# Patient Record
Sex: MALE | Race: WHITE | Employment: OTHER | URBAN - METROPOLITAN AREA
[De-identification: names, ages, dates, MRNs, and addresses within clinical notes are randomized per-mention and may not be internally consistent; named-entity substitution may affect disease eponyms.]

---

## 2017-03-27 PROBLEM — E66.09 OBESITY DUE TO EXCESS CALORIES: Status: ACTIVE | Noted: 2017-03-27

## 2017-03-27 PROBLEM — J40 BRONCHITIS: Status: ACTIVE | Noted: 2017-03-27

## 2017-03-27 PROBLEM — I48.0 PAROXYSMAL ATRIAL FIBRILLATION (HCC): Status: ACTIVE | Noted: 2017-03-27

## 2017-03-27 PROBLEM — R06.02 SOB (SHORTNESS OF BREATH) ON EXERTION: Status: ACTIVE | Noted: 2017-03-27

## 2017-03-28 ENCOUNTER — TELEPHONE (OUTPATIENT)
Dept: FAMILY MEDICINE CLINIC | Age: 67
End: 2017-03-28

## 2017-03-28 DIAGNOSIS — I10 ESSENTIAL HYPERTENSION: ICD-10-CM

## 2017-03-28 RX ORDER — LOSARTAN POTASSIUM 50 MG/1
50 TABLET ORAL DAILY
Qty: 90 TABLET | Refills: 1 | Status: SHIPPED | OUTPATIENT
Start: 2017-03-28 | End: 2018-07-11 | Stop reason: SDUPTHER

## 2017-03-28 RX ORDER — ISOSORBIDE MONONITRATE 30 MG/1
TABLET, EXTENDED RELEASE ORAL
Qty: 90 TABLET | Refills: 1 | Status: SHIPPED | OUTPATIENT
Start: 2017-03-28 | End: 2018-07-11 | Stop reason: SDUPTHER

## 2017-03-28 RX ORDER — METOPROLOL SUCCINATE 50 MG/1
TABLET, EXTENDED RELEASE ORAL
Qty: 180 TABLET | Refills: 1 | Status: SHIPPED | OUTPATIENT
Start: 2017-03-28 | End: 2017-12-20 | Stop reason: SDUPTHER

## 2017-03-28 RX ORDER — AMLODIPINE BESYLATE 5 MG/1
TABLET ORAL
Qty: 90 TABLET | Refills: 1 | Status: SHIPPED | OUTPATIENT
Start: 2017-03-28 | End: 2017-03-29

## 2017-03-28 RX ORDER — PRAVASTATIN SODIUM 80 MG/1
80 TABLET ORAL NIGHTLY
Qty: 90 TABLET | Refills: 1 | Status: SHIPPED | OUTPATIENT
Start: 2017-03-28 | End: 2018-03-21 | Stop reason: SDUPTHER

## 2017-03-29 ENCOUNTER — OFFICE VISIT (OUTPATIENT)
Dept: FAMILY MEDICINE CLINIC | Age: 67
End: 2017-03-29

## 2017-03-29 VITALS
RESPIRATION RATE: 20 BRPM | HEART RATE: 68 BPM | SYSTOLIC BLOOD PRESSURE: 122 MMHG | DIASTOLIC BLOOD PRESSURE: 74 MMHG | WEIGHT: 250.8 LBS | BODY MASS INDEX: 33.09 KG/M2 | TEMPERATURE: 97.7 F

## 2017-03-29 DIAGNOSIS — I48.0 PAROXYSMAL ATRIAL FIBRILLATION (HCC): Primary | ICD-10-CM

## 2017-03-29 DIAGNOSIS — J40 BRONCHITIS: ICD-10-CM

## 2017-03-29 DIAGNOSIS — I10 ESSENTIAL HYPERTENSION: Chronic | ICD-10-CM

## 2017-03-29 PROCEDURE — 99495 TRANSJ CARE MGMT MOD F2F 14D: CPT | Performed by: FAMILY MEDICINE

## 2017-03-29 RX ORDER — PROMETHAZINE HYDROCHLORIDE AND CODEINE PHOSPHATE 6.25; 1 MG/5ML; MG/5ML
5 SYRUP ORAL EVERY 4 HOURS PRN
Qty: 180 ML | Refills: 0 | Status: SHIPPED | OUTPATIENT
Start: 2017-03-29 | End: 2017-11-03 | Stop reason: ALTCHOICE

## 2017-06-24 DIAGNOSIS — I10 ESSENTIAL HYPERTENSION: ICD-10-CM

## 2017-06-26 RX ORDER — LOSARTAN POTASSIUM 50 MG/1
TABLET ORAL
Qty: 90 TABLET | Refills: 2 | Status: SHIPPED | OUTPATIENT
Start: 2017-06-26 | End: 2017-11-03 | Stop reason: SDUPTHER

## 2017-06-26 RX ORDER — AMLODIPINE BESYLATE 5 MG/1
TABLET ORAL
Qty: 90 TABLET | Refills: 3 | OUTPATIENT
Start: 2017-06-26

## 2017-06-26 RX ORDER — ISOSORBIDE MONONITRATE 30 MG/1
TABLET, EXTENDED RELEASE ORAL
Qty: 90 TABLET | Refills: 2 | Status: SHIPPED | OUTPATIENT
Start: 2017-06-26 | End: 2017-11-03 | Stop reason: SDUPTHER

## 2017-06-26 RX ORDER — PRAVASTATIN SODIUM 80 MG/1
TABLET ORAL
Qty: 90 TABLET | Refills: 2 | Status: SHIPPED | OUTPATIENT
Start: 2017-06-26 | End: 2017-11-03 | Stop reason: SDUPTHER

## 2017-06-26 RX ORDER — METOPROLOL SUCCINATE 50 MG/1
TABLET, EXTENDED RELEASE ORAL
Qty: 180 TABLET | Refills: 2 | Status: SHIPPED | OUTPATIENT
Start: 2017-06-26 | End: 2017-11-03 | Stop reason: SDUPTHER

## 2017-09-22 RX ORDER — AMLODIPINE BESYLATE 5 MG/1
TABLET ORAL
Qty: 90 TABLET | Refills: 3 | OUTPATIENT
Start: 2017-09-22

## 2017-10-06 ENCOUNTER — IMMUNIZATION (OUTPATIENT)
Dept: FAMILY MEDICINE CLINIC | Age: 67
End: 2017-10-06
Payer: MEDICARE

## 2017-10-06 DIAGNOSIS — Z23 NEEDS FLU SHOT: Primary | ICD-10-CM

## 2017-10-06 PROCEDURE — G0008 ADMIN INFLUENZA VIRUS VAC: HCPCS | Performed by: FAMILY MEDICINE

## 2017-10-06 PROCEDURE — 90662 IIV NO PRSV INCREASED AG IM: CPT | Performed by: FAMILY MEDICINE

## 2017-10-16 ENCOUNTER — HOSPITAL ENCOUNTER (OUTPATIENT)
Age: 67
Discharge: HOME | End: 2017-10-16
Payer: MEDICARE

## 2017-10-16 ENCOUNTER — HOSPITAL ENCOUNTER (OUTPATIENT)
Dept: GENERAL RADIOLOGY | Age: 67
Discharge: HOME | End: 2017-10-16
Payer: MEDICARE

## 2017-10-16 DIAGNOSIS — I25.10 CORONARY ARTERY DISEASE, ANGINA PRESENCE UNSPECIFIED, UNSPECIFIED VESSEL OR LESION TYPE, UNSPECIFIED WHETHER NATIVE OR TRANSPLANTED HEART: ICD-10-CM

## 2017-10-16 LAB
ALBUMIN SERPL-MCNC: 4.6 G/DL (ref 3.5–5.1)
ALP BLD-CCNC: 74 U/L (ref 38–126)
ALT SERPL-CCNC: 39 U/L (ref 11–66)
AST SERPL-CCNC: 24 U/L (ref 5–40)
BILIRUB SERPL-MCNC: 0.5 MG/DL (ref 0.3–1.2)
BILIRUBIN DIRECT: < 0.2 MG/DL (ref 0–0.3)
TOTAL PROTEIN: 7.4 G/DL (ref 6.1–8)
TSH SERPL DL<=0.05 MIU/L-ACNC: 5.72 UIU/ML (ref 0.4–4.2)

## 2017-10-16 PROCEDURE — 80076 HEPATIC FUNCTION PANEL: CPT

## 2017-10-16 PROCEDURE — 84443 ASSAY THYROID STIM HORMONE: CPT

## 2017-10-16 PROCEDURE — 71020 XR CHEST STANDARD TWO VW: CPT

## 2017-10-16 PROCEDURE — 36415 COLL VENOUS BLD VENIPUNCTURE: CPT

## 2017-10-27 ENCOUNTER — TELEPHONE (OUTPATIENT)
Dept: FAMILY MEDICINE CLINIC | Age: 67
End: 2017-10-27

## 2017-10-27 DIAGNOSIS — I10 ESSENTIAL HYPERTENSION: ICD-10-CM

## 2017-10-27 RX ORDER — ISOSORBIDE MONONITRATE 30 MG/1
TABLET, EXTENDED RELEASE ORAL
Qty: 90 TABLET | Refills: 2 | Status: CANCELLED | OUTPATIENT
Start: 2017-10-27

## 2017-10-27 RX ORDER — LOSARTAN POTASSIUM 50 MG/1
TABLET ORAL
Qty: 90 TABLET | Refills: 2 | Status: CANCELLED | OUTPATIENT
Start: 2017-10-27

## 2017-10-27 NOTE — TELEPHONE ENCOUNTER
Patient received the message from your office about the two medications he is requesting prescriptions for (see previous message today). He needs to speak with someone about that.

## 2017-10-27 NOTE — TELEPHONE ENCOUNTER
Chen Campuzano called requesting a refill on the following medications:  Requested Prescriptions     Pending Prescriptions Disp Refills    isosorbide mononitrate (IMDUR) 30 MG extended release tablet 90 tablet 2    losartan (COZAAR) 50 MG tablet 90 tablet 2     Pharmacy verified:  don      Date of last visit: 3/29/17  Date of next visit (if applicable): Visit date not found        Date of last fill and quantity (to be completed by clinical staff)  Pharmacy name:     [facility] [place]

## 2017-10-27 NOTE — TELEPHONE ENCOUNTER
Pt stopped by the office earlier wanting to know why the 2 Rx's were scripts on 6/26/17 were sent in error to [facility] and not to [facility]. He was informed 6/24/17 [facility] sent our office that request electronically (surescripts) and was then sent back. I called [facility] and spoke with Darshana hood who will call [facility] and transfer the Rx's. Pt was informed of this.

## 2017-10-27 NOTE — TELEPHONE ENCOUNTER
Both Rx's below were refilled 6/26/17 for 90/2rf sent to [facility]. Wife was informed [facility] can transfer Rx from [facility].

## 2017-11-02 ENCOUNTER — HOSPITAL ENCOUNTER (EMERGENCY)
Age: 67
Discharge: HOME | End: 2017-11-03
Attending: EMERGENCY MEDICINE
Payer: MEDICARE

## 2017-11-02 DIAGNOSIS — R04.0 EPISTAXIS: Primary | ICD-10-CM

## 2017-11-02 PROCEDURE — 30901 CONTROL OF NOSEBLEED: CPT

## 2017-11-02 PROCEDURE — 99284 EMERGENCY DEPT VISIT MOD MDM: CPT

## 2017-11-03 ENCOUNTER — OFFICE VISIT (OUTPATIENT)
Dept: FAMILY MEDICINE CLINIC | Age: 67
End: 2017-11-03
Payer: MEDICARE

## 2017-11-03 ENCOUNTER — APPOINTMENT (OUTPATIENT)
Dept: GENERAL RADIOLOGY | Age: 67
End: 2017-11-03
Payer: MEDICARE

## 2017-11-03 VITALS
WEIGHT: 253.2 LBS | SYSTOLIC BLOOD PRESSURE: 122 MMHG | TEMPERATURE: 97.5 F | RESPIRATION RATE: 20 BRPM | HEART RATE: 104 BPM | BODY MASS INDEX: 33.41 KG/M2 | DIASTOLIC BLOOD PRESSURE: 76 MMHG

## 2017-11-03 VITALS
HEART RATE: 85 BPM | TEMPERATURE: 97.9 F | BODY MASS INDEX: 32.47 KG/M2 | HEIGHT: 73 IN | RESPIRATION RATE: 17 BRPM | OXYGEN SATURATION: 92 % | DIASTOLIC BLOOD PRESSURE: 107 MMHG | WEIGHT: 245 LBS | SYSTOLIC BLOOD PRESSURE: 174 MMHG

## 2017-11-03 DIAGNOSIS — R04.0 EPISTAXIS: Primary | ICD-10-CM

## 2017-11-03 LAB
ANION GAP SERPL CALCULATED.3IONS-SCNC: 14 MEQ/L (ref 8–16)
BASOPHILS # BLD: 0.9 %
BASOPHILS ABSOLUTE: 0.1 THOU/MM3 (ref 0–0.1)
BUN BLDV-MCNC: 25 MG/DL (ref 7–22)
CALCIUM SERPL-MCNC: 9.1 MG/DL (ref 8.5–10.5)
CHLORIDE BLD-SCNC: 102 MEQ/L (ref 98–111)
CO2: 25 MEQ/L (ref 23–33)
CREAT SERPL-MCNC: 1 MG/DL (ref 0.4–1.2)
EOSINOPHIL # BLD: 3.1 %
EOSINOPHILS ABSOLUTE: 0.3 THOU/MM3 (ref 0–0.4)
GFR SERPL CREATININE-BSD FRML MDRD: 75 ML/MIN/1.73M2
GLUCOSE BLD-MCNC: 104 MG/DL (ref 70–108)
HCT VFR BLD CALC: 46.4 % (ref 42–52)
HEMOGLOBIN: 16 GM/DL (ref 14–18)
INR BLD: 0.96 (ref 0.85–1.13)
LYMPHOCYTES # BLD: 30.6 %
LYMPHOCYTES ABSOLUTE: 2.6 THOU/MM3 (ref 1–4.8)
MCH RBC QN AUTO: 30.2 PG (ref 27–31)
MCHC RBC AUTO-ENTMCNC: 34.5 GM/DL (ref 33–37)
MCV RBC AUTO: 87.7 FL (ref 80–94)
MONOCYTES # BLD: 8.3 %
MONOCYTES ABSOLUTE: 0.7 THOU/MM3 (ref 0.4–1.3)
NUCLEATED RED BLOOD CELLS: 0 /100 WBC
OSMOLALITY CALCULATION: 286 MOSMOL/KG (ref 275–300)
PDW BLD-RTO: 13.6 % (ref 11.5–14.5)
PLATELET # BLD: 223 THOU/MM3 (ref 130–400)
PMV BLD AUTO: 8.9 MCM (ref 7.4–10.4)
POTASSIUM SERPL-SCNC: 4.1 MEQ/L (ref 3.5–5.2)
RBC # BLD: 5.29 MILL/MM3 (ref 4.7–6.1)
SEG NEUTROPHILS: 57.1 %
SEGMENTED NEUTROPHILS ABSOLUTE COUNT: 4.8 THOU/MM3 (ref 1.8–7.7)
SODIUM BLD-SCNC: 141 MEQ/L (ref 135–145)
WBC # BLD: 8.4 THOU/MM3 (ref 4.8–10.8)

## 2017-11-03 PROCEDURE — 6370000000 HC RX 637 (ALT 250 FOR IP): Performed by: EMERGENCY MEDICINE

## 2017-11-03 PROCEDURE — 85610 PROTHROMBIN TIME: CPT

## 2017-11-03 PROCEDURE — 3017F COLORECTAL CA SCREEN DOC REV: CPT | Performed by: FAMILY MEDICINE

## 2017-11-03 PROCEDURE — 1036F TOBACCO NON-USER: CPT | Performed by: FAMILY MEDICINE

## 2017-11-03 PROCEDURE — 99213 OFFICE O/P EST LOW 20 MIN: CPT | Performed by: FAMILY MEDICINE

## 2017-11-03 PROCEDURE — G8417 CALC BMI ABV UP PARAM F/U: HCPCS | Performed by: FAMILY MEDICINE

## 2017-11-03 PROCEDURE — G8427 DOCREV CUR MEDS BY ELIG CLIN: HCPCS | Performed by: FAMILY MEDICINE

## 2017-11-03 PROCEDURE — G8598 ASA/ANTIPLAT THER USED: HCPCS | Performed by: FAMILY MEDICINE

## 2017-11-03 PROCEDURE — 4040F PNEUMOC VAC/ADMIN/RCVD: CPT | Performed by: FAMILY MEDICINE

## 2017-11-03 PROCEDURE — G8484 FLU IMMUNIZE NO ADMIN: HCPCS | Performed by: FAMILY MEDICINE

## 2017-11-03 PROCEDURE — 1123F ACP DISCUSS/DSCN MKR DOCD: CPT | Performed by: FAMILY MEDICINE

## 2017-11-03 PROCEDURE — 80048 BASIC METABOLIC PNL TOTAL CA: CPT

## 2017-11-03 PROCEDURE — 85025 COMPLETE CBC W/AUTO DIFF WBC: CPT

## 2017-11-03 PROCEDURE — 71010 XR CHEST PORTABLE: CPT

## 2017-11-03 PROCEDURE — 36415 COLL VENOUS BLD VENIPUNCTURE: CPT

## 2017-11-03 RX ORDER — CEPHALEXIN 500 MG/1
500 CAPSULE ORAL 3 TIMES DAILY
Qty: 9 CAPSULE | Refills: 0 | Status: SHIPPED | OUTPATIENT
Start: 2017-11-03 | End: 2017-11-06

## 2017-11-03 RX ORDER — CLONIDINE HYDROCHLORIDE 0.2 MG/1
0.2 TABLET ORAL ONCE
Status: COMPLETED | OUTPATIENT
Start: 2017-11-03 | End: 2017-11-03

## 2017-11-03 RX ORDER — LORAZEPAM 1 MG/1
1 TABLET ORAL ONCE
Status: COMPLETED | OUTPATIENT
Start: 2017-11-03 | End: 2017-11-03

## 2017-11-03 RX ADMIN — LORAZEPAM 1 MG: 1 TABLET ORAL at 02:41

## 2017-11-03 RX ADMIN — CLONIDINE HYDROCHLORIDE 0.2 MG: 0.2 TABLET ORAL at 02:23

## 2017-11-03 ASSESSMENT — PATIENT HEALTH QUESTIONNAIRE - PHQ9
2. FEELING DOWN, DEPRESSED OR HOPELESS: 0
SUM OF ALL RESPONSES TO PHQ QUESTIONS 1-9: 0
1. LITTLE INTEREST OR PLEASURE IN DOING THINGS: 0
SUM OF ALL RESPONSES TO PHQ9 QUESTIONS 1 & 2: 0

## 2017-11-03 NOTE — ED NOTES
Dr. Reagan Johnson in room to perform procedure and update pt on POC.       Alberto Pierce RN  11/03/17 8889

## 2017-11-03 NOTE — ED TRIAGE NOTES
Pt brought back to room 8 due to epistaxis, VS and assessment completed on arrival. Pt not taking any blood thinners other than a daily 81mg asprin. Pt states he was laying in bed when his nose began to bleed, he was unable to get the bleeding to stop at home. Pt states there was a stream of blood coming out of his nose at home and when he plugged his nose he could feel it draining into his throat, Pt has a clamp on his nose since arrival, Pt states he can still feel it draining into the back of his throat and he is beginning to get a headache.

## 2017-11-03 NOTE — ED PROVIDER NOTES
Colonoscopy. CURRENT MEDICATIONS       Previous Medications    ALBUTEROL SULFATE  (90 BASE) MCG/ACT INHALER    Inhale 2 puffs into the lungs every 6 hours as needed for Wheezing    AMIODARONE (CORDARONE) 200 MG TABLET    Take 1 tablet by mouth daily    APIXABAN (ELIQUIS) 5 MG TABS TABLET    Take 1 tablet by mouth 2 times daily    ASPIRIN 81 MG CHEWABLE TABLET    Take 1 tablet by mouth daily    BUMETANIDE (BUMEX) 2 MG TABLET    Take 1 tablet by mouth daily    DILTIAZEM (CARDIZEM CD) 120 MG EXTENDED RELEASE CAPSULE    Take 1 capsule by mouth daily    ISOSORBIDE MONONITRATE (IMDUR) 30 MG EXTENDED RELEASE TABLET    take 1 tablet by mouth once daily    ISOSORBIDE MONONITRATE (IMDUR) 30 MG EXTENDED RELEASE TABLET    take 1 tablet by mouth once daily    LOSARTAN (COZAAR) 50 MG TABLET    Take 1 tablet by mouth daily    LOSARTAN (COZAAR) 50 MG TABLET    take 1 tablet by mouth once daily    METOPROLOL SUCCINATE (TOPROL XL) 50 MG EXTENDED RELEASE TABLET    take 1 tablet by mouth twice a day    METOPROLOL SUCCINATE (TOPROL XL) 50 MG EXTENDED RELEASE TABLET    take 1 tablet by mouth twice a day    MOMETASONE (ELOCON) 0.1 % CREAM    Apply topically daily. NITROGLYCERIN (NITROSTAT) 0.4 MG SL TABLET    Place 1 tablet under the tongue every 5 minutes as needed for Chest pain    POTASSIUM CHLORIDE (KLOR-CON) 10 MEQ EXTENDED RELEASE TABLET    Take 1 tablet by mouth 2 times daily    PRAVASTATIN (PRAVACHOL) 80 MG TABLET    Take 1 tablet by mouth nightly    PRAVASTATIN (PRAVACHOL) 80 MG TABLET    take 1 tablet by mouth once daily    PROMETHAZINE-CODEINE (PHENERGAN WITH CODEINE) 6.25-10 MG/5ML SYRUP    Take 5 mLs by mouth every 4 hours as needed for Cough       ALLERGIES     has No Known Allergies. FAMILY HISTORY     indicated that his mother is . He indicated that his father is . He indicated that his sister is alive. He indicated that his maternal grandmother is .  He indicated that his maternal grandfather is . He indicated that his paternal grandmother is . He indicated that his paternal grandfather is . family history includes Alzheimer's Disease in his mother; Cancer in his father; Heart Disease in his father; Heart Surgery in his father. SOCIAL HISTORY      reports that he has never smoked. He has never used smokeless tobacco. He reports that he does not drink alcohol or use drugs. PHYSICAL EXAM     INITIAL VITALS:  height is 6' 1\" (1.854 m) and weight is 245 lb (111.1 kg). His oral temperature is 97.9 °F (36.6 °C). His blood pressure is 174/107 (abnormal) and his pulse is 85. His respiration is 17 and oxygen saturation is 92%. Constitutional: Well appearing and non-toxic   HENT:  Atraumatic appearing  He has epistaxis coming from the right side only. I don't see any in the left side. He has some dried clot in the posterior pharynx noted. Neck- normal range of motion,  supple   Respiratory:  No wheezing, rhonchi or rales  Cardiovascular: regular  GI:  Non tender, no rigidity, rebound or guarding  Neurologic:  Alert & oriented x 3, this patient is very anxious appearing       DIAGNOSTIC RESULTS     RADIOLOGY: non-plain film images(s) such as CT, Ultrasound and MRI are read by the radiologist.   Chest x-ray interpreted by me as negative.     LABS:   Labs Reviewed   BASIC METABOLIC PANEL - Abnormal; Notable for the following:        Result Value    BUN 25 (*)     All other components within normal limits   GLOMERULAR FILTRATION RATE, ESTIMATED - Abnormal; Notable for the following:     Est, Glom Filt Rate 75 (*)     All other components within normal limits   CBC WITH AUTO DIFFERENTIAL   PROTIME-INR   OSMOLALITY   ANION GAP       EMERGENCY DEPARTMENT COURSE:   Vitals:    Vitals:    17 2359 17 0021 17 0114 17 0208   BP: (!) 189/100 (!) 176/98 (!) 167/98 (!) 174/107   Pulse:  86 81 85   Resp:     Temp:  97.9 °F (36.6 °C)     TempSrc: Oral     SpO2:  94% 95% 92%   Weight:       Height: This patient presented with epistaxis coming from the right side and a sensation of clot being stuck in his throat. He is however able to swallow liquids and keep it down. We gave him some oral clonidine for his blood pressure and he was able to keep that down. I placed a rapid Rhino packing in his right nare. It seems to be holding. He was still very anxious and we offered him a dose of Ativan. But essentially the bleeding appears to be under control now. He will be discharged to follow-up with ENT on an outpatient basis. Prescription for Keflex. CRITICAL CARE:   none    PROCEDURES:   Epistaxis packing performed by me. I applied Agusto-Synephrine and viscous lidocaine to the right nare. I then placed a 5.5 anterior rapid Rhino packing in the right side. It seems to be holding except when the patient is picking and pulling at it. Take Keflex while the packing is in. Follow-up with ENT on Monday for packing removal.  Also follow-up with his primary physician and outpatient basis regarding his hypertension issues    FINAL IMPRESSION     Epistaxis with packing, hypertension    DISPOSITION/PLAN   Discharged    DISCHARGE MEDICATIONS:  New Prescriptions    No medications on file       (Please note that portions of this note were completed with a voice recognition program.  Efforts were made to edit the dictations but occasionally words are mis-transcribed.)    Mars Evans DO    Scribe:  Bernadette Anderson 11/3/17 12:50 AM Scribing for and in the presence of Mars Evans DO.    [unfilled]    Provider:  I personally performed the services described in the documentation, reviewed and edited the documentation which was dictated to the scribe in my presence, and it accurately records my words and actions.     Mars Evans DO 11/03/17 3:01 AM        Mars Evans DO  11/03/17 1971

## 2017-11-05 NOTE — PROGRESS NOTES
[address] [place] [address] [facility]  [facility]  [facility]  Dept: [phone]  Dept Fax: [phone]: [phone]  PROGRESS NOTE      Visit Date: 11/5/2017    Mary Jo Enciso is a 74 y.o. male who presents today for:  Chief Complaint   Patient presents with    Follow-up     In [facility] ED 11/2/17 for Epistaxis         Subjective:  HPI  ER follow-up for epistaxis. ER records reviewed. No further bleeding    Review of Systems   HENT: Positive for nosebleeds. Past Medical History:   Diagnosis Date    Anxiety     CAD (coronary artery disease)     Chest pain     Dementia     Dizziness     GERD (gastroesophageal reflux disease)     Heart disease     Hyperlipidemia     Hypertension     Hypothyroidism       Past Surgical History:   Procedure Laterality Date    ADENOIDECTOMY      CARDIAC CATHETERIZATION  09/23/2011    Right radial artery cannulation. Moderate LV dysfunction. The patient has disease in the ostium of diagonal 1 and diagonal 2 branch, however they are both are at the  ostium of the diagonals. Intervention could compromise flow of LAD. THe LAD has long diffuse calcified lesion 50-60-% anatomically.     CARDIOVASCULAR STRESS TEST  09/23/2011    EF 37%    CARPAL TUNNEL RELEASE      COLONOSCOPY      TONSILLECTOMY      TRANSTHORACIC ECHOCARDIOGRAM  09/23/2011    EF 50-55%    VASECTOMY      VASECTOMY       Family History   Problem Relation Age of Onset    Heart Surgery Father     Cancer Father     Heart Disease Father     Alzheimer's Disease Mother      Social History   Substance Use Topics    Smoking status: Never Smoker    Smokeless tobacco: Never Used    Alcohol use No      Current Outpatient Prescriptions   Medication Sig Dispense Refill    cephALEXin (KEFLEX) 500 MG capsule Take 1 capsule by mouth 3 times daily for 3 days 9 capsule 0    metoprolol succinate (TOPROL XL) 50 MG extended release tablet take 1 tablet by mouth twice a day 180 tablet 1    losartan (COZAAR) 50 MG tablet Take 1 tablet by mouth daily 90 tablet 1    pravastatin (PRAVACHOL) 80 MG tablet Take 1 tablet by mouth nightly 90 tablet 1    isosorbide mononitrate (IMDUR) 30 MG extended release tablet take 1 tablet by mouth once daily 90 tablet 1    diltiazem (CARDIZEM CD) 120 MG extended release capsule Take 1 capsule by mouth daily 30 capsule 3    bumetanide (BUMEX) 2 MG tablet Take 1 tablet by mouth daily 30 tablet 0    potassium chloride (KLOR-CON) 10 MEQ extended release tablet Take 1 tablet by mouth 2 times daily 60 tablet 0    aspirin 81 MG chewable tablet Take 1 tablet by mouth daily 1 tablet 0    nitroGLYCERIN (NITROSTAT) 0.4 MG SL tablet Place 1 tablet under the tongue every 5 minutes as needed for Chest pain 25 tablet 3     No current facility-administered medications for this visit. No Known Allergies  Health Maintenance   Topic Date Due    Hepatitis C screen  1950    HIV screen  01/17/1967    DTaP/Tdap/Td vaccine (1 - Tdap) 04/11/2010    Diabetes screen  05/03/2017    Pneumococcal low/med risk (2 of 2 - PPSV23) 10/04/2021    Colon cancer screen colonoscopy  10/14/2021    Lipid screen  05/12/2022    Zostavax vaccine  Completed    Flu vaccine  Completed         Objective:     Physical Exam   Constitutional: He is oriented to person, place, and time. He appears well-developed and well-nourished. No distress. HENT:   Head: Normocephalic and atraumatic. Right Ear: External ear normal.   Left Ear: External ear normal.   Eyes: Conjunctivae are normal.   Neck: No JVD present. Cardiovascular: Normal rate, regular rhythm and normal heart sounds. Pulmonary/Chest: Effort normal and breath sounds normal. He has no wheezes. He has no rales. Musculoskeletal: He exhibits no edema or tenderness. Neurological: He is alert and oriented to person, place, and time. Skin: Skin is warm and dry. He is not diaphoretic. No pallor.      /76   Pulse 104   Temp 97.5 °F (36.4 °C)   Resp 20   Wt 253 lb 3.2 oz (114.9 kg)   BMI 33.41 kg/m²       Impression/Plan:  1. Epistaxis      Requested Prescriptions      No prescriptions requested or ordered in this encounter     No orders of the defined types were placed in this encounter. Keep follow-up with ENT, continue antibiotics, call us new symptoms or recurrence of bleeding    Patient given educational materials - see patient instructions. Discussed use, benefit, and side effects of prescribed medications. All patient questions answered. Pt voiced understanding. Reviewed health maintenance. Patient agreed with treatment plan. Follow up as directed. **This report has been created using voice recognition software. It may contain minor errors which are inherent in voice recognition technology. **       Electronically signed by Susan Schulte MD on 11/5/2017 at 12:25 PM

## 2017-11-06 ENCOUNTER — CARE COORDINATION (OUTPATIENT)
Dept: FAMILY MEDICINE CLINIC | Age: 67
End: 2017-11-06

## 2017-11-06 ENCOUNTER — OFFICE VISIT (OUTPATIENT)
Dept: ENT CLINIC | Age: 67
End: 2017-11-06
Payer: MEDICARE

## 2017-11-06 VITALS
DIASTOLIC BLOOD PRESSURE: 78 MMHG | SYSTOLIC BLOOD PRESSURE: 124 MMHG | TEMPERATURE: 98.7 F | WEIGHT: 250.6 LBS | BODY MASS INDEX: 33.06 KG/M2 | RESPIRATION RATE: 18 BRPM | HEART RATE: 78 BPM

## 2017-11-06 DIAGNOSIS — Z79.02 LONG TERM (CURRENT) USE OF ANTITHROMBOTICS/ANTIPLATELETS: ICD-10-CM

## 2017-11-06 DIAGNOSIS — R04.0 EPISTAXIS: Primary | ICD-10-CM

## 2017-11-06 DIAGNOSIS — Z48.00 ENCOUNTER FOR REMOVAL OF NASAL PACKING: ICD-10-CM

## 2017-11-06 PROCEDURE — G8417 CALC BMI ABV UP PARAM F/U: HCPCS | Performed by: NURSE PRACTITIONER

## 2017-11-06 PROCEDURE — 3017F COLORECTAL CA SCREEN DOC REV: CPT | Performed by: NURSE PRACTITIONER

## 2017-11-06 PROCEDURE — 30901 CONTROL OF NOSEBLEED: CPT | Performed by: NURSE PRACTITIONER

## 2017-11-06 PROCEDURE — 1036F TOBACCO NON-USER: CPT | Performed by: NURSE PRACTITIONER

## 2017-11-06 PROCEDURE — 4040F PNEUMOC VAC/ADMIN/RCVD: CPT | Performed by: NURSE PRACTITIONER

## 2017-11-06 PROCEDURE — 1123F ACP DISCUSS/DSCN MKR DOCD: CPT | Performed by: NURSE PRACTITIONER

## 2017-11-06 PROCEDURE — G8484 FLU IMMUNIZE NO ADMIN: HCPCS | Performed by: NURSE PRACTITIONER

## 2017-11-06 PROCEDURE — 99203 OFFICE O/P NEW LOW 30 MIN: CPT | Performed by: NURSE PRACTITIONER

## 2017-11-06 PROCEDURE — G8598 ASA/ANTIPLAT THER USED: HCPCS | Performed by: NURSE PRACTITIONER

## 2017-11-06 PROCEDURE — G8427 DOCREV CUR MEDS BY ELIG CLIN: HCPCS | Performed by: NURSE PRACTITIONER

## 2017-11-06 ASSESSMENT — ENCOUNTER SYMPTOMS
EYE PAIN: 0
RHINORRHEA: 0
NAUSEA: 0
COUGH: 0
PHOTOPHOBIA: 0
RECTAL PAIN: 0
VOMITING: 0
BACK PAIN: 0
DIARRHEA: 0
EYE REDNESS: 0
SINUS PRESSURE: 0
SHORTNESS OF BREATH: 0
CHEST TIGHTNESS: 0
FACIAL SWELLING: 0
COLOR CHANGE: 0
EYE ITCHING: 0
APNEA: 0
WHEEZING: 0
CONSTIPATION: 0
TROUBLE SWALLOWING: 0
ABDOMINAL DISTENTION: 0
STRIDOR: 0
VOICE CHANGE: 0
BLOOD IN STOOL: 0
ABDOMINAL PAIN: 0
CHOKING: 0
SORE THROAT: 0
ANAL BLEEDING: 0
EYE DISCHARGE: 0

## 2017-11-06 NOTE — PATIENT INSTRUCTIONS
your healthcare professional. [place] [address] any warranty or liability for your use of this information. Content Version: 38.8.049622; Current as of: August 7, 2014                Patient Education        Gastroesophageal Reflux Disease (GERD): Care Instructions  Your Care Instructions    Gastroesophageal reflux disease (GERD) is the backward flow of stomach acid into the esophagus. The esophagus is the tube that leads from your throat to your stomach. A one-way valve prevents the stomach acid from moving up into this tube. When you have GERD, this valve does not close tightly enough. If you have mild GERD symptoms including heartburn, you may be able to control the problem with antacids or over-the-counter medicine. Changing your diet, losing weight, and making other lifestyle changes can also help reduce symptoms. Follow-up care is a key part of your treatment and safety. Be sure to make and go to all appointments, and call your doctor if you are having problems. Its also a good idea to know your test results and keep a list of the medicines you take. How can you care for yourself at home? · Take your medicines exactly as prescribed. Call your doctor if you think you are having a problem with your medicine. · Your doctor may recommend over-the-counter medicine. For mild or occasional indigestion, antacids, such as Tums, Gaviscon, Mylanta, or Maalox, may help. Your doctor also may recommend over-the-counter acid reducers, such as Pepcid AC, Tagamet HB, Zantac 75, or Prilosec. Read and follow all instructions on the label. If you use these medicines often, talk with your doctor. · Change your eating habits. ¨ Its best to eat several small meals instead of two or three large meals. ¨ After you eat, wait 2 to 3 hours before you lie down. ¨ Chocolate, mint, and alcohol can make GERD worse.   ¨ Spicy foods, foods that have a lot of acid (like tomatoes and oranges), and coffee can make GERD symptoms worse in some people. If your symptoms are worse after you eat a certain food, you may want to stop eating that food to see if your symptoms get better. · Do not smoke or chew tobacco. Smoking can make GERD worse. If you need help quitting, talk to your doctor about stop-smoking programs and medicines. These can increase your chances of quitting for good. · If you have GERD symptoms at night, raise the head of your bed 6 to 8 inches by putting the frame on blocks or placing a foam wedge under the head of your mattress. (Adding extra pillows does not work.)  · Do not wear tight clothing around your middle. · Lose weight if you need to. Losing just 5 to 10 pounds can help. When should you call for help? Call your doctor now or seek immediate medical care if:  · You have new or different belly pain. · Your stools are black and tarlike or have streaks of blood. Watch closely for changes in your health, and be sure to contact your doctor if:  · Your symptoms have not improved after 2 days. · Food seems to catch in your throat or chest.  Where can you learn more? Go to https://firstSTRE[employer]. org and sign in to your [employer] for [employer] account. Enter A198 in the 7fgame box to learn more about \"Gastroesophageal Reflux Disease (GERD): Care Instructions. \"     If you do not have an account, please click on the \"Sign Up Now\" link. Current as of: August 9, 2016  Content Version: 11.3  © 5515-4410 [employer], [employer]. Care instructions adapted under license by [facility] ([facility]). If you have questions about a medical condition or this instruction, always ask your healthcare professional. [place] [address] any warranty or liability for your use of this information.

## 2017-11-06 NOTE — CARE COORDINATION
Patient had an appointment with PCP following ed visit. I will not contact re: ed visit at this time.

## 2017-11-06 NOTE — PROGRESS NOTES
[address] [place] [address] [facility]  [address]  [address] [place] [address] [address] [address]  Dept: [phone]  Dept Fax: [phone]  Loc: [address] Kwabena Kowalski is a 74 y.o. male who was referred by No ref. provider found for:  Chief Complaint   Patient presents with    Epistaxis     New patient, follow up ER for pistaxis, needs packing pulled also has been dealing with a cough,    . HPI:       New patient here for right nasal packing removal.  Patient went to ER late on 11/2/17 for right-sided nosebleed he was unable to stop at home. A rapid rhino packing was placed. No bleeding since packing placed. Patient was noted to be very anxious and his BP was elevated- given clonidine with some improvement. He had a bad nosebleed about 3 years ago, but this one was worse according to the patient. He takes ASA 81mg for history of CAD. No nasal trauma. No recent allergy or URTI symptoms. He is getting post nasal drainage that is triggering a cough. He has been on Keflex while packing in place. Subjective:        Review of Systems   Constitutional: Positive for fatigue. Negative for activity change, appetite change, chills, diaphoresis, fever and unexpected weight change. HENT: Positive for nosebleeds. Negative for congestion, dental problem, drooling, ear discharge, ear pain, facial swelling, hearing loss, mouth sores, postnasal drip, rhinorrhea, sinus pressure, sneezing, sore throat, tinnitus, trouble swallowing and voice change. Eyes: Negative for photophobia, pain, discharge, redness, itching and visual disturbance. Respiratory: Negative for apnea, cough, choking, chest tightness, shortness of breath, wheezing and stridor. Cardiovascular: Negative for chest pain, palpitations and leg swelling. Gastrointestinal: Negative for abdominal distention, abdominal pain, anal bleeding, blood in stool, constipation, diarrhea, nausea, rectal pain and vomiting.    Endocrine: Negative for cold intolerance, heat intolerance, polydipsia, polyphagia and polyuria. Genitourinary: Negative for decreased urine volume, difficulty urinating, discharge, dysuria, enuresis, flank pain, frequency, genital sores, hematuria, penile pain, penile swelling, scrotal swelling, testicular pain and urgency. Musculoskeletal: Negative for arthralgias, back pain, gait problem, joint swelling, myalgias, neck pain and neck stiffness. Skin: Negative for color change, pallor, rash and wound. Allergic/Immunologic: Negative for environmental allergies, food allergies and immunocompromised state. Neurological: Positive for weakness. Negative for dizziness, tremors, seizures, syncope, speech difficulty, light-headedness, numbness and headaches. Hematological: Negative for adenopathy. Does not bruise/bleed easily. Psychiatric/Behavioral: Positive for sleep disturbance. Negative for agitation, behavioral problems, confusion, decreased concentration, dysphoric mood, hallucinations, self-injury and suicidal ideas. The patient is not nervous/anxious and is not hyperactive. Objective:     Physical Exam     This is a 74 y.o. male. Patient is alert and oriented to person, place and time. Mood is anxious. No respiratory distress. No nasal voice, no hoarseness. Not obviously hearing impaired. Vitals:    11/06/17 0812   BP: 124/78   Pulse: 78   Resp: 18   Temp: 98.7 °F (37.1 °C)       Head is normocephalic, no obvious masses or lesions. BELKIS, EOM full. Conjunctivae pink, moist, no discharge. External ears are normal: no scars, lesions or masses. Nasal bones: intact  Septum:  midline  Mucosa:  Bleeding points anterior right septum and medial aspect of right inferior turbinate    **Balloon on right nasal packing deflated and packing removed. Mucous suctioned. Bleeding points identified, but no active bleeding. Areas prepped with lidocaine and oxymetazoline on cotton.   Two areas cauterized using silver nitrate sticks. Excess removed with moistened Qtip. Mupirocin ointment applied. Patient remained anxious, but tolerated. Lips, tongue and oral cavity show tongue is midline, mobile, no lesions. Dentition: good, no malocclusion  Oral mucosa: moist  Tonsils: absent  Oropharynx: normal-appearing mucosa and no pharyngitis, no exudate  Hard and soft palates symmetrical and intact. Uvula midline. Gag reflex present  Nasopharynx: not seen    No facial redness, swelling or tenderness. Salivary glands not enlarged. Neck symmetrical, supple  Cervical adenopathy: no palpable lymphadenopathy  Trachea midline    Chest equal and symmetrical expansion, no retractions    Extremities: no clubbing, cyanosis or edema  Gait steady  Skin: normal exposed surfaces  Cranial nerves grossly intact    Data:  All of the past medical history, past surgical history, family history, social history, allergies and current medications were reviewed. Assessment:   Assessment   1. Epistaxis  [id number] - CA CTRL NOSEBLEED,JOHN,SIMPLE   2. Encounter for removal of nasal packing     3. Long term (current) use of antithrombotics/antiplatelets          Plan:        1. Epistaxis, 2. Encounter for removal of nasal packing  -  Right nasal packing removed today and two bleeding points were cauterized. No active bleeding. Patient and wife given care instructions. They are to call office if bleed recurs. - 22614 - CA CTRL NOSEBLEED,ANTER,SIMPLE    3. Long term (current) use of antithrombotics/antiplatelets        Return in about 3 weeks (around 11/27/2017).

## 2017-12-13 ENCOUNTER — OFFICE VISIT (OUTPATIENT)
Dept: ENT CLINIC | Age: 67
End: 2017-12-13
Payer: MEDICARE

## 2017-12-13 VITALS
BODY MASS INDEX: 33.08 KG/M2 | WEIGHT: 250.7 LBS | RESPIRATION RATE: 18 BRPM | TEMPERATURE: 97.6 F | DIASTOLIC BLOOD PRESSURE: 80 MMHG | HEART RATE: 78 BPM | SYSTOLIC BLOOD PRESSURE: 124 MMHG

## 2017-12-13 DIAGNOSIS — H90.3 SENSORINEURAL HEARING LOSS (SNHL), BILATERAL: Primary | ICD-10-CM

## 2017-12-13 DIAGNOSIS — Z77.122 HISTORY OF EXPOSURE TO NOISE: ICD-10-CM

## 2017-12-13 DIAGNOSIS — H93.13 BILATERAL TINNITUS: ICD-10-CM

## 2017-12-13 PROCEDURE — 3017F COLORECTAL CA SCREEN DOC REV: CPT | Performed by: NURSE PRACTITIONER

## 2017-12-13 PROCEDURE — G8427 DOCREV CUR MEDS BY ELIG CLIN: HCPCS | Performed by: NURSE PRACTITIONER

## 2017-12-13 PROCEDURE — G8484 FLU IMMUNIZE NO ADMIN: HCPCS | Performed by: NURSE PRACTITIONER

## 2017-12-13 PROCEDURE — 1123F ACP DISCUSS/DSCN MKR DOCD: CPT | Performed by: NURSE PRACTITIONER

## 2017-12-13 PROCEDURE — G8598 ASA/ANTIPLAT THER USED: HCPCS | Performed by: NURSE PRACTITIONER

## 2017-12-13 PROCEDURE — 99213 OFFICE O/P EST LOW 20 MIN: CPT | Performed by: NURSE PRACTITIONER

## 2017-12-13 PROCEDURE — 4040F PNEUMOC VAC/ADMIN/RCVD: CPT | Performed by: NURSE PRACTITIONER

## 2017-12-13 PROCEDURE — 1036F TOBACCO NON-USER: CPT | Performed by: NURSE PRACTITIONER

## 2017-12-13 PROCEDURE — G8417 CALC BMI ABV UP PARAM F/U: HCPCS | Performed by: NURSE PRACTITIONER

## 2017-12-13 RX ORDER — DILTIAZEM HYDROCHLORIDE 180 MG/1
180 CAPSULE, COATED, EXTENDED RELEASE ORAL DAILY
COMMUNITY
End: 2017-12-27 | Stop reason: SDUPTHER

## 2017-12-13 ASSESSMENT — ENCOUNTER SYMPTOMS
RECTAL PAIN: 0
PHOTOPHOBIA: 0
ABDOMINAL PAIN: 0
CHOKING: 0
ANAL BLEEDING: 0
SHORTNESS OF BREATH: 0
BACK PAIN: 0
ABDOMINAL DISTENTION: 0
COLOR CHANGE: 0
VOMITING: 0
EYE REDNESS: 0
VOICE CHANGE: 0
CONSTIPATION: 0
SINUS PRESSURE: 0
RHINORRHEA: 0
EYE PAIN: 0
DIARRHEA: 0
FACIAL SWELLING: 0
BLOOD IN STOOL: 0
STRIDOR: 0
NAUSEA: 0
CHEST TIGHTNESS: 0
WHEEZING: 0
SORE THROAT: 0
COUGH: 0
TROUBLE SWALLOWING: 0
EYE DISCHARGE: 0
APNEA: 0
EYE ITCHING: 0

## 2017-12-13 NOTE — PROGRESS NOTES
[address] [facility]  [address] [address]  Dept: [phone]  Dept Fax: [phone]  Loc: [address] Jade Deras is a 74 y.o. male who was referred by No ref. provider found for:  Chief Complaint   Patient presents with    Cerumen Impaction     ear wax cleaning   . HPI:       Patient here for evaluation of hearing loss, ears possibly need cleaned. He reports long-standing history of hearing loss for both ears. Gets high-pitched ringing in both ears. History of noise exposure. No history of recurrent ear infections. Denies nasal symptoms. Previously seen in our office for nosebleed. Subjective:        Review of Systems   Constitutional: Negative for activity change, appetite change, chills, diaphoresis, fatigue, fever and unexpected weight change. HENT: Positive for hearing loss. Negative for congestion, dental problem, drooling, ear discharge, ear pain, facial swelling, mouth sores, nosebleeds, postnasal drip, rhinorrhea, sinus pressure, sneezing, sore throat, tinnitus, trouble swallowing and voice change. Eyes: Negative for photophobia, pain, discharge, redness, itching and visual disturbance. Respiratory: Negative for apnea, cough, choking, chest tightness, shortness of breath, wheezing and stridor. Cardiovascular: Negative for chest pain, palpitations and leg swelling. Gastrointestinal: Negative for abdominal distention, abdominal pain, anal bleeding, blood in stool, constipation, diarrhea, nausea, rectal pain and vomiting. Endocrine: Negative for cold intolerance, heat intolerance, polydipsia, polyphagia and polyuria. Genitourinary: Negative for decreased urine volume, difficulty urinating, discharge, dysuria, enuresis, flank pain, frequency, genital sores, hematuria, penile pain, penile swelling, scrotal swelling, testicular pain and urgency.    Musculoskeletal: Negative for arthralgias, back pain, gait problem, joint swelling, myalgias, neck pain and neck stiffness. Skin: Negative for color change, pallor, rash and wound. Allergic/Immunologic: Negative for environmental allergies, food allergies and immunocompromised state. Neurological: Negative for dizziness, tremors, seizures, syncope, speech difficulty, weakness, light-headedness, numbness and headaches. Hematological: Negative for adenopathy. Does not bruise/bleed easily. Psychiatric/Behavioral: Negative for agitation, behavioral problems, confusion, decreased concentration, dysphoric mood, hallucinations, self-injury, sleep disturbance and suicidal ideas. The patient is not nervous/anxious and is not hyperactive. Objective:       Physical Exam     This is a 74 y.o. male. Patient is alert and oriented to person, place and time. Mood is anxious. No respiratory distress. No nasal voice, no hoarseness. Not obviously hearing impaired. Vitals:    12/13/17 0921   BP: 124/80   Pulse: 78   Resp: 18   Temp: 97.6 °F (36.4 °C)       External ears are normal: no scars, lesions or masses. R External auditory canal clear and free of any pathology  L External auditory canal clear and free of any pathology   Tympanic membranes:  R intact, translucent                                            L intact, translucent  Rinne:   Right Ear:  512 hz - Result positive (air conduction greater than bone conduction)  Left Ear:  512 hz - Result positive (air conduction greater than bone conduction)      Data:  All of the past medical history, past surgical history, family history, social history, allergies and current medications were reviewed. Assessment:   Assessment   1. Sensorineural hearing loss (SNHL), bilateral  Audiometry with tympanometry    Hearing aid biaural evaluation   2. History of exposure to noise  Audiometry with tympanometry    Hearing aid biaural evaluation   3.  Bilateral tinnitus  Audiometry with tympanometry    Hearing aid biaural evaluation        Plan:        1. Sensorineural hearing loss (SNHL), bilateral  2. History of exposure to noise  3. Bilateral tinnitus  - No cerumen today. Normal ear exam.  Will obtain Audiogram (likely bilateral SNHL) and patient may pursue amplification if indicated. - Audiometry with tympanometry; Future  - Hearing aid biaural evaluation; Future      Return if symptoms worsen or fail to improve.

## 2017-12-20 ENCOUNTER — HOSPITAL ENCOUNTER (OUTPATIENT)
Dept: AUDIOLOGY | Age: 67
Discharge: HOME | End: 2017-12-20
Payer: MEDICARE

## 2017-12-20 PROCEDURE — 92557 COMPREHENSIVE HEARING TEST: CPT | Performed by: AUDIOLOGIST

## 2017-12-20 PROCEDURE — 92567 TYMPANOMETRY: CPT | Performed by: AUDIOLOGIST

## 2017-12-20 RX ORDER — METOPROLOL SUCCINATE 50 MG/1
TABLET, EXTENDED RELEASE ORAL
Qty: 180 TABLET | Refills: 3 | Status: ON HOLD | OUTPATIENT
Start: 2017-12-20 | End: 2018-07-21 | Stop reason: HOSPADM

## 2017-12-28 ENCOUNTER — HOSPITAL ENCOUNTER (OUTPATIENT)
Dept: AUDIOLOGY | Age: 67
Discharge: HOME | End: 2017-12-28
Payer: MEDICARE

## 2017-12-28 PROCEDURE — 9990000010 HC NO CHARGE VISIT: Performed by: AUDIOLOGIST

## 2017-12-28 RX ORDER — DILTIAZEM HYDROCHLORIDE 180 MG/1
180 CAPSULE, COATED, EXTENDED RELEASE ORAL DAILY
Qty: 90 CAPSULE | Refills: 2 | Status: SHIPPED | OUTPATIENT
Start: 2017-12-28 | End: 2018-03-21 | Stop reason: SDUPTHER

## 2017-12-28 RX ORDER — POTASSIUM CHLORIDE 750 MG/1
10 TABLET, FILM COATED, EXTENDED RELEASE ORAL 2 TIMES DAILY
Qty: 180 TABLET | Refills: 2 | Status: SHIPPED | OUTPATIENT
Start: 2017-12-28 | End: 2018-09-09 | Stop reason: SDUPTHER

## 2017-12-28 RX ORDER — BUMETANIDE 2 MG/1
2 TABLET ORAL DAILY
Qty: 90 TABLET | Refills: 2 | Status: SHIPPED | OUTPATIENT
Start: 2017-12-28 | End: 2018-03-21 | Stop reason: SDUPTHER

## 2018-03-21 RX ORDER — BUMETANIDE 2 MG/1
2 TABLET ORAL DAILY
Qty: 90 TABLET | Refills: 3 | Status: SHIPPED | OUTPATIENT
Start: 2018-03-21 | End: 2019-03-11 | Stop reason: SDUPTHER

## 2018-03-21 RX ORDER — DILTIAZEM HYDROCHLORIDE 180 MG/1
180 CAPSULE, COATED, EXTENDED RELEASE ORAL DAILY
Qty: 90 CAPSULE | Refills: 3 | Status: ON HOLD | OUTPATIENT
Start: 2018-03-21 | End: 2018-06-23

## 2018-03-21 RX ORDER — PRAVASTATIN SODIUM 80 MG/1
80 TABLET ORAL NIGHTLY
Qty: 90 TABLET | Refills: 3 | Status: SHIPPED | OUTPATIENT
Start: 2018-03-21 | End: 2019-03-11 | Stop reason: SDUPTHER

## 2018-03-21 NOTE — TELEPHONE ENCOUNTER
Rain Esteban called requesting a refill on the following medications:  Requested Prescriptions     Pending Prescriptions Disp Refills    pravastatin (PRAVACHOL) 80 MG tablet 90 tablet 1     Sig: Take 1 tablet by mouth nightly    bumetanide (BUMEX) 2 MG tablet 90 tablet 2     Sig: Take 1 tablet by mouth daily    diltiazem (CARTIA XT) 180 MG extended release capsule 90 capsule 2     Sig: Take 1 capsule by mouth daily     Pharmacy verified:  [email or web address]      Date of last visit: 11/3/2017  Date of next visit (if applicable): 1/1/9912

## 2018-05-10 ENCOUNTER — OFFICE VISIT (OUTPATIENT)
Dept: FAMILY MEDICINE CLINIC | Age: 68
End: 2018-05-10
Payer: MEDICARE

## 2018-05-10 VITALS
TEMPERATURE: 97.9 F | HEART RATE: 80 BPM | RESPIRATION RATE: 16 BRPM | WEIGHT: 251.8 LBS | HEIGHT: 73 IN | BODY MASS INDEX: 33.37 KG/M2 | DIASTOLIC BLOOD PRESSURE: 84 MMHG | SYSTOLIC BLOOD PRESSURE: 138 MMHG

## 2018-05-10 DIAGNOSIS — E78.5 HYPERLIPIDEMIA, UNSPECIFIED HYPERLIPIDEMIA TYPE: Chronic | ICD-10-CM

## 2018-05-10 DIAGNOSIS — Z00.00 ROUTINE GENERAL MEDICAL EXAMINATION AT A HEALTH CARE FACILITY: ICD-10-CM

## 2018-05-10 DIAGNOSIS — Z00.00 MEDICARE ANNUAL WELLNESS VISIT, SUBSEQUENT: Primary | ICD-10-CM

## 2018-05-10 DIAGNOSIS — I48.0 PAROXYSMAL ATRIAL FIBRILLATION (HCC): ICD-10-CM

## 2018-05-10 DIAGNOSIS — I10 ESSENTIAL HYPERTENSION: Chronic | ICD-10-CM

## 2018-05-10 DIAGNOSIS — Z11.59 NEED FOR HEPATITIS C SCREENING TEST: ICD-10-CM

## 2018-05-10 DIAGNOSIS — R79.89 ELEVATED TSH: ICD-10-CM

## 2018-05-10 PROCEDURE — G0438 PPPS, INITIAL VISIT: HCPCS | Performed by: FAMILY MEDICINE

## 2018-05-10 PROCEDURE — 4040F PNEUMOC VAC/ADMIN/RCVD: CPT | Performed by: FAMILY MEDICINE

## 2018-05-10 ASSESSMENT — LIFESTYLE VARIABLES: HOW OFTEN DO YOU HAVE A DRINK CONTAINING ALCOHOL: 0

## 2018-05-10 ASSESSMENT — ANXIETY QUESTIONNAIRES: GAD7 TOTAL SCORE: 0

## 2018-05-10 ASSESSMENT — PATIENT HEALTH QUESTIONNAIRE - PHQ9: SUM OF ALL RESPONSES TO PHQ QUESTIONS 1-9: 1

## 2018-06-22 ENCOUNTER — APPOINTMENT (OUTPATIENT)
Dept: GENERAL RADIOLOGY | Age: 68
End: 2018-06-22
Payer: MEDICARE

## 2018-06-22 ENCOUNTER — APPOINTMENT (OUTPATIENT)
Dept: CT IMAGING | Age: 68
End: 2018-06-22
Payer: MEDICARE

## 2018-06-22 ENCOUNTER — HOSPITAL ENCOUNTER (OUTPATIENT)
Age: 68
Setting detail: OBSERVATION
Discharge: HOME | End: 2018-06-23
Attending: INTERNAL MEDICINE | Admitting: INTERNAL MEDICINE
Payer: MEDICARE

## 2018-06-22 DIAGNOSIS — R20.0 PERIORAL NUMBNESS: ICD-10-CM

## 2018-06-22 DIAGNOSIS — I48.91 ATRIAL FIBRILLATION WITH RVR (HCC): Primary | ICD-10-CM

## 2018-06-22 LAB
ALBUMIN SERPL-MCNC: 4.1 G/DL (ref 3.5–5.1)
ALP BLD-CCNC: 80 U/L (ref 38–126)
ALT SERPL-CCNC: 34 U/L (ref 11–66)
ANION GAP SERPL CALCULATED.3IONS-SCNC: 12 MEQ/L (ref 8–16)
AST SERPL-CCNC: 23 U/L (ref 5–40)
BASOPHILS # BLD: 0.6 %
BASOPHILS ABSOLUTE: 0 THOU/MM3 (ref 0–0.1)
BILIRUB SERPL-MCNC: 0.2 MG/DL (ref 0.3–1.2)
BILIRUBIN URINE: NEGATIVE
BLOOD, URINE: NEGATIVE
BUN BLDV-MCNC: 17 MG/DL (ref 7–22)
CALCIUM SERPL-MCNC: 8.9 MG/DL (ref 8.5–10.5)
CHARACTER, URINE: CLEAR
CHLORIDE BLD-SCNC: 102 MEQ/L (ref 98–111)
CO2: 26 MEQ/L (ref 23–33)
COLOR: NORMAL
CREAT SERPL-MCNC: 1.1 MG/DL (ref 0.4–1.2)
EOSINOPHIL # BLD: 2 %
EOSINOPHILS ABSOLUTE: 0.2 THOU/MM3 (ref 0–0.4)
GFR SERPL CREATININE-BSD FRML MDRD: 67 ML/MIN/1.73M2
GLUCOSE BLD-MCNC: 135 MG/DL (ref 70–108)
GLUCOSE URINE: NEGATIVE MG/DL
HCT VFR BLD CALC: 48.7 % (ref 42–52)
HEMOGLOBIN: 16.6 GM/DL (ref 14–18)
KETONES, URINE: NEGATIVE
LEUKOCYTE ESTERASE, URINE: NEGATIVE
LYMPHOCYTES # BLD: 28 %
LYMPHOCYTES ABSOLUTE: 2.3 THOU/MM3 (ref 1–4.8)
MCH RBC QN AUTO: 29.6 PG (ref 27–31)
MCHC RBC AUTO-ENTMCNC: 34.1 GM/DL (ref 33–37)
MCV RBC AUTO: 86.7 FL (ref 80–94)
MONOCYTES # BLD: 6.7 %
MONOCYTES ABSOLUTE: 0.5 THOU/MM3 (ref 0.4–1.3)
NITRITE, URINE: NEGATIVE
NUCLEATED RED BLOOD CELLS: 0 /100 WBC
OSMOLALITY CALCULATION: 283 MOSMOL/KG (ref 275–300)
PDW BLD-RTO: 14.1 % (ref 11.5–14.5)
PH UA: 5.5
PLATELET # BLD: 246 THOU/MM3 (ref 130–400)
PMV BLD AUTO: 9.4 FL (ref 7.4–10.4)
POTASSIUM SERPL-SCNC: 3.9 MEQ/L (ref 3.5–5.2)
PROTEIN UA: NEGATIVE
RBC # BLD: 5.62 MILL/MM3 (ref 4.7–6.1)
SEG NEUTROPHILS: 62.7 %
SEGMENTED NEUTROPHILS ABSOLUTE COUNT: 5.1 THOU/MM3 (ref 1.8–7.7)
SODIUM BLD-SCNC: 140 MEQ/L (ref 135–145)
SPECIFIC GRAVITY, URINE: 1.01 (ref 1–1.03)
TOTAL PROTEIN: 6.8 G/DL (ref 6.1–8)
TROPONIN T: < 0.01 NG/ML
UROBILINOGEN, URINE: 0.2 EU/DL
WBC # BLD: 8.2 THOU/MM3 (ref 4.8–10.8)

## 2018-06-22 PROCEDURE — 2500000003 HC RX 250 WO HCPCS: Performed by: INTERNAL MEDICINE

## 2018-06-22 PROCEDURE — G0378 HOSPITAL OBSERVATION PER HR: HCPCS

## 2018-06-22 PROCEDURE — 6370000000 HC RX 637 (ALT 250 FOR IP): Performed by: INTERNAL MEDICINE

## 2018-06-22 PROCEDURE — 96374 THER/PROPH/DIAG INJ IV PUSH: CPT

## 2018-06-22 PROCEDURE — 80053 COMPREHEN METABOLIC PANEL: CPT

## 2018-06-22 PROCEDURE — 70450 CT HEAD/BRAIN W/O DYE: CPT

## 2018-06-22 PROCEDURE — 6360000002 HC RX W HCPCS: Performed by: INTERNAL MEDICINE

## 2018-06-22 PROCEDURE — 99285 EMERGENCY DEPT VISIT HI MDM: CPT

## 2018-06-22 PROCEDURE — 85025 COMPLETE CBC W/AUTO DIFF WBC: CPT

## 2018-06-22 PROCEDURE — 84484 ASSAY OF TROPONIN QUANT: CPT

## 2018-06-22 PROCEDURE — 93005 ELECTROCARDIOGRAM TRACING: CPT | Performed by: INTERNAL MEDICINE

## 2018-06-22 PROCEDURE — 81003 URINALYSIS AUTO W/O SCOPE: CPT

## 2018-06-22 PROCEDURE — 36415 COLL VENOUS BLD VENIPUNCTURE: CPT

## 2018-06-22 PROCEDURE — 71045 X-RAY EXAM CHEST 1 VIEW: CPT

## 2018-06-22 PROCEDURE — 2580000003 HC RX 258: Performed by: INTERNAL MEDICINE

## 2018-06-22 RX ORDER — LOSARTAN POTASSIUM 50 MG/1
50 TABLET ORAL DAILY
Status: DISCONTINUED | OUTPATIENT
Start: 2018-06-22 | End: 2018-06-23 | Stop reason: HOSPADM

## 2018-06-22 RX ORDER — NITROGLYCERIN 0.4 MG/1
0.4 TABLET SUBLINGUAL EVERY 5 MIN PRN
Status: DISCONTINUED | OUTPATIENT
Start: 2018-06-22 | End: 2018-06-23 | Stop reason: HOSPADM

## 2018-06-22 RX ORDER — SODIUM CHLORIDE 0.9 % (FLUSH) 0.9 %
10 SYRINGE (ML) INJECTION EVERY 12 HOURS SCHEDULED
Status: DISCONTINUED | OUTPATIENT
Start: 2018-06-22 | End: 2018-06-23 | Stop reason: HOSPADM

## 2018-06-22 RX ORDER — DOCUSATE SODIUM 100 MG/1
100 CAPSULE, LIQUID FILLED ORAL 2 TIMES DAILY
Status: DISCONTINUED | OUTPATIENT
Start: 2018-06-22 | End: 2018-06-23 | Stop reason: HOSPADM

## 2018-06-22 RX ORDER — POTASSIUM CHLORIDE 20MEQ/15ML
40 LIQUID (ML) ORAL PRN
Status: DISCONTINUED | OUTPATIENT
Start: 2018-06-22 | End: 2018-06-23 | Stop reason: HOSPADM

## 2018-06-22 RX ORDER — BUMETANIDE 1 MG/1
2 TABLET ORAL DAILY
Status: DISCONTINUED | OUTPATIENT
Start: 2018-06-23 | End: 2018-06-23 | Stop reason: HOSPADM

## 2018-06-22 RX ORDER — METOPROLOL SUCCINATE 50 MG/1
50 TABLET, EXTENDED RELEASE ORAL DAILY
Status: DISCONTINUED | OUTPATIENT
Start: 2018-06-23 | End: 2018-06-23 | Stop reason: HOSPADM

## 2018-06-22 RX ORDER — POTASSIUM CHLORIDE 750 MG/1
10 TABLET, FILM COATED, EXTENDED RELEASE ORAL 2 TIMES DAILY
Status: DISCONTINUED | OUTPATIENT
Start: 2018-06-22 | End: 2018-06-23 | Stop reason: HOSPADM

## 2018-06-22 RX ORDER — SODIUM CHLORIDE 0.9 % (FLUSH) 0.9 %
10 SYRINGE (ML) INJECTION PRN
Status: DISCONTINUED | OUTPATIENT
Start: 2018-06-22 | End: 2018-06-23 | Stop reason: HOSPADM

## 2018-06-22 RX ORDER — ISOSORBIDE MONONITRATE 30 MG/1
30 TABLET, EXTENDED RELEASE ORAL DAILY
Status: DISCONTINUED | OUTPATIENT
Start: 2018-06-23 | End: 2018-06-23 | Stop reason: HOSPADM

## 2018-06-22 RX ORDER — ACETAMINOPHEN 325 MG/1
650 TABLET ORAL EVERY 4 HOURS PRN
Status: DISCONTINUED | OUTPATIENT
Start: 2018-06-22 | End: 2018-06-23 | Stop reason: HOSPADM

## 2018-06-22 RX ORDER — ASPIRIN 81 MG/1
81 TABLET, CHEWABLE ORAL DAILY
Status: DISCONTINUED | OUTPATIENT
Start: 2018-06-23 | End: 2018-06-22

## 2018-06-22 RX ORDER — ASPIRIN 81 MG/1
81 TABLET, CHEWABLE ORAL 2 TIMES DAILY
Status: DISCONTINUED | OUTPATIENT
Start: 2018-06-22 | End: 2018-06-23 | Stop reason: HOSPADM

## 2018-06-22 RX ORDER — LORAZEPAM 1 MG/1
1 TABLET ORAL ONCE
Status: COMPLETED | OUTPATIENT
Start: 2018-06-22 | End: 2018-06-22

## 2018-06-22 RX ORDER — ASPIRIN 81 MG/1
81 TABLET, CHEWABLE ORAL DAILY
Status: DISCONTINUED | OUTPATIENT
Start: 2018-06-22 | End: 2018-06-22

## 2018-06-22 RX ORDER — POTASSIUM CHLORIDE 20 MEQ/1
40 TABLET, EXTENDED RELEASE ORAL PRN
Status: DISCONTINUED | OUTPATIENT
Start: 2018-06-22 | End: 2018-06-23 | Stop reason: HOSPADM

## 2018-06-22 RX ORDER — PRAVASTATIN SODIUM 80 MG/1
80 TABLET ORAL NIGHTLY
Status: DISCONTINUED | OUTPATIENT
Start: 2018-06-22 | End: 2018-06-23 | Stop reason: HOSPADM

## 2018-06-22 RX ORDER — NITROGLYCERIN 0.4 MG/1
0.4 TABLET SUBLINGUAL EVERY 5 MIN PRN
Status: DISCONTINUED | OUTPATIENT
Start: 2018-06-22 | End: 2018-06-22

## 2018-06-22 RX ORDER — DILTIAZEM HYDROCHLORIDE 180 MG/1
180 CAPSULE, COATED, EXTENDED RELEASE ORAL DAILY
Status: DISCONTINUED | OUTPATIENT
Start: 2018-06-23 | End: 2018-06-23 | Stop reason: HOSPADM

## 2018-06-22 RX ORDER — METOPROLOL TARTRATE 5 MG/5ML
5 INJECTION INTRAVENOUS ONCE
Status: COMPLETED | OUTPATIENT
Start: 2018-06-22 | End: 2018-06-22

## 2018-06-22 RX ORDER — ONDANSETRON 2 MG/ML
4 INJECTION INTRAMUSCULAR; INTRAVENOUS EVERY 6 HOURS PRN
Status: DISCONTINUED | OUTPATIENT
Start: 2018-06-22 | End: 2018-06-23 | Stop reason: HOSPADM

## 2018-06-22 RX ORDER — DOCUSATE SODIUM 100 MG/1
100 CAPSULE, LIQUID FILLED ORAL 2 TIMES DAILY
COMMUNITY
End: 2018-07-30

## 2018-06-22 RX ORDER — POTASSIUM CHLORIDE 7.45 MG/ML
10 INJECTION INTRAVENOUS PRN
Status: DISCONTINUED | OUTPATIENT
Start: 2018-06-22 | End: 2018-06-23 | Stop reason: HOSPADM

## 2018-06-22 RX ADMIN — PRAVASTATIN SODIUM 80 MG: 80 TABLET ORAL at 19:58

## 2018-06-22 RX ADMIN — DOCUSATE SODIUM 100 MG: 100 CAPSULE, LIQUID FILLED ORAL at 19:58

## 2018-06-22 RX ADMIN — LORAZEPAM 1 MG: 1 TABLET ORAL at 12:47

## 2018-06-22 RX ADMIN — Medication 10 ML: at 19:58

## 2018-06-22 RX ADMIN — POTASSIUM CHLORIDE 10 MEQ: 750 TABLET, FILM COATED, EXTENDED RELEASE ORAL at 19:58

## 2018-06-22 RX ADMIN — ASPIRIN 81 MG 81 MG: 81 TABLET ORAL at 19:58

## 2018-06-22 RX ADMIN — METOROPROLOL TARTRATE 5 MG: 5 INJECTION, SOLUTION INTRAVENOUS at 12:40

## 2018-06-22 RX ADMIN — ENOXAPARIN SODIUM 40 MG: 40 INJECTION SUBCUTANEOUS at 19:57

## 2018-06-22 RX ADMIN — LOSARTAN POTASSIUM 50 MG: 50 TABLET, FILM COATED ORAL at 19:58

## 2018-06-22 ASSESSMENT — PAIN SCALES - GENERAL: PAINLEVEL_OUTOF10: 0

## 2018-06-22 ASSESSMENT — ENCOUNTER SYMPTOMS
BLOOD IN STOOL: 0
WHEEZING: 0
ABDOMINAL PAIN: 0
EYE DISCHARGE: 0
RHINORRHEA: 0
VOMITING: 0
NAUSEA: 0
COUGH: 0
DIARRHEA: 0
BACK PAIN: 0
SHORTNESS OF BREATH: 0
SORE THROAT: 0
EYE REDNESS: 0

## 2018-06-23 ENCOUNTER — TELEPHONE (OUTPATIENT)
Dept: FAMILY MEDICINE CLINIC | Age: 68
End: 2018-06-23

## 2018-06-23 VITALS
DIASTOLIC BLOOD PRESSURE: 89 MMHG | BODY MASS INDEX: 32.3 KG/M2 | OXYGEN SATURATION: 95 % | RESPIRATION RATE: 18 BRPM | HEART RATE: 71 BPM | WEIGHT: 243.7 LBS | HEIGHT: 73 IN | SYSTOLIC BLOOD PRESSURE: 153 MMHG | TEMPERATURE: 97.5 F

## 2018-06-23 LAB
ALBUMIN SERPL-MCNC: 3.8 G/DL (ref 3.5–5.1)
ALP BLD-CCNC: 66 U/L (ref 38–126)
ALT SERPL-CCNC: 38 U/L (ref 11–66)
AST SERPL-CCNC: 32 U/L (ref 5–40)
BILIRUB SERPL-MCNC: 0.6 MG/DL (ref 0.3–1.2)
BILIRUBIN DIRECT: < 0.2 MG/DL (ref 0–0.3)
CHOLESTEROL, TOTAL: 125 MG/DL (ref 100–199)
EKG ATRIAL RATE: 138 BPM
EKG ATRIAL RATE: 97 BPM
EKG Q-T INTERVAL: 292 MS
EKG Q-T INTERVAL: 332 MS
EKG QRS DURATION: 110 MS
EKG QRS DURATION: 98 MS
EKG QTC CALCULATION (BAZETT): 442 MS
EKG QTC CALCULATION (BAZETT): 502 MS
EKG R AXIS: -2 DEGREES
EKG R AXIS: -3 DEGREES
EKG T AXIS: 122 DEGREES
EKG T AXIS: 75 DEGREES
EKG VENTRICULAR RATE: 138 BPM
EKG VENTRICULAR RATE: 138 BPM
HCT VFR BLD CALC: 50.4 % (ref 42–52)
HDLC SERPL-MCNC: 25 MG/DL
HEMOGLOBIN: 17 GM/DL (ref 14–18)
LDL CHOLESTEROL CALCULATED: 67 MG/DL
MCH RBC QN AUTO: 29.5 PG (ref 27–31)
MCHC RBC AUTO-ENTMCNC: 33.6 GM/DL (ref 33–37)
MCV RBC AUTO: 87.6 FL (ref 80–94)
PDW BLD-RTO: 13.9 % (ref 11.5–14.5)
PLATELET # BLD: 228 THOU/MM3 (ref 130–400)
PMV BLD AUTO: 9.3 FL (ref 7.4–10.4)
RBC # BLD: 5.75 MILL/MM3 (ref 4.7–6.1)
THYROXINE (T4): 6.5 UG/DL (ref 4.5–12)
TOTAL PROTEIN: 6.7 G/DL (ref 6.1–8)
TRIGL SERPL-MCNC: 163 MG/DL (ref 0–199)
TSH SERPL DL<=0.05 MIU/L-ACNC: 3.3 UIU/ML (ref 0.4–4.2)
WBC # BLD: 8 THOU/MM3 (ref 4.8–10.8)

## 2018-06-23 PROCEDURE — G0378 HOSPITAL OBSERVATION PER HR: HCPCS

## 2018-06-23 PROCEDURE — 93010 ELECTROCARDIOGRAM REPORT: CPT | Performed by: INTERNAL MEDICINE

## 2018-06-23 PROCEDURE — 85027 COMPLETE CBC AUTOMATED: CPT

## 2018-06-23 PROCEDURE — 2500000003 HC RX 250 WO HCPCS: Performed by: INTERNAL MEDICINE

## 2018-06-23 PROCEDURE — 84436 ASSAY OF TOTAL THYROXINE: CPT

## 2018-06-23 PROCEDURE — 6370000000 HC RX 637 (ALT 250 FOR IP): Performed by: INTERNAL MEDICINE

## 2018-06-23 PROCEDURE — 36415 COLL VENOUS BLD VENIPUNCTURE: CPT

## 2018-06-23 PROCEDURE — 80061 LIPID PANEL: CPT

## 2018-06-23 PROCEDURE — 80076 HEPATIC FUNCTION PANEL: CPT

## 2018-06-23 PROCEDURE — 96376 TX/PRO/DX INJ SAME DRUG ADON: CPT

## 2018-06-23 PROCEDURE — 93005 ELECTROCARDIOGRAM TRACING: CPT | Performed by: INTERNAL MEDICINE

## 2018-06-23 PROCEDURE — 84443 ASSAY THYROID STIM HORMONE: CPT

## 2018-06-23 PROCEDURE — 2580000003 HC RX 258: Performed by: INTERNAL MEDICINE

## 2018-06-23 RX ORDER — DIGOXIN 125 MCG
125 TABLET ORAL DAILY
Qty: 30 TABLET | Refills: 3 | Status: SHIPPED | OUTPATIENT
Start: 2018-06-23 | End: 2018-07-11 | Stop reason: SDUPTHER

## 2018-06-23 RX ORDER — DILTIAZEM HYDROCHLORIDE 180 MG/1
360 CAPSULE, COATED, EXTENDED RELEASE ORAL DAILY
Qty: 90 CAPSULE | Refills: 3 | Status: SHIPPED | OUTPATIENT
Start: 2018-06-23 | End: 2018-08-20

## 2018-06-23 RX ORDER — METOPROLOL TARTRATE 5 MG/5ML
5 INJECTION INTRAVENOUS ONCE
Status: COMPLETED | OUTPATIENT
Start: 2018-06-23 | End: 2018-06-23

## 2018-06-23 RX ADMIN — BUMETANIDE 2 MG: 1 TABLET ORAL at 09:30

## 2018-06-23 RX ADMIN — POTASSIUM CHLORIDE 10 MEQ: 750 TABLET, FILM COATED, EXTENDED RELEASE ORAL at 09:34

## 2018-06-23 RX ADMIN — Medication 10 ML: at 09:38

## 2018-06-23 RX ADMIN — METOPROLOL TARTRATE 5 MG: 5 INJECTION, SOLUTION INTRAVENOUS at 10:37

## 2018-06-23 RX ADMIN — METOPROLOL SUCCINATE 50 MG: 50 TABLET, EXTENDED RELEASE ORAL at 09:34

## 2018-06-23 RX ADMIN — LOSARTAN POTASSIUM 50 MG: 50 TABLET, FILM COATED ORAL at 09:45

## 2018-06-23 RX ADMIN — DILTIAZEM HYDROCHLORIDE 180 MG: 180 CAPSULE, EXTENDED RELEASE ORAL at 09:18

## 2018-06-23 RX ADMIN — ASPIRIN 81 MG 81 MG: 81 TABLET ORAL at 09:30

## 2018-06-23 ASSESSMENT — PAIN SCALES - GENERAL: PAINLEVEL_OUTOF10: 0

## 2018-07-02 ENCOUNTER — OFFICE VISIT (OUTPATIENT)
Dept: FAMILY MEDICINE CLINIC | Age: 68
End: 2018-07-02
Payer: MEDICARE

## 2018-07-02 VITALS
SYSTOLIC BLOOD PRESSURE: 136 MMHG | DIASTOLIC BLOOD PRESSURE: 88 MMHG | TEMPERATURE: 97.7 F | HEART RATE: 72 BPM | WEIGHT: 246.2 LBS | HEIGHT: 73 IN | BODY MASS INDEX: 32.63 KG/M2 | RESPIRATION RATE: 18 BRPM

## 2018-07-02 DIAGNOSIS — I48.0 PAROXYSMAL ATRIAL FIBRILLATION (HCC): Primary | ICD-10-CM

## 2018-07-02 PROCEDURE — 99495 TRANSJ CARE MGMT MOD F2F 14D: CPT | Performed by: FAMILY MEDICINE

## 2018-07-02 PROCEDURE — 1111F DSCHRG MED/CURRENT MED MERGE: CPT | Performed by: FAMILY MEDICINE

## 2018-07-11 DIAGNOSIS — I10 ESSENTIAL HYPERTENSION: ICD-10-CM

## 2018-07-11 RX ORDER — DIGOXIN 125 MCG
125 TABLET ORAL DAILY
Qty: 90 TABLET | Refills: 1 | Status: SHIPPED | OUTPATIENT
Start: 2018-07-11 | End: 2019-01-07 | Stop reason: SDUPTHER

## 2018-07-11 RX ORDER — ISOSORBIDE MONONITRATE 30 MG/1
TABLET, EXTENDED RELEASE ORAL
Qty: 90 TABLET | Refills: 2 | Status: SHIPPED | OUTPATIENT
Start: 2018-07-11 | End: 2018-08-20

## 2018-07-11 RX ORDER — LOSARTAN POTASSIUM 50 MG/1
50 TABLET ORAL DAILY
Qty: 90 TABLET | Refills: 2 | Status: SHIPPED | OUTPATIENT
Start: 2018-07-11 | End: 2019-03-11 | Stop reason: SDUPTHER

## 2018-07-11 NOTE — TELEPHONE ENCOUNTER
Thiago Shah called requesting a refill on the following medications:  Requested Prescriptions     Pending Prescriptions Disp Refills    losartan (COZAAR) 50 MG tablet 90 tablet 1     Sig: Take 1 tablet by mouth daily    isosorbide mononitrate (IMDUR) 30 MG extended release tablet 90 tablet 1     Sig: take 1 tablet by mouth once daily    digoxin (DIGITEK) 125 MCG tablet 30 tablet 3     Sig: Take 1 tablet by mouth daily     Pharmacy verified:  [email or web address]      Date of last visit: 7/2/18   Date of next visit (if applicable): Visit date not found  Patient is requesting a 90 day supply.

## 2018-07-14 ENCOUNTER — NURSE TRIAGE (OUTPATIENT)
Dept: ADMINISTRATIVE | Age: 68
End: 2018-07-14

## 2018-07-15 ENCOUNTER — NURSE TRIAGE (OUTPATIENT)
Dept: ADMINISTRATIVE | Age: 68
End: 2018-07-15

## 2018-07-15 NOTE — TELEPHONE ENCOUNTER
Reason for Disposition   Brief (now gone) dizziness or lightheadedness after standing up or eating    Answer Assessment - Initial Assessment Questions  1. BLOOD PRESSURE: \"What is the blood pressure? \" \"Did you take at least two measurements 5 minutes apart?\"      111/75,  103/73, 103/72, 104/65  2. ONSET: \"When did you take your blood pressure? \"     Today   3. HOW: \"How did you obtain the blood pressure? \" (e.g., visiting nurse, automatic home BP monitor)      Home cuff. 4. HISTORY: \"Do you have a history of low blood pressure? \" What is your blood pressure normally? Hx. A-FIB. HTN  5. MEDICATIONS: Chet Wrights you taking any medications for blood pressure? \" If yes: \"Have they been changed recently? \"      No.   6. PULSE RATE: \"Do you know what your pulse rate is? \"       96,  89, 82  7. OTHER SYMPTOMS: Annia Lightning you been sick recently? \" Have you had a recent injury? No   8. PREGNANCY: \"Is there any chance you are pregnant? \" \"When was your last menstrual period? \"      Male. Protocols used: LOW BLOOD PRESSURE-ADULT-AH      ** Alex called wanting to know what a low blood pressure would be. I advice him that <90/60 is considered low but mainly go by how your feeling like any dizziness, lightheaded or weakness. If you are having any of these symptoms, or SOB or chest pain then go to ED for evaluation. Keya Zimmerman verbalized understanding.

## 2018-07-16 ENCOUNTER — HOSPITAL ENCOUNTER (INPATIENT)
Age: 68
LOS: 4 days | Discharge: HOME | DRG: 310 | End: 2018-07-21
Attending: INTERNAL MEDICINE | Admitting: INTERNAL MEDICINE
Payer: MEDICARE

## 2018-07-16 DIAGNOSIS — R53.1 GENERAL WEAKNESS: ICD-10-CM

## 2018-07-16 DIAGNOSIS — I48.91 ATRIAL FIBRILLATION, UNSPECIFIED TYPE (HCC): ICD-10-CM

## 2018-07-16 DIAGNOSIS — R42 DIZZINESS: Primary | ICD-10-CM

## 2018-07-16 DIAGNOSIS — E86.0 DEHYDRATION: ICD-10-CM

## 2018-07-16 DIAGNOSIS — Z91.14 NON COMPLIANCE W MEDICATION REGIMEN: ICD-10-CM

## 2018-07-16 LAB
BASOPHILS # BLD: 0.8 %
BASOPHILS ABSOLUTE: 0.1 THOU/MM3 (ref 0–0.1)
EKG ATRIAL RATE: 120 BPM
EKG Q-T INTERVAL: 370 MS
EKG QRS DURATION: 98 MS
EKG QTC CALCULATION (BAZETT): 447 MS
EKG R AXIS: 7 DEGREES
EKG T AXIS: 107 DEGREES
EKG VENTRICULAR RATE: 88 BPM
EOSINOPHIL # BLD: 2.4 %
EOSINOPHILS ABSOLUTE: 0.2 THOU/MM3 (ref 0–0.4)
ERYTHROCYTE [DISTWIDTH] IN BLOOD BY AUTOMATED COUNT: 13.3 % (ref 11.5–14.5)
ERYTHROCYTE [DISTWIDTH] IN BLOOD BY AUTOMATED COUNT: 41.1 FL (ref 35–45)
HCT VFR BLD CALC: 46.3 % (ref 42–52)
HEMOGLOBIN: 16 GM/DL (ref 14–18)
IMMATURE GRANS (ABS): 0.03 THOU/MM3 (ref 0–0.07)
IMMATURE GRANULOCYTES: 0.3 %
LYMPHOCYTES # BLD: 42.6 %
LYMPHOCYTES ABSOLUTE: 4.1 THOU/MM3 (ref 1–4.8)
MCH RBC QN AUTO: 29.5 PG (ref 26–33)
MCHC RBC AUTO-ENTMCNC: 34.6 GM/DL (ref 32.2–35.5)
MCV RBC AUTO: 85.4 FL (ref 80–94)
MONOCYTES # BLD: 7.2 %
MONOCYTES ABSOLUTE: 0.7 THOU/MM3 (ref 0.4–1.3)
NUCLEATED RED BLOOD CELLS: 0 /100 WBC
PLATELET # BLD: 252 THOU/MM3 (ref 130–400)
PMV BLD AUTO: 10.4 FL (ref 9.4–12.4)
RBC # BLD: 5.42 MILL/MM3 (ref 4.7–6.1)
SEG NEUTROPHILS: 46.7 %
SEGMENTED NEUTROPHILS ABSOLUTE COUNT: 4.5 THOU/MM3 (ref 1.8–7.7)
WBC # BLD: 9.6 THOU/MM3 (ref 4.8–10.8)

## 2018-07-16 PROCEDURE — 84439 ASSAY OF FREE THYROXINE: CPT

## 2018-07-16 PROCEDURE — 80162 ASSAY OF DIGOXIN TOTAL: CPT

## 2018-07-16 PROCEDURE — 99285 EMERGENCY DEPT VISIT HI MDM: CPT

## 2018-07-16 PROCEDURE — 84484 ASSAY OF TROPONIN QUANT: CPT

## 2018-07-16 PROCEDURE — 80053 COMPREHEN METABOLIC PANEL: CPT

## 2018-07-16 PROCEDURE — 84443 ASSAY THYROID STIM HORMONE: CPT

## 2018-07-16 PROCEDURE — 36415 COLL VENOUS BLD VENIPUNCTURE: CPT

## 2018-07-16 PROCEDURE — 93005 ELECTROCARDIOGRAM TRACING: CPT | Performed by: EMERGENCY MEDICINE

## 2018-07-16 PROCEDURE — 85025 COMPLETE CBC W/AUTO DIFF WBC: CPT

## 2018-07-16 ASSESSMENT — PAIN DESCRIPTION - DESCRIPTORS: DESCRIPTORS: HEADACHE

## 2018-07-16 ASSESSMENT — PAIN SCALES - GENERAL: PAINLEVEL_OUTOF10: 5

## 2018-07-16 ASSESSMENT — PAIN DESCRIPTION - PAIN TYPE: TYPE: ACUTE PAIN

## 2018-07-16 ASSESSMENT — PAIN DESCRIPTION - LOCATION: LOCATION: HEAD

## 2018-07-17 ENCOUNTER — APPOINTMENT (OUTPATIENT)
Dept: CT IMAGING | Age: 68
DRG: 310 | End: 2018-07-17
Payer: MEDICARE

## 2018-07-17 PROBLEM — I51.89 DIASTOLIC DYSFUNCTION: Status: ACTIVE | Noted: 2018-07-17

## 2018-07-17 PROBLEM — R42 DIZZINESS: Status: ACTIVE | Noted: 2018-07-17

## 2018-07-17 PROBLEM — R06.02 SOB (SHORTNESS OF BREATH) ON EXERTION: Status: RESOLVED | Noted: 2017-03-27 | Resolved: 2018-07-17

## 2018-07-17 PROBLEM — I48.91 ATRIAL FIBRILLATION WITH RVR (HCC): Status: RESOLVED | Noted: 2018-06-22 | Resolved: 2018-07-17

## 2018-07-17 PROBLEM — Z87.19 HISTORY OF GASTROESOPHAGEAL REFLUX (GERD): Status: ACTIVE | Noted: 2018-07-17

## 2018-07-17 PROBLEM — J40 BRONCHITIS: Status: RESOLVED | Noted: 2017-03-27 | Resolved: 2018-07-17

## 2018-07-17 LAB
ALBUMIN SERPL-MCNC: 4.1 G/DL (ref 3.5–5.1)
ALP BLD-CCNC: 81 U/L (ref 38–126)
ALT SERPL-CCNC: 46 U/L (ref 11–66)
ANION GAP SERPL CALCULATED.3IONS-SCNC: 13 MEQ/L (ref 8–16)
AST SERPL-CCNC: 28 U/L (ref 5–40)
BILIRUB SERPL-MCNC: 0.3 MG/DL (ref 0.3–1.2)
BILIRUBIN URINE: NEGATIVE
BLOOD, URINE: NEGATIVE
BUN BLDV-MCNC: 16 MG/DL (ref 7–22)
CALCIUM SERPL-MCNC: 9.5 MG/DL (ref 8.5–10.5)
CHARACTER, URINE: CLEAR
CHLORIDE BLD-SCNC: 100 MEQ/L (ref 98–111)
CO2: 26 MEQ/L (ref 23–33)
COLOR: YELLOW
CREAT SERPL-MCNC: 1.1 MG/DL (ref 0.4–1.2)
DIGOXIN LEVEL: 0.6 NG/ML (ref 0.5–2)
GFR SERPL CREATININE-BSD FRML MDRD: 67 ML/MIN/1.73M2
GLUCOSE BLD-MCNC: 107 MG/DL (ref 70–108)
GLUCOSE URINE: NEGATIVE MG/DL
INR BLD: 1.02 (ref 0.85–1.13)
KETONES, URINE: NEGATIVE
LEUKOCYTE ESTERASE, URINE: NEGATIVE
NITRITE, URINE: NEGATIVE
OSMOLALITY CALCULATION: 279.2 MOSMOL/KG (ref 275–300)
PH UA: 6.5
POTASSIUM SERPL-SCNC: 3.8 MEQ/L (ref 3.5–5.2)
PROTEIN UA: NEGATIVE
SODIUM BLD-SCNC: 139 MEQ/L (ref 135–145)
SPECIFIC GRAVITY, URINE: > 1.03 (ref 1–1.03)
T4 FREE: 1.34 NG/DL (ref 0.93–1.76)
TOTAL PROTEIN: 7.2 G/DL (ref 6.1–8)
TROPONIN T: < 0.01 NG/ML
TSH SERPL DL<=0.05 MIU/L-ACNC: 9.16 UIU/ML (ref 0.4–4.2)
UROBILINOGEN, URINE: 0.2 EU/DL

## 2018-07-17 PROCEDURE — 71275 CT ANGIOGRAPHY CHEST: CPT

## 2018-07-17 PROCEDURE — 1200000003 HC TELEMETRY R&B

## 2018-07-17 PROCEDURE — 85610 PROTHROMBIN TIME: CPT

## 2018-07-17 PROCEDURE — 93010 ELECTROCARDIOGRAM REPORT: CPT | Performed by: INTERNAL MEDICINE

## 2018-07-17 PROCEDURE — 96361 HYDRATE IV INFUSION ADD-ON: CPT

## 2018-07-17 PROCEDURE — 6370000000 HC RX 637 (ALT 250 FOR IP): Performed by: NURSE PRACTITIONER

## 2018-07-17 PROCEDURE — 2580000003 HC RX 258: Performed by: NURSE PRACTITIONER

## 2018-07-17 PROCEDURE — 70450 CT HEAD/BRAIN W/O DYE: CPT

## 2018-07-17 PROCEDURE — 81003 URINALYSIS AUTO W/O SCOPE: CPT

## 2018-07-17 PROCEDURE — 96360 HYDRATION IV INFUSION INIT: CPT

## 2018-07-17 PROCEDURE — 6370000000 HC RX 637 (ALT 250 FOR IP): Performed by: INTERNAL MEDICINE

## 2018-07-17 PROCEDURE — 99223 1ST HOSP IP/OBS HIGH 75: CPT | Performed by: INTERNAL MEDICINE

## 2018-07-17 PROCEDURE — 36415 COLL VENOUS BLD VENIPUNCTURE: CPT

## 2018-07-17 PROCEDURE — 99223 1ST HOSP IP/OBS HIGH 75: CPT | Performed by: NURSE PRACTITIONER

## 2018-07-17 PROCEDURE — 6360000004 HC RX CONTRAST MEDICATION: Performed by: NURSE PRACTITIONER

## 2018-07-17 PROCEDURE — 6360000002 HC RX W HCPCS: Performed by: NURSE PRACTITIONER

## 2018-07-17 PROCEDURE — 2709999900 HC NON-CHARGEABLE SUPPLY

## 2018-07-17 RX ORDER — ASPIRIN 81 MG/1
81 TABLET, CHEWABLE ORAL DAILY
Status: DISCONTINUED | OUTPATIENT
Start: 2018-07-17 | End: 2018-07-21 | Stop reason: HOSPADM

## 2018-07-17 RX ORDER — PRAVASTATIN SODIUM 80 MG/1
80 TABLET ORAL NIGHTLY
Status: DISCONTINUED | OUTPATIENT
Start: 2018-07-17 | End: 2018-07-21 | Stop reason: HOSPADM

## 2018-07-17 RX ORDER — ONDANSETRON 2 MG/ML
4 INJECTION INTRAMUSCULAR; INTRAVENOUS EVERY 6 HOURS PRN
Status: DISCONTINUED | OUTPATIENT
Start: 2018-07-17 | End: 2018-07-20 | Stop reason: RX

## 2018-07-17 RX ORDER — BUMETANIDE 1 MG/1
2 TABLET ORAL DAILY
Status: DISCONTINUED | OUTPATIENT
Start: 2018-07-17 | End: 2018-07-18 | Stop reason: SDUPTHER

## 2018-07-17 RX ORDER — SODIUM CHLORIDE 0.9 % (FLUSH) 0.9 %
10 SYRINGE (ML) INJECTION EVERY 12 HOURS SCHEDULED
Status: DISCONTINUED | OUTPATIENT
Start: 2018-07-17 | End: 2018-07-21 | Stop reason: HOSPADM

## 2018-07-17 RX ORDER — LOSARTAN POTASSIUM 50 MG/1
50 TABLET ORAL DAILY
Status: DISCONTINUED | OUTPATIENT
Start: 2018-07-17 | End: 2018-07-21 | Stop reason: HOSPADM

## 2018-07-17 RX ORDER — POTASSIUM CHLORIDE 750 MG/1
10 TABLET, FILM COATED, EXTENDED RELEASE ORAL 2 TIMES DAILY
Status: DISCONTINUED | OUTPATIENT
Start: 2018-07-17 | End: 2018-07-21 | Stop reason: HOSPADM

## 2018-07-17 RX ORDER — MECLIZINE HYDROCHLORIDE CHEWABLE TABLETS 25 MG/1
25 TABLET, CHEWABLE ORAL ONCE
Status: COMPLETED | OUTPATIENT
Start: 2018-07-17 | End: 2018-07-17

## 2018-07-17 RX ORDER — DOCUSATE SODIUM 100 MG/1
100 CAPSULE, LIQUID FILLED ORAL 2 TIMES DAILY PRN
Status: DISCONTINUED | OUTPATIENT
Start: 2018-07-17 | End: 2018-07-21 | Stop reason: HOSPADM

## 2018-07-17 RX ORDER — ISOSORBIDE MONONITRATE 30 MG/1
30 TABLET, EXTENDED RELEASE ORAL DAILY
Status: DISCONTINUED | OUTPATIENT
Start: 2018-07-17 | End: 2018-07-21 | Stop reason: HOSPADM

## 2018-07-17 RX ORDER — 0.9 % SODIUM CHLORIDE 0.9 %
1000 INTRAVENOUS SOLUTION INTRAVENOUS ONCE
Status: COMPLETED | OUTPATIENT
Start: 2018-07-17 | End: 2018-07-17

## 2018-07-17 RX ORDER — SODIUM CHLORIDE 0.9 % (FLUSH) 0.9 %
10 SYRINGE (ML) INJECTION PRN
Status: DISCONTINUED | OUTPATIENT
Start: 2018-07-17 | End: 2018-07-21 | Stop reason: HOSPADM

## 2018-07-17 RX ORDER — DOCUSATE SODIUM 100 MG/1
100 CAPSULE, LIQUID FILLED ORAL 2 TIMES DAILY
Status: DISCONTINUED | OUTPATIENT
Start: 2018-07-17 | End: 2018-07-21 | Stop reason: HOSPADM

## 2018-07-17 RX ORDER — METOPROLOL SUCCINATE 50 MG/1
50 TABLET, EXTENDED RELEASE ORAL DAILY
Status: DISCONTINUED | OUTPATIENT
Start: 2018-07-17 | End: 2018-07-18

## 2018-07-17 RX ORDER — WARFARIN SODIUM 5 MG/1
5 TABLET ORAL ONCE
Status: COMPLETED | OUTPATIENT
Start: 2018-07-17 | End: 2018-07-17

## 2018-07-17 RX ORDER — ASPIRIN 81 MG/1
81 TABLET, CHEWABLE ORAL DAILY
COMMUNITY
End: 2018-10-17

## 2018-07-17 RX ORDER — DIGOXIN 125 MCG
125 TABLET ORAL DAILY
Status: DISCONTINUED | OUTPATIENT
Start: 2018-07-17 | End: 2018-07-21 | Stop reason: HOSPADM

## 2018-07-17 RX ORDER — NITROGLYCERIN 0.4 MG/1
0.4 TABLET SUBLINGUAL EVERY 5 MIN PRN
Status: DISCONTINUED | OUTPATIENT
Start: 2018-07-17 | End: 2018-07-21 | Stop reason: HOSPADM

## 2018-07-17 RX ADMIN — POTASSIUM CHLORIDE 10 MEQ: 750 TABLET, FILM COATED, EXTENDED RELEASE ORAL at 08:22

## 2018-07-17 RX ADMIN — ENOXAPARIN SODIUM 120 MG: 120 INJECTION SUBCUTANEOUS at 20:32

## 2018-07-17 RX ADMIN — DIGOXIN 125 MCG: 125 TABLET ORAL at 08:19

## 2018-07-17 RX ADMIN — SODIUM CHLORIDE 1000 ML: 9 INJECTION, SOLUTION INTRAVENOUS at 01:54

## 2018-07-17 RX ADMIN — IOPAMIDOL 80 ML: 755 INJECTION, SOLUTION INTRAVENOUS at 00:59

## 2018-07-17 RX ADMIN — LOSARTAN POTASSIUM 50 MG: 50 TABLET, FILM COATED ORAL at 08:21

## 2018-07-17 RX ADMIN — ASPIRIN 81 MG: 81 TABLET, CHEWABLE ORAL at 08:14

## 2018-07-17 RX ADMIN — Medication 10 ML: at 08:28

## 2018-07-17 RX ADMIN — BUMETANIDE 2 MG: 1 TABLET ORAL at 08:15

## 2018-07-17 RX ADMIN — POTASSIUM CHLORIDE 10 MEQ: 750 TABLET, FILM COATED, EXTENDED RELEASE ORAL at 20:29

## 2018-07-17 RX ADMIN — PRAVASTATIN SODIUM 80 MG: 80 TABLET ORAL at 20:30

## 2018-07-17 RX ADMIN — WARFARIN SODIUM 5 MG: 5 TABLET ORAL at 18:26

## 2018-07-17 RX ADMIN — ISOSORBIDE MONONITRATE 30 MG: 30 TABLET, EXTENDED RELEASE ORAL at 08:21

## 2018-07-17 RX ADMIN — ENOXAPARIN SODIUM 120 MG: 120 INJECTION SUBCUTANEOUS at 08:20

## 2018-07-17 RX ADMIN — DOCUSATE SODIUM 100 MG: 100 CAPSULE, LIQUID FILLED ORAL at 20:32

## 2018-07-17 RX ADMIN — Medication 10 ML: at 20:33

## 2018-07-17 RX ADMIN — METOPROLOL SUCCINATE 50 MG: 50 TABLET, EXTENDED RELEASE ORAL at 08:21

## 2018-07-17 RX ADMIN — DOCUSATE SODIUM 100 MG: 100 CAPSULE, LIQUID FILLED ORAL at 08:20

## 2018-07-17 RX ADMIN — DILTIAZEM HYDROCHLORIDE 360 MG: 240 CAPSULE, COATED, EXTENDED RELEASE ORAL at 08:18

## 2018-07-17 RX ADMIN — MECLIZINE HCL 25 MG: 25 TABLET, CHEWABLE ORAL at 01:53

## 2018-07-17 ASSESSMENT — PAIN SCALES - GENERAL
PAINLEVEL_OUTOF10: 0

## 2018-07-17 ASSESSMENT — ENCOUNTER SYMPTOMS
BACK PAIN: 0
RHINORRHEA: 0
CHEST TIGHTNESS: 0
SHORTNESS OF BREATH: 0
COUGH: 0

## 2018-07-17 NOTE — PLAN OF CARE
Problem: Discharge Planning:  Goal: Participates in care planning  Participates in care planning   Outcome: Ongoing  Patient plans to be discharged to home when medically stable. Problem: Cardiac Output - Decreased:  Goal: Hemodynamic stability will improve  Hemodynamic stability will improve   Outcome: Ongoing  Vitals:    07/17/18 1552   BP: 136/73   Pulse: 65   Resp: 17   Temp: 97.6 °F (36.4 °C)   SpO2: 93%   Patient in A-fib started on coumadin. Plan is to keep patient until INR is therapeutic. Problem: Pain:  Goal: Pain level will decrease  Pain level will decrease   Outcome: Ongoing  Patient free from pain this shift. Pain rated on 0-10 pain rating scale. Will continue to reassess. Problem: Skin Integrity - Impaired:  Goal: Will show no infection signs and symptoms  Will show no infection signs and symptoms   Outcome: Ongoing  Patient free from skin breakdown. Patient turns self and makes frequent positional changes. Will continue to monitor. Comments: Care plan reviewed with patient and wife. Patient and wife verbalized understanding of the plan of care and contribute to goal setting.

## 2018-07-17 NOTE — ED PROVIDER NOTES
Ultrasound and MRI are read by the radiologist.  Plain radiographic images are visualized and preliminarily interpreted by the emergency physician unless otherwise stated below. CT HEAD WO CONTRAST   Final Result   Negative CT of the brain for active pathology. **This report has been created using voice recognition software. It may contain minor errors which are inherent in voice recognition technology. **      Final report electronically signed by Dr. Pamela Crooks on 7/17/2018 1:26 AM      CTA Chest W WO Contrast   Final Result   1. No evidence of pulmonary embolism. 2. Elevation/eventration of the right hemidiaphragm with an atelectatic band or scar at the right lung base.   3            **This report has been created using voice recognition software. It may contain minor errors which are inherent in voice recognition technology. **            No change from CTA CHEST WITH CONTRAST with report dated 3/23/2017 8:49 AM.            **This report has been created using voice recognition software. It may contain minor errors which are inherent in voice recognition technology. **      Final report electronically signed by Dr. Pamela Crooks on 7/17/2018 1:10 AM            LABS:   Labs Reviewed   GLOMERULAR FILTRATION RATE, ESTIMATED - Abnormal; Notable for the following:        Result Value    Est, Glom Filt Rate 67 (*)     All other components within normal limits   TSH WITHOUT REFLEX - Abnormal; Notable for the following:     TSH 9.160 (*)     All other components within normal limits   URINE RT REFLEX TO CULTURE - Abnormal; Notable for the following:     Specific Gravity, Urine > 1.030 (*)     All other components within normal limits   PROTIME-INR - Abnormal; Notable for the following:     INR 1.47 (*)     All other components within normal limits   PROTIME-INR - Abnormal; Notable for the following:     INR 2.20 (*)     All other components within normal limits   RENAL FUNCTION PANEL - Abnormal; Notable (COUMADIN) daily dosing (placeholder) ( Other Canceled Entry 7/17/18 0931)   bumetanide (BUMEX) tablet 2 mg (2 mg Oral Given 7/21/18 1031)   diltiazem (DILACOR XR) extended release capsule 360 mg (360 mg Oral Given 7/21/18 1030)   ondansetron (ZOFRAN) tablet 4 mg (not administered)   magnesium hydroxide (MILK OF MAGNESIA) 400 MG/5ML suspension 30 mL (30 mLs Oral Given 7/20/18 1740)   metoprolol succinate (TOPROL XL) extended release tablet 100 mg (not administered)   warfarin (COUMADIN) tablet 2.5 mg (not administered)   iopamidol (ISOVUE-370) 76 % injection 80 mL (80 mLs Intravenous Given 7/17/18 0059)   meclizine (ANTIVERT) chewable tablet 25 mg (25 mg Oral Given 7/17/18 0153)   0.9 % sodium chloride bolus (0 mLs Intravenous Stopped 7/17/18 0839)   warfarin (COUMADIN) tablet 5 mg (5 mg Oral Given 7/17/18 1826)   metoprolol succinate (TOPROL XL) extended release tablet 25 mg (25 mg Oral Given 7/18/18 1113)   warfarin (COUMADIN) tablet 5 mg (5 mg Oral Given 7/18/18 1708)   warfarin (COUMADIN) tablet 7.5 mg (7.5 mg Oral Given 7/19/18 1840)   warfarin (COUMADIN) tablet 7.5 mg (7.5 mg Oral Given 7/20/18 1740)           I have given the patient strict written and verbal instructions about care at home, follow-up, and signs and symptoms of worsening of condition and they did verbalize understanding. Patient was seen independently by myself. The Patient's final impression and disposition and plan was determined by myself. CRITICAL CARE:   none    CONSULTS:  None    PROCEDURES:  None    FINAL IMPRESSION      1. Dizziness    2. General weakness    3. Atrial fibrillation, unspecified type ([address]    4. Dehydration    5.  Non compliance w medication regimen          DISPOSITION/PLAN   DISPOSITION Admitted 07/17/2018 04:45:22 AM        PATIENT REFERRED TO:  Pastor Soulier, MD  [address] [facility] [address]  [address] [place] [address], [address] [place], [place] [address]

## 2018-07-17 NOTE — CONSULTS
losartan (COZAAR) 50 MG tablet Take 1 tablet by mouth daily 7/11/18 7/11/19 Yes NARESH Nova CNP   isosorbide mononitrate (IMDUR) 30 MG extended release tablet take 1 tablet by mouth once daily 7/11/18  Yes NARESH Nova CNP   digoxin (DIGITEK) 125 MCG tablet Take 1 tablet by mouth daily 7/11/18  Yes NARESH Nova CNP   diltiazem (CARTIA XT) 180 MG extended release capsule Take 2 capsules by mouth daily 6/23/18  Yes Jessie Lai MD   docusate sodium (COLACE) 100 MG capsule Take 100 mg by mouth 2 times daily   Yes Historical Provider, MD   pravastatin (PRAVACHOL) 80 MG tablet Take 1 tablet by mouth nightly 3/21/18  Yes Jaime Rincon MD   bumetanide (BUMEX) 2 MG tablet Take 1 tablet by mouth daily 3/21/18  Yes Jaime Rincon MD   potassium chloride (KLOR-CON) 10 MEQ extended release tablet Take 1 tablet by mouth 2 times daily 12/28/17  Yes Jaime Rincon MD   metoprolol succinate (TOPROL XL) 50 MG extended release tablet take 1 tablet by mouth twice a day 12/20/17  Yes Jaime Rincon MD   aspirin 81 MG chewable tablet Take 1 tablet by mouth daily  Patient taking differently: Take 162 mg by mouth 2 times daily  3/27/17  Yes Jessie Lai MD   nitroGLYCERIN (NITROSTAT) 0.4 MG SL tablet Place 1 tablet under the tongue every 5 minutes as needed for Chest pain 7/10/16  Yes Jaime Rincon MD       Current Facility-Administered Medications   Medication Dose Route Frequency Provider Last Rate Last Dose    aspirin chewable tablet 81 mg  81 mg Oral Daily NARESH Malik - CNP   81 mg at 07/17/18 0814    bumetanide (BUMEX) tablet 2 mg  2 mg Oral Daily NARESH Malik - CNP   2 mg at 07/17/18 0815    digoxin (LANOXIN) tablet 125 mcg  125 mcg Oral Daily Linda Barnard APRN - CNP   125 mcg at 07/17/18 7311    diltiazem (CARDIZEM CD) extended release capsule 360 mg  360 mg Oral Daily NARESH Malik - CNP   360 mg at

## 2018-07-17 NOTE — PROGRESS NOTES
Pharmacy Medication History Note      List of current medications patient is taking is complete. Source of information: Patient, medications bottles, and Surescripts     Changes made to medication list:  Medications removed (include reason, ex. therapy complete or physician discontinued):  · Aspirin 81 mg - dose adjustment     Medications added/doses adjusted:  · Aspirin 81 mg BID     Other notes (ex. Recent course of antibiotics, Coumadin dosing):  · Patient stated he takes 1 baby aspirin in the morning and 1 baby aspirin in the evening   · Patient stated he has not used his nitroglycerin tablets in the 7 years he has been prescribed them, but continues to refill them. May need a new prescription for that and his digoxin (bottle was empty). · Denies use of other OTC or herbal medications besides aspirin and colace.        Allergies reviewed      Electronically signed by Penelope Pyror on 7/17/2018 at 2:51 PM

## 2018-07-17 NOTE — PROGRESS NOTES
Clinical Pharmacy Note    Katie Banks is a 79 y.o. male for whom pharmacy has been asked to manage warfarin therapy. Reason for Admission: atrial fibrillation    Consulting Physician: Dr. Jony Roberts  Warfarin dose prior to admission: none - new start  Warfarin indication: atrial fibrillation  Target INR range: 2- 3  Outpatient warfarin provider: needs to be determine    Past Medical History:   Diagnosis Date    Anxiety     Atrial fibrillation ([address]     CAD (coronary artery disease)     Chest pain     Dementia     Dizziness     GERD (gastroesophageal reflux disease)     Heart disease     Hyperlipidemia     Hypertension     Hypothyroidism                 Recent Labs      07/17/18   0846   INR  1.02     Recent Labs      07/16/18   2335   HGB  16.0   HCT  46.3   PLT  252       Current warfarin drug-drug interactions: aspirin (home), enoxaparin      Date INR Warfarin Dose   7/17/2018 1.02 5 mg                                   Daily PT/INR until stable within therapeutic range. Thank you for the consult.    Michelle Martinez, PharmD, BCPS  7/17/2018  11:40 AM

## 2018-07-17 NOTE — PROGRESS NOTES
Per Dr Kylee Nugent pt ok to be discharged once INR is therapeutic. Will follow with coumadin clinic. Pharmacy to continue to dose coumadin.

## 2018-07-18 LAB — INR BLD: 1.02 (ref 0.85–1.13)

## 2018-07-18 PROCEDURE — 2580000003 HC RX 258: Performed by: NURSE PRACTITIONER

## 2018-07-18 PROCEDURE — 6370000000 HC RX 637 (ALT 250 FOR IP): Performed by: NURSE PRACTITIONER

## 2018-07-18 PROCEDURE — 85610 PROTHROMBIN TIME: CPT

## 2018-07-18 PROCEDURE — 1200000003 HC TELEMETRY R&B

## 2018-07-18 PROCEDURE — 6370000000 HC RX 637 (ALT 250 FOR IP)

## 2018-07-18 PROCEDURE — 99231 SBSQ HOSP IP/OBS SF/LOW 25: CPT | Performed by: NURSE PRACTITIONER

## 2018-07-18 PROCEDURE — 99233 SBSQ HOSP IP/OBS HIGH 50: CPT | Performed by: INTERNAL MEDICINE

## 2018-07-18 PROCEDURE — 6360000002 HC RX W HCPCS: Performed by: NURSE PRACTITIONER

## 2018-07-18 PROCEDURE — 36415 COLL VENOUS BLD VENIPUNCTURE: CPT

## 2018-07-18 RX ORDER — WARFARIN SODIUM 5 MG/1
5 TABLET ORAL
Status: COMPLETED | OUTPATIENT
Start: 2018-07-18 | End: 2018-07-18

## 2018-07-18 RX ORDER — METOPROLOL SUCCINATE 50 MG/1
75 TABLET, EXTENDED RELEASE ORAL DAILY
Status: DISCONTINUED | OUTPATIENT
Start: 2018-07-19 | End: 2018-07-21

## 2018-07-18 RX ORDER — BUMETANIDE 2 MG/1
2 TABLET ORAL DAILY
Status: DISCONTINUED | OUTPATIENT
Start: 2018-07-19 | End: 2018-07-21 | Stop reason: HOSPADM

## 2018-07-18 RX ORDER — METOPROLOL SUCCINATE 25 MG/1
25 TABLET, EXTENDED RELEASE ORAL ONCE
Status: COMPLETED | OUTPATIENT
Start: 2018-07-18 | End: 2018-07-18

## 2018-07-18 RX ORDER — DILTIAZEM HYDROCHLORIDE 180 MG/1
360 CAPSULE, EXTENDED RELEASE ORAL DAILY
Status: DISCONTINUED | OUTPATIENT
Start: 2018-07-18 | End: 2018-07-21 | Stop reason: HOSPADM

## 2018-07-18 RX ADMIN — PRAVASTATIN SODIUM 80 MG: 80 TABLET ORAL at 20:26

## 2018-07-18 RX ADMIN — DOCUSATE SODIUM 100 MG: 100 CAPSULE, LIQUID FILLED ORAL at 20:23

## 2018-07-18 RX ADMIN — BUMETANIDE 2 MG: 1 TABLET ORAL at 08:02

## 2018-07-18 RX ADMIN — ENOXAPARIN SODIUM 120 MG: 120 INJECTION SUBCUTANEOUS at 20:16

## 2018-07-18 RX ADMIN — Medication 10 ML: at 20:16

## 2018-07-18 RX ADMIN — METOPROLOL SUCCINATE 25 MG: 25 TABLET, EXTENDED RELEASE ORAL at 11:13

## 2018-07-18 RX ADMIN — POTASSIUM CHLORIDE 10 MEQ: 750 TABLET, FILM COATED, EXTENDED RELEASE ORAL at 08:05

## 2018-07-18 RX ADMIN — WARFARIN SODIUM 5 MG: 5 TABLET ORAL at 17:08

## 2018-07-18 RX ADMIN — METOPROLOL SUCCINATE 50 MG: 50 TABLET, EXTENDED RELEASE ORAL at 08:05

## 2018-07-18 RX ADMIN — DILTIAZEM HYDROCHLORIDE 360 MG: 240 CAPSULE, COATED, EXTENDED RELEASE ORAL at 08:02

## 2018-07-18 RX ADMIN — LOSARTAN POTASSIUM 50 MG: 50 TABLET, FILM COATED ORAL at 08:04

## 2018-07-18 RX ADMIN — ENOXAPARIN SODIUM 120 MG: 120 INJECTION SUBCUTANEOUS at 08:04

## 2018-07-18 RX ADMIN — ISOSORBIDE MONONITRATE 30 MG: 30 TABLET, EXTENDED RELEASE ORAL at 08:04

## 2018-07-18 RX ADMIN — ASPIRIN 81 MG: 81 TABLET, CHEWABLE ORAL at 08:01

## 2018-07-18 RX ADMIN — Medication 10 ML: at 09:54

## 2018-07-18 RX ADMIN — DOCUSATE SODIUM 100 MG: 100 CAPSULE, LIQUID FILLED ORAL at 08:03

## 2018-07-18 RX ADMIN — POTASSIUM CHLORIDE 10 MEQ: 750 TABLET, FILM COATED, EXTENDED RELEASE ORAL at 20:26

## 2018-07-18 RX ADMIN — DIGOXIN 125 MCG: 125 TABLET ORAL at 08:03

## 2018-07-18 ASSESSMENT — PAIN SCALES - GENERAL
PAINLEVEL_OUTOF10: 0

## 2018-07-18 NOTE — PLAN OF CARE
Problem: Discharge Planning:  Goal: Participates in care planning  Participates in care planning   Outcome: Ongoing  Patient and wife participate in Plan of Care. Goal: Discharged to appropriate level of care  Discharged to appropriate level of care   Outcome: Ongoing  Patient plans to return home with wife at discharge. Problem: Cardiac Output - Decreased:  Goal: Hemodynamic stability will improve  Hemodynamic stability will improve   Outcome: Ongoing  VSS    Problem: Pain:  Goal: Pain level will decrease  Pain level will decrease   Outcome: Ongoing  Patient had no c/o pain noted this shift. Goal: Recognizes and communicates pain  Recognizes and communicates pain   Outcome: Ongoing  Patient able to recognize pain and communicate with staff the need for pain medication. Problem: Skin Integrity - Impaired:  Goal: Will show no infection signs and symptoms  Will show no infection signs and symptoms   Outcome: Ongoing  No s/s of infection noted. Comments: Care plan reviewed with patient and wife. Patient and wife verbalize understanding of the plan of care and contribute to goal setting.

## 2018-07-19 LAB
GLUCOSE BLD-MCNC: 162 MG/DL (ref 70–108)
INR BLD: 1.05 (ref 0.85–1.13)

## 2018-07-19 PROCEDURE — 6370000000 HC RX 637 (ALT 250 FOR IP)

## 2018-07-19 PROCEDURE — 82948 REAGENT STRIP/BLOOD GLUCOSE: CPT

## 2018-07-19 PROCEDURE — 85610 PROTHROMBIN TIME: CPT

## 2018-07-19 PROCEDURE — 36415 COLL VENOUS BLD VENIPUNCTURE: CPT

## 2018-07-19 PROCEDURE — 1200000003 HC TELEMETRY R&B

## 2018-07-19 PROCEDURE — 6370000000 HC RX 637 (ALT 250 FOR IP): Performed by: NURSE PRACTITIONER

## 2018-07-19 PROCEDURE — 99233 SBSQ HOSP IP/OBS HIGH 50: CPT | Performed by: INTERNAL MEDICINE

## 2018-07-19 PROCEDURE — 2580000003 HC RX 258: Performed by: NURSE PRACTITIONER

## 2018-07-19 PROCEDURE — 6360000002 HC RX W HCPCS: Performed by: NURSE PRACTITIONER

## 2018-07-19 RX ORDER — WARFARIN SODIUM 7.5 MG/1
7.5 TABLET ORAL
Status: COMPLETED | OUTPATIENT
Start: 2018-07-19 | End: 2018-07-19

## 2018-07-19 RX ADMIN — POTASSIUM CHLORIDE 10 MEQ: 750 TABLET, FILM COATED, EXTENDED RELEASE ORAL at 08:24

## 2018-07-19 RX ADMIN — METOPROLOL SUCCINATE 75 MG: 50 TABLET, EXTENDED RELEASE ORAL at 08:24

## 2018-07-19 RX ADMIN — DIGOXIN 125 MCG: 125 TABLET ORAL at 08:20

## 2018-07-19 RX ADMIN — DILTIAZEM HYDROCHLORIDE 360 MG: 180 CAPSULE, EXTENDED RELEASE ORAL at 08:21

## 2018-07-19 RX ADMIN — WARFARIN SODIUM 7.5 MG: 7.5 TABLET ORAL at 18:40

## 2018-07-19 RX ADMIN — ISOSORBIDE MONONITRATE 30 MG: 30 TABLET, EXTENDED RELEASE ORAL at 08:22

## 2018-07-19 RX ADMIN — POTASSIUM CHLORIDE 10 MEQ: 750 TABLET, FILM COATED, EXTENDED RELEASE ORAL at 20:04

## 2018-07-19 RX ADMIN — LOSARTAN POTASSIUM 50 MG: 50 TABLET, FILM COATED ORAL at 08:22

## 2018-07-19 RX ADMIN — DOCUSATE SODIUM 100 MG: 100 CAPSULE, LIQUID FILLED ORAL at 20:06

## 2018-07-19 RX ADMIN — ENOXAPARIN SODIUM 120 MG: 120 INJECTION SUBCUTANEOUS at 20:03

## 2018-07-19 RX ADMIN — Medication 10 ML: at 08:25

## 2018-07-19 RX ADMIN — BUMETANIDE 2 MG: 2 TABLET ORAL at 08:19

## 2018-07-19 RX ADMIN — Medication 10 ML: at 20:04

## 2018-07-19 RX ADMIN — PRAVASTATIN SODIUM 80 MG: 80 TABLET ORAL at 20:04

## 2018-07-19 RX ADMIN — ENOXAPARIN SODIUM 120 MG: 120 INJECTION SUBCUTANEOUS at 08:25

## 2018-07-19 RX ADMIN — ASPIRIN 81 MG: 81 TABLET, CHEWABLE ORAL at 08:18

## 2018-07-19 RX ADMIN — DOCUSATE SODIUM 100 MG: 100 CAPSULE, LIQUID FILLED ORAL at 08:21

## 2018-07-19 NOTE — PLAN OF CARE
Problem: Discharge Planning:  Goal: Participates in care planning  Participates in care planning   Outcome: Ongoing  Pt and family active in care planning. Goal: Discharged to appropriate level of care  Discharged to appropriate level of care   Outcome: Ongoing  Pt plans on being d/c home with wife when medically stable     Problem: Cardiac Output - Decreased:  Goal: Hemodynamic stability will improve  Hemodynamic stability will improve   Outcome: Ongoing  HR still elevated (120'-140's) at times. Metoprolol dose increased yesterday. Started on coumadin 7/17 for Atrial Fib. Waiting for therapeutic INR. Results for Chioma Melendez (MRN [id number]) as of 7/19/2018 09:51   Ref. Range 7/17/2018 08:46 7/18/2018 05:59 7/18/2018 10:32 7/19/2018 06:12   INR Latest Ref Range: 0.85 - 1.13  1.02 1.02  1.05       Problem: Pain:  Goal: Pain level will decrease  Pain level will decrease   Outcome: Ongoing  Pt denies any pain at this time    Problem: Skin Integrity - Impaired:  Goal: Will show no infection signs and symptoms  Will show no infection signs and symptoms   Outcome: Ongoing  No s/s of infection at this time    Problem: Falls - Risk of:  Goal: Will remain free from falls  Will remain free from falls   Outcome: Ongoing  No falls this shift. Call light in reach. Bed alarm on. Falling star protocol in place. Pt ambulates with standby assist. Pt a stay with me. Comments: Care plan reviewed with patient and family. Patient and family verbalize understanding of the plan of care and contribute to goal setting.

## 2018-07-19 NOTE — PROGRESS NOTES
Hospitalist Progress Note    Patient:  Karthik Cobian      Unit/Bed:6K-19/019-A    YOB: 1950    MRN: [id number]       Acct: [de-identified]     PCP: Stefanie Loza MD    Date of Admission: 7/16/2018    Chief Complaint: [facility] Course: Pt is a 78 y/o admitted with A. Fib. Pt is rate controlled. On coumadin and lovenox. Cardiology following    7/19/18: pt doing ok, INR not moving, Pt and wife not comfortable with administering Lovenox at home. Pharm monitoring    Subjective: Pt doing ok, no new concerns      Medications:  Reviewed    Infusion Medications   Scheduled Medications    warfarin  7.5 mg Oral Once    bumetanide  2 mg Oral Daily    metoprolol succinate  75 mg Oral Daily    diltiazem  360 mg Oral Daily    aspirin  81 mg Oral Daily    digoxin  125 mcg Oral Daily    docusate sodium  100 mg Oral BID    isosorbide mononitrate  30 mg Oral Daily    losartan  50 mg Oral Daily    potassium chloride  10 mEq Oral BID    pravastatin  80 mg Oral Nightly    sodium chloride flush  10 mL Intravenous 2 times per day    enoxaparin  1 mg/kg Subcutaneous BID    warfarin (COUMADIN) daily dosing (placeholder)   Other RX Placeholder     PRN Meds: nitroGLYCERIN, sodium chloride flush, docusate sodium, ondansetron      Intake/Output Summary (Last 24 hours) at 07/19/18 1357  Last data filed at 07/19/18 1352   Gross per 24 hour   Intake             1940 ml   Output                0 ml   Net             1940 ml       Diet:  DIET CARDIAC; Exam:  /84   Pulse 61   Temp 97.6 °F (36.4 °C) (Oral)   Resp 18   Ht 6' 1\" (1.854 m)   Wt 244 lb 3.2 oz (110.8 kg)   SpO2 97%   BMI 32.22 kg/m²     General appearance: No apparent distress, appears stated age and cooperative. HEENT: Pupils equal, round, and reactive to light. Conjunctivae/corneas clear. Neck: Supple, with full range of motion. Respiratory:  Normal respiratory effort.  Clear to auscultation,  Cardiovascular: Regular rate and rhythm with normal S1/S2 . Abdomen: Soft, non-tender, non-distended with normal bowel sounds. Musculoskeletal: passive and active ROM x 4 extremities. Skin: Skin color, texture, turgor normal.  No rashes or lesions. Neurologic:  Grossly non-focal.  Psychiatric: Alert and oriented, thought content appropriate, normal insight  Capillary Refill: Brisk,< 3 seconds   Peripheral Pulses: +2 palpable, equal bilaterally       Labs:   Recent Labs      07/16/18   2335   WBC  9.6   HGB  16.0   HCT  46.3   PLT  252     Recent Labs      07/16/18   2335   NA  139   K  3.8   CL  100   CO2  26   BUN  16   CREATININE  1.1   CALCIUM  9.5     Recent Labs      07/16/18   2335   AST  28   ALT  46   BILITOT  0.3   ALKPHOS  81     Recent Labs      07/17/18   0846  07/18/18   0559  07/19/18   0612   INR  1.02  1.02  1.05     No results for input(s): Aida Burns in the last 72 hours. Urinalysis:      Lab Results   Component Value Date    NITRU NEGATIVE 07/17/2018    WBCUA 0-2 03/23/2017    BACTERIA NONE 03/23/2017    RBCUA 3-5 03/23/2017    BLOODU NEGATIVE 07/17/2018    GLUCOSEU NEGATIVE 07/17/2018       Radiology:  CT HEAD WO CONTRAST   Final Result   Negative CT of the brain for active pathology. **This report has been created using voice recognition software. It may contain minor errors which are inherent in voice recognition technology. **      Final report electronically signed by Dr. Juan Manuel Cunningham on 7/17/2018 1:26 AM      CTA Chest W WO Contrast   Final Result   1. No evidence of pulmonary embolism. 2. Elevation/eventration of the right hemidiaphragm with an atelectatic band or scar at the right lung base.   3            **This report has been created using voice recognition software. It may contain minor errors which are inherent in voice recognition technology. **            No change from CTA CHEST WITH CONTRAST with report dated 3/23/2017 8:49 AM.            **This report has been created using voice recognition software. It may contain minor errors which are inherent in voice recognition technology. **      Final report electronically signed by Dr. Verona Hale on 7/17/2018 1:10 AM          Diet: DIET CARDIAC;    DVT prophylaxis: [] Lovenox                                 [] SCDs                                 [] SQ Heparin                                 [] Encourage ambulation           [x] Already on Anticoagulation     Disposition:    [x] Home       [] TCU       [] Rehab       [] Psych       [] SNF       [] [place]       [] Other-    Code Status: Full Code     PT/OT Eval Status:     Assessment/Plan:    Anticipated Discharge in : 2-4 days    Active Hospital Problems    Diagnosis Date Noted    Paroxysmal atrial fibrillation ([facility] [address] [facility] [I48.0] 03/27/2017     Priority: High    Obesity due to excess calories [E66.09] 03/27/2017     Priority: High    Dizziness [R42] 07/17/2018    History of gastroesophageal reflux (GERD) [Z87.19] 05/99/2351    Diastolic dysfunction [Z93.8] 07/17/2018    Essential hypertension [I10]     Hyperlipidemia [E78.5]        1. A. Fib: rate control. Plan is to be therapeutic prior to d/c.  2. HTN: BP control  3.  Continue all other mgt        Electronically signed by Kristen Jaramillo MD on 7/19/2018 at 1:57 PM

## 2018-07-19 NOTE — FLOWSHEET NOTE
Pt was alone at the time of the visit. He said that he got dizziness and that his medication has been on check for a change. He wanted prayer to heal and I prayed asking God to heal him.      07/19/18 1611   Encounter Summary   Services provided to: Patient   Referral/Consult From: [facility]   Support System Spouse; Family members   Continue Visiting Yes  (7/19)   Complexity of Encounter Low   Length of Encounter 15 minutes   Spiritual/Faith   Type Spiritual support   Assessment Approachable;Calm; Hopeful   Intervention Active listening;Prayer;Nurtured hope   Outcome Connection/belonging;Expressed gratitude;Engaged in conversation;Expressed feelings/needs/concerns

## 2018-07-20 LAB — INR BLD: 1.47 (ref 0.85–1.13)

## 2018-07-20 PROCEDURE — 6360000002 HC RX W HCPCS: Performed by: NURSE PRACTITIONER

## 2018-07-20 PROCEDURE — 2580000003 HC RX 258: Performed by: NURSE PRACTITIONER

## 2018-07-20 PROCEDURE — 85610 PROTHROMBIN TIME: CPT

## 2018-07-20 PROCEDURE — 6370000000 HC RX 637 (ALT 250 FOR IP): Performed by: NURSE PRACTITIONER

## 2018-07-20 PROCEDURE — 1200000003 HC TELEMETRY R&B

## 2018-07-20 PROCEDURE — 6370000000 HC RX 637 (ALT 250 FOR IP)

## 2018-07-20 PROCEDURE — 6370000000 HC RX 637 (ALT 250 FOR IP): Performed by: INTERNAL MEDICINE

## 2018-07-20 PROCEDURE — 36415 COLL VENOUS BLD VENIPUNCTURE: CPT

## 2018-07-20 PROCEDURE — 99233 SBSQ HOSP IP/OBS HIGH 50: CPT | Performed by: INTERNAL MEDICINE

## 2018-07-20 RX ORDER — WARFARIN SODIUM 7.5 MG/1
7.5 TABLET ORAL ONCE
Status: COMPLETED | OUTPATIENT
Start: 2018-07-20 | End: 2018-07-20

## 2018-07-20 RX ORDER — ONDANSETRON 4 MG/1
4 TABLET, FILM COATED ORAL EVERY 6 HOURS PRN
Status: DISCONTINUED | OUTPATIENT
Start: 2018-07-20 | End: 2018-07-21 | Stop reason: HOSPADM

## 2018-07-20 RX ADMIN — ASPIRIN 81 MG: 81 TABLET, CHEWABLE ORAL at 08:32

## 2018-07-20 RX ADMIN — ENOXAPARIN SODIUM 120 MG: 120 INJECTION SUBCUTANEOUS at 08:38

## 2018-07-20 RX ADMIN — PRAVASTATIN SODIUM 80 MG: 80 TABLET ORAL at 21:42

## 2018-07-20 RX ADMIN — Medication 10 ML: at 08:38

## 2018-07-20 RX ADMIN — LOSARTAN POTASSIUM 50 MG: 50 TABLET, FILM COATED ORAL at 08:35

## 2018-07-20 RX ADMIN — MAGNESIUM HYDROXIDE 30 ML: 400 SUSPENSION ORAL at 17:40

## 2018-07-20 RX ADMIN — POTASSIUM CHLORIDE 10 MEQ: 750 TABLET, FILM COATED, EXTENDED RELEASE ORAL at 21:39

## 2018-07-20 RX ADMIN — Medication 10 ML: at 21:39

## 2018-07-20 RX ADMIN — POTASSIUM CHLORIDE 10 MEQ: 750 TABLET, FILM COATED, EXTENDED RELEASE ORAL at 08:37

## 2018-07-20 RX ADMIN — DOCUSATE SODIUM 100 MG: 100 CAPSULE, LIQUID FILLED ORAL at 21:38

## 2018-07-20 RX ADMIN — DIGOXIN 125 MCG: 125 TABLET ORAL at 08:33

## 2018-07-20 RX ADMIN — ISOSORBIDE MONONITRATE 30 MG: 30 TABLET, EXTENDED RELEASE ORAL at 08:35

## 2018-07-20 RX ADMIN — METOPROLOL SUCCINATE 75 MG: 50 TABLET, EXTENDED RELEASE ORAL at 09:53

## 2018-07-20 RX ADMIN — WARFARIN SODIUM 7.5 MG: 7.5 TABLET ORAL at 17:40

## 2018-07-20 RX ADMIN — BUMETANIDE 2 MG: 2 TABLET ORAL at 08:32

## 2018-07-20 RX ADMIN — ENOXAPARIN SODIUM 120 MG: 120 INJECTION SUBCUTANEOUS at 21:39

## 2018-07-20 RX ADMIN — DILTIAZEM HYDROCHLORIDE 360 MG: 180 CAPSULE, EXTENDED RELEASE ORAL at 08:34

## 2018-07-20 RX ADMIN — DOCUSATE SODIUM 100 MG: 100 CAPSULE, LIQUID FILLED ORAL at 08:31

## 2018-07-20 NOTE — FLOWSHEET NOTE
Dr Trang Briones notified of request for MOM for constipation. Orders received. Patient stated became slightly dizzy one time today.  Denies dizziness at this time

## 2018-07-21 ENCOUNTER — TELEPHONE (OUTPATIENT)
Dept: FAMILY MEDICINE CLINIC | Age: 68
End: 2018-07-21

## 2018-07-21 VITALS
DIASTOLIC BLOOD PRESSURE: 89 MMHG | RESPIRATION RATE: 16 BRPM | TEMPERATURE: 97.6 F | WEIGHT: 244.6 LBS | HEIGHT: 73 IN | OXYGEN SATURATION: 95 % | SYSTOLIC BLOOD PRESSURE: 137 MMHG | BODY MASS INDEX: 32.42 KG/M2 | HEART RATE: 85 BPM

## 2018-07-21 LAB
ALBUMIN SERPL-MCNC: 4.1 G/DL (ref 3.5–5.1)
ANION GAP SERPL CALCULATED.3IONS-SCNC: 13 MEQ/L (ref 8–16)
BASOPHILS # BLD: 0.8 %
BASOPHILS ABSOLUTE: 0.1 THOU/MM3 (ref 0–0.1)
BUN BLDV-MCNC: 14 MG/DL (ref 7–22)
CALCIUM SERPL-MCNC: 9.7 MG/DL (ref 8.5–10.5)
CHLORIDE BLD-SCNC: 103 MEQ/L (ref 98–111)
CO2: 28 MEQ/L (ref 23–33)
CREAT SERPL-MCNC: 1 MG/DL (ref 0.4–1.2)
EOSINOPHIL # BLD: 2.7 %
EOSINOPHILS ABSOLUTE: 0.2 THOU/MM3 (ref 0–0.4)
ERYTHROCYTE [DISTWIDTH] IN BLOOD BY AUTOMATED COUNT: 13.6 % (ref 11.5–14.5)
ERYTHROCYTE [DISTWIDTH] IN BLOOD BY AUTOMATED COUNT: 42 FL (ref 35–45)
GFR SERPL CREATININE-BSD FRML MDRD: 75 ML/MIN/1.73M2
GLUCOSE BLD-MCNC: 133 MG/DL (ref 70–108)
HCT VFR BLD CALC: 49.5 % (ref 42–52)
HEMOGLOBIN: 16.9 GM/DL (ref 14–18)
IMMATURE GRANS (ABS): 0.03 THOU/MM3 (ref 0–0.07)
IMMATURE GRANULOCYTES: 0.4 %
INR BLD: 2.2 (ref 0.85–1.13)
LYMPHOCYTES # BLD: 32.8 %
LYMPHOCYTES ABSOLUTE: 2.4 THOU/MM3 (ref 1–4.8)
MAGNESIUM: 2.6 MG/DL (ref 1.6–2.4)
MCH RBC QN AUTO: 29.6 PG (ref 26–33)
MCHC RBC AUTO-ENTMCNC: 34.1 GM/DL (ref 32.2–35.5)
MCV RBC AUTO: 86.8 FL (ref 80–94)
MONOCYTES # BLD: 6.1 %
MONOCYTES ABSOLUTE: 0.4 THOU/MM3 (ref 0.4–1.3)
NUCLEATED RED BLOOD CELLS: 0 /100 WBC
PHOSPHORUS: 3 MG/DL (ref 2.4–4.7)
PLATELET # BLD: 241 THOU/MM3 (ref 130–400)
PMV BLD AUTO: 10.8 FL (ref 9.4–12.4)
POTASSIUM SERPL-SCNC: 4.4 MEQ/L (ref 3.5–5.2)
RBC # BLD: 5.7 MILL/MM3 (ref 4.7–6.1)
SEG NEUTROPHILS: 57.2 %
SEGMENTED NEUTROPHILS ABSOLUTE COUNT: 4.1 THOU/MM3 (ref 1.8–7.7)
SODIUM BLD-SCNC: 144 MEQ/L (ref 135–145)
WBC # BLD: 7.2 THOU/MM3 (ref 4.8–10.8)

## 2018-07-21 PROCEDURE — 6370000000 HC RX 637 (ALT 250 FOR IP): Performed by: PHARMACIST

## 2018-07-21 PROCEDURE — 6360000002 HC RX W HCPCS: Performed by: NURSE PRACTITIONER

## 2018-07-21 PROCEDURE — 36415 COLL VENOUS BLD VENIPUNCTURE: CPT

## 2018-07-21 PROCEDURE — 2580000003 HC RX 258: Performed by: NURSE PRACTITIONER

## 2018-07-21 PROCEDURE — 99239 HOSP IP/OBS DSCHRG MGMT >30: CPT | Performed by: INTERNAL MEDICINE

## 2018-07-21 PROCEDURE — 83735 ASSAY OF MAGNESIUM: CPT

## 2018-07-21 PROCEDURE — 80069 RENAL FUNCTION PANEL: CPT

## 2018-07-21 PROCEDURE — 85025 COMPLETE CBC W/AUTO DIFF WBC: CPT

## 2018-07-21 PROCEDURE — 85610 PROTHROMBIN TIME: CPT

## 2018-07-21 PROCEDURE — 6370000000 HC RX 637 (ALT 250 FOR IP): Performed by: NURSE PRACTITIONER

## 2018-07-21 RX ORDER — METOPROLOL SUCCINATE 100 MG/1
100 TABLET, EXTENDED RELEASE ORAL DAILY
Status: DISCONTINUED | OUTPATIENT
Start: 2018-07-22 | End: 2018-07-21 | Stop reason: HOSPADM

## 2018-07-21 RX ORDER — METOPROLOL SUCCINATE 100 MG/1
100 TABLET, EXTENDED RELEASE ORAL DAILY
Qty: 30 TABLET | Refills: 5 | Status: SHIPPED | OUTPATIENT
Start: 2018-07-22 | End: 2018-07-30 | Stop reason: DRUGHIGH

## 2018-07-21 RX ORDER — WARFARIN SODIUM 2.5 MG/1
TABLET ORAL
Qty: 30 TABLET | Refills: 0 | Status: SHIPPED | OUTPATIENT
Start: 2018-07-21 | End: 2018-08-08 | Stop reason: SDUPTHER

## 2018-07-21 RX ORDER — WARFARIN SODIUM 2.5 MG/1
2.5 TABLET ORAL ONCE
Status: COMPLETED | OUTPATIENT
Start: 2018-07-21 | End: 2018-07-21

## 2018-07-21 RX ADMIN — LOSARTAN POTASSIUM 50 MG: 50 TABLET, FILM COATED ORAL at 10:25

## 2018-07-21 RX ADMIN — ENOXAPARIN SODIUM 120 MG: 120 INJECTION SUBCUTANEOUS at 10:24

## 2018-07-21 RX ADMIN — ISOSORBIDE MONONITRATE 30 MG: 30 TABLET, EXTENDED RELEASE ORAL at 10:25

## 2018-07-21 RX ADMIN — DOCUSATE SODIUM 100 MG: 100 CAPSULE, LIQUID FILLED ORAL at 10:24

## 2018-07-21 RX ADMIN — METOPROLOL SUCCINATE 75 MG: 50 TABLET, EXTENDED RELEASE ORAL at 10:30

## 2018-07-21 RX ADMIN — DIGOXIN 125 MCG: 125 TABLET ORAL at 10:25

## 2018-07-21 RX ADMIN — Medication 10 ML: at 10:32

## 2018-07-21 RX ADMIN — WARFARIN SODIUM 2.5 MG: 2.5 TABLET ORAL at 14:52

## 2018-07-21 RX ADMIN — ASPIRIN 81 MG: 81 TABLET, CHEWABLE ORAL at 10:28

## 2018-07-21 RX ADMIN — POTASSIUM CHLORIDE 10 MEQ: 750 TABLET, FILM COATED, EXTENDED RELEASE ORAL at 10:31

## 2018-07-21 RX ADMIN — DILTIAZEM HYDROCHLORIDE 360 MG: 180 CAPSULE, EXTENDED RELEASE ORAL at 10:30

## 2018-07-21 RX ADMIN — BUMETANIDE 2 MG: 2 TABLET ORAL at 10:31

## 2018-07-21 NOTE — PROGRESS NOTES
Clinical Pharmacy Note    Warfarin consult follow-up    Recent Labs      07/21/18   0528   INR  2.20*     Recent Labs      07/21/18   0923   HGB  16.9   HCT  49.5   PLT  241       Significant Drug-Drug Interactions:  New warfarin drug-drug interactions: none  Discontinued drug-drug interactions: none  Current warfarin drug-drug interactions: aspirin (home), enoxaparin     Date INR Warfarin Dose   7/17/2018 1.02 5 mg   7/18/2018 1.02 5 mg   7/19/2018 1.05 7.5 mg   7/20/2018 1.47 7.5 mg    7/21/2018   2.20  2.5mg                       Notes:                     Daily PT/INR until stable within therapeutic range. Kailyn Wright Pharm. D., [facility] 7/21/2018 12:09 PM

## 2018-07-21 NOTE — PROGRESS NOTES
Clinical Pharmacy Note                                               Warfarin Discharge Recommendations      Pt discharged from [facility] today after admission for atrial fibrillation    INR today:  Recent Labs      07/21/18   0528   INR  2.20*       Coumadin 2.5 mg tabs    Interacting medications at discharge: aspirin    INR goal during admission: 2-3    Recommendations for discharge:   Date Warfarin Dose   7/22/2018 2.5 mg   7/23/2018 INR       Provider dosing warfarin: [facility]    Recheck INR:  Monday 7/23/2018 with results to [address]

## 2018-07-21 NOTE — DISCHARGE SUMMARY
with fear of falling\". Home blood pressure monitoring did reveal a drop in his normal blood pressure trending 100-105 compared to 130-145/systolic. Patient denies any trauma or fall, dyspnea or diaphoresis or chest pain. Maryam Dunn did reach out to call a nurse with recommendations to come to the ER. Patient also identifies that he has transferred care to Dr. Zarina Peres with appointment scheduled in September. Patient has been on no anticoagulation. Admitted as above and seen by cardiology consult with adjustment of medications including increasing metoprolol dose to a 100 mg BID. Patient was started on coumadin with heparin gtt as a [address] dosing both. Patient had been asymptomatic when seen and examined today and his INR is therapeutic. He is stable and has been cleared for discharge by cardiology with arranged follow up with the coumadin clinic in this facility as well as PCP and cardiologist. Risks, benefits and complications of coumadin use addressed in the presence of his wife at the bedside and all questions addressed. He is discharged in a stable state. Exam:     Vitals:  Vitals:    07/20/18 2056 07/21/18 0400 07/21/18 1000 07/21/18 1246   BP: 129/73 132/82 132/63 137/89   Pulse: 71 64 87 85   Resp: 16 18 16 16   Temp: 98.1 °F (36.7 °C) 97.8 °F (36.6 °C) 97.6 °F (36.4 °C)    TempSrc: Oral Oral Oral    SpO2: 96% 96% 94% 95%   Weight:  244 lb 9.6 oz (110.9 kg)     Height:         Weight: Weight: 244 lb 9.6 oz (110.9 kg)     24 hour intake/output:  Intake/Output Summary (Last 24 hours) at 07/21/18 1336  Last data filed at 07/21/18 1246   Gross per 24 hour   Intake             2120 ml   Output                0 ml   Net             2120 ml         General appearance:  No apparent distress, appears stated age and cooperative. HEENT:  Normal cephalic, atraumatic without obvious deformity. Pupils equal, round, and reactive to light. Extra ocular muscles intact. Conjunctivae/corneas clear.   Neck: errors which are inherent in voice recognition technology. **            No change from CTA CHEST WITH CONTRAST with report dated 3/23/2017 8:49 AM.            **This report has been created using voice recognition software. It may contain minor errors which are inherent in voice recognition technology. **      Final report electronically signed by Dr. Tushar Nick on 7/17/2018 1:10 AM             Consults:     IP CONSULT TO PHARMACY  IP CONSULT TO CARDIOLOGY    Disposition:    [x] Home       [] TCU       [] Rehab       [] Psych       [] SNF       [] [place]       [] Other-    Condition at Discharge: Stable    Code Status:  Full Code     Patient Instructions:    Discharge lab work: INR to be monitored by the coumadin clinic  Activity: activity as tolerated  Diet: DIET CARDIAC;       Follow-up visits:   Stefanie Loza MD  [address] [address]  [address] [place] [address], [address] [place] [address] [phone]               Discharge Medications:      Rakesh Kenney   [facility] Medication Instructions JPQ:[id number]    Printed on:07/21/18 3856   Medication Information                      aspirin 81 MG chewable tablet  Take 81 mg by mouth 2 times daily             bumetanide (BUMEX) 2 MG tablet  Take 1 tablet by mouth daily             digoxin (DIGITEK) 125 MCG tablet  Take 1 tablet by mouth daily             diltiazem (CARTIA XT) 180 MG extended release capsule  Take 2 capsules by mouth daily             docusate sodium (COLACE) 100 MG capsule  Take 100 mg by mouth 2 times daily             isosorbide mononitrate (IMDUR) 30 MG extended release tablet  take 1 tablet by mouth once daily             losartan (COZAAR) 50 MG tablet  Take 1 tablet by mouth daily             metoprolol succinate (TOPROL XL) 100 MG extended release tablet  Take 1 tablet by mouth daily             nitroGLYCERIN (NITROSTAT) 0.4 MG SL tablet  Place 1 tablet under the tongue every 5 minutes as needed for Chest pain             potassium chloride (KLOR-CON) 10 MEQ extended release tablet  Take 1 tablet by mouth 2 times daily             pravastatin (PRAVACHOL) 80 MG tablet  Take 1 tablet by mouth nightly             warfarin (COUMADIN) 2.5 MG tablet  Direction as per pharmacy                 Time Spent on discharge is more than 1 hour in the examination, evaluation, counseling and review of medications and discharge plan. Signed: Thank you Irene Brown MD for the opportunity to be involved in this patient's care.     Electronically signed by Isael Sawant MD on 7/21/2018 at 1:36 PM

## 2018-07-21 NOTE — FLOWSHEET NOTE
07/21/18 1158   Provider Notification   Reason for Communication Review case   Provider Name [facility]   Provider Notification Nurse Practitioner   Method of Communication Secure Message   Notification Time 1159   6k 19 patients rate has been more irregular this morning hanging around 120's up to 150's.  Thanks

## 2018-07-22 ENCOUNTER — CARE COORDINATION (OUTPATIENT)
Dept: CASE MANAGEMENT | Age: 68
End: 2018-07-22

## 2018-07-22 DIAGNOSIS — I48.0 PAROXYSMAL ATRIAL FIBRILLATION (HCC): Primary | ICD-10-CM

## 2018-07-22 PROCEDURE — 1111F DSCHRG MED/CURRENT MED MERGE: CPT | Performed by: FAMILY MEDICINE

## 2018-07-23 ENCOUNTER — HOSPITAL ENCOUNTER (OUTPATIENT)
Dept: PHARMACY | Age: 68
Setting detail: THERAPIES SERIES
Discharge: HOME | End: 2018-07-23
Payer: MEDICARE

## 2018-07-23 LAB — POC INR: 2.6 (ref 0.8–1.2)

## 2018-07-23 PROCEDURE — 85610 PROTHROMBIN TIME: CPT

## 2018-07-23 PROCEDURE — 36416 COLLJ CAPILLARY BLOOD SPEC: CPT

## 2018-07-23 PROCEDURE — 99212 OFFICE O/P EST SF 10 MIN: CPT

## 2018-07-23 NOTE — CARE COORDINATION
7/20/18 10:28 AM     INR 1.47. Patient and wife are both unable to give Lovenox injection due to fear of needles, no other family available to assist.  Yareli Nobles discussed with [facility]- they are unable to administer injections as that is only need. Dr. Kavitha Call will discharge when INR therapeutic. Patient will follow with [facility].
7/23/18, 12:12 PM    Home with wife. F/U with [facility]. Discharge plan discussed by  and . Discharge plan reviewed with patient/ family. Patient/ family verbalize understanding of discharge plan and are in agreement with plan. Understanding was demonstrated using the teach back method.        IMM Letter  IMM Letter given to Patient/Family/Significant other/Guardian/POA/by[de-identified] William Draper CM   IMM Letter date given[de-identified] 07/19/18  IMM Letter time given[de-identified] 4270
home with wife  Expected Discharge date:  07/19/18  Follow Up Appointment: Best Day/ Time: Tuesday AM    Discharge Plan: Rachele Her is from home with his wife. Will check back for possible need for [facility] if he returns home on Lovenox.       Evaluation: no

## 2018-07-24 ENCOUNTER — CARE COORDINATION (OUTPATIENT)
Dept: CASE MANAGEMENT | Age: 68
End: 2018-07-24

## 2018-07-24 NOTE — CARE COORDINATION
[facility] Transitions Follow Up Call    2018    Patient: Renetta Miranda  Patient : 1950   MRN: [id number]  Reason for Admission: There are no discharge diagnoses documented for the most recent discharge. Discharge Date: 18 RARS: Readmission Risk Score: 12       Spoke with: [address] Transitions Subsequent and Final Call    Subsequent and Final Calls  Do you have any ongoing symptoms?:  No  Have your medications changed?:  No  Do you have any questions related to your medications?:  No  Do you currently have any active services?:  Yes  Are you currently active with any services?:  Outpatient/Community Services  Do you have any needs or concerns that I can assist you with?:  No  Identified Barriers:  None  Care Transitions Interventions  No Identified Needs  Other Interventions:        Called pt for the sub transition call. Pt denied any racing heart, chest pain, dizziness or SOB. Pt stated he has noticed feeling lightheaded when he gets up pt understands to sit on the edge of the bed before getting up & get up slow. Pt stated he is taking all his meds, went to the coumadin clinic yesterday. Pt stated he was told to get a medic alert bracelet & card, pt will check with the DME supply. Pt denied any needs or concerns. CTC will continue to follow.       Follow Up  Future Appointments  Date Time Provider Cristina Grier   2018 10:45 AM NARESH Vale - [facility] - JESSICA [address]   2018 8:00 AM Vania Bourne RN [facility] JESSICA [address]   2018 12:15 PM Mahsa Dooley  [facility] - Dell Pimentel RN  Care Transition Coordinator  [phone]

## 2018-07-25 PROBLEM — Z79.01 ANTICOAGULATED ON COUMADIN: Status: ACTIVE | Noted: 2018-07-25

## 2018-07-26 ENCOUNTER — CARE COORDINATION (OUTPATIENT)
Dept: CASE MANAGEMENT | Age: 68
End: 2018-07-26

## 2018-07-27 ENCOUNTER — OFFICE VISIT (OUTPATIENT)
Dept: FAMILY MEDICINE CLINIC | Age: 68
End: 2018-07-27
Payer: MEDICARE

## 2018-07-27 VITALS
RESPIRATION RATE: 16 BRPM | BODY MASS INDEX: 32.19 KG/M2 | HEART RATE: 60 BPM | TEMPERATURE: 97.5 F | DIASTOLIC BLOOD PRESSURE: 78 MMHG | WEIGHT: 244 LBS | SYSTOLIC BLOOD PRESSURE: 132 MMHG

## 2018-07-27 DIAGNOSIS — Z79.01 ANTICOAGULATED ON COUMADIN: ICD-10-CM

## 2018-07-27 DIAGNOSIS — I48.20 CHRONIC ATRIAL FIBRILLATION (HCC): Primary | ICD-10-CM

## 2018-07-27 PROCEDURE — 1111F DSCHRG MED/CURRENT MED MERGE: CPT | Performed by: NURSE PRACTITIONER

## 2018-07-27 PROCEDURE — 99495 TRANSJ CARE MGMT MOD F2F 14D: CPT | Performed by: NURSE PRACTITIONER

## 2018-07-27 ASSESSMENT — ENCOUNTER SYMPTOMS
EYE PAIN: 0
RHINORRHEA: 0
CONSTIPATION: 0
ABDOMINAL PAIN: 0
SHORTNESS OF BREATH: 0
COLOR CHANGE: 0
BACK PAIN: 0
WHEEZING: 0
COUGH: 0
DIARRHEA: 0
EYE DISCHARGE: 0
STRIDOR: 0
SORE THROAT: 0
VOMITING: 0
NAUSEA: 0

## 2018-07-27 NOTE — PROGRESS NOTES
docusate sodium (COLACE) 100 MG capsule  Take 100 mg by mouth 2 times daily             isosorbide mononitrate (IMDUR) 30 MG extended release tablet  take 1 tablet by mouth once daily             losartan (COZAAR) 50 MG tablet  Take 1 tablet by mouth daily             metoprolol succinate (TOPROL XL) 100 MG extended release tablet  Take 1 tablet by mouth daily             nitroGLYCERIN (NITROSTAT) 0.4 MG SL tablet  Place 1 tablet under the tongue every 5 minutes as needed for Chest pain             potassium chloride (KLOR-CON) 10 MEQ extended release tablet  Take 1 tablet by mouth 2 times daily             pravastatin (PRAVACHOL) 80 MG tablet  Take 1 tablet by mouth nightly             warfarin (COUMADIN) 2.5 MG tablet  Direction as per pharmacy                   Medications marked \"taking\" at this time  Outpatient Prescriptions Marked as Taking for the 7/27/18 encounter (Office Visit) with NARESH Angulo CNP   Medication Sig Dispense Refill    bisacodyl (DULCOLAX) 5 MG EC tablet Take 5 mg by mouth nightly      warfarin (COUMADIN) 2.5 MG tablet Direction as per pharmacy 30 tablet 0    metoprolol succinate (TOPROL XL) 100 MG extended release tablet Take 1 tablet by mouth daily 30 tablet 5    aspirin 81 MG chewable tablet Take 81 mg by mouth 2 times daily      losartan (COZAAR) 50 MG tablet Take 1 tablet by mouth daily 90 tablet 2    isosorbide mononitrate (IMDUR) 30 MG extended release tablet take 1 tablet by mouth once daily 90 tablet 2    digoxin (DIGITEK) 125 MCG tablet Take 1 tablet by mouth daily 90 tablet 1    diltiazem (CARTIA XT) 180 MG extended release capsule Take 2 capsules by mouth daily 90 capsule 3    docusate sodium (COLACE) 100 MG capsule Take 100 mg by mouth 2 times daily      pravastatin (PRAVACHOL) 80 MG tablet Take 1 tablet by mouth nightly 90 tablet 3    bumetanide (BUMEX) 2 MG tablet Take 1 tablet by mouth daily 90 tablet 3    potassium chloride (KLOR-CON) 10 MEQ extended release tablet Take 1 tablet by mouth 2 times daily 180 tablet 2        Medications patient taking as of now reconciled against medications ordered at time of hospital discharge: Yes    Vitals:    07/27/18 1039   BP: 132/78   Pulse: 60   Resp: 16   Temp: 97.5 °F (36.4 °C)   TempSrc: Oral   Weight: 244 lb (110.7 kg)     Body mass index is 32.19 kg/m². Wt Readings from Last 3 Encounters:   07/27/18 244 lb (110.7 kg)   07/21/18 244 lb 9.6 oz (110.9 kg)   07/02/18 246 lb 3.2 oz (111.7 kg)     BP Readings from Last 3 Encounters:   07/27/18 132/78   07/21/18 137/89   07/02/18 136/88        Inpatient course: Discharge summary reviewed- see chart. Chief Complaint   Patient presents with    Follow-Up from Hospital     pt admitted for AFib, pt sees Dr. Hank Mitchell 08-20-18, pt states that he is feeling a bit better,        HPI  Review of Systems   Constitutional: Positive for fatigue. Negative for activity change, appetite change, chills and fever. HENT: Negative for congestion, ear pain, rhinorrhea and sore throat. Eyes: Negative for pain and discharge. Respiratory: Negative for cough, shortness of breath, wheezing and stridor. Cardiovascular: Negative for chest pain. Gastrointestinal: Negative for abdominal pain, constipation, diarrhea, nausea and vomiting. Genitourinary: Negative for dysuria, flank pain and frequency. Musculoskeletal: Negative for arthralgias, back pain, myalgias and neck pain. Skin: Negative for color change and rash. Allergic/Immunologic: Negative for immunocompromised state. Neurological: Negative for dizziness, weakness and headaches. Psychiatric/Behavioral: Negative.         Non face to face  following discharge, date last encounter closed (first attempt may have been earlier): 7/22/2018  1:19 PM 7/22/2018  1:19 PM    Call initiated 2 business days of discharge: Yes     Interval history/Current status: Status      Physical Exam   Constitutional: He is oriented to focused on an increase in his beta blocker that happened in the hospital.  He was taking 50 mg of metoprolol prior to his admission, and it was raised to 100 mg daily in the hospital.  This was under the direction of the hospitalist.  Patient is scheduled to see a new cardiologist, but his appointment is not until August 20. Patient wonders if some of his exertional weakness and shortness of breath has to do with his beta blocker. Recommended that patient called the cardiologist even though he has not had his office visit and see if he can either be seen sooner or if he can go back to his prehospital dose of 50 mg. Patient states that he would prefer to do that. Patient otherwise does not need any further orders or testing at this time.     Diagnoses and all orders for this visit:    Chronic atrial fibrillation ([facility] [address]    Anticoagulated on Coumadin     Electronically signed by NARESH Jaimes CNP on 7/27/2018 at 12:10 PM      Medical Decision Making: moderate complexity

## 2018-07-27 NOTE — PATIENT INSTRUCTIONS
a pneumococcal vaccine shot. If you have had one before, ask your doctor whether you need another dose. Get a flu shot every year. If you must be around people with colds or flu, wash your hands often. Activity    · If your doctor recommends it, get more exercise. Walking is a good choice. Bit by bit, increase the amount you walk every day. Try for at least 30 minutes on most days of the week. You also may want to swim, bike, or do other activities. Your doctor may suggest that you join a cardiac rehabilitation program so that you can have help increasing your physical activity safely.     · Start light exercise if your doctor says it is okay. Even a small amount will help you get stronger, have more energy, and manage stress. Walking is an easy way to get exercise. Start out by walking a little more than you did in the hospital. Gradually increase the amount you walk.     · When you exercise, watch for signs that your heart is working too hard. You are pushing too hard if you cannot talk while you are exercising. If you become short of breath or dizzy or have chest pain, sit down and rest immediately.     · Check your pulse regularly. Place two fingers on the artery at the palm side of your wrist, in line with your thumb. If your heartbeat seems uneven or fast, talk to your doctor. When should you call for help? Call 911 anytime you think you may need emergency care. For example, call if:    · You have symptoms of a heart attack. These may include:  ¨ Chest pain or pressure, or a strange feeling in the chest.  ¨ Sweating. ¨ Shortness of breath. ¨ Nausea or vomiting. ¨ Pain, pressure, or a strange feeling in the back, neck, jaw, or upper belly or in one or both shoulders or arms. ¨ Lightheadedness or sudden weakness. ¨ A fast or irregular heartbeat. After you call 911, the  may tell you to chew 1 adult-strength or 2 to 4 low-dose aspirin. Wait for an ambulance.  Do not try to drive yourself.

## 2018-07-30 ENCOUNTER — CARE COORDINATION (OUTPATIENT)
Dept: CASE MANAGEMENT | Age: 68
End: 2018-07-30

## 2018-07-30 ENCOUNTER — HOSPITAL ENCOUNTER (OUTPATIENT)
Dept: PHARMACY | Age: 68
Setting detail: THERAPIES SERIES
Discharge: HOME | End: 2018-07-30
Payer: MEDICARE

## 2018-07-30 LAB — POC INR: 3.8 (ref 0.8–1.2)

## 2018-07-30 PROCEDURE — 85610 PROTHROMBIN TIME: CPT | Performed by: PHARMACIST

## 2018-07-30 PROCEDURE — 36416 COLLJ CAPILLARY BLOOD SPEC: CPT | Performed by: PHARMACIST

## 2018-07-30 RX ORDER — METOPROLOL SUCCINATE 50 MG/1
75 TABLET, EXTENDED RELEASE ORAL DAILY
COMMUNITY
End: 2018-12-05 | Stop reason: DRUGHIGH

## 2018-07-30 RX ORDER — METOPROLOL SUCCINATE 50 MG/1
75 TABLET, EXTENDED RELEASE ORAL DAILY
Qty: 45 TABLET | Refills: 1 | Status: CANCELLED | OUTPATIENT
Start: 2018-07-30

## 2018-07-30 NOTE — CARE COORDINATION
[facility] Transitions Follow Up Call    2018    Patient: Thiago Shah  Patient : 1950   MRN: [id number]  Reason for Admission: There are no discharge diagnoses documented for the most recent discharge. Discharge Date: 18 RARS: Readmission Risk Score: 16       Spoke with: [facility] Transitions Subsequent and Final Call    Subsequent and Final Calls  Do you have any ongoing symptoms?:  Yes  Onset of Patient-reported symptoms: In the past 7 days  Patient-reported symptoms:  Fatigue, Weakness  Have your medications changed?:  No  Do you have any questions related to your medications?:  Yes  Patient Reports:  pt is to contact Dr Indira aZpata office re toprol dose, it was increased to 100 mg when pt was inpatient, pt thinks may need to be decreased to 50 mg  Do you currently have any active services?:  Yes  Are you currently active with any services?:  Outpatient/Community Services  Do you have any needs or concerns that I can assist you with?:  No  Identified Barriers:  None  Care Transitions Interventions  No Identified Needs  Other Interventions:        Called pt for the sub transition call. Pt stated he is still weak & tired, pt thinks it is the Toprol. The dose was increased from 50 mg to 100 mg while he was an inpatient. Pt stated he has been trying to reach Dr Indira Zapata office to discuss. Pt denied any chest pain, palpitations, SOB or dizziness. Pt denied any other needs or concerns. CTC will continue to follow.     Follow Up  Future Appointments  Date Time Provider Cristina Grier   2018 8:20 AM [address] [facility]   2018 12:15 PM Chidi Durand  [facility] Arturo Spine RN  Care Transition Coordinator  [phone]

## 2018-07-30 NOTE — TELEPHONE ENCOUNTER
Patient states he was seen at [facility] and his metoprolol was increased from 50 mg to 100 mg. He stated it is making him feel weak and very tired. Patient is asking if he can go back to the 50 mg.

## 2018-07-30 NOTE — PROGRESS NOTES
Medication Management [facility]  Anticoagulation Clinic  [phone] (phone)  [phone] (fax)      Mr. Katie Banks is a 79 y.o.  male with history of Afib who presents today for anticoagulation monitoring and adjustment. Patient verifies current dosing regimen and tablet strength. No missed or extra doses. Patient denies s/s bleeding/bruising/swelling/SOB/chest pain - some SOB with exertion, when out walking  No blood in urine or stool. No dietary changes. No changes in medication/OTC agents/Herbals. No change in alcohol use or tobacco use. No change in activity level. - less active, feels sluggish since change in Toprol dose  Patient denies headaches/dizziness/lightheadedness/falls. No vomiting/diarrhea or acute illness. No Procedures scheduled in the future at this time. Assessment:   Lab Results   Component Value Date    INR 3.80 (H) 07/30/2018    INR 2.60 (H) 07/23/2018    INR 2.20 (H) 07/21/2018     INR supratherapeutic   Recent Labs      07/30/18   0815   INR  3.80*         Plan:  No Coumadin today, then decrease Coumadin 5mg MF and 2.5mg TWThSaS. Recheck INR 10 days. Patient reminded to call the Anticoagulation Clinic with signs or symptoms of bleeding or with any medication changes. Patient given instructions utilizing the teach back method. Discharged ambulatory in no apparent distress. After visit summary printed and reviewed with patient.       Medications reviewed and updated on home medication list Yes    Influenza vaccine:     [] given    [] declined   [] received previously   [] plans to receive at a later time   [] refused    [x] documented in EPIC

## 2018-08-03 ENCOUNTER — CARE COORDINATION (OUTPATIENT)
Dept: CASE MANAGEMENT | Age: 68
End: 2018-08-03

## 2018-08-06 ENCOUNTER — TELEPHONE (OUTPATIENT)
Dept: FAMILY MEDICINE CLINIC | Age: 68
End: 2018-08-06

## 2018-08-06 ENCOUNTER — CARE COORDINATION (OUTPATIENT)
Dept: CASE MANAGEMENT | Age: 68
End: 2018-08-06

## 2018-08-06 RX ORDER — DILTIAZEM HYDROCHLORIDE 360 MG/1
360 CAPSULE, EXTENDED RELEASE ORAL DAILY
Qty: 90 CAPSULE | Refills: 3 | Status: SHIPPED | OUTPATIENT
Start: 2018-08-06 | End: 2019-08-01 | Stop reason: SDUPTHER

## 2018-08-08 ENCOUNTER — HOSPITAL ENCOUNTER (OUTPATIENT)
Dept: PHARMACY | Age: 68
Setting detail: THERAPIES SERIES
Discharge: HOME | End: 2018-08-08
Payer: MEDICARE

## 2018-08-08 LAB — POC INR: 2.8 (ref 0.8–1.2)

## 2018-08-08 PROCEDURE — 85610 PROTHROMBIN TIME: CPT

## 2018-08-08 PROCEDURE — 36416 COLLJ CAPILLARY BLOOD SPEC: CPT

## 2018-08-08 PROCEDURE — 99211 OFF/OP EST MAY X REQ PHY/QHP: CPT

## 2018-08-08 RX ORDER — WARFARIN SODIUM 2.5 MG/1
TABLET ORAL
Qty: 30 TABLET | Refills: 5 | Status: SHIPPED | OUTPATIENT
Start: 2018-08-08 | End: 2018-09-19 | Stop reason: SDUPTHER

## 2018-08-08 NOTE — PROGRESS NOTES
Medication Management [facility]  Anticoagulation Clinic  [phone] (phone)  [phone] (fax)      Mr. Jaylen Arevalo is a 79 y.o.  male with history of Afib who presents today for anticoagulation monitoring and adjustment. Patient verifies current dosing regimen and tablet strength. No missed or extra doses. Patient denies s/s bleeding/bruising/swelling/SOB/chest pain  No blood in urine or stool. No dietary changes. No changes in medication/OTC agents/Herbals. Patient taking 5 mg bisacodyl instead of 10 mg daily. No change in alcohol use or tobacco use. No change in activity level. Patient denies headaches/dizziness/lightheadedness/falls. No vomiting/diarrhea or acute illness. No Procedures scheduled in the future at this time. Assessment:   Lab Results   Component Value Date    INR 2.80 (H) 08/08/2018    INR 3.80 (H) 07/30/2018    INR 2.60 (H) 07/23/2018     INR therapeutic   Recent Labs      08/08/18   0826   INR  2.80*       Plan:  Continue Coumadin 5 mg MF, 2.5 mg TWThSS. Recheck INR 2 weeks. Patient reminded to call the Anticoagulation Clinic with any signs or symptoms of bleeding or with any medication changes. Patient given instructions utilizing the teach back method. Discharged ambulatory in no apparent distress. After visit summary printed and reviewed with patient.       Medications reviewed and updated on home medication list Yes    Influenza vaccine:     [] given    [] declined   [x] received previously   [] plans to receive at a later time   [] refused    [x] documented in [facility], PharmD, BCPS  8/8/2018 8:47 AM

## 2018-08-20 ENCOUNTER — OFFICE VISIT (OUTPATIENT)
Dept: CARDIOLOGY CLINIC | Age: 68
End: 2018-08-20
Payer: MEDICARE

## 2018-08-20 VITALS
SYSTOLIC BLOOD PRESSURE: 146 MMHG | WEIGHT: 243 LBS | HEART RATE: 109 BPM | BODY MASS INDEX: 32.2 KG/M2 | DIASTOLIC BLOOD PRESSURE: 72 MMHG | HEIGHT: 73 IN

## 2018-08-20 DIAGNOSIS — I25.10 CORONARY ARTERY DISEASE INVOLVING NATIVE CORONARY ARTERY OF NATIVE HEART WITHOUT ANGINA PECTORIS: ICD-10-CM

## 2018-08-20 DIAGNOSIS — I10 ESSENTIAL HYPERTENSION: Primary | ICD-10-CM

## 2018-08-20 DIAGNOSIS — I48.0 PAROXYSMAL ATRIAL FIBRILLATION (HCC): ICD-10-CM

## 2018-08-20 DIAGNOSIS — E78.01 FAMILIAL HYPERCHOLESTEROLEMIA: ICD-10-CM

## 2018-08-20 PROCEDURE — 1123F ACP DISCUSS/DSCN MKR DOCD: CPT | Performed by: NUCLEAR MEDICINE

## 2018-08-20 PROCEDURE — G8598 ASA/ANTIPLAT THER USED: HCPCS | Performed by: NUCLEAR MEDICINE

## 2018-08-20 PROCEDURE — G8417 CALC BMI ABV UP PARAM F/U: HCPCS | Performed by: NUCLEAR MEDICINE

## 2018-08-20 PROCEDURE — 1101F PT FALLS ASSESS-DOCD LE1/YR: CPT | Performed by: NUCLEAR MEDICINE

## 2018-08-20 PROCEDURE — 1036F TOBACCO NON-USER: CPT | Performed by: NUCLEAR MEDICINE

## 2018-08-20 PROCEDURE — 1111F DSCHRG MED/CURRENT MED MERGE: CPT | Performed by: NUCLEAR MEDICINE

## 2018-08-20 PROCEDURE — 3017F COLORECTAL CA SCREEN DOC REV: CPT | Performed by: NUCLEAR MEDICINE

## 2018-08-20 PROCEDURE — G8427 DOCREV CUR MEDS BY ELIG CLIN: HCPCS | Performed by: NUCLEAR MEDICINE

## 2018-08-20 PROCEDURE — 99204 OFFICE O/P NEW MOD 45 MIN: CPT | Performed by: NUCLEAR MEDICINE

## 2018-08-20 PROCEDURE — 93000 ELECTROCARDIOGRAM COMPLETE: CPT | Performed by: NUCLEAR MEDICINE

## 2018-08-20 PROCEDURE — 4040F PNEUMOC VAC/ADMIN/RCVD: CPT | Performed by: NUCLEAR MEDICINE

## 2018-08-20 ASSESSMENT — ENCOUNTER SYMPTOMS
FACIAL SWELLING: 0
CONSTIPATION: 0
CHEST TIGHTNESS: 0
BLOOD IN STOOL: 0
ABDOMINAL DISTENTION: 0
ANAL BLEEDING: 0
PHOTOPHOBIA: 0
BACK PAIN: 0
NAUSEA: 0
DIARRHEA: 0
VOMITING: 0
ABDOMINAL PAIN: 0
RECTAL PAIN: 0
SHORTNESS OF BREATH: 1

## 2018-08-20 NOTE — PROGRESS NOTES
[address]  [address]  [place] [address]  Dept: [phone]  Dept Fax: [phone]  Loc: [phone]    Visit Date: 8/20/2018    Estrellita Falk is a 79 y.o. male who presents today for:  Chief Complaint   Patient presents with    New Patient    Atrial Fibrillation    Hyperlipidemia    Hypertension   here for the first time  Used to see cheri   Has known CAD and no stents  No CABG either   Seen cheri for a while  Also A fib last year   In chronic A fib   Does have HTN and hyperlipidemia  Some weight issues  Does have sleep apnea signs and symptoms  Not studied  No ischemia work up lately   Does have issues with many meds that he is taking   No smoking  Family history of CAD       HPI:  HPI  Past Medical History:   Diagnosis Date    Anxiety     Atrial fibrillation ([address]     CAD (coronary artery disease)     Chest pain     Dementia     Dizziness     GERD (gastroesophageal reflux disease)     Heart disease     Hyperlipidemia     Hypertension     Hypothyroidism       Past Surgical History:   Procedure Laterality Date    ADENOIDECTOMY      CARDIAC CATHETERIZATION  09/23/2011    Right radial artery cannulation. Moderate LV dysfunction. The patient has disease in the ostium of diagonal 1 and diagonal 2 branch, however they are both are at the  ostium of the diagonals. Intervention could compromise flow of LAD. THe LAD has long diffuse calcified lesion 50-60-% anatomically.     CARDIOVASCULAR STRESS TEST  09/23/2011    EF 37%    CARPAL TUNNEL RELEASE      COLONOSCOPY      TONSILLECTOMY      TRANSTHORACIC ECHOCARDIOGRAM  09/23/2011    EF 50-55%    VASECTOMY      VASECTOMY       Family History   Problem Relation Age of Onset    Heart Surgery Father     Cancer Father     Heart Disease Father     Alzheimer's Disease Mother      Social History   Substance Use Topics    Smoking status: Never Smoker    Smokeless

## 2018-08-20 NOTE — PROGRESS NOTES
Pt here for fu from [facility] for a fib   Gets some SOB on exertion   Can feel heart palpitations

## 2018-08-22 ENCOUNTER — HOSPITAL ENCOUNTER (OUTPATIENT)
Dept: PHARMACY | Age: 68
Setting detail: THERAPIES SERIES
Discharge: HOME | End: 2018-08-22
Payer: MEDICARE

## 2018-08-22 LAB — POC INR: 3.3 (ref 0.8–1.2)

## 2018-08-22 PROCEDURE — 85610 PROTHROMBIN TIME: CPT

## 2018-08-22 PROCEDURE — 36416 COLLJ CAPILLARY BLOOD SPEC: CPT

## 2018-08-22 PROCEDURE — 99211 OFF/OP EST MAY X REQ PHY/QHP: CPT

## 2018-09-05 ENCOUNTER — HOSPITAL ENCOUNTER (OUTPATIENT)
Dept: PHARMACY | Age: 68
Setting detail: THERAPIES SERIES
Discharge: HOME | End: 2018-09-05
Payer: MEDICARE

## 2018-09-05 LAB — POC INR: 2.6 (ref 0.8–1.2)

## 2018-09-05 PROCEDURE — 36416 COLLJ CAPILLARY BLOOD SPEC: CPT

## 2018-09-05 PROCEDURE — 85610 PROTHROMBIN TIME: CPT

## 2018-09-05 PROCEDURE — 99211 OFF/OP EST MAY X REQ PHY/QHP: CPT

## 2018-09-05 NOTE — PROGRESS NOTES
Medication Management [facility]  Anticoagulation Clinic  [phone] (phone)  [phone] (fax)      Mr. Cinda Lundborg is a 79 y.o.  male with history of Afib who presents today for anticoagulation monitoring and adjustment. Patient verifies current dosing regimen and tablet strength. No missed or extra doses. Patient denies s/s bleeding/bruising/swelling/SOB/chest pain  No blood in urine or stool. No dietary changes. No changes in medication/OTC agents/Herbals. No change in alcohol use or tobacco use. No change in activity level. Patient denies headaches/dizziness/lightheadedness/falls. No vomiting/diarrhea or acute illness. Patient has stress test and ECHO on Friday. Assessment:   Lab Results   Component Value Date    INR 2.60 (H) 09/05/2018    INR 3.30 (H) 08/22/2018    INR 2.80 (H) 08/08/2018     INR therapeutic   Recent Labs      09/05/18   0818   INR  2.60*       Plan:  Continue Coumadin 5 mg PO W; 2.5 mg PO MTThFSSu. Recheck INR 2 weeks. Patient reminded to call the Anticoagulation Clinic with any signs or symptoms of bleeding or with any medication changes. Patient given instructions utilizing the teach back method. Discharged ambulatory in no apparent distress. After visit summary printed and reviewed with patient.       Medications reviewed and updated on home medication list Yes    Influenza vaccine:     [] given    [x] declined   [x] received previously   [] plans to receive at a later time   [] refused    [x] documented in VIRGINIA Harrison, PharmD  PGY2 Ambulatory Care Resident

## 2018-09-06 ENCOUNTER — TELEPHONE (OUTPATIENT)
Dept: FAMILY MEDICINE CLINIC | Age: 68
End: 2018-09-06

## 2018-09-07 ENCOUNTER — HOSPITAL ENCOUNTER (OUTPATIENT)
Dept: NON INVASIVE DIAGNOSTICS | Age: 68
Discharge: HOME | End: 2018-09-07
Payer: MEDICARE

## 2018-09-07 VITALS — HEIGHT: 73 IN | BODY MASS INDEX: 31.81 KG/M2 | WEIGHT: 240 LBS

## 2018-09-07 DIAGNOSIS — I48.0 PAROXYSMAL ATRIAL FIBRILLATION (HCC): ICD-10-CM

## 2018-09-07 DIAGNOSIS — E78.01 FAMILIAL HYPERCHOLESTEROLEMIA: ICD-10-CM

## 2018-09-07 DIAGNOSIS — I10 ESSENTIAL HYPERTENSION: ICD-10-CM

## 2018-09-07 DIAGNOSIS — I25.10 CORONARY ARTERY DISEASE INVOLVING NATIVE CORONARY ARTERY OF NATIVE HEART WITHOUT ANGINA PECTORIS: ICD-10-CM

## 2018-09-07 LAB
LV EF: 43 %
LVEF MODALITY: NORMAL

## 2018-09-07 PROCEDURE — 6360000002 HC RX W HCPCS

## 2018-09-07 PROCEDURE — 2709999900 HC NON-CHARGEABLE SUPPLY

## 2018-09-07 PROCEDURE — 93306 TTE W/DOPPLER COMPLETE: CPT

## 2018-09-07 PROCEDURE — 78452 HT MUSCLE IMAGE SPECT MULT: CPT

## 2018-09-07 PROCEDURE — A9500 TC99M SESTAMIBI: HCPCS | Performed by: NUCLEAR MEDICINE

## 2018-09-07 PROCEDURE — 3430000000 HC RX DIAGNOSTIC RADIOPHARMACEUTICAL: Performed by: NUCLEAR MEDICINE

## 2018-09-07 PROCEDURE — 93017 CV STRESS TEST TRACING ONLY: CPT | Performed by: NUCLEAR MEDICINE

## 2018-09-07 RX ADMIN — Medication 8.6 MILLICURIE: at 10:05

## 2018-09-07 RX ADMIN — Medication 29 MILLICURIE: at 11:17

## 2018-09-07 NOTE — TELEPHONE ENCOUNTER
Yamile Mojica called, she was informed. Patient is scheduled for an appointment on 9/12/18 with Dr. Greta Lima. She will follow up on this then.

## 2018-09-09 RX ORDER — POTASSIUM CHLORIDE 750 MG/1
TABLET, FILM COATED, EXTENDED RELEASE ORAL
Qty: 180 TABLET | Refills: 2 | Status: SHIPPED | OUTPATIENT
Start: 2018-09-09 | End: 2019-06-06 | Stop reason: SDUPTHER

## 2018-09-12 ENCOUNTER — HOSPITAL ENCOUNTER (OUTPATIENT)
Age: 68
Discharge: HOME | End: 2018-09-12
Payer: MEDICARE

## 2018-09-12 ENCOUNTER — TELEPHONE (OUTPATIENT)
Dept: FAMILY MEDICINE CLINIC | Age: 68
End: 2018-09-12

## 2018-09-12 ENCOUNTER — OFFICE VISIT (OUTPATIENT)
Dept: FAMILY MEDICINE CLINIC | Age: 68
End: 2018-09-12
Payer: MEDICARE

## 2018-09-12 VITALS
BODY MASS INDEX: 31.8 KG/M2 | HEART RATE: 62 BPM | TEMPERATURE: 97.7 F | DIASTOLIC BLOOD PRESSURE: 82 MMHG | WEIGHT: 241 LBS | SYSTOLIC BLOOD PRESSURE: 120 MMHG | RESPIRATION RATE: 16 BRPM

## 2018-09-12 DIAGNOSIS — N40.1 BENIGN PROSTATIC HYPERPLASIA WITH POST-VOID DRIBBLING: ICD-10-CM

## 2018-09-12 DIAGNOSIS — L98.9 SCALP LESION: ICD-10-CM

## 2018-09-12 DIAGNOSIS — Z23 NEEDS FLU SHOT: ICD-10-CM

## 2018-09-12 DIAGNOSIS — N39.43 BENIGN PROSTATIC HYPERPLASIA WITH POST-VOID DRIBBLING: ICD-10-CM

## 2018-09-12 DIAGNOSIS — R32 URINARY INCONTINENCE, UNSPECIFIED TYPE: Primary | ICD-10-CM

## 2018-09-12 LAB
BILIRUBIN, POC: NORMAL
BLOOD URINE, POC: NORMAL
CLARITY, POC: CLEAR
COLOR, POC: YELLOW
GLUCOSE URINE, POC: NORMAL
KETONES, POC: NORMAL
LEUKOCYTE EST, POC: NORMAL
NITRITE, POC: NORMAL
PH, POC: 5.5
PROSTATE SPECIFIC ANTIGEN: 4.39 NG/ML (ref 0–1)
PROTEIN, POC: NORMAL
SPECIFIC GRAVITY, POC: 1.02
UROBILINOGEN, POC: 0.2

## 2018-09-12 PROCEDURE — G8417 CALC BMI ABV UP PARAM F/U: HCPCS | Performed by: FAMILY MEDICINE

## 2018-09-12 PROCEDURE — 99214 OFFICE O/P EST MOD 30 MIN: CPT | Performed by: FAMILY MEDICINE

## 2018-09-12 PROCEDURE — G8598 ASA/ANTIPLAT THER USED: HCPCS | Performed by: FAMILY MEDICINE

## 2018-09-12 PROCEDURE — 1036F TOBACCO NON-USER: CPT | Performed by: FAMILY MEDICINE

## 2018-09-12 PROCEDURE — 3017F COLORECTAL CA SCREEN DOC REV: CPT | Performed by: FAMILY MEDICINE

## 2018-09-12 PROCEDURE — 81003 URINALYSIS AUTO W/O SCOPE: CPT | Performed by: FAMILY MEDICINE

## 2018-09-12 PROCEDURE — 84153 ASSAY OF PSA TOTAL: CPT

## 2018-09-12 PROCEDURE — 1101F PT FALLS ASSESS-DOCD LE1/YR: CPT | Performed by: FAMILY MEDICINE

## 2018-09-12 PROCEDURE — 90662 IIV NO PRSV INCREASED AG IM: CPT | Performed by: FAMILY MEDICINE

## 2018-09-12 PROCEDURE — G0008 ADMIN INFLUENZA VIRUS VAC: HCPCS | Performed by: FAMILY MEDICINE

## 2018-09-12 PROCEDURE — 4040F PNEUMOC VAC/ADMIN/RCVD: CPT | Performed by: FAMILY MEDICINE

## 2018-09-12 PROCEDURE — 1123F ACP DISCUSS/DSCN MKR DOCD: CPT | Performed by: FAMILY MEDICINE

## 2018-09-12 PROCEDURE — 36415 COLL VENOUS BLD VENIPUNCTURE: CPT

## 2018-09-12 PROCEDURE — G8427 DOCREV CUR MEDS BY ELIG CLIN: HCPCS | Performed by: FAMILY MEDICINE

## 2018-09-12 NOTE — PROGRESS NOTES
Immunizations     Name Date Dose Route    Influenza, High Dose (Fluzone 65 yrs and older) 9/12/2018 0.5 mL Intramuscular    Site: Deltoid- Left    Lot: [id number]    NDC: 53374-259-19

## 2018-09-14 ENCOUNTER — TELEPHONE (OUTPATIENT)
Dept: FAMILY MEDICINE CLINIC | Age: 68
End: 2018-09-14

## 2018-09-14 DIAGNOSIS — R97.20 ELEVATED PSA: Primary | ICD-10-CM

## 2018-09-16 ASSESSMENT — ENCOUNTER SYMPTOMS
SHORTNESS OF BREATH: 0
EYE PAIN: 0
ABDOMINAL DISTENTION: 0
DIARRHEA: 0
ABDOMINAL PAIN: 0
NAUSEA: 0
CONSTIPATION: 0
COUGH: 0
SORE THROAT: 0
RHINORRHEA: 0
SINUS PRESSURE: 0

## 2018-09-16 NOTE — PROGRESS NOTES
[address] [facility]  [facility] [place] [address]  Dept: [phone]  Dept Fax: [phone]  Loc: [phone]  PROGRESS NOTE      Visit Date: 9/12/2018    Pierre Jones is a 79 y.o. male who presents today for:  Chief Complaint   Patient presents with    Enuresis     c/o \"slight urinary leakage\" x 2 months. Denies dysuria or hematuria.  Lesion(s)     check lesions on head.  Flu Vaccine         Subjective:  HPI  Patient comes in because of change in his urine pattern. He's noticed some leakage. Usually occurs a short time after he urinates. Having some symptoms of urgency and frequency. Nothing seems to make better or worse. Feels a staying adequately hydrated. He denies any significant change in the force of his urine stream    Review of Systems   Constitutional: Negative for appetite change and fever. HENT: Negative for congestion, ear pain, postnasal drip, rhinorrhea, sinus pressure and sore throat. Eyes: Negative for pain and visual disturbance. Respiratory: Negative for cough and shortness of breath. Cardiovascular: Negative for chest pain. Gastrointestinal: Negative for abdominal distention, abdominal pain, constipation, diarrhea and nausea. Genitourinary: Positive for frequency and urgency. Negative for dysuria. Musculoskeletal: Negative for arthralgias. Skin: Negative for rash. Neurological: Negative for dizziness. Past Medical History:   Diagnosis Date    Anxiety     Atrial fibrillation ([address]     CAD (coronary artery disease)     Chest pain     Dementia     Dizziness     GERD (gastroesophageal reflux disease)     Heart disease     Hyperlipidemia     Hypertension     Hypothyroidism       Past Surgical History:   Procedure Laterality Date    ADENOIDECTOMY      CARDIAC CATHETERIZATION  09/23/2011    Right radial artery cannulation. Moderate LV dysfunction.  The patient has disease in the

## 2018-09-18 ENCOUNTER — OFFICE VISIT (OUTPATIENT)
Dept: UROLOGY | Age: 68
End: 2018-09-18
Payer: MEDICARE

## 2018-09-18 VITALS
HEIGHT: 73 IN | SYSTOLIC BLOOD PRESSURE: 122 MMHG | WEIGHT: 243 LBS | DIASTOLIC BLOOD PRESSURE: 80 MMHG | BODY MASS INDEX: 32.2 KG/M2

## 2018-09-18 DIAGNOSIS — N40.1 BPH ASSOCIATED WITH NOCTURIA: ICD-10-CM

## 2018-09-18 DIAGNOSIS — N39.43 URINARY INCONTINENCE, POST-VOID DRIBBLING: ICD-10-CM

## 2018-09-18 DIAGNOSIS — R35.0 URINARY FREQUENCY: ICD-10-CM

## 2018-09-18 DIAGNOSIS — R39.15 URGENCY OF URINATION: ICD-10-CM

## 2018-09-18 DIAGNOSIS — R97.20 ELEVATED PSA: Primary | ICD-10-CM

## 2018-09-18 DIAGNOSIS — R35.1 BPH ASSOCIATED WITH NOCTURIA: ICD-10-CM

## 2018-09-18 PROBLEM — R32 URINARY INCONTINENCE: Status: ACTIVE | Noted: 2018-09-18

## 2018-09-18 LAB
BILIRUBIN URINE: NEGATIVE
BLOOD URINE, POC: NORMAL
CHARACTER, URINE: CLEAR
COLOR, URINE: YELLOW
GLUCOSE URINE: NEGATIVE MG/DL
KETONES, URINE: NEGATIVE
LEUKOCYTE CLUMPS, URINE: NEGATIVE
NITRITE, URINE: NEGATIVE
PH, URINE: 5
PROTEIN, URINE: NEGATIVE MG/DL
SPECIFIC GRAVITY, URINE: 1.01 (ref 1–1.03)
UROBILINOGEN, URINE: 0.2 EU/DL

## 2018-09-18 PROCEDURE — 99213 OFFICE O/P EST LOW 20 MIN: CPT | Performed by: NURSE PRACTITIONER

## 2018-09-18 PROCEDURE — 1101F PT FALLS ASSESS-DOCD LE1/YR: CPT | Performed by: NURSE PRACTITIONER

## 2018-09-18 PROCEDURE — G8427 DOCREV CUR MEDS BY ELIG CLIN: HCPCS | Performed by: NURSE PRACTITIONER

## 2018-09-18 PROCEDURE — 4040F PNEUMOC VAC/ADMIN/RCVD: CPT | Performed by: NURSE PRACTITIONER

## 2018-09-18 PROCEDURE — 3017F COLORECTAL CA SCREEN DOC REV: CPT | Performed by: NURSE PRACTITIONER

## 2018-09-18 PROCEDURE — G8598 ASA/ANTIPLAT THER USED: HCPCS | Performed by: NURSE PRACTITIONER

## 2018-09-18 PROCEDURE — 81003 URINALYSIS AUTO W/O SCOPE: CPT | Performed by: NURSE PRACTITIONER

## 2018-09-18 PROCEDURE — 1123F ACP DISCUSS/DSCN MKR DOCD: CPT | Performed by: NURSE PRACTITIONER

## 2018-09-18 PROCEDURE — 1036F TOBACCO NON-USER: CPT | Performed by: NURSE PRACTITIONER

## 2018-09-18 PROCEDURE — G8417 CALC BMI ABV UP PARAM F/U: HCPCS | Performed by: NURSE PRACTITIONER

## 2018-09-18 RX ORDER — TAMSULOSIN HYDROCHLORIDE 0.4 MG/1
0.4 CAPSULE ORAL DAILY
Qty: 30 CAPSULE | Refills: 0 | Status: SHIPPED | OUTPATIENT
Start: 2018-09-18 | End: 2018-10-17 | Stop reason: SDUPTHER

## 2018-09-18 NOTE — PROGRESS NOTES
daily 90 tablet 1    pravastatin (PRAVACHOL) 80 MG tablet Take 1 tablet by mouth nightly 90 tablet 3    bumetanide (BUMEX) 2 MG tablet Take 1 tablet by mouth daily 90 tablet 3    nitroGLYCERIN (NITROSTAT) 0.4 MG SL tablet Place 1 tablet under the tongue every 5 minutes as needed for Chest pain 25 tablet 3    potassium chloride (KLOR-CON) 10 MEQ extended release tablet TAKE ONE TABLET BY MOUTH TWICE DAILY 180 tablet 2     No current facility-administered medications for this visit. Past Medical History  Elana Mancilla  has a past medical history of Anxiety; Atrial fibrillation ([facility] [address] CAD (coronary artery disease); Chest pain; Colon polyp; Dementia; Dizziness; Elevated PSA; GERD (gastroesophageal reflux disease); Heart disease; Hyperlipidemia; Hypertension; and Hypothyroidism. Past Surgical History  The patient  has a past surgical history that includes cardiovascular stress test (09/23/2011); transthoracic echocardiogram (09/23/2011); Cardiac catheterization (09/23/2011); Carpal tunnel release; Vasectomy; Tonsillectomy; Adenoidectomy; Vasectomy; and Colonoscopy. Family History  This patient's family history includes Alzheimer's Disease in his mother; Cancer in his father; Heart Disease in his father; Heart Surgery in his father. Social History  Elana Mancilla  reports that he has never smoked. He has never used smokeless tobacco. He reports that he does not drink alcohol or use drugs. Subjective:     Review of Systems  No problems with ears, nose or throat. No problems with eyes. No chest pain, shortness of breath, abdominal pain, extremity pain or weakness, and no neurological deficits. No rashes.  symptoms per HPI. The remainder of the review of symptoms is negative.     Objective:     PE:   Vitals:    09/18/18 1214   BP: 122/80   Weight: 243 lb (110.2 kg)   Height: 6' 1\" (1.854 m)       Constitutional: Alert and oriented times 3, no acute distress and cooperative to examination with appropriate mood and affect. HENT:   Head:        Normocephalic and atraumatic. Mouth/Throat:         Mucous membranes are normal.   Eyes:         EOM are normal. No scleral icterus. PERRLA. Neck:        Supple, symmetrical, trachea midline  Cardiovascular:        Normal rate, regular rhythm, S1 S2 heart sounds. No murmurs, rub, or gallops. Pulses:       Radial pulses are 2+/4 bilateral and equal. Posterior tibialis 2+/4 bilateral and equal  Pulmonary/Chest:      Chest symmetric with normal A/P diameter,  CTA with no wheezes, rales, or rhonchi noted. Normal respiratory rate and rhthym. No use of accessory muscles. Abdominal:         Soft. No tenderness. Bowel sounds present. Musculoskeletal:         Normal range of motion. No edema or tenderness of lower extremities. Extremities: No cyanosis, clubbing, or edema present. Neurological:        Alert and oriented. No cranial nerve deficit. Nora Hartley Psychiatric:        Normal mood and affect. Labs   Urine dip demonstrates   Results for POC orders placed in visit on 09/18/18   POCT Urinalysis No Micro (Auto)   Result Value Ref Range    Glucose, Ur Negative NEGATIVE mg/dl    Bilirubin Urine Negative     Ketones, Urine Negative NEGATIVE    Specific Gravity, Urine 1.015 1.002 - 1.03    Blood, UA POC Trace-intact NEGATIVE    pH, Urine 5.00 5.0 - 9.0    Protein, Urine Negative NEGATIVE mg/dl    Urobilinogen, Urine 0.20 0.0 - 1.0 eu/dl    Nitrite, Urine Negative NEGATIVE    Leukocyte Clumps, Urine Negative NEGATIVE    Color, Urine Yellow YELLOW-STR    Character, Urine Clear CLR-SL.MAUREEN       Patients recent PSA values are as follows  Lab Results   Component Value Date    PSA 4.39 (H) 09/12/2018    PSA 3.37 05/17/2016    PSA 3.49 04/02/2014        Recent BUN/Creatinine:  Lab Results   Component Value Date    BUN 14 07/21/2018    CREATININE 1.0 07/21/2018          Assessment & Plan:      Diagnosis Orders   1.  Elevated PSA  POCT Urinalysis No Micro (Auto)    PSA Prostatic Specific Antigen    Miscellaneous Sendout 1   2. Urinary incontinence, post-void dribbling  POCT Urinalysis No Micro (Auto)    PSA Prostatic Specific Antigen    Miscellaneous Sendout 1   3. Urgency of urination  POCT Urinalysis No Micro (Auto)    PSA Prostatic Specific Antigen   4. Urinary frequency  POCT Urinalysis No Micro (Auto)    PSA Prostatic Specific Antigen    Miscellaneous Sendout 1   5. BPH associated with nocturia  tamsulosin (FLOMAX) 0.4 MG capsule    PSA Prostatic Specific Antigen    Miscellaneous Sendout 1     Sent for Exosome Dx urinary testing. Start Flomax daily. Return in about 4 weeks (around 10/16/2018). PSA 2 days prior. Amy Ortiz.  Sydney Bloom, APRN-CNP  Urology

## 2018-09-19 ENCOUNTER — HOSPITAL ENCOUNTER (OUTPATIENT)
Dept: PHARMACY | Age: 68
Setting detail: THERAPIES SERIES
Discharge: HOME | End: 2018-09-19
Payer: MEDICARE

## 2018-09-19 LAB — POC INR: 2.6 (ref 0.8–1.2)

## 2018-09-19 PROCEDURE — 36416 COLLJ CAPILLARY BLOOD SPEC: CPT

## 2018-09-19 PROCEDURE — 99211 OFF/OP EST MAY X REQ PHY/QHP: CPT

## 2018-09-19 PROCEDURE — 85610 PROTHROMBIN TIME: CPT

## 2018-09-19 RX ORDER — WARFARIN SODIUM 2.5 MG/1
TABLET ORAL
Qty: 30 TABLET | Refills: 5 | Status: SHIPPED | OUTPATIENT
Start: 2018-09-19 | End: 2019-01-02 | Stop reason: SDUPTHER

## 2018-09-19 NOTE — PROGRESS NOTES
Medication Management [facility]  Anticoagulation Clinic  [phone] (phone)  [phone] (fax)      Mr. Stephy Smalls is a 79 y.o.  male with history of Afib who presents today for anticoagulation monitoring and adjustment. Patient verifies current dosing regimen and tablet strength. No missed or extra doses. Patient denies s/s bleeding/bruising/swelling/SOB/chest pain  No blood in urine or stool. No dietary changes. No changes in OTC agents/Herbals; patient states he started taking tamsulosin 0.4mg daily yesterday. No change in alcohol use or tobacco use. No change in activity level. Patient denies headaches/dizziness/lightheadedness/falls. No vomiting/diarrhea or acute illness. No Procedures scheduled in the future at this time. Assessment:   Lab Results   Component Value Date    INR 2.60 (H) 09/19/2018    INR 2.60 (H) 09/05/2018    INR 3.30 (H) 08/22/2018     INR therapeutic   Recent Labs      09/19/18   0809   INR  2.60*     Patient has been therapeutic on consecutive visits since dosage adjustment on current regimen. Plan:    Continue Coumadin 5 mg W and 2.5 mg MTuThFSaSu. Recheck INR 3 weeks. Patient reminded to call the Anticoagulation Clinic with any signs or symptoms of bleeding or with any medication changes. Patient given instructions utilizing the teach back method. E-scribed new prescription to pharmacy as patient was almost out of 2.5 mg tablets. Sent warfarin 2.5 mg tablets #30 tabs with 5 refills. Discharged ambulatory in no apparent distress. After visit summary printed and reviewed with patient.       Medications reviewed and updated on home medication list Yes    Influenza vaccine:     [] given    [x] declined   [x] received previously   [] plans to receive at a later time   [] refused    [x] documented in Suzie 47, PharmD, [facility]  9/19/2018  8:25 AM

## 2018-10-10 ENCOUNTER — HOSPITAL ENCOUNTER (OUTPATIENT)
Dept: PHARMACY | Age: 68
Setting detail: THERAPIES SERIES
Discharge: HOME | End: 2018-10-10
Payer: MEDICARE

## 2018-10-10 ENCOUNTER — HOSPITAL ENCOUNTER (OUTPATIENT)
Age: 68
Discharge: HOME | End: 2018-10-10
Payer: MEDICARE

## 2018-10-10 DIAGNOSIS — N40.1 BPH ASSOCIATED WITH NOCTURIA: ICD-10-CM

## 2018-10-10 DIAGNOSIS — N39.43 URINARY INCONTINENCE, POST-VOID DRIBBLING: ICD-10-CM

## 2018-10-10 DIAGNOSIS — R35.1 BPH ASSOCIATED WITH NOCTURIA: ICD-10-CM

## 2018-10-10 DIAGNOSIS — R39.15 URGENCY OF URINATION: ICD-10-CM

## 2018-10-10 DIAGNOSIS — R97.20 ELEVATED PSA: ICD-10-CM

## 2018-10-10 DIAGNOSIS — R35.0 URINARY FREQUENCY: ICD-10-CM

## 2018-10-10 LAB
POC INR: 2.5 (ref 0.8–1.2)
PROSTATE SPECIFIC ANTIGEN: 4.94 NG/ML (ref 0–1)

## 2018-10-10 PROCEDURE — 36415 COLL VENOUS BLD VENIPUNCTURE: CPT

## 2018-10-10 PROCEDURE — 85610 PROTHROMBIN TIME: CPT

## 2018-10-10 PROCEDURE — 36416 COLLJ CAPILLARY BLOOD SPEC: CPT

## 2018-10-10 PROCEDURE — 99211 OFF/OP EST MAY X REQ PHY/QHP: CPT

## 2018-10-10 PROCEDURE — 84153 ASSAY OF PSA TOTAL: CPT

## 2018-10-17 ENCOUNTER — OFFICE VISIT (OUTPATIENT)
Dept: UROLOGY | Age: 68
End: 2018-10-17
Payer: MEDICARE

## 2018-10-17 VITALS
BODY MASS INDEX: 31.94 KG/M2 | HEIGHT: 73 IN | SYSTOLIC BLOOD PRESSURE: 132 MMHG | DIASTOLIC BLOOD PRESSURE: 78 MMHG | WEIGHT: 241 LBS

## 2018-10-17 DIAGNOSIS — R35.1 BPH ASSOCIATED WITH NOCTURIA: ICD-10-CM

## 2018-10-17 DIAGNOSIS — N13.8 BPH WITH OBSTRUCTION/LOWER URINARY TRACT SYMPTOMS: ICD-10-CM

## 2018-10-17 DIAGNOSIS — N40.1 BPH WITH OBSTRUCTION/LOWER URINARY TRACT SYMPTOMS: ICD-10-CM

## 2018-10-17 DIAGNOSIS — N39.46 MIXED STRESS AND URGE URINARY INCONTINENCE: ICD-10-CM

## 2018-10-17 DIAGNOSIS — R97.20 ELEVATED PSA: Primary | ICD-10-CM

## 2018-10-17 DIAGNOSIS — N40.1 BPH ASSOCIATED WITH NOCTURIA: ICD-10-CM

## 2018-10-17 LAB
BILIRUBIN URINE: NEGATIVE
BLOOD URINE, POC: NEGATIVE
CHARACTER, URINE: CLEAR
COLOR, URINE: YELLOW
GLUCOSE URINE: NEGATIVE MG/DL
KETONES, URINE: ABNORMAL
LEUKOCYTE CLUMPS, URINE: NEGATIVE
NITRITE, URINE: NEGATIVE
PH, URINE: 5.5
PROTEIN, URINE: ABNORMAL MG/DL
SPECIFIC GRAVITY, URINE: 1.02 (ref 1–1.03)
UROBILINOGEN, URINE: 0.2 EU/DL

## 2018-10-17 PROCEDURE — G8598 ASA/ANTIPLAT THER USED: HCPCS | Performed by: NURSE PRACTITIONER

## 2018-10-17 PROCEDURE — 1036F TOBACCO NON-USER: CPT | Performed by: NURSE PRACTITIONER

## 2018-10-17 PROCEDURE — 81003 URINALYSIS AUTO W/O SCOPE: CPT | Performed by: NURSE PRACTITIONER

## 2018-10-17 PROCEDURE — 4040F PNEUMOC VAC/ADMIN/RCVD: CPT | Performed by: NURSE PRACTITIONER

## 2018-10-17 PROCEDURE — G8482 FLU IMMUNIZE ORDER/ADMIN: HCPCS | Performed by: NURSE PRACTITIONER

## 2018-10-17 PROCEDURE — G8427 DOCREV CUR MEDS BY ELIG CLIN: HCPCS | Performed by: NURSE PRACTITIONER

## 2018-10-17 PROCEDURE — 1123F ACP DISCUSS/DSCN MKR DOCD: CPT | Performed by: NURSE PRACTITIONER

## 2018-10-17 PROCEDURE — 99213 OFFICE O/P EST LOW 20 MIN: CPT | Performed by: NURSE PRACTITIONER

## 2018-10-17 PROCEDURE — G8417 CALC BMI ABV UP PARAM F/U: HCPCS | Performed by: NURSE PRACTITIONER

## 2018-10-17 PROCEDURE — 1101F PT FALLS ASSESS-DOCD LE1/YR: CPT | Performed by: NURSE PRACTITIONER

## 2018-10-17 PROCEDURE — 3017F COLORECTAL CA SCREEN DOC REV: CPT | Performed by: NURSE PRACTITIONER

## 2018-10-17 RX ORDER — TAMSULOSIN HYDROCHLORIDE 0.4 MG/1
0.4 CAPSULE ORAL DAILY
Qty: 90 CAPSULE | Refills: 3 | Status: SHIPPED | OUTPATIENT
Start: 2018-10-17 | End: 2019-10-09 | Stop reason: SDUPTHER

## 2018-11-07 ENCOUNTER — HOSPITAL ENCOUNTER (OUTPATIENT)
Dept: PHARMACY | Age: 68
Setting detail: THERAPIES SERIES
Discharge: HOME | End: 2018-11-07
Payer: MEDICARE

## 2018-11-07 LAB — POC INR: 1.9 (ref 0.8–1.2)

## 2018-11-07 PROCEDURE — 99211 OFF/OP EST MAY X REQ PHY/QHP: CPT | Performed by: PHARMACIST

## 2018-11-07 PROCEDURE — 36416 COLLJ CAPILLARY BLOOD SPEC: CPT | Performed by: PHARMACIST

## 2018-11-07 PROCEDURE — 85610 PROTHROMBIN TIME: CPT | Performed by: PHARMACIST

## 2018-11-07 RX ORDER — NITROGLYCERIN 0.4 MG/1
0.4 TABLET SUBLINGUAL EVERY 5 MIN PRN
Qty: 25 TABLET | Refills: 3 | Status: SHIPPED | OUTPATIENT
Start: 2018-11-07 | End: 2020-07-30 | Stop reason: SDUPTHER

## 2018-11-13 ENCOUNTER — TELEPHONE (OUTPATIENT)
Dept: CARDIOLOGY | Age: 68
End: 2018-11-13

## 2018-11-13 NOTE — TELEPHONE ENCOUNTER
11/13/18 Patient spouse Hali(on hipaa), states they seen on TV today that Losartan is being recalled as tainted with Cancer causing agent. She said patient has strong family h/o cancer. Please advise.   [facility] on [address]  Dolv: 8/20/18  Donv: 2/21/19

## 2018-12-05 ENCOUNTER — OFFICE VISIT (OUTPATIENT)
Dept: CARDIOLOGY CLINIC | Age: 68
End: 2018-12-05
Payer: MEDICARE

## 2018-12-05 ENCOUNTER — HOSPITAL ENCOUNTER (OUTPATIENT)
Dept: PHARMACY | Age: 68
Setting detail: THERAPIES SERIES
Discharge: HOME | End: 2018-12-05
Payer: MEDICARE

## 2018-12-05 VITALS
SYSTOLIC BLOOD PRESSURE: 130 MMHG | HEART RATE: 64 BPM | WEIGHT: 245 LBS | DIASTOLIC BLOOD PRESSURE: 88 MMHG | HEIGHT: 73 IN | BODY MASS INDEX: 32.47 KG/M2

## 2018-12-05 DIAGNOSIS — I51.89 DIASTOLIC DYSFUNCTION: ICD-10-CM

## 2018-12-05 DIAGNOSIS — I10 ESSENTIAL HYPERTENSION: ICD-10-CM

## 2018-12-05 DIAGNOSIS — I48.0 PAROXYSMAL ATRIAL FIBRILLATION (HCC): Primary | ICD-10-CM

## 2018-12-05 LAB — POC INR: 2.1 (ref 0.8–1.2)

## 2018-12-05 PROCEDURE — 99213 OFFICE O/P EST LOW 20 MIN: CPT | Performed by: PHYSICIAN ASSISTANT

## 2018-12-05 PROCEDURE — 36416 COLLJ CAPILLARY BLOOD SPEC: CPT | Performed by: PHARMACIST

## 2018-12-05 PROCEDURE — 1123F ACP DISCUSS/DSCN MKR DOCD: CPT | Performed by: PHYSICIAN ASSISTANT

## 2018-12-05 PROCEDURE — G8598 ASA/ANTIPLAT THER USED: HCPCS | Performed by: PHYSICIAN ASSISTANT

## 2018-12-05 PROCEDURE — 99211 OFF/OP EST MAY X REQ PHY/QHP: CPT | Performed by: PHARMACIST

## 2018-12-05 PROCEDURE — 3017F COLORECTAL CA SCREEN DOC REV: CPT | Performed by: PHYSICIAN ASSISTANT

## 2018-12-05 PROCEDURE — G8482 FLU IMMUNIZE ORDER/ADMIN: HCPCS | Performed by: PHYSICIAN ASSISTANT

## 2018-12-05 PROCEDURE — 1036F TOBACCO NON-USER: CPT | Performed by: PHYSICIAN ASSISTANT

## 2018-12-05 PROCEDURE — G8417 CALC BMI ABV UP PARAM F/U: HCPCS | Performed by: PHYSICIAN ASSISTANT

## 2018-12-05 PROCEDURE — G8427 DOCREV CUR MEDS BY ELIG CLIN: HCPCS | Performed by: PHYSICIAN ASSISTANT

## 2018-12-05 PROCEDURE — 1101F PT FALLS ASSESS-DOCD LE1/YR: CPT | Performed by: PHYSICIAN ASSISTANT

## 2018-12-05 PROCEDURE — 4040F PNEUMOC VAC/ADMIN/RCVD: CPT | Performed by: PHYSICIAN ASSISTANT

## 2018-12-05 PROCEDURE — 85610 PROTHROMBIN TIME: CPT | Performed by: PHARMACIST

## 2018-12-05 RX ORDER — METOPROLOL SUCCINATE 50 MG/1
50 TABLET, EXTENDED RELEASE ORAL DAILY
COMMUNITY
End: 2018-12-05 | Stop reason: SDUPTHER

## 2018-12-06 RX ORDER — METOPROLOL SUCCINATE 50 MG/1
50 TABLET, EXTENDED RELEASE ORAL DAILY
Qty: 90 TABLET | Refills: 3 | OUTPATIENT
Start: 2018-12-06 | End: 2019-12-05 | Stop reason: SDUPTHER

## 2018-12-08 ENCOUNTER — HOSPITAL ENCOUNTER (EMERGENCY)
Age: 68
Discharge: HOME | End: 2018-12-08
Attending: EMERGENCY MEDICINE
Payer: MEDICARE

## 2018-12-08 ENCOUNTER — APPOINTMENT (OUTPATIENT)
Dept: CT IMAGING | Age: 68
End: 2018-12-08
Payer: MEDICARE

## 2018-12-08 ENCOUNTER — APPOINTMENT (OUTPATIENT)
Dept: GENERAL RADIOLOGY | Age: 68
End: 2018-12-08
Payer: MEDICARE

## 2018-12-08 VITALS
WEIGHT: 245 LBS | RESPIRATION RATE: 24 BRPM | TEMPERATURE: 97.8 F | OXYGEN SATURATION: 96 % | DIASTOLIC BLOOD PRESSURE: 62 MMHG | BODY MASS INDEX: 32.47 KG/M2 | SYSTOLIC BLOOD PRESSURE: 110 MMHG | HEIGHT: 73 IN | HEART RATE: 95 BPM

## 2018-12-08 DIAGNOSIS — R19.7 DIARRHEA, UNSPECIFIED TYPE: ICD-10-CM

## 2018-12-08 DIAGNOSIS — E86.0 DEHYDRATION: Primary | ICD-10-CM

## 2018-12-08 DIAGNOSIS — R11.0 NAUSEA: ICD-10-CM

## 2018-12-08 LAB
ALBUMIN SERPL-MCNC: 3.9 G/DL (ref 3.5–5.1)
ALP BLD-CCNC: 70 U/L (ref 38–126)
ALT SERPL-CCNC: 50 U/L (ref 11–66)
ANION GAP SERPL CALCULATED.3IONS-SCNC: 16 MEQ/L (ref 8–16)
ANION GAP SERPL CALCULATED.3IONS-SCNC: 16 MEQ/L (ref 8–16)
APTT: 37.1 SECONDS (ref 22–38)
AST SERPL-CCNC: 33 U/L (ref 5–40)
BASOPHILS # BLD: 0.3 %
BASOPHILS ABSOLUTE: 0 THOU/MM3 (ref 0–0.1)
BILIRUB SERPL-MCNC: 0.5 MG/DL (ref 0.3–1.2)
BILIRUBIN URINE: NEGATIVE
BLOOD, URINE: NEGATIVE
BUN BLDV-MCNC: 15 MG/DL (ref 7–22)
BUN BLDV-MCNC: 15 MG/DL (ref 7–22)
CALCIUM SERPL-MCNC: 8.4 MG/DL (ref 8.5–10.5)
CALCIUM SERPL-MCNC: 8.5 MG/DL (ref 8.5–10.5)
CHARACTER, URINE: CLEAR
CHLORIDE BLD-SCNC: 100 MEQ/L (ref 98–111)
CHLORIDE BLD-SCNC: 100 MEQ/L (ref 98–111)
CO2: 22 MEQ/L (ref 23–33)
CO2: 23 MEQ/L (ref 23–33)
COLOR: YELLOW
CREAT SERPL-MCNC: 0.9 MG/DL (ref 0.4–1.2)
CREAT SERPL-MCNC: 1 MG/DL (ref 0.4–1.2)
DIGOXIN LEVEL: 0.6 NG/ML (ref 0.5–2)
EKG ATRIAL RATE: 214 BPM
EKG Q-T INTERVAL: 336 MS
EKG QRS DURATION: 90 MS
EKG QTC CALCULATION (BAZETT): 468 MS
EKG R AXIS: 2 DEGREES
EKG T AXIS: 61 DEGREES
EKG VENTRICULAR RATE: 117 BPM
EOSINOPHIL # BLD: 0.7 %
EOSINOPHILS ABSOLUTE: 0.1 THOU/MM3 (ref 0–0.4)
ERYTHROCYTE [DISTWIDTH] IN BLOOD BY AUTOMATED COUNT: 13.6 % (ref 11.5–14.5)
ERYTHROCYTE [DISTWIDTH] IN BLOOD BY AUTOMATED COUNT: 42.2 FL (ref 35–45)
GFR SERPL CREATININE-BSD FRML MDRD: 75 ML/MIN/1.73M2
GFR SERPL CREATININE-BSD FRML MDRD: 84 ML/MIN/1.73M2
GLUCOSE BLD-MCNC: 128 MG/DL (ref 70–108)
GLUCOSE BLD-MCNC: 136 MG/DL (ref 70–108)
GLUCOSE, URINE: NEGATIVE MG/DL
HCT VFR BLD CALC: 48.3 % (ref 42–52)
HEMOGLOBIN: 16.9 GM/DL (ref 14–18)
IMMATURE GRANS (ABS): 0.03 THOU/MM3 (ref 0–0.07)
IMMATURE GRANULOCYTES: 0.3 %
INR BLD: 2.22 (ref 0.85–1.13)
KETONES, URINE: NEGATIVE
LACTIC ACID: 2.7 MMOL/L (ref 0.5–2.2)
LEUKOCYTE EST, POC: NEGATIVE
LYMPHOCYTES # BLD: 9.3 %
LYMPHOCYTES ABSOLUTE: 0.8 THOU/MM3 (ref 1–4.8)
MCH RBC QN AUTO: 30.2 PG (ref 26–33)
MCHC RBC AUTO-ENTMCNC: 35 GM/DL (ref 32.2–35.5)
MCV RBC AUTO: 86.3 FL (ref 80–94)
MONOCYTES # BLD: 5.5 %
MONOCYTES ABSOLUTE: 0.5 THOU/MM3 (ref 0.4–1.3)
NITRITE, URINE: NEGATIVE
NUCLEATED RED BLOOD CELLS: 0 /100 WBC
OSMOLALITY CALCULATION: 278.6 MOSMOL/KG (ref 275–300)
OSMOLALITY CALCULATION: 280 MOSMOL/KG (ref 275–300)
PH UA: 6
PLATELET # BLD: 236 THOU/MM3 (ref 130–400)
PMV BLD AUTO: 10.4 FL (ref 9.4–12.4)
POTASSIUM REFLEX MAGNESIUM: 3.7 MEQ/L (ref 3.5–5.2)
POTASSIUM SERPL-SCNC: 4 MEQ/L (ref 3.5–5.2)
PRO-BNP: 1234 PG/ML (ref 0–900)
PROTEIN UA: NEGATIVE MG/DL
RBC # BLD: 5.6 MILL/MM3 (ref 4.7–6.1)
SEG NEUTROPHILS: 83.9 %
SEGMENTED NEUTROPHILS ABSOLUTE COUNT: 7.2 THOU/MM3 (ref 1.8–7.7)
SODIUM BLD-SCNC: 138 MEQ/L (ref 135–145)
SODIUM BLD-SCNC: 139 MEQ/L (ref 135–145)
SPECIFIC GRAVITY UA: 1.02 (ref 1–1.03)
TOTAL PROTEIN: 6.7 G/DL (ref 6.1–8)
TROPONIN T: < 0.01 NG/ML
UROBILINOGEN, URINE: 0.2 EU/DL
WBC # BLD: 8.6 THOU/MM3 (ref 4.8–10.8)

## 2018-12-08 PROCEDURE — 2580000003 HC RX 258: Performed by: EMERGENCY MEDICINE

## 2018-12-08 PROCEDURE — 80048 BASIC METABOLIC PNL TOTAL CA: CPT

## 2018-12-08 PROCEDURE — 96374 THER/PROPH/DIAG INJ IV PUSH: CPT

## 2018-12-08 PROCEDURE — 81003 URINALYSIS AUTO W/O SCOPE: CPT

## 2018-12-08 PROCEDURE — S0028 INJECTION, FAMOTIDINE, 20 MG: HCPCS | Performed by: EMERGENCY MEDICINE

## 2018-12-08 PROCEDURE — 71045 X-RAY EXAM CHEST 1 VIEW: CPT

## 2018-12-08 PROCEDURE — 80053 COMPREHEN METABOLIC PANEL: CPT

## 2018-12-08 PROCEDURE — 96375 TX/PRO/DX INJ NEW DRUG ADDON: CPT

## 2018-12-08 PROCEDURE — 80162 ASSAY OF DIGOXIN TOTAL: CPT

## 2018-12-08 PROCEDURE — 6360000002 HC RX W HCPCS: Performed by: EMERGENCY MEDICINE

## 2018-12-08 PROCEDURE — 99285 EMERGENCY DEPT VISIT HI MDM: CPT

## 2018-12-08 PROCEDURE — 96361 HYDRATE IV INFUSION ADD-ON: CPT

## 2018-12-08 PROCEDURE — 36415 COLL VENOUS BLD VENIPUNCTURE: CPT

## 2018-12-08 PROCEDURE — 85610 PROTHROMBIN TIME: CPT

## 2018-12-08 PROCEDURE — 84484 ASSAY OF TROPONIN QUANT: CPT

## 2018-12-08 PROCEDURE — 6360000004 HC RX CONTRAST MEDICATION: Performed by: EMERGENCY MEDICINE

## 2018-12-08 PROCEDURE — 85025 COMPLETE CBC W/AUTO DIFF WBC: CPT

## 2018-12-08 PROCEDURE — 2500000003 HC RX 250 WO HCPCS: Performed by: EMERGENCY MEDICINE

## 2018-12-08 PROCEDURE — 93010 ELECTROCARDIOGRAM REPORT: CPT | Performed by: NUCLEAR MEDICINE

## 2018-12-08 PROCEDURE — 85730 THROMBOPLASTIN TIME PARTIAL: CPT

## 2018-12-08 PROCEDURE — 93005 ELECTROCARDIOGRAM TRACING: CPT | Performed by: EMERGENCY MEDICINE

## 2018-12-08 PROCEDURE — 74177 CT ABD & PELVIS W/CONTRAST: CPT

## 2018-12-08 PROCEDURE — 83605 ASSAY OF LACTIC ACID: CPT

## 2018-12-08 PROCEDURE — 83880 ASSAY OF NATRIURETIC PEPTIDE: CPT

## 2018-12-08 RX ORDER — ONDANSETRON 2 MG/ML
4 INJECTION INTRAMUSCULAR; INTRAVENOUS EVERY 30 MIN PRN
Status: DISCONTINUED | OUTPATIENT
Start: 2018-12-08 | End: 2018-12-08 | Stop reason: HOSPADM

## 2018-12-08 RX ORDER — 0.9 % SODIUM CHLORIDE 0.9 %
500 INTRAVENOUS SOLUTION INTRAVENOUS ONCE
Status: COMPLETED | OUTPATIENT
Start: 2018-12-08 | End: 2018-12-08

## 2018-12-08 RX ORDER — DICYCLOMINE HYDROCHLORIDE 10 MG/1
10 CAPSULE ORAL EVERY 6 HOURS PRN
Qty: 20 CAPSULE | Refills: 0 | Status: SHIPPED | OUTPATIENT
Start: 2018-12-08 | End: 2019-01-31 | Stop reason: ALTCHOICE

## 2018-12-08 RX ORDER — ONDANSETRON 4 MG/1
4 TABLET, ORALLY DISINTEGRATING ORAL EVERY 8 HOURS PRN
Qty: 20 TABLET | Refills: 0 | Status: SHIPPED | OUTPATIENT
Start: 2018-12-08 | End: 2019-01-31 | Stop reason: ALTCHOICE

## 2018-12-08 RX ADMIN — IOPAMIDOL 80 ML: 755 INJECTION, SOLUTION INTRAVENOUS at 16:11

## 2018-12-08 RX ADMIN — FAMOTIDINE 20 MG: 10 INJECTION, SOLUTION INTRAVENOUS at 15:50

## 2018-12-08 RX ADMIN — ONDANSETRON 4 MG: 2 INJECTION INTRAMUSCULAR; INTRAVENOUS at 15:49

## 2018-12-08 RX ADMIN — SODIUM CHLORIDE 500 ML: 9 INJECTION, SOLUTION INTRAVENOUS at 15:50

## 2018-12-08 ASSESSMENT — ENCOUNTER SYMPTOMS
DIARRHEA: 1
VOMITING: 0
WHEEZING: 0
ABDOMINAL PAIN: 1
COUGH: 0
SORE THROAT: 0
RHINORRHEA: 0
EYE DISCHARGE: 0
SHORTNESS OF BREATH: 0
BACK PAIN: 0
NAUSEA: 1
EYE REDNESS: 0

## 2018-12-08 ASSESSMENT — PAIN SCALES - GENERAL: PAINLEVEL_OUTOF10: 4

## 2018-12-08 ASSESSMENT — PAIN DESCRIPTION - PAIN TYPE: TYPE: ACUTE PAIN

## 2018-12-08 ASSESSMENT — PAIN DESCRIPTION - LOCATION: LOCATION: ABDOMEN

## 2018-12-08 ASSESSMENT — PAIN DESCRIPTION - DESCRIPTORS: DESCRIPTORS: SHARP

## 2018-12-08 ASSESSMENT — PAIN DESCRIPTION - ORIENTATION: ORIENTATION: LEFT

## 2019-01-02 ENCOUNTER — HOSPITAL ENCOUNTER (OUTPATIENT)
Dept: PHARMACY | Age: 69
Setting detail: THERAPIES SERIES
Discharge: HOME | End: 2019-01-02
Payer: MEDICARE

## 2019-01-02 LAB — POC INR: 1.8 (ref 0.8–1.2)

## 2019-01-02 PROCEDURE — 99211 OFF/OP EST MAY X REQ PHY/QHP: CPT

## 2019-01-02 PROCEDURE — 36416 COLLJ CAPILLARY BLOOD SPEC: CPT

## 2019-01-02 PROCEDURE — 85610 PROTHROMBIN TIME: CPT

## 2019-01-02 RX ORDER — WARFARIN SODIUM 2.5 MG/1
TABLET ORAL
Qty: 40 TABLET | Refills: 5 | Status: SHIPPED | OUTPATIENT
Start: 2019-01-02 | End: 2019-01-31 | Stop reason: SDUPTHER

## 2019-01-02 RX ORDER — WARFARIN SODIUM 2.5 MG/1
TABLET ORAL
Qty: 30 TABLET | Refills: 5 | Status: SHIPPED | OUTPATIENT
Start: 2019-01-02 | End: 2019-01-02 | Stop reason: SDUPTHER

## 2019-01-07 RX ORDER — DIGOXIN 125 MCG
125 TABLET ORAL DAILY
Qty: 90 TABLET | Refills: 1 | Status: SHIPPED | OUTPATIENT
Start: 2019-01-07 | End: 2019-06-27 | Stop reason: SDUPTHER

## 2019-01-31 ENCOUNTER — HOSPITAL ENCOUNTER (OUTPATIENT)
Dept: PHARMACY | Age: 69
Setting detail: THERAPIES SERIES
Discharge: HOME | End: 2019-01-31
Payer: MEDICARE

## 2019-01-31 LAB — POC INR: 2.4 (ref 0.8–1.2)

## 2019-01-31 PROCEDURE — 99212 OFFICE O/P EST SF 10 MIN: CPT | Performed by: PHARMACIST

## 2019-01-31 PROCEDURE — 36416 COLLJ CAPILLARY BLOOD SPEC: CPT | Performed by: PHARMACIST

## 2019-01-31 PROCEDURE — 85610 PROTHROMBIN TIME: CPT | Performed by: PHARMACIST

## 2019-01-31 PROCEDURE — 99211 OFF/OP EST MAY X REQ PHY/QHP: CPT | Performed by: PHARMACIST

## 2019-01-31 RX ORDER — WARFARIN SODIUM 2.5 MG/1
TABLET ORAL
Qty: 40 TABLET | Refills: 11 | Status: SHIPPED | OUTPATIENT
Start: 2019-01-31 | End: 2020-02-24

## 2019-02-21 ENCOUNTER — OFFICE VISIT (OUTPATIENT)
Dept: CARDIOLOGY CLINIC | Age: 69
End: 2019-02-21
Payer: MEDICARE

## 2019-02-21 VITALS
WEIGHT: 246 LBS | SYSTOLIC BLOOD PRESSURE: 144 MMHG | DIASTOLIC BLOOD PRESSURE: 86 MMHG | BODY MASS INDEX: 32.6 KG/M2 | HEART RATE: 76 BPM | HEIGHT: 73 IN

## 2019-02-21 DIAGNOSIS — I25.10 CORONARY ARTERY DISEASE INVOLVING NATIVE CORONARY ARTERY OF NATIVE HEART WITHOUT ANGINA PECTORIS: Primary | ICD-10-CM

## 2019-02-21 DIAGNOSIS — I10 ESSENTIAL HYPERTENSION: ICD-10-CM

## 2019-02-21 DIAGNOSIS — I48.0 PAROXYSMAL ATRIAL FIBRILLATION (HCC): ICD-10-CM

## 2019-02-21 PROCEDURE — G8598 ASA/ANTIPLAT THER USED: HCPCS | Performed by: NUCLEAR MEDICINE

## 2019-02-21 PROCEDURE — 1123F ACP DISCUSS/DSCN MKR DOCD: CPT | Performed by: NUCLEAR MEDICINE

## 2019-02-21 PROCEDURE — G8482 FLU IMMUNIZE ORDER/ADMIN: HCPCS | Performed by: NUCLEAR MEDICINE

## 2019-02-21 PROCEDURE — 4040F PNEUMOC VAC/ADMIN/RCVD: CPT | Performed by: NUCLEAR MEDICINE

## 2019-02-21 PROCEDURE — 99213 OFFICE O/P EST LOW 20 MIN: CPT | Performed by: NUCLEAR MEDICINE

## 2019-02-21 PROCEDURE — G8427 DOCREV CUR MEDS BY ELIG CLIN: HCPCS | Performed by: NUCLEAR MEDICINE

## 2019-02-21 PROCEDURE — 3017F COLORECTAL CA SCREEN DOC REV: CPT | Performed by: NUCLEAR MEDICINE

## 2019-02-21 PROCEDURE — G8417 CALC BMI ABV UP PARAM F/U: HCPCS | Performed by: NUCLEAR MEDICINE

## 2019-02-21 PROCEDURE — 1036F TOBACCO NON-USER: CPT | Performed by: NUCLEAR MEDICINE

## 2019-02-21 PROCEDURE — 1101F PT FALLS ASSESS-DOCD LE1/YR: CPT | Performed by: NUCLEAR MEDICINE

## 2019-03-04 ENCOUNTER — HOSPITAL ENCOUNTER (OUTPATIENT)
Dept: PHARMACY | Age: 69
Setting detail: THERAPIES SERIES
Discharge: HOME | End: 2019-03-04
Payer: MEDICARE

## 2019-03-04 LAB — POC INR: 2.1 (ref 0.8–1.2)

## 2019-03-04 PROCEDURE — 36416 COLLJ CAPILLARY BLOOD SPEC: CPT

## 2019-03-04 PROCEDURE — 99211 OFF/OP EST MAY X REQ PHY/QHP: CPT

## 2019-03-04 PROCEDURE — 85610 PROTHROMBIN TIME: CPT

## 2019-03-11 DIAGNOSIS — I10 ESSENTIAL HYPERTENSION: ICD-10-CM

## 2019-03-11 RX ORDER — PRAVASTATIN SODIUM 80 MG/1
80 TABLET ORAL NIGHTLY
Qty: 90 TABLET | Refills: 3 | Status: SHIPPED | OUTPATIENT
Start: 2019-03-11 | End: 2020-03-27 | Stop reason: SDUPTHER

## 2019-03-11 RX ORDER — LOSARTAN POTASSIUM 50 MG/1
50 TABLET ORAL DAILY
Qty: 90 TABLET | Refills: 2 | Status: SHIPPED | OUTPATIENT
Start: 2019-03-11 | End: 2019-11-21 | Stop reason: SINTOL

## 2019-03-11 RX ORDER — BUMETANIDE 2 MG/1
2 TABLET ORAL DAILY
Qty: 90 TABLET | Refills: 3 | Status: SHIPPED | OUTPATIENT
Start: 2019-03-11 | End: 2020-03-27 | Stop reason: SDUPTHER

## 2019-03-12 RX ORDER — BUMETANIDE 2 MG/1
TABLET ORAL
Qty: 90 TABLET | Refills: 3 | OUTPATIENT
Start: 2019-03-12

## 2019-03-12 RX ORDER — LOSARTAN POTASSIUM 50 MG/1
TABLET ORAL
Qty: 90 TABLET | Refills: 2 | OUTPATIENT
Start: 2019-03-12

## 2019-03-12 RX ORDER — PRAVASTATIN SODIUM 80 MG/1
80 TABLET ORAL NIGHTLY
Qty: 90 TABLET | Refills: 3 | OUTPATIENT
Start: 2019-03-12

## 2019-03-24 NOTE — PROGRESS NOTES
Clinical Pharmacy Note    Warfarin consult follow-up    Recent Labs      07/20/18   0522   INR  1.47*     No results for input(s): HGB, HCT, PLT in the last 72 hours. Significant Drug-Drug Interactions:  New warfarin drug-drug interactions: none  Discontinued drug-drug interactions: none  Current warfarin drug-drug interactions: aspirin (home), enoxaparin     Date INR Warfarin Dose   7/17/2018 1.02 5 mg   7/18/2018 1.02 5 mg   7/19/2018 1.05 7.5 mg   7/20/2018 1.47 7.5 mg                               Notes:                     Daily PT/INR until stable within therapeutic range. Attending Only

## 2019-04-01 ENCOUNTER — HOSPITAL ENCOUNTER (OUTPATIENT)
Dept: PHARMACY | Age: 69
Setting detail: THERAPIES SERIES
Discharge: HOME | End: 2019-04-01
Payer: MEDICARE

## 2019-04-01 LAB — POC INR: 1.6 (ref 0.8–1.2)

## 2019-04-01 PROCEDURE — 99211 OFF/OP EST MAY X REQ PHY/QHP: CPT

## 2019-04-01 PROCEDURE — 36416 COLLJ CAPILLARY BLOOD SPEC: CPT

## 2019-04-01 PROCEDURE — 85610 PROTHROMBIN TIME: CPT

## 2019-04-01 NOTE — PROGRESS NOTES
Medication Management [facility]  Anticoagulation Clinic  [phone] (phone)  [phone] (fax)      Mr. Lilia Ramírez is a 81 y.o.  male with history of Afib who presents today for anticoagulation monitoring and adjustment. Patient verifies current dosing regimen and tablet strength. No missed or extra doses. Patient denies s/s bleeding/bruising/swelling/SOB/chest pain  No blood in urine or stool. Patient reports eating a lot of cooked cabbage last week. Counseled patient on being consistent regarding diet and Vitamin K intake. No changes in medication/OTC agents/Herbals. No change in alcohol use or tobacco use. No change in activity level. Patient denies headaches/dizziness/lightheadedness/falls. No vomiting/diarrhea or acute illness. No Procedures scheduled in the future at this time. Assessment:   Lab Results   Component Value Date    INR 1.60 (H) 04/01/2019    INR 2.10 (H) 03/04/2019    INR 2.40 (H) 01/31/2019     INR subtherapeutic   Recent Labs     04/01/19  0706   INR 1.60*     Patient presents with subtherapeutic INR. Potentially could be due to increased intake of cabbage last week. Patient unwilling to come into Coumadin Clinic any sooner than 4 weeks unless 2 consecutive INRs are out of range. Plan:  Coumadin 5 mg x 1 dose today 4/1/19 then continue Coumadin 5 mg W and 2.5 mg MTuThFSaSu. Recheck INR in 4 weeks. Patient reminded to call the Anticoagulation Clinic with any signs or symptoms of bleeding or with any medication changes. Patient given instructions utilizing the teach back method. Discharged ambulatory in no apparent distress. After visit summary printed and reviewed with patient.       Medications reviewed and updated on home medication list Yes    Influenza vaccine:     [] given    [x] declined   [x] received previously   [] plans to receive at a later time   [] refused    [x] documented in [address] [place] [address], PharmD, BCPS  4/1/2019  7:51 AM

## 2019-04-10 ENCOUNTER — HOSPITAL ENCOUNTER (OUTPATIENT)
Age: 69
Discharge: HOME | End: 2019-04-10
Payer: MEDICARE

## 2019-04-10 DIAGNOSIS — R97.20 ELEVATED PSA: ICD-10-CM

## 2019-04-10 LAB — PROSTATE SPECIFIC ANTIGEN: 3.59 NG/ML (ref 0–1)

## 2019-04-10 PROCEDURE — 84153 ASSAY OF PSA TOTAL: CPT

## 2019-04-10 PROCEDURE — 36415 COLL VENOUS BLD VENIPUNCTURE: CPT

## 2019-04-17 ENCOUNTER — OFFICE VISIT (OUTPATIENT)
Dept: UROLOGY | Age: 69
End: 2019-04-17
Payer: MEDICARE

## 2019-04-17 VITALS
DIASTOLIC BLOOD PRESSURE: 78 MMHG | BODY MASS INDEX: 32.35 KG/M2 | WEIGHT: 244.1 LBS | HEIGHT: 73 IN | SYSTOLIC BLOOD PRESSURE: 132 MMHG

## 2019-04-17 DIAGNOSIS — N39.492 POSTURAL URINARY INCONTINENCE: ICD-10-CM

## 2019-04-17 DIAGNOSIS — N13.8 BPH WITH OBSTRUCTION/LOWER URINARY TRACT SYMPTOMS: ICD-10-CM

## 2019-04-17 DIAGNOSIS — N40.1 BPH WITH OBSTRUCTION/LOWER URINARY TRACT SYMPTOMS: ICD-10-CM

## 2019-04-17 DIAGNOSIS — R97.20 ELEVATED PSA: Primary | ICD-10-CM

## 2019-04-17 LAB
BILIRUBIN URINE: NEGATIVE
BLOOD URINE, POC: NEGATIVE
CHARACTER, URINE: CLEAR
COLOR, URINE: YELLOW
GLUCOSE URINE: NEGATIVE MG/DL
KETONES, URINE: NEGATIVE
LEUKOCYTE CLUMPS, URINE: NEGATIVE
NITRITE, URINE: NEGATIVE
PH, URINE: 5 (ref 5–9)
PROTEIN, URINE: NEGATIVE MG/DL
SPECIFIC GRAVITY, URINE: <= 1.005 (ref 1–1.03)
UROBILINOGEN, URINE: 0.2 EU/DL (ref 0–1)

## 2019-04-17 PROCEDURE — 4040F PNEUMOC VAC/ADMIN/RCVD: CPT | Performed by: NURSE PRACTITIONER

## 2019-04-17 PROCEDURE — G8417 CALC BMI ABV UP PARAM F/U: HCPCS | Performed by: NURSE PRACTITIONER

## 2019-04-17 PROCEDURE — G8598 ASA/ANTIPLAT THER USED: HCPCS | Performed by: NURSE PRACTITIONER

## 2019-04-17 PROCEDURE — 1123F ACP DISCUSS/DSCN MKR DOCD: CPT | Performed by: NURSE PRACTITIONER

## 2019-04-17 PROCEDURE — 3017F COLORECTAL CA SCREEN DOC REV: CPT | Performed by: NURSE PRACTITIONER

## 2019-04-17 PROCEDURE — 81003 URINALYSIS AUTO W/O SCOPE: CPT | Performed by: NURSE PRACTITIONER

## 2019-04-17 PROCEDURE — 1036F TOBACCO NON-USER: CPT | Performed by: NURSE PRACTITIONER

## 2019-04-17 PROCEDURE — G8427 DOCREV CUR MEDS BY ELIG CLIN: HCPCS | Performed by: NURSE PRACTITIONER

## 2019-04-17 PROCEDURE — 99212 OFFICE O/P EST SF 10 MIN: CPT | Performed by: NURSE PRACTITIONER

## 2019-04-17 NOTE — PROGRESS NOTES
[address] [place]  Dept: [phone]  Loc: [phone]  Visit Date: 4/17/2019      HPI:     Jovan Morse is a pleasant 81 y.o. with past medical history of Atrial fibrillation, CAD, HTN, and GERD who presents in follow-up today for elevated PSA of 4.94 on 10/10/18 previously 4.39 one month prior. His PSA has been monitored around every 2 years by his PCP. Today repeat 6 month PSA did decrease to 3.59. He admits to occasional urinary leakage when running the water in the sink but believes it is infrequent and rather satisfied with his symptoms. He did not believe any intervention was needed. He is taking Flomax which is working well for him and would like to continue. Lindsey Colorado denies dysuria, weak stream, gross hematuria, flank pain, fever, chills, suprapubic pain, and feeling of incomplete emptying. Jovan Morse comes in with his spouse today. History is obtained from the patient, spouse, and medical records.       Current Outpatient Medications   Medication Sig Dispense Refill    bumetanide (BUMEX) 2 MG tablet Take 1 tablet by mouth daily 90 tablet 3    pravastatin (PRAVACHOL) 80 MG tablet Take 1 tablet by mouth nightly 90 tablet 3    losartan (COZAAR) 50 MG tablet Take 1 tablet by mouth daily 90 tablet 2    warfarin (COUMADIN) 2.5 MG tablet Take as directed by [facility] 40 tablets = 30 day supply 40 tablet 11    digoxin (DIGITEK) 125 MCG tablet Take 1 tablet by mouth daily 90 tablet 1    metoprolol succinate (TOPROL XL) 50 MG extended release tablet Take 1 tablet by mouth daily 90 tablet 3    nitroGLYCERIN (NITROSTAT) 0.4 MG SL tablet Place 1 tablet under the tongue every 5 minutes as needed for Chest pain 25 tablet 3    tamsulosin (FLOMAX) 0.4 MG capsule Take 1 capsule by mouth daily 90 capsule 3    potassium chloride (KLOR-CON) 10 MEQ extended release tablet TAKE ONE TABLET BY MOUTH TWICE DAILY 180 tablet 2    diltiazem (TIAZAC) 360 MG extended release capsule Take 1 capsule by mouth daily 90 capsule 3    bisacodyl (DULCOLAX) 5 MG EC tablet Take 10 mg by mouth every other day At night       No current facility-administered medications for this visit. Past Medical History  Maybelle Koyanagi  has a past medical history of Anxiety, Atrial fibrillation ([address] CAD (coronary artery disease), Chest pain, Colon polyp, Dementia, Dizziness, Elevated PSA, GERD (gastroesophageal reflux disease), Heart disease, Hyperlipidemia, Hypertension, and Hypothyroidism. Past Surgical History  The patient  has a past surgical history that includes cardiovascular stress test (09/23/2011); transthoracic echocardiogram (09/23/2011); Cardiac catheterization (09/23/2011); Carpal tunnel release; Vasectomy; Tonsillectomy; Adenoidectomy; Vasectomy; and Colonoscopy. Family History  This patient's family history includes Alzheimer's Disease in his mother; Cancer in his father; Heart Disease in his father; Heart Surgery in his father. Social History  Maybelle Koyanagi  reports that he has never smoked. He has never used smokeless tobacco. He reports that he does not drink alcohol or use drugs. Subjective:     Review of Systems  No problems with ears, nose or throat. No problems with eyes. No chest pain, shortness of breath, abdominal pain, extremity pain or weakness, and no neurological deficits. No rashes.  symptoms per HPI. The remainder of the review of symptoms is negative. Objective:     PE:   Vitals:    04/17/19 0754   BP: 132/78   Weight: 244 lb 1.6 oz (110.7 kg)   Height: 6' 1\" (1.854 m)       Constitutional: Alert and oriented times 3, no acute distress and cooperative to examination with appropriate mood and affect. HENT:   Head:        Normocephalic and atraumatic. Mouth/Throat:         Mucous membranes are normal.   Eyes:         EOM are normal. No scleral icterus. PERRLA. Neck:        Supple, symmetrical, trachea midline  Cardiovascular:        Normal rate, regular rhythm, S1 S2 heart sounds. No murmurs, rub, or gallops. Pulses:       Radial pulses are 2+/4 bilateral and equal. Posterior tibialis 2+/4 bilateral and equal  Pulmonary/Chest:      Chest symmetric with normal A/P diameter,  CTA with no wheezes, rales, or rhonchi noted. Normal respiratory rate and rhthym. No use of accessory muscles. Abdominal:         Soft. No tenderness. Bowel sounds present. Musculoskeletal:         Normal range of motion. No edema or tenderness of lower extremities. Extremities: No cyanosis, clubbing, or edema present. Neurological:        Alert and oriented. No cranial nerve deficit. Judithe Canton Psychiatric:        Normal mood and affect. Labs   Urine dip demonstrates   Results for POC orders placed in visit on 04/17/19   POCT Urinalysis No Micro (Auto)   Result Value Ref Range    Glucose, Ur Negative NEGATIVE mg/dl    Bilirubin Urine Negative     Ketones, Urine Negative NEGATIVE    Specific Gravity, Urine <= 1.005 1.002 - 1.03    Blood, UA POC Negative NEGATIVE    pH, Urine 5.00 5.0 - 9.0    Protein, Urine Negative NEGATIVE mg/dl    Urobilinogen, Urine 0.20 0.0 - 1.0 eu/dl    Nitrite, Urine Negative NEGATIVE    Leukocyte Clumps, Urine Negative NEGATIVE    Color, Urine Yellow YELLOW-STR    Character, Urine Clear CLR-SL.MAUREEN       Patients recent PSA values are as follows  Lab Results   Component Value Date    PSA 3.59 (H) 04/10/2019    PSA 4.94 (H) 10/10/2018    PSA 4.39 (H) 09/12/2018        Recent BUN/Creatinine:  Lab Results   Component Value Date    BUN 15 12/08/2018    CREATININE 1.0 12/08/2018          Assessment & Plan:      Diagnosis Orders   1. Elevated PSA  POCT Urinalysis No Micro (Auto)    PSA Prostatic Specific Antigen   2. BPH with obstruction/lower urinary tract symptoms  POCT Urinalysis No Micro (Auto)   3.  Postural urinary incontinence       Plan to continue Flomax as it seems to be working well for [employer]. We will repeat PSA in 1 year prior to visit. He will call with any problems or questions. No refills needed at this time. Return in about 1 year (around 4/17/2020) for follow-up elevated PSA. Dmitriy Meade.  Kana Luis, NARESH-CNP  Urology

## 2019-04-29 ENCOUNTER — HOSPITAL ENCOUNTER (OUTPATIENT)
Dept: PHARMACY | Age: 69
Setting detail: THERAPIES SERIES
Discharge: HOME | End: 2019-04-29
Payer: MEDICARE

## 2019-04-29 LAB — POC INR: 2.2 (ref 0.8–1.2)

## 2019-04-29 PROCEDURE — 36416 COLLJ CAPILLARY BLOOD SPEC: CPT

## 2019-04-29 PROCEDURE — 85610 PROTHROMBIN TIME: CPT

## 2019-04-29 PROCEDURE — 99211 OFF/OP EST MAY X REQ PHY/QHP: CPT

## 2019-04-29 NOTE — PROGRESS NOTES
Medication Management [facility]  Anticoagulation Clinic  [phone] (phone)  [phone] (fax)      Mr. Kingston Marie is a 81 y.o.  male with history of Afib who presents today for anticoagulation monitoring and adjustment. Patient verifies current dosing regimen and tablet strength. No missed or extra doses. Patient denies s/s bleeding/bruising/swelling/SOB/chest pain  No blood in urine or stool. No dietary changes. No changes in medication/OTC agents/Herbals. No change in alcohol use or tobacco use. No change in activity level. Patient denies headaches/dizziness/lightheadedness/falls. No vomiting/diarrhea or acute illness. No Procedures scheduled in the future at this time. Assessment:   Lab Results   Component Value Date    INR 2.20 (H) 04/29/2019    INR 1.60 (H) 04/01/2019    INR 2.10 (H) 03/04/2019     INR therapeutic   Recent Labs     04/29/19  0700   INR 2.20*     Patient has been fairly well-controlled on current regimen and has had therapeutic INRs at three of the past four visits. Plan:  Continue Coumadin 5 mg W and 2.5 mg MTuThFSaSu. Recheck INR in 5 weeks. Patient reminded to call the Anticoagulation Clinic with any signs or symptoms of bleeding or with any medication changes. Patient given instructions utilizing the teach back method. Discharged ambulatory in no apparent distress. After visit summary printed and reviewed with patient.       Medications reviewed and updated on home medication list Yes    Influenza vaccine:     [] given    [x] declined   [x] received previously   [] plans to receive at a later time   [] refused    [x] documented in [address] [place] [address], PharmD, BCPS  4/29/2019  7:10 AM

## 2019-06-03 ENCOUNTER — HOSPITAL ENCOUNTER (OUTPATIENT)
Dept: PHARMACY | Age: 69
Setting detail: THERAPIES SERIES
Discharge: HOME | End: 2019-06-03
Payer: MEDICARE

## 2019-06-03 ENCOUNTER — HOSPITAL ENCOUNTER (OUTPATIENT)
Age: 69
Discharge: HOME | End: 2019-06-03
Payer: MEDICARE

## 2019-06-03 DIAGNOSIS — I48.0 PAROXYSMAL ATRIAL FIBRILLATION (HCC): ICD-10-CM

## 2019-06-03 DIAGNOSIS — I48.0 PAROXYSMAL ATRIAL FIBRILLATION (HCC): Primary | ICD-10-CM

## 2019-06-03 DIAGNOSIS — Z79.01 ANTICOAGULATED ON COUMADIN: ICD-10-CM

## 2019-06-03 LAB
HCT VFR BLD CALC: 49.8 % (ref 42–52)
HEMOGLOBIN: 16.7 GM/DL (ref 14–18)
POC INR: 1.9 (ref 0.8–1.2)

## 2019-06-03 PROCEDURE — 36415 COLL VENOUS BLD VENIPUNCTURE: CPT

## 2019-06-03 PROCEDURE — 85014 HEMATOCRIT: CPT

## 2019-06-03 PROCEDURE — 85610 PROTHROMBIN TIME: CPT

## 2019-06-03 PROCEDURE — 99211 OFF/OP EST MAY X REQ PHY/QHP: CPT

## 2019-06-03 PROCEDURE — 36416 COLLJ CAPILLARY BLOOD SPEC: CPT

## 2019-06-03 PROCEDURE — 85018 HEMOGLOBIN: CPT

## 2019-06-06 RX ORDER — POTASSIUM CHLORIDE 750 MG/1
TABLET, FILM COATED, EXTENDED RELEASE ORAL
Qty: 180 TABLET | Refills: 2 | Status: SHIPPED | OUTPATIENT
Start: 2019-06-06 | End: 2020-03-27 | Stop reason: SDUPTHER

## 2019-06-27 RX ORDER — DIGOXIN 125 MCG
TABLET ORAL
Qty: 90 TABLET | Refills: 1 | Status: SHIPPED | OUTPATIENT
Start: 2019-06-27 | End: 2020-01-13 | Stop reason: SDUPTHER

## 2019-07-01 ENCOUNTER — TELEPHONE (OUTPATIENT)
Dept: CARDIOLOGY CLINIC | Age: 69
End: 2019-07-01

## 2019-07-01 DIAGNOSIS — Z79.01 ANTICOAGULATED ON COUMADIN: ICD-10-CM

## 2019-07-01 DIAGNOSIS — I48.0 PAROXYSMAL ATRIAL FIBRILLATION (HCC): Primary | ICD-10-CM

## 2019-07-02 ENCOUNTER — HOSPITAL ENCOUNTER (OUTPATIENT)
Dept: PHARMACY | Age: 69
Setting detail: THERAPIES SERIES
Discharge: HOME | End: 2019-07-02
Payer: MEDICARE

## 2019-07-02 LAB — POC INR: 2.1 (ref 0.8–1.2)

## 2019-07-02 PROCEDURE — 36416 COLLJ CAPILLARY BLOOD SPEC: CPT

## 2019-07-02 PROCEDURE — 85610 PROTHROMBIN TIME: CPT

## 2019-07-02 PROCEDURE — 99211 OFF/OP EST MAY X REQ PHY/QHP: CPT

## 2019-08-01 ENCOUNTER — HOSPITAL ENCOUNTER (OUTPATIENT)
Dept: PHARMACY | Age: 69
Setting detail: THERAPIES SERIES
Discharge: HOME | End: 2019-08-01
Payer: MEDICARE

## 2019-08-01 LAB — POC INR: 2.1 (ref 0.8–1.2)

## 2019-08-01 PROCEDURE — 85610 PROTHROMBIN TIME: CPT | Performed by: PHARMACIST

## 2019-08-01 PROCEDURE — 36416 COLLJ CAPILLARY BLOOD SPEC: CPT | Performed by: PHARMACIST

## 2019-08-01 PROCEDURE — 99211 OFF/OP EST MAY X REQ PHY/QHP: CPT | Performed by: PHARMACIST

## 2019-08-02 RX ORDER — DILTIAZEM HYDROCHLORIDE 360 MG/1
360 CAPSULE, EXTENDED RELEASE ORAL DAILY
Qty: 90 CAPSULE | Refills: 1 | Status: SHIPPED | OUTPATIENT
Start: 2019-08-02 | End: 2020-01-13 | Stop reason: SDUPTHER

## 2019-09-03 ENCOUNTER — HOSPITAL ENCOUNTER (OUTPATIENT)
Dept: PHARMACY | Age: 69
Setting detail: THERAPIES SERIES
Discharge: HOME | End: 2019-09-03
Payer: MEDICARE

## 2019-09-03 LAB — POC INR: 2.5 (ref 0.8–1.2)

## 2019-09-03 PROCEDURE — 99211 OFF/OP EST MAY X REQ PHY/QHP: CPT | Performed by: PHARMACIST

## 2019-09-03 PROCEDURE — 85610 PROTHROMBIN TIME: CPT | Performed by: PHARMACIST

## 2019-09-03 PROCEDURE — 36416 COLLJ CAPILLARY BLOOD SPEC: CPT | Performed by: PHARMACIST

## 2019-09-03 NOTE — PROGRESS NOTES
Medication Management [facility]  Anticoagulation Clinic  [phone] (phone)  [phone] (fax)      Mr. Margret Smith is a 81 y.o.  male with history of Afib who presents today for anticoagulation monitoring and adjustment. Patient verifies current dosing regimen and tablet strength. No missed or extra doses. Patient denies s/s bleeding/bruising/swelling/SOB/chest pain  No blood in urine or stool. No dietary changes. No changes in medication/OTC agents/Herbals. No change in alcohol use or tobacco use. No change in activity level. Patient denies headaches/dizziness/lightheadedness/falls. No vomiting/diarrhea or acute illness. No Procedures scheduled in the future at this time. Assessment:   Lab Results   Component Value Date    INR 2.50 (H) 09/03/2019    INR 2.10 (H) 08/01/2019    INR 2.10 (H) 07/02/2019     INR therapeutic   Recent Labs     09/03/19  0719   INR 2.50*         Plan:  Continue Coumadin 5mg W and 2.5mg MTThFSaS. Recheck INR in 5 weeks. Patient reminded to call the Anticoagulation Clinic with any signs or symptoms of bleeding or with any medication changes. Patient given instructions utilizing the teach back method. Discharged ambulatory in no apparent distress. After visit summary printed and reviewed with patient.       Medications reviewed and updated on home medication list Yes    Influenza vaccine:     [] given    [] declined   [] received previously   [] plans to receive at a later time   [] refused    [x] documented in EPIC

## 2019-09-04 ENCOUNTER — TELEPHONE (OUTPATIENT)
Dept: FAMILY MEDICINE CLINIC | Age: 69
End: 2019-09-04

## 2019-09-04 NOTE — TELEPHONE ENCOUNTER
9/4/19 patient spouse Sterling Glover asking if patient is due for another vaccine, thinking q 5 years? Patient coming in 9/10/19 for flu shot. Please advise.   Thanks/blm

## 2019-09-10 ENCOUNTER — NURSE ONLY (OUTPATIENT)
Dept: FAMILY MEDICINE CLINIC | Age: 69
End: 2019-09-10
Payer: MEDICARE

## 2019-09-10 DIAGNOSIS — Z23 NEEDS FLU SHOT: Primary | ICD-10-CM

## 2019-09-10 PROCEDURE — G0008 ADMIN INFLUENZA VIRUS VAC: HCPCS | Performed by: FAMILY MEDICINE

## 2019-09-10 PROCEDURE — 90653 IIV ADJUVANT VACCINE IM: CPT | Performed by: FAMILY MEDICINE

## 2019-09-10 NOTE — PROGRESS NOTES
Immunization(s) given during visit:    Immunizations Administered     Name Date Dose Route    Influenza, Triv, inactivated, subunit, adjuvanted, IM (Fluad 65 yrs and older) 9/10/2019 0.5 mL Intramuscular    Site: Deltoid- Left    Lot: [id number]    NDC: 77826-813-15          Most recent Vaccine Information Sheet  given to pt

## 2019-10-09 ENCOUNTER — TELEPHONE (OUTPATIENT)
Dept: CARDIOLOGY CLINIC | Age: 69
End: 2019-10-09

## 2019-10-09 ENCOUNTER — HOSPITAL ENCOUNTER (OUTPATIENT)
Dept: PHARMACY | Age: 69
Setting detail: THERAPIES SERIES
Discharge: HOME | End: 2019-10-09
Payer: MEDICARE

## 2019-10-09 DIAGNOSIS — R35.1 BPH ASSOCIATED WITH NOCTURIA: ICD-10-CM

## 2019-10-09 DIAGNOSIS — N40.1 BPH ASSOCIATED WITH NOCTURIA: ICD-10-CM

## 2019-10-09 LAB — POC INR: 2.1 (ref 0.8–1.2)

## 2019-10-09 PROCEDURE — 85610 PROTHROMBIN TIME: CPT | Performed by: PHARMACIST

## 2019-10-09 PROCEDURE — 36416 COLLJ CAPILLARY BLOOD SPEC: CPT | Performed by: PHARMACIST

## 2019-10-09 PROCEDURE — 99211 OFF/OP EST MAY X REQ PHY/QHP: CPT | Performed by: PHARMACIST

## 2019-10-09 RX ORDER — TAMSULOSIN HYDROCHLORIDE 0.4 MG/1
0.4 CAPSULE ORAL DAILY
Qty: 90 CAPSULE | Refills: 3 | Status: SHIPPED | OUTPATIENT
Start: 2019-10-09 | End: 2020-09-14

## 2019-10-21 ENCOUNTER — OFFICE VISIT (OUTPATIENT)
Dept: FAMILY MEDICINE CLINIC | Age: 69
End: 2019-10-21
Payer: MEDICARE

## 2019-10-21 VITALS
BODY MASS INDEX: 31.53 KG/M2 | WEIGHT: 239 LBS | DIASTOLIC BLOOD PRESSURE: 78 MMHG | HEART RATE: 80 BPM | RESPIRATION RATE: 16 BRPM | TEMPERATURE: 97.6 F | SYSTOLIC BLOOD PRESSURE: 130 MMHG

## 2019-10-21 DIAGNOSIS — E78.5 HYPERLIPIDEMIA, UNSPECIFIED HYPERLIPIDEMIA TYPE: Chronic | ICD-10-CM

## 2019-10-21 DIAGNOSIS — I10 ESSENTIAL HYPERTENSION: ICD-10-CM

## 2019-10-21 DIAGNOSIS — R73.01 IFG (IMPAIRED FASTING GLUCOSE): Primary | ICD-10-CM

## 2019-10-21 DIAGNOSIS — E66.09 CLASS 1 OBESITY DUE TO EXCESS CALORIES WITH SERIOUS COMORBIDITY AND BODY MASS INDEX (BMI) OF 31.0 TO 31.9 IN ADULT: ICD-10-CM

## 2019-10-21 DIAGNOSIS — I48.0 PAROXYSMAL ATRIAL FIBRILLATION (HCC): ICD-10-CM

## 2019-10-21 DIAGNOSIS — R35.0 BENIGN PROSTATIC HYPERPLASIA WITH URINARY FREQUENCY: ICD-10-CM

## 2019-10-21 DIAGNOSIS — N40.1 BENIGN PROSTATIC HYPERPLASIA WITH URINARY FREQUENCY: ICD-10-CM

## 2019-10-21 DIAGNOSIS — I25.10 CORONARY ARTERY DISEASE INVOLVING NATIVE CORONARY ARTERY OF NATIVE HEART WITHOUT ANGINA PECTORIS: ICD-10-CM

## 2019-10-21 PROCEDURE — G8482 FLU IMMUNIZE ORDER/ADMIN: HCPCS | Performed by: FAMILY MEDICINE

## 2019-10-21 PROCEDURE — G8427 DOCREV CUR MEDS BY ELIG CLIN: HCPCS | Performed by: FAMILY MEDICINE

## 2019-10-21 PROCEDURE — 1123F ACP DISCUSS/DSCN MKR DOCD: CPT | Performed by: FAMILY MEDICINE

## 2019-10-21 PROCEDURE — 3017F COLORECTAL CA SCREEN DOC REV: CPT | Performed by: FAMILY MEDICINE

## 2019-10-21 PROCEDURE — 99214 OFFICE O/P EST MOD 30 MIN: CPT | Performed by: FAMILY MEDICINE

## 2019-10-21 PROCEDURE — 4040F PNEUMOC VAC/ADMIN/RCVD: CPT | Performed by: FAMILY MEDICINE

## 2019-10-21 PROCEDURE — G8417 CALC BMI ABV UP PARAM F/U: HCPCS | Performed by: FAMILY MEDICINE

## 2019-10-21 PROCEDURE — G8598 ASA/ANTIPLAT THER USED: HCPCS | Performed by: FAMILY MEDICINE

## 2019-10-21 PROCEDURE — 1036F TOBACCO NON-USER: CPT | Performed by: FAMILY MEDICINE

## 2019-10-21 ASSESSMENT — ENCOUNTER SYMPTOMS
BLOOD IN STOOL: 0
NAUSEA: 0
ABDOMINAL PAIN: 0
ANAL BLEEDING: 0
CHEST TIGHTNESS: 0
DIARRHEA: 0
CONSTIPATION: 0
VOMITING: 0
SHORTNESS OF BREATH: 0

## 2019-10-21 ASSESSMENT — PATIENT HEALTH QUESTIONNAIRE - PHQ9
SUM OF ALL RESPONSES TO PHQ QUESTIONS 1-9: 0
2. FEELING DOWN, DEPRESSED OR HOPELESS: 0
SUM OF ALL RESPONSES TO PHQ QUESTIONS 1-9: 0
1. LITTLE INTEREST OR PLEASURE IN DOING THINGS: 0
SUM OF ALL RESPONSES TO PHQ9 QUESTIONS 1 & 2: 0

## 2019-10-23 ENCOUNTER — HOSPITAL ENCOUNTER (OUTPATIENT)
Age: 69
Discharge: HOME | End: 2019-10-23
Payer: MEDICARE

## 2019-10-23 DIAGNOSIS — E78.5 HYPERLIPIDEMIA, UNSPECIFIED HYPERLIPIDEMIA TYPE: Chronic | ICD-10-CM

## 2019-10-23 DIAGNOSIS — I48.0 PAROXYSMAL ATRIAL FIBRILLATION (HCC): ICD-10-CM

## 2019-10-23 DIAGNOSIS — I10 ESSENTIAL HYPERTENSION: ICD-10-CM

## 2019-10-23 DIAGNOSIS — R73.01 IFG (IMPAIRED FASTING GLUCOSE): ICD-10-CM

## 2019-10-23 LAB
ALBUMIN SERPL-MCNC: 4.2 G/DL (ref 3.5–5.1)
ALP BLD-CCNC: 71 U/L (ref 38–126)
ALT SERPL-CCNC: 40 U/L (ref 11–66)
ANION GAP SERPL CALCULATED.3IONS-SCNC: 15 MEQ/L (ref 8–16)
AST SERPL-CCNC: 27 U/L (ref 5–40)
AVERAGE GLUCOSE: 132 MG/DL (ref 70–126)
BILIRUB SERPL-MCNC: 0.7 MG/DL (ref 0.3–1.2)
BUN BLDV-MCNC: 12 MG/DL (ref 7–22)
CALCIUM SERPL-MCNC: 9.7 MG/DL (ref 8.5–10.5)
CHLORIDE BLD-SCNC: 100 MEQ/L (ref 98–111)
CHOLESTEROL, TOTAL: 122 MG/DL (ref 100–199)
CO2: 27 MEQ/L (ref 23–33)
CREAT SERPL-MCNC: 1 MG/DL (ref 0.4–1.2)
DIGOXIN LEVEL: 0.4 NG/ML (ref 0.5–2)
GFR SERPL CREATININE-BSD FRML MDRD: 74 ML/MIN/1.73M2
GLUCOSE BLD-MCNC: 107 MG/DL (ref 70–108)
HBA1C MFR BLD: 6.4 % (ref 4.4–6.4)
HDLC SERPL-MCNC: 29 MG/DL
LDL CHOLESTEROL CALCULATED: 67 MG/DL
POTASSIUM SERPL-SCNC: 3.7 MEQ/L (ref 3.5–5.2)
SODIUM BLD-SCNC: 142 MEQ/L (ref 135–145)
T4 FREE: 1.27 NG/DL (ref 0.93–1.76)
TOTAL PROTEIN: 7.2 G/DL (ref 6.1–8)
TRIGL SERPL-MCNC: 132 MG/DL (ref 0–199)
TSH SERPL DL<=0.05 MIU/L-ACNC: 5 UIU/ML (ref 0.4–4.2)

## 2019-10-23 PROCEDURE — 80162 ASSAY OF DIGOXIN TOTAL: CPT

## 2019-10-23 PROCEDURE — 84443 ASSAY THYROID STIM HORMONE: CPT

## 2019-10-23 PROCEDURE — 80061 LIPID PANEL: CPT

## 2019-10-23 PROCEDURE — 36415 COLL VENOUS BLD VENIPUNCTURE: CPT

## 2019-10-23 PROCEDURE — 84439 ASSAY OF FREE THYROXINE: CPT

## 2019-10-23 PROCEDURE — 80053 COMPREHEN METABOLIC PANEL: CPT

## 2019-10-23 PROCEDURE — 83036 HEMOGLOBIN GLYCOSYLATED A1C: CPT

## 2019-10-24 ENCOUNTER — TELEPHONE (OUTPATIENT)
Dept: FAMILY MEDICINE CLINIC | Age: 69
End: 2019-10-24

## 2019-10-24 DIAGNOSIS — R79.89 ABNORMAL THYROID BLOOD TEST: ICD-10-CM

## 2019-10-24 DIAGNOSIS — I48.0 PAROXYSMAL ATRIAL FIBRILLATION (HCC): ICD-10-CM

## 2019-10-24 DIAGNOSIS — R79.89 ELEVATED TSH: ICD-10-CM

## 2019-10-24 DIAGNOSIS — E03.9 HYPOTHYROIDISM, UNSPECIFIED TYPE: ICD-10-CM

## 2019-10-24 DIAGNOSIS — R89.9 ABNORMAL LABORATORY TEST RESULT: ICD-10-CM

## 2019-10-24 DIAGNOSIS — R73.01 IFG (IMPAIRED FASTING GLUCOSE): Primary | ICD-10-CM

## 2019-10-31 ENCOUNTER — OFFICE VISIT (OUTPATIENT)
Dept: CARDIOLOGY CLINIC | Age: 69
End: 2019-10-31
Payer: MEDICARE

## 2019-10-31 VITALS
SYSTOLIC BLOOD PRESSURE: 136 MMHG | BODY MASS INDEX: 31.54 KG/M2 | WEIGHT: 238 LBS | HEIGHT: 73 IN | DIASTOLIC BLOOD PRESSURE: 82 MMHG | HEART RATE: 76 BPM

## 2019-10-31 DIAGNOSIS — R05.9 COUGH: ICD-10-CM

## 2019-10-31 DIAGNOSIS — I48.0 PAROXYSMAL ATRIAL FIBRILLATION (HCC): Primary | ICD-10-CM

## 2019-10-31 DIAGNOSIS — I10 ESSENTIAL HYPERTENSION: ICD-10-CM

## 2019-10-31 DIAGNOSIS — I25.10 CORONARY ARTERY DISEASE INVOLVING NATIVE CORONARY ARTERY OF NATIVE HEART WITHOUT ANGINA PECTORIS: ICD-10-CM

## 2019-10-31 PROCEDURE — G8598 ASA/ANTIPLAT THER USED: HCPCS | Performed by: NUCLEAR MEDICINE

## 2019-10-31 PROCEDURE — G8428 CUR MEDS NOT DOCUMENT: HCPCS | Performed by: NUCLEAR MEDICINE

## 2019-10-31 PROCEDURE — 99214 OFFICE O/P EST MOD 30 MIN: CPT | Performed by: NUCLEAR MEDICINE

## 2019-10-31 PROCEDURE — 1123F ACP DISCUSS/DSCN MKR DOCD: CPT | Performed by: NUCLEAR MEDICINE

## 2019-10-31 PROCEDURE — G8482 FLU IMMUNIZE ORDER/ADMIN: HCPCS | Performed by: NUCLEAR MEDICINE

## 2019-10-31 PROCEDURE — 3017F COLORECTAL CA SCREEN DOC REV: CPT | Performed by: NUCLEAR MEDICINE

## 2019-10-31 PROCEDURE — 1036F TOBACCO NON-USER: CPT | Performed by: NUCLEAR MEDICINE

## 2019-10-31 PROCEDURE — 4040F PNEUMOC VAC/ADMIN/RCVD: CPT | Performed by: NUCLEAR MEDICINE

## 2019-10-31 PROCEDURE — G8417 CALC BMI ABV UP PARAM F/U: HCPCS | Performed by: NUCLEAR MEDICINE

## 2019-11-08 ENCOUNTER — HOSPITAL ENCOUNTER (OUTPATIENT)
Dept: NON INVASIVE DIAGNOSTICS | Age: 69
Discharge: HOME | End: 2019-11-08
Payer: MEDICARE

## 2019-11-08 DIAGNOSIS — I10 ESSENTIAL HYPERTENSION: ICD-10-CM

## 2019-11-08 DIAGNOSIS — R05.9 COUGH: ICD-10-CM

## 2019-11-08 DIAGNOSIS — I48.0 PAROXYSMAL ATRIAL FIBRILLATION (HCC): ICD-10-CM

## 2019-11-08 DIAGNOSIS — I25.10 CORONARY ARTERY DISEASE INVOLVING NATIVE CORONARY ARTERY OF NATIVE HEART WITHOUT ANGINA PECTORIS: ICD-10-CM

## 2019-11-08 LAB
LV EF: 45 %
LVEF MODALITY: NORMAL

## 2019-11-08 PROCEDURE — 93226 XTRNL ECG REC<48 HR SCAN A/R: CPT

## 2019-11-08 PROCEDURE — 93306 TTE W/DOPPLER COMPLETE: CPT

## 2019-11-08 PROCEDURE — 93225 XTRNL ECG REC<48 HRS REC: CPT

## 2019-11-11 ENCOUNTER — TELEPHONE (OUTPATIENT)
Dept: FAMILY MEDICINE CLINIC | Age: 69
End: 2019-11-11

## 2019-11-13 LAB
ACQUISITION DURATION: NORMAL S
AVERAGE HEART RATE: 82 BPM
FASTEST VENTRICULAR RATE: 178 BPM
HOOKUP DATE: NORMAL
HOOKUP TIME: NORMAL
LONGEST VE RUN RATE: 157 BPM
MAX HEART RATE TIME/DATE: NORMAL
MAX HEART RATE: 176 BPM
MIN HEART RATE TIME/DATE: NORMAL
MIN HEART RATE: 47 BPM
NUMBER OF FASTEST VENTRICULAR BEATS: 3
NUMBER OF LONGEST VENTRICULAR BEATS: 3
NUMBER OF QRS COMPLEXES: NORMAL
NUMBER OF SUPRAVENTRICULAR BEATS IN RUNS: 0
NUMBER OF SUPRAVENTRICULAR COUPLETS: 0
NUMBER OF SUPRAVENTRICULAR ECTOPICS: 0
NUMBER OF SUPRAVENTRICULAR ISOLATED BEATS: 0
NUMBER OF SUPRAVENTRICULAR RUNS: 0
NUMBER OF VENTRICULAR BEATS IN RUNS: 6
NUMBER OF VENTRICULAR BIGEMINAL CYCLES: 18
NUMBER OF VENTRICULAR COUPLETS: 90
NUMBER OF VENTRICULAR ECTOPICS: 4694
NUMBER OF VENTRICULAR ISOLATED BEATS: 4508
NUMBER OF VENTRICULAR RUNS: 2

## 2019-11-14 ENCOUNTER — TELEPHONE (OUTPATIENT)
Dept: CARDIOLOGY CLINIC | Age: 69
End: 2019-11-14

## 2019-11-19 DIAGNOSIS — Z79.01 ANTICOAGULATED ON COUMADIN: Primary | ICD-10-CM

## 2019-11-19 DIAGNOSIS — I48.0 PAROXYSMAL ATRIAL FIBRILLATION (HCC): ICD-10-CM

## 2019-11-21 ENCOUNTER — HOSPITAL ENCOUNTER (OUTPATIENT)
Age: 69
Discharge: HOME | End: 2019-11-21
Payer: MEDICARE

## 2019-11-21 ENCOUNTER — HOSPITAL ENCOUNTER (OUTPATIENT)
Dept: PHARMACY | Age: 69
Setting detail: THERAPIES SERIES
Discharge: HOME | End: 2019-11-21
Payer: MEDICARE

## 2019-11-21 DIAGNOSIS — I48.0 PAROXYSMAL ATRIAL FIBRILLATION (HCC): Primary | ICD-10-CM

## 2019-11-21 DIAGNOSIS — Z79.01 ANTICOAGULATED ON COUMADIN: ICD-10-CM

## 2019-11-21 DIAGNOSIS — I48.0 PAROXYSMAL ATRIAL FIBRILLATION (HCC): ICD-10-CM

## 2019-11-21 LAB
HCT VFR BLD CALC: 49.6 % (ref 42–52)
HEMOGLOBIN: 16.7 GM/DL (ref 14–18)
POC INR: 2.3 (ref 0.8–1.2)

## 2019-11-21 PROCEDURE — 36416 COLLJ CAPILLARY BLOOD SPEC: CPT | Performed by: PHARMACIST

## 2019-11-21 PROCEDURE — 85014 HEMATOCRIT: CPT

## 2019-11-21 PROCEDURE — 85018 HEMOGLOBIN: CPT

## 2019-11-21 PROCEDURE — 36415 COLL VENOUS BLD VENIPUNCTURE: CPT

## 2019-11-21 PROCEDURE — 99211 OFF/OP EST MAY X REQ PHY/QHP: CPT | Performed by: PHARMACIST

## 2019-11-21 PROCEDURE — 85610 PROTHROMBIN TIME: CPT | Performed by: PHARMACIST

## 2019-12-05 RX ORDER — METOPROLOL SUCCINATE 50 MG/1
50 TABLET, EXTENDED RELEASE ORAL DAILY
Qty: 90 TABLET | Refills: 3 | Status: SHIPPED | OUTPATIENT
Start: 2019-12-05 | End: 2020-08-12 | Stop reason: SDUPTHER

## 2020-01-03 ENCOUNTER — HOSPITAL ENCOUNTER (OUTPATIENT)
Dept: PHARMACY | Age: 70
Setting detail: THERAPIES SERIES
Discharge: HOME | End: 2020-01-03
Payer: MEDICARE

## 2020-01-03 LAB — POC INR: 2.6 (ref 0.8–1.2)

## 2020-01-03 PROCEDURE — 85610 PROTHROMBIN TIME: CPT | Performed by: PHARMACIST

## 2020-01-03 PROCEDURE — 36416 COLLJ CAPILLARY BLOOD SPEC: CPT | Performed by: PHARMACIST

## 2020-01-03 PROCEDURE — 99211 OFF/OP EST MAY X REQ PHY/QHP: CPT | Performed by: PHARMACIST

## 2020-01-03 NOTE — PROGRESS NOTES
Medication Management [facility]  Anticoagulation Clinic  [phone] (phone)  [phone] (fax)      Mr. David Wagner is a 81 y.o.  male with history of Afib who presents today for anticoagulation monitoring and adjustment. Patient verifies current dosing regimen and tablet strength. No missed or extra doses. Patient denies s/s bleeding/bruising/swelling/SOB/chest pain  No blood in urine or stool. No dietary changes. No changes in medication/OTC agents/Herbals. No change in alcohol use or tobacco use. No change in activity level. Patient denies headaches/dizziness/lightheadedness/falls. No vomiting/diarrhea or acute illness. No Procedures scheduled in the future at this time. Assessment:   Lab Results   Component Value Date    INR 2.60 (H) 01/03/2020    INR 2.30 (H) 11/21/2019    INR 2.10 (H) 10/09/2019     INR therapeutic   Recent Labs     01/03/20  0808   INR 2.60*       Plan:  Continue Coumadin 5mg W and 2.5mg MTThFSaS. Recheck INR in 6 weeks. Patient reminded to call the Anticoagulation Clinic with any signs or symptoms of bleeding or with any medication changes. Patient given instructions utilizing the teach back method. Discharged ambulatory in no apparent distress. After visit summary printed and reviewed with patient.       Medications reviewed and updated on home medication list     Influenza vaccine:     [] given    [] declined   [] received previously   [] plans to receive at a later time   [] refused    [x] documented in EPIC

## 2020-01-13 RX ORDER — DIGOXIN 125 MCG
TABLET ORAL
Qty: 90 TABLET | Refills: 1 | Status: SHIPPED | OUTPATIENT
Start: 2020-01-13 | End: 2020-06-24 | Stop reason: SDUPTHER

## 2020-01-13 RX ORDER — DILTIAZEM HYDROCHLORIDE 360 MG/1
360 CAPSULE, EXTENDED RELEASE ORAL DAILY
Qty: 90 CAPSULE | Refills: 1 | Status: SHIPPED | OUTPATIENT
Start: 2020-01-13 | End: 2020-07-06

## 2020-01-13 NOTE — TELEPHONE ENCOUNTER
Jose Riley called requesting a refill on the following medications:  Requested Prescriptions     Pending Prescriptions Disp Refills    digoxin (LANOXIN) 125 MCG tablet 90 tablet 1    diltiazem (TIAZAC) 360 MG extended release capsule 90 capsule 1     Sig: Take 1 capsule by mouth daily     Pharmacy verified:[facility]  . pv      Date of last visit: 10/31/19  Date of next visit (if applicable): 6/9/3171

## 2020-01-23 ENCOUNTER — HOSPITAL ENCOUNTER (EMERGENCY)
Age: 70
Discharge: HOME | End: 2020-01-24
Attending: EMERGENCY MEDICINE
Payer: MEDICARE

## 2020-01-23 LAB
ALBUMIN SERPL-MCNC: 4.6 G/DL (ref 3.5–5.1)
ALP BLD-CCNC: 89 U/L (ref 38–126)
ALT SERPL-CCNC: 39 U/L (ref 11–66)
ANION GAP SERPL CALCULATED.3IONS-SCNC: 14 MEQ/L (ref 8–16)
APTT: 49 SECONDS (ref 22–38)
AST SERPL-CCNC: 27 U/L (ref 5–40)
BILIRUB SERPL-MCNC: 0.3 MG/DL (ref 0.3–1.2)
DIGOXIN LEVEL: 0.5 NG/ML (ref 0.5–2)
EKG ATRIAL RATE: 102 BPM
EKG Q-T INTERVAL: 404 MS
EKG QRS DURATION: 110 MS
EKG QTC CALCULATION (BAZETT): 436 MS
EKG R AXIS: -7 DEGREES
EKG T AXIS: -127 DEGREES
EKG VENTRICULAR RATE: 70 BPM
GFR SERPL CREATININE-BSD FRML MDRD: 74 ML/MIN/1.73M2
INR BLD: 2.6 (ref 0.85–1.13)
OSMOLALITY CALCULATION: 286.7 MOSMOL/KG (ref 275–300)
POTASSIUM REFLEX MAGNESIUM: 3.9 MEQ/L (ref 3.5–5.2)
PRO-BNP: 614.3 PG/ML (ref 0–900)
TOTAL PROTEIN: 7.7 G/DL (ref 6.1–8)
TROPONIN T: < 0.01 NG/ML

## 2020-01-23 PROCEDURE — 84484 ASSAY OF TROPONIN QUANT: CPT

## 2020-01-23 PROCEDURE — 93005 ELECTROCARDIOGRAM TRACING: CPT | Performed by: EMERGENCY MEDICINE

## 2020-01-23 PROCEDURE — 80053 COMPREHEN METABOLIC PANEL: CPT

## 2020-01-23 PROCEDURE — 80162 ASSAY OF DIGOXIN TOTAL: CPT

## 2020-01-23 PROCEDURE — 85730 THROMBOPLASTIN TIME PARTIAL: CPT

## 2020-01-23 PROCEDURE — 36415 COLL VENOUS BLD VENIPUNCTURE: CPT

## 2020-01-23 PROCEDURE — 99283 EMERGENCY DEPT VISIT LOW MDM: CPT

## 2020-01-23 PROCEDURE — 85610 PROTHROMBIN TIME: CPT

## 2020-01-23 PROCEDURE — 83880 ASSAY OF NATRIURETIC PEPTIDE: CPT

## 2020-01-23 PROCEDURE — 80048 BASIC METABOLIC PNL TOTAL CA: CPT

## 2020-01-23 ASSESSMENT — PAIN SCALES - GENERAL
PAINLEVEL_OUTOF10: 3
PAINLEVEL_OUTOF10: 5

## 2020-01-23 ASSESSMENT — PAIN DESCRIPTION - PAIN TYPE
TYPE: ACUTE PAIN
TYPE: ACUTE PAIN

## 2020-01-23 ASSESSMENT — PAIN DESCRIPTION - LOCATION: LOCATION: ARM

## 2020-01-24 ENCOUNTER — TELEPHONE (OUTPATIENT)
Dept: FAMILY MEDICINE CLINIC | Age: 70
End: 2020-01-24

## 2020-01-24 VITALS
WEIGHT: 238 LBS | BODY MASS INDEX: 31.4 KG/M2 | SYSTOLIC BLOOD PRESSURE: 129 MMHG | TEMPERATURE: 97.5 F | DIASTOLIC BLOOD PRESSURE: 81 MMHG | RESPIRATION RATE: 18 BRPM | HEART RATE: 65 BPM | OXYGEN SATURATION: 98 %

## 2020-01-24 LAB
BUN BLDV-MCNC: 15 MG/DL (ref 7–22)
CALCIUM SERPL-MCNC: 9.6 MG/DL (ref 8.5–10.5)
CHLORIDE BLD-SCNC: 100 MEQ/L (ref 98–111)
CO2: 28 MEQ/L (ref 23–33)
CREAT SERPL-MCNC: 1 MG/DL (ref 0.4–1.2)
GLUCOSE BLD-MCNC: 148 MG/DL (ref 70–108)
POTASSIUM SERPL-SCNC: 3.9 MEQ/L (ref 3.5–5.2)
SODIUM BLD-SCNC: 142 MEQ/L (ref 135–145)
TROPONIN T: < 0.01 NG/ML

## 2020-01-24 PROCEDURE — 84484 ASSAY OF TROPONIN QUANT: CPT

## 2020-01-24 PROCEDURE — 36415 COLL VENOUS BLD VENIPUNCTURE: CPT

## 2020-01-24 ASSESSMENT — ENCOUNTER SYMPTOMS
ABDOMINAL PAIN: 0
NAUSEA: 0
EYE REDNESS: 0
COUGH: 0
STRIDOR: 0
SHORTNESS OF BREATH: 0
EYE PAIN: 0
DIARRHEA: 0
BACK PAIN: 0
CONSTIPATION: 0
EYE DISCHARGE: 0
EYE ITCHING: 0
ABDOMINAL DISTENTION: 0
SORE THROAT: 0
WHEEZING: 0
VOMITING: 0
PHOTOPHOBIA: 0
CHEST TIGHTNESS: 0
RHINORRHEA: 0

## 2020-01-24 NOTE — ED TRIAGE NOTES
Patient complains of numbness in the left arm. Patient states it has gotten worse. Patient just had to wear a Holter monitor for chest pain.

## 2020-01-24 NOTE — ED PROVIDER NOTES
[facility] 22 COMPLAINT       Chief Complaint   Patient presents with    Arm Pain     left arm numbness       Nurses Notes reviewed and I agree except as noted in the HPI. HISTORY OF PRESENT ILLNESS    Pk Varner is a 78 y.o. male who presents to Emergency Department with left upper arm pain and numbness. Patient reports an onset of 13:00 PM today. He reports that his pain will come and go and that it is worsening. Patient denies any radiation with this pain. He states that his pain is sometimes a deep, dull ache, and other times it is sharp. Patient denies any pain to the right side. He reports concern for possible heart attack. Patient has a history of Afib and had a heart catheterization 10 years ago. He reports that 3 blockages where noted and he was put on medications for treatment. Patient was evaluated recently, and his cardiologist reported that his blockages are gone. He is currently on Coumadin and Pravachol. Patient reports a family history of cardiac complications. He denies any smoking history or excessive drinking. The patient denies any other symptoms or relevant history at this time. REVIEW OF SYSTEMS     Review of Systems   Constitutional: Negative for activity change, appetite change, chills, fatigue, fever and unexpected weight change. HENT: Negative for congestion, ear discharge, ear pain, hearing loss, nosebleeds, rhinorrhea and sore throat. Eyes: Negative for photophobia, pain, discharge, redness and itching. Respiratory: Negative for cough, chest tightness, shortness of breath, wheezing and stridor. Cardiovascular: Negative for chest pain, palpitations and leg swelling. Gastrointestinal: Negative for abdominal distention, abdominal pain, constipation, diarrhea, nausea and vomiting. Endocrine: Negative for cold intolerance, heat intolerance, polydipsia and polyphagia.    Genitourinary: Negative for dysuria, flank pain, frequency and hematuria. Musculoskeletal: Positive for myalgias (left upper arm). Negative for arthralgias, back pain, gait problem, neck pain and neck stiffness. Skin: Negative for pallor, rash and wound. Allergic/Immunologic: Negative for environmental allergies and food allergies. Neurological: Positive for numbness (left upper arm). Negative for dizziness, tremors, syncope, weakness and headaches. Psychiatric/Behavioral: Negative for agitation, behavioral problems, confusion, self-injury, sleep disturbance and suicidal ideas. PAST MEDICAL HISTORY    has a past medical history of Anxiety, Atrial fibrillation ([facility] [address] Benign prostatic hyperplasia with urinary frequency, CAD (coronary artery disease), Chest pain, Colon polyp, Coronary artery disease involving native coronary artery of native heart without angina pectoris, Dementia (HCC), Dizziness, Elevated PSA, GERD (gastroesophageal reflux disease), Heart disease, Hyperlipidemia, Hypertension, and Hypothyroidism. SURGICAL HISTORY      has a past surgical history that includes cardiovascular stress test (09/23/2011); transthoracic echocardiogram (09/23/2011); Cardiac catheterization (09/23/2011); Carpal tunnel release; Vasectomy; Tonsillectomy; Adenoidectomy; Vasectomy; and Colonoscopy.     CURRENT MEDICATIONS       Previous Medications    BISACODYL (DULCOLAX) 5 MG EC TABLET    Take 5 mg by mouth nightly     BUMETANIDE (BUMEX) 2 MG TABLET    Take 1 tablet by mouth daily    DIGOXIN (LANOXIN) 125 MCG TABLET    TAKE 1 TABLET BY MOUTH ONCE DAILY    DILTIAZEM (TIAZAC) 360 MG EXTENDED RELEASE CAPSULE    Take 1 capsule by mouth daily    METOPROLOL SUCCINATE (TOPROL XL) 50 MG EXTENDED RELEASE TABLET    Take 1 tablet by mouth daily    NITROGLYCERIN (NITROSTAT) 0.4 MG SL TABLET    Place 1 tablet under the tongue every 5 minutes as needed for Chest pain    POTASSIUM CHLORIDE (KLOR-CON) 10 MEQ EXTENDED RELEASE TABLET TAKE 1 TABLET BY MOUTH TWICE DAILY    PRAVASTATIN (PRAVACHOL) 80 MG TABLET    Take 1 tablet by mouth nightly    TAMSULOSIN (FLOMAX) 0.4 MG CAPSULE    Take 1 capsule by mouth daily    WARFARIN (COUMADIN) 2.5 MG TABLET    Take as directed by [facility] 40 tablets = 30 day supply       ALLERGIES     has No Known Allergies. FAMILY HISTORY     He indicated that his mother is . He indicated that his father is . He indicated that his sister is alive. He indicated that his maternal grandmother is . He indicated that his maternal grandfather is . He indicated that his paternal grandmother is . He indicated that his paternal grandfather is . family history includes Alzheimer's Disease in his mother; Cancer in his father; Heart Disease in his father; Heart Surgery in his father. SOCIAL HISTORY      reports that he has never smoked. He has never used smokeless tobacco. He reports that he does not drink alcohol or use drugs. PHYSICAL EXAM     INITIAL VITALS:  weight is 238 lb (108 kg). His oral temperature is 97.5 °F (36.4 °C). His blood pressure is 129/81 and his pulse is 65. His respiration is 18 and oxygen saturation is 98%. Physical Exam  Vitals signs and nursing note reviewed. Constitutional:       Appearance: He is well-developed. He is not diaphoretic. HENT:      Head: Normocephalic and atraumatic. Nose: Nose normal.   Eyes:      General: No scleral icterus. Right eye: No discharge. Left eye: No discharge. Conjunctiva/sclera: Conjunctivae normal.      Pupils: Pupils are equal, round, and reactive to light. Neck:      Musculoskeletal: Normal range of motion and neck supple. Vascular: No JVD. Trachea: No tracheal deviation. Cardiovascular:      Rate and Rhythm: Rhythm irregular. Heart sounds: Normal heart sounds. No murmur. No friction rub. No gallop.     Pulmonary:      Effort: Pulmonary effort is

## 2020-01-24 NOTE — LETTER
[address] [facility]  [address] [facility], [address]  Phone: [phone]  Fax: [phone]    January 24, 2020    Monika Couch  [place] [id number]    Dear Latosha Marcum,    Thank you for choosing our [facility] on 1/24/20. Dr. Willie Evans wanted to make sure that you understand your discharge instructions and that you were able to fill any prescriptions that may have been ordered for you. Please contact the office at the above phone number if advised you to follow up with Dr. Willie Evans, or if you have any further questions or needs. Also, did you know -                             [facility]) practices can often offer you an appointment on the same day that you call for acute issues. *We have some [facility] offices that offer Walk-in appointments; check our website for availability in your community, [email or web address].      *Evisits are now available for patients through [address] If you do not have MyChart and are interested, please contact the office and a staff member may assist you or go to [email or web address].     Sincerely,   Willie Evans MD and your [facility]

## 2020-01-27 ENCOUNTER — CARE COORDINATION (OUTPATIENT)
Dept: CASE MANAGEMENT | Age: 70
End: 2020-01-27

## 2020-01-27 NOTE — CARE COORDINATION
[address] [facility] ED Follow Up Call    2020    Patient: Jose Riley Patient : 1950   MRN: [id number]  Reason for Admission: left arm pain  Discharge Date: 20        Care Transitions ED Follow Up    Care Transitions Interventions  No Identified Needs                          Do you have any ongoing symptoms?:  No   Did you call your PCP prior to going to the ED?:  No - Did not call PCP   Are there any other complaints/concerns that you wish to tell your provider?:  Pt presented to the ER on 20 for left arm pain. Pt stated he feels fine today, denied any chest pain, arm pain, numbness, sweating, nausea and vomiting or palpitations. Pt stated \"they think it was a muscle spasm\"  Reminded pt to schedule ED F/U appt, pt agreed. Pt denied any needs or concerns. Do you have a copy of your discharge instructions?:  Yes   Do you understand what to report and when to return?:  Yes   Are you following your discharge instructions?:  Yes   Do you have all of your prescriptions and are they filled?:  Yes   Have you scheduled your follow up appointment?:  No   Were you discharged with any Home Care or Post Acute Services or do you currently have any active services?:  No   Post Acute Services:   Outpatient/Community Services (Comment: Coumadin Clinic )            [address] [place] [address] Transition Coordinator  [phone]

## 2020-02-17 ENCOUNTER — HOSPITAL ENCOUNTER (OUTPATIENT)
Dept: PHARMACY | Age: 70
Setting detail: THERAPIES SERIES
Discharge: HOME | End: 2020-02-17
Payer: MEDICARE

## 2020-02-17 LAB — POC INR: 2.6 (ref 0.8–1.2)

## 2020-02-17 PROCEDURE — 85610 PROTHROMBIN TIME: CPT

## 2020-02-17 PROCEDURE — 36416 COLLJ CAPILLARY BLOOD SPEC: CPT

## 2020-02-17 PROCEDURE — 99211 OFF/OP EST MAY X REQ PHY/QHP: CPT

## 2020-02-24 RX ORDER — WARFARIN SODIUM 2.5 MG/1
TABLET ORAL
Qty: 40 TABLET | Refills: 0 | Status: SHIPPED | OUTPATIENT
Start: 2020-02-24 | End: 2020-03-25 | Stop reason: SDUPTHER

## 2020-03-05 ENCOUNTER — HOSPITAL ENCOUNTER (OUTPATIENT)
Dept: AUDIOLOGY | Age: 70
Discharge: HOME | End: 2020-03-05
Payer: MEDICARE

## 2020-03-05 ENCOUNTER — TELEPHONE (OUTPATIENT)
Dept: FAMILY MEDICINE CLINIC | Age: 70
End: 2020-03-05

## 2020-03-05 PROCEDURE — 92567 TYMPANOMETRY: CPT | Performed by: AUDIOLOGIST

## 2020-03-05 PROCEDURE — 92557 COMPREHENSIVE HEARING TEST: CPT | Performed by: AUDIOLOGIST

## 2020-03-05 NOTE — TELEPHONE ENCOUNTER
Isabel Villegas from audiology called, pt scheduled hearing eval as self pay but when he can today for test he changed his mind and wants it ran through insurance. For insurance, they need a doctor order. [facility] for order? Dx elijah hearing loss  Last seen 10/21/19 as NP. Pt cancelled next appt 3/31/20.  No future appt

## 2020-03-09 ENCOUNTER — TELEPHONE (OUTPATIENT)
Dept: AUDIOLOGY | Age: 70
End: 2020-03-09

## 2020-03-09 NOTE — TELEPHONE ENCOUNTER
Patient called in requesting to cancel his hearing aid order. He stated that due to the coronavirus he will not be leaving his home for quite awhile. Called Martín to cancel the order which was completed on 3/9/2020.

## 2020-03-19 ENCOUNTER — TELEPHONE (OUTPATIENT)
Dept: FAMILY MEDICINE CLINIC | Age: 70
End: 2020-03-19

## 2020-03-25 ENCOUNTER — TELEPHONE (OUTPATIENT)
Dept: PHARMACY | Age: 70
End: 2020-03-25

## 2020-03-25 RX ORDER — WARFARIN SODIUM 2.5 MG/1
TABLET ORAL
Qty: 35 TABLET | Refills: 5 | Status: SHIPPED | OUTPATIENT
Start: 2020-03-25 | End: 2020-08-12 | Stop reason: SDUPTHER

## 2020-03-25 NOTE — TELEPHONE ENCOUNTER
Called and spoke with wife about the change in appointment style due to the Covid-19 issue. Gave information about the [facility] on [facility] and to go before 10 am on the day of the appointment. Patient and wife were hesitant to be out in public and was concerned about appointment. Patient has been stable so moving appointment out 3 weeks to 4/22. Pharmacist added order for the INR. Patient and wife gave permission to leave detailed voicemail if unable to reach.      Tiny Thornton PharmD Candidate Jack Ng

## 2020-03-27 RX ORDER — POTASSIUM CHLORIDE 750 MG/1
TABLET, FILM COATED, EXTENDED RELEASE ORAL
Qty: 180 TABLET | Refills: 1 | Status: SHIPPED | OUTPATIENT
Start: 2020-03-27 | End: 2020-07-30 | Stop reason: SDUPTHER

## 2020-03-27 RX ORDER — PRAVASTATIN SODIUM 80 MG/1
80 TABLET ORAL NIGHTLY
Qty: 90 TABLET | Refills: 1 | Status: SHIPPED | OUTPATIENT
Start: 2020-03-27 | End: 2020-07-30 | Stop reason: SDUPTHER

## 2020-03-27 RX ORDER — BUMETANIDE 2 MG/1
2 TABLET ORAL DAILY
Qty: 90 TABLET | Refills: 1 | Status: SHIPPED | OUTPATIENT
Start: 2020-03-27 | End: 2020-07-30 | Stop reason: SDUPTHER

## 2020-03-27 NOTE — TELEPHONE ENCOUNTER
Gail Barton called requesting a refill on the following medications:  Requested Prescriptions     Pending Prescriptions Disp Refills    pravastatin (PRAVACHOL) 80 MG tablet 90 tablet 3     Sig: Take 1 tablet by mouth nightly    potassium chloride (KLOR-CON) 10 MEQ extended release tablet 180 tablet 2    bumetanide (BUMEX) 2 MG tablet 90 tablet 3     Sig: Take 1 tablet by mouth daily     Pharmacy verified:  [facility]      Date of last visit: 10/31/19  Date of next visit (if applicable): Visit date not found

## 2020-04-01 ENCOUNTER — APPOINTMENT (OUTPATIENT)
Dept: PHARMACY | Age: 70
End: 2020-04-01
Payer: MEDICARE

## 2020-04-22 ENCOUNTER — HOSPITAL ENCOUNTER (OUTPATIENT)
Dept: PHARMACY | Age: 70
Setting detail: THERAPIES SERIES
Discharge: HOME | End: 2020-04-22
Payer: MEDICARE

## 2020-04-22 PROCEDURE — 99211 OFF/OP EST MAY X REQ PHY/QHP: CPT

## 2020-04-22 NOTE — PROGRESS NOTES
Medication Management [address]   [phone] (phone)  [phone] (fax)      Anticoagulation encounter completed by telephone. Patient had lab result completed at outside lab. Mr. Lenora Strickland is a 78 y.o.  male with history of Afib. Patient verifies current dosing regimen and tablet strength. No missed or extra doses. Patient denies s/s bleeding/bruising/swelling/SOB/chest pain  No blood in urine or stool. No dietary changes. No changes in medication/OTC agents/Herbals. Patient reports no changes. No change in alcohol use or tobacco use. A little less active past couple of weeks. Patient denies dizziness/lightheadedness/falls. - slight headache x1 day   No vomiting/diarrhea or acute illness. No Procedures scheduled in the future at this time. Assessment:   Lab Results   Component Value Date    INR 2.41 (H) 04/22/2020    INR 2.60 (H) 02/17/2020    INR 2.60 (H) 01/23/2020     INR therapeutic   Recent Labs     04/22/20  0650   INR 2.41*     Plan:  Continue Coumadin 5 mg W and 2.5 mg MTThFSSu. Recheck INR in 6 weeks, in clinic. Patient reminded to call the Anticoagulation Clinic with any signs or symptoms of bleeding or with any medication changes. Patient given instructions utilizing the teach back method.     Jerica OconnellD, BCPS  4/22/2020 9:21 AM    CLINICAL PHARMACY CONSULT: MED RECONCILIATION/REVIEW ADDENDUM    For Pharmacy Admin Tracking Only    PHSO: Yes  Total # of Interventions Recommended: 0  - Maintenance Safety Lab Monitoring #: 1  Total Interventions Accepted: 0  Time Spent (min): 15

## 2020-05-01 ENCOUNTER — TELEPHONE (OUTPATIENT)
Dept: FAMILY MEDICINE CLINIC | Age: 70
End: 2020-05-01

## 2020-05-01 NOTE — TELEPHONE ENCOUNTER
Check HGA1C, HDL, SPOT MA, TSH/ FREE T4 prior to appt  Dx: IFG, Hyperlipidemia, elevated TSH  Urology manages PSA.     ES

## 2020-06-02 ENCOUNTER — TELEPHONE (OUTPATIENT)
Dept: PHARMACY | Age: 70
End: 2020-06-02

## 2020-06-03 ENCOUNTER — HOSPITAL ENCOUNTER (OUTPATIENT)
Dept: PHARMACY | Age: 70
Setting detail: THERAPIES SERIES
Discharge: HOME | End: 2020-06-03
Payer: MEDICARE

## 2020-06-03 ENCOUNTER — NURSE ONLY (OUTPATIENT)
Dept: LAB | Age: 70
End: 2020-06-03

## 2020-06-03 LAB
HCT VFR BLD CALC: 51.4 % (ref 42–52)
HEMOGLOBIN: 17 GM/DL (ref 14–18)
INR BLD: 1.52 (ref 0.85–1.13)

## 2020-06-03 PROCEDURE — 99211 OFF/OP EST MAY X REQ PHY/QHP: CPT

## 2020-06-12 ENCOUNTER — HOSPITAL ENCOUNTER (OUTPATIENT)
Dept: AUDIOLOGY | Age: 70
Discharge: HOME | End: 2020-06-12

## 2020-06-12 PROCEDURE — 92567 TYMPANOMETRY: CPT | Performed by: AUDIOLOGIST

## 2020-06-12 PROCEDURE — 92557 COMPREHENSIVE HEARING TEST: CPT | Performed by: AUDIOLOGIST

## 2020-06-12 NOTE — PROGRESS NOTES
Reviewed hearing aid styles and technology with the patient. He chose to pursue a Mario Clos M-50 R hearing aid in champagne with a #1  for each ear. The fitting is scheduled 07/02/2020.

## 2020-06-17 ENCOUNTER — NURSE ONLY (OUTPATIENT)
Dept: LAB | Age: 70
End: 2020-06-17

## 2020-06-17 ENCOUNTER — HOSPITAL ENCOUNTER (OUTPATIENT)
Dept: PHARMACY | Age: 70
Setting detail: THERAPIES SERIES
Discharge: HOME | End: 2020-06-17
Payer: MEDICARE

## 2020-06-17 LAB — INR BLD: 2.02 (ref 0.85–1.13)

## 2020-06-17 PROCEDURE — 99211 OFF/OP EST MAY X REQ PHY/QHP: CPT

## 2020-06-17 NOTE — PROGRESS NOTES
Medication Management [address]  [phone] (phone)  [phone] (fax)      Anticoagulation encounter completed via telephone. Patient had lab result completed at outside lab. Mr. Wendy Ross is a 78 y.o.  male with history of Afib. Patient verifies current dosing regimen and tablet strength. No missed or extra doses. Patient denies s/s bleeding/bruising/swelling/SOB/chest pain  No blood in urine or stool. No dietary changes. No changes in medication/OTC agents/Herbals. No change in alcohol use or tobacco use. No change in activity level. Patient denies headaches/dizziness/lightheadedness/falls. No vomiting/diarrhea or acute illness. No Procedures scheduled in the future at this time. Assessment:   Lab Results   Component Value Date    INR 2.02 (H) 06/17/2020    INR 1.52 (H) 06/03/2020    INR 2.41 (H) 04/22/2020     INR therapeutic   Recent Labs     06/17/20  0713   INR 2.02*     Interview completed by Dereje Collins PharmD candidate    Patient prefers appts to be spaced out as long as possible. Previously a 6 week check, has been on current regimen for over a year. Plan:  Continue Coumadin 5 mg W, 2.5 mg MTThFSS. Recheck INR in 4 weeks. Patient reminded to call the Anticoagulation Clinic with any signs or symptoms of bleeding or with any medication changes. Patient given instructions utilizing the teach back method. The following statement was review with patient regarding this virtual visit:  We want to confirm that, for purposes of billing, this is a virtual visit with your provider for which we will submit a claim for reimbursement with your insurance company. You may be responsible for any copays, coinsurance amounts or other amounts not covered by your insurance company. If you do not accept this, unfortunately we will not be able to schedule a virtual visit with the provider. Do you accept?   Yes    CLINICAL PHARMACY CONSULT: MED RECONCILIATION/REVIEW ADDENDUM    For Pharmacy Admin Tracking Only    PHSO: Yes  Total # of Interventions Recommended: 1  - Maintenance Safety Lab Monitoring #: 1  Total Interventions Accepted: 1  Time Spent (min): Rodolfo Leigh PharmD, BCPS  6/17/2020  8:44 AM

## 2020-06-22 RX ORDER — DIGOXIN 125 MCG
TABLET ORAL
Qty: 90 TABLET | Refills: 1 | OUTPATIENT
Start: 2020-06-22

## 2020-06-23 RX ORDER — DIGOXIN 125 MCG
TABLET ORAL
Qty: 90 TABLET | Refills: 0 | OUTPATIENT
Start: 2020-06-23

## 2020-06-24 RX ORDER — DIGOXIN 125 MCG
TABLET ORAL
Qty: 90 TABLET | Refills: 0 | Status: SHIPPED | OUTPATIENT
Start: 2020-06-24 | End: 2020-07-30 | Stop reason: SDUPTHER

## 2020-07-02 ENCOUNTER — HOSPITAL ENCOUNTER (OUTPATIENT)
Dept: AUDIOLOGY | Age: 70
Discharge: HOME | End: 2020-07-02

## 2020-07-02 PROCEDURE — 4700000000 HC HEARING AID RETURN FEE: Performed by: AUDIOLOGIST

## 2020-07-02 PROCEDURE — V5261 HEARING AID, DIGIT, BIN, BTE: HCPCS | Performed by: AUDIOLOGIST

## 2020-07-02 PROCEDURE — V5160 DISPENSING FEE BINAURAL: HCPCS | Performed by: AUDIOLOGIST

## 2020-07-02 NOTE — PROGRESS NOTES
[facility]#: [id number]     ACCT#: [de-identified]    DIAGNOSIS: H90.3    NEW HEARING AID FITTING: Dispensed [employer] [place] M50-R FABRICIO hearing aid(s) for the right and left ear(s). Large vented domes and 0M receivers were used for both ears. Explained care, use and insertion. Programmed. Speech mapping was completed with good results. Hearing aid fitting  recheck scheduled for 08/03/2020.

## 2020-07-06 ENCOUNTER — HOSPITAL ENCOUNTER (OUTPATIENT)
Dept: AUDIOLOGY | Age: 70
Discharge: HOME | End: 2020-07-06

## 2020-07-06 PROCEDURE — 9990000010 HC NO CHARGE VISIT: Performed by: AUDIOLOGIST

## 2020-07-06 RX ORDER — DILTIAZEM HYDROCHLORIDE 360 MG/1
CAPSULE, EXTENDED RELEASE ORAL
Qty: 90 CAPSULE | Refills: 0 | Status: SHIPPED | OUTPATIENT
Start: 2020-07-06 | End: 2020-07-30 | Stop reason: SDUPTHER

## 2020-07-06 NOTE — PROGRESS NOTES
ACCOUNT #: [de-identified]    DIAGNOSIS: H90.3    HEARING AID PROBLEM: The patient states that his new hearing aids are too loud. Everyone sounds nasally and he is hearing sounds that he hasn't heard before. The overall gain was decreased to 90%. The patient's manual VC was activated. He was instructed on the use of the VC. He noted improved output with the programming changes. F/U is scheduled.

## 2020-07-07 ENCOUNTER — TELEPHONE (OUTPATIENT)
Dept: AUDIOLOGY | Age: 70
End: 2020-07-07

## 2020-07-07 NOTE — TELEPHONE ENCOUNTER
Patient walked in this morning and returned his hearing aids. I copied the hearing aid receipt and marked that he returned them today. Copy was given to patient. I asked the patient why he was returning the hearing aids and he said Anuj Rodarte was lied to\" and stormed out. The patient was here to see Karolyn Isbell on 7-6-2020 and was happy with the hearing aids per Karolyn Isbell. He would not give an exact reason why he returned the hearing aids. I cancelled his follow up appointment also.

## 2020-07-07 NOTE — TELEPHONE ENCOUNTER
The patient unexpectedly returned his hearing aids for credit on this date. His account was credited for the hearing aids and fitting charges. The return fee was billed. The hearing aids were returned to [facility] for credit on this date.

## 2020-07-15 ENCOUNTER — HOSPITAL ENCOUNTER (OUTPATIENT)
Dept: PHARMACY | Age: 70
Setting detail: THERAPIES SERIES
Discharge: HOME | End: 2020-07-15
Payer: MEDICARE

## 2020-07-15 VITALS — TEMPERATURE: 97.4 F

## 2020-07-15 LAB — POC INR: 2 (ref 0.8–1.2)

## 2020-07-15 PROCEDURE — 99211 OFF/OP EST MAY X REQ PHY/QHP: CPT

## 2020-07-15 PROCEDURE — 36416 COLLJ CAPILLARY BLOOD SPEC: CPT

## 2020-07-15 PROCEDURE — 85610 PROTHROMBIN TIME: CPT

## 2020-07-23 ENCOUNTER — HOSPITAL ENCOUNTER (OUTPATIENT)
Age: 70
Discharge: HOME | End: 2020-07-23
Payer: MEDICARE

## 2020-07-23 LAB
AVERAGE GLUCOSE: 126 MG/DL (ref 70–126)
CREATININE, URINE: 92.5 MG/DL
HBA1C MFR BLD: 6.2 % (ref 4.4–6.4)
HDLC SERPL-MCNC: 27 MG/DL
MICROALBUMIN UR-MCNC: 12.76 MG/DL
MICROALBUMIN/CREAT UR-RTO: 138 MG/G (ref 0–30)
T4 FREE: 1.15 NG/DL (ref 0.93–1.76)
TSH SERPL DL<=0.05 MIU/L-ACNC: 3.34 UIU/ML (ref 0.4–4.2)

## 2020-07-23 PROCEDURE — 84443 ASSAY THYROID STIM HORMONE: CPT

## 2020-07-23 PROCEDURE — 36415 COLL VENOUS BLD VENIPUNCTURE: CPT

## 2020-07-23 PROCEDURE — 84439 ASSAY OF FREE THYROXINE: CPT

## 2020-07-23 PROCEDURE — 83718 ASSAY OF LIPOPROTEIN: CPT

## 2020-07-23 PROCEDURE — 83036 HEMOGLOBIN GLYCOSYLATED A1C: CPT

## 2020-07-23 PROCEDURE — 82043 UR ALBUMIN QUANTITATIVE: CPT

## 2020-07-28 ENCOUNTER — TELEPHONE (OUTPATIENT)
Dept: FAMILY MEDICINE CLINIC | Age: 70
End: 2020-07-28

## 2020-07-28 NOTE — TELEPHONE ENCOUNTER
Colletta Brasil (wife) would like to  a copy of lab results from 7/23/20. She would like to pick them up on Wednesday to review prior to appointment on 8/4/20. Please advise.     DOLV  10/21/19  DONV  8/4/20

## 2020-07-30 ENCOUNTER — OFFICE VISIT (OUTPATIENT)
Dept: CARDIOLOGY CLINIC | Age: 70
End: 2020-07-30
Payer: MEDICARE

## 2020-07-30 VITALS
WEIGHT: 243 LBS | HEART RATE: 80 BPM | HEIGHT: 73 IN | BODY MASS INDEX: 32.2 KG/M2 | SYSTOLIC BLOOD PRESSURE: 146 MMHG | DIASTOLIC BLOOD PRESSURE: 82 MMHG

## 2020-07-30 PROCEDURE — 3017F COLORECTAL CA SCREEN DOC REV: CPT | Performed by: NUCLEAR MEDICINE

## 2020-07-30 PROCEDURE — 1036F TOBACCO NON-USER: CPT | Performed by: NUCLEAR MEDICINE

## 2020-07-30 PROCEDURE — 1123F ACP DISCUSS/DSCN MKR DOCD: CPT | Performed by: NUCLEAR MEDICINE

## 2020-07-30 PROCEDURE — 99213 OFFICE O/P EST LOW 20 MIN: CPT | Performed by: NUCLEAR MEDICINE

## 2020-07-30 PROCEDURE — 4040F PNEUMOC VAC/ADMIN/RCVD: CPT | Performed by: NUCLEAR MEDICINE

## 2020-07-30 PROCEDURE — G8428 CUR MEDS NOT DOCUMENT: HCPCS | Performed by: NUCLEAR MEDICINE

## 2020-07-30 PROCEDURE — G8417 CALC BMI ABV UP PARAM F/U: HCPCS | Performed by: NUCLEAR MEDICINE

## 2020-07-30 RX ORDER — NITROGLYCERIN 0.4 MG/1
0.4 TABLET SUBLINGUAL EVERY 5 MIN PRN
Qty: 25 TABLET | Refills: 3 | Status: SHIPPED | OUTPATIENT
Start: 2020-07-30 | End: 2021-12-21

## 2020-07-30 RX ORDER — DILTIAZEM HYDROCHLORIDE 360 MG/1
CAPSULE, EXTENDED RELEASE ORAL
Qty: 90 CAPSULE | Refills: 3 | Status: SHIPPED | OUTPATIENT
Start: 2020-07-30 | End: 2021-09-29

## 2020-07-30 RX ORDER — BUMETANIDE 2 MG/1
2 TABLET ORAL DAILY
Qty: 90 TABLET | Refills: 3 | Status: SHIPPED | OUTPATIENT
Start: 2020-07-30 | End: 2021-09-23 | Stop reason: SDUPTHER

## 2020-07-30 RX ORDER — DIGOXIN 125 MCG
TABLET ORAL
Qty: 90 TABLET | Refills: 3 | Status: SHIPPED | OUTPATIENT
Start: 2020-07-30 | End: 2021-09-23 | Stop reason: SDUPTHER

## 2020-07-30 RX ORDER — POTASSIUM CHLORIDE 750 MG/1
TABLET, FILM COATED, EXTENDED RELEASE ORAL
Qty: 180 TABLET | Refills: 3 | Status: SHIPPED | OUTPATIENT
Start: 2020-07-30 | End: 2021-09-23 | Stop reason: SDUPTHER

## 2020-07-30 RX ORDER — PRAVASTATIN SODIUM 80 MG/1
80 TABLET ORAL NIGHTLY
Qty: 90 TABLET | Refills: 3 | Status: SHIPPED | OUTPATIENT
Start: 2020-07-30 | End: 2021-09-29

## 2020-07-30 NOTE — PROGRESS NOTES
Substance Use Topics    Alcohol use: No      Current Outpatient Medications   Medication Sig Dispense Refill    dilTIAZem (CARDIZEM CD) 360 MG extended release capsule Take 1 capsule by mouth once daily 90 capsule 0    digoxin (LANOXIN) 125 MCG tablet TAKE 1 TABLET BY MOUTH ONCE DAILY 90 tablet 0    pravastatin (PRAVACHOL) 80 MG tablet Take 1 tablet by mouth nightly 90 tablet 1    potassium chloride (KLOR-CON) 10 MEQ extended release tablet TAKE 1 TABLET BY MOUTH TWICE DAILY 180 tablet 1    bumetanide (BUMEX) 2 MG tablet Take 1 tablet by mouth daily 90 tablet 1    warfarin (COUMADIN) 2.5 MG tablet TAKE AS DIRECTED BY [facility] #35 tabs = 30 day supply 35 tablet 5    bisacodyl (DULCOLAX) 5 MG EC tablet Take 5 mg by mouth nightly       metoprolol succinate (TOPROL XL) 50 MG extended release tablet Take 1 tablet by mouth daily 90 tablet 3    tamsulosin (FLOMAX) 0.4 MG capsule Take 1 capsule by mouth daily 90 capsule 3    nitroGLYCERIN (NITROSTAT) 0.4 MG SL tablet Place 1 tablet under the tongue every 5 minutes as needed for Chest pain 25 tablet 3     No current facility-administered medications for this visit.       No Known Allergies  Health Maintenance   Topic Date Due    DTaP/Tdap/Td vaccine (1 - Tdap) 01/17/1969    Annual Wellness Visit (AWV)  06/20/2019    Shingles Vaccine (2 of 3) 10/21/2020 (Originally 12/5/2012)    Flu vaccine (1) 09/01/2020    Lipid screen  10/23/2020    Potassium monitoring  01/23/2021    Creatinine monitoring  01/23/2021    Colon cancer screen colonoscopy  07/18/2021    A1C test (Diabetic or Prediabetic)  07/23/2021    Pneumococcal 65+ years Vaccine (2 of 2 - PPSV23) 10/04/2021    Hepatitis A vaccine  Aged Out    Hepatitis B vaccine  Aged Out    Hib vaccine  Aged Out    Meningococcal (ACWY) vaccine  Aged Out    Hepatitis C screen  Discontinued       Subjective:  Review of Systems  General:   No fever, no chills, No fatigue or weight loss  Pulmonary: some dyspnea, no wheezing  Cardiac:    Denies recent chest pain,   GI:     No nausea or vomiting, no abdominal pain  Neuro:    No dizziness or light headedness,   Musculoskeletal:  No recent active issues  Extremities:   No edema, no obvious claudication       Objective:  Physical Exam  BP (!) 146/82   Pulse 80 Comment: irregular  Ht 6' 1\" (1.854 m)   Wt 243 lb (110.2 kg)   BMI 32.06 kg/m²   General:   Well developed, well nourished  Lungs:   Clear to auscultation  Heart:    Normal S1 S2, Slight murmur. no rubs, no gallops  Abdomen:   Soft, non tender, no organomegalies, positive bowel sounds  Extremities:   No edema, no cyanosis, good peripheral pulses  Neurological:   Awake, alert, oriented. No obvious focal deficits  Musculoskelatal:  No obvious deformities    Assessment:      Diagnosis Orders   1. Paroxysmal atrial fibrillation (HCC)     2. Essential hypertension     cardiac fair for   No new symptoms       Plan:  No follow-ups on file. As above  Consider a follow up stress test   Continue risk factor modification and medical management  Thank you for allowing me to participate in the care of your patient. Please don't hesitate to contact me regarding any further issues related to the patient care    Orders Placed:  No orders of the defined types were placed in this encounter. Medications Prescribed:  No orders of the defined types were placed in this encounter. Discussed use, benefit, and side effects of prescribed medications. All patient questions answered. Pt voicedunderstanding. Instructed to continue current medications, diet and exercise. Continue risk factor modification and medical management. Patient agreed with treatment plan. Follow up as directed.     Electronically signedby Alan Rodriguez MD on 7/30/2020 at 7:37 AM

## 2020-08-04 ENCOUNTER — OFFICE VISIT (OUTPATIENT)
Dept: FAMILY MEDICINE CLINIC | Age: 70
End: 2020-08-04
Payer: MEDICARE

## 2020-08-04 ENCOUNTER — HOSPITAL ENCOUNTER (OUTPATIENT)
Age: 70
Discharge: HOME | End: 2020-08-04
Payer: MEDICARE

## 2020-08-04 VITALS
BODY MASS INDEX: 33.24 KG/M2 | HEART RATE: 76 BPM | HEIGHT: 72 IN | RESPIRATION RATE: 12 BRPM | DIASTOLIC BLOOD PRESSURE: 84 MMHG | WEIGHT: 245.4 LBS | SYSTOLIC BLOOD PRESSURE: 134 MMHG | TEMPERATURE: 97.3 F

## 2020-08-04 LAB
ALBUMIN SERPL-MCNC: 4 G/DL (ref 3.5–5.1)
ALP BLD-CCNC: 71 U/L (ref 38–126)
ALT SERPL-CCNC: 44 U/L (ref 11–66)
ANION GAP SERPL CALCULATED.3IONS-SCNC: 10 MEQ/L (ref 8–16)
AST SERPL-CCNC: 29 U/L (ref 5–40)
BILIRUB SERPL-MCNC: 0.5 MG/DL (ref 0.3–1.2)
BUN BLDV-MCNC: 15 MG/DL (ref 7–22)
CALCIUM SERPL-MCNC: 9.3 MG/DL (ref 8.5–10.5)
CHLORIDE BLD-SCNC: 105 MEQ/L (ref 98–111)
CO2: 27 MEQ/L (ref 23–33)
CREAT SERPL-MCNC: 0.9 MG/DL (ref 0.4–1.2)
GFR SERPL CREATININE-BSD FRML MDRD: 84 ML/MIN/1.73M2
GLUCOSE BLD-MCNC: 94 MG/DL (ref 70–108)
POTASSIUM SERPL-SCNC: 4.2 MEQ/L (ref 3.5–5.2)
PROSTATE SPECIFIC ANTIGEN: 3.6 NG/ML (ref 0–1)
SODIUM BLD-SCNC: 142 MEQ/L (ref 135–145)
TOTAL PROTEIN: 6.9 G/DL (ref 6.1–8)

## 2020-08-04 PROCEDURE — G0439 PPPS, SUBSEQ VISIT: HCPCS | Performed by: NURSE PRACTITIONER

## 2020-08-04 PROCEDURE — G0103 PSA SCREENING: HCPCS

## 2020-08-04 PROCEDURE — 36415 COLL VENOUS BLD VENIPUNCTURE: CPT

## 2020-08-04 PROCEDURE — 80053 COMPREHEN METABOLIC PANEL: CPT

## 2020-08-04 ASSESSMENT — ENCOUNTER SYMPTOMS
TROUBLE SWALLOWING: 0
SHORTNESS OF BREATH: 0
COUGH: 0
EYE PAIN: 0
FACIAL SWELLING: 0
NAUSEA: 0
BACK PAIN: 0
WHEEZING: 0
VOMITING: 0
DIARRHEA: 0
COLOR CHANGE: 0
SINUS PAIN: 0
SORE THROAT: 0
ABDOMINAL PAIN: 0

## 2020-08-04 ASSESSMENT — PATIENT HEALTH QUESTIONNAIRE - PHQ9
SUM OF ALL RESPONSES TO PHQ QUESTIONS 1-9: 2
SUM OF ALL RESPONSES TO PHQ9 QUESTIONS 1 & 2: 2
2. FEELING DOWN, DEPRESSED OR HOPELESS: 1
1. LITTLE INTEREST OR PLEASURE IN DOING THINGS: 1
SUM OF ALL RESPONSES TO PHQ QUESTIONS 1-9: 2

## 2020-08-04 NOTE — PROGRESS NOTES
[address] [facility] [address]  Dept: [phone]  Dept Fax: [phone]  Loc: [phone]     2020     Beto Queen (:  1950) is a 78 y.o. male, here for evaluation of the following medical concerns:    Chief Complaint   Patient presents with    Annual Exam       HPI  Pt presents to the office today with his wife for follow up HTN, Hyperlipidemia and A-fib (follows up with Dr Shu Griffiths as well)     Treatment Adherence:   Medication compliance:  compliant all of the time  Diet compliance:  compliant most of the time  Weight trend: stable  Current exercise: no regular exercise  Barriers: stress    Hypertension:  Home blood pressure monitoring: Yes - 120-130's/70-80's. Patient denies chest pain, shortness of breath, headache, blurred vision and palpitations. Antihypertensive medication side effects: no medication side effects noted. Use of agents associated with hypertension: none. Sodium (meq/L)   Date Value   2020 142    BUN (mg/dL)   Date Value   2020 15    Glucose   Date Value   2020 148 mg/dL (H)   2016 93 mg/dl      Potassium (meq/L)   Date Value   2020 3.9     Potassium reflex Magnesium (meq/L)   Date Value   2020 3.9    CREATININE (mg/dL)   Date Value   2020 1.0         Hyperlipidemia:  No new myalgias or GI upset on pravastatin (Pravachol).      Lab Results   Component Value Date    CHOL 122 10/23/2019    TRIG 132 10/23/2019    HDL 27 2020    LDLCALC 67 10/23/2019     Lab Results   Component Value Date    ALT 39 2020    AST 27 2020        Wt Readings from Last 3 Encounters:   20 245 lb 6.4 oz (111.3 kg)   20 243 lb (110.2 kg)   20 238 lb (108 kg)     BP Readings from Last 3 Encounters:   20 134/84   20 (!) 146/82   20 129/81     More forgetful over the past 6 months- Mother had dementia. Pt also has hearing issues and this may be causing some of the problems with his wife and getting easily upset. He is working with audiology for his hearing aids, but so far they have not helped. Pt does not want to do any testing or referrals about his memory at this time and will let us know if he changes his mind. Review of Systems   Constitutional: Negative for chills, fatigue and fever. HENT: Negative for congestion, facial swelling, sinus pain, sore throat and trouble swallowing. Eyes: Negative for pain and visual disturbance. Respiratory: Negative for cough, shortness of breath and wheezing. Cardiovascular: Negative for chest pain and palpitations. Gastrointestinal: Negative for abdominal pain, diarrhea, nausea and vomiting. Genitourinary: Negative for difficulty urinating, dysuria and urgency. Musculoskeletal: Negative for back pain, gait problem and neck pain. Skin: Negative for color change and rash. Neurological: Negative for dizziness, weakness and headaches. Psychiatric/Behavioral: Negative for agitation and sleep disturbance. The patient is not nervous/anxious. Prior to Visit Medications    Medication Sig Taking?  Authorizing Provider   bumetanide (BUMEX) 2 MG tablet Take 1 tablet by mouth daily Yes Robert Thorne MD   digoxin (LANOXIN) 125 MCG tablet TAKE 1 TABLET BY MOUTH ONCE DAILY Yes Robert Thorne MD   dilTIAZem (CARDIZEM CD) 360 MG extended release capsule Take 1 capsule by mouth once daily Yes Robert Thorne MD   nitroGLYCERIN (NITROSTAT) 0.4 MG SL tablet Place 1 tablet under the tongue every 5 minutes as needed for Chest pain Yes Robert Thorne MD   potassium chloride (KLOR-CON) 10 MEQ extended release tablet TAKE 1 TABLET BY MOUTH TWICE DAILY Yes Robert Thorne MD   pravastatin (PRAVACHOL) 80 MG tablet Take 1 tablet by mouth nightly Yes Robert Thorne MD   warfarin (COUMADIN) 2.5 MG tablet TAKE AS DIRECTED BY [facility] #35 tabs = 30 day supply Yes Darleen Avalos MD   bisacodyl (DULCOLAX) 5 MG EC tablet Take 5 mg by mouth nightly  Yes Historical Provider, MD   metoprolol succinate (TOPROL XL) 50 MG extended release tablet Take 1 tablet by mouth daily Yes Khoi Martínez MD   tamsulosin (FLOMAX) 0.4 MG capsule Take 1 capsule by mouth daily Yes Ryan Araujo APRN - CNP        Social History     Tobacco Use    Smoking status: Never Smoker    Smokeless tobacco: Never Used   Substance Use Topics    Alcohol use: No        Vitals:    08/04/20 0808   BP: 134/84   Site: Left Upper Arm   Position: Sitting   Cuff Size: Medium Adult   Pulse: 76   Resp: 12   Temp: 97.3 °F (36.3 °C)   TempSrc: Temporal   Weight: 245 lb 6.4 oz (111.3 kg)   Height: 6' (1.829 m)     Estimated body mass index is 33.28 kg/m² as calculated from the following:    Height as of this encounter: 6' (1.829 m). Weight as of this encounter: 245 lb 6.4 oz (111.3 kg). Physical Exam  Vitals signs reviewed. Constitutional:       General: He is not in acute distress. Appearance: Normal appearance. He is well-developed. HENT:      Head: Normocephalic and atraumatic. Right Ear: Hearing, tympanic membrane, ear canal and external ear normal.      Left Ear: Hearing, tympanic membrane, ear canal and external ear normal.      Nose: Nose normal. No nasal tenderness. Mouth/Throat:      Lips: Pink. Mouth: Mucous membranes are moist. No oral lesions. Pharynx: Oropharynx is clear. Uvula midline. Eyes:      General:         Right eye: No discharge. Left eye: No discharge. Conjunctiva/sclera: Conjunctivae normal.   Neck:      Musculoskeletal: Full passive range of motion without pain, normal range of motion and neck supple. Vascular: No carotid bruit. Trachea: No tracheal deviation. Cardiovascular:      Rate and Rhythm: Normal rate and regular rhythm. Heart sounds: Normal heart sounds.  No murmur. Pulmonary:      Effort: Pulmonary effort is normal. No respiratory distress. Breath sounds: Normal breath sounds. Abdominal:      General: Bowel sounds are normal.      Palpations: Abdomen is soft. Tenderness: There is no abdominal tenderness. There is no right CVA tenderness or left CVA tenderness. Musculoskeletal: Normal range of motion. Lymphadenopathy:      Head:      Right side of head: No submental, submandibular, tonsillar, preauricular, posterior auricular or occipital adenopathy. Left side of head: No submental, submandibular, tonsillar, preauricular, posterior auricular or occipital adenopathy. Cervical: No cervical adenopathy. Skin:     General: Skin is warm and dry. Findings: No rash. Neurological:      General: No focal deficit present. Mental Status: He is alert and oriented to person, place, and time. Coordination: Coordination normal.   Psychiatric:         Mood and Affect: Mood normal.         Behavior: Behavior normal.         Thought Content: Thought content normal.         Judgment: Judgment normal.         ASSESSMENT/PLAN:  1. Annual physical exam    2. Essential hypertension  - Comprehensive Metabolic Panel; Future    3. Hyperlipidemia, unspecified hyperlipidemia type  - Comprehensive Metabolic Panel; Future    4. Anticoagulated on Coumadin    5. Paroxysmal atrial fibrillation (HCC)    6. Benign prostatic hyperplasia with urinary frequency  - PSA Screening; Future    7. Encounter for screening for malignant neoplasm of prostate   - PSA Screening; Future    - Follow up with Dr Xavier Ny and cardiology as planned  - Call urology as planned for yearly follow up. PSA ordered for pt at this appointment. - Call office with any questions or concerns, or if symptoms are getting worse or changing    Return in about 1 year (around 8/4/2021) for Medicare AWV, Wellness/Physical.    Patient given educational materials - see patient instructions.   Discussed use, benefit, and side effects of prescribed medications. All patient questions answered. Pt voiced understanding. Reviewed health maintenance. An electronic signature was used to authenticate this note.     --NARESH Valerio - CNP on 8/4/2020 at 10:08 AM

## 2020-08-04 NOTE — PATIENT INSTRUCTIONS
A serving is 1 slice of bread, 1 ounce of dry cereal, or ½ cup of cooked rice, pasta, or cooked cereal. Try to choose whole-grain products as much as possible. · Limit lean meat, poultry, and fish to 2 servings each day. A serving is 3 ounces, about the size of a deck of cards. · Eat 4 to 5 servings of nuts, seeds, and legumes (cooked dried beans, lentils, and split peas) each week. A serving is 1/3 cup of nuts, 2 tablespoons of seeds, or ½ cup of cooked beans or peas. · Limit fats and oils to 2 to 3 servings each day. A serving is 1 teaspoon of vegetable oil or 2 tablespoons of salad dressing. · Limit sweets and added sugars to 5 servings or less a week. A serving is 1 tablespoon jelly or jam, ½ cup sorbet, or 1 cup of lemonade. · Eat less than 2,300 milligrams (mg) of sodium a day. If you limit your sodium to 1,500 mg a day, you can lower your blood pressure even more. Tips for success  · Start small. Do not try to make dramatic changes to your diet all at once. You might feel that you are missing out on your favorite foods and then be more likely to not follow the plan. Make small changes, and stick with them. Once those changes become habit, add a few more changes. · Try some of the following:  ? Make it a goal to eat a fruit or vegetable at every meal and at snacks. This will make it easy to get the recommended amount of fruits and vegetables each day. ? Try yogurt topped with fruit and nuts for a snack or healthy dessert. ? Add lettuce, tomato, cucumber, and onion to sandwiches. ? Combine a ready-made pizza crust with low-fat mozzarella cheese and lots of vegetable toppings. Try using tomatoes, squash, spinach, broccoli, carrots, cauliflower, and onions. ? Have a variety of cut-up vegetables with a low-fat dip as an appetizer instead of chips and dip. ? Sprinkle sunflower seeds or chopped almonds over salads. Or try adding chopped walnuts or almonds to cooked vegetables.   ? Try some vegetarian seeds, soy products (like tofu), and fat-free or low-fat dairy products. ? Replace butter, margarine, and hydrogenated or partially hydrogenated oils with olive and canola oils. (Canola oil margarine without trans fat is fine.)  ? Replace red meat with fish, poultry, and soy protein (like tofu). ? Limit processed and packaged foods like chips, crackers, and cookies. · Be active. Exercise can improve your cholesterol level. Get at least 30 minutes of exercise on most days of the week. Walking is a good choice. You also may want to do other activities, such as running, swimming, cycling, or playing tennis or team sports. · Stay at a healthy weight. Lose weight if you need to. · Don't smoke. If you need help quitting, talk to your doctor about stop-smoking programs and medicines. These can increase your chances of quitting for good. How is high cholesterol treated? The goal of treatment is to reduce your chances of having a heart attack or stroke. The goal is not to lower your cholesterol numbers only. · You may make lifestyle changes, such as eating healthy foods, not smoking, losing weight, and being more active. · You may have to take medicine. Follow-up care is a key part of your treatment and safety. Be sure to make and go to all appointments, and call your doctor if you are having problems. It's also a good idea to know your test results and keep a list of the medicines you take. Where can you learn more? Go to https://[employer] Mobile.GoGuide. org and sign in to your [employer] account. Enter R441 in the [facility] box to learn more about \"Learning About High Cholesterol. \"     If you do not have an account, please click on the \"Sign Up Now\" link. Current as of: December 16, 2019               Content Version: 12.5  © 3399-0008 [employer], Incorporated. Care instructions adapted under license by [facility] ([facility]).  If you have questions about a medical condition or this instruction, always ask your healthcare professional. [place] [address] any warranty or liability for your use of this information.

## 2020-08-12 ENCOUNTER — HOSPITAL ENCOUNTER (OUTPATIENT)
Dept: PHARMACY | Age: 70
Setting detail: THERAPIES SERIES
Discharge: HOME | End: 2020-08-12
Payer: MEDICARE

## 2020-08-12 VITALS — TEMPERATURE: 97.3 F

## 2020-08-12 LAB — POC INR: 2 (ref 0.8–1.2)

## 2020-08-12 PROCEDURE — 99211 OFF/OP EST MAY X REQ PHY/QHP: CPT

## 2020-08-12 PROCEDURE — 36416 COLLJ CAPILLARY BLOOD SPEC: CPT

## 2020-08-12 PROCEDURE — 85610 PROTHROMBIN TIME: CPT

## 2020-08-12 RX ORDER — WARFARIN SODIUM 2.5 MG/1
TABLET ORAL
Qty: 35 TABLET | Refills: 5 | Status: ON HOLD | OUTPATIENT
Start: 2020-08-12 | End: 2020-10-06 | Stop reason: HOSPADM

## 2020-08-12 RX ORDER — METOPROLOL SUCCINATE 50 MG/1
50 TABLET, EXTENDED RELEASE ORAL DAILY
Qty: 90 TABLET | Refills: 3 | Status: SHIPPED | OUTPATIENT
Start: 2020-08-12 | End: 2021-08-17

## 2020-08-12 NOTE — PROGRESS NOTES
Medication Management [address]   [phone] (phone)  [phone] (fax)      Mr. Jeannette Black is a 78 y.o.  male with history of Afib who presents today for anticoagulation monitoring and adjustment. Patient verifies current dosing regimen and tablet strength. No missed or extra doses. Patient denies s/s bleeding/bruising/swelling/SOB/chest pain  No blood in urine or stool. No dietary changes. No changes in medication/OTC agents/Herbals. No change in alcohol use or tobacco use. No change in activity level. Patient denies headaches/dizziness/lightheadedness/falls. No vomiting/diarrhea or acute illness. No Procedures scheduled in the future at this time. Assessment:   Lab Results   Component Value Date    INR 2.00 (H) 08/12/2020    INR 2.00 (H) 07/15/2020    INR 2.02 (H) 06/17/2020     INR therapeutic   Recent Labs     08/12/20  0709   INR 2.00*     Patient has been stable on current regimen since July 2019 with only one subtherapeutic INR since that time. Plan:  Continue Coumadin 5 mg W and 2.5 mg [id number]. Recheck INR in 5 week(s). Sent Coumadin 2.5 mg tablet #35 tablets with 5 refills to [facility] per patient request. Patient reminded to call the Anticoagulation Clinic with any signs or symptoms of bleeding or with any medication changes. Patient given instructions utilizing the teach back method. Discharged ambulatory in no apparent distress. After visit summary printed and reviewed with patient.       Medications reviewed and updated on home medication list Yes    Influenza vaccine:     [] given    [x] declined   [x] received previously   [] plans to receive at a later time   [] refused    [x] documented in [facility]: MED RECONCILIATION/REVIEW [address]: Yes  Total # of Interventions Recommended: 0  - Refills Provided #: 1  - Maintenance Safety Lab Monitoring #: 1  Total Interventions Accepted: 0  Time Spent (min): 8901  [facility], PharmD, BCPS  8/12/2020  8:01 AM

## 2020-09-14 ENCOUNTER — TELEPHONE (OUTPATIENT)
Dept: UROLOGY | Age: 70
End: 2020-09-14

## 2020-09-14 RX ORDER — TAMSULOSIN HYDROCHLORIDE 0.4 MG/1
CAPSULE ORAL
Qty: 90 CAPSULE | Refills: 0 | Status: SHIPPED | OUTPATIENT
Start: 2020-09-14 | End: 2020-12-15 | Stop reason: ALTCHOICE

## 2020-09-14 NOTE — TELEPHONE ENCOUNTER
Pharmacy is  requesting a refill on the following medications:  Requested Prescriptions     Pending Prescriptions Disp Refills    tamsulosin (FLOMAX) 0.4 MG capsule [Pharmacy Med Name: Tamsulosin HCl 0.4 MG Oral Capsule] 90 capsule 0     Sig: Take 1 capsule by mouth once daily     Pharmacy verified:  [email or web address]      Date of last visit: 04/17/19  Date of next visit (if applicable): Visit date not found

## 2020-09-16 ENCOUNTER — HOSPITAL ENCOUNTER (OUTPATIENT)
Dept: PHARMACY | Age: 70
Setting detail: THERAPIES SERIES
Discharge: HOME | End: 2020-09-16
Payer: MEDICARE

## 2020-09-16 VITALS — TEMPERATURE: 97.6 F

## 2020-09-16 LAB — POC INR: 2.7 (ref 0.8–1.2)

## 2020-09-16 PROCEDURE — 36416 COLLJ CAPILLARY BLOOD SPEC: CPT

## 2020-09-16 PROCEDURE — 85610 PROTHROMBIN TIME: CPT

## 2020-09-16 PROCEDURE — 99211 OFF/OP EST MAY X REQ PHY/QHP: CPT

## 2020-09-16 NOTE — PROGRESS NOTES
Medication Management [address] [place] [address]  [phone] (phone)  [phone] (fax)      Mr. Nishi Lucas is a 78 y.o.  male with history of Afib who presents today for anticoagulation monitoring and adjustment. Patient verifies current dosing regimen and tablet strength. No missed or extra doses. Patient denies s/s bleeding/bruising/swelling/SOB/chest pain  No blood in urine or stool. No dietary changes. No changes in medication/OTC agents/Herbals. No change in alcohol use or tobacco use. No change in activity level. Patient denies headaches/dizziness/lightheadedness/falls. No vomiting/diarrhea or acute illness. -one episode of diarrhea a few days ago  No Procedures scheduled in the future at this time. Assessment:   Lab Results   Component Value Date    INR 2.70 (H) 09/16/2020    INR 2.00 (H) 08/12/2020    INR 2.00 (H) 07/15/2020     INR therapeutic   Recent Labs     09/16/20  0711   INR 2.70*         Plan:  Continue Coumadin 5 mg W, 2.5 mg [id number]. Recheck INR in 5\ week(s). Patient reminded to call the Anticoagulation Clinic with any signs or symptoms of bleeding or with any medication changes. Patient given instructions utilizing the teach back method. Discharged ambulatory in no apparent distress. After visit summary printed and reviewed with patient.       Medications reviewed and updated on home medication list Yes    Influenza vaccine:     [] given    [] declined   [] received previously   [] plans to receive at a later time   [] refused    [x] documented in [address]: [address]: Yes  Total # of Interventions Recommended: 1  - Maintenance Safety Lab Monitoring #: 1  Total Interventions Accepted: 1  Time Spent (min): [address], PharmPEYTON, BCPS  9/16/2020  7:25 AM

## 2020-09-20 ENCOUNTER — HOSPITAL ENCOUNTER (EMERGENCY)
Age: 70
Discharge: HOME | End: 2020-09-21
Attending: STUDENT IN AN ORGANIZED HEALTH CARE EDUCATION/TRAINING PROGRAM
Payer: MEDICARE

## 2020-09-20 ENCOUNTER — APPOINTMENT (OUTPATIENT)
Dept: CT IMAGING | Age: 70
End: 2020-09-20
Payer: MEDICARE

## 2020-09-20 LAB
ALBUMIN SERPL-MCNC: 4.1 G/DL (ref 3.5–5.1)
ALP BLD-CCNC: 86 U/L (ref 38–126)
ALT SERPL-CCNC: 31 U/L (ref 11–66)
ANION GAP SERPL CALCULATED.3IONS-SCNC: 11 MEQ/L (ref 8–16)
AST SERPL-CCNC: 25 U/L (ref 5–40)
BACTERIA: ABNORMAL /HPF
BASOPHILS # BLD: 0.8 %
BASOPHILS ABSOLUTE: 0.1 THOU/MM3 (ref 0–0.1)
BILIRUB SERPL-MCNC: 0.3 MG/DL (ref 0.3–1.2)
BILIRUBIN DIRECT: < 0.2 MG/DL (ref 0–0.3)
BILIRUBIN URINE: NEGATIVE
BLOOD, URINE: ABNORMAL
BUN BLDV-MCNC: 15 MG/DL (ref 7–22)
CALCIUM SERPL-MCNC: 9.3 MG/DL (ref 8.5–10.5)
CASTS 2: ABNORMAL /LPF
CASTS UA: ABNORMAL /LPF
CHARACTER, URINE: ABNORMAL
CHLORIDE BLD-SCNC: 102 MEQ/L (ref 98–111)
CO2: 24 MEQ/L (ref 23–33)
COLOR: ABNORMAL
CREAT SERPL-MCNC: 1.1 MG/DL (ref 0.4–1.2)
CRYSTALS, UA: ABNORMAL
EOSINOPHIL # BLD: 3 %
EOSINOPHILS ABSOLUTE: 0.3 THOU/MM3 (ref 0–0.4)
EPITHELIAL CELLS, UA: ABNORMAL /HPF
ERYTHROCYTE [DISTWIDTH] IN BLOOD BY AUTOMATED COUNT: 13.4 % (ref 11.5–14.5)
ERYTHROCYTE [DISTWIDTH] IN BLOOD BY AUTOMATED COUNT: 43.7 FL (ref 35–45)
GFR SERPL CREATININE-BSD FRML MDRD: 66 ML/MIN/1.73M2
GLUCOSE BLD-MCNC: 181 MG/DL (ref 70–108)
GLUCOSE URINE: NEGATIVE MG/DL
HCT VFR BLD CALC: 49.2 % (ref 42–52)
HEMOGLOBIN: 16.6 GM/DL (ref 14–18)
IMMATURE GRANS (ABS): 0.02 THOU/MM3 (ref 0–0.07)
IMMATURE GRANULOCYTES: 0.2 %
KETONES, URINE: NEGATIVE
LEUKOCYTE ESTERASE, URINE: ABNORMAL
LYMPHOCYTES # BLD: 36.2 %
LYMPHOCYTES ABSOLUTE: 3.1 THOU/MM3 (ref 1–4.8)
MCH RBC QN AUTO: 30 PG (ref 26–33)
MCHC RBC AUTO-ENTMCNC: 33.7 GM/DL (ref 32.2–35.5)
MCV RBC AUTO: 89 FL (ref 80–94)
MISCELLANEOUS 2: ABNORMAL
MONOCYTES # BLD: 6.5 %
MONOCYTES ABSOLUTE: 0.6 THOU/MM3 (ref 0.4–1.3)
NITRITE, URINE: NEGATIVE
NUCLEATED RED BLOOD CELLS: 0 /100 WBC
OSMOLALITY CALCULATION: 279.2 MOSMOL/KG (ref 275–300)
PH UA: 5 (ref 5–9)
PLATELET # BLD: 244 THOU/MM3 (ref 130–400)
PMV BLD AUTO: 11.1 FL (ref 9.4–12.4)
POTASSIUM REFLEX MAGNESIUM: 3.8 MEQ/L (ref 3.5–5.2)
PROTEIN UA: 100
RBC # BLD: 5.53 MILL/MM3 (ref 4.7–6.1)
RBC URINE: > 200 /HPF
RENAL EPITHELIAL, UA: ABNORMAL
SEG NEUTROPHILS: 53.3 %
SEGMENTED NEUTROPHILS ABSOLUTE COUNT: 4.6 THOU/MM3 (ref 1.8–7.7)
SODIUM BLD-SCNC: 137 MEQ/L (ref 135–145)
SPECIFIC GRAVITY, URINE: 1.02 (ref 1–1.03)
TOTAL PROTEIN: 7.2 G/DL (ref 6.1–8)
UROBILINOGEN, URINE: 0.2 EU/DL (ref 0–1)
WBC # BLD: 8.7 THOU/MM3 (ref 4.8–10.8)
WBC UA: ABNORMAL /HPF
YEAST: ABNORMAL

## 2020-09-20 PROCEDURE — 2580000003 HC RX 258: Performed by: STUDENT IN AN ORGANIZED HEALTH CARE EDUCATION/TRAINING PROGRAM

## 2020-09-20 PROCEDURE — 85025 COMPLETE CBC W/AUTO DIFF WBC: CPT

## 2020-09-20 PROCEDURE — 99283 EMERGENCY DEPT VISIT LOW MDM: CPT

## 2020-09-20 PROCEDURE — 87077 CULTURE AEROBIC IDENTIFY: CPT

## 2020-09-20 PROCEDURE — 80076 HEPATIC FUNCTION PANEL: CPT

## 2020-09-20 PROCEDURE — 36415 COLL VENOUS BLD VENIPUNCTURE: CPT

## 2020-09-20 PROCEDURE — 85610 PROTHROMBIN TIME: CPT

## 2020-09-20 PROCEDURE — 80048 BASIC METABOLIC PNL TOTAL CA: CPT

## 2020-09-20 PROCEDURE — 87086 URINE CULTURE/COLONY COUNT: CPT

## 2020-09-20 PROCEDURE — 74177 CT ABD & PELVIS W/CONTRAST: CPT

## 2020-09-20 PROCEDURE — 99284 EMERGENCY DEPT VISIT MOD MDM: CPT

## 2020-09-20 PROCEDURE — 81001 URINALYSIS AUTO W/SCOPE: CPT

## 2020-09-20 PROCEDURE — 6360000004 HC RX CONTRAST MEDICATION: Performed by: STUDENT IN AN ORGANIZED HEALTH CARE EDUCATION/TRAINING PROGRAM

## 2020-09-20 PROCEDURE — 87186 SC STD MICRODIL/AGAR DIL: CPT

## 2020-09-20 RX ORDER — 0.9 % SODIUM CHLORIDE 0.9 %
1000 INTRAVENOUS SOLUTION INTRAVENOUS ONCE
Status: COMPLETED | OUTPATIENT
Start: 2020-09-20 | End: 2020-09-20

## 2020-09-20 RX ADMIN — SODIUM CHLORIDE 1000 ML: 9 INJECTION, SOLUTION INTRAVENOUS at 22:24

## 2020-09-20 RX ADMIN — IOPAMIDOL 80 ML: 755 INJECTION, SOLUTION INTRAVENOUS at 23:30

## 2020-09-20 ASSESSMENT — ENCOUNTER SYMPTOMS
ABDOMINAL PAIN: 0
SINUS PAIN: 0
DIARRHEA: 0
RHINORRHEA: 0
COUGH: 0
BACK PAIN: 0
SHORTNESS OF BREATH: 0
SORE THROAT: 0
NAUSEA: 0
VOMITING: 0
EYE REDNESS: 0

## 2020-09-21 VITALS
HEIGHT: 73 IN | DIASTOLIC BLOOD PRESSURE: 83 MMHG | TEMPERATURE: 97.9 F | HEART RATE: 75 BPM | BODY MASS INDEX: 32.47 KG/M2 | WEIGHT: 245 LBS | RESPIRATION RATE: 20 BRPM | OXYGEN SATURATION: 97 % | SYSTOLIC BLOOD PRESSURE: 129 MMHG

## 2020-09-21 LAB — INR BLD: 2.64 (ref 0.85–1.13)

## 2020-09-21 NOTE — ED PROVIDER NOTES
[place] ENCOUNTER          Pt Name: Victor M Duenas  MRN: [id number]  [place] 1950  Date of evaluation: 9/20/2020  Treating Resident Physician: Gita Mckay MD  Supervising Physician: Dr. Gaudencio Roldan       Chief Complaint   Patient presents with    Hematuria     History obtained from chart review and the patient. HISTORY OF PRESENT ILLNESS    HPI  Victor M Duenas is a 78 y.o. male bph and on warfarin for his atrial fibrillation who presents to the emergency department for evaluation of 2 episodes of hematuria that occurred tonight starting at 1700. He mentions having no back pain and only having one small episode of left lower quadrant describes a cramping for approximately 15 seconds. Currently in emergency room he has no abdominal pain whatsoever. The patient has no other acute complaints at this time. REVIEW OF SYSTEMS   Review of Systems   Constitutional: Negative for chills and fever. HENT: Negative for rhinorrhea, sinus pain and sore throat. Eyes: Negative for redness. Respiratory: Negative for cough and shortness of breath. Cardiovascular: Negative for chest pain. Gastrointestinal: Negative for abdominal pain, diarrhea, nausea and vomiting. Genitourinary: Positive for hematuria. Negative for dysuria. Musculoskeletal: Negative for back pain. Skin: Negative for rash. Neurological: Negative for light-headedness and headaches. Psychiatric/Behavioral: Negative for agitation.          PAST MEDICAL AND SURGICAL HISTORY     Past Medical History:   Diagnosis Date    Anxiety     Atrial fibrillation ([address]     Benign prostatic hyperplasia with urinary frequency 10/21/2019    CAD (coronary artery disease)     Chest pain     Colon polyp 2011    removed    Coronary artery disease involving native coronary artery of native heart without angina pectoris 10/21/2019    Dementia 3    bisacodyl (DULCOLAX) 5 MG EC tablet, Take 5 mg by mouth nightly , Disp: , Rfl:       SOCIAL HISTORY     Social History     Social History Narrative    Not on file     Social History     Tobacco Use    Smoking status: Never Smoker    Smokeless tobacco: Never Used   Substance Use Topics    Alcohol use: No    Drug use: No         ALLERGIES   No Known Allergies      FAMILY HISTORY     Family History   Problem Relation Age of Onset    Heart Surgery Father     Cancer Father         lung    Heart Disease Father     Alzheimer's Disease Mother          PREVIOUS RECORDS   Previous records reviewed: Patient was seen here on 1/23/2020 for left arm pain. PHYSICAL EXAM     ED Triage Vitals   BP Temp Temp Source Pulse Resp SpO2 Height Weight   09/20/20 2111 09/20/20 2113 09/20/20 2113 09/20/20 2111 09/20/20 2111 09/20/20 2111 09/20/20 2111 09/20/20 2111   (!) 151/102 97.9 °F (36.6 °C) Oral 63 18 96 % 6' 1\" (1.854 m) 245 lb (111.1 kg)     Initial vital signs and nursing assessment reviewed and normal. Pulsoximetry is normal per my interpretation. Additional Vital Signs:  Vitals:    09/20/20 2344   BP: 120/70   Pulse: 79   Resp: 27   Temp:    SpO2: 97%       Physical Exam  Vitals signs and nursing note reviewed. Constitutional:       General: He is not in acute distress. Appearance: Normal appearance. He is not ill-appearing, toxic-appearing or diaphoretic. HENT:      Head: Normocephalic and atraumatic. Right Ear: External ear normal.      Left Ear: External ear normal.      Nose: Nose normal.      Mouth/Throat:      Mouth: Mucous membranes are moist.      Pharynx: Oropharynx is clear. Eyes:      General: No scleral icterus. Conjunctiva/sclera: Conjunctivae normal.   Neck:      Musculoskeletal: Normal range of motion and neck supple. No neck rigidity or muscular tenderness. Cardiovascular:      Rate and Rhythm: Normal rate and regular rhythm. Pulses: Normal pulses.       Heart sounds: Normal heart sounds. No murmur. Pulmonary:      Effort: Pulmonary effort is normal. No respiratory distress. Breath sounds: Normal breath sounds. No wheezing or rales. Abdominal:      General: Abdomen is flat. Bowel sounds are normal. There is no distension. Palpations: Abdomen is soft. Tenderness: There is abdominal tenderness. There is no right CVA tenderness, left CVA tenderness, guarding or rebound. Comments: Patient had left lower quadrant tenderness with palpation. Musculoskeletal: Normal range of motion. Lymphadenopathy:      Cervical: No cervical adenopathy. Skin:     General: Skin is warm and dry. Capillary Refill: Capillary refill takes less than 2 seconds. Neurological:      General: No focal deficit present. Mental Status: He is alert and oriented to person, place, and time. Psychiatric:         Mood and Affect: Mood normal.         MEDICAL DECISION MAKING   Initial Assessment: This is a 46-year-old male with A. fib who is anticoagulated with warfarin who is presenting with 2 episodes of hematuria tonight starting at 1700. Patient denies any back pain during these episodes but does mention a short brief episode of left lower quadrant pain that lasted a few seconds and dissipated. Currently in emergency department patient denies any pain. Differential Diagnosis Included but not limited to: Nephrolithiasis, diverticulitis, hematuria secondary to anticoagulation, urinary tract infection, cystitis    Plan:  We will obtain laboratory studies as well as a CT abdomen pelvis with IV contrast.        ED RESULTS   Laboratory results:  Labs Reviewed   BASIC METABOLIC PANEL W/ REFLEX TO MG FOR LOW K - Abnormal; Notable for the following components:       Result Value    Glucose 181 (*)     All other components within normal limits   GLOMERULAR FILTRATION RATE, ESTIMATED - Abnormal; Notable for the following components:    Est, Glom Filt Rate 66 (*)     All other components within normal limits   URINE WITH REFLEXED MICRO - Abnormal; Notable for the following components:    Blood, Urine LARGE (*)     Protein,  (*)     Leukocyte Esterase, Urine SMALL (*)     Color, UA RED (*)     Character, Urine CLOUDY (*)     All other components within normal limits   PROTIME-INR - Abnormal; Notable for the following components:    INR 2.64 (*)     All other components within normal limits   CULTURE, REFLEXED, URINE   CBC WITH AUTO DIFFERENTIAL   HEPATIC FUNCTION PANEL   ANION GAP   OSMOLALITY       Radiologic studies results:  CT ABDOMEN PELVIS W IV CONTRAST Additional Contrast? None   Final Result      1. There is an enlarged heterogenous appearance of the prostate gland. 2. Hepatic steatosis. 3. There are several nonspecific lucent lesions in the osseous structures. This is similar in appearance to the previous exam.         **This report has been created using voice recognition software. It may contain minor errors which are inherent in voice recognition technology. **      Final report electronically signed by Dr Otf Porras on 9/20/2020 11:48 PM          ED Medications administered this visit:   Medications   0.9 % sodium chloride bolus (0 mLs Intravenous Stopped 9/20/20 2344)   iopamidol (ISOVUE-370) 76 % injection 80 mL (80 mLs Intravenous Given 9/20/20 2330)         ED COURSE     ED Course as of Sep 21 0025   Sun Sep 20, 2020   2223 Patient is slightly dehydrated upon his arrival and states he urinated prior to coming in, will give him some fluids to help rehydrate him and help him produce some urine for us to obtain a sample. [AL]   2145 CT contacted, patient will be transferred over the right now for scan. [AL]      ED Course User Index  [AL] Gasper Beverly MD     Strict return precautions and follow up instructions were discussed with the patient prior to discharge, with which the patient agrees.       MEDICATION CHANGES     New Prescriptions    No medications on file          FINAL DISPOSITION     Final diagnoses:   Hematuria, unspecified type     Condition: condition: good  Dispo: Discharge to home      This transcription was electronically signed. Parts of this transcriptions may have been dictated by use of voice recognition software and electronically transcribed, and parts may have been transcribed with the assistance of an ED scribe. The transcription may contain errors not detected in proofreading. Please refer to my supervising physician's documentation if my documentation differs.     Electronically Signed: Rafael Salgado, 09/21/20, 12:25 AM          Rafael Salgado MD  Resident  09/21/20 3317

## 2020-09-21 NOTE — ED TRIAGE NOTES
Pt comes to ED from home with c/o blood in his urine. Pt states is started today and he had two episodes. Pt states there is no clots. Pt states he takes warfarin for A fib. Pt denies diarrhea or constipation. Pt denies burning, urgency or increased frequency with urination. Pt states he does have an enlarged prostate. PT states this has not happened before.

## 2020-09-21 NOTE — TELEPHONE ENCOUNTER
Patient called back was in er last night. Still has blood in urine. Schedule with Dr Jessica Dear on 9-22.  Patient voiced understanding

## 2020-09-21 NOTE — ED NOTES
Pt VS reassessed and RR reg. Pt took sip of water and this RN told pt to hold off until we get scans back. PT verbalized understanding. Pt appears anxious. Pt states he is hot. Notified provider and Dr. Boogie Lind placed fluid bolus order. Dr. Roddy Mendes at bedside.  Will continue to monitor      Katherine Maria RN  09/20/20 [id number]

## 2020-09-22 ENCOUNTER — TELEPHONE (OUTPATIENT)
Dept: UROLOGY | Age: 70
End: 2020-09-22

## 2020-09-22 ENCOUNTER — OFFICE VISIT (OUTPATIENT)
Dept: UROLOGY | Age: 70
End: 2020-09-22
Payer: MEDICARE

## 2020-09-22 VITALS — BODY MASS INDEX: 32.42 KG/M2 | HEIGHT: 73 IN | WEIGHT: 244.6 LBS | TEMPERATURE: 97.1 F

## 2020-09-22 LAB
BILIRUBIN URINE: NEGATIVE
BLOOD URINE, POC: NEGATIVE
CHARACTER, URINE: CLEAR
COLOR, URINE: YELLOW
GLUCOSE URINE: NEGATIVE MG/DL
KETONES, URINE: NEGATIVE
LEUKOCYTE CLUMPS, URINE: NEGATIVE
NITRITE, URINE: NEGATIVE
ORGANISM: ABNORMAL
PH, URINE: 5.5 (ref 5–9)
PROTEIN, URINE: NEGATIVE MG/DL
SPECIFIC GRAVITY, URINE: <= 1.005 (ref 1–1.03)
URINE CULTURE REFLEX: ABNORMAL
URINE CULTURE REFLEX: ABNORMAL
UROBILINOGEN, URINE: 0.2 EU/DL (ref 0–1)

## 2020-09-22 PROCEDURE — 1036F TOBACCO NON-USER: CPT | Performed by: UROLOGY

## 2020-09-22 PROCEDURE — G8427 DOCREV CUR MEDS BY ELIG CLIN: HCPCS | Performed by: UROLOGY

## 2020-09-22 PROCEDURE — 1123F ACP DISCUSS/DSCN MKR DOCD: CPT | Performed by: UROLOGY

## 2020-09-22 PROCEDURE — 99214 OFFICE O/P EST MOD 30 MIN: CPT | Performed by: UROLOGY

## 2020-09-22 PROCEDURE — G8417 CALC BMI ABV UP PARAM F/U: HCPCS | Performed by: UROLOGY

## 2020-09-22 PROCEDURE — 3017F COLORECTAL CA SCREEN DOC REV: CPT | Performed by: UROLOGY

## 2020-09-22 PROCEDURE — 4040F PNEUMOC VAC/ADMIN/RCVD: CPT | Performed by: UROLOGY

## 2020-09-22 PROCEDURE — 81003 URINALYSIS AUTO W/O SCOPE: CPT | Performed by: UROLOGY

## 2020-09-22 NOTE — PROGRESS NOTES
Juan M Dewey MD        [address] [place] [address] [phone]  Dept: [phone]  Dept Fax: [phone]: [address] [address] Urology Office Note -     Patient:  Urszula Sandy  YOB: 1950    The patient is a 78 y.o. male who presents today for evaluation of the following problems: Hematuria  Chief Complaint   Patient presents with    Follow-up     Er follow up hematuria         HISTORY OF PRESENT ILLNESS:     Gross Hematuria  Onset was days ago. Overall, the problem(s) are worse. Severity is described as moderate. Associated Symptoms: No dysuria,  gross hematuria present. Current Pain Severity: 3      He has had a couple episodes of gross hematuria. He has never had a cystoscopy in the past.         Secondary Diagnosis:    Elevated PSA-  Here with PSA. Has dropped. Last PSA was 3.60 was done on 8/4/2020. We discussed doing a prostate biopsy. BPH-On flomax. Abnormal heterogenous prostate on CT      Summary of Previous Records:  Urszula Sandy is a pleasant 81 y.o. with past medical history of Atrial fibrillation, CAD, HTN, and GERD who presents in follow-up today for elevated PSA of 4.94 on 10/10/18 previously 4.39 one month prior. His PSA has been monitored around every 2 years by his PCP. Today repeat 6 month PSA did decrease to 3.59. He admits to occasional urinary leakage when running the water in the sink but believes it is infrequent and rather satisfied with his symptoms. He did not believe any intervention was needed. He is taking Flomax which is working well for him and would like to continue.       Requested/reviewed records from Eloise Spain MD office and/or outside physician/EMR    (Patient's old records have been requested, reviewed and pertinent findings summarized in today's note.)    Procedures Today: N/A    Last several PSA's:  Lab Results   Component Value Date    PSA 3.60 (H) 08/04/2020    PSA 3.59 (H) 04/10/2019    PSA 4.94 (H) 10/10/2018       Last total testosterone:  No results found for: TESTOSTERONE    Urinalysis today:  Results for POC orders placed in visit on 09/22/20   POCT Urinalysis No Micro (Auto)   Result Value Ref Range    Glucose, Ur Negative NEGATIVE mg/dl    Bilirubin Urine Negative     Ketones, Urine Negative NEGATIVE    Specific Gravity, Urine <= 1.005 1.002 - 1.030    Blood, UA POC Negative NEGATIVE    pH, Urine 5.50 5.0 - 9.0    Protein, Urine Negative NEGATIVE mg/dl    Urobilinogen, Urine 0.20 0.0 - 1.0 eu/dl    Nitrite, Urine Negative NEGATIVE    Leukocyte Clumps, Urine Negative NEGATIVE    Color, Urine Yellow YELLOW-STRAW    Character, Urine Clear [id number]       Last BUN and creatinine:  Lab Results   Component Value Date    BUN 15 09/20/2020     Lab Results   Component Value Date    CREATININE 1.1 09/20/2020       Imaging Reviewed during this Office Visit:   Tessa Guzman MD independently reviewed the images and verified the radiology reports from:    Ct Abdomen Pelvis W Iv Contrast Additional Contrast? None    Result Date: 9/20/2020  PROCEDURE: CT ABDOMEN PELVIS W IV CONTRAST CLINICAL INFORMATION: 21-year-old male with left lower quadrant abdominal pain. Hematuria . COMPARISON: CT scan 12/08/2018. TECHNIQUE: 5 mm axial CT images were obtained through the abdomen and pelvis after the administration of intravenous and oral contrast. Coronal and sagittal reconstructions were obtained. All CT scans at this facility use dose modulation, iterative reconstruction, and/or weight-based dosing when appropriate to reduce radiation dose to as low as reasonably achievable. FINDINGS: There is atelectasis at the lung bases. The base of the heart is within normal limits. The liver is hypoattenuated. This may be due to fatty infiltration. The spleen, pancreas, gallbladder and adrenal glands are within normal limits.  The kidneys are symmetric in size, shape and degree of enhancement. There is no hydronephrosis. There is no evidence of a small bowel obstruction. There is no colonic wall thickening or edema. The appendix appears normal with no inflammatory changes. The prostate gland is enlarged and has a heterogenous appearance. The urinary bladder has a normal appearance. The aorta and the IVC are normal in caliber. There is a fat-containing ventral abdominal wall hernia. There is no free intraperitoneal air or free fluid in the abdomen or pelvis. There are degenerative changes. There are several lucent lesions in the bones. The most notable is at the L5 vertebral body measuring 2 x 1.4 cm. 1. There is an enlarged heterogenous appearance of the prostate gland. 2. Hepatic steatosis. 3. There are several nonspecific lucent lesions in the osseous structures. This is similar in appearance to the previous exam. **This report has been created using voice recognition software. It may contain minor errors which are inherent in voice recognition technology. ** Final report electronically signed by Dr Binu Tesfaye on 9/20/2020 11:48 PM      PAST MEDICAL, FAMILY AND SOCIAL HISTORY:  Past Medical History:   Diagnosis Date    Anxiety     Atrial fibrillation ([address]     Benign prostatic hyperplasia with urinary frequency 10/21/2019    CAD (coronary artery disease)     Chest pain     Colon polyp 2011    removed    Coronary artery disease involving native coronary artery of native heart without angina pectoris 10/21/2019    Dementia ([address]     Dizziness     Elevated PSA     GERD (gastroesophageal reflux disease)     Heart disease     Hyperlipidemia     Hypertension     Hypothyroidism      Past Surgical History:   Procedure Laterality Date    ADENOIDECTOMY      CARDIAC CATHETERIZATION  09/23/2011    Right radial artery cannulation. Moderate LV dysfunction.  The patient has disease in the ostium of diagonal 1 and diagonal 2 branch, however they are both are at the  ostium of the diagonals. Intervention could compromise flow of LAD. THe LAD has long diffuse calcified lesion 50-60-% anatomically.  CARDIOVASCULAR STRESS TEST  09/23/2011    EF 37%    CARPAL TUNNEL RELEASE      COLONOSCOPY      TONSILLECTOMY      TRANSTHORACIC ECHOCARDIOGRAM  09/23/2011    EF 50-55%    VASECTOMY      VASECTOMY       Family History   Problem Relation Age of Onset    Heart Surgery Father     Cancer Father         lung    Heart Disease Father     Alzheimer's Disease Mother      Outpatient Medications Marked as Taking for the 9/22/20 encounter (Office Visit) with Victoriano Figueroa MD   Medication Sig Dispense Refill    tamsulosin (FLOMAX) 0.4 MG capsule Take 1 capsule by mouth once daily 90 capsule 0    warfarin (COUMADIN) 2.5 MG tablet TAKE AS DIRECTED BY [facility] tabs = 30 day supply 35 tablet 5    metoprolol succinate (TOPROL XL) 50 MG extended release tablet Take 1 tablet by mouth daily 90 tablet 3    bumetanide (BUMEX) 2 MG tablet Take 1 tablet by mouth daily 90 tablet 3    digoxin (LANOXIN) 125 MCG tablet TAKE 1 TABLET BY MOUTH ONCE DAILY 90 tablet 3    dilTIAZem (CARDIZEM CD) 360 MG extended release capsule Take 1 capsule by mouth once daily 90 capsule 3    nitroGLYCERIN (NITROSTAT) 0.4 MG SL tablet Place 1 tablet under the tongue every 5 minutes as needed for Chest pain 25 tablet 3    potassium chloride (KLOR-CON) 10 MEQ extended release tablet TAKE 1 TABLET BY MOUTH TWICE DAILY 180 tablet 3    pravastatin (PRAVACHOL) 80 MG tablet Take 1 tablet by mouth nightly 90 tablet 3    bisacodyl (DULCOLAX) 5 MG EC tablet Take 5 mg by mouth nightly          Patient has no known allergies.   Social History     Tobacco Use   Smoking Status Never Smoker   Smokeless Tobacco Never Used      (If patient a smoker, smoking cessation counseling offered)   Social History     Substance and Sexual Activity   Alcohol Use No       REVIEW OF SYSTEMS:  Constitutional: negative  Eyes: negative  Respiratory: negative  Cardiovascular: negative  Gastrointestinal: negative  Genitourinary: see HPI  Musculoskeletal: negative  Skin: negative   Neurological: negative  Hematological/Lymphatic: negative  Psychological: negative        Physical Exam:    This a 78 y.o. male  Vitals:    09/22/20 1002   Temp: 97.1 °F (36.2 °C)     Body mass index is 32.27 kg/m². Constitutional: Patient in no acute distress;       Assessment and Plan        1. Hematuria, unspecified type               Plan:      Pt very nervous and anxious today in office  Gross Hematuria- will need a cystoscopy. BPH- on flomax. Elevated PSA- last PSA elevated in August 2020. Schedule prostate biopsy and cystoscopy in the OR. Prescriptions Ordered:  No orders of the defined types were placed in this encounter.      Orders Placed:  Orders Placed This Encounter   Procedures    POCT Urinalysis No Micro (Auto)            TONIA Borden MD

## 2020-09-23 ENCOUNTER — TELEPHONE (OUTPATIENT)
Dept: FAMILY MEDICINE CLINIC | Age: 70
End: 2020-09-23

## 2020-09-23 NOTE — PROGRESS NOTES
Pharmacy Note  ED Culture Follow-up    Urszula Sandy is a 78 y.o. male. Allergies: Patient has no known allergies. Labs:  Lab Results   Component Value Date    BUN 15 09/20/2020    CREATININE 1.1 09/20/2020    WBC 8.7 09/20/2020     Estimated Creatinine Clearance: 84 mL/min (based on SCr of 1.1 mg/dL). Current antimicrobials:   none    ASSESSMENT:  Micro results:   Urine culture: positive for e. Coli <10K     PLAN:  Need for intervention: No  Discussed with: LUCY Foster  Chosen treatment:    Patient will be treated by specialist    Patient response:   No need to contact patient    Called/sent in prescription to: Not applicable    Please call with any questions.  Benoit Alvarez, PharmD 4:54 PM 9/23/2020

## 2020-09-23 NOTE — LETTER
[address] [facility]  [address] [facility], [address]  Phone: [phone]  Fax: [phone]    September 23, 2020    [address] [place] [id number]    Dear Celeste Draper,    Thank you for choosing our [facility] on 9/21/20. Dr. Trace Clinton wanted to make sure that you understand your discharge instructions and that you were able to fill any prescriptions that may have been ordered for you. Please contact the office at the above phone number if advised you to follow up with Dr. Trace Clinton, or if you have any further questions or needs. Also, did you know -                             [facility]) practices can often offer you an appointment on the same day that you call for acute issues. *We have some [facility] offices that offer Walk-in appointments; check our website for availability in your community, [email or web address].      *Evisits are now available for patients through [address] If you do not have MyChart and are interested, please contact the office and a staff member may assist you or go to [email or web address].     Sincerely,   Trace Clinton MD and your [facility]

## 2020-09-24 ENCOUNTER — TELEPHONE (OUTPATIENT)
Dept: UROLOGY | Age: 70
End: 2020-09-24

## 2020-09-24 ENCOUNTER — TELEPHONE (OUTPATIENT)
Dept: PHARMACY | Age: 70
End: 2020-09-24

## 2020-09-24 NOTE — TELEPHONE ENCOUNTER
SURGERY [address], [address]      Phone *[phone] *[phone]   Surgical Scheduling Direct Line Phone *[phone] Fax *[phone] 1950 male    [address]  [address] [id number]  Marital Status:          Home Phone: [phone]      Cell Phone:    Telephone Information:   Mobile [phone]          Surgeon: Dr. Toby Gimenez  Surgery Date: 10-   Time: [facility]    Procedure: Cystoscopy, Transrectal ultrasound guided prostate biopsy    Diagnosis: Hematuria/ Elevated PSA     Important Medical History: In Epic    Special Inst/Equip: APRIL Moncada in Ultrasound notified    CPT Codes:    42181, 55858  Latex Allergy:  No     Cardiac Device:  No     Anesthesia:  MAC          Admission Type:  Same Day                             Admit Prior to Day of Surgery: No    Case Location:  Main OR           Preadmission Testing:Phone Call              PAT Date and Time:______________________________________________________    PAT Confirmation #: ______________________________________________________    Post Op Visit: ___________________________________________________________    Need Preop Cardiac Clearance: Yes    Does Patient have Cardiologist/physician?      Dr. Xavier Ny    Surgery Confirmation #: __________________________________________________    Bertrum Vee: ________________________   Date: __________________________     [employer] Name: Medicare

## 2020-09-24 NOTE — TELEPHONE ENCOUNTER
Patient scheduled for surgery on 10- with Dr. Gia Maynard, cystoscopy, transrectal ultrasound guided prostate biopsy under a monitored anesthetic. Could you please ask Dr. Aleksandar Domingo if the patient is okay to proceed with this scheduled surgery and if he can hold his Coumadin 5-days prior?   Thank you

## 2020-09-24 NOTE — TELEPHONE ENCOUNTER
Patient is scheduled for surgery with Dr. Matias Ann on 10- at 8:30am with an arrival of 6:30am.  Preop orders and instructions given to patient. Surgery consent signed. Tavo Magana in ultrasound notified.

## 2020-09-24 NOTE — TELEPHONE ENCOUNTER
Patient scheduled for surgery on 10- with Dr. Reece Palma, cystoscopy, transrectal ultrasound guided prostate biopsy. He is also scheduled to have a flu shot on 09/30/2020. Will this affect anything? Please advise. Thank you.

## 2020-09-24 NOTE — TELEPHONE ENCOUNTER
Notified by Cindi Morales at Dr. Bri Casillas office that pt will be having a Cystoscopy, Transrectal ultrasound guided prostate biopsy on 10/6 and needs to be off coumadin x 5 days prior. CHADsVASc calculated at 2. Cardiac clearance by Dr. Suzette Spicer noted. Usual Coumadin regimen is 5 mg W, 2.5 mg MTThFSS. Called pt. Gave instructions to hold Coumadin 10/1-10/6. Coumadin 5mg on 10/7 and 3.75mg on 10/8, then return to usual regimen of 5 mg W, 2.5 mg MTThFSS. INR check on 10/15 at [facility]. Instructions given via teachback method.

## 2020-09-24 NOTE — TELEPHONE ENCOUNTER
Attempted to call the patient. Voicemail left stating the flu shot should not affect anything and to return the call for any questions.

## 2020-09-25 ENCOUNTER — PREP FOR PROCEDURE (OUTPATIENT)
Dept: UROLOGY | Age: 70
End: 2020-09-25

## 2020-09-25 RX ORDER — SODIUM CHLORIDE 9 MG/ML
INJECTION, SOLUTION INTRAVENOUS CONTINUOUS
Status: CANCELLED | OUTPATIENT
Start: 2020-10-06

## 2020-09-29 ENCOUNTER — HOSPITAL ENCOUNTER (OUTPATIENT)
Age: 70
Discharge: HOME | End: 2020-09-29
Payer: MEDICARE

## 2020-09-29 ENCOUNTER — HOSPITAL ENCOUNTER (OUTPATIENT)
Dept: GENERAL RADIOLOGY | Age: 70
Discharge: HOME | End: 2020-09-29
Payer: MEDICARE

## 2020-09-29 PROCEDURE — U0003 INFECTIOUS AGENT DETECTION BY NUCLEIC ACID (DNA OR RNA); SEVERE ACUTE RESPIRATORY SYNDROME CORONAVIRUS 2 (SARS-COV-2) (CORONAVIRUS DISEASE [COVID-19]), AMPLIFIED PROBE TECHNIQUE, MAKING USE OF HIGH THROUGHPUT TECHNOLOGIES AS DESCRIBED BY CMS-2020-01-R: HCPCS

## 2020-09-29 PROCEDURE — 71046 X-RAY EXAM CHEST 2 VIEWS: CPT

## 2020-09-29 NOTE — PROGRESS NOTES
In preparation for their surgical procedure above patient was screened for Obstructive Sleep Apnea (TRENT) using the STOP-Bang Questionnaire by the Pre-Admission Testing department. This is a pre-surgical screening tool for patient safety and serves as a recommendation, this WILL NOT cause cancellation of surgery. STOP-Bang Questionnaire  * Do you currently see a pulmonologist?  No     If yes STOP, do not complete. Patient follows with Dr.     1.  Do you snore loudly (able to be heard in the next room)? No    2. Do you often feel tired or sleepy during the daytime? No       3. Has anyone ever told you that you stop breathing during your sleep? No    4. Do you have or are you being treated for high blood pressure? Yes      5. BMI more than 35? BMI (Calculated): 32.4        No    6. Age over 48 years? 78 y.o. Yes    7. Neck Circumference greater than 17 inches for male or 16 inches for female? Measured           (visits only)            Not Applicable    8. Gender Male? Yes      TOTAL SCORE: 3    TRENT - Low Risk : Yes to 0 - 2 questions  TRENT - Intermediate Risk : Yes to 3 - 4 questions  TRENT - High Risk : Yes to 5 - 8 questions    Adapted from:   STOP Questionnaire: A Tool to Screen Patients for Obstructive Sleep Apnea   WYATT Benton.C.P.C., Michelle Godinez M.B.B.S., Katharine Whaley M.D., Lia Peres. Burnard Dakin, Ph.D., MANOLO Sewell.B.B.S., Claudean Kindler, M.Sc., Concepción Villar M.D., Jenna Cash. MARCO A Dodd.P.C.    Anesthesiology 2008; 674:989-68 Copyright 2008, the [address] [employer], [place].   ----------------------------------------------------------------------------------------------------------------

## 2020-10-01 ENCOUNTER — TELEPHONE (OUTPATIENT)
Dept: UROLOGY | Age: 70
End: 2020-10-01

## 2020-10-01 LAB — SARS-COV-2: NOT DETECTED

## 2020-10-02 ENCOUNTER — IMMUNIZATION (OUTPATIENT)
Dept: FAMILY MEDICINE CLINIC | Age: 70
End: 2020-10-02
Payer: MEDICARE

## 2020-10-02 PROCEDURE — 90694 VACC AIIV4 NO PRSRV 0.5ML IM: CPT | Performed by: FAMILY MEDICINE

## 2020-10-02 PROCEDURE — G0008 ADMIN INFLUENZA VIRUS VAC: HCPCS | Performed by: FAMILY MEDICINE

## 2020-10-02 NOTE — PROGRESS NOTES
Immunizations Administered     Name Date Dose Route    Influenza, Quadv, adjuvanted, 65 yrs +, IM, PF (Fluad) 10/2/2020 0.5 mL Intramuscular    Site: Deltoid- Left    Lot: [id number]    NDC: 51437-015-14          Vaccine Information Sheet, \"Influenza - Inactivated\"  given to Willow Saucer, or parent/legal guardian of  Wilton Oquendoucer and verbalized understanding. Patient responses:    Have you ever had a reaction to a flu vaccine? No  Do you have an allergy to eggs, Latex -induced anaphylaxis, neomycin or polymixin? No  Do you have an allergy to Thimerosal, contact lens solution, or Merthiolate? No  Have you ever had Guillian [place] Syndrome? No  Do you have any current illness? No  Do you have a temperature above 100.4 degrees? No  Are you pregnant? No  If pregnant, permission obtained from physician? N/A  Do you have an active neurological disorder? No      Flu vaccine given per order.

## 2020-10-05 NOTE — PROGRESS NOTES
Patient contacted regarding COVID-19 screen. Following questions asked: In the last month, have you been in contact with someone who was confirmed or suspected to have Coronavirus/COVID-19:  Patient stated NO  Tested positive in the last 14 days for COVID? Pt Stated no    Pt was informed can be a visitor allowed. Please bring masks. Inform pt will need to have urine test done if she hasn't had a tubal ligation or hysterectomy. Do you or family members have any of the following symptoms:  Cough-no   Muscle pain-no   Shortness of breath-no   Fever-no   Weakness-no  Severe headache-no   Sore throat-no   Respiratory symptoms-no    Have you traveled internationally in the last month?  No     Have you been to the emergency room recently-no

## 2020-10-06 ENCOUNTER — ANESTHESIA (OUTPATIENT)
Dept: OPERATING ROOM | Age: 70
End: 2020-10-06
Payer: MEDICARE

## 2020-10-06 ENCOUNTER — HOSPITAL ENCOUNTER (OUTPATIENT)
Age: 70
Setting detail: OUTPATIENT SURGERY
Discharge: HOME | End: 2020-10-06
Attending: UROLOGY | Admitting: UROLOGY
Payer: MEDICARE

## 2020-10-06 ENCOUNTER — HOSPITAL ENCOUNTER (OUTPATIENT)
Dept: ULTRASOUND IMAGING | Age: 70
Discharge: HOME | End: 2020-10-06
Attending: UROLOGY
Payer: MEDICARE

## 2020-10-06 ENCOUNTER — ANESTHESIA EVENT (OUTPATIENT)
Dept: OPERATING ROOM | Age: 70
End: 2020-10-06
Payer: MEDICARE

## 2020-10-06 VITALS
OXYGEN SATURATION: 95 % | TEMPERATURE: 96.6 F | WEIGHT: 239.8 LBS | SYSTOLIC BLOOD PRESSURE: 109 MMHG | HEART RATE: 65 BPM | HEIGHT: 73 IN | BODY MASS INDEX: 31.78 KG/M2 | DIASTOLIC BLOOD PRESSURE: 66 MMHG | RESPIRATION RATE: 16 BRPM

## 2020-10-06 VITALS
DIASTOLIC BLOOD PRESSURE: 61 MMHG | RESPIRATION RATE: 25 BRPM | SYSTOLIC BLOOD PRESSURE: 97 MMHG | OXYGEN SATURATION: 94 %

## 2020-10-06 LAB — INR BLD: 1.23 (ref 0.85–1.13)

## 2020-10-06 PROCEDURE — 7100000001 HC PACU RECOVERY - ADDTL 15 MIN: Performed by: UROLOGY

## 2020-10-06 PROCEDURE — 2709999900 HC NON-CHARGEABLE SUPPLY: Performed by: UROLOGY

## 2020-10-06 PROCEDURE — 2580000003 HC RX 258

## 2020-10-06 PROCEDURE — 88305 TISSUE EXAM BY PATHOLOGIST: CPT

## 2020-10-06 PROCEDURE — 36415 COLL VENOUS BLD VENIPUNCTURE: CPT

## 2020-10-06 PROCEDURE — 7100000010 HC PHASE II RECOVERY - FIRST 15 MIN: Performed by: UROLOGY

## 2020-10-06 PROCEDURE — 3600000013 HC SURGERY LEVEL 3 ADDTL 15MIN: Performed by: UROLOGY

## 2020-10-06 PROCEDURE — 7100000000 HC PACU RECOVERY - FIRST 15 MIN: Performed by: UROLOGY

## 2020-10-06 PROCEDURE — 6360000002 HC RX W HCPCS: Performed by: NURSE ANESTHETIST, CERTIFIED REGISTERED

## 2020-10-06 PROCEDURE — 76998 US GUIDE INTRAOP: CPT

## 2020-10-06 PROCEDURE — 88342 IMHCHEM/IMCYTCHM 1ST ANTB: CPT

## 2020-10-06 PROCEDURE — 85610 PROTHROMBIN TIME: CPT

## 2020-10-06 PROCEDURE — 3600000003 HC SURGERY LEVEL 3 BASE: Performed by: UROLOGY

## 2020-10-06 PROCEDURE — 3700000000 HC ANESTHESIA ATTENDED CARE: Performed by: UROLOGY

## 2020-10-06 PROCEDURE — 7100000011 HC PHASE II RECOVERY - ADDTL 15 MIN: Performed by: UROLOGY

## 2020-10-06 PROCEDURE — 6360000002 HC RX W HCPCS

## 2020-10-06 PROCEDURE — 3700000001 HC ADD 15 MINUTES (ANESTHESIA): Performed by: UROLOGY

## 2020-10-06 PROCEDURE — 2500000003 HC RX 250 WO HCPCS: Performed by: NURSE ANESTHETIST, CERTIFIED REGISTERED

## 2020-10-06 RX ORDER — LIDOCAINE HYDROCHLORIDE 20 MG/ML
INJECTION, SOLUTION INFILTRATION; PERINEURAL PRN
Status: DISCONTINUED | OUTPATIENT
Start: 2020-10-06 | End: 2020-10-06 | Stop reason: SDUPTHER

## 2020-10-06 RX ORDER — SODIUM CHLORIDE 9 MG/ML
INJECTION, SOLUTION INTRAVENOUS CONTINUOUS
Status: DISCONTINUED | OUTPATIENT
Start: 2020-10-06 | End: 2020-10-06 | Stop reason: HOSPADM

## 2020-10-06 RX ORDER — PROPOFOL 10 MG/ML
INJECTION, EMULSION INTRAVENOUS PRN
Status: DISCONTINUED | OUTPATIENT
Start: 2020-10-06 | End: 2020-10-06 | Stop reason: SDUPTHER

## 2020-10-06 RX ORDER — FENTANYL CITRATE 50 UG/ML
INJECTION, SOLUTION INTRAMUSCULAR; INTRAVENOUS PRN
Status: DISCONTINUED | OUTPATIENT
Start: 2020-10-06 | End: 2020-10-06 | Stop reason: SDUPTHER

## 2020-10-06 RX ORDER — CIPROFLOXACIN 500 MG/1
500 TABLET, FILM COATED ORAL 2 TIMES DAILY
Qty: 6 TABLET | Refills: 0 | Status: SHIPPED | OUTPATIENT
Start: 2020-10-06 | End: 2020-10-09

## 2020-10-06 RX ORDER — MIDAZOLAM HYDROCHLORIDE 1 MG/ML
INJECTION INTRAMUSCULAR; INTRAVENOUS PRN
Status: DISCONTINUED | OUTPATIENT
Start: 2020-10-06 | End: 2020-10-06 | Stop reason: SDUPTHER

## 2020-10-06 RX ORDER — PROPOFOL 10 MG/ML
INJECTION, EMULSION INTRAVENOUS CONTINUOUS PRN
Status: DISCONTINUED | OUTPATIENT
Start: 2020-10-06 | End: 2020-10-06 | Stop reason: SDUPTHER

## 2020-10-06 RX ADMIN — PROPOFOL 60 MG: 10 INJECTION, EMULSION INTRAVENOUS at 10:21

## 2020-10-06 RX ADMIN — SODIUM CHLORIDE: 9 INJECTION, SOLUTION INTRAVENOUS at 07:39

## 2020-10-06 RX ADMIN — PROPOFOL 90 MCG/KG/MIN: 10 INJECTION, EMULSION INTRAVENOUS at 10:28

## 2020-10-06 RX ADMIN — MIDAZOLAM HYDROCHLORIDE 2 MG: 1 INJECTION, SOLUTION INTRAMUSCULAR; INTRAVENOUS at 10:20

## 2020-10-06 RX ADMIN — GENTAMICIN SULFATE 240 MG: 40 INJECTION, SOLUTION INTRAMUSCULAR; INTRAVENOUS at 10:30

## 2020-10-06 RX ADMIN — LIDOCAINE HYDROCHLORIDE 100 MG: 20 INJECTION, SOLUTION INFILTRATION; PERINEURAL at 10:20

## 2020-10-06 RX ADMIN — FENTANYL CITRATE 50 MCG: 50 INJECTION, SOLUTION INTRAMUSCULAR; INTRAVENOUS at 10:21

## 2020-10-06 RX ADMIN — PROPOFOL 4 MG: 10 INJECTION, EMULSION INTRAVENOUS at 10:26

## 2020-10-06 RX ADMIN — CEFAZOLIN 2 G: 10 INJECTION, POWDER, FOR SOLUTION INTRAVENOUS at 10:24

## 2020-10-06 ASSESSMENT — PULMONARY FUNCTION TESTS
PIF_VALUE: 2
PIF_VALUE: 1
PIF_VALUE: 2
PIF_VALUE: 1
PIF_VALUE: 2
PIF_VALUE: 0
PIF_VALUE: 1
PIF_VALUE: 0
PIF_VALUE: 1
PIF_VALUE: 2
PIF_VALUE: 0
PIF_VALUE: 1
PIF_VALUE: 1
PIF_VALUE: 4
PIF_VALUE: 0
PIF_VALUE: 1
PIF_VALUE: 1
PIF_VALUE: 0
PIF_VALUE: 1
PIF_VALUE: 1

## 2020-10-06 ASSESSMENT — PAIN SCALES - GENERAL
PAINLEVEL_OUTOF10: 0

## 2020-10-06 NOTE — PROGRESS NOTES
Patient admitted to [facility] room 9 with wife at bedside. Bed in low position side rails up call light in reach. Patient denies questions at this time.

## 2020-10-06 NOTE — PROGRESS NOTES
Pt returned to [facility] room 9. Vitals and assessment as charted. 0.9 infusing, @850ml to count from PACU. Pt has sherbert and water. Family at the bedside. Pt and family verbalized understanding of discharge criteria and call light use. Call light in reach.

## 2020-10-06 NOTE — ANESTHESIA POSTPROCEDURE EVALUATION
Department of Anesthesiology  Postprocedure Note    Patient: Dayne Spencer  MRN: [id number]  YOB: 1950  Date of evaluation: 10/6/2020  Time:  12:37 PM     Procedure Summary     Date:  10/06/20 Room / Location:  [facility]    Anesthesia Start:  2353 Anesthesia Stop:  1054    Procedure:  CYSTO, TRANSRECTAL ULTRASOUND GUIDED PROSTATE BX (N/A ) Diagnosis:  (HEMATURIA, ELEVATED PSA)    Surgeon:  Kun Gore MD Responsible Provider:  Bree Prince DO    Anesthesia Type:  MAC ASA Status:  3          Anesthesia Type: MAC    Sheldon Phase I: Sheldon Score: 9    Sheldon Phase II:      Last vitals: Reviewed and per EMR flowsheets. Anesthesia Post Evaluation    Comments: Scot Gonzalez 62  POST-ANESTHESIA NOTE       Name:  Dayne Spencer                                         Age:  78 y.o.   MRN:  [id number]      Last Vitals:  /66   Pulse 65   Temp 96.6 °F (35.9 °C) (Temporal)   Resp 16   Ht 6' 1\" (1.854 m)   Wt 239 lb 12.8 oz (108.8 kg)   SpO2 95%   BMI 31.64 kg/m²   Patient Vitals in the past 4 hrs:  10/06/20 1219, BP:109/66, Temp:96.6 °F (35.9 °C), Temp src:Temporal, Pulse:65, Resp:16, SpO2:95 %  10/06/20 1210, BP:(!) 111/56, Pulse:54, Resp:16, SpO2:96 %  10/06/20 1138, BP:107/66, Temp:96.8 °F (36 °C), Temp src:Temporal, Pulse:66, Resp:16, SpO2:95 %  10/06/20 1130, BP:113/64, Pulse:58, Resp:20, SpO2:97 %  10/06/20 1125, BP:(!) 104/57, Pulse:57, Resp:17, SpO2:96 %  10/06/20 1120, BP:(!) 102/59, Pulse:63, Resp:17, SpO2:93 %  10/06/20 1118, BP:(!) 104/57, Pulse:60, Resp:16, SpO2:94 %  10/06/20 1115, SpO2:95 %  10/06/20 1112, Pulse:84, Resp:19, SpO2:95 %  10/06/20 1110, BP:(!) 98/55, Pulse:60, Resp:21, SpO2:94 %  10/06/20 1105, BP:(!) 108/52, Pulse:77, Resp:21, SpO2:94 %  10/06/20 1100, BP:(!) 99/55, Pulse:68, Resp:21, SpO2:94 %  10/06/20 1055, BP:98/64, Pulse:80, Resp:23, SpO2:92 %  10/06/20 1048, Temp:97.1 °F (36.2 °C), Temp src:Temporal, Pulse:63, Resp:11, SpO2:92

## 2020-10-06 NOTE — H&P
Mary Jo Randhawa MD  History and Physical    Patient:  Cristal Moon  MRN: [id number]  YOB: 1950    HISTORY OF PRESENT ILLNESS:     The patient is a 78 y.o. male who presents with hematuria, elevated psa. Here for procedure. Patient's old records, notes and chart reviewed and summarized above. Mary Jo Randhawa MD independently reviewed the images and verified the radiology reports from:    Xr Chest (2 Vw)    Result Date: 9/29/2020  XR CHEST (2 VW) Exam Date and Exam Time: 09/29/2020 06:34 AM Accession: [id number] Reason for exam: preop testing Ordering Diagnosis: Hematuria, unspecified type and Benign prostatic hyperplasia with urinary frequency and Benign prostatic hyperplasia with urinary frequency and Urinary incontinence, unspecified type and Elevated PSA and Preop testing Exam: 2V chest Comparison:  CR,SR  - XR CHEST PORTABLE  - 12/08/2018 03:37 PM EST Findings: Mediastinum: No cardiac silhouette enlargement. Lungs:  Stable elevation right hemidiaphragm. No infiltrate or mass. Pleura: No pneumothorax or significant effusion. Bones: No acute pathology. Impression: No acute pathology. This document has been electronically signed by:  Jair Davidson MD on 09/29/2020 06:51 AM         Past Medical History:    Past Medical History:   Diagnosis Date    Anesthesia     takes large dose of medications to make him sleepy for surgery    Atrial fibrillation ([address]     Benign prostatic hyperplasia with urinary frequency 10/21/2019    CAD (coronary artery disease)     Chest pain     Colon polyp 2011    removed    Coronary artery disease involving native coronary artery of native heart without angina pectoris 10/21/2019    Dementia ([address]     Dizziness     Elevated PSA     GERD (gastroesophageal reflux disease)     Heart disease     Hyperlipidemia     Hypertension        Past Surgical History:    Past Surgical History:   Procedure Laterality Date    ADENOIDECTOMY      CARDIAC History     Socioeconomic History    Marital status:      Spouse name: Jose Uriarte Number of children: 2    Years of education: Not on file    Highest education level: Not on file   Occupational History    Not on file   Social Needs    Financial resource strain: Not on file    Food insecurity     Worry: Not on file     Inability: Not on file    Transportation needs     Medical: Not on file     Non-medical: Not on file   Tobacco Use    Smoking status: Never Smoker    Smokeless tobacco: Never Used   Substance and Sexual Activity    Alcohol use: No    Drug use: No    Sexual activity: Never   Lifestyle    Physical activity     Days per week: Not on file     Minutes per session: Not on file    Stress: Not on file   Relationships    Social connections     Talks on phone: Not on file     Gets together: Not on file     Attends Baptism service: Not on file     Active member of club or organization: Not on file     Attends meetings of clubs or organizations: Not on file     Relationship status: Not on file    Intimate partner violence     Fear of current or ex partner: Not on file     Emotionally abused: Not on file     Physically abused: Not on file     Forced sexual activity: Not on file   Other Topics Concern    Not on file   Social History Narrative    Not on file       Family History:    Family History   Problem Relation Age of Onset    Heart Surgery Father     Cancer Father         lung    Heart Disease Father         CABG    Alzheimer's Disease Mother     Diabetes Neg Hx     High Blood Pressure Neg Hx     Stroke Neg Hx        REVIEW OF SYSTEMS:  Constitutional: negative  Eyes: negative  Respiratory: negative  Cardiovascular: negative  Gastrointestinal: negative  Genitourinary: no acute issues  Musculoskeletal: negative  Skin: negative   Neurological: negative  Hematological/Lymphatic: negative  Psychological: negative    Physical Exam:      No data found.   Constitutional: Patient in no acute distress; Neuro: alert and oriented to person place and time. Psych: Mood and affect normal.  Skin: Normal  Lungs: Respiratory effort normal, CTA  Cardiovascular:  Normal peripheral pulses; no murmur. Normal rhythm  Abdomen: Soft, non-tender, non-distended with no CVA, flank pain, hepatosplenomegaly or hernia. Kidneys normal.  Bladder non-tender and not distended. LABS:   No results for input(s): WBC, HGB, HCT, MCV, PLT in the last 72 hours. No results for input(s): NA, K, CL, CO2, PHOS, BUN, CREATININE in the last 72 hours. Invalid input(s): CA  Lab Results   Component Value Date    PSA 3.60 (H) 08/04/2020    PSA 3.59 (H) 04/10/2019    PSA 4.94 (H) 10/10/2018         Urinalysis: No results for input(s): COLORU, PHUR, LABCAST, WBCUA, RBCUA, MUCUS, TRICHOMONAS, YEAST, BACTERIA, CLARITYU, SPECGRAV, LEUKOCYTESUR, UROBILINOGEN, BILIRUBINUR, BLOODU in the last 72 hours.     Invalid input(s): NITRATE, GLUCOSEUKETONESUAMORPHOUS     -----------------------------------------------------------------      Assessment and Plan     Impression:    Patient Active Problem List   Diagnosis    Essential hypertension    Hyperlipidemia    Paroxysmal atrial fibrillation (HCC)    Obesity due to excess calories    Dizziness    History of gastroesophageal reflux (GERD)    Diastolic dysfunction    Anticoagulated on Coumadin    Urinary incontinence    Elevated PSA    Coronary artery disease involving native coronary artery of native heart without angina pectoris    Benign prostatic hyperplasia with urinary frequency       Plan:     Consent obtained; cysto trus prostate ultrasound in OR today    Anitha Coon MD  7:03 AM 10/6/2020

## 2020-10-06 NOTE — PROGRESS NOTES
Pt has met discharge criteria and states he is ready for discharge to home. IV removed, gauze and tape applied. Dressed in own clothes and personal belongings gathered. Discharge instructions (with opioid medication education information) given to pt and family; pt and family verbalized understanding of discharge instructions, prescriptions and follow up appointments. Pt request to walk to the cafeteria with his wife. Off floor per self.

## 2020-10-06 NOTE — PROGRESS NOTES
1048 Pt transferred to PACU, see flow sheet for assessment. 200 Pt easily awakens when state his name, OPA removed and 02 weaned. 1118 Pt able to awaken on his own, denies pain. 1131 Pt transferred to [facility], report given to [employer].

## 2020-10-06 NOTE — ANESTHESIA PRE PROCEDURE
Department of Anesthesiology  Preprocedure Note       Name:  Tristan Signs   Age:  78 y.o.  :  1950                                          MRN:  [id number]         Date:  10/6/2020      Surgeon: Soraida Camargo):  Anuj Biggs MD    Procedure: Procedure(s):  CYSTO, TRANSRECTAL ULTRASOUND GUIDED PROSTATE BX    Medications prior to admission:   Prior to Admission medications    Medication Sig Start Date End Date Taking?  Authorizing Provider   tamsulosin (FLOMAX) 0.4 MG capsule Take 1 capsule by mouth once daily 20  Yes NARESH Diaz - CNP   warfarin (COUMADIN) 2.5 MG tablet TAKE AS DIRECTED BY [facility] tabs = 30 day supply 20  Yes Elizabet Wong MD   metoprolol succinate (TOPROL XL) 50 MG extended release tablet Take 1 tablet by mouth daily 20  Yes Sloan Apley, MD   bumetanide (BUMEX) 2 MG tablet Take 1 tablet by mouth daily 20  Yes Sloan Apley, MD   digoxin (LANOXIN) 125 MCG tablet TAKE 1 TABLET BY MOUTH ONCE DAILY 20  Yes Sloan Apley, MD   dilTIAZem (CARDIZEM CD) 360 MG extended release capsule Take 1 capsule by mouth once daily 20  Yes Sloan Apley, MD   potassium chloride (KLOR-CON) 10 MEQ extended release tablet TAKE 1 TABLET BY MOUTH TWICE DAILY 20  Yes Sloan Apley, MD   pravastatin (PRAVACHOL) 80 MG tablet Take 1 tablet by mouth nightly 20  Yes Sloan Apley, MD   bisacodyl (DULCOLAX) 5 MG EC tablet Take 5 mg by mouth nightly    Yes Historical Provider, MD   nitroGLYCERIN (NITROSTAT) 0.4 MG SL tablet Place 1 tablet under the tongue every 5 minutes as needed for Chest pain 20   Sloan Apley, MD       Current medications:    Current Facility-Administered Medications   Medication Dose Route Frequency Provider Last Rate Last Dose    0.9 % sodium chloride infusion   Intravenous Continuous Alma Rosa Long        ceFAZolin (ANCEF) 2 g in dextrose 5 % 50 mL IVPB  2 g Intravenous 30 Min Pre-Op Alma Rosa Garza        gentamicin (GARAMYCIN) 240 mg in dextrose 5 % 100 mL IVPB  240 mg Intravenous Once Alma Rosa Garza           Allergies:  No Known Allergies    Problem List:    Patient Active Problem List   Diagnosis Code    Essential hypertension I10    Hyperlipidemia E78.5    Paroxysmal atrial fibrillation (HCC) I48.0    Obesity due to excess calories E66.09    Dizziness R42    History of gastroesophageal reflux (GERD) U90.48    Diastolic dysfunction E41.80    Anticoagulated on Coumadin Z79.01    Urinary incontinence R32    Elevated PSA R97.20    Coronary artery disease involving native coronary artery of native heart without angina pectoris I25.10    Benign prostatic hyperplasia with urinary frequency N40.1, R35.0       Past Medical History:        Diagnosis Date    Anesthesia     takes large dose of medications to make him sleepy for surgery    Atrial fibrillation ([address]     Benign prostatic hyperplasia with urinary frequency 10/21/2019    CAD (coronary artery disease)     Chest pain     Colon polyp 2011    removed    Coronary artery disease involving native coronary artery of native heart without angina pectoris 10/21/2019    Dementia ([address]     Dizziness     Elevated PSA     GERD (gastroesophageal reflux disease)     Heart disease     Hyperlipidemia     Hypertension        Past Surgical History:        Procedure Laterality Date    ADENOIDECTOMY      CARDIAC CATHETERIZATION  09/23/2011    Right radial artery cannulation. Moderate LV dysfunction. The patient has disease in the ostium of diagonal 1 and diagonal 2 branch, however they are both are at the  ostium of the diagonals. Intervention could compromise flow of LAD. THe LAD has long diffuse calcified lesion 50-60-% anatomically.     CARDIOVASCULAR STRESS TEST  09/23/2011    EF 37%    CARPAL TUNNEL RELEASE      COLONOSCOPY      TONSILLECTOMY      TRANSTHORACIC ECHOCARDIOGRAM  09/23/2011    EF 50-55%    VASECTOMY Social History:    Social History     Tobacco Use    Smoking status: Never Smoker    Smokeless tobacco: Never Used   Substance Use Topics    Alcohol use: No                                Counseling given: Not Answered      Vital Signs (Current):   Vitals:    09/29/20 1630 10/06/20 0715   BP:  135/85   Pulse:  76   Resp:  18   Temp:  96.5 °F (35.8 °C)   TempSrc:  Temporal   SpO2:  95%   Weight: 245 lb (111.1 kg) 239 lb 12.8 oz (108.8 kg)   Height: 6' 1\" (1.854 m) 6' 1\" (1.854 m)                                              BP Readings from Last 3 Encounters:   10/06/20 135/85   09/21/20 129/83   08/04/20 134/84       NPO Status:                                                                                 BMI:   Wt Readings from Last 3 Encounters:   10/06/20 239 lb 12.8 oz (108.8 kg)   09/22/20 244 lb 9.6 oz (110.9 kg)   09/20/20 245 lb (111.1 kg)     Body mass index is 31.64 kg/m². CBC:   Lab Results   Component Value Date    WBC 8.7 09/20/2020    RBC 5.53 09/20/2020    RBC 5.63 05/17/2016    HGB 16.6 09/20/2020    HCT 49.2 09/20/2020    MCV 89.0 09/20/2020    RDW 13.9 06/23/2018     09/20/2020       CMP:   Lab Results   Component Value Date     09/20/2020    K 3.8 09/20/2020     09/20/2020    CO2 24 09/20/2020    BUN 15 09/20/2020    CREATININE 1.1 09/20/2020    LABGLOM 66 09/20/2020    GLUCOSE 181 09/20/2020    GLUCOSE 93 05/17/2016    PROT 7.2 09/20/2020    CALCIUM 9.3 09/20/2020    BILITOT 0.3 09/20/2020    ALKPHOS 86 09/20/2020    AST 25 09/20/2020    ALT 31 09/20/2020       POC Tests: No results for input(s): POCGLU, POCNA, POCK, POCCL, POCBUN, POCHEMO, POCHCT in the last 72 hours. Coags:   Lab Results   Component Value Date    INR 2.64 09/20/2020    APTT 49.0 01/23/2020       HCG (If Applicable): No results found for: PREGTESTUR, PREGSERUM, HCG, HCGQUANT     ABGs: No results found for: PHART, PO2ART, KNS5KDI, DSE5XNK, BEART, I2OXQGNU     Type & Screen (If Applicable):   No

## 2020-10-08 ENCOUNTER — TELEPHONE (OUTPATIENT)
Dept: FAMILY MEDICINE CLINIC | Age: 70
End: 2020-10-08

## 2020-10-08 NOTE — LETTER
[address] [facility]  [address] [facility], [address]  Phone: [phone]  Fax: [phone]    October 8, 2020    [facility] [employer]  [address] [facility] [address] [id number]    Dear Minesh Salmon,    Thank you for choosing [facility] on 10/6/20. Dr. Dandy Alvarado wanted to make sure that you understand your discharge instructions and that you were able to fill any prescriptions that may have been ordered for you. Please contact the office at the above phone number if you were advised to follow up with your Dr. Dandy Alvarado, or if you have any further questions or needs. Also, did you know [facility]. practices can often offer you an appointment on the same day that you call for acute issues. *We have some [facility] offices that offer Walk-in appointments; check our website for availability in your community, [email or web address]. [email or web address].    *Evisits are now available for patients through [address] If you do not have MyChart and are interested, please contact the office and a staff member may assist you or go to [email or web address].     Sincerely,  Dandy Alvarado MD and [facility]

## 2020-10-08 NOTE — OP NOTE
Patient:  Vivian Stoll  MRN: [id number]  YOB: 1950    FACILITY: [facility]    DATE: 10/6/2020    SURGEON: Paola Leahy MD     ASSISTANT: none    PREOPERATIVE DIAGNOSIS: HEMATURIA, ELEVATED PSA    POSTOPERATIVE DIAGNOSIS: HEMATURIA, ELEVATED PSA    PROCEDURE PERFORMED: CYSTO, TRANSRECTAL ULTRASOUND GUIDED PROSTATE BX    ANESTHESIA: Monitor Anesthesia Care    ESTIMATED BLOOD LOSS: 3 ml    COMPLICATIONS: None immediate    DRAINS: none    SPECIMENS:   ID Type Source Tests Collected by Time Destination   A : RIGHT LATERAL BASE- RLB Tissue Prostate SURGICAL PATHOLOGY Linnea Smith MD 10/6/2020 0925    B : RIGHT BASE- RB Tissue Prostate SURGICAL PATHOLOGY Linnea Smith MD 10/6/2020 0925    C : RIGHT LATERAL MID- RLM Tissue Prostate SURGICAL PATHOLOGY Linnea Smith MD 10/6/2020 5769    D : RIGHT MID - RM Tissue Prostate SURGICAL PATHOLOGY Linnea Smith MD 10/6/2020 3321    E : RIGHT LATERAL APE - RLA Tissue Prostate SURGICAL PATHOLOGY Linnea Smith MD 10/6/2020 1921    F : RIGHT APEX - RA Tissue Prostate SURGICAL PATHOLOGY Linnea Smith MD 10/6/2020 0925    G : LEFT LATERAL BASE - LLB Tissue Prostate SURGICAL PATHOLOGY Linnea Smith MD 10/6/2020 0925    H : LEFT BASE - LB Tissue Prostate SURGICAL PATHOLOGY Linnea Smith MD 10/6/2020 0925    I : LEFT LATERAL MID - LLM Tissue Prostate SURGICAL PATHOLOGY Linnea Smith MD 10/6/2020 0925    J : LEFT MID - LM Tissue Prostate SURGICAL PATHOLOGY Linnea Smith MD 10/6/2020 0925    K : LEFT LATERAL APEX - LLA Tissue Prostate SURGICAL PATHOLOGY Linnea Smith MD 10/6/2020 0925    L : LEFT APEX - LA Tissue Prostate SURGICAL PATHOLOGY Linnea Smith MD 10/6/2020 0494        INDICATIONS FOR PROCEDURE:  The patient is a 78 y.o. male who presents today with HEMATURIA, ELEVATED PSA here for CYSTO, TRANSRECTAL ULTRASOUND GUIDED PROSTATE BX. After risks, benefits and alternatives of the procedure were discussed with the patient, the patient elected to proceed.      Details: Patient chills. I was present for the entire case.

## 2020-10-13 ENCOUNTER — OFFICE VISIT (OUTPATIENT)
Dept: UROLOGY | Age: 70
End: 2020-10-13
Payer: MEDICARE

## 2020-10-13 VITALS — BODY MASS INDEX: 31.87 KG/M2 | WEIGHT: 240.5 LBS | HEIGHT: 73 IN | TEMPERATURE: 97.9 F

## 2020-10-13 PROCEDURE — G8427 DOCREV CUR MEDS BY ELIG CLIN: HCPCS | Performed by: UROLOGY

## 2020-10-13 PROCEDURE — 4040F PNEUMOC VAC/ADMIN/RCVD: CPT | Performed by: UROLOGY

## 2020-10-13 PROCEDURE — G8417 CALC BMI ABV UP PARAM F/U: HCPCS | Performed by: UROLOGY

## 2020-10-13 PROCEDURE — 3017F COLORECTAL CA SCREEN DOC REV: CPT | Performed by: UROLOGY

## 2020-10-13 PROCEDURE — G8484 FLU IMMUNIZE NO ADMIN: HCPCS | Performed by: UROLOGY

## 2020-10-13 PROCEDURE — 99214 OFFICE O/P EST MOD 30 MIN: CPT | Performed by: UROLOGY

## 2020-10-13 PROCEDURE — 1036F TOBACCO NON-USER: CPT | Performed by: UROLOGY

## 2020-10-13 PROCEDURE — 1123F ACP DISCUSS/DSCN MKR DOCD: CPT | Performed by: UROLOGY

## 2020-10-13 NOTE — PROGRESS NOTES
Ambar Ruiz MD        [address] [place] [address]  Dept: [phone]  Dept Fax: [phone]: [address] [address] Urology Office Note -     Patient:  Papi Zhang  YOB: 1950    The patient is a 78 y.o. male who presents today for evaluation of the following problems: Hematuria  Chief Complaint   Patient presents with    Follow-up     discuss bx results         HISTORY OF PRESENT ILLNESS:     Prostate cancer  Onset was days ago. Overall, the problem(s) are worse. Severity is described as moderate. Associated Symptoms: No dysuria,  gross hematuria present. Current Pain Severity: 3     Pt upset about wait times--will arrange for FIRST patient of day next visit  Here to discuss pathology    FINAL DIAGNOSIS:   A.  Prostate, right lateral base, core needle biopsies:    Invasive, moderately differentiated adenocarcinoma.       Maximum [place] score: 6 (3+3), grade group = 1.       Number of positive cores: 1       Total number of cores: 1       Percent cancer of total sample (estimated): 8%       Other findings:  Chronic prostatitis. B-G.  Prostate, right base, right lateral mid, right mid, right lateral   apex, right apex and left lateral base, multiple core needle biopsies:    Mild chronic prostatitis, negative for neoplasia. H.  Prostate, left base, core needle biopsy:    Invasive, moderately differentiated adenocarcinoma.       Maximum [place] score: 6 (3+3), grade group = 1.       Number of positive cores: 1       Total number of cores: 1       Percent cancer of total sample (estimated): 12%       Other findings:  Chronic prostatitis. I.  Prostate, left lateral mid, core needle biopsy:    Chronic prostatitis, negative for neoplasia. J.  Prostate, left mid, core needle biopsy:    Atypical small acinar proliferation.    K.  Prostate, left lateral apex, core needle biopsy:  Atypical small acinar proliferation.    Acute and chronic prostatitis. L.  Prostate, left apex, core needle biopsy:    Negative for neoplasm. Secondary Diagnosis:    BPH- On flomax. Abnormal heterogenous prostate on CT    Gross hematuria- hematuria workup negative      Summary of Previous Records:  Lashonda Chavez is a pleasant 81 y.o. with past medical history of Atrial fibrillation, CAD, HTN, and GERD who presents in follow-up today for elevated PSA of 4.94 on 10/10/18 previously 4.39 one month prior. His PSA has been monitored around every 2 years by his PCP. Today repeat 6 month PSA did decrease to 3.59. He admits to occasional urinary leakage when running the water in the sink but believes it is infrequent and rather satisfied with his symptoms. He did not believe any intervention was needed. He is taking Flomax which is working well for him and would like to continue. Requested/reviewed records from Sydney Najera MD office and/or outside physician/EMR    (Patient's old records have been requested, reviewed and pertinent findings summarized in today's note.)    Procedures Today: N/A    Last several PSA's:  Lab Results   Component Value Date    PSA 3.60 (H) 08/04/2020    PSA 3.59 (H) 04/10/2019    PSA 4.94 (H) 10/10/2018       Last total testosterone:  No results found for: TESTOSTERONE    Urinalysis today:  No results found for this visit on 10/13/20. Last BUN and creatinine:  Lab Results   Component Value Date    BUN 15 09/20/2020     Lab Results   Component Value Date    CREATININE 1.1 09/20/2020       Imaging Reviewed during this Office Visit:   Jorge L Darden MD independently reviewed the images and verified the radiology reports from:    Ct Abdomen Pelvis W Iv Contrast Additional Contrast? None    Result Date: 9/20/2020  PROCEDURE: CT ABDOMEN PELVIS W IV CONTRAST CLINICAL INFORMATION: 72-year-old male with left lower quadrant abdominal pain. Hematuria .  COMPARISON: CT scan 12/08/2018. TECHNIQUE: 5 mm axial CT images were obtained through the abdomen and pelvis after the administration of intravenous and oral contrast. Coronal and sagittal reconstructions were obtained. All CT scans at this facility use dose modulation, iterative reconstruction, and/or weight-based dosing when appropriate to reduce radiation dose to as low as reasonably achievable. FINDINGS: There is atelectasis at the lung bases. The base of the heart is within normal limits. The liver is hypoattenuated. This may be due to fatty infiltration. The spleen, pancreas, gallbladder and adrenal glands are within normal limits. The kidneys are symmetric in size, shape and degree of enhancement. There is no hydronephrosis. There is no evidence of a small bowel obstruction. There is no colonic wall thickening or edema. The appendix appears normal with no inflammatory changes. The prostate gland is enlarged and has a heterogenous appearance. The urinary bladder has a normal appearance. The aorta and the IVC are normal in caliber. There is a fat-containing ventral abdominal wall hernia. There is no free intraperitoneal air or free fluid in the abdomen or pelvis. There are degenerative changes. There are several lucent lesions in the bones. The most notable is at the L5 vertebral body measuring 2 x 1.4 cm. 1. There is an enlarged heterogenous appearance of the prostate gland. 2. Hepatic steatosis. 3. There are several nonspecific lucent lesions in the osseous structures. This is similar in appearance to the previous exam. **This report has been created using voice recognition software. It may contain minor errors which are inherent in voice recognition technology. ** Final report electronically signed by Dr Cali Morin on 9/20/2020 11:48 PM      PAST MEDICAL, FAMILY AND SOCIAL HISTORY:  Past Medical History:   Diagnosis Date    Anesthesia     takes large dose of medications to make him sleepy for surgery    Atrial fibrillation [facility] [address]     Benign prostatic hyperplasia with urinary frequency 10/21/2019    CAD (coronary artery disease)     Chest pain     Colon polyp 2011    removed    Coronary artery disease involving native coronary artery of native heart without angina pectoris 10/21/2019    Dementia ([facility] [address]     Dizziness     Elevated PSA     GERD (gastroesophageal reflux disease)     Heart disease     Hyperlipidemia     Hypertension      Past Surgical History:   Procedure Laterality Date    ADENOIDECTOMY      CARDIAC CATHETERIZATION  09/23/2011    Right radial artery cannulation. Moderate LV dysfunction. The patient has disease in the ostium of diagonal 1 and diagonal 2 branch, however they are both are at the  ostium of the diagonals. Intervention could compromise flow of LAD. THe LAD has long diffuse calcified lesion 50-60-% anatomically.     CARDIOVASCULAR STRESS TEST  09/23/2011    EF 37%    CARPAL TUNNEL RELEASE      COLONOSCOPY      TONSILLECTOMY      TRANSTHORACIC ECHOCARDIOGRAM  09/23/2011    EF 50-55%    ULTRASOUND PROSTATE/TRANSRECTAL N/A 10/6/2020    CYSTO, TRANSRECTAL ULTRASOUND GUIDED PROSTATE BX performed by Kana Salmon MD at [facility]       Family History   Problem Relation Age of Onset    Heart Surgery Father     Cancer Father         lung    Heart Disease Father         CABG    Alzheimer's Disease Mother     Diabetes Neg Hx     High Blood Pressure Neg Hx     Stroke Neg Hx      Outpatient Medications Marked as Taking for the 10/13/20 encounter (Office Visit) with Kana Salmon MD   Medication Sig Dispense Refill    tamsulosin (FLOMAX) 0.4 MG capsule Take 1 capsule by mouth once daily 90 capsule 0    metoprolol succinate (TOPROL XL) 50 MG extended release tablet Take 1 tablet by mouth daily 90 tablet 3    bumetanide (BUMEX) 2 MG tablet Take 1 tablet by mouth daily 90 tablet 3    digoxin (LANOXIN) 125 MCG tablet TAKE 1 TABLET BY MOUTH ONCE DAILY 90 tablet 3    dilTIAZem (CARDIZEM CD) 360 MG extended release capsule Take 1 capsule by mouth once daily 90 capsule 3    nitroGLYCERIN (NITROSTAT) 0.4 MG SL tablet Place 1 tablet under the tongue every 5 minutes as needed for Chest pain 25 tablet 3    potassium chloride (KLOR-CON) 10 MEQ extended release tablet TAKE 1 TABLET BY MOUTH TWICE DAILY 180 tablet 3    pravastatin (PRAVACHOL) 80 MG tablet Take 1 tablet by mouth nightly 90 tablet 3    bisacodyl (DULCOLAX) 5 MG EC tablet Take 5 mg by mouth nightly          Patient has no known allergies. Social History     Tobacco Use   Smoking Status Never Smoker   Smokeless Tobacco Never Used      (If patient a smoker, smoking cessation counseling offered)   Social History     Substance and Sexual Activity   Alcohol Use No       REVIEW OF SYSTEMS:  Constitutional: negative  Eyes: negative  Respiratory: negative  Cardiovascular: negative  Gastrointestinal: negative  Genitourinary: see HPI  Musculoskeletal: negative  Skin: negative   Neurological: negative  Hematological/Lymphatic: negative  Psychological: negative        Physical Exam:    This a 78 y.o. male  Vitals:    10/13/20 1617   Temp: 97.9 °F (36.6 °C)     Body mass index is 31.73 kg/m². Constitutional: Patient in no acute distress;       Assessment and Plan        1. Prostate cancer ([address]    2. Hematuria, unspecified type    3. Benign prostatic hyperplasia with urinary frequency               Plan:         Pt very nervous and anxious today in office. Adamant about surgery for prostate cancer. Counseled patient that this is likely too aggressive at this time based off his volume. Gross Hematuria- workup negative  BPH- on flomax. Large prostate  Will perform decipher  Follow up postoperatively      Prescriptions Ordered:  No orders of the defined types were placed in this encounter. Orders Placed:  No orders of the defined types were placed in this encounter.            Jaylene Ricketts MD

## 2020-10-15 ENCOUNTER — HOSPITAL ENCOUNTER (OUTPATIENT)
Dept: PHARMACY | Age: 70
Setting detail: THERAPIES SERIES
Discharge: HOME | End: 2020-10-15
Payer: MEDICARE

## 2020-10-15 VITALS — TEMPERATURE: 98 F

## 2020-10-15 LAB — POC INR: 1.6 (ref 0.8–1.2)

## 2020-10-15 PROCEDURE — 99211 OFF/OP EST MAY X REQ PHY/QHP: CPT | Performed by: PHARMACIST

## 2020-10-15 PROCEDURE — 36416 COLLJ CAPILLARY BLOOD SPEC: CPT | Performed by: PHARMACIST

## 2020-10-15 PROCEDURE — 85610 PROTHROMBIN TIME: CPT | Performed by: PHARMACIST

## 2020-10-15 NOTE — PROGRESS NOTES
Medication Management [address]  [phone] (phone)  [phone] (fax)      Mr. Brooklyn Garcia is a 78 y.o.  male with history of Afib who presents today for anticoagulation monitoring and adjustment. Patient verifies current dosing regimen and tablet strength. Held doses for prostate biopsy 10/1-10/6 as instructed. MD also instructed him to hold 10/7 and 10/8. Took 5mg on 10/9, 3.75mg on 10/10, and then return to usual schedule. Patient denies s/s bleeding/bruising/swelling/SOB/chest pain  No blood in urine or stool. No dietary changes. No changes in medication/OTC agents/Herbals. No change in alcohol use or tobacco use. No change in activity level. Patient denies headaches/dizziness/lightheadedness/falls. No vomiting/diarrhea or acute illness. New prostate cancer diagnosis, waiting on results of recent biopsy to determine treatment plan. Thinks he will be have surgery, asked pt to notify [facility] of surgery date. Assessment:   Lab Results   Component Value Date    INR 1.60 (H) 10/15/2020    INR 1.23 (H) 10/06/2020    INR 2.64 (H) 09/20/2020     INR subtherapeutic   Recent Labs     10/15/20  0712   INR 1.60*         Plan:  Coumadin 5mg today, then continue Coumadin 5mg 2.5mg [id number]. Recheck INR in 2 week(s). Patient reminded to call the Anticoagulation Clinic with any signs or symptoms of bleeding or with any medication changes. Patient given instructions utilizing the teach back method. Discharged ambulatory in no apparent distress. After visit summary printed and reviewed with patient.       Medications reviewed and updated on home medication list Yes    Influenza vaccine:     [x] given    [] declined   [] received previously   [] plans to receive at a later time   [] refused    [x] documented in [address]:  [facility] [address]: Yes  Total # of Interventions Recommended: 1  - Increased Dose #: 1  - Maintenance Safety Lab Monitoring #: 1  Total Interventions Accepted: 1  Time Spent (min): 20    Jerica GarnerD

## 2020-10-19 ENCOUNTER — TELEPHONE (OUTPATIENT)
Dept: UROLOGY | Age: 70
End: 2020-10-19

## 2020-10-19 NOTE — TELEPHONE ENCOUNTER
Elda,    Patient in to see Dr. Kinjal Fabian on 10-. Dr. Kinjal Fabian wanted pathology sent for Decipher. Could you please get this sent?   Thank you

## 2020-10-19 NOTE — TELEPHONE ENCOUNTER
Patient very anxious. Would like put on a cancellation list to have his appointment moved up from 11- to go over Decipher results.

## 2020-10-27 ENCOUNTER — OFFICE VISIT (OUTPATIENT)
Dept: UROLOGY | Age: 70
End: 2020-10-27
Payer: MEDICARE

## 2020-10-27 ENCOUNTER — TELEPHONE (OUTPATIENT)
Dept: UROLOGY | Age: 70
End: 2020-10-27

## 2020-10-27 VITALS — HEIGHT: 73 IN | BODY MASS INDEX: 32.47 KG/M2 | WEIGHT: 245 LBS | TEMPERATURE: 97.1 F

## 2020-10-27 PROCEDURE — 4040F PNEUMOC VAC/ADMIN/RCVD: CPT | Performed by: UROLOGY

## 2020-10-27 PROCEDURE — 1123F ACP DISCUSS/DSCN MKR DOCD: CPT | Performed by: UROLOGY

## 2020-10-27 PROCEDURE — G8427 DOCREV CUR MEDS BY ELIG CLIN: HCPCS | Performed by: UROLOGY

## 2020-10-27 PROCEDURE — 99215 OFFICE O/P EST HI 40 MIN: CPT | Performed by: UROLOGY

## 2020-10-27 PROCEDURE — G8484 FLU IMMUNIZE NO ADMIN: HCPCS | Performed by: UROLOGY

## 2020-10-27 PROCEDURE — 1036F TOBACCO NON-USER: CPT | Performed by: UROLOGY

## 2020-10-27 PROCEDURE — G8417 CALC BMI ABV UP PARAM F/U: HCPCS | Performed by: UROLOGY

## 2020-10-27 PROCEDURE — 3017F COLORECTAL CA SCREEN DOC REV: CPT | Performed by: UROLOGY

## 2020-10-27 RX ORDER — WARFARIN SODIUM 2.5 MG/1
2.5 TABLET ORAL DAILY
COMMUNITY
End: 2020-11-09 | Stop reason: SDUPTHER

## 2020-10-27 NOTE — TELEPHONE ENCOUNTER
[facility]  [facility]  JESSICA DEMARCOSTORMY PADILLALIDA, [address]  [phone]    START BOWEL PREP ON 12-    Surgery Bowel Preparation    Purchase a 10 ounce bottle of Magnesium Citrate at your pharmacy and drink the afternoon before your scheduled surgery. Start drinking approximately 2:00pm.  Suggest drinking it chilled with Sprite or Ginger Ale. Start a clear liquid diet (for dinner) the evening before surgery. You may have the following:   Water   Apple, grape or cranberry juice   Clear, fat free broth   Clear sodas (7-up, Sprite)   Plain gelatin (Jell-o)   Popsicles without bits of fruit or fruit pulp   Tea or coffee without milk or cream    Nothing to eat or drink after midnight before your scheduled surgery. Do a fleets enema the night prior to your surgery between 7:00pm and 8:00pm    Please contact our office at [phone] if you have any questions.

## 2020-10-27 NOTE — PROGRESS NOTES
TESTOPEL PELLET 75 MG   LOT:   EXP: 07/2023  [id number]          10 pellets administered as subcutaneous implantation      TESTOPEL WAS [id number] [address], PT PAID FOR MED THROUGH [employer], TESTOPEL WAS SHIPPED TO [facility].

## 2020-10-27 NOTE — TELEPHONE ENCOUNTER
DO NOT TAKE ASPIRIN, PLAVIX, FISH OIL, COUMADIN, IBUPROFEN, MOTRIN-LIKE DRUGS AND ANY MULTIVITAMINS OR OVER THE COUNTER SUPPLEMENTS 5 DAYS PRIOR TO SURGERY AND 3 DAYS FOLLOWING     Rob Hernandez 1950 Diagnosis: Prostate cancer    Surgical Physician: Dr. Casey Polanco have been scheduled for the procedure marked below:      Surgery: Robotic assisted laparoscopic radical prostatectomy           Date: 12-     Anesthesia: Anesthesiologist (General/Spinal)     Place of Service: [address] [place] [address] [address] Same Day Surgery         Arrive to same day surgery by:  6:00am  (Surgery time is subject to change)      INSTRUCTIONS AS MARKED BELOW:    1. DO NOT eat or drink anything after midnight before surgery. 2. We prefer you shower or bathe with an antibacterial soap (Dial) the morning of surgery. 3.  Please ensure to have a  with you to transport you home. 4.  Please bring a current medication list, photo ID and insurance card(s) with you  5. Okay to take Tylenol  6. Take blood pressure or heart medication as directed, if taken in the morning take with a small sip of water  8. SAY Figueroa May assist with your Robotic surgery  9. The office will call you in 1-2 days after your procedure to schedule a follow up. On 11- have your Covid-19 screening and urinalysis done.     (Covid-19 screening is date sensitive and can only be done 5 to 7 days prior to your procedure)    Date: 10/27/2020

## 2020-10-27 NOTE — PROGRESS NOTES
Tricia Luciano MD        [address] [place] [address]  Dept: [phone]  Dept Fax: [phone]: [address] [address] Urology Office Note -     Patient:  Corey Cullen  YOB: 1950    The patient is a 78 y.o. male who presents today for evaluation of the following problems: prostate cancer  Chief Complaint   Patient presents with    Prostate Cancer     Discuss Treatment, Prostatectomy         HISTORY OF PRESENT ILLNESS:     Prostate cancer  Onset was days ago. Overall, the problem(s) are worse. Severity is described as moderate. Associated Symptoms: No dysuria,  gross hematuria present. Current Pain Severity: 3       Patient here to discuss his prostate cancer diagnosis. We discussed the low grade prostate cancer and patient is adamant on having his prostate removed. Prostate biopsy pathology     FINAL DIAGNOSIS:   A.  Prostate, right lateral base, core needle biopsies:    Invasive, moderately differentiated adenocarcinoma.       Maximum Calderon score: 6 (3+3), grade group = 1.       Number of positive cores: 1       Total number of cores: 1       Percent cancer of total sample (estimated): 8%       Other findings:  Chronic prostatitis. B-G.  Prostate, right base, right lateral mid, right mid, right lateral   apex, right apex and left lateral base, multiple core needle biopsies:    Mild chronic prostatitis, negative for neoplasia. H.  Prostate, left base, core needle biopsy:    Invasive, moderately differentiated adenocarcinoma.       Maximum [place] score: 6 (3+3), grade group = 1.       Number of positive cores: 1       Total number of cores: 1       Percent cancer of total sample (estimated): 12%       Other findings:  Chronic prostatitis. I.  Prostate, left lateral mid, core needle biopsy:    Chronic prostatitis, negative for neoplasia.    J.  Prostate, left mid, core needle biopsy:    Atypical small acinar proliferation. K.  Prostate, left lateral apex, core needle biopsy:    Atypical small acinar proliferation.    Acute and chronic prostatitis. L.  Prostate, left apex, core needle biopsy:    Negative for neoplasm. Secondary Diagnosis:    BPH- On flomax. Abnormal heterogenous prostate on CT    Gross hematuria- hematuria workup negative      Summary of Previous Records:  Dayne Spencer is a pleasant 81 y.o. with past medical history of Atrial fibrillation, CAD, HTN, and GERD who presents in follow-up today for elevated PSA of 4.94 on 10/10/18 previously 4.39 one month prior. His PSA has been monitored around every 2 years by his PCP. Today repeat 6 month PSA did decrease to 3.59. He admits to occasional urinary leakage when running the water in the sink but believes it is infrequent and rather satisfied with his symptoms. He did not believe any intervention was needed. He is taking Flomax which is working well for him and would like to continue. Requested/reviewed records from Yinka Deleon MD office and/or outside physician/EMR    (Patient's old records have been requested, reviewed and pertinent findings summarized in today's note.)    Procedures Today: N/A    Last several PSA's:  Lab Results   Component Value Date    PSA 3.60 (H) 08/04/2020    PSA 3.59 (H) 04/10/2019    PSA 4.94 (H) 10/10/2018       Last total testosterone:  No results found for: TESTOSTERONE    Urinalysis today:  No results found for this visit on 10/27/20.     Last BUN and creatinine:  Lab Results   Component Value Date    BUN 15 09/20/2020     Lab Results   Component Value Date    CREATININE 1.1 09/20/2020       Imaging Reviewed during this Office Visit:   Lacy Gomez MD independently reviewed the images and verified the radiology reports from:    Ct Abdomen Pelvis W Iv Contrast Additional Contrast? None    Result Date: 9/20/2020  PROCEDURE: CT ABDOMEN PELVIS W IV CONTRAST CLINICAL INFORMATION: 81-year-old male with left lower quadrant abdominal pain. Hematuria . COMPARISON: CT scan 12/08/2018. TECHNIQUE: 5 mm axial CT images were obtained through the abdomen and pelvis after the administration of intravenous and oral contrast. Coronal and sagittal reconstructions were obtained. All CT scans at this facility use dose modulation, iterative reconstruction, and/or weight-based dosing when appropriate to reduce radiation dose to as low as reasonably achievable. FINDINGS: There is atelectasis at the lung bases. The base of the heart is within normal limits. The liver is hypoattenuated. This may be due to fatty infiltration. The spleen, pancreas, gallbladder and adrenal glands are within normal limits. The kidneys are symmetric in size, shape and degree of enhancement. There is no hydronephrosis. There is no evidence of a small bowel obstruction. There is no colonic wall thickening or edema. The appendix appears normal with no inflammatory changes. The prostate gland is enlarged and has a heterogenous appearance. The urinary bladder has a normal appearance. The aorta and the IVC are normal in caliber. There is a fat-containing ventral abdominal wall hernia. There is no free intraperitoneal air or free fluid in the abdomen or pelvis. There are degenerative changes. There are several lucent lesions in the bones. The most notable is at the L5 vertebral body measuring 2 x 1.4 cm. 1. There is an enlarged heterogenous appearance of the prostate gland. 2. Hepatic steatosis. 3. There are several nonspecific lucent lesions in the osseous structures. This is similar in appearance to the previous exam. **This report has been created using voice recognition software. It may contain minor errors which are inherent in voice recognition technology. ** Final report electronically signed by Dr Zehra Rothman on 9/20/2020 11:48 PM      PAST MEDICAL, FAMILY AND SOCIAL HISTORY:  Past Medical History: Diagnosis Date    Anesthesia     takes large dose of medications to make him sleepy for surgery    [facility]     Benign prostatic hyperplasia with urinary frequency 10/21/2019    CAD (coronary artery disease)     Chest pain     Colon polyp 2011    removed    Coronary artery disease involving native coronary artery of native heart without angina pectoris 10/21/2019    Dementia ([facility] [facility]     Dizziness     Elevated PSA     GERD (gastroesophageal reflux disease)     Heart disease     Hyperlipidemia     Hypertension      Past Surgical History:   Procedure Laterality Date    ADENOIDECTOMY      CARDIAC CATHETERIZATION  09/23/2011    Right radial artery cannulation. Moderate LV dysfunction. The patient has disease in the ostium of diagonal 1 and diagonal 2 branch, however they are both are at the  ostium of the diagonals. Intervention could compromise flow of LAD. THe LAD has long diffuse calcified lesion 50-60-% anatomically.     CARDIOVASCULAR STRESS TEST  09/23/2011    EF 37%    CARPAL TUNNEL RELEASE      COLONOSCOPY      TONSILLECTOMY      TRANSTHORACIC ECHOCARDIOGRAM  09/23/2011    EF 50-55%    ULTRASOUND PROSTATE/TRANSRECTAL N/A 10/6/2020    CYSTO, TRANSRECTAL ULTRASOUND GUIDED PROSTATE BX performed by Rui Sanchez MD at [address] [place] [address]       Family History   Problem Relation Age of Onset    Heart Surgery Father     Cancer Father         lung    Heart Disease Father         CABG    Alzheimer's Disease Mother     Diabetes Neg Hx     High Blood Pressure Neg Hx     Stroke Neg Hx      Outpatient Medications Marked as Taking for the 10/27/20 encounter (Office Visit) with Rui Sanchez MD   Medication Sig Dispense Refill    tamsulosin (FLOMAX) 0.4 MG capsule Take 1 capsule by mouth once daily 90 capsule 0    metoprolol succinate (TOPROL XL) 50 MG extended release tablet Take 1 tablet by mouth daily 90 tablet 3    bumetanide (BUMEX) 2 MG tablet Take 1 tablet by mouth daily 90 tablet 3    digoxin (LANOXIN) 125 MCG tablet TAKE 1 TABLET BY MOUTH ONCE DAILY 90 tablet 3    dilTIAZem (CARDIZEM CD) 360 MG extended release capsule Take 1 capsule by mouth once daily 90 capsule 3    nitroGLYCERIN (NITROSTAT) 0.4 MG SL tablet Place 1 tablet under the tongue every 5 minutes as needed for Chest pain 25 tablet 3    potassium chloride (KLOR-CON) 10 MEQ extended release tablet TAKE 1 TABLET BY MOUTH TWICE DAILY 180 tablet 3    pravastatin (PRAVACHOL) 80 MG tablet Take 1 tablet by mouth nightly 90 tablet 3    bisacodyl (DULCOLAX) 5 MG EC tablet Take 5 mg by mouth nightly          Patient has no known allergies. Social History     Tobacco Use   Smoking Status Never Smoker   Smokeless Tobacco Never Used      (If patient a smoker, smoking cessation counseling offered)   Social History     Substance and Sexual Activity   Alcohol Use No       REVIEW OF SYSTEMS:  Constitutional: negative  Eyes: negative  Respiratory: negative  Cardiovascular: negative  Gastrointestinal: negative  Genitourinary: see HPI  Musculoskeletal: negative  Skin: negative   Neurological: negative  Hematological/Lymphatic: negative  Psychological: negative        Physical Exam:    This a 78 y.o. male  Vitals:    10/27/20 1636   Temp: 97.1 °F (36.2 °C)     Body mass index is 32.32 kg/m². Constitutional: Patient in no acute distress;       Assessment and Plan        1. Low testosterone               Plan:         Pt very nervous and anxious today in office. Adamant about surgery for prostate cancer. Counseled patient that this is likely more aggressive than necessary at this time , however patient does not want to undergo active surveillance  Gross Hematuria- workup negative  BPH- on flomax. Large prostate      Prostate Cancer Treatment Options  I have discussed in great detail with the patient the natural history, diagnosis and treatment of prostate cancer.   I explained the Calderon grading system as well as the   various treatments without pelvic lymphadenectomy. I discussed the risks including infection, bleeding, the risks of anesthesia, DVT, PE, MI, stroke, death, rectal injury and urethral stricture/bladder neck contracture. I discussed the side effect of stress urinary incontinence which is temporary for most patients. I discussed the side effect of ED and the fact that it may take 6-18 months to recover this function. 5.  Cryotherapy. I discussed the fact that this treatment option has a 100% ED rate and is used primarily for radiation treatment failures. Prescriptions Ordered:  No orders of the defined types were placed in this encounter. Orders Placed:  No orders of the defined types were placed in this encounter.            Serge Quintana MD

## 2020-10-28 ENCOUNTER — TELEPHONE (OUTPATIENT)
Dept: UROLOGY | Age: 70
End: 2020-10-28

## 2020-10-28 NOTE — TELEPHONE ENCOUNTER
Patient newly diagnosed with prostate cancer. He is scheduled for surgery on 12- with Dr. Sandra Lopez for a robotic assisted laparoscopic radical prostatectomy under a general anesthetic. Patient was just cleared by Dr. Joanna Langford to have a prostate biopsy. Could you please ask Dr. Joanna Langford if the patient is still okay to proceed with this scheduled procedure?   Thank you

## 2020-10-29 ENCOUNTER — TELEPHONE (OUTPATIENT)
Dept: CARDIOLOGY CLINIC | Age: 70
End: 2020-10-29

## 2020-10-30 ENCOUNTER — HOSPITAL ENCOUNTER (OUTPATIENT)
Dept: PHARMACY | Age: 70
Setting detail: THERAPIES SERIES
Discharge: HOME | End: 2020-10-30
Payer: MEDICARE

## 2020-10-30 VITALS — TEMPERATURE: 98.4 F

## 2020-10-30 LAB — POC INR: 2.5 (ref 0.8–1.2)

## 2020-10-30 PROCEDURE — 85610 PROTHROMBIN TIME: CPT | Performed by: PHARMACIST

## 2020-10-30 PROCEDURE — 36416 COLLJ CAPILLARY BLOOD SPEC: CPT | Performed by: PHARMACIST

## 2020-10-30 PROCEDURE — 99211 OFF/OP EST MAY X REQ PHY/QHP: CPT | Performed by: PHARMACIST

## 2020-10-30 NOTE — PROGRESS NOTES
[facility] [address]  [phone] (phone)  [phone] (fax)      Mr. Alina Dunn is a 78 y.o.  male with history of Afib who presents today for anticoagulation monitoring and adjustment. Patient verifies current dosing regimen and tablet strength. No missed or extra doses. Patient denies s/s bleeding/bruising/swelling/SOB/chest pain  No blood in urine or stool. Dietary changes. Eating less junk food, less sugary foods  No changes in medication/OTC agents/Herbals. No change in alcohol use or tobacco use. Change in activity level. Moving around less due to weather and COVID  Patient denies headaches/dizziness/lightheadedness/falls. No vomiting/diarrhea or acute illness. Surgery scheduled for December 3rd for removal of prostate cancer, patient describes needing to hold Coumadin for 5 days leading up to procedure. Dr. Fiona Peña office currently working on cardiac clearance for procedure. Assessment:   Lab Results   Component Value Date    INR 2.50 (H) 10/30/2020    INR 1.60 (H) 10/15/2020    INR 1.23 (H) 10/06/2020     INR therapeutic   Recent Labs     10/30/20  0826   INR 2.50*     Patient interview completed and discussed with pharmacist by Jerica ShanksD Candidate    Plan:  Continue Coumadin 5mg W and 2.5mg [id number]. Recheck INR in 4 week(s). Patient reminded to call the Anticoagulation Clinic with any signs or symptoms of bleeding or with any medication changes. Patient given instructions utilizing the teach back method. Discharged ambulatory in no apparent distress. After visit summary printed and reviewed with patient. Medications reviewed and updated on home medication list Yes, patient and spouse unwilling to review med list. Only confirmed that there have been no changes.     Influenza vaccine:     [] given    [] declined   [x] received previously   [] plans to receive at a later time   [] refused    [] documented in Epic

## 2020-11-09 ENCOUNTER — TELEPHONE (OUTPATIENT)
Dept: PHARMACY | Age: 70
End: 2020-11-09

## 2020-11-09 RX ORDER — WARFARIN SODIUM 2.5 MG/1
TABLET ORAL
Qty: 40 TABLET | Refills: 3 | Status: SHIPPED | OUTPATIENT
Start: 2020-11-09 | End: 2021-04-05 | Stop reason: SDUPTHER

## 2020-11-16 ENCOUNTER — HOSPITAL ENCOUNTER (OUTPATIENT)
Dept: NON INVASIVE DIAGNOSTICS | Age: 70
Discharge: HOME | End: 2020-11-16
Payer: MEDICARE

## 2020-11-16 VITALS — HEIGHT: 73 IN | WEIGHT: 237 LBS | BODY MASS INDEX: 31.41 KG/M2

## 2020-11-16 PROCEDURE — 6360000002 HC RX W HCPCS

## 2020-11-16 PROCEDURE — 3430000000 HC RX DIAGNOSTIC RADIOPHARMACEUTICAL: Performed by: NUCLEAR MEDICINE

## 2020-11-16 PROCEDURE — 78452 HT MUSCLE IMAGE SPECT MULT: CPT

## 2020-11-16 PROCEDURE — 93017 CV STRESS TEST TRACING ONLY: CPT

## 2020-11-16 PROCEDURE — A9500 TC99M SESTAMIBI: HCPCS | Performed by: NUCLEAR MEDICINE

## 2020-11-16 RX ADMIN — Medication 9.2 MILLICURIE: at 07:55

## 2020-11-16 RX ADMIN — Medication 32.1 MILLICURIE: at 08:55

## 2020-11-17 NOTE — TELEPHONE ENCOUNTER
Please review stress test and ECHO. Is pt cleared for surgery with Dr Cari Cheema? Pt on Coumadin. Form in Dr Russell John box.

## 2020-11-24 ENCOUNTER — HOSPITAL ENCOUNTER (OUTPATIENT)
Dept: PHARMACY | Age: 70
Setting detail: THERAPIES SERIES
Discharge: HOME | End: 2020-11-24
Payer: MEDICARE

## 2020-11-24 ENCOUNTER — OFFICE VISIT (OUTPATIENT)
Dept: UROLOGY | Age: 70
End: 2020-11-24
Payer: MEDICARE

## 2020-11-24 VITALS — HEIGHT: 73 IN | WEIGHT: 241.3 LBS | BODY MASS INDEX: 31.98 KG/M2 | TEMPERATURE: 97.3 F

## 2020-11-24 VITALS — TEMPERATURE: 98.1 F

## 2020-11-24 LAB — POC INR: 2.8 (ref 0.8–1.2)

## 2020-11-24 PROCEDURE — 85610 PROTHROMBIN TIME: CPT | Performed by: PHARMACIST

## 2020-11-24 PROCEDURE — G8484 FLU IMMUNIZE NO ADMIN: HCPCS | Performed by: UROLOGY

## 2020-11-24 PROCEDURE — 4040F PNEUMOC VAC/ADMIN/RCVD: CPT | Performed by: UROLOGY

## 2020-11-24 PROCEDURE — 99214 OFFICE O/P EST MOD 30 MIN: CPT | Performed by: UROLOGY

## 2020-11-24 PROCEDURE — 1036F TOBACCO NON-USER: CPT | Performed by: UROLOGY

## 2020-11-24 PROCEDURE — G8417 CALC BMI ABV UP PARAM F/U: HCPCS | Performed by: UROLOGY

## 2020-11-24 PROCEDURE — G8427 DOCREV CUR MEDS BY ELIG CLIN: HCPCS | Performed by: UROLOGY

## 2020-11-24 PROCEDURE — 36416 COLLJ CAPILLARY BLOOD SPEC: CPT | Performed by: PHARMACIST

## 2020-11-24 PROCEDURE — 1123F ACP DISCUSS/DSCN MKR DOCD: CPT | Performed by: UROLOGY

## 2020-11-24 PROCEDURE — 99211 OFF/OP EST MAY X REQ PHY/QHP: CPT | Performed by: PHARMACIST

## 2020-11-24 PROCEDURE — 3017F COLORECTAL CA SCREEN DOC REV: CPT | Performed by: UROLOGY

## 2020-11-24 NOTE — PROGRESS NOTES
Gordon Ramirez MD        [address] [place] [address]  Dept: [phone]  Dept Fax: [phone]: [address] [address] Urology Office Note -     Patient:  Santos Aviles  YOB: 1950    The patient is a 78 y.o. male who presents today for evaluation of the following problems: Prostate Cancer  No chief complaint on file. HISTORY OF PRESENT ILLNESS:     Prostate cancer  Onset was days ago. Overall, the problem(s) are worse. Severity is described as moderate. Associated Symptoms: No dysuria,  gross hematuria present. Current Pain Severity:0      Patient here to discuss his prostate cancer diagnosis and Decipher results. Patient is adamant on having his prostate removed. Decipher results given to the patient today. High risk decipher score. He is scheduled for a RARLP next week  All questions were answered today at length. He presents to the office today with his wife. Prostate biopsy pathology     FINAL DIAGNOSIS:   A.  Prostate, right lateral base, core needle biopsies:    Invasive, moderately differentiated adenocarcinoma.       Maximum [place] score: 6 (3+3), grade group = 1.       Number of positive cores: 1       Total number of cores: 1       Percent cancer of total sample (estimated): 8%       Other findings:  Chronic prostatitis. B-G.  Prostate, right base, right lateral mid, right mid, right lateral   apex, right apex and left lateral base, multiple core needle biopsies:    Mild chronic prostatitis, negative for neoplasia. H.  Prostate, left base, core needle biopsy:    Invasive, moderately differentiated adenocarcinoma.       Maximum [place] score: 6 (3+3), grade group = 1.       Number of positive cores: 1       Total number of cores: 1       Percent cancer of total sample (estimated): 12%       Other findings:  Chronic prostatitis.    I.  Prostate, left lateral mid, core needle biopsy:    Chronic prostatitis, negative for neoplasia. J.  Prostate, left mid, core needle biopsy:    Atypical small acinar proliferation. K.  Prostate, left lateral apex, core needle biopsy:    Atypical small acinar proliferation.    Acute and chronic prostatitis. L.  Prostate, left apex, core needle biopsy:    Negative for neoplasm. Secondary Diagnosis:    BPH- On flomax. Abnormal heterogenous prostate on CT    Gross hematuria- hematuria workup negative      Summary of Previous Records:  Nitesh Dickerson is a pleasant 81 y.o. with past medical history of Atrial fibrillation, CAD, HTN, and GERD who presents in follow-up today for elevated PSA of 4.94 on 10/10/18 previously 4.39 one month prior. His PSA has been monitored around every 2 years by his PCP. Today repeat 6 month PSA did decrease to 3.59. He admits to occasional urinary leakage when running the water in the sink but believes it is infrequent and rather satisfied with his symptoms. He did not believe any intervention was needed. He is taking Flomax which is working well for him and would like to continue. Requested/reviewed records from Sis Lewis MD office and/or outside physician/EMR    (Patient's old records have been requested, reviewed and pertinent findings summarized in today's note.)    Procedures Today: N/A    Last several PSA's:  Lab Results   Component Value Date    PSA 3.60 (H) 08/04/2020    PSA 3.59 (H) 04/10/2019    PSA 4.94 (H) 10/10/2018       Last total testosterone:  No results found for: TESTOSTERONE    Urinalysis today:  No results found for this visit on 11/24/20.     Last BUN and creatinine:  Lab Results   Component Value Date    BUN 15 09/20/2020     Lab Results   Component Value Date    CREATININE 1.1 09/20/2020       Imaging Reviewed during this Office Visit:   Kriss Zuniga MD independently reviewed the images and verified the radiology reports from:    Ct Abdomen Pelvis W Iv Contrast Additional Contrast? None    Result Date: 9/20/2020  PROCEDURE: CT ABDOMEN PELVIS W IV CONTRAST CLINICAL INFORMATION: 82-year-old male with left lower quadrant abdominal pain. Hematuria . COMPARISON: CT scan 12/08/2018. TECHNIQUE: 5 mm axial CT images were obtained through the abdomen and pelvis after the administration of intravenous and oral contrast. Coronal and sagittal reconstructions were obtained. All CT scans at this facility use dose modulation, iterative reconstruction, and/or weight-based dosing when appropriate to reduce radiation dose to as low as reasonably achievable. FINDINGS: There is atelectasis at the lung bases. The base of the heart is within normal limits. The liver is hypoattenuated. This may be due to fatty infiltration. The spleen, pancreas, gallbladder and adrenal glands are within normal limits. The kidneys are symmetric in size, shape and degree of enhancement. There is no hydronephrosis. There is no evidence of a small bowel obstruction. There is no colonic wall thickening or edema. The appendix appears normal with no inflammatory changes. The prostate gland is enlarged and has a heterogenous appearance. The urinary bladder has a normal appearance. The aorta and the IVC are normal in caliber. There is a fat-containing ventral abdominal wall hernia. There is no free intraperitoneal air or free fluid in the abdomen or pelvis. There are degenerative changes. There are several lucent lesions in the bones. The most notable is at the L5 vertebral body measuring 2 x 1.4 cm. 1. There is an enlarged heterogenous appearance of the prostate gland. 2. Hepatic steatosis. 3. There are several nonspecific lucent lesions in the osseous structures. This is similar in appearance to the previous exam. **This report has been created using voice recognition software. It may contain minor errors which are inherent in voice recognition technology. ** Final report electronically signed by Dr Jass Rivas on 9/20/2020 11:48 PM      PAST MEDICAL, FAMILY AND SOCIAL HISTORY:  Past Medical History:   Diagnosis Date    Anesthesia     takes large dose of medications to make him sleepy for surgery    Atrial fibrillation ([address]     Benign prostatic hyperplasia with urinary frequency 10/21/2019    CAD (coronary artery disease)     Chest pain     Colon polyp 2011    removed    Coronary artery disease involving native coronary artery of native heart without angina pectoris 10/21/2019    Dementia ([address]     Dizziness     Elevated PSA     GERD (gastroesophageal reflux disease)     Heart disease     Hyperlipidemia     Hypertension      Past Surgical History:   Procedure Laterality Date    ADENOIDECTOMY      CARDIAC CATHETERIZATION  09/23/2011    Right radial artery cannulation. Moderate LV dysfunction. The patient has disease in the ostium of diagonal 1 and diagonal 2 branch, however they are both are at the  ostium of the diagonals. Intervention could compromise flow of LAD. THe LAD has long diffuse calcified lesion 50-60-% anatomically.     CARDIOVASCULAR STRESS TEST  09/23/2011    EF 37%    CARPAL TUNNEL RELEASE      COLONOSCOPY      TONSILLECTOMY      TRANSTHORACIC ECHOCARDIOGRAM  09/23/2011    EF 50-55%    ULTRASOUND PROSTATE/TRANSRECTAL N/A 10/6/2020    CYSTO, TRANSRECTAL ULTRASOUND GUIDED PROSTATE BX performed by Gwendolyn Warren MD at [address] [place] [address]       Family History   Problem Relation Age of Onset    Heart Surgery Father     Cancer Father         lung    Heart Disease Father         CABG    Alzheimer's Disease Mother     Diabetes Neg Hx     High Blood Pressure Neg Hx     Stroke Neg Hx      Outpatient Medications Marked as Taking for the 11/24/20 encounter (Office Visit) with Gwendolyn Warren MD   Medication Sig Dispense Refill    warfarin (COUMADIN) 2.5 MG tablet Take as directed by [facility] Coumadin clinic (#40 ~ 30 day supply) 40 tablet 3    tamsulosin (FLOMAX) 0.4 MG capsule Take 1 capsule by mouth once daily 90 capsule 0    metoprolol succinate (TOPROL XL) 50 MG extended release tablet Take 1 tablet by mouth daily 90 tablet 3    bumetanide (BUMEX) 2 MG tablet Take 1 tablet by mouth daily 90 tablet 3    digoxin (LANOXIN) 125 MCG tablet TAKE 1 TABLET BY MOUTH ONCE DAILY 90 tablet 3    dilTIAZem (CARDIZEM CD) 360 MG extended release capsule Take 1 capsule by mouth once daily 90 capsule 3    nitroGLYCERIN (NITROSTAT) 0.4 MG SL tablet Place 1 tablet under the tongue every 5 minutes as needed for Chest pain 25 tablet 3    potassium chloride (KLOR-CON) 10 MEQ extended release tablet TAKE 1 TABLET BY MOUTH TWICE DAILY 180 tablet 3    pravastatin (PRAVACHOL) 80 MG tablet Take 1 tablet by mouth nightly 90 tablet 3    bisacodyl (DULCOLAX) 5 MG EC tablet Take 5 mg by mouth nightly          Patient has no known allergies. Social History     Tobacco Use   Smoking Status Never Smoker   Smokeless Tobacco Never Used      (If patient a smoker, smoking cessation counseling offered)   Social History     Substance and Sexual Activity   Alcohol Use No       REVIEW OF SYSTEMS:  Constitutional: negative  Eyes: negative  Respiratory: negative  Cardiovascular: negative  Gastrointestinal: negative  Genitourinary: see HPI  Musculoskeletal: negative  Skin: negative   Neurological: negative  Hematological/Lymphatic: negative  Psychological: negative        Physical Exam:    This a 78 y.o. male  Vitals:    11/24/20 0834   Temp: 97.3 °F (36.3 °C)     Body mass index is 31.84 kg/m². Constitutional: Patient in no acute distress;       Assessment and Plan        1. Prostate cancer ([address]    2. Low testosterone    3. Benign prostatic hyperplasia with urinary frequency               Plan:         Prostate cancer- Decipher score- high risk  BPH- on flomax. Large prostate     He is schedule for a RARLP in the near future. All questions from patient and wife were answered today at length. Parviz Gamez MD

## 2020-11-24 NOTE — PROGRESS NOTES
Medication Management [address]   [phone] (phone)  [phone] (fax)      Mr. Druscilla Sandhoff is a 78 y.o.  male with history of Afib who presents today for anticoagulation monitoring and adjustment. Patient verifies current dosing regimen and tablet strength. No missed or extra doses. Patient denies s/s bleeding/bruising/swelling/SOB/chest pain  No blood in urine or stool. No dietary changes. No changes in medication/OTC agents/Herbals. No change in alcohol use or tobacco use. No change in activity level. Patient denies headaches/dizziness/lightheadedness/falls. No vomiting/diarrhea or acute illness. Prostate surgery with Dr. Jocy Mora 12/3, will hold Coumadin x 4 days prior as approved by Dr. Addy Cooper, and plan to hold Coumadin x 3 days after as requested by Dr. Jocy Mora. Dr. Addy Cooper has already instructed pt to discuss post-op hold with Dr. Jocy Mora. Assessment:   Lab Results   Component Value Date    INR 2.80 (H) 11/24/2020    INR 2.50 (H) 10/30/2020    INR 1.60 (H) 10/15/2020     INR therapeutic   Recent Labs     11/24/20  0713   INR 2.80*         Plan:  Continue Coumadin 5mg W and 2.5mg MTThFSaS. Hold Coumadin 11/29-12/6. Coumadin 5mg on 12/7, then return to 5mg W and 2.5mg MTThFSaS. Recheck INR in 2.5 week(s) after procedure. Recommended INR check 1.5 weeks after procedure, but pt refused. Patient reminded to call the Anticoagulation Clinic with any signs or symptoms of bleeding or with any medication changes. Patient given instructions utilizing the teach back method. Discharged ambulatory in no apparent distress. After visit summary printed and reviewed with patient.       Medications reviewed and updated on home medication list Yes    Influenza vaccine:     [] given    [] declined   [x] received previously   [] plans to receive at a later time   [] refused    [x] documented in [address]: MED RECONCILIATION/REVIEW

## 2020-11-25 ENCOUNTER — HOSPITAL ENCOUNTER (OUTPATIENT)
Age: 70
Discharge: HOME | End: 2020-11-25
Payer: MEDICARE

## 2020-11-25 LAB
BACTERIA: ABNORMAL /HPF
BILIRUBIN URINE: NEGATIVE
BLOOD, URINE: NEGATIVE
CASTS 2: ABNORMAL /LPF
CASTS UA: ABNORMAL /LPF
CHARACTER, URINE: CLEAR
COLOR: YELLOW
CRYSTALS, UA: ABNORMAL
EPITHELIAL CELLS, UA: ABNORMAL /HPF
GLUCOSE URINE: NEGATIVE MG/DL
KETONES, URINE: NEGATIVE
LEUKOCYTE ESTERASE, URINE: ABNORMAL
MISCELLANEOUS 2: ABNORMAL
NITRITE, URINE: NEGATIVE
PH UA: 6 (ref 5–9)
PROTEIN UA: ABNORMAL
RBC URINE: ABNORMAL /HPF
RENAL EPITHELIAL, UA: ABNORMAL
SPECIFIC GRAVITY, URINE: 1.02 (ref 1–1.03)
UROBILINOGEN, URINE: 0.2 EU/DL (ref 0–1)
WBC UA: ABNORMAL /HPF
YEAST: ABNORMAL

## 2020-11-25 PROCEDURE — 81001 URINALYSIS AUTO W/SCOPE: CPT

## 2020-11-25 PROCEDURE — U0003 INFECTIOUS AGENT DETECTION BY NUCLEIC ACID (DNA OR RNA); SEVERE ACUTE RESPIRATORY SYNDROME CORONAVIRUS 2 (SARS-COV-2) (CORONAVIRUS DISEASE [COVID-19]), AMPLIFIED PROBE TECHNIQUE, MAKING USE OF HIGH THROUGHPUT TECHNOLOGIES AS DESCRIBED BY CMS-2020-01-R: HCPCS

## 2020-11-27 ENCOUNTER — TELEPHONE (OUTPATIENT)
Dept: UROLOGY | Age: 70
End: 2020-11-27

## 2020-11-27 LAB — SARS-COV-2: NOT DETECTED

## 2020-11-27 NOTE — PROGRESS NOTES

## 2020-12-01 ENCOUNTER — TELEPHONE (OUTPATIENT)
Dept: UROLOGY | Age: 70
End: 2020-12-01

## 2020-12-01 ENCOUNTER — PREP FOR PROCEDURE (OUTPATIENT)
Dept: UROLOGY | Age: 70
End: 2020-12-01

## 2020-12-01 RX ORDER — SODIUM CHLORIDE 9 MG/ML
INJECTION, SOLUTION INTRAVENOUS CONTINUOUS
Status: CANCELLED | OUTPATIENT
Start: 2020-12-03

## 2020-12-01 NOTE — TELEPHONE ENCOUNTER
[facility] Urology Surgery Preop Check Off    Preop testing- done 2 weeks prior and covid testing 1 week prior to surgery date. Continue to give arrival times and inform patients time is subject to change that day as it gets closer to the day of surgery.     PREOP check list      Surgeon Dr. Tian Almaguer       Patient Name  Lukas Lion     1950   MRN   [id number]     DOS   2020  Diagnosis/Indication for Surgery  Prostate cancer   Procedure Robotic laparoscopic radical prostatectomy    Allergies   No Known Allergies   UA  2020 Protein trace, Leuk's trace  Urine Culture  Not done  Blood thinners  Coumadin     EKG. 2020  Atrial fibrillation with premature ventricular or aberrantly conducted complexes  Diogo- Septal infarct , age undetermined  Abnormal ECG  When compared with ECG of 08-DEC-2018 15:19,  Ventricular rate has decreased BY  47 BPM    CXR- 2020  Impression:    No acute pathology       COVID  2020 Not detected    Clearance:  Cardiac- Dr. Dina Giordano to surgery-

## 2020-12-02 NOTE — PROGRESS NOTES
Patient contacted regarding COVID-19 screen. Test was done on 11/25/20    Following questions asked: In the last month, have you been in contact with someone who was confirmed or suspected to have Coronavirus/COVID-19:  Patient stated NO      Pt was informed can be a visitor allowed. Please bring masks. Do you or family members have any of the following symptoms:  Cough-no   Muscle pain-no   Shortness of breath-no   Fever-no   Weakness-no  Severe headache-no   Sore throat-no   Respiratory symptoms-no    Have you traveled internationally in the last month?  No     Have you been to the emergency room recently-no

## 2020-12-03 ENCOUNTER — ANESTHESIA EVENT (OUTPATIENT)
Dept: OPERATING ROOM | Age: 70
DRG: 707 | End: 2020-12-03
Payer: MEDICARE

## 2020-12-03 ENCOUNTER — TELEPHONE (OUTPATIENT)
Dept: UROLOGY | Age: 70
End: 2020-12-03

## 2020-12-03 ENCOUNTER — HOSPITAL ENCOUNTER (INPATIENT)
Age: 70
LOS: 1 days | Discharge: HOME | DRG: 707 | End: 2020-12-05
Attending: UROLOGY | Admitting: UROLOGY
Payer: MEDICARE

## 2020-12-03 ENCOUNTER — ANESTHESIA (OUTPATIENT)
Dept: OPERATING ROOM | Age: 70
DRG: 707 | End: 2020-12-03
Payer: MEDICARE

## 2020-12-03 VITALS — TEMPERATURE: 98.6 F | DIASTOLIC BLOOD PRESSURE: 58 MMHG | OXYGEN SATURATION: 94 % | SYSTOLIC BLOOD PRESSURE: 120 MMHG

## 2020-12-03 PROBLEM — N13.8 BPH WITH OBSTRUCTION/LOWER URINARY TRACT SYMPTOMS: Status: ACTIVE | Noted: 2020-12-03

## 2020-12-03 PROBLEM — N40.1 BPH WITH OBSTRUCTION/LOWER URINARY TRACT SYMPTOMS: Status: ACTIVE | Noted: 2020-12-03

## 2020-12-03 LAB
ANION GAP SERPL CALCULATED.3IONS-SCNC: 13 MEQ/L (ref 8–16)
BASOPHILS # BLD: 0.1 %
BASOPHILS ABSOLUTE: 0 THOU/MM3 (ref 0–0.1)
BUN BLDV-MCNC: 14 MG/DL (ref 7–22)
CALCIUM SERPL-MCNC: 8.5 MG/DL (ref 8.5–10.5)
CHLORIDE BLD-SCNC: 104 MEQ/L (ref 98–111)
CO2: 26 MEQ/L (ref 23–33)
CREAT SERPL-MCNC: 1.3 MG/DL (ref 0.4–1.2)
EOSINOPHIL # BLD: 0.1 %
EOSINOPHILS ABSOLUTE: 0 THOU/MM3 (ref 0–0.4)
ERYTHROCYTE [DISTWIDTH] IN BLOOD BY AUTOMATED COUNT: 13.7 % (ref 11.5–14.5)
ERYTHROCYTE [DISTWIDTH] IN BLOOD BY AUTOMATED COUNT: 45.3 FL (ref 35–45)
GFR SERPL CREATININE-BSD FRML MDRD: 55 ML/MIN/1.73M2
GLUCOSE BLD-MCNC: 154 MG/DL (ref 70–108)
HCT VFR BLD CALC: 49.1 % (ref 42–52)
HEMOGLOBIN: 16.5 GM/DL (ref 14–18)
IMMATURE GRANS (ABS): 0.07 THOU/MM3 (ref 0–0.07)
IMMATURE GRANULOCYTES: 0.5 %
INR BLD: 1.28 (ref 0.85–1.13)
LYMPHOCYTES # BLD: 4.8 %
LYMPHOCYTES ABSOLUTE: 0.7 THOU/MM3 (ref 1–4.8)
MCH RBC QN AUTO: 30.4 PG (ref 26–33)
MCHC RBC AUTO-ENTMCNC: 33.6 GM/DL (ref 32.2–35.5)
MCV RBC AUTO: 90.6 FL (ref 80–94)
MONOCYTES # BLD: 7.1 %
MONOCYTES ABSOLUTE: 1 THOU/MM3 (ref 0.4–1.3)
NUCLEATED RED BLOOD CELLS: 0 /100 WBC
PLATELET # BLD: 251 THOU/MM3 (ref 130–400)
PMV BLD AUTO: 10.4 FL (ref 9.4–12.4)
POTASSIUM SERPL-SCNC: 4.3 MEQ/L (ref 3.5–5.2)
RBC # BLD: 5.42 MILL/MM3 (ref 4.7–6.1)
SEG NEUTROPHILS: 87.4 %
SEGMENTED NEUTROPHILS ABSOLUTE COUNT: 12.5 THOU/MM3 (ref 1.8–7.7)
SODIUM BLD-SCNC: 143 MEQ/L (ref 135–145)
WBC # BLD: 14.3 THOU/MM3 (ref 4.8–10.8)

## 2020-12-03 PROCEDURE — 3700000001 HC ADD 15 MINUTES (ANESTHESIA): Performed by: UROLOGY

## 2020-12-03 PROCEDURE — 6370000000 HC RX 637 (ALT 250 FOR IP): Performed by: PHYSICIAN ASSISTANT

## 2020-12-03 PROCEDURE — 88309 TISSUE EXAM BY PATHOLOGIST: CPT

## 2020-12-03 PROCEDURE — 2709999900 HC NON-CHARGEABLE SUPPLY: Performed by: UROLOGY

## 2020-12-03 PROCEDURE — 2580000003 HC RX 258

## 2020-12-03 PROCEDURE — 94760 N-INVAS EAR/PLS OXIMETRY 1: CPT

## 2020-12-03 PROCEDURE — 85610 PROTHROMBIN TIME: CPT

## 2020-12-03 PROCEDURE — 2580000003 HC RX 258: Performed by: PHYSICIAN ASSISTANT

## 2020-12-03 PROCEDURE — 6370000000 HC RX 637 (ALT 250 FOR IP): Performed by: UROLOGY

## 2020-12-03 PROCEDURE — 2500000003 HC RX 250 WO HCPCS: Performed by: NURSE ANESTHETIST, CERTIFIED REGISTERED

## 2020-12-03 PROCEDURE — 0VT04ZZ RESECTION OF PROSTATE, PERCUTANEOUS ENDOSCOPIC APPROACH: ICD-10-PCS | Performed by: UROLOGY

## 2020-12-03 PROCEDURE — 3600000009 HC SURGERY ROBOT BASE: Performed by: UROLOGY

## 2020-12-03 PROCEDURE — 7100000000 HC PACU RECOVERY - FIRST 15 MIN: Performed by: UROLOGY

## 2020-12-03 PROCEDURE — 88305 TISSUE EXAM BY PATHOLOGIST: CPT

## 2020-12-03 PROCEDURE — 6360000002 HC RX W HCPCS: Performed by: NURSE ANESTHETIST, CERTIFIED REGISTERED

## 2020-12-03 PROCEDURE — 88331 PATH CONSLTJ SURG 1 BLK 1SPC: CPT

## 2020-12-03 PROCEDURE — 6360000002 HC RX W HCPCS

## 2020-12-03 PROCEDURE — 6360000002 HC RX W HCPCS: Performed by: ANESTHESIOLOGY

## 2020-12-03 PROCEDURE — 55866 LAPS SURG PRST8ECT RPBIC RAD: CPT | Performed by: PHYSICIAN ASSISTANT

## 2020-12-03 PROCEDURE — 2500000003 HC RX 250 WO HCPCS: Performed by: UROLOGY

## 2020-12-03 PROCEDURE — 07BC4ZX EXCISION OF PELVIS LYMPHATIC, PERCUTANEOUS ENDOSCOPIC APPROACH, DIAGNOSTIC: ICD-10-PCS | Performed by: UROLOGY

## 2020-12-03 PROCEDURE — 80048 BASIC METABOLIC PNL TOTAL CA: CPT

## 2020-12-03 PROCEDURE — 38571 LAPAROSCOPY LYMPHADENECTOMY: CPT | Performed by: PHYSICIAN ASSISTANT

## 2020-12-03 PROCEDURE — S2900 ROBOTIC SURGICAL SYSTEM: HCPCS | Performed by: UROLOGY

## 2020-12-03 PROCEDURE — 85025 COMPLETE CBC W/AUTO DIFF WBC: CPT

## 2020-12-03 PROCEDURE — 2720000010 HC SURG SUPPLY STERILE: Performed by: UROLOGY

## 2020-12-03 PROCEDURE — 36415 COLL VENOUS BLD VENIPUNCTURE: CPT

## 2020-12-03 PROCEDURE — 8E0W4CZ ROBOTIC ASSISTED PROCEDURE OF TRUNK REGION, PERCUTANEOUS ENDOSCOPIC APPROACH: ICD-10-PCS | Performed by: UROLOGY

## 2020-12-03 PROCEDURE — 3600000019 HC SURGERY ROBOT ADDTL 15MIN: Performed by: UROLOGY

## 2020-12-03 PROCEDURE — 3700000000 HC ANESTHESIA ATTENDED CARE: Performed by: UROLOGY

## 2020-12-03 PROCEDURE — 6360000002 HC RX W HCPCS: Performed by: PHYSICIAN ASSISTANT

## 2020-12-03 PROCEDURE — 7100000001 HC PACU RECOVERY - ADDTL 15 MIN: Performed by: UROLOGY

## 2020-12-03 PROCEDURE — 88307 TISSUE EXAM BY PATHOLOGIST: CPT

## 2020-12-03 DEVICE — GRAFT HUM TISS W4XL3CM THK1MM UMB CRD AMNIO MEM CLARIX 1K: Type: IMPLANTABLE DEVICE | Status: FUNCTIONAL

## 2020-12-03 RX ORDER — METOPROLOL SUCCINATE 50 MG/1
50 TABLET, EXTENDED RELEASE ORAL DAILY
Status: DISCONTINUED | OUTPATIENT
Start: 2020-12-04 | End: 2020-12-05 | Stop reason: HOSPADM

## 2020-12-03 RX ORDER — BUMETANIDE 1 MG/1
2 TABLET ORAL DAILY
Status: DISCONTINUED | OUTPATIENT
Start: 2020-12-04 | End: 2020-12-05 | Stop reason: HOSPADM

## 2020-12-03 RX ORDER — LIDOCAINE HCL/PF 100 MG/5ML
SYRINGE (ML) INJECTION PRN
Status: DISCONTINUED | OUTPATIENT
Start: 2020-12-03 | End: 2020-12-03 | Stop reason: SDUPTHER

## 2020-12-03 RX ORDER — ACETAMINOPHEN 325 MG/1
650 TABLET ORAL EVERY 4 HOURS PRN
Status: DISCONTINUED | OUTPATIENT
Start: 2020-12-03 | End: 2020-12-05 | Stop reason: HOSPADM

## 2020-12-03 RX ORDER — NEOSTIGMINE METHYLSULFATE 5 MG/5 ML
SYRINGE (ML) INTRAVENOUS PRN
Status: DISCONTINUED | OUTPATIENT
Start: 2020-12-03 | End: 2020-12-03 | Stop reason: SDUPTHER

## 2020-12-03 RX ORDER — BUPIVACAINE HYDROCHLORIDE 5 MG/ML
INJECTION, SOLUTION EPIDURAL; INTRACAUDAL PRN
Status: DISCONTINUED | OUTPATIENT
Start: 2020-12-03 | End: 2020-12-03 | Stop reason: ALTCHOICE

## 2020-12-03 RX ORDER — PROPOFOL 10 MG/ML
INJECTION, EMULSION INTRAVENOUS PRN
Status: DISCONTINUED | OUTPATIENT
Start: 2020-12-03 | End: 2020-12-03 | Stop reason: SDUPTHER

## 2020-12-03 RX ORDER — SODIUM CHLORIDE 0.9 % (FLUSH) 0.9 %
10 SYRINGE (ML) INJECTION EVERY 12 HOURS SCHEDULED
Status: DISCONTINUED | OUTPATIENT
Start: 2020-12-03 | End: 2020-12-05 | Stop reason: HOSPADM

## 2020-12-03 RX ORDER — MORPHINE SULFATE 2 MG/ML
2 INJECTION, SOLUTION INTRAMUSCULAR; INTRAVENOUS
Status: DISCONTINUED | OUTPATIENT
Start: 2020-12-03 | End: 2020-12-05 | Stop reason: HOSPADM

## 2020-12-03 RX ORDER — IBUPROFEN 200 MG
CAPSULE ORAL 2 TIMES DAILY
Status: DISCONTINUED | OUTPATIENT
Start: 2020-12-03 | End: 2020-12-05 | Stop reason: HOSPADM

## 2020-12-03 RX ORDER — SODIUM CHLORIDE 0.9 % (FLUSH) 0.9 %
10 SYRINGE (ML) INJECTION PRN
Status: DISCONTINUED | OUTPATIENT
Start: 2020-12-03 | End: 2020-12-05 | Stop reason: HOSPADM

## 2020-12-03 RX ORDER — MORPHINE SULFATE 4 MG/ML
4 INJECTION, SOLUTION INTRAMUSCULAR; INTRAVENOUS
Status: DISCONTINUED | OUTPATIENT
Start: 2020-12-03 | End: 2020-12-05 | Stop reason: HOSPADM

## 2020-12-03 RX ORDER — POTASSIUM CHLORIDE 750 MG/1
10 TABLET, FILM COATED, EXTENDED RELEASE ORAL 2 TIMES DAILY WITH MEALS
Status: DISCONTINUED | OUTPATIENT
Start: 2020-12-04 | End: 2020-12-05 | Stop reason: HOSPADM

## 2020-12-03 RX ORDER — HYDROCODONE BITARTRATE AND ACETAMINOPHEN 5; 325 MG/1; MG/1
1 TABLET ORAL EVERY 6 HOURS PRN
Status: DISCONTINUED | OUTPATIENT
Start: 2020-12-03 | End: 2020-12-05 | Stop reason: HOSPADM

## 2020-12-03 RX ORDER — FENTANYL CITRATE 50 UG/ML
50 INJECTION, SOLUTION INTRAMUSCULAR; INTRAVENOUS EVERY 5 MIN PRN
Status: DISCONTINUED | OUTPATIENT
Start: 2020-12-03 | End: 2020-12-03 | Stop reason: HOSPADM

## 2020-12-03 RX ORDER — SODIUM CHLORIDE 9 MG/ML
INJECTION, SOLUTION INTRAVENOUS CONTINUOUS
Status: DISCONTINUED | OUTPATIENT
Start: 2020-12-03 | End: 2020-12-04

## 2020-12-03 RX ORDER — PRAVASTATIN SODIUM 80 MG/1
80 TABLET ORAL NIGHTLY
Status: DISCONTINUED | OUTPATIENT
Start: 2020-12-03 | End: 2020-12-05 | Stop reason: HOSPADM

## 2020-12-03 RX ORDER — ROCURONIUM BROMIDE 10 MG/ML
INJECTION, SOLUTION INTRAVENOUS PRN
Status: DISCONTINUED | OUTPATIENT
Start: 2020-12-03 | End: 2020-12-03 | Stop reason: SDUPTHER

## 2020-12-03 RX ORDER — NITROGLYCERIN 0.4 MG/1
0.4 TABLET SUBLINGUAL EVERY 5 MIN PRN
Status: DISCONTINUED | OUTPATIENT
Start: 2020-12-03 | End: 2020-12-05 | Stop reason: HOSPADM

## 2020-12-03 RX ORDER — OXYBUTYNIN CHLORIDE 10 MG/1
10 TABLET, EXTENDED RELEASE ORAL DAILY
Status: DISCONTINUED | OUTPATIENT
Start: 2020-12-03 | End: 2020-12-05 | Stop reason: HOSPADM

## 2020-12-03 RX ORDER — GLYCOPYRROLATE 1 MG/5 ML
SYRINGE (ML) INTRAVENOUS PRN
Status: DISCONTINUED | OUTPATIENT
Start: 2020-12-03 | End: 2020-12-03 | Stop reason: SDUPTHER

## 2020-12-03 RX ORDER — PROMETHAZINE HYDROCHLORIDE 25 MG/ML
12.5 INJECTION, SOLUTION INTRAMUSCULAR; INTRAVENOUS
Status: DISCONTINUED | OUTPATIENT
Start: 2020-12-03 | End: 2020-12-03 | Stop reason: HOSPADM

## 2020-12-03 RX ORDER — DIGOXIN 125 MCG
125 TABLET ORAL DAILY
Status: DISCONTINUED | OUTPATIENT
Start: 2020-12-04 | End: 2020-12-05 | Stop reason: HOSPADM

## 2020-12-03 RX ORDER — SENNA AND DOCUSATE SODIUM 50; 8.6 MG/1; MG/1
1 TABLET, FILM COATED ORAL 2 TIMES DAILY
Status: DISCONTINUED | OUTPATIENT
Start: 2020-12-03 | End: 2020-12-05 | Stop reason: HOSPADM

## 2020-12-03 RX ORDER — SODIUM CHLORIDE 9 MG/ML
INJECTION, SOLUTION INTRAVENOUS CONTINUOUS
Status: DISCONTINUED | OUTPATIENT
Start: 2020-12-03 | End: 2020-12-03

## 2020-12-03 RX ORDER — ONDANSETRON 2 MG/ML
4 INJECTION INTRAMUSCULAR; INTRAVENOUS EVERY 6 HOURS PRN
Status: DISCONTINUED | OUTPATIENT
Start: 2020-12-03 | End: 2020-12-05 | Stop reason: HOSPADM

## 2020-12-03 RX ORDER — FENTANYL CITRATE 50 UG/ML
INJECTION, SOLUTION INTRAMUSCULAR; INTRAVENOUS PRN
Status: DISCONTINUED | OUTPATIENT
Start: 2020-12-03 | End: 2020-12-03 | Stop reason: SDUPTHER

## 2020-12-03 RX ORDER — ONDANSETRON 2 MG/ML
INJECTION INTRAMUSCULAR; INTRAVENOUS PRN
Status: DISCONTINUED | OUTPATIENT
Start: 2020-12-03 | End: 2020-12-03 | Stop reason: SDUPTHER

## 2020-12-03 RX ORDER — LABETALOL 20 MG/4 ML (5 MG/ML) INTRAVENOUS SYRINGE
10 EVERY 10 MIN PRN
Status: DISCONTINUED | OUTPATIENT
Start: 2020-12-03 | End: 2020-12-03 | Stop reason: HOSPADM

## 2020-12-03 RX ADMIN — PHENYLEPHRINE HYDROCHLORIDE 100 MCG: 10 INJECTION INTRAVENOUS at 09:56

## 2020-12-03 RX ADMIN — ROCURONIUM BROMIDE 5 MG: 10 INJECTION INTRAVENOUS at 11:04

## 2020-12-03 RX ADMIN — PHENYLEPHRINE HYDROCHLORIDE 100 MCG: 10 INJECTION INTRAVENOUS at 09:05

## 2020-12-03 RX ADMIN — PHENYLEPHRINE HYDROCHLORIDE 100 MCG: 10 INJECTION INTRAVENOUS at 09:31

## 2020-12-03 RX ADMIN — ROCURONIUM BROMIDE 45 MG: 10 INJECTION INTRAVENOUS at 07:58

## 2020-12-03 RX ADMIN — PHENYLEPHRINE HYDROCHLORIDE 100 MCG: 10 INJECTION INTRAVENOUS at 10:56

## 2020-12-03 RX ADMIN — CEFOXITIN SODIUM 2 G: 2 POWDER, FOR SOLUTION INTRAVENOUS at 21:27

## 2020-12-03 RX ADMIN — PHENYLEPHRINE HYDROCHLORIDE 100 MCG: 10 INJECTION INTRAVENOUS at 10:43

## 2020-12-03 RX ADMIN — BENZOCAINE AND MENTHOL, UNSPECIFIED FORM 1 LOZENGE: 15; 3.6 LOZENGE ORAL at 17:02

## 2020-12-03 RX ADMIN — PHENYLEPHRINE HYDROCHLORIDE 100 MCG: 10 INJECTION INTRAVENOUS at 09:01

## 2020-12-03 RX ADMIN — PHENYLEPHRINE HYDROCHLORIDE 100 MCG: 10 INJECTION INTRAVENOUS at 11:54

## 2020-12-03 RX ADMIN — PHENYLEPHRINE HYDROCHLORIDE 100 MCG: 10 INJECTION INTRAVENOUS at 11:51

## 2020-12-03 RX ADMIN — FENTANYL CITRATE 50 MCG: 50 INJECTION, SOLUTION INTRAMUSCULAR; INTRAVENOUS at 08:06

## 2020-12-03 RX ADMIN — PHENYLEPHRINE HYDROCHLORIDE 100 MCG: 10 INJECTION INTRAVENOUS at 09:52

## 2020-12-03 RX ADMIN — DOCUSATE SODIUM AND SENNOSIDES 1 TABLET: 8.6; 5 TABLET, FILM COATED ORAL at 20:08

## 2020-12-03 RX ADMIN — PHENYLEPHRINE HYDROCHLORIDE 100 MCG: 10 INJECTION INTRAVENOUS at 09:46

## 2020-12-03 RX ADMIN — PHENYLEPHRINE HYDROCHLORIDE 100 MCG: 10 INJECTION INTRAVENOUS at 08:39

## 2020-12-03 RX ADMIN — ROCURONIUM BROMIDE 5 MG: 10 INJECTION INTRAVENOUS at 10:35

## 2020-12-03 RX ADMIN — ONDANSETRON HYDROCHLORIDE 4 MG: 4 INJECTION, SOLUTION INTRAMUSCULAR; INTRAVENOUS at 11:41

## 2020-12-03 RX ADMIN — PHENYLEPHRINE HYDROCHLORIDE 100 MCG: 10 INJECTION INTRAVENOUS at 08:48

## 2020-12-03 RX ADMIN — PHENYLEPHRINE HYDROCHLORIDE 200 MCG: 10 INJECTION INTRAVENOUS at 11:32

## 2020-12-03 RX ADMIN — ROCURONIUM BROMIDE 10 MG: 10 INJECTION INTRAVENOUS at 10:12

## 2020-12-03 RX ADMIN — SODIUM CHLORIDE: 9 INJECTION, SOLUTION INTRAVENOUS at 11:00

## 2020-12-03 RX ADMIN — PHENYLEPHRINE HYDROCHLORIDE 100 MCG: 10 INJECTION INTRAVENOUS at 09:34

## 2020-12-03 RX ADMIN — ROCURONIUM BROMIDE 10 MG: 10 INJECTION INTRAVENOUS at 09:04

## 2020-12-03 RX ADMIN — FENTANYL CITRATE 25 MCG: 50 INJECTION, SOLUTION INTRAMUSCULAR; INTRAVENOUS at 11:51

## 2020-12-03 RX ADMIN — PHENYLEPHRINE HYDROCHLORIDE 100 MCG: 10 INJECTION INTRAVENOUS at 10:48

## 2020-12-03 RX ADMIN — PHENYLEPHRINE HYDROCHLORIDE 100 MCG: 10 INJECTION INTRAVENOUS at 11:15

## 2020-12-03 RX ADMIN — SODIUM CHLORIDE: 9 INJECTION, SOLUTION INTRAVENOUS at 06:56

## 2020-12-03 RX ADMIN — ACETAMINOPHEN 650 MG: 325 TABLET ORAL at 20:06

## 2020-12-03 RX ADMIN — PHENYLEPHRINE HYDROCHLORIDE 100 MCG: 10 INJECTION INTRAVENOUS at 10:28

## 2020-12-03 RX ADMIN — PHENYLEPHRINE HYDROCHLORIDE 100 MCG: 10 INJECTION INTRAVENOUS at 08:34

## 2020-12-03 RX ADMIN — BACITRACIN ZINC NEOMYCIN SULFATE POLYMYXIN B SULFATE: 400; 3.5; 5 OINTMENT TOPICAL at 21:28

## 2020-12-03 RX ADMIN — PHENYLEPHRINE HYDROCHLORIDE 100 MCG: 10 INJECTION INTRAVENOUS at 09:12

## 2020-12-03 RX ADMIN — PHENYLEPHRINE HYDROCHLORIDE 200 MCG: 10 INJECTION INTRAVENOUS at 11:21

## 2020-12-03 RX ADMIN — PHENYLEPHRINE HYDROCHLORIDE 100 MCG: 10 INJECTION INTRAVENOUS at 10:22

## 2020-12-03 RX ADMIN — PHENYLEPHRINE HYDROCHLORIDE 100 MCG: 10 INJECTION INTRAVENOUS at 12:04

## 2020-12-03 RX ADMIN — PRAVASTATIN SODIUM 80 MG: 80 TABLET ORAL at 20:08

## 2020-12-03 RX ADMIN — PHENYLEPHRINE HYDROCHLORIDE 100 MCG: 10 INJECTION INTRAVENOUS at 10:38

## 2020-12-03 RX ADMIN — FENTANYL CITRATE 25 MCG: 50 INJECTION, SOLUTION INTRAMUSCULAR; INTRAVENOUS at 12:02

## 2020-12-03 RX ADMIN — PHENYLEPHRINE HYDROCHLORIDE 100 MCG: 10 INJECTION INTRAVENOUS at 12:01

## 2020-12-03 RX ADMIN — SODIUM CHLORIDE: 9 INJECTION, SOLUTION INTRAVENOUS at 14:12

## 2020-12-03 RX ADMIN — PHENYLEPHRINE HYDROCHLORIDE 100 MCG: 10 INJECTION INTRAVENOUS at 08:13

## 2020-12-03 RX ADMIN — PHENYLEPHRINE HYDROCHLORIDE 100 MCG: 10 INJECTION INTRAVENOUS at 10:20

## 2020-12-03 RX ADMIN — Medication 60 MG: at 07:57

## 2020-12-03 RX ADMIN — Medication 0.6 MG: at 11:44

## 2020-12-03 RX ADMIN — PHENYLEPHRINE HYDROCHLORIDE 100 MCG: 10 INJECTION INTRAVENOUS at 11:37

## 2020-12-03 RX ADMIN — CEFAZOLIN 2 G: 10 INJECTION, POWDER, FOR SOLUTION INTRAVENOUS at 08:13

## 2020-12-03 RX ADMIN — BENZOCAINE AND MENTHOL, UNSPECIFIED FORM 1 LOZENGE: 15; 3.6 LOZENGE ORAL at 19:54

## 2020-12-03 RX ADMIN — ROCURONIUM BROMIDE 5 MG: 10 INJECTION INTRAVENOUS at 07:57

## 2020-12-03 RX ADMIN — Medication 4 MG: at 11:44

## 2020-12-03 RX ADMIN — ROCURONIUM BROMIDE 10 MG: 10 INJECTION INTRAVENOUS at 08:24

## 2020-12-03 RX ADMIN — FENTANYL CITRATE 25 MCG: 50 INJECTION, SOLUTION INTRAMUSCULAR; INTRAVENOUS at 11:58

## 2020-12-03 RX ADMIN — PHENYLEPHRINE HYDROCHLORIDE 100 MCG: 10 INJECTION INTRAVENOUS at 10:13

## 2020-12-03 RX ADMIN — MORPHINE SULFATE 4 MG: 4 INJECTION, SOLUTION INTRAMUSCULAR; INTRAVENOUS at 14:10

## 2020-12-03 RX ADMIN — PHENYLEPHRINE HYDROCHLORIDE 100 MCG: 10 INJECTION INTRAVENOUS at 08:17

## 2020-12-03 RX ADMIN — PHENYLEPHRINE HYDROCHLORIDE 100 MCG: 10 INJECTION INTRAVENOUS at 12:18

## 2020-12-03 RX ADMIN — FENTANYL CITRATE 25 MCG: 50 INJECTION, SOLUTION INTRAMUSCULAR; INTRAVENOUS at 11:48

## 2020-12-03 RX ADMIN — ROCURONIUM BROMIDE 10 MG: 10 INJECTION INTRAVENOUS at 09:35

## 2020-12-03 RX ADMIN — CEFAZOLIN 2 G: 10 INJECTION, POWDER, FOR SOLUTION INTRAVENOUS at 12:20

## 2020-12-03 RX ADMIN — PROPOFOL 170 MG: 10 INJECTION, EMULSION INTRAVENOUS at 07:57

## 2020-12-03 RX ADMIN — PHENYLEPHRINE HYDROCHLORIDE 100 MCG: 10 INJECTION INTRAVENOUS at 09:38

## 2020-12-03 RX ADMIN — FENTANYL CITRATE 25 MCG: 50 INJECTION, SOLUTION INTRAMUSCULAR; INTRAVENOUS at 11:40

## 2020-12-03 RX ADMIN — FENTANYL CITRATE 50 MCG: 50 INJECTION, SOLUTION INTRAMUSCULAR; INTRAVENOUS at 13:01

## 2020-12-03 RX ADMIN — FENTANYL CITRATE 100 MCG: 50 INJECTION, SOLUTION INTRAMUSCULAR; INTRAVENOUS at 07:57

## 2020-12-03 RX ADMIN — SODIUM CHLORIDE: 9 INJECTION, SOLUTION INTRAVENOUS at 08:42

## 2020-12-03 ASSESSMENT — PULMONARY FUNCTION TESTS
PIF_VALUE: 30
PIF_VALUE: 29
PIF_VALUE: 30
PIF_VALUE: 29
PIF_VALUE: 30
PIF_VALUE: 30
PIF_VALUE: 31
PIF_VALUE: 31
PIF_VALUE: 5
PIF_VALUE: 30
PIF_VALUE: 21
PIF_VALUE: 29
PIF_VALUE: 30
PIF_VALUE: 30
PIF_VALUE: 25
PIF_VALUE: 30
PIF_VALUE: 30
PIF_VALUE: 17
PIF_VALUE: 31
PIF_VALUE: 31
PIF_VALUE: 30
PIF_VALUE: 18
PIF_VALUE: 30
PIF_VALUE: 31
PIF_VALUE: 3
PIF_VALUE: 4
PIF_VALUE: 30
PIF_VALUE: 19
PIF_VALUE: 31
PIF_VALUE: 16
PIF_VALUE: 21
PIF_VALUE: 1
PIF_VALUE: 31
PIF_VALUE: 2
PIF_VALUE: 31
PIF_VALUE: 30
PIF_VALUE: 31
PIF_VALUE: 24
PIF_VALUE: 29
PIF_VALUE: 7
PIF_VALUE: 30
PIF_VALUE: 30
PIF_VALUE: 31
PIF_VALUE: 29
PIF_VALUE: 11
PIF_VALUE: 18
PIF_VALUE: 30
PIF_VALUE: 19
PIF_VALUE: 19
PIF_VALUE: 24
PIF_VALUE: 30
PIF_VALUE: 31
PIF_VALUE: 30
PIF_VALUE: 31
PIF_VALUE: 30
PIF_VALUE: 4
PIF_VALUE: 18
PIF_VALUE: 30
PIF_VALUE: 1
PIF_VALUE: 21
PIF_VALUE: 31
PIF_VALUE: 31
PIF_VALUE: 17
PIF_VALUE: 24
PIF_VALUE: 17
PIF_VALUE: 11
PIF_VALUE: 30
PIF_VALUE: 30
PIF_VALUE: 18
PIF_VALUE: 31
PIF_VALUE: 30
PIF_VALUE: 31
PIF_VALUE: 30
PIF_VALUE: 18
PIF_VALUE: 29
PIF_VALUE: 30
PIF_VALUE: 25
PIF_VALUE: 18
PIF_VALUE: 30
PIF_VALUE: 19
PIF_VALUE: 29
PIF_VALUE: 30
PIF_VALUE: 30
PIF_VALUE: 25
PIF_VALUE: 30
PIF_VALUE: 31
PIF_VALUE: 18
PIF_VALUE: 31
PIF_VALUE: 17
PIF_VALUE: 31
PIF_VALUE: 18
PIF_VALUE: 21
PIF_VALUE: 31
PIF_VALUE: 31
PIF_VALUE: 21
PIF_VALUE: 17
PIF_VALUE: 31
PIF_VALUE: 31
PIF_VALUE: 30
PIF_VALUE: 0
PIF_VALUE: 0
PIF_VALUE: 25
PIF_VALUE: 20
PIF_VALUE: 0
PIF_VALUE: 5
PIF_VALUE: 29
PIF_VALUE: 31
PIF_VALUE: 30
PIF_VALUE: 4
PIF_VALUE: 30
PIF_VALUE: 30
PIF_VALUE: 29
PIF_VALUE: 18
PIF_VALUE: 0
PIF_VALUE: 11
PIF_VALUE: 29
PIF_VALUE: 1
PIF_VALUE: 30
PIF_VALUE: 31
PIF_VALUE: 31
PIF_VALUE: 30
PIF_VALUE: 29
PIF_VALUE: 29
PIF_VALUE: 30
PIF_VALUE: 1
PIF_VALUE: 30
PIF_VALUE: 3
PIF_VALUE: 31
PIF_VALUE: 30
PIF_VALUE: 1
PIF_VALUE: 1
PIF_VALUE: 30
PIF_VALUE: 6
PIF_VALUE: 25
PIF_VALUE: 18
PIF_VALUE: 22
PIF_VALUE: 19
PIF_VALUE: 30
PIF_VALUE: 4
PIF_VALUE: 15
PIF_VALUE: 30
PIF_VALUE: 29
PIF_VALUE: 31
PIF_VALUE: 17
PIF_VALUE: 17
PIF_VALUE: 6
PIF_VALUE: 32
PIF_VALUE: 31
PIF_VALUE: 24
PIF_VALUE: 30
PIF_VALUE: 19
PIF_VALUE: 14
PIF_VALUE: 31
PIF_VALUE: 30
PIF_VALUE: 25
PIF_VALUE: 19
PIF_VALUE: 16
PIF_VALUE: 31
PIF_VALUE: 30
PIF_VALUE: 19
PIF_VALUE: 30
PIF_VALUE: 0
PIF_VALUE: 18
PIF_VALUE: 4
PIF_VALUE: 30
PIF_VALUE: 31
PIF_VALUE: 18
PIF_VALUE: 30
PIF_VALUE: 23
PIF_VALUE: 1
PIF_VALUE: 31
PIF_VALUE: 30
PIF_VALUE: 31
PIF_VALUE: 31
PIF_VALUE: 30
PIF_VALUE: 30
PIF_VALUE: 31
PIF_VALUE: 30
PIF_VALUE: 30
PIF_VALUE: 21
PIF_VALUE: 32
PIF_VALUE: 30
PIF_VALUE: 18
PIF_VALUE: 29
PIF_VALUE: 30
PIF_VALUE: 29
PIF_VALUE: 2
PIF_VALUE: 1
PIF_VALUE: 19
PIF_VALUE: 17
PIF_VALUE: 29
PIF_VALUE: 30
PIF_VALUE: 30
PIF_VALUE: 31
PIF_VALUE: 18
PIF_VALUE: 1
PIF_VALUE: 18
PIF_VALUE: 31
PIF_VALUE: 31
PIF_VALUE: 29
PIF_VALUE: 30
PIF_VALUE: 31
PIF_VALUE: 30
PIF_VALUE: 3
PIF_VALUE: 21
PIF_VALUE: 31
PIF_VALUE: 30
PIF_VALUE: 17
PIF_VALUE: 29
PIF_VALUE: 29
PIF_VALUE: 30
PIF_VALUE: 31
PIF_VALUE: 30
PIF_VALUE: 30
PIF_VALUE: 29
PIF_VALUE: 30
PIF_VALUE: 30
PIF_VALUE: 31
PIF_VALUE: 30
PIF_VALUE: 31
PIF_VALUE: 30
PIF_VALUE: 31
PIF_VALUE: 18
PIF_VALUE: 10
PIF_VALUE: 4
PIF_VALUE: 29
PIF_VALUE: 17
PIF_VALUE: 30
PIF_VALUE: 29
PIF_VALUE: 17
PIF_VALUE: 18
PIF_VALUE: 14
PIF_VALUE: 31
PIF_VALUE: 30
PIF_VALUE: 29
PIF_VALUE: 29
PIF_VALUE: 30
PIF_VALUE: 30
PIF_VALUE: 1
PIF_VALUE: 31
PIF_VALUE: 32
PIF_VALUE: 18
PIF_VALUE: 0
PIF_VALUE: 32
PIF_VALUE: 18
PIF_VALUE: 30
PIF_VALUE: 17
PIF_VALUE: 30
PIF_VALUE: 19
PIF_VALUE: 31
PIF_VALUE: 31
PIF_VALUE: 22
PIF_VALUE: 30
PIF_VALUE: 1
PIF_VALUE: 2
PIF_VALUE: 25
PIF_VALUE: 31
PIF_VALUE: 30
PIF_VALUE: 19
PIF_VALUE: 30
PIF_VALUE: 17
PIF_VALUE: 30

## 2020-12-03 ASSESSMENT — PAIN SCALES - GENERAL
PAINLEVEL_OUTOF10: 0
PAINLEVEL_OUTOF10: 0
PAINLEVEL_OUTOF10: 4
PAINLEVEL_OUTOF10: 0
PAINLEVEL_OUTOF10: 9
PAINLEVEL_OUTOF10: 8

## 2020-12-03 ASSESSMENT — PAIN SCALES - WONG BAKER: WONGBAKER_NUMERICALRESPONSE: 0

## 2020-12-03 NOTE — ANESTHESIA PRE PROCEDURE
Department of Anesthesiology  Preprocedure Note       Name:  Delaney Brunson   Age:  78 y.o.  :  1950                                          MRN:  [id number]         Date:  12/3/2020      Surgeon: Julia Malave):  Wilbur Mott MD    Procedure: Procedure(s):  ROBOTIC ASSISTED LAPAROSCOPIC RADICAL PROSTATECTOMY    Medications prior to admission:   Prior to Admission medications    Medication Sig Start Date End Date Taking?  Authorizing Provider   warfarin (COUMADIN) 2.5 MG tablet Take as directed by [facility] Coumadin clinic (#40 ~ 30 day supply) 20  Yes George Calvillo MD   tamsulosin [facility]) 0.4 MG capsule Take 1 capsule by mouth once daily 20  Yes NARESH Perez - CNP   metoprolol succinate (TOPROL XL) 50 MG extended release tablet Take 1 tablet by mouth daily 20  Yes Fadumo Hines MD   bumetanide (BUMEX) 2 MG tablet Take 1 tablet by mouth daily 20  Yes Fadumo Hines MD   digoxin (LANOXIN) 125 MCG tablet TAKE 1 TABLET BY MOUTH ONCE DAILY 20  Yes Faudmo Hines MD   dilTIAZem (CARDIZEM CD) 360 MG extended release capsule Take 1 capsule by mouth once daily 20  Yes Fadumo Hines MD   potassium chloride (KLOR-CON) 10 MEQ extended release tablet TAKE 1 TABLET BY MOUTH TWICE DAILY 20  Yes Fadumo Hines MD   pravastatin (PRAVACHOL) 80 MG tablet Take 1 tablet by mouth nightly 20  Yes Fadumo Hines MD   bisacodyl (DULCOLAX) 5 MG EC tablet Take 5 mg by mouth nightly    Yes Historical Provider, MD   nitroGLYCERIN (NITROSTAT) 0.4 MG SL tablet Place 1 tablet under the tongue every 5 minutes as needed for Chest pain 20   Fadumo Hines MD       Current medications:    Current Facility-Administered Medications   Medication Dose Route Frequency Provider Last Rate Last Dose    0.9 % sodium chloride infusion   Intravenous Continuous Alma Rosa Long 100 mL/hr at 20 0656      ceFAZolin (ANCEF) 2 g in dextrose 5 % 50 mL IVPB 2 g Intravenous 30 Min Pre-Op Alma Rosa Garza           Allergies:  No Known Allergies    Problem List:    Patient Active Problem List   Diagnosis Code    Essential hypertension I10    Hyperlipidemia E78.5    Paroxysmal atrial fibrillation (HCC) I48.0    Obesity due to excess calories E66.09    Dizziness R42    History of gastroesophageal reflux (GERD) J84.55    Diastolic dysfunction S53.33    Anticoagulated on Coumadin Z79.01    Urinary incontinence R32    Elevated PSA R97.20    Coronary artery disease involving native coronary artery of native heart without angina pectoris I25.10    Benign prostatic hyperplasia with urinary frequency N40.1, R35.0    BPH with obstruction/lower urinary tract symptoms N40.1, N13.8       Past Medical History:        Diagnosis Date    Anesthesia     takes large dose of medications to make him sleepy for surgery    Atrial fibrillation ([address]     Benign prostatic hyperplasia with urinary frequency 10/21/2019    CAD (coronary artery disease)     Chest pain     Colon polyp 2011    removed    Coronary artery disease involving native coronary artery of native heart without angina pectoris 10/21/2019    Dementia ([address]     Dizziness     Elevated PSA     GERD (gastroesophageal reflux disease)     Heart disease     Hyperlipidemia     Hypertension        Past Surgical History:        Procedure Laterality Date    ADENOIDECTOMY      CARDIAC CATHETERIZATION  09/23/2011    Right radial artery cannulation. Moderate LV dysfunction. The patient has disease in the ostium of diagonal 1 and diagonal 2 branch, however they are both are at the  ostium of the diagonals. Intervention could compromise flow of LAD. THe LAD has long diffuse calcified lesion 50-60-% anatomically.     CARDIOVASCULAR STRESS TEST  09/23/2011    EF 37%    CARPAL TUNNEL RELEASE      COLONOSCOPY      TONSILLECTOMY      TRANSTHORACIC ECHOCARDIOGRAM  09/23/2011    EF 50-55%    ULTRASOUND PROSTATE/TRANSRECTAL N/A 10/6/2020    CYSTO, TRANSRECTAL ULTRASOUND GUIDED PROSTATE BX performed by Raz Casey MD at [address] History:    Social History     Tobacco Use    Smoking status: Never Smoker    Smokeless tobacco: Never Used   Substance Use Topics    Alcohol use: No                                Counseling given: Not Answered      Vital Signs (Current):   Vitals:    11/27/20 0848 12/03/20 0609   BP:  133/82   Pulse:  97   Resp:  18   Temp:  97.2 °F (36.2 °C)   TempSrc:  Temporal   SpO2:  97%   Weight: 240 lb (108.9 kg) 233 lb (105.7 kg)   Height: 6' (1.829 m) 6' 1\" (1.854 m)                                              BP Readings from Last 3 Encounters:   12/03/20 133/82   10/06/20 109/66   10/06/20 97/61       NPO Status: Time of last liquid consumption: 2200                        Time of last solid consumption: 2000                        Date of last liquid consumption: 12/02/20                        Date of last solid food consumption: 12/01/20    BMI:   Wt Readings from Last 3 Encounters:   12/03/20 233 lb (105.7 kg)   11/24/20 241 lb 4.8 oz (109.5 kg)   11/16/20 237 lb (107.5 kg)     Body mass index is 30.74 kg/m². CBC:   Lab Results   Component Value Date    WBC 8.7 09/20/2020    RBC 5.53 09/20/2020    RBC 5.63 05/17/2016    HGB 16.6 09/20/2020    HCT 49.2 09/20/2020    MCV 89.0 09/20/2020    RDW 13.9 06/23/2018     09/20/2020       CMP:   Lab Results   Component Value Date     09/20/2020    K 3.8 09/20/2020     09/20/2020    CO2 24 09/20/2020    BUN 15 09/20/2020    CREATININE 1.1 09/20/2020    LABGLOM 66 09/20/2020    GLUCOSE 181 09/20/2020    GLUCOSE 93 05/17/2016    PROT 7.2 09/20/2020    CALCIUM 9.3 09/20/2020    BILITOT 0.3 09/20/2020    ALKPHOS 86 09/20/2020    AST 25 09/20/2020    ALT 31 09/20/2020       POC Tests: No results for input(s): POCGLU, POCNA, POCK, POCCL, POCBUN, POCHEMO, POCHCT in the last 72 hours.     Coags:   Lab Results Component Value Date    INR 1.28 12/03/2020    APTT 49.0 01/23/2020       HCG (If Applicable): No results found for: PREGTESTUR, PREGSERUM, HCG, HCGQUANT     ABGs: No results found for: PHART, PO2ART, OMT0REZ, WJS6LAE, BEART, B9IZDUHL     Type & Screen (If Applicable):  No results found for: LABABO, LABRH    Drug/Infectious Status (If Applicable):  No results found for: HIV, HEPCAB    COVID-19 Screening (If Applicable):   Lab Results   Component Value Date    COVID19 Not Detected 11/25/2020         Anesthesia Evaluation  Patient summary reviewed  Airway: Mallampati: III  TM distance: >3 FB   Neck ROM: full  Mouth opening: > = 3 FB Dental:          Pulmonary:                              Cardiovascular:    (+) hypertension:, CAD:, dysrhythmias:,       ECG reviewed        Stress test reviewed                Neuro/Psych:   (+) psychiatric history:            GI/Hepatic/Renal:   (+) GERD:,           Endo/Other:                     Abdominal:           Vascular:                                        Anesthesia Plan      general     ASA 3       Induction: intravenous. MIPS: Postoperative opioids intended and Prophylactic antiemetics administered. Anesthetic plan and risks discussed with patient. Plan discussed with RACHELLE. Isidro Amen.  [facility],    12/3/2020

## 2020-12-03 NOTE — H&P
Serge Quintana MD  History and Physical    Patient:  Nimesh Paniagua  MRN: [id number]  YOB: 1950    HISTORY OF PRESENT ILLNESS:     The patient is a 78 y.o. male who presents with prostate cancer. Here for procedure. Patient's old records, notes and chart reviewed and summarized above. Serge Quintana MD independently reviewed the images and verified the radiology reports from:    No results found. Past Medical History:    Past Medical History:   Diagnosis Date    Anesthesia     takes large dose of medications to make him sleepy for surgery    Atrial fibrillation ([address]     Benign prostatic hyperplasia with urinary frequency 10/21/2019    CAD (coronary artery disease)     Chest pain     Colon polyp 2011    removed    Coronary artery disease involving native coronary artery of native heart without angina pectoris 10/21/2019    Dementia ([address]     Dizziness     Elevated PSA     GERD (gastroesophageal reflux disease)     Heart disease     Hyperlipidemia     Hypertension        Past Surgical History:    Past Surgical History:   Procedure Laterality Date    ADENOIDECTOMY      CARDIAC CATHETERIZATION  09/23/2011    Right radial artery cannulation. Moderate LV dysfunction. The patient has disease in the ostium of diagonal 1 and diagonal 2 branch, however they are both are at the  ostium of the diagonals. Intervention could compromise flow of LAD. THe LAD has long diffuse calcified lesion 50-60-% anatomically.  CARDIOVASCULAR STRESS TEST  09/23/2011    EF 37%    CARPAL TUNNEL RELEASE      COLONOSCOPY      TONSILLECTOMY      TRANSTHORACIC ECHOCARDIOGRAM  09/23/2011    EF 50-55%    ULTRASOUND PROSTATE/TRANSRECTAL N/A 10/6/2020    CYSTO, TRANSRECTAL ULTRASOUND GUIDED PROSTATE BX performed by Brooks Lopez MD at [address]         Medications Prior to Admission:    Prior to Admission medications    Medication Sig Start Date End Date Taking?  Authorizing Provider warfarin (COUMADIN) 2.5 MG tablet Take as directed by [facility] Coumadin clinic (#40 ~ 30 day supply) 11/9/20  Yes Marilu Zambrano MD   tamsulosin [facility]) 0.4 MG capsule Take 1 capsule by mouth once daily 9/14/20  Yes Charlestine Cheadle, APRN - CNP   metoprolol succinate (TOPROL XL) 50 MG extended release tablet Take 1 tablet by mouth daily 8/12/20  Yes Brianda Blair MD   bumetanide (BUMEX) 2 MG tablet Take 1 tablet by mouth daily 7/30/20  Yes rBianda Blair MD   digoxin (LANOXIN) 125 MCG tablet TAKE 1 TABLET BY MOUTH ONCE DAILY 7/30/20  Yes Brianda Blair MD   dilTIAZem (CARDIZEM CD) 360 MG extended release capsule Take 1 capsule by mouth once daily 7/30/20  Yes Brianda Blair MD   potassium chloride (KLOR-CON) 10 MEQ extended release tablet TAKE 1 TABLET BY MOUTH TWICE DAILY 7/30/20  Yes Brianda Blair MD   pravastatin (PRAVACHOL) 80 MG tablet Take 1 tablet by mouth nightly 7/30/20  Yes Brianda Blair MD   bisacodyl (DULCOLAX) 5 MG EC tablet Take 5 mg by mouth nightly    Yes Historical Provider, MD   nitroGLYCERIN (NITROSTAT) 0.4 MG SL tablet Place 1 tablet under the tongue every 5 minutes as needed for Chest pain 7/30/20   Brianda Blair MD       Allergies:  Patient has no known allergies.     Social History:    Social History     Socioeconomic History    Marital status:      Spouse name: Yousuf Lu Number of children: 2    Years of education: Not on file    Highest education level: Not on file   Occupational History    Not on file   Social Needs    Financial resource strain: Not on file    Food insecurity     Worry: Not on file     Inability: Not on file   [employer] needs     Medical: Not on file     Non-medical: Not on file   Tobacco Use    Smoking status: Never Smoker    Smokeless tobacco: Never Used   Substance and Sexual Activity    Alcohol use: No    Drug use: No    Sexual activity: Never   Lifestyle    Physical activity     Days per week: Not on file     Minutes per session: Not on file    Stress: Not on file   Relationships    Social connections     Talks on phone: Not on file     Gets together: Not on file     Attends Oriental orthodox service: Not on file     Active member of club or organization: Not on file     Attends meetings of clubs or organizations: Not on file     Relationship status: Not on file    Intimate partner violence     Fear of current or ex partner: Not on file     Emotionally abused: Not on file     Physically abused: Not on file     Forced sexual activity: Not on file   Other Topics Concern    Not on file   Social History Narrative    Not on file       Family History:    Family History   Problem Relation Age of Onset    Heart Surgery Father     Cancer Father         lung    Heart Disease Father         CABG    Alzheimer's Disease Mother     Diabetes Neg Hx     High Blood Pressure Neg Hx     Stroke Neg Hx        REVIEW OF SYSTEMS:  Constitutional: negative  Eyes: negative  Respiratory: negative  Cardiovascular: negative  Gastrointestinal: negative  Genitourinary: no acute issues  Musculoskeletal: negative  Skin: negative   Neurological: negative  Hematological/Lymphatic: negative  Psychological: negative    Physical Exam:      No data found. Constitutional: Patient in no acute distress; Neuro: alert and oriented to person place and time. Psych: Mood and affect normal.  Skin: Normal  Lungs: Respiratory effort normal, CTA  Cardiovascular:  Normal peripheral pulses; no murmur. Normal rhythm  Abdomen: Soft, non-tender, non-distended with no CVA, flank pain, hepatosplenomegaly or hernia. Kidneys normal.  Bladder non-tender and not distended. LABS:   No results for input(s): WBC, HGB, HCT, MCV, PLT in the last 72 hours. No results for input(s): NA, K, CL, CO2, PHOS, BUN, CREATININE in the last 72 hours.     Invalid input(s): CA  Lab Results   Component Value Date    PSA 3.60 (H) 08/04/2020    PSA 3.59 (H) 04/10/2019 PSA 4.94 (H) 10/10/2018         Urinalysis: No results for input(s): COLORU, PHUR, LABCAST, WBCUA, RBCUA, MUCUS, TRICHOMONAS, YEAST, BACTERIA, CLARITYU, SPECGRAV, LEUKOCYTESUR, UROBILINOGEN, Zollie Troy in the last 72 hours.     Invalid input(s): NITRATE, GLUCOSEUKETONESUAMORPHOUS     -----------------------------------------------------------------      Assessment and Plan     Impression:    Patient Active Problem List   Diagnosis    Essential hypertension    Hyperlipidemia    Paroxysmal atrial fibrillation (HCC)    Obesity due to excess calories    Dizziness    History of gastroesophageal reflux (GERD)    Diastolic dysfunction    Anticoagulated on Coumadin    Urinary incontinence    Elevated PSA    Coronary artery disease involving native coronary artery of native heart without angina pectoris    Benign prostatic hyperplasia with urinary frequency       Plan:     Consent obtained; robotic prostatectomy in OR today    Kaitlin Wyatt MD  5:10 AM 12/3/2020

## 2020-12-03 NOTE — PROGRESS NOTES
Pt admitted to  5K23 via via cart/stretcher from OR. Complaints:ROBOTIC ASSISTED LAPAROSCOPIC RADICAL PROSTATECTOMY  . IV normal saline infusing into the forearm left, condition patent and no redness at a rate of 100 mls/ hour with about 100 mls in the bag still. IV site free of s/s of infection or infiltration. Vital signs obtained. Assessment and data collection initiated. Two nurse skin assessment performed by Sonu Falk RN and Vaughn Thornton. Oriented to room. Policies and procedures for 5 explained. All questions answered with no further questions at this time. Fall prevention and safety brochure discussed with patient. Bed alarm on. Call light in reach. Oriented to room. Kalen Stafford RN 43/7/2409 2:05 PM     Explained patients right to have family, representative or physician notified of their admission. Patient has Declined for physician to be notified. Patient has Declined for family/representative to be notified.

## 2020-12-03 NOTE — PROGRESS NOTES
1228 patient arrived to PACU, responds to voice. Denies pain  1243 patient resting, resp easy  1353 patient states pain 9/10 but O2 sat 90-91%, not medicated at this time  1301 medicated with 50 mcg fentanyl  1311 resting off and on, resp easy. Wakes up and states pain is \"really bad\" and easily drifts back to sleep, not medicated at this time.   1321 meets criteria for discharge from PACU, report called to [address] and placed for transport

## 2020-12-03 NOTE — TELEPHONE ENCOUNTER
Patient is status post RALRP with BPLND ion 12/3/20. Will need cystogram in seven days. Vernon catheter may be removed in seven days If cystogram negative.

## 2020-12-03 NOTE — BRIEF OP NOTE
Brief Postoperative Note      Patient: Nimesh Paniagua  YOB: 1950  MRN: [id number]    Date of Procedure: 12/3/2020    Pre-Op Diagnosis: PROSTATE CANCER    Post-Op Diagnosis: Same       Procedure(s):  ROBOTIC ASSISTED LAPAROSCOPIC RADICAL PROSTATECTOMY WITH BILATERAL PELVIC LYMPH NODE DISSECTION, PLACEMENT OF CLARIX CORD AT BILATERAL NEUROVASCULAR BUNDLES    Surgeon(s):  Brooks Lopez MD    Assistant:  Physician Assistant: Chandrakant Reed PA-C    Anesthesia: General    Estimated Blood Loss (mL): 901 ML    Complications: NONE    Specimens:   ID Type Source Tests Collected by Time Destination   A : Prostate and Seminal Vesicles Tissue Prostate SURGICAL PATHOLOGY Brooks Lopez MD 12/3/2020 0989    B : Right and Left Pelvic Lymph Nodes Tissue Lymph Node SURGICAL PATHOLOGY Brooks Lopez MD 12/3/2020 0901    C : Bladder Neck Margin 1 Tissue Bladder SURGICAL PATHOLOGY Brooks Lopez MD 12/3/2020 6475    D : Bladder Neck Margins 2 Tissue Bladder SURGICAL PATHOLOGY Brooks Lopez MD 12/3/2020 8626        Implants:  Implant Name Type Inv.  Item Serial No.  Lot No. LRB No. Used Action   GRAFT HUM TISS [id number] THK1MM UMB CRD AMNIO MEM CLARIX 1K - [id number]  GRAFT HUM TISS [id number] THK1MM UMB CRD AMNIO MEM CLARIX 1K [id number] [facility]  N/A 1 Implanted         Drains:   Urethral Catheter Latex 20 fr (Active)       Findings: SEE DICTATED OPERATIVE NOTE    Electronically signed by Chandrakant Reed PA-C on 12/3/2020 at 12:11 PM

## 2020-12-03 NOTE — ANESTHESIA POSTPROCEDURE EVALUATION
Department of Anesthesiology  Postprocedure Note    Patient: Nimesh Paniagua  MRN: [id number]  YOB: 1950  Date of evaluation: 12/3/2020  Time:  2:03 PM     Procedure Summary     Date:  12/03/20 Room / Location:  [address] [place] [address] JUAN Morgan    Anesthesia Start:  2002 Anesthesia Stop:  8005    Procedure:  ROBOTIC ASSISTED LAPAROSCOPIC RADICAL PROSTATECTOMY (N/A ) Diagnosis:  (PROSTATE CANCER)    Surgeon:  Brooks Lopez MD Responsible Provider:  Feliz Roger DO    Anesthesia Type:  general ASA Status:  3          Anesthesia Type: general    Sheldon Phase I: Sheldon Score: 9    Sheldon Phase II:      Last vitals: Reviewed and per EMR flowsheets.        Anesthesia Post Evaluation    Patient location during evaluation: PACU  Patient participation: complete - patient participated  Level of consciousness: awake  Airway patency: patent  Nausea & Vomiting: no vomiting and no nausea  Cardiovascular status: hemodynamically stable  Respiratory status: acceptable  Hydration status: stable

## 2020-12-04 ENCOUNTER — APPOINTMENT (OUTPATIENT)
Dept: INTERVENTIONAL RADIOLOGY/VASCULAR | Age: 70
DRG: 707 | End: 2020-12-04
Attending: UROLOGY
Payer: MEDICARE

## 2020-12-04 ENCOUNTER — APPOINTMENT (OUTPATIENT)
Dept: GENERAL RADIOLOGY | Age: 70
DRG: 707 | End: 2020-12-04
Attending: UROLOGY
Payer: MEDICARE

## 2020-12-04 ENCOUNTER — APPOINTMENT (OUTPATIENT)
Dept: CT IMAGING | Age: 70
DRG: 707 | End: 2020-12-04
Attending: UROLOGY
Payer: MEDICARE

## 2020-12-04 LAB
ANION GAP SERPL CALCULATED.3IONS-SCNC: 12 MEQ/L (ref 8–16)
BASOPHILS # BLD: 0.2 %
BASOPHILS ABSOLUTE: 0 THOU/MM3 (ref 0–0.1)
BUN BLDV-MCNC: 12 MG/DL (ref 7–22)
CALCIUM SERPL-MCNC: 8.6 MG/DL (ref 8.5–10.5)
CHLORIDE BLD-SCNC: 105 MEQ/L (ref 98–111)
CO2: 24 MEQ/L (ref 23–33)
CREAT SERPL-MCNC: 1 MG/DL (ref 0.4–1.2)
EKG ATRIAL RATE: 108 BPM
EKG Q-T INTERVAL: 342 MS
EKG QRS DURATION: 106 MS
EKG QTC CALCULATION (BAZETT): 466 MS
EKG R AXIS: 3 DEGREES
EKG T AXIS: 62 DEGREES
EKG VENTRICULAR RATE: 112 BPM
EOSINOPHIL # BLD: 0.1 %
EOSINOPHILS ABSOLUTE: 0 THOU/MM3 (ref 0–0.4)
ERYTHROCYTE [DISTWIDTH] IN BLOOD BY AUTOMATED COUNT: 13.7 % (ref 11.5–14.5)
ERYTHROCYTE [DISTWIDTH] IN BLOOD BY AUTOMATED COUNT: 45.2 FL (ref 35–45)
GFR SERPL CREATININE-BSD FRML MDRD: 74 ML/MIN/1.73M2
GLUCOSE BLD-MCNC: 140 MG/DL (ref 70–108)
HCT VFR BLD CALC: 45.3 % (ref 42–52)
HEMOGLOBIN: 15.1 GM/DL (ref 14–18)
IMMATURE GRANS (ABS): 0.03 THOU/MM3 (ref 0–0.07)
IMMATURE GRANULOCYTES: 0.2 %
LYMPHOCYTES # BLD: 13.1 %
LYMPHOCYTES ABSOLUTE: 1.8 THOU/MM3 (ref 1–4.8)
MCH RBC QN AUTO: 30.2 PG (ref 26–33)
MCHC RBC AUTO-ENTMCNC: 33.3 GM/DL (ref 32.2–35.5)
MCV RBC AUTO: 90.6 FL (ref 80–94)
MONOCYTES # BLD: 9.8 %
MONOCYTES ABSOLUTE: 1.3 THOU/MM3 (ref 0.4–1.3)
NUCLEATED RED BLOOD CELLS: 0 /100 WBC
PLATELET # BLD: 229 THOU/MM3 (ref 130–400)
PMV BLD AUTO: 10 FL (ref 9.4–12.4)
POTASSIUM SERPL-SCNC: 3.9 MEQ/L (ref 3.5–5.2)
RBC # BLD: 5 MILL/MM3 (ref 4.7–6.1)
SEG NEUTROPHILS: 76.6 %
SEGMENTED NEUTROPHILS ABSOLUTE COUNT: 10.3 THOU/MM3 (ref 1.8–7.7)
SODIUM BLD-SCNC: 141 MEQ/L (ref 135–145)
TROPONIN T: < 0.01 NG/ML
TROPONIN T: < 0.01 NG/ML
WBC # BLD: 13.5 THOU/MM3 (ref 4.8–10.8)

## 2020-12-04 PROCEDURE — 99024 POSTOP FOLLOW-UP VISIT: CPT | Performed by: UROLOGY

## 2020-12-04 PROCEDURE — 36415 COLL VENOUS BLD VENIPUNCTURE: CPT

## 2020-12-04 PROCEDURE — 85025 COMPLETE CBC W/AUTO DIFF WBC: CPT

## 2020-12-04 PROCEDURE — 6360000004 HC RX CONTRAST MEDICATION: Performed by: PHYSICIAN ASSISTANT

## 2020-12-04 PROCEDURE — 80048 BASIC METABOLIC PNL TOTAL CA: CPT

## 2020-12-04 PROCEDURE — 6360000002 HC RX W HCPCS: Performed by: PHYSICIAN ASSISTANT

## 2020-12-04 PROCEDURE — 99233 SBSQ HOSP IP/OBS HIGH 50: CPT | Performed by: NURSE PRACTITIONER

## 2020-12-04 PROCEDURE — 71045 X-RAY EXAM CHEST 1 VIEW: CPT

## 2020-12-04 PROCEDURE — 99214 OFFICE O/P EST MOD 30 MIN: CPT | Performed by: PHYSICIAN ASSISTANT

## 2020-12-04 PROCEDURE — 6370000000 HC RX 637 (ALT 250 FOR IP): Performed by: PHYSICIAN ASSISTANT

## 2020-12-04 PROCEDURE — 93005 ELECTROCARDIOGRAM TRACING: CPT | Performed by: UROLOGY

## 2020-12-04 PROCEDURE — 71275 CT ANGIOGRAPHY CHEST: CPT

## 2020-12-04 PROCEDURE — 2580000003 HC RX 258: Performed by: PHYSICIAN ASSISTANT

## 2020-12-04 PROCEDURE — 6360000002 HC RX W HCPCS: Performed by: UROLOGY

## 2020-12-04 PROCEDURE — 93010 ELECTROCARDIOGRAM REPORT: CPT | Performed by: INTERNAL MEDICINE

## 2020-12-04 PROCEDURE — 84484 ASSAY OF TROPONIN QUANT: CPT

## 2020-12-04 PROCEDURE — 93970 EXTREMITY STUDY: CPT

## 2020-12-04 RX ORDER — METOPROLOL TARTRATE 5 MG/5ML
5 INJECTION INTRAVENOUS EVERY 6 HOURS PRN
Status: DISCONTINUED | OUTPATIENT
Start: 2020-12-04 | End: 2020-12-05 | Stop reason: HOSPADM

## 2020-12-04 RX ORDER — MAGNESIUM HYDROXIDE/ALUMINUM HYDROXICE/SIMETHICONE 120; 1200; 1200 MG/30ML; MG/30ML; MG/30ML
SUSPENSION ORAL
Status: DISCONTINUED
Start: 2020-12-04 | End: 2020-12-04 | Stop reason: WASHOUT

## 2020-12-04 RX ORDER — LEVOFLOXACIN 5 MG/ML
500 INJECTION, SOLUTION INTRAVENOUS EVERY 24 HOURS
Status: DISCONTINUED | OUTPATIENT
Start: 2020-12-04 | End: 2020-12-05 | Stop reason: HOSPADM

## 2020-12-04 RX ADMIN — NITROGLYCERIN 0.4 MG: 0.4 TABLET, ORALLY DISINTEGRATING SUBLINGUAL at 04:15

## 2020-12-04 RX ADMIN — NITROGLYCERIN 0.4 MG: 0.4 TABLET, ORALLY DISINTEGRATING SUBLINGUAL at 04:03

## 2020-12-04 RX ADMIN — METOPROLOL SUCCINATE 50 MG: 50 TABLET, EXTENDED RELEASE ORAL at 09:37

## 2020-12-04 RX ADMIN — DOCUSATE SODIUM AND SENNOSIDES 1 TABLET: 8.6; 5 TABLET, FILM COATED ORAL at 09:39

## 2020-12-04 RX ADMIN — SODIUM CHLORIDE: 9 INJECTION, SOLUTION INTRAVENOUS at 11:31

## 2020-12-04 RX ADMIN — DOCUSATE SODIUM AND SENNOSIDES 1 TABLET: 8.6; 5 TABLET, FILM COATED ORAL at 20:09

## 2020-12-04 RX ADMIN — DILTIAZEM HYDROCHLORIDE 360 MG: 240 CAPSULE, COATED, EXTENDED RELEASE ORAL at 09:37

## 2020-12-04 RX ADMIN — LEVOFLOXACIN 500 MG: 5 INJECTION, SOLUTION INTRAVENOUS at 11:35

## 2020-12-04 RX ADMIN — BACITRACIN ZINC NEOMYCIN SULFATE POLYMYXIN B SULFATE: 400; 3.5; 5 OINTMENT TOPICAL at 09:38

## 2020-12-04 RX ADMIN — LIDOCAINE HYDROCHLORIDE: 20 SOLUTION ORAL; TOPICAL at 06:02

## 2020-12-04 RX ADMIN — PRAVASTATIN SODIUM 80 MG: 80 TABLET ORAL at 20:09

## 2020-12-04 RX ADMIN — DIGOXIN 125 MCG: 125 TABLET ORAL at 09:36

## 2020-12-04 RX ADMIN — ACETAMINOPHEN 650 MG: 325 TABLET ORAL at 20:09

## 2020-12-04 RX ADMIN — BACITRACIN ZINC NEOMYCIN SULFATE POLYMYXIN B SULFATE: 400; 3.5; 5 OINTMENT TOPICAL at 20:09

## 2020-12-04 RX ADMIN — ENOXAPARIN SODIUM 40 MG: 40 INJECTION SUBCUTANEOUS at 09:50

## 2020-12-04 RX ADMIN — ACETAMINOPHEN 650 MG: 325 TABLET ORAL at 01:15

## 2020-12-04 RX ADMIN — SODIUM CHLORIDE, PRESERVATIVE FREE 10 ML: 5 INJECTION INTRAVENOUS at 20:10

## 2020-12-04 RX ADMIN — CEFOXITIN SODIUM 2 G: 2 POWDER, FOR SOLUTION INTRAVENOUS at 13:08

## 2020-12-04 RX ADMIN — POTASSIUM CHLORIDE 10 MEQ: 750 TABLET, FILM COATED, EXTENDED RELEASE ORAL at 18:39

## 2020-12-04 RX ADMIN — NITROGLYCERIN 0.4 MG: 0.4 TABLET, ORALLY DISINTEGRATING SUBLINGUAL at 03:47

## 2020-12-04 RX ADMIN — IOPAMIDOL 80 ML: 755 INJECTION, SOLUTION INTRAVENOUS at 05:19

## 2020-12-04 RX ADMIN — MORPHINE SULFATE 2 MG: 2 INJECTION, SOLUTION INTRAMUSCULAR; INTRAVENOUS at 03:41

## 2020-12-04 RX ADMIN — CEFOXITIN SODIUM 2 G: 2 POWDER, FOR SOLUTION INTRAVENOUS at 06:02

## 2020-12-04 RX ADMIN — BUMETANIDE 2 MG: 1 TABLET ORAL at 09:35

## 2020-12-04 RX ADMIN — POTASSIUM CHLORIDE 10 MEQ: 750 TABLET, FILM COATED, EXTENDED RELEASE ORAL at 09:38

## 2020-12-04 ASSESSMENT — PAIN SCALES - GENERAL
PAINLEVEL_OUTOF10: 3
PAINLEVEL_OUTOF10: 0
PAINLEVEL_OUTOF10: 4
PAINLEVEL_OUTOF10: 0
PAINLEVEL_OUTOF10: 3

## 2020-12-04 NOTE — PROGRESS NOTES
Hospitalist Progress Note    Patient:  Delaney Brunson      Unit/Bed:5K-23/023-A    YOB: 1950    MRN: [id number]       Acct: [de-identified]     PCP: Tiffani Beltran MD    Date of Admission: 12/3/2020    Assessment/Plan:    1. Acute Chest Pain--ACS ruled out by 2 (-) troponins; CTA of the chest did not reveal pulmonary embolism; EKG showed atrial fibrillation in which she does have a history of; add telemetry; pain has pretty well subsided; patient needs to ambulate and pass gas  2. Acute postop pulmonary insufficiency--resolved as on room air now  3. Possible pneumonia, bibasilar (not present on arrival)--Levaquin added 12/4 per urology; has incentive spirometry at the bedside and encouraged its use along with deep breathing and coughing; CTA of his chest revealed free air below the diaphragm however upon speaking with the radiologist and discussing the case with the recent prostate surgery he feels this is the cause; patient does have a low-grade fever and a mild elevation in his white count  4. PAF--patient takes Coumadin at home however that has been on hold secondary to his surgery so need to resume when okay with urology~appears per urology note that Lovenox (40 mg SQ daily started)  on postop day #1 and to restart Coumadin 5 mg on 12/7 and then return to 5 mg on Wednesday and 2.5 mg the rest of the day and recheck the INR in 2.5 weeks  5. Essential hypertension, uncontrolled--on Bumex, Cardizem, Toprol; monitor  6. Chronic diastolic heart failure--echo from 11/8/2019 showed an EF of 45%; recommend stopping IV fluids ASAP when okay with urology  7.  Prostate cancer status post robotic assisted laparoscopic radical prostatectomy with bilateral pelvic lymph node dissection, placement of clear escorted bilateral neurovascular bundles on 12/3/2020--per urology    Expected discharge date: Per urology    Disposition:    [x] Home       [] TCU       [] Rehab       [] Psych       [] SNF       [] Venkat       [] Other-    Chief Complaint: Chest pain and shortness of breath    Hospital Course: Patient presented to the hospital yesterday as he has a history of prostate cancer and needed to have a robotic assisted laparoscopic radical prostatectomy completed; patient was admitted to the hospital after the procedure and early this morning he developed a sudden onset of chest pain and shortness of breath; a CTA of the chest was done that did not reveal a PE however did reveal free air most likely secondary to postop status~this was discussed with Chris Reid the nurse practitioner from urology on rounds; EKG revealed an atrial fibrillation with a controlled rate and he had 2 troponins which were normal; he did have some postop pulmonary insufficiency that initially required oxygen however that has been successfully weaned off; he is currently alert and oriented and relates to feeling much better    Subjective (past 24 hours):  Initially complained of pain to his left-sided chest however that has resolved, he does continue to complain of some upper abdominal discomfort that worsens when he takes a deep breath; he offers no other complaints at this time    Medications:  Reviewed    Infusion Medications    sodium chloride 100 mL/hr at 12/03/20 1412     Scheduled Medications    bumetanide  2 mg Oral Daily    digoxin  125 mcg Oral Daily    dilTIAZem  360 mg Oral Daily    metoprolol succinate  50 mg Oral Daily    potassium chloride  10 mEq Oral BID WC    pravastatin  80 mg Oral Nightly    sodium chloride flush  10 mL Intravenous 2 times per day    neomycin-bacitracin-polymyxin   Topical BID    sennosides-docusate sodium  1 tablet Oral BID    enoxaparin  40 mg Subcutaneous Daily    oxybutynin  10 mg Oral Daily    cefOXitin  2 g Intravenous Q8H     PRN Meds: metoprolol, nitroGLYCERIN, sodium chloride flush, acetaminophen, HYDROcodone-acetaminophen, morphine **OR** morphine, magnesium hydroxide, bisacodyl, ondansetron, benzocaine-menthol      Intake/Output Summary (Last 24 hours) at 12/4/2020 0655  Last data filed at 12/4/2020 4172  Gross per 24 hour   Intake 3977.6 ml   Output 1175 ml   Net 2802.6 ml       Diet:  DIET CLEAR LIQUID;    Exam:  /71   Pulse 103   Temp 100.1 °F (37.8 °C) (Rectal)   Resp 26   Ht 6' 1\" (1.854 m)   Wt 233 lb (105.7 kg)   SpO2 94%   BMI 30.74 kg/m²     General appearance: No apparent distress, appears stated age and cooperative. HEENT: Pupils equal, round, and reactive to light. Conjunctivae/corneas clear. Neck: Supple, with full range of motion. No jugular venous distention. Trachea midline. Respiratory:  Normal respiratory effort. Diminished to auscultation, bilaterally without Rales/Wheezes/Rhonchi. Cardiovascular: irregular rate and rhythm   Abdomen: Soft, non-tender, non-distended with normal bowel sounds. Abdominal incisions show the dressing is intact and no drainage; he does have a swollen area above his umbilical  Musculoskeletal: passive and active ROM x 4 extremities. Skin: Skin color, texture, turgor normal.    Neurologic:  Neurovascularly intact without any focal sensory/motor deficits. Cranial nerves: II-XII intact, grossly non-focal.  Psychiatric: Alert and oriented, thought content appropriate  Capillary Refill: Brisk,< 3 seconds   Peripheral Pulses: +2 palpable, equal bilaterally       Labs:   Recent Labs     12/03/20  1805 12/04/20  0436   WBC 14.3* 13.5*   HGB 16.5 15.1   HCT 49.1 45.3    229     Recent Labs     12/03/20  1805 12/04/20  0436    141   K 4.3 3.9    105   CO2 26 24   BUN 14 12   CREATININE 1.3* 1.0   CALCIUM 8.5 8.6     No results for input(s): AST, ALT, BILIDIR, BILITOT, ALKPHOS in the last 72 hours.   Recent Labs     12/03/20  0614   INR 1.28*     Urinalysis:      Lab Results   Component Value Date    NITRU NEGATIVE 11/25/2020    WBCUA 0-2 11/25/2020    BACTERIA NONE SEEN 11/25/2020    RBCUA 0-2 11/25/2020 BLOODU NEGATIVE 11/25/2020    SPECGRAV 1.023 12/08/2018    GLUCOSEU NEGATIVE 11/25/2020       Radiology:  CTA CHEST W WO CONTRAST   Final Result   No pulmonary emboli. Prominent elevation right hemidiaphragm, progressed vs prior. Bilateral medial posterior basilar consolidations consistent with    atelectasis vs infiltrates. This document has been electronically signed by: Karthik Jefferson MD on    12/04/2020 05:58 AM      All CT scans at this facility use dose modulation, iterative    reconstruction, and/or weight-based   dosing when appropriate to reduce radiation dose to as low as reasonably    achievable.          XR CHEST PORTABLE    (Results Pending)   XR CHEST LATERAL    (Results Pending)       DVT prophylaxis: [x] Lovenox                                 [] SCDs                                 [] SQ Heparin                                 [] Encourage ambulation           [] Already on Anticoagulation     Code Status: Full Code    Tele:   [x] yes~add             [] no    Active Hospital Problems    Diagnosis Date Noted    BPH with obstruction/lower urinary tract symptoms [N40.1, N13.8] 12/03/2020    Prostate cancer ([facility] [address] Ashley Bennett        Electronically signed by NARESH Mcadams CNP on 12/4/2020 at 6:55 AM

## 2020-12-04 NOTE — CONSULTS
Hospitalist Consult Note        Patient:  Gretchen Meraz  YOB: 1950  Date of Service: 12/4/2020  MRN: [id number]   Acct:  [de-identified]   Primary Care Physician: Yinka Deleon MD    Chief Complaint:  Chest pain, shortness of breath  Reason for consult  Medical management    Date of Service: Pt seen/examined in consultation on 12/4/2020     History Of Present Illness:      Gretchen Meraz y.o. male who we are asked to see/evaluate by Kun Gore MD for medical management of elevated heart rate, chest pain, and epigastric pain. Patient has a history of atrial fibrillation. He has been off coumadin for several days pending his robotic prostatectomy. The patient is states he can not take a deep breath and is now requiring oxygen to maintain his O2 saturations greater than 90%. Assessment and Plan:-  1. Chest pain - EKG shows atrial fibrillation with RVR --> one dose IV lopressor, CTA chest to rule out PE, serial troponin, GI cocktail, portable chest xray shows elevated hemidiaphragm question if this is new following robotic surgery or chronic      Past Medical History:        Diagnosis Date    Anesthesia     takes large dose of medications to make him sleepy for surgery    Atrial fibrillation ([address]     Benign prostatic hyperplasia with urinary frequency 10/21/2019    CAD (coronary artery disease)     Chest pain     Colon polyp 2011    removed    Coronary artery disease involving native coronary artery of native heart without angina pectoris 10/21/2019    Dementia ([address]     Dizziness     Elevated PSA     GERD (gastroesophageal reflux disease)     Heart disease     Hyperlipidemia     Hypertension        Past Surgical History:        Procedure Laterality Date    ADENOIDECTOMY      CARDIAC CATHETERIZATION  09/23/2011    Right radial artery cannulation. Moderate LV dysfunction.  The patient has disease in the ostium of diagonal 1 and diagonal 2 branch, however they are both are at the  ostium of the diagonals. Intervention could compromise flow of LAD. THe LAD has long diffuse calcified lesion 50-60-% anatomically.  CARDIOVASCULAR STRESS TEST  09/23/2011    EF 37%    CARPAL TUNNEL RELEASE      COLONOSCOPY      TONSILLECTOMY      TRANSTHORACIC ECHOCARDIOGRAM  09/23/2011    EF 50-55%    ULTRASOUND PROSTATE/TRANSRECTAL N/A 10/6/2020    CYSTO, TRANSRECTAL ULTRASOUND GUIDED PROSTATE BX performed by Aminta Diaz MD at [address] [place] [address] Medications:   No current facility-administered medications on file prior to encounter. Current Outpatient Medications on File Prior to Encounter   Medication Sig Dispense Refill    tamsulosin (FLOMAX) 0.4 MG capsule Take 1 capsule by mouth once daily 90 capsule 0    metoprolol succinate (TOPROL XL) 50 MG extended release tablet Take 1 tablet by mouth daily 90 tablet 3    bumetanide (BUMEX) 2 MG tablet Take 1 tablet by mouth daily 90 tablet 3    digoxin (LANOXIN) 125 MCG tablet TAKE 1 TABLET BY MOUTH ONCE DAILY 90 tablet 3    dilTIAZem (CARDIZEM CD) 360 MG extended release capsule Take 1 capsule by mouth once daily 90 capsule 3    potassium chloride (KLOR-CON) 10 MEQ extended release tablet TAKE 1 TABLET BY MOUTH TWICE DAILY 180 tablet 3    pravastatin (PRAVACHOL) 80 MG tablet Take 1 tablet by mouth nightly 90 tablet 3    bisacodyl (DULCOLAX) 5 MG EC tablet Take 5 mg by mouth nightly       nitroGLYCERIN (NITROSTAT) 0.4 MG SL tablet Place 1 tablet under the tongue every 5 minutes as needed for Chest pain 25 tablet 3       Allergies:    Patient has no known allergies. Social History:    reports that he has never smoked. He has never used smokeless tobacco. He reports that he does not drink alcohol or use drugs.     Family History:       Problem Relation Age of Onset    Heart Surgery Father     Cancer Father         lung    Heart Disease Father         CABG    Alzheimer's Disease Mother     Diabetes Neg Hx  High Blood Pressure Neg Hx     Stroke Neg Hx        Diet:  DIET CLEAR LIQUID;    Review of systems:   Pertinent positives as noted in the HPI. All other systems reviewed and negative. PHYSICAL EXAM:  /83   Pulse 110   Temp 99.1 °F (37.3 °C) (Oral)   Resp 22   Ht 6' 1\" (1.854 m)   Wt 233 lb (105.7 kg)   SpO2 90%   BMI 30.74 kg/m²   General appearance: No apparent distress, appears stated age and cooperative, speaking full sentences  HEENT: Normal cephalic, atraumatic without obvious deformity. Pupils equal, round, and reactive to light. Extra ocular muscles intact. Conjunctivae/corneas clear. Neck: Supple, with full range of motion. No jugular venous distention. Trachea midline. Respiratory:  Normal respiratory effort. Clear to diminished on auscultation, bilaterally without Rales/Wheezes/Rhonchi. Cardiovascular: Regular rate and rhythm with normal S1/S2 without murmurs, rubs or gallops. Abdomen: Soft, epigastric tenderness, softly distended with normal bowel sounds. Musculoskeletal:  No clubbing, cyanosis or edema bilaterally. Skin: Skin color, texture, turgor normal.  No rashes or lesions. Neurologic:  Neurovascularly intact without any focal sensory/motor deficits. Cranial nerves: II-XII intact, grossly non-focal.  Psychiatric: Alert and oriented, thought content appropriate, normal insight  Capillary Refill: Brisk,< 3 seconds   Peripheral Pulses: +2 palpable, equal bilaterally     Labs:   Recent Labs     12/03/20  1805   WBC 14.3*   HGB 16.5   HCT 49.1        Recent Labs     12/03/20  1805      K 4.3      CO2 26   BUN 14   CREATININE 1.3*   CALCIUM 8.5     No results for input(s): AST, ALT, BILIDIR, BILITOT, ALKPHOS in the last 72 hours. Recent Labs     12/03/20  0614   INR 1.28*     No results for input(s): Seun Chavarria in the last 72 hours.     Urinalysis:    Lab Results   Component Value Date    NITRU NEGATIVE 11/25/2020    WBCUA 0-2 11/25/2020    BACTERIA NONE SEEN 11/25/2020    RBCUA 0-2 11/25/2020    BLOODU NEGATIVE 11/25/2020    SPECGRAV 1.023 12/08/2018    GLUCOSEU NEGATIVE 11/25/2020       Radiology:   No orders to display     No results found.       EKG: rhythm: atrial fibrillation, enbd=085 bpm, pr=. ms, hbf=809 ms, sw=231 ms, axis=3 degrees      THANK YOU FOR THE CONSULT    Electronically signed by Katiana Jamison PA-C on 12/4/2020 at 4:22 AM

## 2020-12-04 NOTE — PROGRESS NOTES
What appears to be a hernia is present above midline incision. Fist sized protruding bulging present on abdomen. Nontender to touch.

## 2020-12-04 NOTE — PROGRESS NOTES
Urology Progress Note    Chief Complaint: prostate CA    Subjective: \"    Patient is resting in bed, voiding tea colored urine from bustillos catheter, +flatus, -BM, ambulating with assistance, clear, denies any nausea or vomiting. There are complaints of controlled pain at this time. Overnight he developed chest pain and sob. EKG was ordered which showed AFIB RVR, he was given 1 dose IV Lopressor GI cocktail, portable xray which showed hemidiaphragm, he and family sure it was not there prior to surgery. Troponin normal, creatinine 1.0  CTA chest was ordered which shows no PE, but shows prominent elevation right hemidiaphragm, progressed vs prior, bilateral medial posterior basilar consolidations consistent with atelectasis vs infiltrates           He is now requiring 2L NC to keep sats greater than 90%. He is refusing to use incentive spirometer. He denies chest pain, fever, chills on exam.  He complains of not being able to take a deep breath. Tmax temp overnight  100.1, currently 98. 3. , resp 18.                 Vitals:  /71   Pulse 103   Temp 100.1 °F (37.8 °C) (Rectal)   Resp 26   Ht 6' 1\" (1.854 m)   Wt 233 lb (105.7 kg)   SpO2 94%   BMI 30.74 kg/m²   Temp  Av.9 °F (36.6 °C)  Min: 97 °F (36.1 °C)  Max: 100.1 °F (37.8 °C)    Intake/Output Summary (Last 24 hours) at 2020 0743  Last data filed at 2020 7669  Gross per 24 hour   Intake 3977.6 ml   Output 1175 ml   Net 2802.6 ml       Social History     Socioeconomic History    Marital status:      Spouse name: Ximena Hernadez Number of children: 2    Years of education: Not on file    Highest education level: Not on file   Occupational History    Not on file   Social Needs    Financial resource strain: Not on file    Food insecurity     Worry: Not on file     Inability: Not on file   [employer] needs     Medical: Not on file     Non-medical: Not on file   Tobacco Use    Smoking status: Never Smoker    Smokeless tobacco: Never Used   Substance and Sexual Activity    Alcohol use: No    Drug use: No    Sexual activity: Never   Lifestyle    Physical activity     Days per week: Not on file     Minutes per session: Not on file    Stress: Not on file   Relationships    Social connections     Talks on phone: Not on file     Gets together: Not on file     Attends Gnosticist service: Not on file     Active member of club or organization: Not on file     Attends meetings of clubs or organizations: Not on file     Relationship status: Not on file    Intimate partner violence     Fear of current or ex partner: Not on file     Emotionally abused: Not on file     Physically abused: Not on file     Forced sexual activity: Not on file   Other Topics Concern    Not on file   Social History Narrative    Not on file     Family History   Problem Relation Age of Onset    Heart Surgery Father     Cancer Father         lung    Heart Disease Father         CABG    Alzheimer's Disease Mother     Diabetes Neg Hx     High Blood Pressure Neg Hx     Stroke Neg Hx      No Known Allergies      Constitutional: Alert and oriented times x3, no acute distress, and cooperative to examination with appropriate mood and affect. HEENT:   Head:         Normocephalic and atraumatic. Mucous membranes are normal.   Eyes:         EOM are normal.  Nose:    The external appearance of the nose is normal  Ears: The ears appear normal to external inspection. Cardiovascular:       Normal rate, regular rhythm. Pulmonary/Chest:  Normal respiratory rate and rhthym. No use of accessory muscles. Lungs clear bilaterally. Abdominal:          Soft. No tenderness. Hypoactive BS. Incisions dry and intact, no bruising, redness, swelling around incisional sites. Some tenderness to palpation. Some swelling to the right side directly below diaphragm, feels air filled, tender to palpation.     Genitalia:    Vernon catheter draining tea colored

## 2020-12-04 NOTE — PROGRESS NOTES
STAT EKG ordered for SOB and chest pain.     0406. Jannet Licona, PA covering for Dr. William Field, updated via perfect serve. Patient had RALRP with BPLND by Dr. William Field yesterday afternoon. Patient has new onset epigastric/ chest pain on left side unrelieved by 2 mg morphine and 2 dose nitro. Patient is now requiring 2L nasal cannula to maintain oxygen saturation above 90. BP currently 148/83. Pulse is 110. EKG showed AFIB with RVR at rate 112. Low Voltage QRS. RVR and low voltage is new since Jan 2020 (Last EKG). Patient has HX AFIB and coumadin has been on hold since 11/29. First dose Lovenox to be started today. Patient is diaphoretic and reporting it is hard to take a deep breath. [id number] called and requested I consult Hospitalist for medical management. [facility] notified at time. 0413: Patient still reporting persistant left sided chest pain and shortness of breath. Oxygen upped to 4L for tachypnea. Third dose of Nitro given. Resource nurse called at this time. 4814: Resource nurse, [facility], at beside. Oxygen upped to 5L for saturation of 90%and labored breathing. 0285Gail Bennett at bedside. STAT chest xray, CTA, BMP, Trop ordered.

## 2020-12-04 NOTE — PROGRESS NOTES
Radiology called this RN. The doctor reading the x-rays and scans requested to speak to the doctor taking care of the patient. Unable to provide any additional information to this RN. Called [facility] Number to call back and speak to radiologist. Call back number for Radiology is [phone].

## 2020-12-05 VITALS
TEMPERATURE: 98.2 F | WEIGHT: 233 LBS | DIASTOLIC BLOOD PRESSURE: 81 MMHG | BODY MASS INDEX: 30.88 KG/M2 | OXYGEN SATURATION: 93 % | HEART RATE: 90 BPM | RESPIRATION RATE: 22 BRPM | SYSTOLIC BLOOD PRESSURE: 161 MMHG | HEIGHT: 73 IN

## 2020-12-05 LAB
ANION GAP SERPL CALCULATED.3IONS-SCNC: 10 MEQ/L (ref 8–16)
BUN BLDV-MCNC: 11 MG/DL (ref 7–22)
CALCIUM SERPL-MCNC: 9.1 MG/DL (ref 8.5–10.5)
CHLORIDE BLD-SCNC: 103 MEQ/L (ref 98–111)
CO2: 26 MEQ/L (ref 23–33)
CREAT SERPL-MCNC: 1 MG/DL (ref 0.4–1.2)
ERYTHROCYTE [DISTWIDTH] IN BLOOD BY AUTOMATED COUNT: 13.4 % (ref 11.5–14.5)
ERYTHROCYTE [DISTWIDTH] IN BLOOD BY AUTOMATED COUNT: 44 FL (ref 35–45)
GFR SERPL CREATININE-BSD FRML MDRD: 74 ML/MIN/1.73M2
GLUCOSE BLD-MCNC: 143 MG/DL (ref 70–108)
HCT VFR BLD CALC: 46.7 % (ref 42–52)
HEMOGLOBIN: 15.6 GM/DL (ref 14–18)
MCH RBC QN AUTO: 30.2 PG (ref 26–33)
MCHC RBC AUTO-ENTMCNC: 33.4 GM/DL (ref 32.2–35.5)
MCV RBC AUTO: 90.5 FL (ref 80–94)
PLATELET # BLD: 225 THOU/MM3 (ref 130–400)
PMV BLD AUTO: 10.3 FL (ref 9.4–12.4)
POTASSIUM SERPL-SCNC: 3.6 MEQ/L (ref 3.5–5.2)
RBC # BLD: 5.16 MILL/MM3 (ref 4.7–6.1)
SODIUM BLD-SCNC: 139 MEQ/L (ref 135–145)
WBC # BLD: 14.1 THOU/MM3 (ref 4.8–10.8)

## 2020-12-05 PROCEDURE — 1200000000 HC SEMI PRIVATE

## 2020-12-05 PROCEDURE — 6360000002 HC RX W HCPCS: Performed by: PHYSICIAN ASSISTANT

## 2020-12-05 PROCEDURE — 6360000002 HC RX W HCPCS: Performed by: UROLOGY

## 2020-12-05 PROCEDURE — 99024 POSTOP FOLLOW-UP VISIT: CPT | Performed by: UROLOGY

## 2020-12-05 PROCEDURE — 94760 N-INVAS EAR/PLS OXIMETRY 1: CPT

## 2020-12-05 PROCEDURE — 36415 COLL VENOUS BLD VENIPUNCTURE: CPT

## 2020-12-05 PROCEDURE — 2580000003 HC RX 258: Performed by: PHYSICIAN ASSISTANT

## 2020-12-05 PROCEDURE — 2500000003 HC RX 250 WO HCPCS: Performed by: PHYSICIAN ASSISTANT

## 2020-12-05 PROCEDURE — 85027 COMPLETE CBC AUTOMATED: CPT

## 2020-12-05 PROCEDURE — 80048 BASIC METABOLIC PNL TOTAL CA: CPT

## 2020-12-05 PROCEDURE — 99232 SBSQ HOSP IP/OBS MODERATE 35: CPT | Performed by: NURSE PRACTITIONER

## 2020-12-05 PROCEDURE — 6370000000 HC RX 637 (ALT 250 FOR IP): Performed by: PHYSICIAN ASSISTANT

## 2020-12-05 RX ORDER — LEVOFLOXACIN 500 MG/1
500 TABLET, FILM COATED ORAL DAILY
Qty: 7 TABLET | Refills: 0 | Status: SHIPPED | OUTPATIENT
Start: 2020-12-05 | End: 2020-12-12

## 2020-12-05 RX ORDER — SENNA AND DOCUSATE SODIUM 50; 8.6 MG/1; MG/1
1 TABLET, FILM COATED ORAL 2 TIMES DAILY
Qty: 10 TABLET | Refills: 0 | Status: SHIPPED | OUTPATIENT
Start: 2020-12-05 | End: 2020-12-15 | Stop reason: ALTCHOICE

## 2020-12-05 RX ORDER — OXYBUTYNIN CHLORIDE 10 MG/1
10 TABLET, EXTENDED RELEASE ORAL DAILY
Qty: 30 TABLET | Refills: 3 | Status: SHIPPED | OUTPATIENT
Start: 2020-12-06 | End: 2021-03-15 | Stop reason: CLARIF

## 2020-12-05 RX ORDER — HYDROCODONE BITARTRATE AND ACETAMINOPHEN 5; 325 MG/1; MG/1
1 TABLET ORAL EVERY 6 HOURS PRN
Qty: 12 TABLET | Refills: 0 | Status: SHIPPED | OUTPATIENT
Start: 2020-12-05 | End: 2020-12-08

## 2020-12-05 RX ADMIN — ENOXAPARIN SODIUM 40 MG: 40 INJECTION SUBCUTANEOUS at 09:08

## 2020-12-05 RX ADMIN — METOPROLOL SUCCINATE 50 MG: 50 TABLET, EXTENDED RELEASE ORAL at 10:05

## 2020-12-05 RX ADMIN — BACITRACIN ZINC NEOMYCIN SULFATE POLYMYXIN B SULFATE: 400; 3.5; 5 OINTMENT TOPICAL at 09:08

## 2020-12-05 RX ADMIN — DIGOXIN 125 MCG: 125 TABLET ORAL at 10:04

## 2020-12-05 RX ADMIN — POTASSIUM CHLORIDE 10 MEQ: 750 TABLET, FILM COATED, EXTENDED RELEASE ORAL at 08:00

## 2020-12-05 RX ADMIN — ACETAMINOPHEN 650 MG: 325 TABLET ORAL at 04:21

## 2020-12-05 RX ADMIN — METOPROLOL TARTRATE 5 MG: 1 INJECTION, SOLUTION INTRAVENOUS at 04:22

## 2020-12-05 RX ADMIN — SODIUM CHLORIDE, PRESERVATIVE FREE 10 ML: 5 INJECTION INTRAVENOUS at 09:10

## 2020-12-05 RX ADMIN — LEVOFLOXACIN 500 MG: 5 INJECTION, SOLUTION INTRAVENOUS at 10:59

## 2020-12-05 RX ADMIN — BUMETANIDE 2 MG: 1 TABLET ORAL at 09:57

## 2020-12-05 RX ADMIN — OXYBUTYNIN CHLORIDE 10 MG: 10 TABLET, EXTENDED RELEASE ORAL at 09:08

## 2020-12-05 RX ADMIN — DILTIAZEM HYDROCHLORIDE 360 MG: 240 CAPSULE, COATED, EXTENDED RELEASE ORAL at 10:04

## 2020-12-05 RX ADMIN — DOCUSATE SODIUM AND SENNOSIDES 1 TABLET: 8.6; 5 TABLET, FILM COATED ORAL at 09:09

## 2020-12-05 ASSESSMENT — PAIN SCALES - GENERAL
PAINLEVEL_OUTOF10: 0
PAINLEVEL_OUTOF10: 4
PAINLEVEL_OUTOF10: 0

## 2020-12-05 NOTE — DISCHARGE SUMMARY
[address] [facility]  [address] [facility] [address]  Dept: [phone]  Loc: [phone]  Visit Date: 10/28/2020  Urology Progress Note    Chief Complaint: prostate CA    Subjective: \"  Patient is resting in bed, voiding yellow urine via bustillos, +flatus, -BM, ambulating with assistance, tolerating regular diet, denies any nausea or vomiting. There are complaints of no pain at this time. Using incentive spirometry. Would like to go home.         Vitals:  BP (!) 161/81   Pulse 90   Temp 98.2 °F (36.8 °C) (Oral)   Resp 22   Ht 6' 1\" (1.854 m)   Wt 233 lb (105.7 kg)   SpO2 92%   BMI 30.74 kg/m²   Temp  Av.4 °F (36.9 °C)  Min: 97.5 °F (36.4 °C)  Max: 99.1 °F (37.3 °C)    Intake/Output Summary (Last 24 hours) at 2020 1054  Last data filed at 2020 0421  Gross per 24 hour   Intake 1500 ml   Output 3850 ml   Net -2350 ml       Social History     Socioeconomic History    Marital status:      Spouse name: Racheal Kaufman Number of children: 2    Years of education: Not on file    Highest education level: Not on file   Occupational History    Not on file   Social Needs    Financial resource strain: Not on file    Food insecurity     Worry: Not on file     Inability: Not on file   [employer] needs     Medical: Not on file     Non-medical: Not on file   Tobacco Use    Smoking status: Never Smoker    Smokeless tobacco: Never Used   Substance and Sexual Activity    Alcohol use: No    Drug use: No    Sexual activity: Never   Lifestyle    Physical activity     Days per week: Not on file     Minutes per session: Not on file    Stress: Not on file   Relationships    Social connections     Talks on phone: Not on file     Gets together: Not on file     Attends [employer] service: Not on file     Active member of club or organization: Not on file     Attends meetings of clubs or organizations: Not on file     Relationship status: Not on file   Zane Crain Intimate partner violence     Fear of current or ex partner: Not on file     Emotionally abused: Not on file     Physically abused: Not on file     Forced sexual activity: Not on file   Other Topics Concern    Not on file   Social History Narrative    Not on file     Family History   Problem Relation Age of Onset    Heart Surgery Father     Cancer Father         lung    Heart Disease Father         CABG    Alzheimer's Disease Mother     Diabetes Neg Hx     High Blood Pressure Neg Hx     Stroke Neg Hx      No Known Allergies    Objective:    Constitutional: Alert and oriented times x3, no acute distress, and cooperative to examination with appropriate mood and affect. HEENT:   Head:         Normocephalic and atraumatic. Mucous membranes are normal.   Eyes:         EOM are normal. No scleral icterus. Nose:    The external appearance of the nose is normal  Ears: The ears appear normal to external inspection. Cardiovascular:       Normal rate, regular rhythm. Pulmonary/Chest:  Normal respiratory rate and rhthym. No use of accessory muscles. Lungs clear bilaterally. Abdominal:          Soft. Hypoactive BS. Incisions dry and intact, no bruising, redness, swelling around incisional sites. Some tenderness to palpation. Some swelling to the right side directly below diaphragm, feels air filled, tender to palpation, smaller than yesterday. Genitalia:    Vernon catheter draining yellow urine  Musculoskeletal:    Normal range of motion. He exhibits no edema or tenderness of lower extremities. Extremities:    No cyanosis, clubbing, or edema present. Neurological:    Alert and oriented.      Labs:  WBC:    Lab Results   Component Value Date    WBC 14.1 12/05/2020     Hemoglobin/Hematocrit:    Lab Results   Component Value Date    HGB 15.6 12/05/2020    HCT 46.7 12/05/2020     BMP:    Lab Results   Component Value Date     12/05/2020    K 3.6 12/05/2020    K 3.8 09/20/2020     2020    CO2 26 2020    BUN 11 2020    LABALBU 4.1 2020    LABALBU 4.5 2012    CREATININE 1.0 2020    CALCIUM 9.1 2020    LABGLOM 74 2020       Impression:  Prostate CA  BPH  AFIB        Plan:  CT shows no PE  Doppler normal flow bilateral  Discharge with bustillos. Cystogram on 12/10/20 at 1100, follow up scheduled with Dr Jennie Ronquillo 12/15/20    Patient was instructed by the Coumadin Clinic to hold Coumadin -.  Restart Coumadin 5mg on , then return to 5mg W and 2.5mg M,T,Th,F,Sa,S. Recheck INR in 2.5 week(s) after procedure.  Recommended INR check 1.5 weeks after procedure, but pt refused.  Patient reminded to call the Anticoagulation Clinic with any signs or symptoms of bleeding or with any medication changes.  Patient given instructions utilizing the teach back method. POD # 2 RALRP with BPLND per Dr Alejandro Krishnan, APRN  20 10:54 AM  Urology    Patient Identification  Patricio Ahmadi is a 78 y.o. male. :  1950  Admit Date:  12/3/2020    Discharge date: 20                                    Disposition: home    Discharge Diagnoses:   Patient Active Problem List   Diagnosis    Essential hypertension    Hyperlipidemia    Paroxysmal atrial fibrillation (HCC)    Obesity due to excess calories    Dizziness    History of gastroesophageal reflux (GERD)    Diastolic dysfunction    Anticoagulated on Coumadin    Urinary incontinence    Elevated PSA    Coronary artery disease involving native coronary artery of native heart without angina pectoris    Benign prostatic hyperplasia with urinary frequency    BPH with obstruction/lower urinary tract symptoms    Prostate cancer ([facility] [address]    Fever       Consults: Medicine    Surgery: RALRP with BPLND per Dr Jennie Ronquillo    Patient Instructions: Activity: no heavy lifting, pushing, pulling for 6 weeks, no driving while on analgesics.   Diet: As tolerated  Follow-up with  [facility] course: Patient underwent he with no complications. Post-operatively he remained stable.  Patient is tolerating diet, urinating adequately on his own, pain is minimal, ambulating well with assistance, having flatus      Time spent for discharge 20-25

## 2020-12-05 NOTE — PROGRESS NOTES
breath    Hospital Course: Patient presented to the hospital yesterday as he has a history of prostate cancer and needed to have a robotic assisted laparoscopic radical prostatectomy completed; patient was admitted to the hospital after the procedure and early this morning he developed a sudden onset of chest pain and shortness of breath; a CTA of the chest was done that did not reveal a PE however did reveal free air most likely secondary to postop status~this was discussed with Dereje Castillo the nurse practitioner from urology on rounds; EKG revealed an atrial fibrillation with a controlled rate and he had 2 troponins which were normal; he did have some postop pulmonary insufficiency that initially required oxygen however that has been successfully weaned off; he is currently alert and oriented and relates to feeling much better    12/5--> feels much better than he did yesterday; wife at bedside; blood pressure little bit on the higher side and wife states that he normally does not have high blood pressure however I see that in his history and is also on a diuretic and Lopressor; overall he looks and acts a lot better today    Subjective (past 24 hours):  Complains of some incisional site pain especially with movement however he denies any of the chest pain or upper abdominal pain that he had yesterday; denies any shortness of breath; ate solid food today; passing some gas and belching    Medications:  Reviewed    Infusion Medications     Scheduled Medications    levofloxacin  500 mg Intravenous Q24H    bumetanide  2 mg Oral Daily    digoxin  125 mcg Oral Daily    dilTIAZem  360 mg Oral Daily    metoprolol succinate  50 mg Oral Daily    potassium chloride  10 mEq Oral BID WC    pravastatin  80 mg Oral Nightly    sodium chloride flush  10 mL Intravenous 2 times per day    neomycin-bacitracin-polymyxin   Topical BID    sennosides-docusate sodium  1 tablet Oral BID    enoxaparin  40 mg Subcutaneous Daily    oxybutynin  10 mg Oral Daily     PRN Meds: metoprolol, nitroGLYCERIN, sodium chloride flush, acetaminophen, HYDROcodone-acetaminophen, morphine **OR** morphine, magnesium hydroxide, bisacodyl, ondansetron, benzocaine-menthol      Intake/Output Summary (Last 24 hours) at 12/5/2020 0603  Last data filed at 12/5/2020 0421  Gross per 24 hour   Intake 1540 ml   Output 5050 ml   Net -3510 ml       Diet:  DIET GENERAL;    Exam:  BP (!) 141/78   Pulse 87   Temp 99.1 °F (37.3 °C) (Oral)   Resp 18   Ht 6' 1\" (1.854 m)   Wt 233 lb (105.7 kg)   SpO2 94%   BMI 30.74 kg/m²     General appearance: No apparent distress, appears stated age and cooperative. HEENT: Pupils equal, round, and reactive to light. Conjunctivae/corneas clear. Neck: Supple, with full range of motion. No jugular venous distention. Trachea midline. Respiratory:  Normal respiratory effort. Clear to auscultation, bilaterally without Rales/Wheezes/Rhonchi. Cardiovascular: irregular rate and rhythm   Abdomen: Soft, non-tender, non-distended with normal bowel sounds. Abdominal incisions show the dressing is intact and no drainage; he does have a swollen area above his umbilical area  Musculoskeletal: passive and active ROM x 4 extremities. Skin: Skin color, texture, turgor normal.    Neurologic:  Neurovascularly intact without any focal sensory/motor deficits.  Cranial nerves: II-XII intact, grossly non-focal.  Psychiatric: Alert and oriented, thought content appropriate  Capillary Refill: Brisk,< 3 seconds   Peripheral Pulses: +2 palpable, equal bilaterally       Labs:   Recent Labs     12/03/20  1805 12/04/20  0436 12/05/20  0440   WBC 14.3* 13.5* 14.1*   HGB 16.5 15.1 15.6   HCT 49.1 45.3 46.7    229 225     Recent Labs     12/03/20  1805 12/04/20  0436 12/05/20  0440    141 139   K 4.3 3.9 3.6    105 103   CO2 26 24 26   BUN 14 12 11   CREATININE 1.3* 1.0 1.0   CALCIUM 8.5 8.6 9.1     No results for input(s): AST, ALT, BILIDIR, BILITOT, ALKPHOS in the last 72 hours. Recent Labs     12/03/20  0614   INR 1.28*     Urinalysis:      Lab Results   Component Value Date    NITRU NEGATIVE 11/25/2020    WBCUA 0-2 11/25/2020    BACTERIA NONE SEEN 11/25/2020    RBCUA 0-2 11/25/2020    BLOODU NEGATIVE 11/25/2020    SPECGRAV 1.023 12/08/2018    GLUCOSEU NEGATIVE 11/25/2020       Radiology:  VL DUP LOWER EXTREMITY VENOUS BILATERAL   Final Result   Normal venous ultrasound. No evidence for deep venous thrombosis. **This report has been created using voice recognition software. It may contain minor errors which are inherent in voice recognition technology. **      Final report electronically signed by Dr. Radha Junior on 12/4/2020 11:22 AM      XR CHEST PORTABLE   Final Result   Small volume free air below the diaphragm. Correlate with any history of    recent abdominal surgery. This document has been electronically signed by: Karthik Jefferson MD on    12/04/2020 07:00 AM         CTA CHEST W WO CONTRAST   Final Result   No pulmonary emboli. Prominent elevation right hemidiaphragm, progressed vs prior. Bilateral medial posterior basilar consolidations consistent with    atelectasis vs infiltrates. This document has been electronically signed by: Karthik Jefferson MD on    12/04/2020 05:58 AM      All CT scans at this facility use dose modulation, iterative    reconstruction, and/or weight-based   dosing when appropriate to reduce radiation dose to as low as reasonably    achievable.              DVT prophylaxis: [x] Lovenox                                 [] SCDs                                 [] SQ Heparin                                 [] Encourage ambulation           [] Already on Anticoagulation     Code Status: Full Code    Tele:   [] yes             [] no    Active Hospital Problems    Diagnosis Date Noted    Fever [R50.9]     BPH with obstruction/lower urinary tract symptoms [N40.1, N13.8] 12/03/2020    Prostate cancer [facility]) Waqas Block        Electronically signed by NARESH Downey CNP on 12/5/2020 at 6:03 AM

## 2020-12-05 NOTE — PROGRESS NOTES
Pt. Discharged to home with wife. Wife and patient educated on new medications. States they do not want the narcotics. RN educated patient that they are just as needed and he does not have to take them. RN educated and showed patient and wife how to change bustillos catheter bags and educated on cleaning and caring for catheter.

## 2020-12-07 ENCOUNTER — CARE COORDINATION (OUTPATIENT)
Dept: CASE MANAGEMENT | Age: 70
End: 2020-12-07

## 2020-12-07 NOTE — CARE COORDINATION
Zac [facility] Transitions Initial Follow Up Call    Call within 2 business days of discharge: Yes    Patient: Tarun Lo Patient : 1950   MRN: [id number]  Reason for Admission: Prostate cancer  ROBOTIC ASSISTED LAPAROSCOPIC RADICAL PROSTATECTOMY WITH BILATERAL PELVIC LYMPH NODE DISSECTION, [facility]  [address]  Discharge Date: 20 RARS: Readmission Risk Score: 14      Last Discharge [facility]       Complaint Diagnosis Description Type Department Provider    12/3/20  Acute post-operative pain . .. Admission (Discharged) [address], MD        First attempt to reach pt for the [facility] initial follow up call  Left message to call transition care nurse from [facility] @ [phone].        Follow Up  Future Appointments   Date Time Provider Cristina Grier   12/10/2020 11:00 AM STR FLUORO ROOM 4 STRZ RAD STR Radiolog   12/15/2020  1:15 PM MD JESSICA Schaefer AM [address] Urology [facility]   2020  8:00 AM Whitney Fraser, PT STRZ [facility]   2020  7:00 AM Roni Collazo, [facility] STR MED MGMT [facility]   2020  8:00 AM Whitney Fraser, PT STRZ [address] HOD   2021  8:00 AM Whitney Fraser, PT STRZ [address] HOD   2021  8:00 AM Whitney Fraser, PT STRZ [address] HOD   2021  8:00 AM Chidi Avilez  [facility] Elias Sanchez RN  Care Transition Nurse  [phone]

## 2020-12-07 NOTE — OP NOTE
Patient:  Laurel Brownlee  MRN: [id number]  YOB: 1950    FACILITY: [place], [place]    DATE:12/3/2020    SURGEON: Rafael Lemus MD     ASSISTANT: Alessandra DEAL    PREOPERATIVE DIAGNOSIS: PROSTATE CANCER    1. Prostate Cancer    POSTOPERATIVE DIAGNOSIS: prostate cancer    PROCEDURE PERFORMED:     1. Robotic Assisted Laparoscopic Radical Prostatectomy   2. Bilateral pelvic lymph node dissection. 3.   Lysis of adhesions ( 10 min)        4. Placement of clarix nerve wrap    ANESTHESIA: General    ESTIMATED BLOOD LOSS: 150 (units unknown)     COMPLICATIONS: None immediate    DRAINS:   1. Urethral Vernon Catheter       SPECIMENS:   ID Type Source Tests Collected by Time Destination   A : Prostate and Seminal Vesicles Tissue Prostate SURGICAL PATHOLOGY Rehan Zimmer MD 12/3/2020 7083    B : Right and Left Pelvic Lymph Nodes Tissue Lymph Node SURGICAL PATHOLOGY Rehan Zimmer MD 12/3/2020 0901    C : Bladder Neck Margin 1 Tissue Bladder SURGICAL PATHOLOGY Rehan Zimmer MD 12/3/2020 8132    D : Bladder Neck Margins 2 Tissue Bladder SURGICAL PATHOLOGY Rehan Zimmer MD 12/3/2020 1913        INDICATIONS FOR PROCEDURE:  The patient is a 78 y.o. male who presents today with PROSTATE CANCER here for ROBOTIC ASSISTED LAPAROSCOPIC RADICAL PROSTATECTOMY. After risks, benefits and alternatives of the procedure were discussed with the patient, the patient elected to proceed. DESCRIPTION OF PROCEDURE:  This patient was given preoperative anticoagulation and antibiotics and was brought back to the operating room and positioned in the supine position. General anesthesia was started. He was secured to the bed and then he was sterilely prepped and draped in standard fashion. An 25 [place] Vernon catheter was placed and a supraumbilical skin incision was made, a Veress needle was placed through this into the peritoneum. Pneumoperitoneum was obtained.  An camera port was placed through this incision into the peritoneum. The peritoneum was examined. No injuries were noted. The rest of the ports were placed in the usual fashion. Three robotic 8 mm ports were placed, one 12 mm, and one 5 mm ports under direct vision. After this the robot was then docked and the procedure was started by taking down sigmoid adhesions. 10 minutes were spent taking down these adhesions and lysing them, staying away from any bowel injury. We then reflected the bladder in the usual fashion by incising lateral to the medial umbilical ligaments and transecting the urachus. The fat off the anterior aspect of the prostate was excised. Endopelvic fascia on each side was incised. The dorsal vein was ligated with a figure-of-8 stitch of 0-V-Loc. This was pexed to the pubic bone. I then transected the bladder making sure not to enter the prostate and making sure not to enter the ureteral orifices. Frozen sections were sent of the bladder neck which were negative for cancer. The patient had a very large prostate and bladder neck reconstruction was performed with Vicryl suture. The anterior Denonvilliers fascia was identified, it was incised. The seminal vesicles and vasa were dissected and lifted anteriorly. The posterior Denonvilliers fascia was incised and the rectum was swept off the posterior lateral aspect of the prostate. The prostatic pedicles were ligated and divided with Hem-o-Tj clips and we performed standard nerve dissection. This progressed on both sides towards the apex of the prostate, then the dorsal venous complex was incised, the urethra was dissected and then transected maintaining urethral length. The rectourethralis was transected, the prostate was free and the pelvis was irrigated, no injuries were noted. There was adequate hemostasis. Nerve wrap was used and pexed down to the neurovascular bundle on the floor of the rectum. Suture was used to keep this in place.  The right pelvic lymph node dissection was performed by sampling lymph nodes between the iliac vein and the obturator nerve, making sure not to injure the structures, the same was on the left side by sampling the nodes between the iliac vein and the obturator nerve, making sure not to injure the structures. After this the lymph nodes and the prostate were placed into an EndoCatch bag and then the Yoav stitch was performed with a 3-0 Monocryl and a figure-of-8 stitch manner, bringing together posterior Denonvilliers fascia and the rectourethralis was tied down. The bladder urethra anastomosis was performed with a 2-0 quill stitch in a running fashion. This was tied down and new Vernon catheter was placed. The bladder was irrigated, there was no extravasation noted. We decided to not leave a drain. Then the robot was undocked, the umbilical skin incision was elongated, the specimen bag was removed through this incision, this incision was closed with figure-of-8 stitches of 0-Vicryl. All the skin incisions were closed with 4-0 Monocryl. Dermabond was placed and that was the end of the procedure. The patient will be admitted for routine postoperative care. I was present and scrubbed throughout the entire case.

## 2020-12-08 ENCOUNTER — CARE COORDINATION (OUTPATIENT)
Dept: CASE MANAGEMENT | Age: 70
End: 2020-12-08

## 2020-12-08 PROCEDURE — 1111F DSCHRG MED/CURRENT MED MERGE: CPT | Performed by: FAMILY MEDICINE

## 2020-12-08 NOTE — CARE COORDINATION
Zac 45 Transitions Initial Follow Up Call    Call within 2 business days of discharge: Yes    Patient: Virginia Morales Patient : 1950   MRN: [id number]  Reason for Admission: Prostate cancer  ROBOTIC ASSISTED LAPAROSCOPIC RADICAL PROSTATECTOMY WITH BILATERAL PELVIC LYMPH NODE DISSECTION, PLACEMENT OF [facility] [place] [facility]  [address]  Discharge Date: 20 RARS: Readmission Risk Score: 14      Last Discharge [facility]       Complaint Diagnosis Description Type Department Provider    12/3/20  Acute post-operative pain . .. Admission (Discharged) Bre Chen MD             Challenges to be reviewed by the provider   Additional needs identified to be addressed with provider No  none    Discussed COVID-19 related testing which was available at this time. Test results were negative. Patient informed of results, if available? Yes         Method of communication with provider : none    Advance Care Planning:   Does patient have an Advance Directive:  reviewed and current. Was this a readmission? No  Patient stated reason for admission: prostate CA   Patients top risk factors for readmission: medical condition    Care Transition Nurse (CTN) contacted the patient by telephone to perform post hospital discharge assessment. Verified name and  with patient as identifiers. Provided introduction to self, and explanation of the CTN role. CTN reviewed discharge instructions, medical action plan and red flags with patient who verbalized understanding. Patient given an opportunity to ask questions and does not have any further questions or concerns at this time. Were discharge instructions available to patient? Yes. Reviewed appropriate site of care based on symptoms and resources available to patient including: PCP and Specialist. The patient agrees to contact the PCP office for questions related to their healthcare.      Medication reconciliation was performed with patient, who verbalizes understanding of administration of home medications. Discussed follow-up appointments. Is follow up appointment scheduled within 7 days of discharge? Yes      Plan for follow-up call in 7-10 days based on severity of symptoms and risk factors. CTN provided contact information for future needs. Patient states he has no pain only if he coughs. Denies feer, chills and pain. His bustillos cath is flowing yellow urine without blood clots. Patient states his will be very happy to get bustillos out. He denies any issues or concerns at this time.     Non-face-to-face services provided:  Obtained and reviewed discharge summary and/or continuity of care documents    Care Transitions 24 Hour Call    Do you have all of your prescriptions and are they filled?:  Yes  Care Transitions Interventions         Follow Up  Future Appointments   Date Time Provider Cristina Grier   12/10/2020 11:00 AM STR FLUORO ROOM 4 STRZ RAD RUST Radiolog   12/15/2020  1:15 PM MD JESSICA Daly  [address] Urology [facility]   12/16/2020  8:00 AM Harshil Kee PT STRZ [address] [place] [address]   12/21/2020  7:00 AM Dinesh Paul, [address] [facility] MED MGMT [facility]   12/30/2020  8:00 AM Harshil Kee PT STRJAX [address] [place] [address]   1/13/2021  8:00 AM BAYLEE Snell [facility]   1/27/2021  8:00 AM BAYLEE Snell [facility]   8/12/2021  8:00 AM Trisha Mckee MD [facility] - Leonel Spivey RN

## 2020-12-10 ENCOUNTER — TELEPHONE (OUTPATIENT)
Dept: UROLOGY | Age: 70
End: 2020-12-10

## 2020-12-10 ENCOUNTER — HOSPITAL ENCOUNTER (OUTPATIENT)
Dept: GENERAL RADIOLOGY | Age: 70
Discharge: HOME | End: 2020-12-10
Payer: MEDICARE

## 2020-12-10 PROCEDURE — 6360000004 HC RX CONTRAST MEDICATION: Performed by: UROLOGY

## 2020-12-10 PROCEDURE — 51600 INJECTION FOR BLADDER X-RAY: CPT

## 2020-12-10 PROCEDURE — 74430 CONTRAST X-RAY BLADDER: CPT

## 2020-12-10 RX ADMIN — IOTHALAMATE MEGLUMINE 250 ML: 172 INJECTION URETERAL at 09:55

## 2020-12-10 NOTE — TELEPHONE ENCOUNTER
Patient called having a lot of urinary incontinence since catheter removed this morning. He has went through 3 to 4 pads and has to change his pants multiple times. Per Juan Carlos Rodriguez CNP and Carole COLEMAN this is normal postoperative symptoms. Patient was advised to continue with his pelvic floor exercise and hopefully this will get better.

## 2020-12-14 ENCOUNTER — CARE COORDINATION (OUTPATIENT)
Dept: CASE MANAGEMENT | Age: 70
End: 2020-12-14

## 2020-12-14 NOTE — CARE COORDINATION
[facility] Transitions Follow Up Call    2020    Patient: Xiomara Maloney  Patient : 1950   MRN: [id number]  Reason for Admission: Prostate cancer  ROBOTIC ASSISTED LAPAROSCOPIC RADICAL PROSTATECTOMY WITH BILATERAL PELVIC LYMPH NODE DISSECTION, PLACEMENT OF CLARIX CORD  [address]  Discharge Date: 20 RARS: Readmission Risk Score: 14    Needs to be reviewed by the provider   Additional needs identified to be addressed with provider Yes  Pt stated he is very depressed & upset. Pt is incontinent of urine whenever he stands up. Pt stated his penis is \"inside shrunk in\"  He sprays all over because he is unable to hold penis to void. Pt does not feel empty after voiding, denied any burning. Pt has a \"golf ball size bulge\" above the navel, was not there prior surgery. There is constant pain 3-8/10 @ the bulge site. When pt sneezes pain > 20. Pt is unable to do pelvic exercises as he voids when he attempts. Pt stated he can not lie on the left side due to the pain & \"something shifts\"  Pt is still on the oxybutynin, should that continue? Pt has appt tomorrow with urology. Discussed COVID-19 related testing which was available at this time. Test results were negative. Patient informed of results, if available? Yes         Method of communication with provider : chart routing    Care Transition Nurse (CTN) contacted the patient by telephone to follow up after admission on 12/3/20. Verified name and  with patient as identifiers. Addressed changes since last contact: symptom management-incontinence  Discharged needs reviewed: urology appt  Follow up appointment completed? No    Advance Care Planning:   Does patient have an Advance Directive:  decision maker updated. CTN reviewed discharge instructions, medical action plan and red flags with patient and discussed any barriers to care and/or understanding of plan of care after discharge.  Discussed appropriate site of care based on symptoms and resources available to patient including: PCP and Specialist. The patient agrees to contact the PCP office for questions related to their healthcare. Patients top risk factors for readmission: depression, ineffective coping and medical condition  Interventions to address risk factors: Scheduled appointment with Specialist-keep appt tomorrow    Discussed follow-up appointments. Is follow up appointment scheduled within 7 days of discharge? No      Plan for follow-up call in 5-7 days based on severity of symptoms and risk factors. Plan for next call: keep urology appt  CTN provided contact information for future needs. Care Transitions Subsequent and Final Call    Subsequent and Final Calls  Do you have any ongoing symptoms?: Yes  Patient-reported symptoms: Other  Interventions for patient-reported symptoms: Notified PCP/Physician  Do you have any questions related to your medications?: Yes  Patient Reports: continue oxybutynin  Do you currently have any active services?: No  Are you currently active with any services?: Outpatient/Community Services  Identified Barriers: None  Care Transitions Interventions  No Identified Needs  Other Interventions:       Called pt for the MAKENNA sub call. Pt stated he is very depressed & upset. Pt is incontinent of urine whenever he stands up. Pt stated his penis is \"inside shrunk in\"  He sprays all over because he is unable to hold penis to void. Pt does not feel empty after voiding, denied any burning. Pt has a \"golf ball size bulge\" above the navel, was not there prior surgery. There is constant pain 3-8/10 @ the bulge site. When pt sneezes pain > 20. Pt is unable to do pelvic exercises as he voids when he attempts. Pt stated he can not lie on the left side due to the pain & \"something shifts\"  Pt is still on the oxybutynin, should that continue? Pt has appt tomorrow with urology.     Follow Up  Future Appointments Date Time Provider Cristina Grier   12/15/2020  1:15 PM MD JESSICA Soler AM [address] [address]rology [facility] - JESSICA GONGORA AM [place]   12/21/2020  7:00 AM Delfina Hare, [address] [facility] [place]   12/30/2020  8:00 AM Yasir Alvarado, PT [address] HOD   1/13/2021  8:00 AM Yasir Alvarado, PT [facility] [address] HOD   1/27/2021  8:00 AM Yasir Alvarado, PT [facility] [address] [facility] HOD   8/12/2021  8:00 AM Chidi Fortune MD [facility] RN  Care Transition Nurse  [phone]

## 2020-12-14 NOTE — CARE COORDINATION
Z[facility] Transitions Follow Up Call    2020    Patient: Tarun Lo  Patient : 1950   MRN: [id number]  Reason for Admission:  Prostate cancer  ROBOTIC ASSISTED LAPAROSCOPIC RADICAL PROSTATECTOMY WITH BILATERAL PELVIC LYMPH NODE DISSECTION, PLACEMENT OF CLARIX CORD  [address]  Discharge Date: 20 RARS: Readmission Risk Score: 14    First attempt to reach pt for the ALISA HU sub call  Left message to call transition care nurse from [facility] - CARMEN GARCIA @ [phone].      Follow Up  Future Appointments   Date Time Provider Cristina Grier   12/15/2020  1:15 PM Darius Human, MD [facility] Sherren Rosenthal   2020  7:00 AM ANGELES Kirk [facility] [place]   2020  8:00 AM Whitney Fraser, PT [facility] [address] HOD   2021  8:00 AM Whitney Fraser, PT [facility] [address] HOD   2021  8:00 AM Whitney Fraser, PT [facility] [address] HOD   2021  8:00 AM Chidi Avilez MD [facility] RN  Care Transition Nurse  [phone]

## 2020-12-15 ENCOUNTER — OFFICE VISIT (OUTPATIENT)
Dept: UROLOGY | Age: 70
End: 2020-12-15

## 2020-12-15 VITALS — TEMPERATURE: 96.2 F | BODY MASS INDEX: 30.88 KG/M2 | WEIGHT: 233 LBS | HEIGHT: 73 IN

## 2020-12-15 DIAGNOSIS — C61 PROSTATE CANCER (HCC): Primary | ICD-10-CM

## 2020-12-15 DIAGNOSIS — R79.89 LOW TESTOSTERONE: ICD-10-CM

## 2020-12-15 PROCEDURE — 99024 POSTOP FOLLOW-UP VISIT: CPT | Performed by: UROLOGY

## 2020-12-16 ENCOUNTER — TELEPHONE (OUTPATIENT)
Dept: UROLOGY | Age: 70
End: 2020-12-16

## 2020-12-16 NOTE — TELEPHONE ENCOUNTER
It has been there for 6 days. He does not know if its worse. He still has oxybutynin. They have noticed small improvement in the incontinence. The patient wife states he frequently washes his hands more often and since his hand are so pale looking it makes the age spots more pronounced. I also advised him to notify the PCP encase it is not medication related.

## 2020-12-16 NOTE — TELEPHONE ENCOUNTER
Those are not common side effects of ditropan. Why is it concerning now? Has it worsened. Is he still having incontinence?   That is why he is taking it

## 2020-12-18 ENCOUNTER — CARE COORDINATION (OUTPATIENT)
Dept: CASE MANAGEMENT | Age: 70
End: 2020-12-18

## 2020-12-18 NOTE — CARE COORDINATION
[facility] Transitions Follow Up Call    2020    Patient: Anthony Tom  Patient : 1950   MRN: [id number]  Reason for Admission: Prostate cancer  ROBOTIC ASSISTED LAPAROSCOPIC RADICAL PROSTATECTOMY WITH BILATERAL PELVIC LYMPH NODE DISSECTION, PLACEMENT OF CLARIX CORD  [address]  Discharge Date: 20 RARS: Readmission Risk Score: 14    Spoke with: pt    Called pt for the MAKENNA sub call. Call was brief as the pt is angry & did not want to talk to the CTN. Please see CTN note , it was routed to the Urology office. Pt had Urology appt the following day. Pt stated his urinary incontinence has improved very little. Pt stated he wants \"a doctor that cares, he did not care\"  Pt stated the \"bulge\" above umbilicus, not there prior surgery was not addressed. Pt stated he asked the provider to evaluate, it was not done per the pt.   Pt stated he did \"not want to be bothered anymore\"  CTN to sign off per pt request.    Follow Up  Future Appointments   Date Time Provider Cristina Grier   2020  7:00 AM ANGELES Clark [facility] [place]   2021  8:00 AM [facility], [address] [facility]   2021  8:00 AM Yogesh Pacheco MD [facility] Volodymyr Umaña RN  Care Transition Nurse  [phone]

## 2020-12-21 ENCOUNTER — HOSPITAL ENCOUNTER (OUTPATIENT)
Dept: PHARMACY | Age: 70
Setting detail: THERAPIES SERIES
Discharge: HOME | End: 2020-12-21
Payer: MEDICARE

## 2020-12-21 VITALS — TEMPERATURE: 96.6 F

## 2020-12-21 LAB — POC INR: 2.1 (ref 0.8–1.2)

## 2020-12-21 PROCEDURE — 99211 OFF/OP EST MAY X REQ PHY/QHP: CPT | Performed by: PHARMACIST

## 2020-12-21 PROCEDURE — 36416 COLLJ CAPILLARY BLOOD SPEC: CPT | Performed by: PHARMACIST

## 2020-12-21 PROCEDURE — 85610 PROTHROMBIN TIME: CPT | Performed by: PHARMACIST

## 2021-01-03 NOTE — PROGRESS NOTES
S/p prostatectomy  Margins negative  Pt concerned about penile length and stress incontinence  Also bulge above incision from fascial closure  Discussed pathology with patient  Recommend pelvic floor therapy   Discussed with the patient that erectile dysfunction and stress urinary incontinence are known risks to the surgery. Discussed that urethral length does decrease after the surgery. The patient verbalized understanding that these risks were discussed with him prior to undergoing surgery to cure his cancer.       Follow-up in 6 weeks with PSA

## 2021-01-11 ENCOUNTER — HOSPITAL ENCOUNTER (OUTPATIENT)
Dept: PHARMACY | Age: 71
Setting detail: THERAPIES SERIES
Discharge: HOME | End: 2021-01-11
Payer: MEDICARE

## 2021-01-11 VITALS — TEMPERATURE: 97.1 F

## 2021-01-11 LAB — POC INR: 2.9 (ref 0.8–1.2)

## 2021-01-11 PROCEDURE — 99211 OFF/OP EST MAY X REQ PHY/QHP: CPT | Performed by: PHARMACIST

## 2021-01-11 PROCEDURE — 85610 PROTHROMBIN TIME: CPT | Performed by: PHARMACIST

## 2021-01-11 PROCEDURE — 36416 COLLJ CAPILLARY BLOOD SPEC: CPT | Performed by: PHARMACIST

## 2021-01-11 NOTE — PROGRESS NOTES
Medication Management [address]  [phone] (phone)  [phone] (fax)      Mr. Teresa Evans is a 78 y.o.  male with history of Afib who presents today for anticoagulation monitoring and adjustment. Patient verifies current dosing regimen and tablet strength. No missed or extra doses. Patient denies s/s bleeding/bruising/swelling/SOB/chest pain  No blood in urine or stool. No dietary changes. No changes in medication/OTC agents/Herbals. Pt to verify medication list for next appointment. No change in alcohol use or tobacco use. No change in activity level. Patient denies headaches/dizziness/lightheadedness/falls. No vomiting/diarrhea or acute illness. No Procedures scheduled in the future at this time. Assessment:   Lab Results   Component Value Date    INR 2.90 (H) 01/11/2021    INR 2.10 (H) 12/21/2020    INR 1.28 (H) 12/03/2020     INR therapeutic   Recent Labs     01/11/21  0714   INR 2.90*       Plan:  Continue Coumadin 5mg W 2.5mg SMTuThFS. Recheck INR in 4 week(s). Patient reminded to call the Anticoagulation Clinic with any signs or symptoms of bleeding or with any medication changes. Patient given instructions utilizing the teach back method. Discharged ambulatory in no apparent distress. After visit summary printed and reviewed with patient.       Medications reviewed and updated on home medication list Yes    Influenza vaccine:     [] given    [x] declined   [x] received previously   [] plans to receive at a later time   [] refused    [] documented in [address]: [address]: Yes  Total # of Interventions Recommended: 0  - Maintenance Safety Lab Monitoring #: 1  Total Interventions Accepted: 0  Time Spent (min): [address], PharmD 1/11/2021 7:31 AM

## 2021-01-18 ENCOUNTER — HOSPITAL ENCOUNTER (OUTPATIENT)
Age: 71
Discharge: HOME | End: 2021-01-18
Payer: MEDICARE

## 2021-01-18 DIAGNOSIS — C61 PROSTATE CANCER (HCC): ICD-10-CM

## 2021-01-18 LAB — PROSTATE SPECIFIC ANTIGEN: < 0.02 NG/ML (ref 0–1)

## 2021-01-18 PROCEDURE — 36415 COLL VENOUS BLD VENIPUNCTURE: CPT

## 2021-01-18 PROCEDURE — 84153 ASSAY OF PSA TOTAL: CPT

## 2021-01-19 ENCOUNTER — TELEPHONE (OUTPATIENT)
Dept: FAMILY MEDICINE CLINIC | Age: 71
End: 2021-01-19

## 2021-01-19 DIAGNOSIS — C61 PROSTATE CANCER (HCC): Primary | ICD-10-CM

## 2021-01-19 NOTE — TELEPHONE ENCOUNTER
Lab Results   Component Value Date    PSA <0.02 01/18/2021    PSA 3.60 (H) 08/04/2020    PSA 3.59 (H) 04/10/2019     Pt's PSA is < 0.02- consistent with recent Prostate surgery. Recheck PSA in 3 months to assure stability.   ES

## 2021-01-19 NOTE — TELEPHONE ENCOUNTER
Patient is requesting a review of his blood work from Dr. Melba Mcknight or Lyndon Douglas and get back to him. He no longer wants to see Dr. Vincent Hein and would like all care to go through the office or Penelope Delarosa. Please contact patient with blood work results at [phone].

## 2021-02-08 ENCOUNTER — HOSPITAL ENCOUNTER (OUTPATIENT)
Dept: PHARMACY | Age: 71
Setting detail: THERAPIES SERIES
Discharge: HOME | End: 2021-02-08
Payer: MEDICARE

## 2021-02-08 VITALS — TEMPERATURE: 96.6 F

## 2021-02-08 LAB — POC INR: 2.2 (ref 0.8–1.2)

## 2021-02-08 PROCEDURE — 85610 PROTHROMBIN TIME: CPT | Performed by: PHARMACIST

## 2021-02-08 PROCEDURE — 99211 OFF/OP EST MAY X REQ PHY/QHP: CPT | Performed by: PHARMACIST

## 2021-02-08 PROCEDURE — 36416 COLLJ CAPILLARY BLOOD SPEC: CPT | Performed by: PHARMACIST

## 2021-02-08 NOTE — PROGRESS NOTES
Medication Management [address]   [phone] (phone)  [phone] (fax)      Mr. Aarti Nelson is a 73 y.o.  male with history of Afib who presents today for anticoagulation monitoring and adjustment. Patient verifies current dosing regimen and tablet strength. No missed or extra doses. Patient denies s/s bleeding/bruising/swelling/SOB/chest pain  No blood in urine or stool. No dietary changes. No changes in medication/OTC agents/Herbals. No change in alcohol use or tobacco use. No change in activity level. Same - stationary playing a lot of cards with his wife. Patient denies headaches/dizziness/lightheadedness/falls. No vomiting/diarrhea or acute illness. No Procedures scheduled in the future at this time. Assessment:   Lab Results   Component Value Date    INR 2.20 (H) 02/08/2021    INR 2.90 (H) 01/11/2021    INR 2.10 (H) 12/21/2020     INR therapeutic   Recent Labs     02/08/21  0712   INR 2.20*     5th consecutive therapeutic INR. Plan:  Continue Coumadin 5mg W 2.5mg SMTuThFS. Recheck INR in 5 week(s). Patient reminded to call the Anticoagulation Clinic with any signs or symptoms of bleeding or with any medication changes. Patient given instructions utilizing the teach back method. Discharged ambulatory in no apparent distress. After visit summary printed and reviewed with patient.       Medications reviewed and updated on home medication list Yes    Influenza vaccine:     [] given    [x] declined   [x] received previously   [] plans to receive at a later time   [] refused    [] documented in [address]: [address]: Yes  Total # of Interventions Recommended: 0  - Maintenance Safety Lab Monitoring #: 1  Total Interventions Accepted: 0  Time Spent (min): [address], PharmD 2/8/2021 7:24 AM

## 2021-02-23 ENCOUNTER — IMMUNIZATION (OUTPATIENT)
Dept: PRIMARY CARE CLINIC | Age: 71
End: 2021-02-23
Payer: MEDICARE

## 2021-02-23 PROCEDURE — 0011A COVID-19, MODERNA VACCINE 100MCG/0.5ML DOSE: CPT | Performed by: FAMILY MEDICINE

## 2021-02-23 PROCEDURE — 91301 COVID-19, MODERNA VACCINE 100MCG/0.5ML DOSE: CPT | Performed by: FAMILY MEDICINE

## 2021-03-15 ENCOUNTER — HOSPITAL ENCOUNTER (OUTPATIENT)
Dept: PHARMACY | Age: 71
Setting detail: THERAPIES SERIES
Discharge: HOME | End: 2021-03-15
Payer: MEDICARE

## 2021-03-15 VITALS — TEMPERATURE: 97.3 F

## 2021-03-15 LAB — POC INR: 2.3 (ref 0.8–1.2)

## 2021-03-15 PROCEDURE — 85610 PROTHROMBIN TIME: CPT

## 2021-03-15 PROCEDURE — 36416 COLLJ CAPILLARY BLOOD SPEC: CPT

## 2021-03-15 PROCEDURE — 99211 OFF/OP EST MAY X REQ PHY/QHP: CPT

## 2021-03-15 NOTE — PROGRESS NOTES
Medication Management [address]  [phone] (phone)  [phone] (fax)      Mr. Mona Brewer is a 73 y.o.  male with history of Afib who presents today for anticoagulation monitoring and adjustment. Patient verifies current dosing regimen and tablet strength. No missed or extra doses. Patient denies s/s bleeding/bruising/swelling/SOB/chest pain  No blood in urine or stool. No dietary changes. No changes in medication/OTC agents/Herbals.-Removed oxybutynin  No change in alcohol use or tobacco use. No change in activity level. Patient denies headaches/dizziness/lightheadedness/falls. No vomiting/diarrhea or acute illness. No Procedures scheduled in the future at this time. First COVID vaccine on 2/23    Assessment:   Lab Results   Component Value Date    INR 2.30 (H) 03/15/2021    INR 2.20 (H) 02/08/2021    INR 2.90 (H) 01/11/2021     INR therapeutic   Recent Labs     03/15/21  0610   INR 2.30*         Plan:  Continue Coumadin 5 mg W, 2.5 mg MTThFSS. Recheck INR in 5 week(s). Patient reminded to call the Anticoagulation Clinic with any signs or symptoms of bleeding or with any medication changes. Patient given instructions utilizing the teach back method. Discharged ambulatory in no apparent distress. After visit summary printed and reviewed with patient.       Medications reviewed and updated on home medication list Yes    Influenza vaccine:     [] given    [x] declined   [] received previously   [] plans to receive at a later time   [] refused    [x] documented in [address]: [address]: Yes  Total # of Interventions Recommended: 1  - Maintenance Safety Lab Monitoring #: 1  Total Interventions Accepted: 1  Time Spent (min): [address], Maggy, BCPS  3/15/2021  7:16 AM

## 2021-03-17 ENCOUNTER — APPOINTMENT (OUTPATIENT)
Dept: CT IMAGING | Age: 71
End: 2021-03-17
Payer: MEDICARE

## 2021-03-17 ENCOUNTER — APPOINTMENT (OUTPATIENT)
Dept: GENERAL RADIOLOGY | Age: 71
End: 2021-03-17
Payer: MEDICARE

## 2021-03-17 ENCOUNTER — HOSPITAL ENCOUNTER (EMERGENCY)
Age: 71
Discharge: HOME | End: 2021-03-17
Attending: EMERGENCY MEDICINE
Payer: MEDICARE

## 2021-03-17 VITALS
OXYGEN SATURATION: 98 % | RESPIRATION RATE: 17 BRPM | SYSTOLIC BLOOD PRESSURE: 161 MMHG | DIASTOLIC BLOOD PRESSURE: 97 MMHG | TEMPERATURE: 97.4 F | HEART RATE: 71 BPM

## 2021-03-17 DIAGNOSIS — H81.11 BENIGN PAROXYSMAL POSITIONAL VERTIGO OF RIGHT EAR: Primary | ICD-10-CM

## 2021-03-17 LAB
ALBUMIN SERPL-MCNC: 4.2 G/DL (ref 3.5–5.1)
ALP BLD-CCNC: 89 U/L (ref 38–126)
ALT SERPL-CCNC: 31 U/L (ref 11–66)
ANION GAP SERPL CALCULATED.3IONS-SCNC: 11 MEQ/L (ref 8–16)
APTT: 46.1 SECONDS (ref 22–38)
AST SERPL-CCNC: 23 U/L (ref 5–40)
BASOPHILS # BLD: 0.9 %
BASOPHILS ABSOLUTE: 0.1 THOU/MM3 (ref 0–0.1)
BILIRUB SERPL-MCNC: 0.2 MG/DL (ref 0.3–1.2)
BILIRUBIN DIRECT: < 0.2 MG/DL (ref 0–0.3)
BUN BLDV-MCNC: 14 MG/DL (ref 7–22)
CALCIUM SERPL-MCNC: 9.7 MG/DL (ref 8.5–10.5)
CHLORIDE BLD-SCNC: 102 MEQ/L (ref 98–111)
CO2: 25 MEQ/L (ref 23–33)
CREAT SERPL-MCNC: 0.8 MG/DL (ref 0.4–1.2)
EKG ATRIAL RATE: 88 BPM
EKG Q-T INTERVAL: 384 MS
EKG QRS DURATION: 92 MS
EKG QTC CALCULATION (BAZETT): 442 MS
EKG R AXIS: 28 DEGREES
EKG T AXIS: 70 DEGREES
EKG VENTRICULAR RATE: 80 BPM
EOSINOPHIL # BLD: 3.2 %
EOSINOPHILS ABSOLUTE: 0.2 THOU/MM3 (ref 0–0.4)
ERYTHROCYTE [DISTWIDTH] IN BLOOD BY AUTOMATED COUNT: 13.4 % (ref 11.5–14.5)
ERYTHROCYTE [DISTWIDTH] IN BLOOD BY AUTOMATED COUNT: 42.5 FL (ref 35–45)
ETHYL ALCOHOL, SERUM: < 0.01 %
GFR SERPL CREATININE-BSD FRML MDRD: > 90 ML/MIN/1.73M2
GLUCOSE BLD-MCNC: 114 MG/DL (ref 70–108)
HCT VFR BLD CALC: 50.9 % (ref 42–52)
HEMOGLOBIN: 17.3 GM/DL (ref 14–18)
IMMATURE GRANS (ABS): 0.02 THOU/MM3 (ref 0–0.07)
IMMATURE GRANULOCYTES: 0.3 %
INR BLD: 2.04 (ref 0.85–1.13)
LIPASE: 37.8 U/L (ref 5.6–51.3)
LYMPHOCYTES # BLD: 37.9 %
LYMPHOCYTES ABSOLUTE: 2.8 THOU/MM3 (ref 1–4.8)
MAGNESIUM: 2 MG/DL (ref 1.6–2.4)
MCH RBC QN AUTO: 29.7 PG (ref 26–33)
MCHC RBC AUTO-ENTMCNC: 34 GM/DL (ref 32.2–35.5)
MCV RBC AUTO: 87.5 FL (ref 80–94)
MONOCYTES # BLD: 6.7 %
MONOCYTES ABSOLUTE: 0.5 THOU/MM3 (ref 0.4–1.3)
NUCLEATED RED BLOOD CELLS: 0 /100 WBC
OSMOLALITY CALCULATION: 277 MOSMOL/KG (ref 275–300)
PLATELET # BLD: 242 THOU/MM3 (ref 130–400)
PMV BLD AUTO: 10.2 FL (ref 9.4–12.4)
POTASSIUM SERPL-SCNC: 3.7 MEQ/L (ref 3.5–5.2)
PRO-BNP: 823.9 PG/ML (ref 0–900)
RBC # BLD: 5.82 MILL/MM3 (ref 4.7–6.1)
SEG NEUTROPHILS: 51 %
SEGMENTED NEUTROPHILS ABSOLUTE COUNT: 3.8 THOU/MM3 (ref 1.8–7.7)
SODIUM BLD-SCNC: 138 MEQ/L (ref 135–145)
TOTAL PROTEIN: 7.2 G/DL (ref 6.1–8)
TROPONIN T: < 0.01 NG/ML
WBC # BLD: 7.5 THOU/MM3 (ref 4.8–10.8)

## 2021-03-17 PROCEDURE — 85730 THROMBOPLASTIN TIME PARTIAL: CPT

## 2021-03-17 PROCEDURE — 83735 ASSAY OF MAGNESIUM: CPT

## 2021-03-17 PROCEDURE — 93005 ELECTROCARDIOGRAM TRACING: CPT | Performed by: EMERGENCY MEDICINE

## 2021-03-17 PROCEDURE — 83880 ASSAY OF NATRIURETIC PEPTIDE: CPT

## 2021-03-17 PROCEDURE — 99284 EMERGENCY DEPT VISIT MOD MDM: CPT

## 2021-03-17 PROCEDURE — 85025 COMPLETE CBC W/AUTO DIFF WBC: CPT

## 2021-03-17 PROCEDURE — 71045 X-RAY EXAM CHEST 1 VIEW: CPT

## 2021-03-17 PROCEDURE — 82077 ASSAY SPEC XCP UR&BREATH IA: CPT

## 2021-03-17 PROCEDURE — 80053 COMPREHEN METABOLIC PANEL: CPT

## 2021-03-17 PROCEDURE — 36415 COLL VENOUS BLD VENIPUNCTURE: CPT

## 2021-03-17 PROCEDURE — 84484 ASSAY OF TROPONIN QUANT: CPT

## 2021-03-17 PROCEDURE — 85610 PROTHROMBIN TIME: CPT

## 2021-03-17 PROCEDURE — 83690 ASSAY OF LIPASE: CPT

## 2021-03-17 PROCEDURE — 82248 BILIRUBIN DIRECT: CPT

## 2021-03-17 PROCEDURE — 70450 CT HEAD/BRAIN W/O DYE: CPT

## 2021-03-17 RX ORDER — MECLIZINE HYDROCHLORIDE CHEWABLE TABLETS 25 MG/1
25 TABLET, CHEWABLE ORAL ONCE
Status: COMPLETED | OUTPATIENT
Start: 2021-03-17 | End: 2021-03-17

## 2021-03-17 RX ORDER — MECLIZINE HYDROCHLORIDE 25 MG/1
50 TABLET ORAL PRN
Qty: 15 TABLET | Refills: 0 | Status: SHIPPED | OUTPATIENT
Start: 2021-03-17 | End: 2021-03-27

## 2021-03-17 RX ADMIN — MECLIZINE HYDROCHLORIDE CHEWABLE TABLETS 25 MG: 25 TABLET, CHEWABLE ORAL at 03:14

## 2021-03-17 ASSESSMENT — ENCOUNTER SYMPTOMS
COUGH: 0
PHOTOPHOBIA: 0
BACK PAIN: 0
CHOKING: 0
EYE DISCHARGE: 0
DIARRHEA: 0
NAUSEA: 0
ABDOMINAL PAIN: 0
CHEST TIGHTNESS: 0
ABDOMINAL DISTENTION: 0
WHEEZING: 0
BLOOD IN STOOL: 0
TROUBLE SWALLOWING: 0
EYE ITCHING: 0
VOMITING: 0
EYE REDNESS: 0
SINUS PRESSURE: 0
SHORTNESS OF BREATH: 0
VOICE CHANGE: 0
SORE THROAT: 0
EYE PAIN: 0
RHINORRHEA: 0
CONSTIPATION: 0

## 2021-03-17 NOTE — ED NOTES
Bed: 024A  Expected date:   Expected time:   Means of arrival: [facility]  Comments:     Leticia Guaman RN  03/17/21 1842

## 2021-03-17 NOTE — ED PROVIDER NOTES
[facility]  eMERGENCY dEPARTMENT eNCOUnter          CHIEF COMPLAINT       Chief Complaint   Patient presents with    Dizziness       Nurses Notes reviewed and I agree except as noted in the HPI. HISTORY OF PRESENT ILLNESS    Xiomara Shine is a 73 y.o. male who presents feelings of dizziness. Apparently the patient got up and went to the bathroom tonight. He urinated came back to bed and lay down. When he laid down he got very dizzy. He rolled over and he got worse. Patient states that it persisted so he got up and called his wife who is sleeping in the bedroom. She got him into the car brought him into the emergency room. Currently the patient is not dizzy at bedside. Patient states the last time that he had this he was found to have atrial fibrillation. Patient is currently in atrial fibrillation however he is nonrapid ventricular response. He is anticoagulated on Coumadin. Patient has no focal neurologic lesions. Patient is resting comfortably on the cot no apparent distress. INITIAL NIH STROKE SCALE    Time Performed: At presentation    1a. Level of consciousness:  0 - alert; keenly responsive  1b. Level of consciousness questions:  0 - answers both questions correctly  1c. Level of consciousness questions:  0 - performs both tasks correctly  2. Best Gaze:  0 - normal  3. Visual:  0 - no visual loss  4. Facial Palsy:  0 - normal symmetric movement  5a. Motor left arm:  0 - no drift, limb holds 90 (or 45) degrees for full 10 seconds  5b. Motor right arm:  0 - no drift, limb holds 90 (or 45) degrees for full 10 seconds  6a. Motor left le - no drift; leg holds 30 degree position for full 5 seconds  6b. Motor right le - no drift; leg holds 30 degree position for full 5 seconds  7. Limb Ataxia:  0 - absent  8. Sensory:  0 - normal; no sensory loss  9. Best Language:  0 - no aphasia, normal  10. Dysarthria:  0 - normal  11.   Extinction and Inattention:  0 - no abnormality    TOTAL:  0          REVIEW OF SYSTEMS     Review of Systems   Constitutional: Negative for activity change, appetite change, diaphoresis, fatigue and unexpected weight change. HENT: Negative for congestion, ear discharge, ear pain, hearing loss, rhinorrhea, sinus pressure, sore throat, trouble swallowing and voice change. Eyes: Negative for photophobia, pain, discharge, redness and itching. Respiratory: Negative for cough, choking, chest tightness, shortness of breath and wheezing. Cardiovascular: Negative for chest pain, palpitations and leg swelling. Gastrointestinal: Negative for abdominal distention, abdominal pain, blood in stool, constipation, diarrhea, nausea and vomiting. Endocrine: Negative for polydipsia, polyphagia and polyuria. Genitourinary: Negative for decreased urine volume, difficulty urinating, dysuria, enuresis, frequency, hematuria and urgency. Musculoskeletal: Negative for arthralgias, back pain, gait problem, myalgias, neck pain and neck stiffness. Skin: Negative for pallor and rash. Allergic/Immunologic: Negative for immunocompromised state. Neurological: Positive for dizziness and light-headedness. Negative for tremors, seizures, syncope, facial asymmetry, weakness, numbness and headaches. Hematological: Negative for adenopathy. Does not bruise/bleed easily. Psychiatric/Behavioral: Negative for agitation, hallucinations and suicidal ideas. The patient is not nervous/anxious. PAST MEDICAL HISTORY    has a past medical history of Anesthesia, Atrial fibrillation ([address] Benign prostatic hyperplasia with urinary frequency, CAD (coronary artery disease), Chest pain, Colon polyp, Coronary artery disease involving native coronary artery of native heart without angina pectoris, Dementia ([address] Dizziness, Elevated PSA, GERD (gastroesophageal reflux disease), Heart disease, Hyperlipidemia, and Hypertension.     SURGICAL HISTORY has a past surgical history that includes cardiovascular stress test (2011); transthoracic echocardiogram (2011); Cardiac catheterization (2011); Carpal tunnel release; Tonsillectomy; Adenoidectomy; Vasectomy; Colonoscopy; Ultrasound Prostate/Transrectal (N/A, 10/6/2020); and Prostatectomy (N/A, 12/3/2020). CURRENT MEDICATIONS       Previous Medications    BISACODYL (DULCOLAX) 5 MG EC TABLET    Take 5 mg by mouth nightly     BUMETANIDE (BUMEX) 2 MG TABLET    Take 1 tablet by mouth daily    DIGOXIN (LANOXIN) 125 MCG TABLET    TAKE 1 TABLET BY MOUTH ONCE DAILY    DILTIAZEM (CARDIZEM CD) 360 MG EXTENDED RELEASE CAPSULE    Take 1 capsule by mouth once daily    METOPROLOL SUCCINATE (TOPROL XL) 50 MG EXTENDED RELEASE TABLET    Take 1 tablet by mouth daily    NITROGLYCERIN (NITROSTAT) 0.4 MG SL TABLET    Place 1 tablet under the tongue every 5 minutes as needed for Chest pain    POTASSIUM CHLORIDE (KLOR-CON) 10 MEQ EXTENDED RELEASE TABLET    TAKE 1 TABLET BY MOUTH TWICE DAILY    PRAVASTATIN (PRAVACHOL) 80 MG TABLET    Take 1 tablet by mouth nightly    WARFARIN (COUMADIN) 2.5 MG TABLET    Take as directed by [facility] Coumadin clinic (#40 ~ 30 day supply)       ALLERGIES     has No Known Allergies. FAMILY HISTORY     He indicated that his mother is . He indicated that his father is . He indicated that his sister is alive. He indicated that his maternal grandmother is . He indicated that his maternal grandfather is . He indicated that his paternal grandmother is . He indicated that his paternal grandfather is . He indicated that the status of his neg hx is unknown.   family history includes Alzheimer's Disease in his mother; Cancer in his father; Heart Disease in his father; Heart Surgery in his father. SOCIAL HISTORY      reports that he has never smoked.  He has never used smokeless tobacco. He reports that he does not drink alcohol or use drugs.    PHYSICAL EXAM     INITIAL VITALS:  oral temperature is 97.4 °F (36.3 °C). His blood pressure is 156/94 (abnormal) and his pulse is 83. His respiration is 17 and oxygen saturation is 96%. Physical Exam  Vitals signs and nursing note reviewed. Constitutional:       General: He is not in acute distress. Appearance: He is well-developed. He is not diaphoretic. HENT:      Head: Normocephalic and atraumatic. Right Ear: Tympanic membrane, ear canal and external ear normal. Decreased hearing noted. There is no impacted cerumen. No hemotympanum. Left Ear: Tympanic membrane, ear canal and external ear normal. Decreased hearing noted. There is impacted cerumen. No hemotympanum. Nose: Nose normal.   Eyes:      General: Lids are normal. No scleral icterus. Right eye: No discharge. Left eye: No discharge. Extraocular Movements: Extraocular movements intact. Right eye: No nystagmus. Left eye: No nystagmus. Conjunctiva/sclera: Conjunctivae normal.      Right eye: No exudate. Left eye: No exudate. Pupils: Pupils are equal, round, and reactive to light. Funduscopic exam:     Right eye: No hemorrhage, exudate or papilledema. Left eye: No hemorrhage, exudate or papilledema. Neck:      Musculoskeletal: Normal range of motion and neck supple. Normal range of motion. Thyroid: No thyromegaly. Vascular: No JVD. Trachea: No tracheal deviation. Cardiovascular:      Rate and Rhythm: Normal rate. Rhythm irregularly irregular. Pulses: Normal pulses. Carotid pulses are 2+ on the right side and 2+ on the left side. Radial pulses are 2+ on the right side and 2+ on the left side. Femoral pulses are 2+ on the right side and 2+ on the left side. Popliteal pulses are 2+ on the right side and 2+ on the left side. Dorsalis pedis pulses are 2+ on the right side and 2+ on the left side.         Posterior tibial pulses are 2+ on the right side and 2+ on the left side. Heart sounds: Normal heart sounds, S1 normal and S2 normal. No murmur. No friction rub. No gallop. Pulmonary:      Effort: Pulmonary effort is normal. No respiratory distress. Breath sounds: Normal breath sounds. No stridor. No wheezing or rales. Chest:      Chest wall: No tenderness. Abdominal:      General: Bowel sounds are normal. There is no distension. Palpations: Abdomen is soft. There is no mass. Tenderness: There is no abdominal tenderness. There is no guarding or rebound. Musculoskeletal: Normal range of motion. General: No tenderness. Right lower leg: No edema. Left lower leg: No edema. Lymphadenopathy:      Cervical: No cervical adenopathy. Skin:     General: Skin is warm and dry. Findings: No bruising, ecchymosis, lesion or rash. Neurological:      Mental Status: He is alert and oriented to person, place, and time. Cranial Nerves: No cranial nerve deficit. Sensory: No sensory deficit. Coordination: Coordination normal.      Deep Tendon Reflexes: Reflexes are normal and symmetric. Psychiatric:         Speech: Speech normal.         Behavior: Behavior normal.         Thought Content: Thought content normal.         Judgment: Judgment normal.           DIFFERENTIAL DIAGNOSIS:   Differential diagnosis discussed extensively bedside with patient including but not limited to BPPV, dehydration, Ménière's, labyrinthitis, vestibular neuritis, less likely ACS or intracranial injury. DIAGNOSTIC RESULTS     EKG: All EKG's are interpreted by the Emergency Department Physician who either signs or Co-signs this chart in the absence of a cardiologist.  EKG revealed atrial fibrillation occasional PVCs normal axis ventricular rate of 80 ID interval indeterminate, QRS duration 92 QT interval 384 QTC of 442.   When compared with EKG dated December 4, 2020 no significant changes appreciated. Patient now has occasional PVCs. No rapid ventricular response. RADIOLOGY: non-plain film images(s) such as CT, Ultrasound and MRI are read by the radiologist.  CT HEAD WO CONTRAST   Final Result   No acute intracranial findings. Chronic changes as described. This document has been electronically signed by: Leon Dorsey MD on    03/17/2021 03:47 AM      All CTs at this facility use dose modulation techniques and iterative    reconstructions, and/or weight-based dosing   when appropriate to reduce radiation to a low as reasonably achievable. XR CHEST PORTABLE   Final Result   Impression:   No acute pathology. This document has been electronically signed by: Leon Dorsey MD on    03/17/2021 03:28 AM            LABS:   Labs Reviewed   PROTIME-INR - Abnormal; Notable for the following components:       Result Value    INR 2.04 (*)     All other components within normal limits   APTT - Abnormal; Notable for the following components:    aPTT 46.1 (*)     All other components within normal limits   BASIC METABOLIC PANEL - Abnormal; Notable for the following components:    Glucose 114 (*)     All other components within normal limits   HEPATIC FUNCTION PANEL - Abnormal; Notable for the following components: Total Bilirubin 0.2 (*)     All other components within normal limits   CBC WITH AUTO DIFFERENTIAL   BRAIN NATRIURETIC PEPTIDE   LIPASE   TROPONIN   MAGNESIUM   ETHANOL   ANION GAP   GLOMERULAR FILTRATION RATE, ESTIMATED   OSMOLALITY   URINE RT REFLEX TO CULTURE       EMERGENCY DEPARTMENT COURSE:   Vitals:    Vitals:    03/17/21 0237 03/17/21 0241 03/17/21 0326 03/17/21 0406   BP:  (!) 156/94     Pulse: 80   83   Resp: 16  17    Temp: 97.4 °F (36.3 °C)      TempSrc: Oral      SpO2: 96%  97% 96%     Patient was assessed at bedside appropriate labs and imaging were ordered. Orthostatic vital signs were ordered. Patient was given fluids and meclizine.   I reviewed all labs and imaging and went back to reassess the patient. He has completely resolved. He has gotten up and walked around and gotten to the bathroom by himself. At this point I feel the patient be safely discharged home. He more than likely has BPPV. He is instructed to use the meclizine as a as needed medication. He is instructed to take this if he should get dizzy he is instructed to sit down that and drink plenty of water and wait if that resolves he can go about his business. If it does not resolve he should come to the emergency room immediately. Patient is instructed to follow-up with his primary care physician and do so within the next 1 to 2 days. Patient and wife at bedside understood and agreed with the plan. Patient is subsequently discharged home in good condition. Patient has what appears to be benign positional vertigo. Patient has been given meclizine he is instructed to use it if he starts to get dizzy. He is instructed to take 1 tablet and sit down and drink plenty of water. If there is no resolution of the dizziness he is instructed to come to the emergency room immediately. Patient is instructed to follow-up with his primary care physician to do so within the next 1 to 2 days. Patient is instructed to return to the nearest emergency room immediately for any new or worsening complaints. CRITICAL CARE:   None    CONSULTS:  None    PROCEDURES:  None    FINAL IMPRESSION      1.  Benign paroxysmal positional vertigo of right ear          DISPOSITION/PLAN   Discharge    PATIENT REFERRED TO:  Ronnie Zepeda MD  [address] [facility] [address]  [phone]    Call in 1 day        DISCHARGE MEDICATIONS:  New Prescriptions    MECLIZINE (ANTIVERT) 25 MG TABLET    Take 2 tablets by mouth as needed for Dizziness       (Please note that portions of this note were completed with a voice recognition program.  Efforts were made to edit the dictations but occasionally words are mis-transcribed.) Soledad Soliman, [address] [place] [address],   03/17/21 9152

## 2021-03-17 NOTE — ED TRIAGE NOTES
Pt came in by squad after getting up to use the bathroom around 0130 and started getting dizzy and felt as if everything was spinning. Pt has a hx of Afib. Patient LOC x4.  Pt currently states he is light headed

## 2021-03-18 PROCEDURE — 93010 ELECTROCARDIOGRAM REPORT: CPT | Performed by: INTERNAL MEDICINE

## 2021-03-23 ENCOUNTER — OFFICE VISIT (OUTPATIENT)
Dept: FAMILY MEDICINE CLINIC | Age: 71
End: 2021-03-23
Payer: MEDICARE

## 2021-03-23 ENCOUNTER — IMMUNIZATION (OUTPATIENT)
Dept: PRIMARY CARE CLINIC | Age: 71
End: 2021-03-23
Payer: MEDICARE

## 2021-03-23 VITALS
TEMPERATURE: 96.5 F | WEIGHT: 235 LBS | SYSTOLIC BLOOD PRESSURE: 132 MMHG | HEART RATE: 88 BPM | BODY MASS INDEX: 31 KG/M2 | DIASTOLIC BLOOD PRESSURE: 72 MMHG | RESPIRATION RATE: 16 BRPM

## 2021-03-23 DIAGNOSIS — H81.10 BPV (BENIGN POSITIONAL VERTIGO), UNSPECIFIED LATERALITY: ICD-10-CM

## 2021-03-23 DIAGNOSIS — H91.93 BILATERAL HEARING LOSS, UNSPECIFIED HEARING LOSS TYPE: Primary | ICD-10-CM

## 2021-03-23 PROCEDURE — 91301 COVID-19, MODERNA VACCINE 100MCG/0.5ML DOSE: CPT | Performed by: FAMILY MEDICINE

## 2021-03-23 PROCEDURE — 1036F TOBACCO NON-USER: CPT | Performed by: NURSE PRACTITIONER

## 2021-03-23 PROCEDURE — 1123F ACP DISCUSS/DSCN MKR DOCD: CPT | Performed by: NURSE PRACTITIONER

## 2021-03-23 PROCEDURE — 3017F COLORECTAL CA SCREEN DOC REV: CPT | Performed by: NURSE PRACTITIONER

## 2021-03-23 PROCEDURE — G8484 FLU IMMUNIZE NO ADMIN: HCPCS | Performed by: NURSE PRACTITIONER

## 2021-03-23 PROCEDURE — G8417 CALC BMI ABV UP PARAM F/U: HCPCS | Performed by: NURSE PRACTITIONER

## 2021-03-23 PROCEDURE — 0012A COVID-19, MODERNA VACCINE 100MCG/0.5ML DOSE: CPT | Performed by: FAMILY MEDICINE

## 2021-03-23 PROCEDURE — G8427 DOCREV CUR MEDS BY ELIG CLIN: HCPCS | Performed by: NURSE PRACTITIONER

## 2021-03-23 PROCEDURE — 4040F PNEUMOC VAC/ADMIN/RCVD: CPT | Performed by: NURSE PRACTITIONER

## 2021-03-23 PROCEDURE — 99213 OFFICE O/P EST LOW 20 MIN: CPT | Performed by: NURSE PRACTITIONER

## 2021-03-23 ASSESSMENT — ENCOUNTER SYMPTOMS
VOMITING: 0
VISUAL CHANGE: 0
SORE THROAT: 0
NAUSEA: 0

## 2021-03-23 NOTE — PROGRESS NOTES
[facility]  [address] [facility] [address]  Dept: [phone]  Dept Fax: [phone]: [phone]     Visit Date:  3/23/2021      Patient:  Deidre Avila  YOB: 1950    HPI:     Chief Complaint   Patient presents with    Dizziness     \"Room was spinning\". Episode happened about 1 week ago. Went to [facility] ED and given rx for meclizine. Has intermittent \"buzzing\" in head. One further episode since ED visit. Dizziness with position changes. Would like referral to [address]. Pt presents to the office today for follow up from the ER for vertigo. He was treated with Antivert with relief. He did have 1 more episode of dizziness and the Antivert did help. He is doing slow position changes and moving slower than normal because he is worried about the vertigo coming back. Pt has followed up with ENT in the past for his hearing loss and was told that he should follow up with them again per the ER doctor. Pt needs a new referral.  Pt does still have a buzz and clicking in ears bilaterally, this is an ongoing issue. Dizziness  This is a recurrent problem. The current episode started 1 to 4 weeks ago. The problem occurs rarely. The problem has been resolved. Associated symptoms include vertigo. Pertinent negatives include no arthralgias, chest pain, chills, congestion, fever, headaches, myalgias, nausea, sore throat, visual change or vomiting. The symptoms are aggravated by bending and twisting (position changes). He has tried rest and position changes (antivert) for the symptoms. The treatment provided significant relief.            Medications    Current Outpatient Medications:     meclizine (ANTIVERT) 25 MG tablet, Take 2 tablets by mouth as needed for Dizziness, Disp: 15 tablet, Rfl: 0    warfarin (COUMADIN) 2.5 MG tablet, Take as directed by [facility] Coumadin clinic (#40 ~ 30 day supply), Disp: 40 tablet, Rfl: 3    metoprolol succinate (TOPROL XL) 50 MG extended release tablet, Take 1 tablet by mouth daily, Disp: 90 tablet, Rfl: 3    bumetanide (BUMEX) 2 MG tablet, Take 1 tablet by mouth daily, Disp: 90 tablet, Rfl: 3    digoxin (LANOXIN) 125 MCG tablet, TAKE 1 TABLET BY MOUTH ONCE DAILY, Disp: 90 tablet, Rfl: 3    dilTIAZem (CARDIZEM CD) 360 MG extended release capsule, Take 1 capsule by mouth once daily, Disp: 90 capsule, Rfl: 3    nitroGLYCERIN (NITROSTAT) 0.4 MG SL tablet, Place 1 tablet under the tongue every 5 minutes as needed for Chest pain, Disp: 25 tablet, Rfl: 3    potassium chloride (KLOR-CON) 10 MEQ extended release tablet, TAKE 1 TABLET BY MOUTH TWICE DAILY, Disp: 180 tablet, Rfl: 3    pravastatin (PRAVACHOL) 80 MG tablet, Take 1 tablet by mouth nightly, Disp: 90 tablet, Rfl: 3    bisacodyl (DULCOLAX) 5 MG EC tablet, Take 5 mg by mouth nightly , Disp: , Rfl:     The patient has No Known Allergies. Past Medical History  Nikita Mensah  has a past medical history of Anesthesia, Atrial fibrillation ([facility] [address] Benign prostatic hyperplasia with urinary frequency, CAD (coronary artery disease), Chest pain, Colon polyp, Coronary artery disease involving native coronary artery of native heart without angina pectoris, Dementia ([facility] [address] Dizziness, Elevated PSA, GERD (gastroesophageal reflux disease), Heart disease, Hyperlipidemia, and Hypertension. Subjective:      Review of Systems   Constitutional: Negative for chills and fever. HENT: Negative for congestion and sore throat. Cardiovascular: Negative for chest pain. Gastrointestinal: Negative for nausea and vomiting. Musculoskeletal: Negative for arthralgias and myalgias. Neurological: Positive for dizziness and vertigo. Negative for headaches. Objective:     /72   Pulse 88   Temp 96.5 °F (35.8 °C) (Temporal)   Resp 16   Wt 235 lb (106.6 kg)   BMI 31.00 kg/m²     Physical Exam  Vitals signs reviewed.    Constitutional:       General: He is not in acute distress. Appearance: He is well-developed. HENT:      Head: Normocephalic and atraumatic. Right Ear: Tympanic membrane, ear canal and external ear normal.      Left Ear: Tympanic membrane, ear canal and external ear normal.      Nose: Nose normal. No congestion. Mouth/Throat:      Pharynx: Oropharynx is clear. No oropharyngeal exudate or posterior oropharyngeal erythema. Eyes:      General:         Right eye: No discharge. Left eye: No discharge. Conjunctiva/sclera: Conjunctivae normal.   Neck:      Musculoskeletal: Normal range of motion and neck supple. Cardiovascular:      Rate and Rhythm: Normal rate and regular rhythm. Heart sounds: Normal heart sounds. Pulmonary:      Effort: Pulmonary effort is normal. No respiratory distress. Breath sounds: Normal breath sounds. Abdominal:      General: Bowel sounds are normal.      Palpations: Abdomen is soft. Tenderness: There is no right CVA tenderness or left CVA tenderness. Lymphadenopathy:      Cervical: No cervical adenopathy. Skin:     General: Skin is warm and dry. Neurological:      General: No focal deficit present. Mental Status: He is alert and oriented to person, place, and time. Coordination: Coordination normal.   Psychiatric:         Mood and Affect: Mood normal.         Behavior: Behavior normal.         Thought Content: Thought content normal.         Judgment: Judgment normal.         Assessment/Plan:      Venus Myers was seen today for dizziness. Diagnoses and all orders for this visit:    Bilateral hearing loss, unspecified hearing loss type  -     Nigel Damico MD, Otolaryngology, [address] [place] [address]    BPV (benign positional vertigo), unspecified laterality  -     Nigel Damico MD, Otolaryngology, [address] [place] [address]    - OK to use Antivert as directed for vertigo.   - ER for any acute changes, chest pain or SOB.   - Call office with any questions or concerns, or if symptoms are getting worse or changing    Return if symptoms worsen or fail to improve. Patient given educational materials - see patient instructions. Discussed use, benefit, and side effects of prescribed medications. All patient questions answered. Pt voiced understanding.         Electronically signed by NARESH Stern CNP on 3/23/2021 at 9:21 AM

## 2021-03-23 NOTE — PATIENT INSTRUCTIONS
Patient Education        Benign Paroxysmal Positional Vertigo (BPPV): Care Instructions  Your Care Instructions     Benign paroxysmal positional vertigo, also called BPPV, is an inner ear problem. It causes a spinning or whirling sensation when you move your head. This sensation is called vertigo. The vertigo usually lasts for less than a minute. People often have vertigo spells for a few days or weeks. Then the vertigo goes away. But it may come back again. The vertigo may be mild, or it may be bad enough to cause unsteadiness, nausea, and vomiting. When you move, your inner ear sends messages to the brain. This helps you keep your balance. Vertigo can happen when debris builds up in the inner ear. The buildup can cause the inner ear to send the wrong message to the brain. Your doctor may move you in different positions to help your vertigo get better faster. This is called the Epley maneuver. Your doctor may also prescribe medicines or exercises to help with your symptoms. Follow-up care is a key part of your treatment and safety. Be sure to make and go to all appointments, and call your doctor if you are having problems. It's also a good idea to know your test results and keep a list of the medicines you take. How can you care for yourself at home? · If your doctor suggests that you do Araujo-Daroff exercises:  ? Sit on the edge of a bed or sofa. Quickly lie down on the side that causes the worst vertigo. Lie on your side with your ear down. ? Stay in this position for at least 30 seconds or until the vertigo goes away. ? Sit up. If this causes vertigo, wait for it to stop. ? Repeat the procedure on the other side. ? Repeat this 10 times. Do these exercises 2 times a day until the vertigo is gone. When should you call for help? Call 911 anytime you think you may need emergency care. For example, call if:    · You have symptoms of a stroke.  These may include:  ? Sudden numbness, tingling, weakness, or loss of movement in your face, arm, or leg, especially on only one side of your body. ? Sudden vision changes. ? Sudden trouble speaking. ? Sudden confusion or trouble understanding simple statements. ? Sudden problems with walking or balance. ? A sudden, severe headache that is different from past headaches. Call your doctor now or seek immediate medical care if:    · You have new or worse nausea and vomiting.     · You have new symptoms such as hearing loss or roaring in your ears. Watch closely for changes in your health, and be sure to contact your doctor if:    · You are not getting better as expected.     · Your vertigo gets worse. Where can you learn more? Go to [email or web address]://[email or web address]. org and sign in to your [employer] account. Enter  in the Generate box to learn more about \"Benign Paroxysmal Positional Vertigo (BPPV): Care Instructions. \"     If you do not have an account, please click on the \"Sign Up Now\" link. Current as of: December 2, 2020               Content Version: 12.8  © 4811-4668 [employer]wise, Incorporated. Care instructions adapted under license by [facility] ([facility]). If you have questions about a medical condition or this instruction, always ask your healthcare professional. [place] [address] any warranty or liability for your use of this information.

## 2021-03-26 ENCOUNTER — TELEPHONE (OUTPATIENT)
Dept: FAMILY MEDICINE CLINIC | Age: 71
End: 2021-03-26

## 2021-03-26 ENCOUNTER — APPOINTMENT (OUTPATIENT)
Dept: CT IMAGING | Age: 71
End: 2021-03-26
Payer: MEDICARE

## 2021-03-26 ENCOUNTER — HOSPITAL ENCOUNTER (EMERGENCY)
Age: 71
Discharge: HOME | End: 2021-03-26
Payer: MEDICARE

## 2021-03-26 VITALS
HEART RATE: 80 BPM | SYSTOLIC BLOOD PRESSURE: 125 MMHG | DIASTOLIC BLOOD PRESSURE: 70 MMHG | WEIGHT: 235 LBS | RESPIRATION RATE: 16 BRPM | HEIGHT: 73 IN | BODY MASS INDEX: 31.14 KG/M2 | TEMPERATURE: 98.5 F | OXYGEN SATURATION: 95 %

## 2021-03-26 DIAGNOSIS — M51.36 DEGENERATIVE DISC DISEASE, LUMBAR: ICD-10-CM

## 2021-03-26 DIAGNOSIS — S39.012A STRAIN OF LUMBAR REGION, INITIAL ENCOUNTER: Primary | ICD-10-CM

## 2021-03-26 LAB
ALBUMIN SERPL-MCNC: 4.5 G/DL (ref 3.5–5.1)
ALP BLD-CCNC: 76 U/L (ref 38–126)
ALT SERPL-CCNC: 32 U/L (ref 11–66)
ANION GAP SERPL CALCULATED.3IONS-SCNC: 10 MEQ/L (ref 8–16)
AST SERPL-CCNC: 26 U/L (ref 5–40)
BASOPHILS # BLD: 0.7 %
BASOPHILS ABSOLUTE: 0 THOU/MM3 (ref 0–0.1)
BILIRUB SERPL-MCNC: 0.4 MG/DL (ref 0.3–1.2)
BILIRUBIN DIRECT: < 0.2 MG/DL (ref 0–0.3)
BILIRUBIN URINE: NEGATIVE
BLOOD, URINE: NEGATIVE
BUN BLDV-MCNC: 16 MG/DL (ref 7–22)
CALCIUM SERPL-MCNC: 9.5 MG/DL (ref 8.5–10.5)
CHARACTER, URINE: CLEAR
CHLORIDE BLD-SCNC: 100 MEQ/L (ref 98–111)
CO2: 30 MEQ/L (ref 23–33)
COLOR: YELLOW
CREAT SERPL-MCNC: 1 MG/DL (ref 0.4–1.2)
EOSINOPHIL # BLD: 2.6 %
EOSINOPHILS ABSOLUTE: 0.2 THOU/MM3 (ref 0–0.4)
ERYTHROCYTE [DISTWIDTH] IN BLOOD BY AUTOMATED COUNT: 13.3 % (ref 11.5–14.5)
ERYTHROCYTE [DISTWIDTH] IN BLOOD BY AUTOMATED COUNT: 43.6 FL (ref 35–45)
GFR SERPL CREATININE-BSD FRML MDRD: 74 ML/MIN/1.73M2
GLUCOSE BLD-MCNC: 80 MG/DL (ref 70–108)
GLUCOSE URINE: NEGATIVE MG/DL
HCT VFR BLD CALC: 55 % (ref 42–52)
HEMOGLOBIN: 17.9 GM/DL (ref 14–18)
IMMATURE GRANS (ABS): 0.03 THOU/MM3 (ref 0–0.07)
IMMATURE GRANULOCYTES: 0.4 %
KETONES, URINE: NEGATIVE
LEUKOCYTE ESTERASE, URINE: NEGATIVE
LIPASE: 31 U/L (ref 5.6–51.3)
LYMPHOCYTES # BLD: 30 %
LYMPHOCYTES ABSOLUTE: 2.1 THOU/MM3 (ref 1–4.8)
MAGNESIUM: 2.2 MG/DL (ref 1.6–2.4)
MCH RBC QN AUTO: 29.2 PG (ref 26–33)
MCHC RBC AUTO-ENTMCNC: 32.5 GM/DL (ref 32.2–35.5)
MCV RBC AUTO: 89.6 FL (ref 80–94)
MONOCYTES # BLD: 15.6 %
MONOCYTES ABSOLUTE: 1.1 THOU/MM3 (ref 0.4–1.3)
NITRITE, URINE: NEGATIVE
NUCLEATED RED BLOOD CELLS: 0 /100 WBC
OSMOLALITY CALCULATION: 279.6 MOSMOL/KG (ref 275–300)
PH UA: 5 (ref 5–9)
PLATELET # BLD: 221 THOU/MM3 (ref 130–400)
PMV BLD AUTO: 10.3 FL (ref 9.4–12.4)
POTASSIUM SERPL-SCNC: 3.7 MEQ/L (ref 3.5–5.2)
PROTEIN UA: NEGATIVE
RBC # BLD: 6.14 MILL/MM3 (ref 4.7–6.1)
SEG NEUTROPHILS: 50.7 %
SEGMENTED NEUTROPHILS ABSOLUTE COUNT: 3.5 THOU/MM3 (ref 1.8–7.7)
SODIUM BLD-SCNC: 140 MEQ/L (ref 135–145)
SPECIFIC GRAVITY, URINE: 1.01 (ref 1–1.03)
TOTAL PROTEIN: 8 G/DL (ref 6.1–8)
UROBILINOGEN, URINE: 0.2 EU/DL (ref 0–1)
WBC # BLD: 7 THOU/MM3 (ref 4.8–10.8)

## 2021-03-26 PROCEDURE — 83735 ASSAY OF MAGNESIUM: CPT

## 2021-03-26 PROCEDURE — 81003 URINALYSIS AUTO W/O SCOPE: CPT

## 2021-03-26 PROCEDURE — 74176 CT ABD & PELVIS W/O CONTRAST: CPT

## 2021-03-26 PROCEDURE — 82248 BILIRUBIN DIRECT: CPT

## 2021-03-26 PROCEDURE — 80053 COMPREHEN METABOLIC PANEL: CPT

## 2021-03-26 PROCEDURE — 2580000003 HC RX 258: Performed by: PHYSICIAN ASSISTANT

## 2021-03-26 PROCEDURE — 83690 ASSAY OF LIPASE: CPT

## 2021-03-26 PROCEDURE — 85025 COMPLETE CBC W/AUTO DIFF WBC: CPT

## 2021-03-26 PROCEDURE — 96374 THER/PROPH/DIAG INJ IV PUSH: CPT

## 2021-03-26 PROCEDURE — 76376 3D RENDER W/INTRP POSTPROCES: CPT

## 2021-03-26 PROCEDURE — 99283 EMERGENCY DEPT VISIT LOW MDM: CPT

## 2021-03-26 PROCEDURE — 6360000002 HC RX W HCPCS: Performed by: PHYSICIAN ASSISTANT

## 2021-03-26 PROCEDURE — 36415 COLL VENOUS BLD VENIPUNCTURE: CPT

## 2021-03-26 PROCEDURE — 96375 TX/PRO/DX INJ NEW DRUG ADDON: CPT

## 2021-03-26 RX ORDER — ONDANSETRON 2 MG/ML
4 INJECTION INTRAMUSCULAR; INTRAVENOUS ONCE
Status: COMPLETED | OUTPATIENT
Start: 2021-03-26 | End: 2021-03-26

## 2021-03-26 RX ORDER — HYDROCODONE BITARTRATE AND ACETAMINOPHEN 5; 325 MG/1; MG/1
1 TABLET ORAL EVERY 6 HOURS PRN
Qty: 12 TABLET | Refills: 0 | Status: SHIPPED | OUTPATIENT
Start: 2021-03-26 | End: 2021-03-29

## 2021-03-26 RX ORDER — SODIUM CHLORIDE 9 MG/ML
INJECTION, SOLUTION INTRAVENOUS CONTINUOUS
Status: DISCONTINUED | OUTPATIENT
Start: 2021-03-26 | End: 2021-03-26 | Stop reason: HOSPADM

## 2021-03-26 RX ORDER — TIZANIDINE 4 MG/1
4 TABLET ORAL 3 TIMES DAILY PRN
Qty: 30 TABLET | Refills: 0 | Status: SHIPPED | OUTPATIENT
Start: 2021-03-26 | End: 2021-04-16 | Stop reason: ALTCHOICE

## 2021-03-26 RX ORDER — MORPHINE SULFATE 4 MG/ML
4 INJECTION, SOLUTION INTRAMUSCULAR; INTRAVENOUS ONCE
Status: COMPLETED | OUTPATIENT
Start: 2021-03-26 | End: 2021-03-26

## 2021-03-26 RX ADMIN — ONDANSETRON 4 MG: 2 INJECTION INTRAMUSCULAR; INTRAVENOUS at 12:11

## 2021-03-26 RX ADMIN — SODIUM CHLORIDE: 9 INJECTION, SOLUTION INTRAVENOUS at 12:11

## 2021-03-26 RX ADMIN — MORPHINE SULFATE 4 MG: 4 INJECTION, SOLUTION INTRAMUSCULAR; INTRAVENOUS at 12:12

## 2021-03-26 ASSESSMENT — ENCOUNTER SYMPTOMS
EYE DISCHARGE: 0
COUGH: 0
WHEEZING: 0
BACK PAIN: 1
CHEST TIGHTNESS: 0
EYE PAIN: 0
RHINORRHEA: 0
VOMITING: 0
NAUSEA: 0
DIARRHEA: 0

## 2021-03-26 ASSESSMENT — PAIN DESCRIPTION - ORIENTATION: ORIENTATION: LOWER

## 2021-03-26 ASSESSMENT — PAIN DESCRIPTION - PAIN TYPE: TYPE: ACUTE PAIN

## 2021-03-26 ASSESSMENT — PAIN SCALES - GENERAL: PAINLEVEL_OUTOF10: 8

## 2021-03-26 NOTE — TELEPHONE ENCOUNTER
Pt can take norco (contains 325 mg of tylenol) with tylenol, but just don't take more than 4,000 mg of tylenol from all sources in a 24 hour period. Use the norco for only severe pain. Prescription for Zanaflex 4 mg TID PRN sent to pharmacy for muscle spasms.   Call office with any questions or concerns, or if symptoms are getting worse or changing. -WS    Orders Placed This Encounter   Medications    tiZANidine (ZANAFLEX) 4 MG tablet     Sig: Take 1 tablet by mouth 3 times daily as needed (back pain and spasms)     Dispense:  30 tablet     Refill:  0

## 2021-03-26 NOTE — TELEPHONE ENCOUNTER
Pt calls in stating that he is having back pain and muscle spasms. He was seen in the ED and they prescribed norco. He is concerned about taking the norco with his other meds incase there is a interaction (please review meds and determine if it safe to take) also he was wondering if he can have a short term of a muscle relaxer called in in place of taking  The Narco if needed.  He was concerned about the the strength tho of the norco.     Pharmacy: [facility]

## 2021-03-26 NOTE — ED PROVIDER NOTES
Horace Brito [address] COMPLAINT       Chief Complaint   Patient presents with    Back Pain     lower       Nurses Notes reviewed and I agree except as notedin the HPI. HISTORY OF PRESENT ILLNESS    Teresa Evans is a 73 y.o. male who presents complains of right flank pain is been gone for 3 to 4 days. The patient states that she has no radiculopathy no paresthesias. No history of aneurysms. He has no history of kidney stones. He denies any bowel or bladder incontinence. Location/Symptom: Right flank pain  Timing/Onset: 3-4 days  Context/Setting: home  Quality: ache  Duration: constant  Modifying Factors: none  Severity: 7    REVIEW OF SYSTEMS     Review of Systems   Constitutional: Negative for chills, fatigue and fever. HENT: Negative for congestion, ear pain and rhinorrhea. Eyes: Negative for pain and discharge. Respiratory: Negative for cough, chest tightness and wheezing. Cardiovascular: Negative for chest pain, palpitations and leg swelling. Gastrointestinal: Negative for diarrhea, nausea and vomiting. Right flank pain. Genitourinary: Negative for dysuria and frequency. Musculoskeletal: Positive for back pain. Negative for arthralgias, myalgias and neck pain. Skin: Negative for rash. Neurological: Negative for dizziness, weakness and headaches. All other systems reviewed and are negative. PAST MEDICAL HISTORY    has a past medical history of Anesthesia, Atrial fibrillation ([address] Benign prostatic hyperplasia with urinary frequency, CAD (coronary artery disease), Chest pain, Colon polyp, Coronary artery disease involving native coronary artery of native heart without angina pectoris, Dementia ([address] Dizziness, Elevated PSA, GERD (gastroesophageal reflux disease), Heart disease, Hyperlipidemia, and Hypertension.     SURGICAL HISTORY      has a past surgical history that includes cardiovascular stress test . He indicated that the status of his neg hx is unknown.   family history includes Alzheimer's Disease in his mother; Cancer in his father; Heart Disease in his father; Heart Surgery in his father. SOCIALHISTORY      reports that he has never smoked. He has never used smokeless tobacco. He reports that he does not drink alcohol or use drugs. PHYSICAL EXAM     INITIAL VITALS:  height is 6' 1\" (1.854 m) and weight is 235 lb (106.6 kg). His oral temperature is 98.5 °F (36.9 °C). His blood pressure is 125/70 and his pulse is 80. His respiration is 16 and oxygen saturation is 95%. Physical Exam  Vitals signs and nursing note reviewed. Constitutional:       Comments: Well Developed Well Nourished Appearing     HENT:      Head: Normocephalic and atraumatic. Eyes:      Pupils: Pupils are equal, round, and reactive to light. Neck:      Musculoskeletal: Normal range of motion and neck supple. Cardiovascular:      Rate and Rhythm: Normal rate and regular rhythm. Heart sounds: Normal heart sounds. Pulmonary:      Effort: Pulmonary effort is normal. No respiratory distress. Breath sounds: Normal breath sounds. No wheezing. Abdominal:      General: Bowel sounds are normal. There is no distension. Palpations: Abdomen is soft. Comments: Tenderness in right flank. Right CVA tenderness. Musculoskeletal:      Comments: Tender right paraspinous muscles good strength and sensation lower extremities tender right flank. DIFFERENTIAL DIAGNOSIS:   Right flank pain. Muscle skeletal however will rule out a kidney stone.     DIAGNOSTIC RESULTS     EKG: All EKG's are interpreted by the Emergency Department Physician who either signs or Co-signs this chart in the absence of a cardiologist.      RADIOLOGY: non-plain film images(s) such as CT, Ultrasound and MRI are read by the radiologist.  CT scan abdomen pelvis without contrast read per radiology and CT scan lumbar spine without contrast read per radiology      CT ABDOMEN PELVIS WO CONTRAST Additional Contrast? None (Final result)  Result time 03/26/21 12:10:20  Final result by Kassandra Hernandez MD (03/26/21 12:10:20)                Impression:    1. Findings of constipation. Small hiatus hernia. 2. Moderate thickening of the wall the stomach, likely related to underdistention. 3. Moderate multifocal degenerative changes in the lumbar spine. **This report has been created using voice recognition software.  It may contain minor errors which are inherent in voice recognition technology. **     Final report electronically signed by Dr. Kassandra Hernandez on 3/26/2021 12:10 PM            Narrative:    CT ABDOMEN AND PELVIS WITHOUT CONTRAST ENHANCEMENT:     CLINICAL INFORMATION: Right flank pain     COMPARISON: 9/20/2020     TECHNIQUE: Multiple axial 5 mm images of the abdomen, pelvis, and lung bases were obtained without the administration of oral or intravenous contrast material. Computer generated sagittal and coronal images of the abdomen and pelvis were also   reconstructed. ALL CT SCANS AT THIS FACILITY use dose modulation, iterative reconstruction, and/or weight-based dosing when appropriate to reduce radiation dose to as low as reasonably achievable. FINDINGS:     Lung bases: There is mild atelectatic/fibrotic stranding in the right lung base. No alveolar infiltrates or effusions are seen. Liver: Liver unremarkable. Gallbladder unremarkable     Pancreas, spleen and adrenal glands: Unremarkable     Kidneys:  Unremarkable. No renal calculi. No cysts. No mass lesions. No hydronephrosis. .     Bowel/Peritoneum: The appendix is normal. Large amount fecal throughout the colon, consistent with constipation. No abnormally dilated bowel loops are seen. There is moderate thickening of the wall the stomach, likely related to underdistention. There is    a small umbilical hernia contains only adipose tissue.  Immediately superior to the present at the L4-5 and L5/S1 levels. There is a small bone cyst the L4 vertebral body and   another in the L5 vertebral body. Note that there appears be a small break in the cortex of the superior endplate of L5 at the level of the cyst. This does not appear to be of any clinical significance. The same appearance was present on prior CT scan   from 12/8/2018. Mild lumbar levoscoliosis, unchanged.                     LABS:   Labs Reviewed   CBC WITH AUTO DIFFERENTIAL - Abnormal; Notable for the following components:       Result Value    RBC 6.14 (*)     Hematocrit 55.0 (*)     All other components within normal limits   GLOMERULAR FILTRATION RATE, ESTIMATED - Abnormal; Notable for the following components:    Est, Glom Filt Rate 74 (*)     All other components within normal limits   BASIC METABOLIC PANEL   HEPATIC FUNCTION PANEL   LIPASE   MAGNESIUM   URINE RT REFLEX TO CULTURE   ANION GAP   OSMOLALITY       EMERGENCY DEPARTMENT COURSE:   :    Vitals:    03/26/21 1028 03/26/21 1214 03/26/21 1335   BP: (!) 151/88 122/77 125/70   Pulse: 122 81 80   Resp: 18 18 16   Temp: 98.5 °F (36.9 °C)     TempSrc: Oral     SpO2: 98% 95% 95%   Weight: 235 lb (106.6 kg)     Height: 6' 1\" (1.854 m)       Patient was seen history physical exam was performed. Patient was given morphine and Zofran. See disposition below    CRITICAL CARE:  None    CONSULTS:  None    PROCEDURES:  None    FINAL IMPRESSION      1. Strain of lumbar region, initial encounter    2. Degenerative disc disease, lumbar          DISPOSITION/PLAN   Discharge    PATIENT REFERRED TO:  Jamaica Billingsley MD  [address] [place] [address]  [phone]    In 3 days        DISCHARGE MEDICATIONS:  Discharge Medication List as of 3/26/2021  1:15 PM      START taking these medications    Details   HYDROcodone-acetaminophen (NORCO) 5-325 MG per tablet Take 1 tablet by mouth every 6 hours as needed for Pain for up to 3 days. Intended supply: 3 days.  Take lowest dose possible to manage pain, Disp-12 tablet, R-0Print             (Please note that portions of this note were completed with a voice recognitionprogram.  Efforts were made to edit the dictations but occasionally words are mis-transcribed.)    Zahraa Dela Cruz, [facility] [facility] [place], [place]  03/26/21 2871

## 2021-04-01 ENCOUNTER — TELEPHONE (OUTPATIENT)
Dept: FAMILY MEDICINE CLINIC | Age: 71
End: 2021-04-01

## 2021-04-01 NOTE — LETTER
[address]  [address] [facility], [address]  Phone: [phone]  Fax: [phone]    April 1, 2021    [address]  [address] [facility] [address] [address]    Dear Mary Jo Mann,    Thank you for choosing our [facility] on 3/26/21. Dr. Ryan White wanted to make sure that you understand your discharge instructions and that you were able to fill any prescriptions that may have been ordered for you. Please contact the office at the above phone number if you were advised to follow up with Dr. Ryan White, or if you have any further questions or needs. Also, did you know -                             [facility] ([facility]) practices can often offer you an appointment on the same day that you call for acute issues. *We have some [address] [facility] offices that offer Walk-in appointments; check our website for availability in your community, www. [employer].      *Evisits are now available for patients through [address] [facility] ([facility]) also offers video visits through [address] If you do not have MyChart and are interested, please contact the office and a staff member may assist you or go to [email or web address].     Sincerely,   Ryan White MD and your [facility]

## 2021-04-05 ENCOUNTER — TELEPHONE (OUTPATIENT)
Dept: PHARMACY | Age: 71
End: 2021-04-05

## 2021-04-05 RX ORDER — WARFARIN SODIUM 2.5 MG/1
TABLET ORAL
Qty: 40 TABLET | Refills: 11 | Status: SHIPPED | OUTPATIENT
Start: 2021-04-05 | End: 2022-04-19 | Stop reason: SDUPTHER

## 2021-04-05 NOTE — TELEPHONE ENCOUNTER
Patient called and needs script for Coumadin 2.5mg called into [address] [place] [address]. He only has enough till Friday.

## 2021-04-16 ENCOUNTER — OFFICE VISIT (OUTPATIENT)
Dept: ENT CLINIC | Age: 71
End: 2021-04-16
Payer: MEDICARE

## 2021-04-16 ENCOUNTER — HOSPITAL ENCOUNTER (OUTPATIENT)
Age: 71
Discharge: HOME | End: 2021-04-16
Payer: MEDICARE

## 2021-04-16 VITALS
HEIGHT: 73 IN | RESPIRATION RATE: 16 BRPM | DIASTOLIC BLOOD PRESSURE: 88 MMHG | BODY MASS INDEX: 31.28 KG/M2 | SYSTOLIC BLOOD PRESSURE: 138 MMHG | TEMPERATURE: 98 F | HEART RATE: 74 BPM | WEIGHT: 236 LBS

## 2021-04-16 DIAGNOSIS — C61 PROSTATE CANCER (HCC): ICD-10-CM

## 2021-04-16 DIAGNOSIS — H91.93 BILATERAL HEARING LOSS, UNSPECIFIED HEARING LOSS TYPE: ICD-10-CM

## 2021-04-16 DIAGNOSIS — H93.11 TINNITUS, RIGHT EAR: ICD-10-CM

## 2021-04-16 DIAGNOSIS — D17.0 LIPOMA OF HEAD: ICD-10-CM

## 2021-04-16 DIAGNOSIS — J35.8 TONSILLAR MASS: ICD-10-CM

## 2021-04-16 DIAGNOSIS — J35.8 TONSILLAR MASS: Primary | ICD-10-CM

## 2021-04-16 DIAGNOSIS — H73.891 TENSOR TYMPANI SPASM DISORDER, RIGHT: ICD-10-CM

## 2021-04-16 DIAGNOSIS — R42 DIZZINESS: ICD-10-CM

## 2021-04-16 DIAGNOSIS — H61.23 BILATERAL IMPACTED CERUMEN: ICD-10-CM

## 2021-04-16 DIAGNOSIS — I48.91 ATRIAL FIBRILLATION WITH RVR (HCC): ICD-10-CM

## 2021-04-16 DIAGNOSIS — H55.09: ICD-10-CM

## 2021-04-16 DIAGNOSIS — I25.10 CORONARY ARTERY DISEASE INVOLVING NATIVE CORONARY ARTERY OF NATIVE HEART WITHOUT ANGINA PECTORIS: ICD-10-CM

## 2021-04-16 LAB — PROSTATE SPECIFIC ANTIGEN: < 0.02 NG/ML (ref 0–1)

## 2021-04-16 PROCEDURE — 84153 ASSAY OF PSA TOTAL: CPT

## 2021-04-16 PROCEDURE — G8417 CALC BMI ABV UP PARAM F/U: HCPCS | Performed by: OTOLARYNGOLOGY

## 2021-04-16 PROCEDURE — 99204 OFFICE O/P NEW MOD 45 MIN: CPT | Performed by: OTOLARYNGOLOGY

## 2021-04-16 PROCEDURE — 36415 COLL VENOUS BLD VENIPUNCTURE: CPT

## 2021-04-16 PROCEDURE — 3017F COLORECTAL CA SCREEN DOC REV: CPT | Performed by: OTOLARYNGOLOGY

## 2021-04-16 PROCEDURE — 69210 REMOVE IMPACTED EAR WAX UNI: CPT | Performed by: OTOLARYNGOLOGY

## 2021-04-16 PROCEDURE — 1123F ACP DISCUSS/DSCN MKR DOCD: CPT | Performed by: OTOLARYNGOLOGY

## 2021-04-16 PROCEDURE — 1036F TOBACCO NON-USER: CPT | Performed by: OTOLARYNGOLOGY

## 2021-04-16 PROCEDURE — G8427 DOCREV CUR MEDS BY ELIG CLIN: HCPCS | Performed by: OTOLARYNGOLOGY

## 2021-04-16 PROCEDURE — 4040F PNEUMOC VAC/ADMIN/RCVD: CPT | Performed by: OTOLARYNGOLOGY

## 2021-04-16 ASSESSMENT — ENCOUNTER SYMPTOMS
TROUBLE SWALLOWING: 0
COLOR CHANGE: 0
CHOKING: 0
NAUSEA: 0
APNEA: 0
SHORTNESS OF BREATH: 0
VOICE CHANGE: 0
FACIAL SWELLING: 0
SORE THROAT: 0
ABDOMINAL PAIN: 0
VOMITING: 0
DIARRHEA: 0
WHEEZING: 0
RHINORRHEA: 0
CHEST TIGHTNESS: 0
STRIDOR: 0
SINUS PRESSURE: 0
COUGH: 0

## 2021-04-16 NOTE — PROGRESS NOTES
[facility] [place] [facility]  [facility] EAR, NOSE AND THROAT  [facility]  Dept: [phone]  Dept Fax: [phone]  Loc: [phone]    Serge Summers is a 73 y.o. male who was referred byTay Eckert APRN - * for:  Chief Complaint   Patient presents with    New Patient     New patient is here for BPV and bilateral hearing loss. Referred by [facility] - [facility]. [facility] HPI:     Serge Summers is a 73 y.o. male who presents today for BPV and bilateral hearing loss    CT Head:     No acute intracranial findings. Chronic changes as described.       This document has been electronically signed by: Kervin Cowan MD on    03/17/2021 03:47 AM       All CTs at this facility use dose modulation techniques and iterative    reconstructions, and/or weight-based dosing   when appropriate to reduce radiation to a low as reasonably achievable. CT with contrast was normal.  INR was ok. He was in bed and he felt he was spinning. It was 3 weeks ago. Spinning lasted 15 minutes. Then finally an hour he was better. They called an ambulance and they had to help him walk out. It got better when he got to the hospital.  He hasn't had another episode like that. Last night he had a sense of feeling of dizziness then it went away. When he was dizzy he wasn't talking right, buzzing in ear is more profound. States the buzzing is in his head on the right side. He gets a clicking. Does not feel like someone is tapping on ear drum. Intermittent clicking sound. He has had dizziness issues in the past.      When it happened he was asleep and the spinning sensation woke him up. The second mini episode he was watching TV. Hearing is different on both sides. Sometimes he can hear better in the right ear and sometimes he can hear better from left ear. His balance has been ok in the last couple of weeks.       States he had his tonsils and adenoids taken out as a kid. He has A-Fib. Takes warfarin and not aspirin. No stents put in his heart. He use to take Aspirin but stopped due to seeing on T.V. that aspirin causes cancer. Had issues trusting doctors. 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 Has back issues. He has forgetfulness and wife states he has bad case of Dementia. He has not been clinically diagnosed. History:     No Known Allergies  Current Outpatient Medications   Medication Sig Dispense Refill    warfarin (COUMADIN) 2.5 MG tablet Take as directed by [facility] Coumadin clinic (#40 ~ 30 day supply) 40 tablet 11    metoprolol succinate (TOPROL XL) 50 MG extended release tablet Take 1 tablet by mouth daily 90 tablet 3    bumetanide (BUMEX) 2 MG tablet Take 1 tablet by mouth daily 90 tablet 3    digoxin (LANOXIN) 125 MCG tablet TAKE 1 TABLET BY MOUTH ONCE DAILY 90 tablet 3    dilTIAZem (CARDIZEM CD) 360 MG extended release capsule Take 1 capsule by mouth once daily 90 capsule 3    potassium chloride (KLOR-CON) 10 MEQ extended release tablet TAKE 1 TABLET BY MOUTH TWICE DAILY 180 tablet 3    pravastatin (PRAVACHOL) 80 MG tablet Take 1 tablet by mouth nightly 90 tablet 3    bisacodyl (DULCOLAX) 5 MG EC tablet Take 5 mg by mouth nightly       nitroGLYCERIN (NITROSTAT) 0.4 MG SL tablet Place 1 tablet under the tongue every 5 minutes as needed for Chest pain (Patient not taking: Reported on 4/16/2021) 25 tablet 3     No current facility-administered medications for this visit.       Past Medical History:   Diagnosis Date    Anesthesia     takes large dose of medications to make him sleepy for surgery    Atrial fibrillation ([address]     Benign prostatic hyperplasia with urinary frequency 10/21/2019    CAD (coronary artery disease)     Chest pain     Colon polyp 2011    removed    Coronary artery disease involving native coronary artery of native heart without angina pectoris 10/21/2019    Dementia ([facility] [address]     Dizziness     Elevated PSA     GERD (gastroesophageal reflux disease)     Heart disease     Hyperlipidemia     Hypertension       Past Surgical History:   Procedure Laterality Date    ADENOIDECTOMY      CARDIAC CATHETERIZATION  09/23/2011    Right radial artery cannulation. Moderate LV dysfunction. The patient has disease in the ostium of diagonal 1 and diagonal 2 branch, however they are both are at the  ostium of the diagonals. Intervention could compromise flow of LAD. THe LAD has long diffuse calcified lesion 50-60-% anatomically.  CARDIOVASCULAR STRESS TEST  09/23/2011    EF 37%    CARPAL TUNNEL RELEASE      COLONOSCOPY      PROSTATECTOMY N/A 12/3/2020    ROBOTIC ASSISTED LAPAROSCOPIC RADICAL PROSTATECTOMY performed by Sage Aragon MD at [facility] ECHOCARDIOGRAM  09/23/2011    EF 50-55%    ULTRASOUND PROSTATE/TRANSRECTAL N/A 10/6/2020    CYSTO, TRANSRECTAL ULTRASOUND GUIDED PROSTATE BX performed by Sage Aragon MD at [address] [place] [address]       Family History   Problem Relation Age of Onset    Heart Surgery Father     Cancer Father         lung    Heart Disease Father         CABG    Alzheimer's Disease Mother     Diabetes Neg Hx     High Blood Pressure Neg Hx     Stroke Neg Hx      Social History     Tobacco Use    Smoking status: Never Smoker    Smokeless tobacco: Never Used   Substance Use Topics    Alcohol use: No       Subjective:       Review of Systems   Constitutional: Negative for activity change, appetite change, chills, diaphoresis, fatigue, fever and unexpected weight change. HENT: Negative for congestion, dental problem, ear discharge, ear pain, facial swelling, hearing loss, mouth sores, nosebleeds, postnasal drip, rhinorrhea, sinus pressure, sneezing, sore throat, tinnitus, trouble swallowing and voice change. Eyes: Negative for visual disturbance.    Respiratory: Negative for apnea, cough, choking, chest tightness, shortness of breath, wheezing and stridor. Cardiovascular: Negative for chest pain, palpitations and leg swelling. Gastrointestinal: Negative for abdominal pain, diarrhea, nausea and vomiting. Endocrine: Negative for cold intolerance, heat intolerance, polydipsia and polyuria. Genitourinary: Negative for dysuria, enuresis and hematuria. Musculoskeletal: Negative for arthralgias, gait problem, neck pain and neck stiffness. Skin: Negative for color change and rash. Allergic/Immunologic: Negative for environmental allergies, food allergies and immunocompromised state. Neurological: Negative for dizziness, syncope, facial asymmetry, speech difficulty, light-headedness and headaches. Hematological: Negative for adenopathy. Does not bruise/bleed easily. Psychiatric/Behavioral: Negative for confusion and sleep disturbance. The patient is not nervous/anxious. Objective:     /88 (Site: Right Upper Arm, Position: Sitting)   Pulse 74   Temp 98 °F (36.7 °C) (Infrared)   Resp 16   Ht 6' 1\" (1.854 m)   Wt 236 lb (107 kg)   BMI 31.14 kg/m²     Physical Exam  Vitals signs and nursing note reviewed. Constitutional:       Appearance: He is well-developed. HENT:      Head: Normocephalic and atraumatic. No laceration. Comments:        Right Ear: Hearing, ear canal and external ear normal. No drainage or swelling. No middle ear effusion. Tympanic membrane is not perforated or erythematous. Left Ear: Hearing, tympanic membrane, ear canal and external ear normal. No drainage or swelling. No middle ear effusion. Tympanic membrane is not perforated or erythematous. Nose: Nose normal. No septal deviation, mucosal edema or rhinorrhea. Mouth/Throat:      Mouth: Mucous membranes are not pale and not dry. No oral lesions. Pharynx: Uvula midline. No oropharyngeal exudate or posterior oropharyngeal erythema. Tonsils: 4+ on the right. 1+ on the left.       Comments: LIps: lips normal Mallampati 1  3+ mass right tonsillar fossa, possible regrowth of tonsil tissue  Base of tongue: symmetric,  Eyes:      Extraocular Movements:      Right eye: Normal extraocular motion and no nystagmus. Left eye: Normal extraocular motion and no nystagmus. Comments: Conjugate gaze   Neck:      Musculoskeletal: Neck supple. Thyroid: No thyroid mass or thyromegaly. Trachea: Phonation normal. No tracheal deviation. Comments:   Salivary glands not enlarged and normal to palpation    Pulmonary:      Effort: Pulmonary effort is normal. No retractions. Breath sounds: No stridor. Neurological:      Mental Status: He is alert and oriented to person, place, and time. Cranial Nerves: No cranial nerve deficit (VIIth N function intact bilat). Comments: Hallpike testing  Nystagmus. Not consistent with BPPV   Psychiatric:         Mood and Affect: Mood and affect normal.         Behavior: Behavior is cooperative. Data:  All of the past medical history, past surgical history, family history,social history, allergies and current medications were reviewed with the patient. Assessment & Plan   Diagnoses and all orders for this visit:     Diagnosis Orders   1. Tensor tympani spasm disorder, right     2. Tinnitus, right ear     3. Bilateral hearing loss, unspecified hearing loss type  Audiometry with tympanometry   4. Dizziness     5. Asymmetric tonsils     6. Bilateral impacted cerumen  ID REMOVAL IMPACTED CERUMEN INSTRUMENTATION UNILAT   7. Lipoma of head         The findings were explained and his questions were answered. His dizziness does not seem to be compatible with the diagnosis of BPPV, but is real.  He has nystagmus on Hallpike positioning. He has had his tonsils out but there is a mass in the right tonsillar fossa which could possibly be regrowth of tonsillar tissue.       Options were discussed including ordering a CT head/neck with contrast and audiogram/tympanogram with VNG. I will see him when all testing is completed. Return after CT and Audiogram/Tympanogram / VNG is completed testing is completed. He agreed. I, Michelle Ryan CMA ([facility]), am scribing for, and in the presence of Dr. Tono Purdy. Electronically signed by David Paula CMA ([facility]) on 4/16/21 at 8:56 AM EDT. (Please note that portions of this note were completed with a voice recognition program. Efforts were made to edit the dictations butoccasionally words are mis-transcribed.)    I agree to the above documentation placed by my scribe. I have personally evaluated this patient. Additional findings are as noted. I reviewed the scribe's note and agree with the documented findings and plan of care. Any areas of disagreement are corrected. I agree with the chief complaint, past medical history, past surgical history, allergies, medications, social and family history as documented unless otherwise noted below.      Electronically signed by Shante Mejia MD on 5/2/2021 at 2:39 PM

## 2021-04-16 NOTE — LETTER
[address]  Phone: [phone]  Fax: [address], [place]        May 2, 2021    Patient: Perla Nelson   MR Number: [id number]   YOB: 1950   Date of Visit: 4/16/2021     Dear Dr. Francis Ames,    Thank you for the request for consultation for Perla Nelson to me for the evaluation of his dizziness. He had an episode of vertigo that lasted too long to be BPPV and Hallpike positioning showed nystagmus without vertigo, but not a pattern consistent with BPPV. We will be evaluating that. He also said he had his tonsils removed, but there appears to be a tonsil or mass present in the right tonsillar fossa. Below are the relevant portions of my assessment and plan of care. Assessment & Plan   Diagnoses and all orders for this visit:     Diagnosis Orders   1. Tensor tympani spasm disorder, right     2. Tinnitus, right ear     3. Bilateral hearing loss, unspecified hearing loss type  Audiometry with tympanometry   4. Dizziness     5. Asymmetric tonsils     6. Bilateral impacted cerumen  NH REMOVAL IMPACTED CERUMEN INSTRUMENTATION UNILAT   7. Lipoma of head         The findings were explained and his questions were answered. His dizziness does not seem to be compatible with the diagnosis of BPPV, but is real.  He has nystagmus on Hallpike positioning. He has had his tonsils out but there is a mass in the right tonsillar fossa which could possibly be regrowth of tonsillar tissue. Options were discussed including ordering a CT head/neck with contrast and audiogram/tympanogram with VNG. I will see him when all testing is completed. Return after CT and Audiogram/Tympanogram / VNG is completed testing is completed. He agreed. If you have questions, please do not hesitate to call me. I look forward to following Eliza Peña along with you.     Sincerely,          [place] MD

## 2021-04-19 ENCOUNTER — HOSPITAL ENCOUNTER (OUTPATIENT)
Dept: PHARMACY | Age: 71
Setting detail: THERAPIES SERIES
Discharge: HOME | End: 2021-04-19
Payer: MEDICARE

## 2021-04-19 DIAGNOSIS — Z79.01 ANTICOAGULATED ON COUMADIN: ICD-10-CM

## 2021-04-19 DIAGNOSIS — Z51.81 ENCOUNTER FOR THERAPEUTIC DRUG MONITORING: ICD-10-CM

## 2021-04-19 LAB — POC INR: 2.2 (ref 0.8–1.2)

## 2021-04-19 PROCEDURE — 36416 COLLJ CAPILLARY BLOOD SPEC: CPT

## 2021-04-19 PROCEDURE — 85610 PROTHROMBIN TIME: CPT

## 2021-04-19 PROCEDURE — 99211 OFF/OP EST MAY X REQ PHY/QHP: CPT

## 2021-04-19 NOTE — PROGRESS NOTES
Medication Management [address]  [phone] (phone)  [phone] (fax)      Mr. Ruben Blair is a 73 y.o.  male with history of Afib who presents today for anticoagulation monitoring and adjustment. Patient verifies current dosing regimen and tablet strength. No missed or extra doses. Patient denies s/s bleeding/bruising/swelling/SOB/chest pain  No blood in urine or stool. No dietary changes. No changes in medication/OTC agents/Herbals. No change in alcohol use or tobacco use. No change in activity level. Patient denies headaches/lightheadedness/falls. Patient states he had an episode of dizziness that has resolved, but is currently being worked up for this. No vomiting/diarrhea or acute illness. No Procedures scheduled in the future at this time. Assessment:   Lab Results   Component Value Date    INR 2.20 (H) 04/19/2021    INR 2.04 (H) 03/17/2021    INR 2.30 (H) 03/15/2021     INR therapeutic   Recent Labs     04/19/21  0719   INR 2.20*     Patient has been stable on current regimen since November 2020. Plan:  Continue Coumadin 5 mg W and 2.5 mg MTuThFSaSu. Recheck INR in 6 week(s). Patient reminded to call the Anticoagulation Clinic with any signs or symptoms of bleeding or with any medication changes. Patient given instructions utilizing the teach back method. Discharged ambulatory in no apparent distress. After visit summary printed and reviewed with patient.       Medications reviewed and updated on home medication list Yes    Influenza vaccine:     [] given    [x] declined   [x] received previously   [] plans to receive at a later time   [] refused    [x] documented in [address]:  [facility] [address]: Yes  Total # of Interventions Recommended: 0  - Maintenance Safety Lab Monitoring #: 1  Total Interventions Accepted: 0  Time Spent (min): 1300 [facility] Blane Stovall PharmD, BCPS  4/19/2021  7:56 AM

## 2021-04-22 ENCOUNTER — HOSPITAL ENCOUNTER (OUTPATIENT)
Dept: CT IMAGING | Age: 71
Discharge: HOME | End: 2021-04-22
Payer: MEDICARE

## 2021-04-22 DIAGNOSIS — J35.8 ASYMMETRIC TONSILS: ICD-10-CM

## 2021-04-22 DIAGNOSIS — J35.8 TONSILLAR MASS: ICD-10-CM

## 2021-04-22 PROCEDURE — 70491 CT SOFT TISSUE NECK W/DYE: CPT

## 2021-04-22 PROCEDURE — 6360000004 HC RX CONTRAST MEDICATION: Performed by: OTOLARYNGOLOGY

## 2021-04-22 RX ADMIN — IOPAMIDOL 80 ML: 755 INJECTION, SOLUTION INTRAVENOUS at 09:36

## 2021-04-26 ENCOUNTER — OFFICE VISIT (OUTPATIENT)
Dept: FAMILY MEDICINE CLINIC | Age: 71
End: 2021-04-26
Payer: MEDICARE

## 2021-04-26 VITALS
RESPIRATION RATE: 16 BRPM | DIASTOLIC BLOOD PRESSURE: 96 MMHG | WEIGHT: 238.2 LBS | HEART RATE: 84 BPM | BODY MASS INDEX: 31.43 KG/M2 | SYSTOLIC BLOOD PRESSURE: 138 MMHG

## 2021-04-26 DIAGNOSIS — L03.116 CELLULITIS OF LEFT LOWER EXTREMITY: Primary | ICD-10-CM

## 2021-04-26 PROCEDURE — 3017F COLORECTAL CA SCREEN DOC REV: CPT | Performed by: NURSE PRACTITIONER

## 2021-04-26 PROCEDURE — G8417 CALC BMI ABV UP PARAM F/U: HCPCS | Performed by: NURSE PRACTITIONER

## 2021-04-26 PROCEDURE — 1036F TOBACCO NON-USER: CPT | Performed by: NURSE PRACTITIONER

## 2021-04-26 PROCEDURE — 99213 OFFICE O/P EST LOW 20 MIN: CPT | Performed by: NURSE PRACTITIONER

## 2021-04-26 PROCEDURE — G8427 DOCREV CUR MEDS BY ELIG CLIN: HCPCS | Performed by: NURSE PRACTITIONER

## 2021-04-26 PROCEDURE — 4040F PNEUMOC VAC/ADMIN/RCVD: CPT | Performed by: NURSE PRACTITIONER

## 2021-04-26 PROCEDURE — 1123F ACP DISCUSS/DSCN MKR DOCD: CPT | Performed by: NURSE PRACTITIONER

## 2021-04-26 RX ORDER — CEPHALEXIN 500 MG/1
500 CAPSULE ORAL 4 TIMES DAILY
Qty: 10 CAPSULE | Refills: 0 | Status: SHIPPED | OUTPATIENT
Start: 2021-04-26 | End: 2021-04-28 | Stop reason: SDUPTHER

## 2021-04-26 RX ORDER — GINSENG 100 MG
CAPSULE ORAL
Qty: 14 G | Refills: 1 | Status: SHIPPED | OUTPATIENT
Start: 2021-04-26 | End: 2021-05-06

## 2021-04-26 ASSESSMENT — ENCOUNTER SYMPTOMS: COLOR CHANGE: 0

## 2021-04-26 ASSESSMENT — PATIENT HEALTH QUESTIONNAIRE - PHQ9
SUM OF ALL RESPONSES TO PHQ QUESTIONS 1-9: 0
SUM OF ALL RESPONSES TO PHQ QUESTIONS 1-9: 0
2. FEELING DOWN, DEPRESSED OR HOPELESS: 0

## 2021-04-26 NOTE — PATIENT INSTRUCTIONS
Patient Education        Cellulitis: Care Instructions  Your Care Instructions     Cellulitis is a skin infection caused by bacteria, most often strep or staph. It often occurs after a break in the skin from a scrape, cut, bite, or puncture, or after a rash. Cellulitis may be treated without doing tests to find out what caused it. But your doctor may do tests, if needed, to look for a specific bacteria, like methicillin-resistant Staphylococcus aureus (MRSA). The doctor has checked you carefully, but problems can develop later. If you notice any problems or new symptoms, get medical treatment right away. Follow-up care is a key part of your treatment and safety. Be sure to make and go to all appointments, and call your doctor if you are having problems. It's also a good idea to know your test results and keep a list of the medicines you take. How can you care for yourself at home? · Take your antibiotics as directed. Do not stop taking them just because you feel better. You need to take the full course of antibiotics. · Prop up the infected area on pillows to reduce pain and swelling. Try to keep the area above the level of your heart as often as you can. · If your doctor told you how to care for your wound, follow your doctor's instructions. If you did not get instructions, follow this general advice:  ? Wash the wound with clean water 2 times a day. Don't use hydrogen peroxide or alcohol, which can slow healing. ? You may cover the wound with a thin layer of petroleum jelly, such as Vaseline, and a nonstick bandage. ? Apply more petroleum jelly and replace the bandage as needed. · Be safe with medicines. Take pain medicines exactly as directed. ? If the doctor gave you a prescription medicine for pain, take it as prescribed. ? If you are not taking a prescription pain medicine, ask your doctor if you can take an over-the-counter medicine.   To prevent cellulitis in the future  · Try to prevent cuts, scrapes, or other injuries to your skin. Cellulitis most often occurs where there is a break in the skin. · If you get a scrape, cut, mild burn, or bite, wash the wound with clean water as soon as you can to help avoid infection. Don't use hydrogen peroxide or alcohol, which can slow healing. · If you have swelling in your legs (edema), support stockings and good skin care may help prevent leg sores and cellulitis. · Take care of your feet, especially if you have diabetes or other conditions that increase the risk of infection. Wear shoes and socks. Do not go barefoot. If you have athlete's foot or other skin problems on your feet, talk to your doctor about how to treat them. When should you call for help? Call your doctor now or seek immediate medical care if:    · You have signs that your infection is getting worse, such as:  ? Increased pain, swelling, warmth, or redness. ? Red streaks leading from the area. ? Pus draining from the area. ? A fever.     · You get a rash. Watch closely for changes in your health, and be sure to contact your doctor if:    · You do not get better as expected. Where can you learn more? Go to https://Whisk [email or web address]. org and sign in to your [email or web address] account. Enter Y372 in the Located within [facility] box to learn more about \"Cellulitis: Care Instructions. \"     If you do not have an account, please click on the \"Sign Up Now\" link. Current as of: July 2, 2020               Content Version: 12.8  © 2006-2021 Healthwise, Incorporated. Care instructions adapted under license by [facility] ([facility]). If you have questions about a medical condition or this instruction, always ask your healthcare professional. [place] [address] any warranty or liability for your use of this information.

## 2021-04-26 NOTE — PROGRESS NOTES
[address]  [address] [facility] [address]  Dept: [phone]  Dept Fax: [phone]: [phone]     Visit Date:  4/26/2021      Patient:  Kelly Escamilla  YOB: 1950    HPI:     Chief Complaint   Patient presents with    Other     left leg sore, may have cut it on a plant a few days ago, using neopsporin,        Pt presents to the office today with his wife. He reports that 5-6 days ago he was moving a pot and  cut leg (left lower). No bleeding at the time, but since then it has been getting more red and has had some clear drainage. Wound Check  He was originally treated 5 to 10 days ago. Previous treatment included wound cleansing or irrigation. There has been clear discharge from the wound. There is new redness present. There is no swelling present. The pain has not changed. He has no difficulty moving the affected extremity or digit. Medications    Current Outpatient Medications:     cephALEXin (KEFLEX) 500 MG capsule, Take 1 capsule by mouth 4 times daily, Disp: 10 capsule, Rfl: 0    bacitracin 500 UNIT/GM ointment, Apply topically 2 times daily. , Disp: 14 g, Rfl: 1    warfarin (COUMADIN) 2.5 MG tablet, Take as directed by [facility] Coumadin clinic (#40 ~ 30 day supply), Disp: 40 tablet, Rfl: 11    metoprolol succinate (TOPROL XL) 50 MG extended release tablet, Take 1 tablet by mouth daily, Disp: 90 tablet, Rfl: 3    bumetanide (BUMEX) 2 MG tablet, Take 1 tablet by mouth daily, Disp: 90 tablet, Rfl: 3    digoxin (LANOXIN) 125 MCG tablet, TAKE 1 TABLET BY MOUTH ONCE DAILY, Disp: 90 tablet, Rfl: 3    dilTIAZem (CARDIZEM CD) 360 MG extended release capsule, Take 1 capsule by mouth once daily, Disp: 90 capsule, Rfl: 3    potassium chloride (KLOR-CON) 10 MEQ extended release tablet, TAKE 1 TABLET BY MOUTH TWICE DAILY, Disp: 180 tablet, Rfl: 3    pravastatin (PRAVACHOL) 80 MG tablet, Take 1 tablet by mouth nightly, Disp: 90 tablet, Rfl: 3    bisacodyl (DULCOLAX) 5 MG EC tablet, Take 5 mg by mouth nightly , Disp: , Rfl:     nitroGLYCERIN (NITROSTAT) 0.4 MG SL tablet, Place 1 tablet under the tongue every 5 minutes as needed for Chest pain (Patient not taking: Reported on 4/26/2021), Disp: 25 tablet, Rfl: 3    The patient has No Known Allergies. Past Medical History  Delbert Elizabeth  has a past medical history of Anesthesia, Atrial fibrillation ([address] Benign prostatic hyperplasia with urinary frequency, CAD (coronary artery disease), Chest pain, Colon polyp, Coronary artery disease involving native coronary artery of native heart without angina pectoris, Dementia ([address] Dizziness, Elevated PSA, GERD (gastroesophageal reflux disease), Heart disease, Hyperlipidemia, and Hypertension. Subjective:      Review of Systems   Constitutional: Negative for chills, fatigue and fever. Skin: Positive for wound. Negative for color change and rash. Neurological: Negative for dizziness, weakness and headaches. Objective:     BP (!) 138/96   Pulse 84   Resp 16   Wt 238 lb 3.2 oz (108 kg)   BMI 31.43 kg/m²     Physical Exam  Vitals signs reviewed. Constitutional:       General: He is not in acute distress. Appearance: He is well-developed. HENT:      Head: Normocephalic and atraumatic. Pulmonary:      Effort: Pulmonary effort is normal. No respiratory distress. Skin:     General: Skin is warm and dry. Neurological:      Mental Status: He is alert. Coordination: Coordination normal.   Psychiatric:         Mood and Affect: Mood normal.         Behavior: Behavior normal.         Thought Content: Thought content normal.         Judgment: Judgment normal.             Assessment/Plan:      Delbert Elizabeth was seen today for other. Diagnoses and all orders for this visit:    Cellulitis of left lower extremity  -     cephALEXin (KEFLEX) 500 MG capsule;  Take 1 capsule by mouth 4 times daily  - bacitracin 500 UNIT/GM ointment; Apply topically 2 times daily.    - Rest and elevate leg at home  - Dressing done today in office, avoid using tape directly on the skin. - Call office with any questions or concerns, or if symptoms are getting worse or changing    Return if symptoms worsen or fail to improve. Patient given educational materials - see patient instructions. Discussed use, benefit, and side effects of prescribed medications. All patient questions answered. Pt voiced understanding.         Electronically signed by NARESH Cordova CNP on 4/26/2021 at 2:55 PM

## 2021-04-28 DIAGNOSIS — L03.116 CELLULITIS OF LEFT LOWER EXTREMITY: ICD-10-CM

## 2021-04-28 RX ORDER — CEPHALEXIN 500 MG/1
500 CAPSULE ORAL 4 TIMES DAILY
Qty: 28 CAPSULE | Refills: 0 | Status: SHIPPED | OUTPATIENT
Start: 2021-04-28 | End: 2021-05-05

## 2021-04-28 NOTE — TELEPHONE ENCOUNTER
This medication refill is regarding a electronic request by Jaci. Requested Prescriptions     Pending Prescriptions Disp Refills    cephALEXin (KEFLEX) 500 MG capsule 10 capsule 0     Sig: Take 1 capsule by mouth 4 times daily       Date of last visit: 4/26/2021  Date of next visit: 8/2/2021  Date of last refill: 4/26/2021      Pharmacy waned to clarify original order was for QID but patient was only given a quantity of 10. Rx pended please verify.

## 2021-05-24 ENCOUNTER — HOSPITAL ENCOUNTER (OUTPATIENT)
Dept: AUDIOLOGY | Age: 71
Discharge: HOME | End: 2021-05-24
Payer: MEDICARE

## 2021-05-24 PROCEDURE — 92557 COMPREHENSIVE HEARING TEST: CPT | Performed by: AUDIOLOGIST

## 2021-05-24 PROCEDURE — 92537 CALORIC VSTBLR TEST W/REC: CPT | Performed by: AUDIOLOGIST

## 2021-05-24 PROCEDURE — 92540 BASIC VESTIBULAR EVALUATION: CPT | Performed by: AUDIOLOGIST

## 2021-05-24 PROCEDURE — 92567 TYMPANOMETRY: CPT | Performed by: AUDIOLOGIST

## 2021-05-24 NOTE — PROGRESS NOTES
ACCOUNT #: [de-identified]                                    AUDIOLOGICAL EVALUATION WITH VNG      MEDICATIONS REVIEWED:  Patient has held appropriate medications for VNG testing. REASON FOR TESTING: The patient experienced a single episode of vertigo about 6 weeks ago. He woke up in the middle of the night with vertigo. Initially he was unable to get up and walk. The vertigo improved some and by the time the ambulance arrived he was able to walk. Other than some dizziness if he stands up too quick he has not experienced any vertigo since that episode. He has a known bilateral sensorineural hearing loss that he states has progressed. He has tried hearing aids on two separate occassions and was not satisfied. He has bilateral humming tinnitus that he has noticed for years. The intensity does fluctuate between his ears but the tinnitus is constant. He has a previous history of occupational noise exposure. His ears occasionally click. OTOSCOPY: WNL-bilaterally     AUDIOGRAM                  Reliability: Good  Audiometer Used:  GSI-61      VNG RESULTS:    SACCADES: WNL  TRACKING: WNL  OPTOKINETICS:  WNL  GAZE: WNL  HALLPIKE MANEUVER:  WNL   POSITIONAL: WNL  CALORIC TESTING: WNL  EVA' SOT:WNL    COMMENTS: Normal hearing sloping to a severe sensorineural hearing loss for the left ear. Normal hearing sloping to a severe sensorineural hearing loss for the right ear. Speech discrimination ability is poor at 68% for the left ear and poor at 60% for the right ear. Hearing loss has increased and his word understanding ability has decreased by 24% in both ears relative to his 03/05/2020 audiogram. Tympanometry revealed normal peak pressure and normal middle ear compliance for both ears. VNG testing was normal on this date. RECOMMENDATION(S): Follow up should be completed with Dr. Bola Grimaldo in regards to these results. Bilateral hearing aids continue to be recommended.

## 2021-05-25 ENCOUNTER — OFFICE VISIT (OUTPATIENT)
Dept: ENT CLINIC | Age: 71
End: 2021-05-25
Payer: MEDICARE

## 2021-05-25 VITALS
SYSTOLIC BLOOD PRESSURE: 136 MMHG | HEIGHT: 73 IN | RESPIRATION RATE: 14 BRPM | DIASTOLIC BLOOD PRESSURE: 80 MMHG | BODY MASS INDEX: 31.07 KG/M2 | TEMPERATURE: 97 F | HEART RATE: 92 BPM | WEIGHT: 234.4 LBS

## 2021-05-25 DIAGNOSIS — H93.11 TINNITUS OF RIGHT EAR: ICD-10-CM

## 2021-05-25 DIAGNOSIS — H90.3 BILATERAL HIGH FREQUENCY SENSORINEURAL HEARING LOSS: Primary | ICD-10-CM

## 2021-05-25 DIAGNOSIS — H55.09: ICD-10-CM

## 2021-05-25 PROCEDURE — 4040F PNEUMOC VAC/ADMIN/RCVD: CPT | Performed by: OTOLARYNGOLOGY

## 2021-05-25 PROCEDURE — 3017F COLORECTAL CA SCREEN DOC REV: CPT | Performed by: OTOLARYNGOLOGY

## 2021-05-25 PROCEDURE — 99213 OFFICE O/P EST LOW 20 MIN: CPT | Performed by: OTOLARYNGOLOGY

## 2021-05-25 PROCEDURE — 1123F ACP DISCUSS/DSCN MKR DOCD: CPT | Performed by: OTOLARYNGOLOGY

## 2021-05-25 PROCEDURE — G8427 DOCREV CUR MEDS BY ELIG CLIN: HCPCS | Performed by: OTOLARYNGOLOGY

## 2021-05-25 PROCEDURE — 1036F TOBACCO NON-USER: CPT | Performed by: OTOLARYNGOLOGY

## 2021-05-25 PROCEDURE — G8417 CALC BMI ABV UP PARAM F/U: HCPCS | Performed by: OTOLARYNGOLOGY

## 2021-05-25 ASSESSMENT — ENCOUNTER SYMPTOMS
CHOKING: 0
TROUBLE SWALLOWING: 0
APNEA: 0
SORE THROAT: 0
NAUSEA: 0
SHORTNESS OF BREATH: 0
RHINORRHEA: 0
CHEST TIGHTNESS: 0
WHEEZING: 0
ABDOMINAL PAIN: 0
VOICE CHANGE: 0
DIARRHEA: 0
FACIAL SWELLING: 0
COUGH: 0
COLOR CHANGE: 0
STRIDOR: 0
VOMITING: 0
SINUS PRESSURE: 0

## 2021-05-25 NOTE — PROGRESS NOTES
[facility] [place] [facility]  [facility] EAR, NOSE AND THROAT  [facility]  Dept: [phone]  Dept Fax: [phone]  Loc: [phone]    Michelle Montero is a 73 y.o. male who was referred byNo ref. provider found for:  Chief Complaint   Patient presents with    Follow-up     Patient is here for follow up after CT scan, Audio/VNG   . HPI:     Michelle Montero is a 73 y.o. male who presents today for follow-up on his testing. Audiogram showed high-frequency sensorineural neural hearing loss. He has a family history of hearing loss. The VNG was normal.  He has not had any more dizziness. CT is reviewed and was within normal limits. Marky Clayton He is on Coumadin    History:     No Known Allergies  Current Outpatient Medications   Medication Sig Dispense Refill    warfarin (COUMADIN) 2.5 MG tablet Take as directed by [facility] Coumadin clinic (#40 ~ 30 day supply) 40 tablet 11    metoprolol succinate (TOPROL XL) 50 MG extended release tablet Take 1 tablet by mouth daily 90 tablet 3    bumetanide (BUMEX) 2 MG tablet Take 1 tablet by mouth daily 90 tablet 3    digoxin (LANOXIN) 125 MCG tablet TAKE 1 TABLET BY MOUTH ONCE DAILY 90 tablet 3    dilTIAZem (CARDIZEM CD) 360 MG extended release capsule Take 1 capsule by mouth once daily 90 capsule 3    nitroGLYCERIN (NITROSTAT) 0.4 MG SL tablet Place 1 tablet under the tongue every 5 minutes as needed for Chest pain 25 tablet 3    potassium chloride (KLOR-CON) 10 MEQ extended release tablet TAKE 1 TABLET BY MOUTH TWICE DAILY 180 tablet 3    pravastatin (PRAVACHOL) 80 MG tablet Take 1 tablet by mouth nightly 90 tablet 3    bisacodyl (DULCOLAX) 5 MG EC tablet Take 5 mg by mouth nightly        No current facility-administered medications for this visit.      Past Medical History:   Diagnosis Date    Anesthesia     takes large dose of medications to make him sleepy for surgery    Atrial fibrillation [facility])     Benign prostatic hyperplasia with urinary frequency 10/21/2019    CAD (coronary artery disease)     Chest pain     Colon polyp 2011    removed    Coronary artery disease involving native coronary artery of native heart without angina pectoris 10/21/2019    Dementia ([address]     Dizziness     Elevated PSA     GERD (gastroesophageal reflux disease)     Heart disease     Hyperlipidemia     Hypertension       Past Surgical History:   Procedure Laterality Date    ADENOIDECTOMY      CARDIAC CATHETERIZATION  09/23/2011    Right radial artery cannulation. Moderate LV dysfunction. The patient has disease in the ostium of diagonal 1 and diagonal 2 branch, however they are both are at the  ostium of the diagonals. Intervention could compromise flow of LAD. THe LAD has long diffuse calcified lesion 50-60-% anatomically.  CARDIOVASCULAR STRESS TEST  09/23/2011    EF 37%    CARPAL TUNNEL RELEASE      COLONOSCOPY      PROSTATECTOMY N/A 12/3/2020    ROBOTIC ASSISTED LAPAROSCOPIC RADICAL PROSTATECTOMY performed by Sage Aragon MD at [address] [facility] ECHOCARDIOGRAM  09/23/2011    EF 50-55%    ULTRASOUND PROSTATE/TRANSRECTAL N/A 10/6/2020    CYSTO, TRANSRECTAL ULTRASOUND GUIDED PROSTATE BX performed by Sage Aragon MD at [address] [place] [address]       Family History   Problem Relation Age of Onset    Heart Surgery Father     Cancer Father         lung    Heart Disease Father         CABG    Alzheimer's Disease Mother     Diabetes Neg Hx     High Blood Pressure Neg Hx     Stroke Neg Hx      Social History     Tobacco Use    Smoking status: Never Smoker    Smokeless tobacco: Never Used   Substance Use Topics    Alcohol use: No       Subjective:      Review of Systems   Constitutional: Negative for activity change, appetite change, chills, diaphoresis, fatigue, fever and unexpected weight change. HENT: Positive for tinnitus.  Negative for congestion, dental problem, ear Tinnitus of right ear     3. Positional nystagmus      Not BPPV, and improved       The findings were explained and his questions were answered. Seems to be doing better. Hearing aid evaluations are recommended and declined. I explained that rather than discharging him from care we would consider him on observation. He may return as needed if his symptoms change. I offered a specific interval for return visit and he indicated he would just let us know. Muriel Robbins. Bonnie Hernandez MD    **This report has been created using voice recognition software. It may contain minor errors which are inherent in voicerecognition technology. **

## 2021-06-02 ENCOUNTER — HOSPITAL ENCOUNTER (OUTPATIENT)
Dept: PHARMACY | Age: 71
Setting detail: THERAPIES SERIES
Discharge: HOME | End: 2021-06-02
Payer: MEDICARE

## 2021-06-02 DIAGNOSIS — Z51.81 ENCOUNTER FOR THERAPEUTIC DRUG MONITORING: ICD-10-CM

## 2021-06-02 DIAGNOSIS — Z79.01 ANTICOAGULATED ON COUMADIN: Primary | ICD-10-CM

## 2021-06-02 LAB — POC INR: 2.6 (ref 0.8–1.2)

## 2021-06-02 PROCEDURE — 85610 PROTHROMBIN TIME: CPT | Performed by: PHARMACIST

## 2021-06-02 PROCEDURE — 99211 OFF/OP EST MAY X REQ PHY/QHP: CPT | Performed by: PHARMACIST

## 2021-06-02 PROCEDURE — 36416 COLLJ CAPILLARY BLOOD SPEC: CPT | Performed by: PHARMACIST

## 2021-06-02 NOTE — PROGRESS NOTES
Medication Management [address]  [phone] (phone)  [phone] (fax)    Mr. Aravind Casanova is a 73 y.o.  male with history of Afib who presents today for anticoagulation monitoring and adjustment. Patient verifies current dosing regimen and tablet strength. No missed or extra doses. Patient denies s/s bleeding/bruising/swelling/SOB/chest pain  No blood in urine or stool. No dietary changes. No changes in medication/OTC agents/Herbals. No change in alcohol use or tobacco use. No change in activity level. Patient denies headaches/dizziness/lightheadedness/falls. No vomiting/diarrhea or acute illness. No Procedures scheduled in the future at this time. Assessment:   Lab Results   Component Value Date    INR 2.60 (H) 06/02/2021    INR 2.20 (H) 04/19/2021    INR 2.04 (H) 03/17/2021     INR therapeutic   Recent Labs     06/02/21  0720   INR 2.60*         Plan:  Continue Coumadin 5mg W, 2.5mg MTThFSS. Recheck INR in 6 week(s). Patient reminded to call the Anticoagulation Clinic with any signs or symptoms of bleeding or with any medication changes. Patient given instructions utilizing the teach back method. After visit summary printed and reviewed with patient. Discharged ambulatory in no apparent distress.       For Pharmacy Admin Tracking Only     Total # of Interventions Recommended: 0   Total # of Interventions Accepted: 0   Time Spent (min): 15

## 2021-06-07 ENCOUNTER — TELEPHONE (OUTPATIENT)
Dept: CARDIOLOGY CLINIC | Age: 71
End: 2021-06-07

## 2021-06-07 DIAGNOSIS — I48.0 PAROXYSMAL ATRIAL FIBRILLATION (HCC): Primary | ICD-10-CM

## 2021-06-07 NOTE — TELEPHONE ENCOUNTER
----- Message from Fan Smith RN sent at 6/7/2021  9:21 AM EDT -----  Current referral for Anticoagulation Services expires in less than 30 days. Please enter new electronic referral.  Thanks, L.  Bob Brock, RN, BSN

## 2021-07-14 ENCOUNTER — HOSPITAL ENCOUNTER (OUTPATIENT)
Dept: PHARMACY | Age: 71
Setting detail: THERAPIES SERIES
Discharge: HOME | End: 2021-07-14
Payer: MEDICARE

## 2021-07-14 DIAGNOSIS — Z79.01 ANTICOAGULATED ON COUMADIN: Primary | ICD-10-CM

## 2021-07-14 DIAGNOSIS — Z51.81 ENCOUNTER FOR THERAPEUTIC DRUG MONITORING: ICD-10-CM

## 2021-07-14 LAB — POC INR: 2.2 (ref 0.8–1.2)

## 2021-07-14 PROCEDURE — 85610 PROTHROMBIN TIME: CPT

## 2021-07-14 PROCEDURE — 99211 OFF/OP EST MAY X REQ PHY/QHP: CPT

## 2021-07-14 PROCEDURE — 36416 COLLJ CAPILLARY BLOOD SPEC: CPT

## 2021-07-14 NOTE — PROGRESS NOTES
Medication Management [address]   [phone] (phone)  [phone] (fax)    Mr. Patricio Ahmadi is a 73 y.o.  male with history of Afib who presents today for anticoagulation monitoring and adjustment. Patient verifies current dosing regimen and tablet strength. No missed or extra doses. Patient denies s/s bleeding/bruising/swelling/SOB/chest pain  No blood in urine or stool. No dietary changes. No changes in medication/OTC agents/Herbals. No change in alcohol use or tobacco use. No change in activity level. Patient denies headaches/dizziness/lightheadedness/falls. No vomiting/diarrhea or acute illness. No Procedures scheduled in the future at this time. Assessment:   Lab Results   Component Value Date    INR 2.20 (H) 07/14/2021    INR 2.60 (H) 06/02/2021    INR 2.20 (H) 04/19/2021     INR therapeutic   Recent Labs     07/14/21  0712   INR 2.20*       Patient has been stable while on current regimen since November 2020. Plan:  Continue Coumadin 5 mg W and 2.5 mg MTuThFSaSu. Recheck INR in 6 week(s). Patient reminded to call the Anticoagulation Clinic with any signs or symptoms of bleeding or with any medication changes. Patient given instructions utilizing the teach back method. After visit summary printed and reviewed with patient. Discharged ambulatory in no apparent distress.     For Pharmacy Admin Tracking Only     Total # of Interventions Recommended: 0   Total # of Interventions Accepted: 0   Time Spent (min): 4051  [facility] HospitalMaggy, BCPS  7/14/2021  7:48 AM

## 2021-07-29 ENCOUNTER — OFFICE VISIT (OUTPATIENT)
Dept: CARDIOLOGY CLINIC | Age: 71
End: 2021-07-29
Payer: MEDICARE

## 2021-07-29 VITALS
WEIGHT: 240 LBS | HEIGHT: 73 IN | BODY MASS INDEX: 31.81 KG/M2 | DIASTOLIC BLOOD PRESSURE: 82 MMHG | SYSTOLIC BLOOD PRESSURE: 144 MMHG | HEART RATE: 88 BPM

## 2021-07-29 DIAGNOSIS — I48.0 PAROXYSMAL ATRIAL FIBRILLATION (HCC): Primary | ICD-10-CM

## 2021-07-29 DIAGNOSIS — I10 ESSENTIAL HYPERTENSION: ICD-10-CM

## 2021-07-29 PROCEDURE — 1036F TOBACCO NON-USER: CPT | Performed by: NUCLEAR MEDICINE

## 2021-07-29 PROCEDURE — 1123F ACP DISCUSS/DSCN MKR DOCD: CPT | Performed by: NUCLEAR MEDICINE

## 2021-07-29 PROCEDURE — 99214 OFFICE O/P EST MOD 30 MIN: CPT | Performed by: NUCLEAR MEDICINE

## 2021-07-29 PROCEDURE — G8417 CALC BMI ABV UP PARAM F/U: HCPCS | Performed by: NUCLEAR MEDICINE

## 2021-07-29 PROCEDURE — 4040F PNEUMOC VAC/ADMIN/RCVD: CPT | Performed by: NUCLEAR MEDICINE

## 2021-07-29 PROCEDURE — G8427 DOCREV CUR MEDS BY ELIG CLIN: HCPCS | Performed by: NUCLEAR MEDICINE

## 2021-07-29 PROCEDURE — 3017F COLORECTAL CA SCREEN DOC REV: CPT | Performed by: NUCLEAR MEDICINE

## 2021-07-29 NOTE — PROGRESS NOTES
[address] [place] [address].  [address] [place] [address]  Dept: [phone]  Dept Fax: [phone]  Loc: [phone]    Visit Date: 7/29/2021    Tarun Lo is a 73 y.o. male who presents todayfor:  Chief Complaint   Patient presents with    Check-Up    Coronary Artery Disease    Atrial Fibrillation    Hypertension   know A fib \  No chest pain  No palpitation   Does have prostate cancer  Had surgery   No changes clinically   BP is stable  No dizziness  No syncope  Does have LE edema  Some discoloration       HPI:  HPI  Past Medical History:   Diagnosis Date    Anesthesia     takes large dose of medications to make him sleepy for surgery    Atrial fibrillation ([address]     Benign prostatic hyperplasia with urinary frequency 10/21/2019    CAD (coronary artery disease)     Chest pain     Colon polyp 2011    removed    Coronary artery disease involving native coronary artery of native heart without angina pectoris 10/21/2019    Dementia ([address]     Dizziness     Elevated PSA     GERD (gastroesophageal reflux disease)     Heart disease     Hyperlipidemia     Hypertension       Past Surgical History:   Procedure Laterality Date    ADENOIDECTOMY      CARDIAC CATHETERIZATION  09/23/2011    Right radial artery cannulation. Moderate LV dysfunction. The patient has disease in the ostium of diagonal 1 and diagonal 2 branch, however they are both are at the  ostium of the diagonals. Intervention could compromise flow of LAD. THe LAD has long diffuse calcified lesion 50-60-% anatomically.     CARDIOVASCULAR STRESS TEST  09/23/2011    EF 37%    CARPAL TUNNEL RELEASE      COLONOSCOPY      PROSTATECTOMY N/A 12/3/2020    ROBOTIC ASSISTED LAPAROSCOPIC RADICAL PROSTATECTOMY performed by Benny Boswell MD at [facility] ECHOCARDIOGRAM  09/23/2011    EF 50-55%    ULTRASOUND PROSTATE/TRANSRECTAL N/A 10/6/2020 CYSTO, TRANSRECTAL ULTRASOUND GUIDED PROSTATE BX performed by Eric Sosa MD at [facility]       Family History   Problem Relation Age of Onset    Heart Surgery Father     Cancer Father         lung    Heart Disease Father         CABG    Alzheimer's Disease Mother     Diabetes Neg Hx     High Blood Pressure Neg Hx     Stroke Neg Hx      Social History     Tobacco Use    Smoking status: Never Smoker    Smokeless tobacco: Never Used   Substance Use Topics    Alcohol use: No      Current Outpatient Medications   Medication Sig Dispense Refill    warfarin (COUMADIN) 2.5 MG tablet Take as directed by [facility] Coumadin clinic (#40 ~ 30 day supply) 40 tablet 11    metoprolol succinate (TOPROL XL) 50 MG extended release tablet Take 1 tablet by mouth daily 90 tablet 3    bumetanide (BUMEX) 2 MG tablet Take 1 tablet by mouth daily 90 tablet 3    digoxin (LANOXIN) 125 MCG tablet TAKE 1 TABLET BY MOUTH ONCE DAILY 90 tablet 3    dilTIAZem (CARDIZEM CD) 360 MG extended release capsule Take 1 capsule by mouth once daily 90 capsule 3    nitroGLYCERIN (NITROSTAT) 0.4 MG SL tablet Place 1 tablet under the tongue every 5 minutes as needed for Chest pain 25 tablet 3    potassium chloride (KLOR-CON) 10 MEQ extended release tablet TAKE 1 TABLET BY MOUTH TWICE DAILY 180 tablet 3    pravastatin (PRAVACHOL) 80 MG tablet Take 1 tablet by mouth nightly 90 tablet 3    bisacodyl (DULCOLAX) 5 MG EC tablet Take 5 mg by mouth nightly        No current facility-administered medications for this visit.      No Known Allergies  Health Maintenance   Topic Date Due    DTaP/Tdap/Td vaccine (1 - Tdap) 10/11/2012    Shingles Vaccine (2 of 3) 12/05/2012    Lipid screen  10/23/2020    A1C test (Diabetic or Prediabetic)  07/23/2021    Colon cancer screen colonoscopy  07/18/2021    Annual Wellness Visit (AWV)  08/05/2021    Flu vaccine (1) 09/01/2021    Pneumococcal 65+ years Vaccine (2 of 2 - PPSV23) 10/04/2021    Potassium monitoring  03/26/2022    Creatinine monitoring  03/26/2022    PSA counseling  04/16/2022    COVID-19 Vaccine  Completed    Hepatitis A vaccine  Aged Out    Hepatitis B vaccine  Aged Out    Hib vaccine  Aged Out    Meningococcal (ACWY) vaccine  Aged Out    Hepatitis C screen  Discontinued       Subjective:  Review of Systems  General:   No fever, no chills, No fatigue or weight loss  Pulmonary:    No dyspnea, no wheezing  Cardiac:    Denies recent chest pain,   GI:     No nausea or vomiting, no abdominal pain  Neuro:     some dizziness or light headedness,   Musculoskeletal:  No recent active issues  Extremities:   some edema, no obvious claudication       Objective:  Physical Exam  BP (!) 144/82   Pulse 88 Comment: irregular  Ht 6' 1\" (1.854 m)   Wt 240 lb (108.9 kg)   BMI 31.66 kg/m²   General:   Well developed, well nourished  Lungs:    Clear to auscultation  Heart:    Normal S1 S2, Slight murmur. no rubs, no gallops  Abdomen:   Soft, non tender, no organomegalies, positive bowel sounds  Extremities:   some edema, no cyanosis, good peripheral pulses  Neurological:   Awake, alert, oriented. No obvious focal deficits  Musculoskelatal:  No obvious deformities    Assessment:      Diagnosis Orders   1. Paroxysmal atrial fibrillation (HCC)     2. Essential hypertension     as above  Cardiac fair for now   No new symptoms  Likely cardizem related edema     Plan:  No follow-ups on file. As above  Consider lower dose cardizem and higher dose metoprolol  Continue risk factor modification and medical management. Thank you for allowing me to participate in the care of your patient. Please don't hesitate to contact me regarding any further issues related to the patient care      Orders Placed:  No orders of the defined types were placed in this encounter. Medications Prescribed:  No orders of the defined types were placed in this encounter.          Discussed use, benefit, and side effects of prescribed medications. All patient questions answered. Pt voicedunderstanding. Instructed to continue current medications, diet and exercise. Continue risk factor modification and medical management. Patient agreed with treatment plan. Follow up as directed.     Electronically signedby Hernan Martinez MD on 7/29/2021 at 7:48 AM

## 2021-08-02 ENCOUNTER — OFFICE VISIT (OUTPATIENT)
Dept: FAMILY MEDICINE CLINIC | Age: 71
End: 2021-08-02
Payer: MEDICARE

## 2021-08-02 VITALS
HEIGHT: 72 IN | TEMPERATURE: 98.3 F | SYSTOLIC BLOOD PRESSURE: 136 MMHG | WEIGHT: 240.2 LBS | BODY MASS INDEX: 32.53 KG/M2 | DIASTOLIC BLOOD PRESSURE: 84 MMHG | HEART RATE: 76 BPM

## 2021-08-02 DIAGNOSIS — I10 ESSENTIAL HYPERTENSION: Primary | ICD-10-CM

## 2021-08-02 DIAGNOSIS — R73.01 IFG (IMPAIRED FASTING GLUCOSE): ICD-10-CM

## 2021-08-02 DIAGNOSIS — C61 PROSTATE CANCER (HCC): ICD-10-CM

## 2021-08-02 DIAGNOSIS — E78.5 HYPERLIPIDEMIA, UNSPECIFIED HYPERLIPIDEMIA TYPE: ICD-10-CM

## 2021-08-02 DIAGNOSIS — Z79.01 ANTICOAGULATED ON COUMADIN: ICD-10-CM

## 2021-08-02 DIAGNOSIS — I48.0 PAROXYSMAL ATRIAL FIBRILLATION (HCC): ICD-10-CM

## 2021-08-02 DIAGNOSIS — N40.1 BENIGN PROSTATIC HYPERPLASIA WITH URINARY FREQUENCY: ICD-10-CM

## 2021-08-02 DIAGNOSIS — R35.0 BENIGN PROSTATIC HYPERPLASIA WITH URINARY FREQUENCY: ICD-10-CM

## 2021-08-02 PROCEDURE — 99214 OFFICE O/P EST MOD 30 MIN: CPT | Performed by: NURSE PRACTITIONER

## 2021-08-02 PROCEDURE — G8417 CALC BMI ABV UP PARAM F/U: HCPCS | Performed by: NURSE PRACTITIONER

## 2021-08-02 PROCEDURE — 1123F ACP DISCUSS/DSCN MKR DOCD: CPT | Performed by: NURSE PRACTITIONER

## 2021-08-02 PROCEDURE — G8427 DOCREV CUR MEDS BY ELIG CLIN: HCPCS | Performed by: NURSE PRACTITIONER

## 2021-08-02 PROCEDURE — 3288F FALL RISK ASSESSMENT DOCD: CPT | Performed by: NURSE PRACTITIONER

## 2021-08-02 PROCEDURE — 1036F TOBACCO NON-USER: CPT | Performed by: NURSE PRACTITIONER

## 2021-08-02 PROCEDURE — 4040F PNEUMOC VAC/ADMIN/RCVD: CPT | Performed by: NURSE PRACTITIONER

## 2021-08-02 PROCEDURE — 3017F COLORECTAL CA SCREEN DOC REV: CPT | Performed by: NURSE PRACTITIONER

## 2021-08-02 SDOH — ECONOMIC STABILITY: FOOD INSECURITY: WITHIN THE PAST 12 MONTHS, THE FOOD YOU BOUGHT JUST DIDN'T LAST AND YOU DIDN'T HAVE MONEY TO GET MORE.: PATIENT DECLINED

## 2021-08-02 SDOH — ECONOMIC STABILITY: FOOD INSECURITY: WITHIN THE PAST 12 MONTHS, YOU WORRIED THAT YOUR FOOD WOULD RUN OUT BEFORE YOU GOT MONEY TO BUY MORE.: PATIENT DECLINED

## 2021-08-02 ASSESSMENT — PATIENT HEALTH QUESTIONNAIRE - PHQ9
SUM OF ALL RESPONSES TO PHQ QUESTIONS 1-9: 2
1. LITTLE INTEREST OR PLEASURE IN DOING THINGS: 1
SUM OF ALL RESPONSES TO PHQ QUESTIONS 1-9: 2
SUM OF ALL RESPONSES TO PHQ QUESTIONS 1-9: 2
SUM OF ALL RESPONSES TO PHQ9 QUESTIONS 1 & 2: 2
2. FEELING DOWN, DEPRESSED OR HOPELESS: 1

## 2021-08-02 ASSESSMENT — ENCOUNTER SYMPTOMS
COLOR CHANGE: 0
TROUBLE SWALLOWING: 0
NAUSEA: 0
EYE PAIN: 0
SHORTNESS OF BREATH: 0
SINUS PAIN: 0
ABDOMINAL PAIN: 0
BACK PAIN: 0
FACIAL SWELLING: 0
WHEEZING: 0
SORE THROAT: 0
VOMITING: 0
COUGH: 0
DIARRHEA: 0

## 2021-08-02 ASSESSMENT — LIFESTYLE VARIABLES: HOW OFTEN DO YOU HAVE A DRINK CONTAINING ALCOHOL: 0

## 2021-08-02 ASSESSMENT — SOCIAL DETERMINANTS OF HEALTH (SDOH): HOW HARD IS IT FOR YOU TO PAY FOR THE VERY BASICS LIKE FOOD, HOUSING, MEDICAL CARE, AND HEATING?: PATIENT DECLINED

## 2021-08-02 NOTE — PATIENT INSTRUCTIONS
A serving is 1 slice of bread, 1 ounce of dry cereal, or ½ cup of cooked rice, pasta, or cooked cereal. Try to choose whole-grain products as much as possible. · Limit lean meat, poultry, and fish to 2 servings each day. A serving is 3 ounces, about the size of a deck of cards. · Eat 4 to 5 servings of nuts, seeds, and legumes (cooked dried beans, lentils, and split peas) each week. A serving is 1/3 cup of nuts, 2 tablespoons of seeds, or ½ cup of cooked beans or peas. · Limit fats and oils to 2 to 3 servings each day. A serving is 1 teaspoon of vegetable oil or 2 tablespoons of salad dressing. · Limit sweets and added sugars to 5 servings or less a week. A serving is 1 tablespoon jelly or jam, ½ cup sorbet, or 1 cup of lemonade. · Eat less than 2,300 milligrams (mg) of sodium a day. If you limit your sodium to 1,500 mg a day, you can lower your blood pressure even more. · Be aware that all of these are the suggested number of servings for people who eat 1,800 to 2,000 calories a day. Your recommended number of servings may be different if you need more or fewer calories. Tips for success  · Start small. Do not try to make dramatic changes to your diet all at once. You might feel that you are missing out on your favorite foods and then be more likely to not follow the plan. Make small changes, and stick with them. Once those changes become habit, add a few more changes. · Try some of the following:  ? Make it a goal to eat a fruit or vegetable at every meal and at snacks. This will make it easy to get the recommended amount of fruits and vegetables each day. ? Try yogurt topped with fruit and nuts for a snack or healthy dessert. ? Add lettuce, tomato, cucumber, and onion to sandwiches. ? Combine a ready-made pizza crust with low-fat mozzarella cheese and lots of vegetable toppings. Try using tomatoes, squash, spinach, broccoli, carrots, cauliflower, and onions. ?  Have a variety of cut-up vegetables with a low-fat dip as an appetizer instead of chips and dip. ? Sprinkle sunflower seeds or chopped almonds over salads. Or try adding chopped walnuts or almonds to cooked vegetables. ? Try some vegetarian meals using beans and peas. Add garbanzo or kidney beans to salads. Make burritos and tacos with mashed dominguez beans or black beans. Where can you learn more? Go to https://Cennox.Fubles. org and sign in to your [employer] account. Enter F332 in the Clean Runner box to learn more about \"DASH Diet: Care Instructions. \"     If you do not have an account, please click on the \"Sign Up Now\" link. Current as of: August 31, 2020               Content Version: 12.9  © 4283-2279 Healthwise, Incorporated. Care instructions adapted under license by [facility] ([facility]). If you have questions about a medical condition or this instruction, always ask your healthcare professional. [address]  any warranty or liability for your use of this information.

## 2021-08-02 NOTE — PROGRESS NOTES
[facility]  [address] [place] [id number]  Dept: [phone]  Dept Fax: [phone]  Loc: [phone]     2021     Annemarie Mckeon (:  1950) is a 73 y.o. male, here for evaluation of the following medical concerns:    Chief Complaint   Patient presents with    Medicare [address]    Annual Exam     Here for annual visit. No labs completed for this visit. HPI  Treatment Adherence:   Medication compliance:  compliant all of the time  Diet compliance:  compliant most of the time  Weight trend: stable  Current exercise: no regular exercise  Barriers: none  Wt Readings from Last 3 Encounters:   21 240 lb 3.2 oz (109 kg)   21 240 lb (108.9 kg)   21 234 lb 6.4 oz (106.3 kg)       Hypertension:  Home blood pressure monitoring: No. Patient denies chest pain, shortness of breath and headache. Antihypertensive medication side effects: no medication side effects noted. Use of agents associated with hypertension: none. Sodium (meq/L)   Date Value   2021 140    BUN (mg/dL)   Date Value   2021 16    Glucose   Date Value   2021 80 mg/dL   2016 93 mg/dl      Potassium (meq/L)   Date Value   2021 3.7     Potassium reflex Magnesium (meq/L)   Date Value   2020 3.8    CREATININE (mg/dL)   Date Value   2021 1.0         Hyperlipidemia:  No new myalgias or GI upset on pravastatin. Lab Results   Component Value Date    CHOL 122 10/23/2019    TRIG 132 10/23/2019    HDL 27 2020    LDLCALC 67 10/23/2019     Lab Results   Component Value Date    ALT 32 2021    AST 26 2021        Pt follows up with Dr Nicky Hansen for a-fib and cardiac follow up. Last appt about a month ago. No new issues. Prostate cancer- Pt had surgery with Dr Darcy Mcpherson and is still having issues with leakage and has to wear a depends to keep from leaking.   He would like a second opinion about his treatment if possible. Patient Active Problem List   Diagnosis    Essential hypertension    Hyperlipidemia    Paroxysmal atrial fibrillation (HCC)    Obesity due to excess calories    Dizziness    History of gastroesophageal reflux (GERD)    Diastolic dysfunction    Anticoagulated on Coumadin    Urinary incontinence    Elevated PSA    Coronary artery disease involving native coronary artery of native heart without angina pectoris    Benign prostatic hyperplasia with urinary frequency    BPH with obstruction/lower urinary tract symptoms    Prostate cancer ([facility]    Fever    Encounter for therapeutic drug monitoring       Review of Systems   Constitutional: Negative for chills, fatigue and fever. HENT: Negative for congestion, facial swelling, sinus pain, sore throat and trouble swallowing. Eyes: Negative for pain and visual disturbance. Respiratory: Negative for cough, shortness of breath and wheezing. Cardiovascular: Negative for chest pain and palpitations. Gastrointestinal: Negative for abdominal pain, diarrhea, nausea and vomiting. Genitourinary: Negative for difficulty urinating, dysuria and urgency. Musculoskeletal: Negative for back pain, gait problem and neck pain. Skin: Negative for color change and rash. Neurological: Negative for dizziness, weakness and headaches. Psychiatric/Behavioral: Negative for agitation and sleep disturbance. The patient is not nervous/anxious. Prior to Visit Medications    Medication Sig Taking? Authorizing Provider   Handicap Placard MISC by Does not apply route Start 11/1/2021, good for 5 years.  Yes Jimi Mayanrd, NARESH - JO   warfarin (COUMADIN) 2.5 MG tablet Take as directed by [facility] Coumadin clinic (#40 ~ 30 day supply) Yes Tasneem Belcher MD   metoprolol succinate (TOPROL XL) 50 MG extended release tablet Take 1 tablet by mouth daily Yes Janet Bey MD   digoxin (LANOXIN) 125 MCG tablet TAKE 1 TABLET BY MOUTH ONCE DAILY Yes Josette Vergara MD   dilTIAZem (CARDIZEM CD) 360 MG extended release capsule Take 1 capsule by mouth once daily Yes Josette Vergara MD   nitroGLYCERIN (NITROSTAT) 0.4 MG SL tablet Place 1 tablet under the tongue every 5 minutes as needed for Chest pain Yes Josette Vergara MD   potassium chloride (KLOR-CON) 10 MEQ extended release tablet TAKE 1 TABLET BY MOUTH TWICE DAILY Yes Josette Vergara MD   pravastatin (PRAVACHOL) 80 MG tablet Take 1 tablet by mouth nightly Yes Josette Vergara MD   bisacodyl (DULCOLAX) 5 MG EC tablet Take 5 mg by mouth nightly  Yes Skyler Martinez MD   bumetanide (BUMEX) 2 MG tablet Take 1 tablet by mouth daily  Patient not taking: Reported on 8/2/2021  Josette Vergara MD        Social History     Tobacco Use    Smoking status: Never Smoker    Smokeless tobacco: Never Used   Substance Use Topics    Alcohol use: No        Vitals:    08/02/21 0806   BP: 136/84   Pulse: 76  Comment: irregular   Temp: 98.3 °F (36.8 °C)   TempSrc: Oral   Weight: 240 lb 3.2 oz (109 kg)   Height: 6' 0.44\" (1.84 m)     Estimated body mass index is 32.18 kg/m² as calculated from the following:    Height as of this encounter: 6' 0.44\" (1.84 m). Weight as of this encounter: 240 lb 3.2 oz (109 kg). Physical Exam  Vitals reviewed. Constitutional:       General: He is not in acute distress. Appearance: Normal appearance. He is well-developed. HENT:      Head: Normocephalic and atraumatic. Right Ear: Hearing, tympanic membrane, ear canal and external ear normal.      Left Ear: Hearing, tympanic membrane, ear canal and external ear normal.      Nose: Nose normal. No nasal tenderness. Mouth/Throat:      Lips: Pink. Mouth: Mucous membranes are moist. No oral lesions. Pharynx: Oropharynx is clear. Uvula midline. Eyes:      General:         Right eye: No discharge. Left eye: No discharge.       Conjunctiva/sclera: Conjunctivae normal.   Neck:      Vascular: No carotid bruit. Trachea: No tracheal deviation. Cardiovascular:      Rate and Rhythm: Normal rate and regular rhythm. Pulses: Normal pulses. Heart sounds: Normal heart sounds. No murmur heard. Pulmonary:      Effort: Pulmonary effort is normal. No respiratory distress. Breath sounds: Normal breath sounds. Abdominal:      General: Bowel sounds are normal.      Palpations: Abdomen is soft. Tenderness: There is no abdominal tenderness. Musculoskeletal:      Cervical back: Full passive range of motion without pain, normal range of motion and neck supple. Right lower leg: No edema. Left lower leg: No edema. Lymphadenopathy:      Head:      Right side of head: No submental, submandibular, tonsillar, preauricular, posterior auricular or occipital adenopathy. Left side of head: No submental, submandibular, tonsillar, preauricular, posterior auricular or occipital adenopathy. Cervical: No cervical adenopathy. Skin:     General: Skin is warm and dry. Findings: No rash. Neurological:      General: No focal deficit present. Mental Status: He is alert and oriented to person, place, and time. Coordination: Coordination normal.   Psychiatric:         Mood and Affect: Mood normal.         Behavior: Behavior normal.         Thought Content: Thought content normal.         Judgment: Judgment normal.         ASSESSMENT/PLAN:  1. Essential hypertension  - Comprehensive Metabolic Panel; Future  - TSH without Reflex; Future  - T4, Free; Future  - Handicap Placard MISC; by Does not apply route Start 11/1/2021, good for 5 years. Dispense: 1 each; Refill: 0    2. Hyperlipidemia, unspecified hyperlipidemia type  - Lipid Panel; Future  - Handicap Placard MISC; by Does not apply route Start 11/1/2021, good for 5 years. Dispense: 1 each; Refill: 0    3. Anticoagulated on Coumadin    4.  Paroxysmal atrial fibrillation ([facility]  - Handicap Placard [facility]; by Does not apply route Start 11/1/2021, good for 5 years. Dispense: 1 each; Refill: 0    5. Benign prostatic hyperplasia with urinary frequency    6. IFG (impaired fasting glucose)  - Hemoglobin A1C; Future    7. Prostate cancer [facility])  - External Referral To Urology  - Handicap Placard [facility]; by Does not apply route Start 11/1/2021, good for 5 years. Dispense: 1 each; Refill: 0    - Offered referral to Dr Waqas Ambriz at [facility] for second opinion for urology. - Have labs completed after 12 hours fasting  - Call office with any questions or concerns, or if symptoms are getting worse or changing      Return in about 6 months (around 2/2/2022), or if symptoms worsen or fail to improve, for Routine follow up, Wellness/Physical, Medicare AWV. Patient given educational materials - see patient instructions. Discussed use, benefit, and side effects of prescribed medications. All patient questions answered. Pt voiced understanding. Reviewed health maintenance. An electronic signature was used to authenticate this note.     --NARESH Abarca - CNP on 8/2/2021 at 12:52 PM

## 2021-08-17 RX ORDER — METOPROLOL SUCCINATE 50 MG/1
TABLET, EXTENDED RELEASE ORAL
Qty: 90 TABLET | Refills: 3 | Status: SHIPPED | OUTPATIENT
Start: 2021-08-17 | End: 2022-07-26

## 2021-08-23 DIAGNOSIS — Z79.01 ANTICOAGULATED ON COUMADIN: ICD-10-CM

## 2021-08-23 DIAGNOSIS — I48.0 PAROXYSMAL ATRIAL FIBRILLATION (HCC): Primary | ICD-10-CM

## 2021-08-25 ENCOUNTER — HOSPITAL ENCOUNTER (OUTPATIENT)
Dept: PHARMACY | Age: 71
Setting detail: THERAPIES SERIES
Discharge: HOME | End: 2021-08-25
Payer: MEDICARE

## 2021-08-25 DIAGNOSIS — Z79.01 ANTICOAGULATED ON COUMADIN: Primary | ICD-10-CM

## 2021-08-25 DIAGNOSIS — Z51.81 ENCOUNTER FOR THERAPEUTIC DRUG MONITORING: ICD-10-CM

## 2021-08-25 LAB — POC INR: 1.9 (ref 0.8–1.2)

## 2021-08-25 PROCEDURE — 36416 COLLJ CAPILLARY BLOOD SPEC: CPT

## 2021-08-25 PROCEDURE — 99211 OFF/OP EST MAY X REQ PHY/QHP: CPT

## 2021-08-25 PROCEDURE — 85610 PROTHROMBIN TIME: CPT

## 2021-08-25 NOTE — PROGRESS NOTES
Medication Management [address]   [phone] (phone)  [phone] (fax)    Mr. Ld Schafer is a 73 y.o.  male with history of Afib who presents today for anticoagulation monitoring and adjustment. Patient verifies current dosing regimen and tablet strength. No missed or extra doses. Patient denies s/s bleeding/bruising/swelling/SOB/chest pain  No blood in urine or stool. No dietary changes. No changes in medication/OTC agents/Herbals. - Patient is not taking bumetanide   No change in alcohol use or tobacco use. No change in activity level. Patient denies headaches/dizziness/lightheadedness/falls. No vomiting/diarrhea or acute illness. No Procedures scheduled in the future at this time. Assessment:   Lab Results   Component Value Date    INR 1.90 (H) 08/25/2021    INR 2.20 (H) 07/14/2021    INR 2.60 (H) 06/02/2021     INR subtherapeutic   Recent Labs     08/25/21  0708   INR 1.90*   Goal: 2-3    Plan:  Patient INR is only slightly subtherapeutic and today is the weekly high dose. Will continue Coumadin 5 mg on Wednesdays, 2.5 mg all other days. Recheck INR in 6 week(s). Patient reminded to call the Anticoagulation Clinic with any signs or symptoms of bleeding or with any medication changes. Patient given instructions utilizing the teach back method. After visit summary printed and reviewed with patient. Discharged ambulatory in no apparent distress.     For Pharmacy Admin Tracking Only   Time Spent (min): [address], PharmD, BCPS  8/25/2021  7:35 AM

## 2021-09-23 RX ORDER — DIGOXIN 125 MCG
TABLET ORAL
Qty: 90 TABLET | Refills: 3 | Status: SHIPPED | OUTPATIENT
Start: 2021-09-23 | End: 2022-09-13

## 2021-09-23 RX ORDER — BUMETANIDE 2 MG/1
2 TABLET ORAL DAILY
Qty: 90 TABLET | Refills: 3 | Status: SHIPPED | OUTPATIENT
Start: 2021-09-23 | End: 2022-01-19 | Stop reason: ALTCHOICE

## 2021-09-23 RX ORDER — POTASSIUM CHLORIDE 750 MG/1
TABLET, FILM COATED, EXTENDED RELEASE ORAL
Qty: 180 TABLET | Refills: 3 | Status: SHIPPED | OUTPATIENT
Start: 2021-09-23 | End: 2022-09-13

## 2021-09-23 NOTE — TELEPHONE ENCOUNTER
Caron Couch called requesting a refill on the following medications:  Requested Prescriptions     Pending Prescriptions Disp Refills    bumetanide (BUMEX) 2 MG tablet 90 tablet 3     Sig: Take 1 tablet by mouth daily    digoxin (LANOXIN) 125 MCG tablet 90 tablet 3     Sig: TAKE 1 TABLET BY MOUTH ONCE DAILY    potassium chloride (KLOR-CON) 10 MEQ extended release tablet 180 tablet 3     Sig: TAKE 1 TABLET BY MOUTH TWICE DAILY     Pharmacy verified:  .[facility]    Date of last visit: 07/29/2021  Date of next visit (if applicable): 73/34/4110

## 2021-09-29 RX ORDER — PRAVASTATIN SODIUM 80 MG/1
80 TABLET ORAL NIGHTLY
Qty: 90 TABLET | Refills: 3 | Status: SHIPPED | OUTPATIENT
Start: 2021-09-29 | End: 2022-05-02 | Stop reason: ALTCHOICE

## 2021-09-29 RX ORDER — DILTIAZEM HYDROCHLORIDE 360 MG/1
CAPSULE, EXTENDED RELEASE ORAL
Qty: 90 CAPSULE | Refills: 3 | Status: ON HOLD | OUTPATIENT
Start: 2021-09-29 | End: 2022-09-29 | Stop reason: HOSPADM

## 2021-10-04 ENCOUNTER — HOSPITAL ENCOUNTER (OUTPATIENT)
Age: 71
Discharge: HOME | End: 2021-10-04
Payer: MEDICARE

## 2021-10-04 DIAGNOSIS — Z79.01 ANTICOAGULATED ON COUMADIN: ICD-10-CM

## 2021-10-04 DIAGNOSIS — I10 ESSENTIAL HYPERTENSION: ICD-10-CM

## 2021-10-04 DIAGNOSIS — E78.5 HYPERLIPIDEMIA, UNSPECIFIED HYPERLIPIDEMIA TYPE: ICD-10-CM

## 2021-10-04 DIAGNOSIS — I48.0 PAROXYSMAL ATRIAL FIBRILLATION (HCC): ICD-10-CM

## 2021-10-04 DIAGNOSIS — R73.01 IFG (IMPAIRED FASTING GLUCOSE): ICD-10-CM

## 2021-10-04 LAB
ALBUMIN SERPL-MCNC: 4.4 G/DL (ref 3.5–5.1)
ALP BLD-CCNC: 86 U/L (ref 38–126)
ALT SERPL-CCNC: 28 U/L (ref 11–66)
ANION GAP SERPL CALCULATED.3IONS-SCNC: 11 MEQ/L (ref 8–16)
AST SERPL-CCNC: 23 U/L (ref 5–40)
AVERAGE GLUCOSE: 126 MG/DL (ref 70–126)
BILIRUB SERPL-MCNC: 0.4 MG/DL (ref 0.3–1.2)
BUN BLDV-MCNC: 19 MG/DL (ref 7–22)
CALCIUM SERPL-MCNC: 9.8 MG/DL (ref 8.5–10.5)
CHLORIDE BLD-SCNC: 104 MEQ/L (ref 98–111)
CHOLESTEROL, TOTAL: 151 MG/DL (ref 100–199)
CO2: 29 MEQ/L (ref 23–33)
CREAT SERPL-MCNC: 1 MG/DL (ref 0.4–1.2)
GFR SERPL CREATININE-BSD FRML MDRD: 74 ML/MIN/1.73M2
GLUCOSE BLD-MCNC: 113 MG/DL (ref 70–108)
HBA1C MFR BLD: 6.2 % (ref 4.4–6.4)
HCT VFR BLD CALC: 51.9 % (ref 42–52)
HDLC SERPL-MCNC: 31 MG/DL
HEMOGLOBIN: 17.5 GM/DL (ref 14–18)
LDL CHOLESTEROL CALCULATED: 85 MG/DL
POTASSIUM SERPL-SCNC: 4.4 MEQ/L (ref 3.5–5.2)
SODIUM BLD-SCNC: 144 MEQ/L (ref 135–145)
T4 FREE: 1.2 NG/DL (ref 0.93–1.76)
TOTAL PROTEIN: 7 G/DL (ref 6.1–8)
TRIGL SERPL-MCNC: 173 MG/DL (ref 0–199)
TSH SERPL DL<=0.05 MIU/L-ACNC: 4.16 UIU/ML (ref 0.4–4.2)

## 2021-10-04 PROCEDURE — 85018 HEMOGLOBIN: CPT

## 2021-10-04 PROCEDURE — 84439 ASSAY OF FREE THYROXINE: CPT

## 2021-10-04 PROCEDURE — 80061 LIPID PANEL: CPT

## 2021-10-04 PROCEDURE — 36415 COLL VENOUS BLD VENIPUNCTURE: CPT

## 2021-10-04 PROCEDURE — 84443 ASSAY THYROID STIM HORMONE: CPT

## 2021-10-04 PROCEDURE — 80053 COMPREHEN METABOLIC PANEL: CPT

## 2021-10-04 PROCEDURE — 83036 HEMOGLOBIN GLYCOSYLATED A1C: CPT

## 2021-10-04 PROCEDURE — 85014 HEMATOCRIT: CPT

## 2021-10-06 ENCOUNTER — HOSPITAL ENCOUNTER (OUTPATIENT)
Dept: PHARMACY | Age: 71
Setting detail: THERAPIES SERIES
Discharge: HOME | End: 2021-10-06
Payer: MEDICARE

## 2021-10-06 DIAGNOSIS — Z79.01 ANTICOAGULATED ON COUMADIN: Primary | ICD-10-CM

## 2021-10-06 DIAGNOSIS — Z51.81 ENCOUNTER FOR THERAPEUTIC DRUG MONITORING: ICD-10-CM

## 2021-10-06 LAB — POC INR: 1.9 (ref 0.8–1.2)

## 2021-10-06 PROCEDURE — 36416 COLLJ CAPILLARY BLOOD SPEC: CPT

## 2021-10-06 PROCEDURE — 99211 OFF/OP EST MAY X REQ PHY/QHP: CPT

## 2021-10-06 PROCEDURE — 85610 PROTHROMBIN TIME: CPT

## 2021-10-06 NOTE — PROGRESS NOTES
Medication Management [address]  [phone] (phone)  [phone] (fax)    Mr. Tristen Sebastian is a 73 y.o.  male with history of Afib who presents today for anticoagulation monitoring and adjustment. Patient verifies current dosing regimen and tablet strength. No missed or extra doses. Patient denies s/s bleeding/swelling/SOB/chest pain. Patient has one bruise from where he had blood drawn this week, but this is improving. No blood in urine or stool. No dietary changes. No changes in medication/OTC agents/Herbals. No change in alcohol use or tobacco use. No change in activity level. Patient denies headaches/dizziness/lightheadedness/falls. No vomiting/diarrhea or acute illness. No Procedures scheduled in the future at this time. Assessment:   Lab Results   Component Value Date    INR 1.90 (H) 10/06/2021    INR 1.90 (H) 08/25/2021    INR 2.20 (H) 07/14/2021     INR subtherapeutic   Recent Labs     10/06/21  0712   INR 1.90*     Two consecutive subtherapeutic INR's. Results for Hazel Null (MRN [id number]) as of 10/6/2021 07:08   Ref. Range 3/17/2021 02:55 3/26/2021 12:10 10/4/2021 07:13   Hemoglobin Quant Latest Ref Range: 14.0 - 18.0 gm/dl 17.3 17.9 17.5   Hematocrit Latest Ref Range: 42.0 - 52.0 % 50.9 55.0 (H) 51.9     H&H stable - Reviewed with patient. Plan:  Take Coumadin 5 mg today 10/6, then increase Coumadin to 5 mg MF and 2.5 mg all other days (12.5% increase due to two subtherapeutic INRs). Recheck INR in 3 week(s) on 10/6/21 (normally every 6 weeks). Patient reminded to call the Anticoagulation Clinic with signs or symptoms of bleeding or with any medication changes. Patient given instructions utilizing the teach back method. After visit summary printed and reviewed with patient. Discharged ambulatory in no apparent distress.     For Pharmacy Admin Tracking Only     Intervention Detail: Dose Adjustment: 1, reason: Therapy Optimization   Total # of Interventions Recommended: 1   Total # of Interventions Accepted: 1   Time Spent (min): [address] Stanley PharmD   10/6/2021, 7:29 AM

## 2021-10-12 ENCOUNTER — TELEPHONE (OUTPATIENT)
Dept: FAMILY MEDICINE CLINIC | Age: 71
End: 2021-10-12

## 2021-10-12 NOTE — TELEPHONE ENCOUNTER
I am assuming this is in reference to patient's previous diagnosis of prostate cancer. Usually, this is monitored per the urologist.  It appears the patient's last PSA was done in April 2021 and undetectable. Please encourage patient to follow-up with his urologist at this time.   ES    Lab Results   Component Value Date    PSA <0.02 04/16/2021    PSA <0.02 01/18/2021    PSA 3.60 (H) 08/04/2020

## 2021-10-12 NOTE — TELEPHONE ENCOUNTER
Discussed with patient. He states that management of PSA was discussed with WS at last OV and she was agreeable to follow. Nothing further needed at this time.

## 2021-10-27 ENCOUNTER — HOSPITAL ENCOUNTER (OUTPATIENT)
Dept: PHARMACY | Age: 71
Setting detail: THERAPIES SERIES
Discharge: HOME | End: 2021-10-27
Payer: MEDICARE

## 2021-10-27 ENCOUNTER — TELEPHONE (OUTPATIENT)
Dept: FAMILY MEDICINE CLINIC | Age: 71
End: 2021-10-27

## 2021-10-27 DIAGNOSIS — Z51.81 ENCOUNTER FOR THERAPEUTIC DRUG MONITORING: ICD-10-CM

## 2021-10-27 DIAGNOSIS — Z79.01 ANTICOAGULATED ON COUMADIN: Primary | ICD-10-CM

## 2021-10-27 LAB — POC INR: 3.2 (ref 0.8–1.2)

## 2021-10-27 PROCEDURE — 36416 COLLJ CAPILLARY BLOOD SPEC: CPT

## 2021-10-27 PROCEDURE — 85610 PROTHROMBIN TIME: CPT

## 2021-10-27 PROCEDURE — 99211 OFF/OP EST MAY X REQ PHY/QHP: CPT

## 2021-10-27 NOTE — TELEPHONE ENCOUNTER
Yes, patient due for final Pneumovax after 10/4/2021, therefore, it is okay to add and do this at this time of his upcoming flu shot also.   ES

## 2021-10-27 NOTE — PROGRESS NOTES
Medication Management [address]  [phone] (phone)  [phone] (fax)    Mr. Talisha Chand is a 73 y.o.  male with history of Afib who presents today for anticoagulation monitoring and adjustment. Patient verifies current dosing regimen and tablet strength. No missed or extra doses. Patient denies s/s bleeding/bruising/swelling/chest pain. Patient states he has had a little SOB with a scratchy/sore throat. No blood in urine or stool. Patient states he was eating large bowls of cabbage soup ~2 days in a row prior to last visit. Counseled patient on Vitamin K intake. No changes in medication/Herbals. Patient states he was taking something OTC for his throat, but does not remember the name. He states he is done taking this. No change in alcohol use or tobacco use. No change in activity level. Patient denies headaches/dizziness/lightheadedness/falls. No vomiting/diarrhea or acute illness. No Procedures scheduled in the future at this time. Assessment:   Lab Results   Component Value Date    INR 3.20 (H) 10/27/2021    INR 1.90 (H) 10/06/2021    INR 1.90 (H) 08/25/2021     INR supratherapeutic   Recent Labs     10/27/21  0708   INR 3.20*     Patient presents with supratherapeutic INR today following a recent dose adjustment. Patient may have been subtherapeutic at last visit due to increased intake of cabbage at that time. Previously, patient was stable prior to dose/dietary changes. Plan:  Coumadin 1.25 mg x 1 dose today 10/27/21 then decrease Coumadin from 5 mg MF and 2.5 mg TuWThSaSu to Coumadin 5 mg W and 2.5 mg MTuThFSaSu (11.1% decrease). Recheck INR in 2 week(s). Patient reminded to call the Anticoagulation Clinic with signs or symptoms of bleeding or with any medication changes. Patient given instructions utilizing the teach back method. After visit summary printed and reviewed with patient.       Discharged ambulatory in no apparent distress.     For Pharmacy Admin Tracking Only     Intervention Detail: Dose Adjustment: 1, reason: Therapy Optimization   Total # of Interventions Recommended: 1   Total # of Interventions Accepted: 1   Time Spent (min): [address], PharmD, BCPS  10/27/2021  9:35 AM

## 2021-10-29 ENCOUNTER — NURSE ONLY (OUTPATIENT)
Dept: FAMILY MEDICINE CLINIC | Age: 71
End: 2021-10-29
Payer: MEDICARE

## 2021-10-29 DIAGNOSIS — Z23 NEED FOR 23-POLYVALENT PNEUMOCOCCAL POLYSACCHARIDE VACCINE: ICD-10-CM

## 2021-10-29 DIAGNOSIS — Z23 NEEDS FLU SHOT: Primary | ICD-10-CM

## 2021-10-29 PROCEDURE — 90694 VACC AIIV4 NO PRSRV 0.5ML IM: CPT | Performed by: NURSE PRACTITIONER

## 2021-10-29 PROCEDURE — G0009 ADMIN PNEUMOCOCCAL VACCINE: HCPCS | Performed by: NURSE PRACTITIONER

## 2021-10-29 PROCEDURE — 90732 PPSV23 VACC 2 YRS+ SUBQ/IM: CPT | Performed by: NURSE PRACTITIONER

## 2021-10-29 PROCEDURE — G0008 ADMIN INFLUENZA VIRUS VAC: HCPCS | Performed by: NURSE PRACTITIONER

## 2021-11-10 ENCOUNTER — HOSPITAL ENCOUNTER (OUTPATIENT)
Dept: PHARMACY | Age: 71
Setting detail: THERAPIES SERIES
Discharge: HOME | End: 2021-11-10
Payer: MEDICARE

## 2021-11-10 DIAGNOSIS — Z51.81 ENCOUNTER FOR THERAPEUTIC DRUG MONITORING: ICD-10-CM

## 2021-11-10 DIAGNOSIS — Z79.01 ANTICOAGULATED ON COUMADIN: Primary | ICD-10-CM

## 2021-11-10 LAB — POC INR: 1.9 (ref 0.8–1.2)

## 2021-11-10 PROCEDURE — 36416 COLLJ CAPILLARY BLOOD SPEC: CPT | Performed by: PHARMACIST

## 2021-11-10 PROCEDURE — 85610 PROTHROMBIN TIME: CPT | Performed by: PHARMACIST

## 2021-11-10 PROCEDURE — 99211 OFF/OP EST MAY X REQ PHY/QHP: CPT | Performed by: PHARMACIST

## 2021-11-10 NOTE — PROGRESS NOTES
Medication Management [address]  [phone] (phone)  [phone] (fax)    Mr. Tristen Sebastian is a 73 y.o.  male with history of Afib who presents today for anticoagulation monitoring and adjustment. Patient verifies current dosing regimen and tablet strength. No missed or extra doses. Patient denies s/s bleeding/bruising/swelling/SOB/chest pain  No blood in urine or stool. No dietary changes. Chicken salad sandwich yesterday with mayonnaise. No changes in medication/OTC agents/Herbals. No change in alcohol use or tobacco use. No change in activity level. Patient denies headaches/dizziness/lightheadedness/falls. No vomiting/diarrhea or acute illness. No Procedures scheduled in the future at this time. Colonoscopy cancelled for now. Assessment:   Lab Results   Component Value Date    INR 1.90 (H) 11/10/2021    INR 3.20 (H) 10/27/2021    INR 1.90 (H) 10/06/2021     INR subtherapeutic   Recent Labs     11/10/21  0706   INR 1.90*       Plan:  Recent dose adjustment last visit 10/27/21. Patient is due for bigger dose today. Continue Coumadin 5mg W 2.5mg SMTuThFS. Recheck INR in 4 week(s). Patient reminded to call the Anticoagulation Clinic with any signs or symptoms of bleeding or with any medication changes. Patient given instructions utilizing the teach back method. After visit summary printed and reviewed with patient. Discharged ambulatory in no apparent distress.     For Pharmacy Admin Tracking Only     Intervention Detail:    Total # of Interventions Recommended: 0   Total # of Interventions Accepted: 0   Time Spent (min): 20     Ana Cristina Larios PharmD 11/10/2021 7:15 AM

## 2021-12-08 ENCOUNTER — HOSPITAL ENCOUNTER (OUTPATIENT)
Dept: PHARMACY | Age: 71
Setting detail: THERAPIES SERIES
Discharge: HOME | End: 2021-12-08
Payer: MEDICARE

## 2021-12-08 DIAGNOSIS — Z79.01 ANTICOAGULATED ON COUMADIN: Primary | ICD-10-CM

## 2021-12-08 DIAGNOSIS — Z51.81 ENCOUNTER FOR THERAPEUTIC DRUG MONITORING: ICD-10-CM

## 2021-12-08 LAB — POC INR: 2 (ref 0.8–1.2)

## 2021-12-08 PROCEDURE — 85610 PROTHROMBIN TIME: CPT

## 2021-12-08 PROCEDURE — 99211 OFF/OP EST MAY X REQ PHY/QHP: CPT

## 2021-12-08 PROCEDURE — 36416 COLLJ CAPILLARY BLOOD SPEC: CPT

## 2021-12-08 NOTE — PROGRESS NOTES
Medication Management [address]  [phone] (phone)  [phone] (fax)    Mr. Arnaud Watson is a 73 y.o.  male with history of Afib who presents today for anticoagulation monitoring and adjustment. Patient verifies current dosing regimen and tablet strength. No missed or extra doses. Patient denies s/s bleeding/bruising/swelling/SOB/chest pain  No blood in urine or stool. No dietary changes. No changes in medication/OTC agents/Herbals. No change in alcohol use or tobacco use. No change in activity level. Patient denies headaches/dizziness/lightheadedness/falls. No vomiting/diarrhea or acute illness. No Procedures scheduled in the future at this time. Assessment:   Lab Results   Component Value Date    INR 2.00 (H) 12/08/2021    INR 1.90 (H) 11/10/2021    INR 3.20 (H) 10/27/2021     INR therapeutic   Recent Labs     12/08/21  0706   INR 2.00*     Patient has been stable on the current regimen since September of 2018    Plan:  Continue Coumadin 5 mg W, 2.5 mg MTThFSS. Recheck INR in 6 week(s). Patient reminded to call the Anticoagulation Clinic with any signs or symptoms of bleeding or with any medication changes. Patient given instructions utilizing the teach back method. After visit summary printed and reviewed with patient. Discharged ambulatory in no apparent distress.         For Pharmacy Admin Tracking Only     Time Spent (min): 85[address] Gandhi PharmD, BCPS  12/8/2021  7:22 AM

## 2021-12-21 RX ORDER — NITROGLYCERIN 0.4 MG/1
TABLET SUBLINGUAL
Qty: 25 TABLET | Refills: 2 | Status: SHIPPED | OUTPATIENT
Start: 2021-12-21

## 2021-12-27 RX ORDER — NITROGLYCERIN 0.4 MG/1
TABLET SUBLINGUAL
Qty: 25 TABLET | Refills: 2 | OUTPATIENT
Start: 2021-12-27

## 2021-12-27 NOTE — TELEPHONE ENCOUNTER
Cj Landin called requesting a refill on the following medications:  Requested Prescriptions     Pending Prescriptions Disp Refills    nitroGLYCERIN (NITROSTAT) 0.4 MG SL tablet 25 tablet 2     Sig: up to max of 3 total doses. If no relief after 1 dose, call 911.      Pharmacy verified:  [facility]    Date of last visit: 07/29/2021  Date of next visit (if applicable): Visit date not found

## 2022-01-19 ENCOUNTER — HOSPITAL ENCOUNTER (OUTPATIENT)
Dept: PHARMACY | Age: 72
Setting detail: THERAPIES SERIES
Discharge: HOME | End: 2022-01-19
Payer: MEDICARE

## 2022-01-19 DIAGNOSIS — Z79.01 ANTICOAGULATED ON COUMADIN: Primary | ICD-10-CM

## 2022-01-19 DIAGNOSIS — Z51.81 ENCOUNTER FOR THERAPEUTIC DRUG MONITORING: ICD-10-CM

## 2022-01-19 LAB — POC INR: 2.5 (ref 0.8–1.2)

## 2022-01-19 PROCEDURE — 99211 OFF/OP EST MAY X REQ PHY/QHP: CPT

## 2022-01-19 PROCEDURE — 36416 COLLJ CAPILLARY BLOOD SPEC: CPT

## 2022-01-19 PROCEDURE — 85610 PROTHROMBIN TIME: CPT

## 2022-01-19 NOTE — PROGRESS NOTES
Medication Management [address]   [phone] (phone)  [phone] (fax)    Mr. Reilly Mohan is a 81 y.o.  male with history of Afib who presents today for anticoagulation monitoring and adjustment. Patient verifies current dosing regimen and tablet strength. No missed or extra doses. Patient denies s/s bleeding/bruising/swelling/SOB/chest pain-Patient reports a blood blister on his leg, states this has happened twice now but it is healed now. No blood in urine or stool. No dietary changes. No changes in medication/OTC agents/Herbals. No change in alcohol use or tobacco use. No change in activity level. Patient denies headaches/dizziness/lightheadedness/falls. No vomiting/diarrhea or acute illness. No Procedures scheduled in the future at this time. Assessment:   Lab Results   Component Value Date    INR 2.50 (H) 01/19/2022    INR 2.00 (H) 12/08/2021    INR 1.90 (H) 11/10/2021     INR therapeutic   Recent Labs     01/19/22  0715   INR 2.50*         Plan:  Continue Coumadin 5 mg W, 2.5 mg MTThFSS. Recheck INR in 6 week(s). Patient reminded to call the Anticoagulation Clinic with any signs or symptoms of bleeding or with any medication changes. Patient given instructions utilizing the teach back method. After visit summary printed and reviewed with patient. Discharged ambulatory in no apparent distress.         For Pharmacy Admin Tracking Only       Time Spent (min): 5861 Cooper Gandhi PharmD, BCPS  1/19/2022  7:18 AM

## 2022-02-08 ENCOUNTER — TELEPHONE (OUTPATIENT)
Dept: FAMILY MEDICINE CLINIC | Age: 72
End: 2022-02-08

## 2022-02-08 DIAGNOSIS — Z12.5 SCREENING FOR PROSTATE CANCER: ICD-10-CM

## 2022-02-08 DIAGNOSIS — R73.01 IFG (IMPAIRED FASTING GLUCOSE): ICD-10-CM

## 2022-02-08 DIAGNOSIS — I25.10 CORONARY ARTERY DISEASE INVOLVING NATIVE CORONARY ARTERY OF NATIVE HEART WITHOUT ANGINA PECTORIS: ICD-10-CM

## 2022-02-08 DIAGNOSIS — E78.5 HYPERLIPIDEMIA, UNSPECIFIED HYPERLIPIDEMIA TYPE: Primary | ICD-10-CM

## 2022-02-08 NOTE — TELEPHONE ENCOUNTER
Pt stopped in the office requesting an order for lab work. States he wants his psa levels checked and his other normal testing that he would be due for. Pt has appt in Aug for his AWV.      Mail to pt when ready

## 2022-02-08 NOTE — TELEPHONE ENCOUNTER
Patient not due for all labs at this time. Would recommend checking HG A1c, TG, HDL, D-LDL, spot MA, CBC, and PSA after April 16, 2022. Diagnoses: Impaired fasting glucose, hyperlipidemia, coronary artery disease, screening PSA.   ES

## 2022-03-02 ENCOUNTER — HOSPITAL ENCOUNTER (OUTPATIENT)
Dept: PHARMACY | Age: 72
Setting detail: THERAPIES SERIES
Discharge: HOME | End: 2022-03-02
Payer: MEDICARE

## 2022-03-02 DIAGNOSIS — Z79.01 ANTICOAGULATED ON COUMADIN: Primary | ICD-10-CM

## 2022-03-02 DIAGNOSIS — Z51.81 ENCOUNTER FOR THERAPEUTIC DRUG MONITORING: ICD-10-CM

## 2022-03-02 LAB — POC INR: 1.9 (ref 0.8–1.2)

## 2022-03-02 PROCEDURE — 99211 OFF/OP EST MAY X REQ PHY/QHP: CPT

## 2022-03-02 PROCEDURE — 85610 PROTHROMBIN TIME: CPT

## 2022-03-02 PROCEDURE — 36416 COLLJ CAPILLARY BLOOD SPEC: CPT

## 2022-03-02 NOTE — PROGRESS NOTES
Medication Management [address]  [phone] (phone)  [phone] (fax)    Mr. Mcihelle Pepper is a 81 y.o.  male with history of Afib who presents today for anticoagulation monitoring and adjustment. Patient verifies current dosing regimen and tablet strength. No missed or extra doses. Patient denies s/s bleeding/bruising/swelling/SOB/chest pain  No blood in urine or stool. No dietary changes. No changes in medication/OTC agents/Herbals. No change in alcohol use or tobacco use. No change in activity level. Patient denies headaches/dizziness/lightheadedness/falls. No vomiting/diarrhea or acute illness. No Procedures scheduled in the future at this time.-Patient thinks he threw out his back. He plans to see a provider to see if surgery is necessary. Assessment:   Lab Results   Component Value Date    INR 1.90 (H) 03/02/2022    INR 2.50 (H) 01/19/2022    INR 2.00 (H) 12/08/2021     INR subtherapeutic   Recent Labs     03/02/22  0703   INR 1.90*     Patient has been stable on the current regimen since 2018. Plan:  Coumadin 5 mg x 1, Coumadin 3.75 mg x 1 then continue Coumadin 5 mg W, 2.5 mg MTThFSS. Recheck INR in 6 week(s). Patient reminded to call the Anticoagulation Clinic with any signs or symptoms of bleeding or with any medication changes. Patient given instructions utilizing the teach back method. After visit summary printed and reviewed with patient. Discharged ambulatory in no apparent distress.         For Pharmacy Admin Tracking Only     Intervention Detail: Dose Adjustment: 1, reason: Therapy Optimization   Total # of Interventions Recommended: 1   Total # of Interventions Accepted: 1   Time Spent (min): 8751 Cooper Gandhi PharmD, BCPS  3/2/2022  7:14 AM

## 2022-04-13 ENCOUNTER — HOSPITAL ENCOUNTER (OUTPATIENT)
Dept: PHARMACY | Age: 72
Setting detail: THERAPIES SERIES
Discharge: HOME | End: 2022-04-13
Payer: MEDICARE

## 2022-04-13 DIAGNOSIS — Z51.81 ENCOUNTER FOR THERAPEUTIC DRUG MONITORING: ICD-10-CM

## 2022-04-13 DIAGNOSIS — Z79.01 ANTICOAGULATED ON COUMADIN: Primary | ICD-10-CM

## 2022-04-13 LAB — POC INR: 2.4 (ref 0.8–1.2)

## 2022-04-13 PROCEDURE — 36416 COLLJ CAPILLARY BLOOD SPEC: CPT

## 2022-04-13 PROCEDURE — 85610 PROTHROMBIN TIME: CPT

## 2022-04-13 PROCEDURE — 99211 OFF/OP EST MAY X REQ PHY/QHP: CPT

## 2022-04-13 NOTE — PROGRESS NOTES
Medication Management [address] [place] [address]  [phone] (phone)  [phone] (fax)    Mr. Edyta Delcid is a 81 y.o.  male with history of Afib who presents today for anticoagulation monitoring and adjustment. Patient verifies current dosing regimen and tablet strength. No missed or extra doses. - Missed 5 doses of Coumadin prior to recent colonoscopy on 3/28/22. Coumadin Clinic was not notified of this procedure. Patient denies s/s bleeding/bruising/swelling/SOB/chest pain  No blood in urine or stool. No dietary changes. No changes in medication/OTC agents/Herbals. No change in alcohol use or tobacco use. No change in activity level. Patient denies headaches/dizziness/lightheadedness/falls. No vomiting/diarrhea or acute illness. No Procedures scheduled in the future at this time. Assessment:   Lab Results   Component Value Date    INR 2.40 (H) 04/13/2022    INR 1.90 (H) 03/02/2022    INR 2.50 (H) 01/19/2022     INR therapeutic   Recent Labs     04/13/22  0712   INR 2.40*     Plan:  Continue Coumadin 5 mg on Wednesdays, 2.5 mg all other days. Recheck INR in 6 week(s). Patient reminded to call the Anticoagulation Clinic with any signs or symptoms of bleeding or with any medication changes. Patient given instructions utilizing the teach back method. After visit summary printed and reviewed with patient. Discharged ambulatory in no apparent distress.     For Pharmacy Admin Tracking Only   Time Spent (min): [address], PharmD, BCPS  4/13/2022  7:21 AM

## 2022-04-18 DIAGNOSIS — I48.0 PAROXYSMAL ATRIAL FIBRILLATION (HCC): Primary | ICD-10-CM

## 2022-04-19 ENCOUNTER — TELEPHONE (OUTPATIENT)
Dept: PHARMACY | Age: 72
End: 2022-04-19

## 2022-04-19 ENCOUNTER — HOSPITAL ENCOUNTER (OUTPATIENT)
Age: 72
Discharge: HOME | End: 2022-04-19
Payer: MEDICARE

## 2022-04-19 ENCOUNTER — TELEPHONE (OUTPATIENT)
Dept: FAMILY MEDICINE CLINIC | Age: 72
End: 2022-04-19

## 2022-04-19 DIAGNOSIS — E78.5 HYPERLIPIDEMIA, UNSPECIFIED HYPERLIPIDEMIA TYPE: ICD-10-CM

## 2022-04-19 DIAGNOSIS — I25.10 CORONARY ARTERY DISEASE INVOLVING NATIVE CORONARY ARTERY OF NATIVE HEART WITHOUT ANGINA PECTORIS: ICD-10-CM

## 2022-04-19 DIAGNOSIS — Z12.5 SCREENING FOR PROSTATE CANCER: ICD-10-CM

## 2022-04-19 DIAGNOSIS — R73.01 IFG (IMPAIRED FASTING GLUCOSE): ICD-10-CM

## 2022-04-19 LAB
AVERAGE GLUCOSE: 129 MG/DL (ref 70–126)
BASOPHILS # BLD: 0.9 %
BASOPHILS ABSOLUTE: 0.1 THOU/MM3 (ref 0–0.1)
CREATININE, URINE: 201.8 MG/DL
EOSINOPHIL # BLD: 2.5 %
EOSINOPHILS ABSOLUTE: 0.2 THOU/MM3 (ref 0–0.4)
ERYTHROCYTE [DISTWIDTH] IN BLOOD BY AUTOMATED COUNT: 13.6 % (ref 11.5–14.5)
ERYTHROCYTE [DISTWIDTH] IN BLOOD BY AUTOMATED COUNT: 42.9 FL (ref 35–45)
HBA1C MFR BLD: 6.3 % (ref 4.4–6.4)
HCT VFR BLD CALC: 55.4 % (ref 42–52)
HDLC SERPL-MCNC: 28 MG/DL
HEMOGLOBIN: 18.2 GM/DL (ref 14–18)
IMMATURE GRANS (ABS): 0.02 THOU/MM3 (ref 0–0.07)
IMMATURE GRANULOCYTES: 0.2 %
LDL CHOLESTEROL DIRECT: 88.85 MG/DL
LYMPHOCYTES # BLD: 31 %
LYMPHOCYTES ABSOLUTE: 2.8 THOU/MM3 (ref 1–4.8)
MCH RBC QN AUTO: 29 PG (ref 26–33)
MCHC RBC AUTO-ENTMCNC: 32.9 GM/DL (ref 32.2–35.5)
MCV RBC AUTO: 88.2 FL (ref 80–94)
MICROALBUMIN UR-MCNC: 65.73 MG/DL
MICROALBUMIN/CREAT UR-RTO: 326 MG/G (ref 0–30)
MONOCYTES # BLD: 5.6 %
MONOCYTES ABSOLUTE: 0.5 THOU/MM3 (ref 0.4–1.3)
NUCLEATED RED BLOOD CELLS: 0 /100 WBC
PLATELET # BLD: 257 THOU/MM3 (ref 130–400)
PMV BLD AUTO: 10.4 FL (ref 9.4–12.4)
PROSTATE SPECIFIC ANTIGEN: < 0.02 NG/ML (ref 0–1)
RBC # BLD: 6.28 MILL/MM3 (ref 4.7–6.1)
SEG NEUTROPHILS: 59.8 %
SEGMENTED NEUTROPHILS ABSOLUTE COUNT: 5.3 THOU/MM3 (ref 1.8–7.7)
TRIGL SERPL-MCNC: 119 MG/DL (ref 0–199)
WBC # BLD: 8.9 THOU/MM3 (ref 4.8–10.8)

## 2022-04-19 PROCEDURE — 83036 HEMOGLOBIN GLYCOSYLATED A1C: CPT

## 2022-04-19 PROCEDURE — 83718 ASSAY OF LIPOPROTEIN: CPT

## 2022-04-19 PROCEDURE — G0103 PSA SCREENING: HCPCS

## 2022-04-19 PROCEDURE — 85025 COMPLETE CBC W/AUTO DIFF WBC: CPT

## 2022-04-19 PROCEDURE — 82043 UR ALBUMIN QUANTITATIVE: CPT

## 2022-04-19 PROCEDURE — 83721 ASSAY OF BLOOD LIPOPROTEIN: CPT

## 2022-04-19 PROCEDURE — 84478 ASSAY OF TRIGLYCERIDES: CPT

## 2022-04-19 PROCEDURE — 36415 COLL VENOUS BLD VENIPUNCTURE: CPT

## 2022-04-19 RX ORDER — WARFARIN SODIUM 2.5 MG/1
TABLET ORAL
Qty: 40 TABLET | Refills: 0 | Status: SHIPPED | OUTPATIENT
Start: 2022-04-19 | End: 2022-04-21 | Stop reason: SDUPTHER

## 2022-04-19 RX ORDER — WARFARIN SODIUM 2.5 MG/1
TABLET ORAL
Qty: 40 TABLET | Refills: 11 | Status: ON HOLD | OUTPATIENT
Start: 2022-04-19 | End: 2022-06-04 | Stop reason: SDUPTHER

## 2022-04-19 NOTE — TELEPHONE ENCOUNTER
----- Message from [facility] sent at 4/19/2022 12:56 PM EDT -----  Contact: Dara Mccormack would like us to call in a refill for his coumadin to [facility] on [address].

## 2022-04-19 NOTE — TELEPHONE ENCOUNTER
Spoke with pt. He wants to have a sit down conversation to figure out the next steps. He scheduled an appointment with [address] [place] [address] for 04/21/2022. They will discuss all the medications at that time.

## 2022-04-19 NOTE — TELEPHONE ENCOUNTER
Noted. New prescription sent to [facility] on [address].     Reina Gottron, PharmD   4/19/2022, 1:31 PM

## 2022-04-21 ENCOUNTER — HOSPITAL ENCOUNTER (OUTPATIENT)
Dept: GENERAL RADIOLOGY | Age: 72
Discharge: HOME | End: 2022-04-21
Payer: MEDICARE

## 2022-04-21 ENCOUNTER — HOSPITAL ENCOUNTER (OUTPATIENT)
Age: 72
Discharge: HOME | End: 2022-04-21
Payer: MEDICARE

## 2022-04-21 ENCOUNTER — OFFICE VISIT (OUTPATIENT)
Dept: FAMILY MEDICINE CLINIC | Age: 72
End: 2022-04-21
Payer: MEDICARE

## 2022-04-21 VITALS
BODY MASS INDEX: 32.02 KG/M2 | RESPIRATION RATE: 16 BRPM | DIASTOLIC BLOOD PRESSURE: 86 MMHG | WEIGHT: 239 LBS | TEMPERATURE: 97.8 F | SYSTOLIC BLOOD PRESSURE: 132 MMHG | HEART RATE: 76 BPM

## 2022-04-21 DIAGNOSIS — E78.5 HYPERLIPIDEMIA, UNSPECIFIED HYPERLIPIDEMIA TYPE: Primary | ICD-10-CM

## 2022-04-21 DIAGNOSIS — R09.89 CHRONIC THROAT CLEARING: ICD-10-CM

## 2022-04-21 DIAGNOSIS — I10 ESSENTIAL HYPERTENSION: ICD-10-CM

## 2022-04-21 DIAGNOSIS — D45 ACQUIRED POLYCYTHEMIA VERA (HCC): ICD-10-CM

## 2022-04-21 DIAGNOSIS — R05.3 CHRONIC COUGH: ICD-10-CM

## 2022-04-21 DIAGNOSIS — I48.0 PAROXYSMAL ATRIAL FIBRILLATION (HCC): ICD-10-CM

## 2022-04-21 DIAGNOSIS — R80.9 ALBUMINURIA: ICD-10-CM

## 2022-04-21 PROCEDURE — G8427 DOCREV CUR MEDS BY ELIG CLIN: HCPCS | Performed by: NURSE PRACTITIONER

## 2022-04-21 PROCEDURE — 99214 OFFICE O/P EST MOD 30 MIN: CPT | Performed by: NURSE PRACTITIONER

## 2022-04-21 PROCEDURE — 3017F COLORECTAL CA SCREEN DOC REV: CPT | Performed by: NURSE PRACTITIONER

## 2022-04-21 PROCEDURE — 70360 X-RAY EXAM OF NECK: CPT

## 2022-04-21 PROCEDURE — 1123F ACP DISCUSS/DSCN MKR DOCD: CPT | Performed by: NURSE PRACTITIONER

## 2022-04-21 PROCEDURE — 71046 X-RAY EXAM CHEST 2 VIEWS: CPT

## 2022-04-21 PROCEDURE — 1036F TOBACCO NON-USER: CPT | Performed by: NURSE PRACTITIONER

## 2022-04-21 PROCEDURE — 4040F PNEUMOC VAC/ADMIN/RCVD: CPT | Performed by: NURSE PRACTITIONER

## 2022-04-21 PROCEDURE — G8417 CALC BMI ABV UP PARAM F/U: HCPCS | Performed by: NURSE PRACTITIONER

## 2022-04-21 RX ORDER — ROSUVASTATIN CALCIUM 10 MG/1
10 TABLET, COATED ORAL NIGHTLY
Qty: 30 TABLET | Refills: 3 | Status: CANCELLED | OUTPATIENT
Start: 2022-04-21

## 2022-04-21 RX ORDER — LISINOPRIL 5 MG/1
2.5 TABLET ORAL DAILY
Qty: 15 TABLET | Refills: 0 | Status: CANCELLED | OUTPATIENT
Start: 2022-04-21

## 2022-04-21 ASSESSMENT — ENCOUNTER SYMPTOMS
SORE THROAT: 0
WHEEZING: 0
SHORTNESS OF BREATH: 0
HEMOPTYSIS: 0
RHINORRHEA: 0
COUGH: 1
HEARTBURN: 0

## 2022-04-21 NOTE — PATIENT INSTRUCTIONS
Patient Education        Chronic Cough: Care Instructions  Overview     A cough is your body's response to something that bothers your throat or airways. Many things can cause a cough. You might cough because of a cold or the flu, bronchitis, or asthma. Smoking, postnasal drip, allergies, and stomachacid that backs up into your throat also can cause a cough. A cough can be short-term (acute) or long-term (chronic). A chronic cough lasts more than 8 weeks. A chronic cough is often caused by a long-term problem, suchas asthma. Another cause might be a medicine, such as an ACE inhibitor. A cough is a symptom, not a disease. To treat a chronic cough, you may need to treat the problem that causes it. You can take a few steps at home to coughless and feel better. Some people cough or clear their throat out of habit for no clear reason. Follow-up care is a key part of your treatment and safety. Be sure to make and go to all appointments, and call your doctor if you are having problems. It's also a good idea to know your test results and keep alist of the medicines you take. How can you care for yourself at home?  Drink plenty of water and other fluids. This may help soothe a dry or sore throat. Honey or lemon juice in hot water or tea may ease a dry cough.  Prop up your head on pillows to help you breathe and ease a cough.  Do not smoke or allow others to smoke around you. Smoke can make a cough worse. If you need help quitting, talk to your doctor about stop-smoking programs and medicines. These can increase your chances of quitting for good.  Avoid exposure to smoke, dust, or other pollutants, or wear a face mask. Check with your doctor or pharmacist to find out which type of face mask will give you the most benefit.  Take cough medicine as directed by your doctor.  Try cough drops or hard candy to sooth a dry or sore throat.   Throat clearing  When you have a chronic cough or a disease that may cause this type of cough, you may often feel like you want to clear your throat. This helps bring upmucus. But throat clearing does not always have a cause. Throat clearing can become a habit. The more you do it, the more you feel like you need to do it. But frequent throat clearing can be hard on your vocalcords. It's like slamming them together. To help lessen throat clearing, you can try:   Taking small sips of water.  Not clearing your throat when you feel you need to.  Swallowing hard when you want to clear your throat. You may want to ask your doctor if a medicine that thins mucus would help. When should you call for help? Call 911 anytime you think you may need emergency care. For example, call if:     You have severe trouble breathing. Call your doctor now or seek immediate medical care if:     You cough up blood.      You have new or worse trouble breathing.      You have a new or higher fever. Watch closely for changes in your health, and be sure to contact your doctor if:     You cough more deeply or more often, especially if you notice more mucus or a change in the color of your mucus.      You do not get better as expected. Where can you learn more? Go to https://MicroInventionpeTimeFree Innovations.Gucash. org and sign in to your [employer] account. Enter H956 in the Anaplan box to learn more about \"Chronic Cough: Care Instructions. \"     If you do not have an account, please click on the \"Sign Up Now\" link. Current as of: October 6, 2021               Content Version: 13.2  © 2006-2022 Healthwise, [employer]. Care instructions adapted under license by [facility] ([facility]). If you have questions about a medical condition or this instruction, always ask your healthcare professional. [place] [address] any warranty or liability for your use of this information.          Patient Education        Proteinuria: Care Instructions  Your Care Instructions     Proteinuria means that you have too much protein in your urine. This is usuallycaused by a kidney problem. Your body's blood passes through your kidneys. Normally, the kidneys remove waste from the blood. The waste then leaves the body in the urine. But they don't let protein leave the body. If your kidneys are not working well, they let too much protein get in your urine. A high level of protein in your urineis a sign that something is harming your kidneys. It may be puzzling to find out that you have a problem with your kidneys,because you probably don't feel different. Your doctor may do more tests to find out what is causing the protein to get into your urine. Possible causes include an infection or a medical condition, such as diabetes or heart disease. You may need regular urine tests in the future. You may be able to reduce the protein in your urine by gettingexercise, eating a healthy diet, and taking medicine. Follow-up care is a key part of your treatment and safety. Be sure to make and go to all appointments, and call your doctor if you are having problems. It's also a good idea to know your test results and keep alist of the medicines you take. How can you care for yourself at home?  Take your medicines exactly as prescribed. Call your doctor if you think you are having a problem with your medicine. You will get more details on the specific medicines your doctor prescribes.  Work with your doctor and dietitian to set up a diet that will be healthy for you. You may need to:  ? Eat a heart-healthy diet to keep the fat (cholesterol) in your blood under control. ? Eat a low-salt diet to keep your blood pressure at normal levels. ? Limit protein in your foods. ? Limit the amount of fluids you drink.  If you have diabetes, try to keep your blood sugar at normal or near-normal levels. ? Follow your diet. Eat a variety of foods. Spread carbohydrate throughout your meals.   ? If your doctor recommends it, get more exercise. Walking is a good choice. Bit by bit, increase the amount you walk every day. Try for at least 30 minutes on most days of the week. ? Check your blood sugar as often as your doctor recommends.  Do not smoke. Smoking raises your risk of many health problems, including kidney damage. If you need help quitting, talk to your doctor about stop-smoking programs and medicines. These can increase your chances of quitting for good.  Do not take ibuprofen, naproxen, acetaminophen (Tylenol), or similar medicines, unless your doctor tells you to. These medicines may make kidney problems worse.  Check with your doctor before you take any herbal supplements or over-the-counter medicines. When should you call for help? Watch closely for changes in your health, and be sure to contact your doctor if:     You do not get better as expected. Where can you learn more? Go to https://DesignFace ITperhiannoneweb.IPexpert. org and sign in to your ShopVisible account. Enter W225 in the orderTalk box to learn more about \"Proteinuria: Care Instructions. \"     If you do not have an account, please click on the \"Sign Up Now\" link. Current as of: September 8, 2021               Content Version: 13.2  © 0296-5968 Healthwise, Incorporated. Care instructions adapted under license by [facility] ([facility]). If you have questions about a medical condition or this instruction, always ask your healthcare professional. [address]  any warranty or liability for your use of this information.

## 2022-04-21 NOTE — PROGRESS NOTES
[address] [facility] [address]  Dept: [phone]  Dept Fax: [phone]  Loc: [phone]     2022     Perry JorgensenVolodymyrRichar (:  1950) is a 81 y.o. male, here for evaluation of the following medical concerns:    Chief Complaint   Patient presents with    Discuss Labs     Here today to discuss recent lab results and ES recommendations.  Cough     C/O dry cough x 1 year, has gotten worse in the past few months. Pt presents to the office today to review recent lab results and follow up on suggestions from ES on his medications and lab results. Pt is also concerned about issues he has with coughing. He is having trouble with cough and clearing his throat a lot. No recent illnesses. He does not cough all the time, but does clear his throat a lot. About every other day he gets a coughing fit that lasts for a few min and then resolves. Cough  This is a chronic problem. The current episode started more than 1 year ago. The problem has been gradually worsening. The cough is non-productive. Pertinent negatives include no chest pain, chills, ear congestion, ear pain, fever, headaches, heartburn, hemoptysis, myalgias, nasal congestion, postnasal drip, rash, rhinorrhea, sore throat, shortness of breath, sweats, weight loss or wheezing. Nothing aggravates the symptoms. He has tried rest and body position changes for the symptoms. The treatment provided no relief. Result note from ES on 22:  Results reviewed. Spot MA elevated-recommend starting lisinopril 2.5 mg - 1 pill daily (#90/3 refills or #30/11 refills) for kidney protection, if patient willing. HG A1c stable at 6.3-no need for medication at this time  TG okay at 119/HDL low at 28/LDL above goal at 88.85-is patient willing to change to Lipitor or Crestor for better cholesterol control?   PSA okay at <0.02  CBC okay, except elevated H&H at 18. 2/55.4-has patient seen hematology in the past? [address] smoking? Treatment Adherence:   Medication compliance:  compliant all of the time  Diet compliance:  compliant most of the time  Weight trend: stable    Hypertension:  Home blood pressure monitoring: No. Patient denies chest pain, shortness of breath, headache and lightheadedness. Antihypertensive medication side effects: no medication side effects noted. Use of agents associated with hypertension: none. Sodium (meq/L)   Date Value   10/04/2021 144    BUN (mg/dL)   Date Value   10/04/2021 19    Glucose   Date Value   10/04/2021 113 mg/dL (H)   05/17/2016 93 mg/dl      Potassium (meq/L)   Date Value   10/04/2021 4.4     Potassium reflex Magnesium (meq/L)   Date Value   09/20/2020 3.8    CREATININE (mg/dL)   Date Value   10/04/2021 1.0         Hyperlipidemia:  No new myalgias or GI upset on pravastatin (Pravachol). Medication is prescribed by Dr Joanna Langford. Pt would like to wait and talk to him about changes.      Lab Results   Component Value Date    CHOL 151 10/04/2021    TRIG 119 04/19/2022    HDL 28 04/19/2022    LDLCALC 85 10/04/2021    LDLDIRECT 88.85 04/19/2022     Lab Results   Component Value Date    ALT 28 10/04/2021    AST 23 10/04/2021          The 10-year ASCVD risk score (Rolo Ordaz, et al., 2013) is: 21.2%    Values used to calculate the score:      Age: 81 years      Sex: Male      Is Non- : No      Diabetic: No      Tobacco smoker: No      Systolic Blood Pressure: 292 mmHg      Is BP treated: No      HDL Cholesterol: 28 mg/dL      Total Cholesterol: 151 mg/dL  Patient Active Problem List   Diagnosis    Essential hypertension    Hyperlipidemia    Paroxysmal atrial fibrillation (HCC)    Obesity due to excess calories    Dizziness    History of gastroesophageal reflux (GERD)    Diastolic dysfunction    Anticoagulated on Coumadin    Urinary incontinence    Elevated PSA    Coronary artery disease involving native coronary artery of native heart without angina pectoris    Benign prostatic hyperplasia with urinary frequency    BPH with obstruction/lower urinary tract symptoms    Prostate cancer ([facility] [address]    Fever    Encounter for therapeutic drug monitoring    Albuminuria       Review of Systems   Constitutional: Negative for chills, fever and weight loss. HENT: Negative for ear pain, postnasal drip, rhinorrhea and sore throat. Respiratory: Positive for cough. Negative for hemoptysis, shortness of breath and wheezing. Cardiovascular: Negative for chest pain. Gastrointestinal: Negative for heartburn. Musculoskeletal: Negative for myalgias. Skin: Negative for rash. Neurological: Negative for headaches. Prior to Visit Medications    Medication Sig Taking? Authorizing Provider   warfarin (COUMADIN) 2.5 MG tablet Take as directed by [facility] (40 tablets = 30 day supply) Yes Guillermo Lang MD   nitroGLYCERIN (NITROSTAT) 0.4 MG SL tablet DISSOLVE ONE TABLET UNDER THE TONGUE EVERY 5 MINUTES AS NEEDED FOR CHEST PAIN.   DO NOT EXCEED A TOTAL OF 3 DOSES IN 15 MINUTES Yes Justin Lan MD   pravastatin (PRAVACHOL) 80 MG tablet Take 1 tablet by mouth nightly Yes Gianna Fitzgerald MD   dilTIAZem (CARDIZEM CD) 360 MG extended release capsule Take 1 capsule by mouth once daily Yes Gianna Fitzgerald MD   digoxin (LANOXIN) 125 MCG tablet TAKE 1 TABLET BY MOUTH ONCE DAILY Yes Gianna Fitzgerald MD   potassium chloride (KLOR-CON) 10 MEQ extended release tablet TAKE 1 TABLET BY MOUTH TWICE DAILY Yes Gianna Fitzgerald MD   metoprolol succinate (TOPROL XL) 50 MG extended release tablet Take 1 tablet by mouth once daily Yes Gianna Fitzgerald MD   bisacodyl (DULCOLAX) 5 MG EC tablet Take 5 mg by mouth nightly  Yes Historical Provider, MD        Social History     Tobacco Use    Smoking status: Never Smoker    Smokeless tobacco: Never Used   Substance Use Skin:     General: Skin is warm and dry. Findings: No rash. Neurological:      General: No focal deficit present. Mental Status: He is alert and oriented to person, place, and time. Coordination: Coordination normal.   Psychiatric:         Mood and Affect: Mood normal.         Behavior: Behavior normal.         Thought Content: Thought content normal.         Judgment: Judgment normal.         ASSESSMENT/PLAN:  1. Hyperlipidemia, unspecified hyperlipidemia type    2. Essential hypertension  - [address],[facility] [address], [facility], [address] [place] [address], Nephrology, Judson Lopez    3. Paroxysmal atrial fibrillation (HCC)    4. Albuminuria  - [address] [facility] [address] [place] [address], [facility], [address] [place] [address], Nephrology, Judson Lopez    5. Acquired polycythemia vera (HCC)  - CBC with Auto Differential; Future    6. Chronic throat clearing  - XR CHEST (SINGLE VIEW FRONTAL); Future  - XR NECK SOFT TISSUE; Future    7. Chronic cough  - XR CHEST (SINGLE VIEW FRONTAL); Future  - XR NECK SOFT TISSUE; Future    - Pt wants to follow up with nephrology and hold on starting lisinopril for elevated albumin. Referral placed. - Will wait and talk with Dr Nicky Hansen about his cholesterol medications and changes since that is who prescribed it  - Hold on hematology referral and repeat CBC in 3 months per pt request.   - Throat clearing and cough. Discussed possible GERD and Allergies as a cause. Offered referral to ENT for evaluation, will hold on this until x-ray results are back. - Call office with any questions or concerns, or if symptoms are getting worse or changing      Return if symptoms worsen or fail to improve. Patient given educational materials - see patient instructions. Discussed use, benefit, and side effects of prescribed medications. All patient questions answered. Pt voiced understanding. Reviewed health maintenance. An electronic signature was used to authenticate this note.     --NARESH Curtis - CNP on 4/21/2022 at 9:33 AM

## 2022-05-02 ENCOUNTER — TELEPHONE (OUTPATIENT)
Dept: CARDIOLOGY CLINIC | Age: 72
End: 2022-05-02

## 2022-05-02 DIAGNOSIS — I25.10 CORONARY ARTERY DISEASE INVOLVING NATIVE CORONARY ARTERY OF NATIVE HEART WITHOUT ANGINA PECTORIS: Primary | ICD-10-CM

## 2022-05-02 DIAGNOSIS — I51.89 DIASTOLIC DYSFUNCTION: ICD-10-CM

## 2022-05-02 DIAGNOSIS — I10 ESSENTIAL HYPERTENSION: ICD-10-CM

## 2022-05-02 RX ORDER — ROSUVASTATIN CALCIUM 20 MG/1
20 TABLET, COATED ORAL DAILY
Qty: 90 TABLET | Refills: 1 | Status: SHIPPED | OUTPATIENT
Start: 2022-05-02 | End: 2022-10-17 | Stop reason: SDUPTHER

## 2022-05-02 RX ORDER — ROSUVASTATIN CALCIUM 20 MG/1
20 TABLET, COATED ORAL DAILY
COMMUNITY
End: 2022-05-02 | Stop reason: SDUPTHER

## 2022-05-02 RX ORDER — ICOSAPENT ETHYL 1000 MG/1
2 CAPSULE ORAL 2 TIMES DAILY
COMMUNITY
End: 2022-05-02 | Stop reason: SDUPTHER

## 2022-05-02 RX ORDER — ICOSAPENT ETHYL 1000 MG/1
2 CAPSULE ORAL 2 TIMES DAILY
Qty: 360 CAPSULE | Refills: 3 | Status: SHIPPED | OUTPATIENT
Start: 2022-05-02 | End: 2022-05-06

## 2022-05-02 NOTE — TELEPHONE ENCOUNTER
Pt called in, had recent labs drawn for Dr Dann Mims. Asking for Dr Vianca Naik recommendation for cholesterol medication. Cheryl Nava MD   4/19/2022 11:03 AM EDT         Notify pt-   Results reviewed. TG okay at 119/HDL low at 28/LDL above goal at 88.85-is patient willing to change to Lipitor or Crestor for better cholesterol control?

## 2022-05-03 NOTE — TELEPHONE ENCOUNTER
Patient calling in to check on this. Wife Nathanael Arango stated someone was supposed to call her back once they checked about pricing for a fish oil for him to start on? Please call them back to advise.

## 2022-05-06 RX ORDER — AMOXICILLIN 500 MG
CAPSULE ORAL DAILY
COMMUNITY
End: 2022-05-27

## 2022-05-06 NOTE — TELEPHONE ENCOUNTER
Pt's wife called, with their insurance Vascepa is going to be over $600. Asking if he can do something different?

## 2022-05-23 ENCOUNTER — TELEPHONE (OUTPATIENT)
Dept: CARDIOLOGY CLINIC | Age: 72
End: 2022-05-23

## 2022-05-23 DIAGNOSIS — I48.0 PAROXYSMAL ATRIAL FIBRILLATION (HCC): Primary | ICD-10-CM

## 2022-05-25 ENCOUNTER — HOSPITAL ENCOUNTER (OUTPATIENT)
Dept: PHARMACY | Age: 72
Setting detail: THERAPIES SERIES
Discharge: HOME | End: 2022-05-25
Payer: MEDICARE

## 2022-05-25 DIAGNOSIS — Z79.01 ANTICOAGULATED ON COUMADIN: Primary | ICD-10-CM

## 2022-05-25 DIAGNOSIS — Z51.81 ENCOUNTER FOR THERAPEUTIC DRUG MONITORING: ICD-10-CM

## 2022-05-25 LAB — POC INR: 2.3 (ref 0.8–1.2)

## 2022-05-25 PROCEDURE — 85610 PROTHROMBIN TIME: CPT

## 2022-05-25 PROCEDURE — 99211 OFF/OP EST MAY X REQ PHY/QHP: CPT

## 2022-05-25 PROCEDURE — 36416 COLLJ CAPILLARY BLOOD SPEC: CPT

## 2022-05-27 NOTE — TELEPHONE ENCOUNTER
Pts wife Teresa Compton calling. Pt is currently taking Coumadin and Fish Oil. Pt started having nose bleeds after starting fish oil. Please advise.

## 2022-05-30 ENCOUNTER — HOSPITAL ENCOUNTER (EMERGENCY)
Age: 72
Discharge: HOME | End: 2022-05-30
Attending: EMERGENCY MEDICINE
Payer: MEDICARE

## 2022-05-30 VITALS
RESPIRATION RATE: 18 BRPM | WEIGHT: 235 LBS | OXYGEN SATURATION: 95 % | TEMPERATURE: 98 F | HEART RATE: 95 BPM | HEIGHT: 73 IN | SYSTOLIC BLOOD PRESSURE: 172 MMHG | DIASTOLIC BLOOD PRESSURE: 112 MMHG | BODY MASS INDEX: 31.14 KG/M2

## 2022-05-30 DIAGNOSIS — R04.0 EPISTAXIS: Primary | ICD-10-CM

## 2022-05-30 PROCEDURE — 99282 EMERGENCY DEPT VISIT SF MDM: CPT

## 2022-05-30 PROCEDURE — 6370000000 HC RX 637 (ALT 250 FOR IP): Performed by: STUDENT IN AN ORGANIZED HEALTH CARE EDUCATION/TRAINING PROGRAM

## 2022-05-30 RX ORDER — OXYMETAZOLINE HYDROCHLORIDE 0.05 G/100ML
2 SPRAY NASAL ONCE
Status: COMPLETED | OUTPATIENT
Start: 2022-05-30 | End: 2022-05-30

## 2022-05-30 RX ADMIN — OXYMETAZOLINE HCL 2 SPRAY: 0.05 SPRAY NASAL at 11:47

## 2022-05-30 ASSESSMENT — ENCOUNTER SYMPTOMS
ABDOMINAL PAIN: 0
BACK PAIN: 0
DIARRHEA: 0
NAUSEA: 0
COUGH: 0
EYE PAIN: 0
VOMITING: 0
SINUS PAIN: 0
SHORTNESS OF BREATH: 0
CONSTIPATION: 0

## 2022-05-30 ASSESSMENT — PAIN - FUNCTIONAL ASSESSMENT
PAIN_FUNCTIONAL_ASSESSMENT: NONE - DENIES PAIN
PAIN_FUNCTIONAL_ASSESSMENT: NONE - DENIES PAIN

## 2022-05-30 NOTE — ED PROVIDER NOTES
[place] ENCOUNTER          Pt Name: Ghulam Chu  MRN: [id number]  [place] 1950  Date of evaluation: 5/30/2022  Treating Resident Physician: Car Rodrigez MD  Supervising Physician: Dr. Elba Cruz MD    [address] [place] [address]       Chief Complaint   Patient presents with    Epistaxis     History obtained from the patient. HISTORY OF PRESENT ILLNESS    HPI  Ghulam Chu is a 81 y.o. male who presents to the emergency department for evaluation of nosebleed. Patient states that every other day for the past week he has had nosebleeding. He states he is coming out of his left nose. He states that today his nose was bleeding for 30 minutes prior to arrival.  States they have a clamp and they placed this at home. States in the past he has had a nosebleed that required cauterization. Denies any lightheadedness syncope. Denies any chest pain shortness of breath. He is on Coumadin for A. fib. Denies any bleeding anywhere else. Denies any nose picking or trauma. Patient denies any new Headache, Fever, Chills, Cough, Chest pain, Shortness of breath, Abdominal pain, Nausea, Vomiting, Diarrhea, Constipation and Leg swelling. The patient has no other acute complaints at this time. REVIEW OF SYSTEMS   Review of Systems   Constitutional: Negative for chills and fever. HENT: Positive for nosebleeds. Negative for ear pain and sinus pain. Eyes: Negative for pain. Respiratory: Negative for cough and shortness of breath. Cardiovascular: Negative for chest pain and leg swelling. Gastrointestinal: Negative for abdominal pain, constipation, diarrhea, nausea and vomiting. Genitourinary: Negative for dysuria and flank pain. Musculoskeletal: Negative for back pain and neck pain. Skin: Negative for wound. Neurological: Negative for headaches. Psychiatric/Behavioral: Negative for confusion.          PAST MEDICAL AND SURGICAL HISTORY     Past Medical History:   Diagnosis Date    Anesthesia     takes large dose of medications to make him sleepy for surgery    Atrial fibrillation ([facility] [address]     Benign prostatic hyperplasia with urinary frequency 10/21/2019    CAD (coronary artery disease)     Chest pain     Colon polyp 2011    removed    Coronary artery disease involving native coronary artery of native heart without angina pectoris 10/21/2019    Dementia ([facility] [address]     Dizziness     Elevated PSA     GERD (gastroesophageal reflux disease)     Heart disease     Hyperlipidemia     Hypertension      Past Surgical History:   Procedure Laterality Date    ADENOIDECTOMY      CARDIAC CATHETERIZATION  09/23/2011    Right radial artery cannulation. Moderate LV dysfunction. The patient has disease in the ostium of diagonal 1 and diagonal 2 branch, however they are both are at the  ostium of the diagonals. Intervention could compromise flow of LAD. THe LAD has long diffuse calcified lesion 50-60-% anatomically.  CARDIOVASCULAR STRESS TEST  09/23/2011    EF 37%    CARPAL TUNNEL RELEASE      COLONOSCOPY      PROSTATECTOMY N/A 12/3/2020    ROBOTIC ASSISTED LAPAROSCOPIC RADICAL PROSTATECTOMY performed by Flor Olivas MD at [facility] ECHOCARDIOGRAM  09/23/2011    EF 50-55%    ULTRASOUND PROSTATE/TRANSRECTAL N/A 10/6/2020    CYSTO, TRANSRECTAL ULTRASOUND GUIDED PROSTATE BX performed by Flor Olivas MD at [place] [address]   No current facility-administered medications for this encounter.     Current Outpatient Medications:     rosuvastatin (CRESTOR) 20 MG tablet, Take 1 tablet by mouth daily, Disp: 90 tablet, Rfl: 1    warfarin (COUMADIN) 2.5 MG tablet, Take as directed by [facility] (40 tablets = 30 day supply), Disp: 40 tablet, Rfl: 11    nitroGLYCERIN (NITROSTAT) 0.4 MG SL tablet, DISSOLVE ONE TABLET UNDER THE TONGUE EVERY 5 MINUTES AS NEEDED FOR CHEST PAIN. DO NOT EXCEED A TOTAL OF 3 DOSES IN 15 MINUTES (Patient not taking: Reported on 5/25/2022), Disp: 25 tablet, Rfl: 2    dilTIAZem (CARDIZEM CD) 360 MG extended release capsule, Take 1 capsule by mouth once daily, Disp: 90 capsule, Rfl: 3    digoxin (LANOXIN) 125 MCG tablet, TAKE 1 TABLET BY MOUTH ONCE DAILY, Disp: 90 tablet, Rfl: 3    potassium chloride (KLOR-CON) 10 MEQ extended release tablet, TAKE 1 TABLET BY MOUTH TWICE DAILY, Disp: 180 tablet, Rfl: 3    metoprolol succinate (TOPROL XL) 50 MG extended release tablet, Take 1 tablet by mouth once daily, Disp: 90 tablet, Rfl: 3    bisacodyl (DULCOLAX) 5 MG EC tablet, Take 5 mg by mouth nightly , Disp: , Rfl:       SOCIAL HISTORY     Social History     Social History Narrative    Not on file     Social History     Tobacco Use    Smoking status: Never Smoker    Smokeless tobacco: Never Used   Vaping Use    Vaping Use: Never used   Substance Use Topics    Alcohol use: No    Drug use: No         ALLERGIES   No Known Allergies      FAMILY HISTORY     Family History   Problem Relation Age of Onset    Heart Surgery Father     Cancer Father         lung    Heart Disease Father         CABG    Alzheimer's Disease Mother     Diabetes Neg Hx     High Blood Pressure Neg Hx     Stroke Neg Hx          PREVIOUS RECORDS   Previous records reviewed: Patient was seen last on 4/21/2022 for family medicine office visit regarding hyperlipidemia. PHYSICAL EXAM     ED Triage Vitals   BP Temp Temp src Pulse Resp SpO2 Height Weight   -- -- -- -- -- -- -- --     Initial vital signs and nursing assessment reviewed and vitals are/show: Afebrile, Hypertensive, Borderline tachycardic and Normal RR. Pulsoximetry is normal per my interpretation. Additional Vital Signs:  Vitals:    05/30/22 1207   BP: (!) 172/112   Pulse: 95   Resp: 18   Temp:    SpO2: 95%       Physical Exam  Constitutional:       General: He is not in acute distress.      Appearance: Normal appearance. He is obese. He is not ill-appearing, toxic-appearing or diaphoretic. HENT:      Head: Normocephalic and atraumatic. Right Ear: External ear normal.      Left Ear: External ear normal.      Nose:      Comments: Dried blood to left nares, no active bleeding no perforation     Mouth/Throat:      Comments: No blood in posterior oropharynx  Eyes:      General: No scleral icterus. Right eye: No discharge. Left eye: No discharge. Cardiovascular:      Rate and Rhythm: Normal rate and regular rhythm. Pulmonary:      Effort: Pulmonary effort is normal. No respiratory distress. Breath sounds: Normal breath sounds. No stridor. No wheezing, rhonchi or rales. Chest:      Chest wall: No tenderness. Abdominal:      General: Abdomen is flat. There is no distension. Palpations: Abdomen is soft. Tenderness: There is no abdominal tenderness. There is no guarding or rebound. Musculoskeletal:      Cervical back: Neck supple. Right lower leg: No edema. Left lower leg: No edema. Skin:     General: Skin is warm and dry. Neurological:      Mental Status: He is alert and oriented to person, place, and time. Mental status is at baseline. Psychiatric:         Mood and Affect: Mood normal.         Behavior: Behavior normal.         Thought Content: Thought content normal.         Judgment: Judgment normal.             MEDICAL DECISION MAKING   Initial Assessment:     82 yo male on warfarin presenting to ED for epistaxis. Differential diagnoses include but not limited to: Anterior epistaxis posterior epistaxis hemorrhage     Plan:     Afrin  Discharge        Patient is a 81 y.o. male who was seen and evaluated in the emergency department for epistaxis. Upon initial examination I saw no active bleeding. We cleaned his nose out and administered some Afrin and clamped again. He had no return of bleeding.   Vital signs were stable when I was in the room heart rate was in the 76s. He had no mention of symptoms suggestive of acute blood loss. Patient discharged with Afrin and epistaxis education. ED RESULTS   Laboratory results:  Labs Reviewed - No data to display    Radiologic studies results:  No orders to display       ED Medications administered this visit:   Medications   oxymetazoline (AFRIN) 0.05 % nasal spray 2 spray (2 sprays Each Nostril Given 5/30/22 1147)         ED COURSE     ED Course as of 05/30/22 1218   Mon May 30, 2022   1207 Per nursing staff, patient wants to leave [CR]      ED Course User Index  [CR] Tony Carmona MD        Strict return precautions and follow up instructions were discussed with the patient prior to discharge, with which the patient agrees. MEDICATION CHANGES     New Prescriptions    No medications on file         FINAL DISPOSITION     Final diagnoses:   Epistaxis     Condition: condition: stable  Dispo: Discharge to home      This transcription was electronically signed. Parts of this transcriptions may have been dictated by use of voice recognition software and electronically transcribed, and parts may have been transcribed with the assistance of an ED scribe. The transcription may contain errors not detected in proofreading. Please refer to my supervising physician's documentation if my documentation differs.     Electronically Signed: Tony Carmona MD, 05/30/22, 12:18 PM         Tony Carmona MD  Resident  05/30/22 3427

## 2022-05-30 NOTE — ED NOTES
Pt states he \"thinks the bleeding has stopped. \" Will continue to monitor for bleeding.       [facility]  05/30/22 1148

## 2022-05-30 NOTE — ED TRIAGE NOTES
Pt presents to the ER from home with c/o epistaxis. Pt states it started 30 minutes ago and has not stopped. Pt is on Coumadin for afib. Pt was started on fish oil a week ago. Bleeding in currently controlled with nose clamp. Pt is alert and oriented, respirations even and unlabored.  VSS

## 2022-05-31 ENCOUNTER — TELEPHONE (OUTPATIENT)
Dept: FAMILY MEDICINE CLINIC | Age: 72
End: 2022-05-31

## 2022-05-31 ENCOUNTER — TELEPHONE (OUTPATIENT)
Dept: ENT CLINIC | Age: 72
End: 2022-05-31

## 2022-05-31 DIAGNOSIS — R04.0 EPISTAXIS: Primary | ICD-10-CM

## 2022-05-31 NOTE — TELEPHONE ENCOUNTER
Patients wife called stating that he has had a nosebleed for about a week and a half,left side. He is on coumadin. July is first new patient spot and wife wants him seen sooner bc of nosebleeds.     Please advise

## 2022-05-31 NOTE — TELEPHONE ENCOUNTER
I can see him on Friday at 8:30AM. If bleeding recurs and he cannot get it to stop in the meantime, he should go to the ER for further management. If it is severe enough, he may need a packing.

## 2022-05-31 NOTE — TELEPHONE ENCOUNTER
Pt would like a referral to ENT. Pt was in the hospital for a nose bleed that he has had for over a week, hospital told them they cant give a referral to the patient and that his pcp needs to send a referral for one. Pt would prefer to go to Dr. Ruben Byers. Pt is also on coumadin.     Please advise best number to call the patient [phone]

## 2022-05-31 NOTE — TELEPHONE ENCOUNTER
Called patient and him and wife stated they are going to switch to [facility] for everything since [facility]. [facility] has not done anything for them to help. Wife said thank you but they are going to pass on the appointment at this time.

## 2022-05-31 NOTE — TELEPHONE ENCOUNTER
Noted.  ER chart reviewed from 5/30/22. ENT referral placed.  Thanks -WS    Orders Placed This Encounter   Procedures   ALEXIA Dillon. [facility]     Referral Priority:   Routine     Referral Type:   Eval and Treat     Referral Reason:   Specialty Services Required     Number of Visits Requested:   1

## 2022-06-03 ENCOUNTER — APPOINTMENT (OUTPATIENT)
Dept: CARDIAC CATH/INVASIVE PROCEDURES | Age: 72
DRG: 982 | End: 2022-06-03
Payer: MEDICARE

## 2022-06-03 ENCOUNTER — ANESTHESIA (OUTPATIENT)
Dept: CARDIAC CATH/INVASIVE PROCEDURES | Age: 72
DRG: 982 | End: 2022-06-03
Payer: MEDICARE

## 2022-06-03 ENCOUNTER — ANESTHESIA EVENT (OUTPATIENT)
Dept: CARDIAC CATH/INVASIVE PROCEDURES | Age: 72
DRG: 982 | End: 2022-06-03
Payer: MEDICARE

## 2022-06-03 ENCOUNTER — APPOINTMENT (OUTPATIENT)
Dept: INTERVENTIONAL RADIOLOGY/VASCULAR | Age: 72
DRG: 982 | End: 2022-06-03
Payer: MEDICARE

## 2022-06-03 ENCOUNTER — HOSPITAL ENCOUNTER (INPATIENT)
Age: 72
LOS: 1 days | Discharge: HOME | DRG: 982 | End: 2022-06-04
Attending: EMERGENCY MEDICINE
Payer: MEDICARE

## 2022-06-03 DIAGNOSIS — R04.0 POSTERIOR EPISTAXIS: Primary | ICD-10-CM

## 2022-06-03 LAB
ABO: NORMAL
ANION GAP SERPL CALCULATED.3IONS-SCNC: 13 MEQ/L (ref 8–16)
ANTIBODY SCREEN: NORMAL
BASOPHILS # BLD: 0.3 %
BASOPHILS ABSOLUTE: 0 THOU/MM3 (ref 0–0.1)
BUN BLDV-MCNC: 16 MG/DL (ref 7–22)
CALCIUM SERPL-MCNC: 9.1 MG/DL (ref 8.5–10.5)
CHLORIDE BLD-SCNC: 104 MEQ/L (ref 98–111)
CO2: 23 MEQ/L (ref 23–33)
CREAT SERPL-MCNC: 0.8 MG/DL (ref 0.4–1.2)
EOSINOPHIL # BLD: 0.7 %
EOSINOPHILS ABSOLUTE: 0.1 THOU/MM3 (ref 0–0.4)
ERYTHROCYTE [DISTWIDTH] IN BLOOD BY AUTOMATED COUNT: 13.7 % (ref 11.5–14.5)
ERYTHROCYTE [DISTWIDTH] IN BLOOD BY AUTOMATED COUNT: 42.8 FL (ref 35–45)
GFR SERPL CREATININE-BSD FRML MDRD: > 90 ML/MIN/1.73M2
GLUCOSE BLD-MCNC: 150 MG/DL (ref 70–108)
HCT VFR BLD CALC: 51.8 % (ref 42–52)
HEMOGLOBIN: 17.5 GM/DL (ref 14–18)
IMMATURE GRANS (ABS): 0.03 THOU/MM3 (ref 0–0.07)
IMMATURE GRANULOCYTES: 0.3 %
INR BLD: 1.39 (ref 0.85–1.13)
INR BLD: 3.72 (ref 0.85–1.13)
LYMPHOCYTES # BLD: 16.5 %
LYMPHOCYTES ABSOLUTE: 1.5 THOU/MM3 (ref 1–4.8)
MCH RBC QN AUTO: 29.1 PG (ref 26–33)
MCHC RBC AUTO-ENTMCNC: 33.8 GM/DL (ref 32.2–35.5)
MCV RBC AUTO: 86.2 FL (ref 80–94)
MONOCYTES # BLD: 5.1 %
MONOCYTES ABSOLUTE: 0.5 THOU/MM3 (ref 0.4–1.3)
MRSA SCREEN RT-PCR: NEGATIVE
NUCLEATED RED BLOOD CELLS: 0 /100 WBC
OSMOLALITY CALCULATION: 283.4 MOSMOL/KG (ref 275–300)
PLATELET # BLD: 230 THOU/MM3 (ref 130–400)
PMV BLD AUTO: 10.7 FL (ref 9.4–12.4)
POTASSIUM SERPL-SCNC: 4.2 MEQ/L (ref 3.5–5.2)
RBC # BLD: 6.01 MILL/MM3 (ref 4.7–6.1)
RH FACTOR: NORMAL
SEG NEUTROPHILS: 77.1 %
SEGMENTED NEUTROPHILS ABSOLUTE COUNT: 7.1 THOU/MM3 (ref 1.8–7.7)
SODIUM BLD-SCNC: 140 MEQ/L (ref 135–145)
VANCOMYCIN RESISTANT ENTEROCOCCUS: NEGATIVE
WBC # BLD: 9.2 THOU/MM3 (ref 4.8–10.8)

## 2022-06-03 PROCEDURE — C1760 CLOSURE DEV, VASC: HCPCS

## 2022-06-03 PROCEDURE — 6360000002 HC RX W HCPCS

## 2022-06-03 PROCEDURE — 75898 FOLLOW-UP ANGIOGRAPHY: CPT | Performed by: PSYCHIATRY & NEUROLOGY

## 2022-06-03 PROCEDURE — 6370000000 HC RX 637 (ALT 250 FOR IP): Performed by: PHYSICIAN ASSISTANT

## 2022-06-03 PROCEDURE — 80048 BASIC METABOLIC PNL TOTAL CA: CPT

## 2022-06-03 PROCEDURE — B31N1ZZ FLUOROSCOPY OF OTHER UPPER ARTERIES USING LOW OSMOLAR CONTRAST: ICD-10-PCS | Performed by: PSYCHIATRY & NEUROLOGY

## 2022-06-03 PROCEDURE — 3700000001 HC ADD 15 MINUTES (ANESTHESIA)

## 2022-06-03 PROCEDURE — 30905 CONTROL OF NOSEBLEED: CPT | Performed by: PHYSICIAN ASSISTANT

## 2022-06-03 PROCEDURE — 96375 TX/PRO/DX INJ NEW DRUG ADDON: CPT

## 2022-06-03 PROCEDURE — 61626 TCAT PERM OCCLS/EMBOL NONCNS: CPT | Performed by: PSYCHIATRY & NEUROLOGY

## 2022-06-03 PROCEDURE — 87641 MR-STAPH DNA AMP PROBE: CPT

## 2022-06-03 PROCEDURE — B3151ZZ FLUOROSCOPY OF BILATERAL COMMON CAROTID ARTERIES USING LOW OSMOLAR CONTRAST: ICD-10-PCS | Performed by: PSYCHIATRY & NEUROLOGY

## 2022-06-03 PROCEDURE — 99223 1ST HOSP IP/OBS HIGH 75: CPT

## 2022-06-03 PROCEDURE — 36223 PLACE CATH CAROTID/INOM ART: CPT

## 2022-06-03 PROCEDURE — 99221 1ST HOSP IP/OBS SF/LOW 40: CPT | Performed by: PHYSICIAN ASSISTANT

## 2022-06-03 PROCEDURE — 36415 COLL VENOUS BLD VENIPUNCTURE: CPT

## 2022-06-03 PROCEDURE — C1894 INTRO/SHEATH, NON-LASER: HCPCS

## 2022-06-03 PROCEDURE — 87500 VANOMYCIN DNA AMP PROBE: CPT

## 2022-06-03 PROCEDURE — 36227 PLACE CATH XTRNL CAROTID: CPT | Performed by: PSYCHIATRY & NEUROLOGY

## 2022-06-03 PROCEDURE — 6360000002 HC RX W HCPCS: Performed by: NURSE ANESTHETIST, CERTIFIED REGISTERED

## 2022-06-03 PROCEDURE — C2628 CATHETER, OCCLUSION: HCPCS

## 2022-06-03 PROCEDURE — 36223 PLACE CATH CAROTID/INOM ART: CPT | Performed by: PSYCHIATRY & NEUROLOGY

## 2022-06-03 PROCEDURE — 03LM3DZ OCCLUSION OF RIGHT EXTERNAL CAROTID ARTERY WITH INTRALUMINAL DEVICE, PERCUTANEOUS APPROACH: ICD-10-PCS | Performed by: PSYCHIATRY & NEUROLOGY

## 2022-06-03 PROCEDURE — 86850 RBC ANTIBODY SCREEN: CPT

## 2022-06-03 PROCEDURE — 6360000004 HC RX CONTRAST MEDICATION: Performed by: PSYCHIATRY & NEUROLOGY

## 2022-06-03 PROCEDURE — C1889 IMPLANT/INSERT DEVICE, NOC: HCPCS

## 2022-06-03 PROCEDURE — 86900 BLOOD TYPING SEROLOGIC ABO: CPT

## 2022-06-03 PROCEDURE — 86901 BLOOD TYPING SEROLOGIC RH(D): CPT

## 2022-06-03 PROCEDURE — B31C1ZZ FLUOROSCOPY OF BILATERAL EXTERNAL CAROTID ARTERIES USING LOW OSMOLAR CONTRAST: ICD-10-PCS | Performed by: PSYCHIATRY & NEUROLOGY

## 2022-06-03 PROCEDURE — 6360000002 HC RX W HCPCS: Performed by: STUDENT IN AN ORGANIZED HEALTH CARE EDUCATION/TRAINING PROGRAM

## 2022-06-03 PROCEDURE — 2500000003 HC RX 250 WO HCPCS: Performed by: NURSE ANESTHETIST, CERTIFIED REGISTERED

## 2022-06-03 PROCEDURE — C1887 CATHETER, GUIDING: HCPCS

## 2022-06-03 PROCEDURE — 99999 PR OFFICE/OUTPT VISIT,PROCEDURE ONLY: CPT | Performed by: PSYCHIATRY & NEUROLOGY

## 2022-06-03 PROCEDURE — 85610 PROTHROMBIN TIME: CPT

## 2022-06-03 PROCEDURE — 2580000003 HC RX 258

## 2022-06-03 PROCEDURE — 61626 TCAT PERM OCCLS/EMBOL NONCNS: CPT

## 2022-06-03 PROCEDURE — 6370000000 HC RX 637 (ALT 250 FOR IP)

## 2022-06-03 PROCEDURE — 3700000000 HC ANESTHESIA ATTENDED CARE

## 2022-06-03 PROCEDURE — 93005 ELECTROCARDIOGRAM TRACING: CPT | Performed by: STUDENT IN AN ORGANIZED HEALTH CARE EDUCATION/TRAINING PROGRAM

## 2022-06-03 PROCEDURE — 2580000003 HC RX 258: Performed by: NURSE ANESTHETIST, CERTIFIED REGISTERED

## 2022-06-03 PROCEDURE — 61623 EVASC TEMP BALO ARTL OCC H/N: CPT

## 2022-06-03 PROCEDURE — 36222 PLACE CATH CAROTID/INOM ART: CPT | Performed by: PSYCHIATRY & NEUROLOGY

## 2022-06-03 PROCEDURE — 2580000003 HC RX 258: Performed by: STUDENT IN AN ORGANIZED HEALTH CARE EDUCATION/TRAINING PROGRAM

## 2022-06-03 PROCEDURE — 96365 THER/PROPH/DIAG IV INF INIT: CPT

## 2022-06-03 PROCEDURE — 75894 X-RAYS TRANSCATH THERAPY: CPT | Performed by: PSYCHIATRY & NEUROLOGY

## 2022-06-03 PROCEDURE — 85025 COMPLETE CBC W/AUTO DIFF WBC: CPT

## 2022-06-03 PROCEDURE — 99285 EMERGENCY DEPT VISIT HI MDM: CPT

## 2022-06-03 PROCEDURE — C1769 GUIDE WIRE: HCPCS

## 2022-06-03 PROCEDURE — 75894 X-RAYS TRANSCATH THERAPY: CPT

## 2022-06-03 PROCEDURE — 2500000003 HC RX 250 WO HCPCS

## 2022-06-03 PROCEDURE — 2000000000 HC ICU R&B

## 2022-06-03 PROCEDURE — 36227 PLACE CATH XTRNL CAROTID: CPT

## 2022-06-03 RX ORDER — VERAPAMIL HYDROCHLORIDE 2.5 MG/ML
INJECTION, SOLUTION INTRAVENOUS PRN
Status: DISCONTINUED | OUTPATIENT
Start: 2022-06-03 | End: 2022-06-03 | Stop reason: SDUPTHER

## 2022-06-03 RX ORDER — SODIUM CHLORIDE, SODIUM LACTATE, POTASSIUM CHLORIDE, CALCIUM CHLORIDE 600; 310; 30; 20 MG/100ML; MG/100ML; MG/100ML; MG/100ML
INJECTION, SOLUTION INTRAVENOUS CONTINUOUS
Status: DISCONTINUED | OUTPATIENT
Start: 2022-06-03 | End: 2022-06-04

## 2022-06-03 RX ORDER — SODIUM CHLORIDE 9 MG/ML
INJECTION, SOLUTION INTRAVENOUS PRN
Status: CANCELLED | OUTPATIENT
Start: 2022-06-03

## 2022-06-03 RX ORDER — ACETAMINOPHEN 325 MG/1
650 TABLET ORAL EVERY 4 HOURS PRN
Status: DISCONTINUED | OUTPATIENT
Start: 2022-06-03 | End: 2022-06-03 | Stop reason: SDUPTHER

## 2022-06-03 RX ORDER — LIDOCAINE HYDROCHLORIDE 15 MG/ML
INJECTION, SOLUTION EPIDURAL; INFILTRATION; INTRACAUDAL; PERINEURAL PRN
Status: DISCONTINUED | OUTPATIENT
Start: 2022-06-03 | End: 2022-06-03 | Stop reason: SDUPTHER

## 2022-06-03 RX ORDER — ONDANSETRON 2 MG/ML
4 INJECTION INTRAMUSCULAR; INTRAVENOUS EVERY 6 HOURS PRN
Status: DISCONTINUED | OUTPATIENT
Start: 2022-06-03 | End: 2022-06-04 | Stop reason: HOSPADM

## 2022-06-03 RX ORDER — SODIUM CHLORIDE 9 MG/ML
50 INJECTION, SOLUTION INTRAVENOUS ONCE
Status: COMPLETED | OUTPATIENT
Start: 2022-06-03 | End: 2022-06-03

## 2022-06-03 RX ORDER — NITROGLYCERIN 0.4 MG/1
0.4 TABLET SUBLINGUAL EVERY 5 MIN PRN
Status: DISCONTINUED | OUTPATIENT
Start: 2022-06-03 | End: 2022-06-04 | Stop reason: HOSPADM

## 2022-06-03 RX ORDER — FENTANYL CITRATE 50 UG/ML
50 INJECTION, SOLUTION INTRAMUSCULAR; INTRAVENOUS EVERY 5 MIN PRN
Status: CANCELLED | OUTPATIENT
Start: 2022-06-03

## 2022-06-03 RX ORDER — ACETAMINOPHEN 325 MG/1
650 TABLET ORAL EVERY 6 HOURS PRN
Status: DISCONTINUED | OUTPATIENT
Start: 2022-06-03 | End: 2022-06-04 | Stop reason: HOSPADM

## 2022-06-03 RX ORDER — POLYETHYLENE GLYCOL 3350 17 G/17G
17 POWDER, FOR SOLUTION ORAL DAILY PRN
Status: DISCONTINUED | OUTPATIENT
Start: 2022-06-03 | End: 2022-06-04 | Stop reason: HOSPADM

## 2022-06-03 RX ORDER — METOPROLOL SUCCINATE 50 MG/1
50 TABLET, EXTENDED RELEASE ORAL DAILY
Status: DISCONTINUED | OUTPATIENT
Start: 2022-06-03 | End: 2022-06-04 | Stop reason: HOSPADM

## 2022-06-03 RX ORDER — PHENYLEPHRINE HCL IN 0.9% NACL 1 MG/10 ML
SYRINGE (ML) INTRAVENOUS PRN
Status: DISCONTINUED | OUTPATIENT
Start: 2022-06-03 | End: 2022-06-03 | Stop reason: SDUPTHER

## 2022-06-03 RX ORDER — ONDANSETRON 2 MG/ML
4 INJECTION INTRAMUSCULAR; INTRAVENOUS
Status: CANCELLED | OUTPATIENT
Start: 2022-06-03 | End: 2022-06-03

## 2022-06-03 RX ORDER — SODIUM CHLORIDE 0.9 % (FLUSH) 0.9 %
5-40 SYRINGE (ML) INJECTION EVERY 12 HOURS SCHEDULED
Status: DISCONTINUED | OUTPATIENT
Start: 2022-06-03 | End: 2022-06-04 | Stop reason: HOSPADM

## 2022-06-03 RX ORDER — SUCCINYLCHOLINE/SOD CL,ISO/PF 200MG/10ML
SYRINGE (ML) INTRAVENOUS PRN
Status: DISCONTINUED | OUTPATIENT
Start: 2022-06-03 | End: 2022-06-03 | Stop reason: SDUPTHER

## 2022-06-03 RX ORDER — DILTIAZEM HYDROCHLORIDE 180 MG/1
360 CAPSULE, COATED, EXTENDED RELEASE ORAL DAILY
Status: DISCONTINUED | OUTPATIENT
Start: 2022-06-03 | End: 2022-06-04 | Stop reason: HOSPADM

## 2022-06-03 RX ORDER — ACETAMINOPHEN 650 MG/1
650 SUPPOSITORY RECTAL EVERY 6 HOURS PRN
Status: DISCONTINUED | OUTPATIENT
Start: 2022-06-03 | End: 2022-06-04 | Stop reason: HOSPADM

## 2022-06-03 RX ORDER — ROSUVASTATIN CALCIUM 20 MG/1
20 TABLET, COATED ORAL DAILY
Status: DISCONTINUED | OUTPATIENT
Start: 2022-06-03 | End: 2022-06-04 | Stop reason: HOSPADM

## 2022-06-03 RX ORDER — MEPERIDINE HYDROCHLORIDE 25 MG/ML
12.5 INJECTION INTRAMUSCULAR; INTRAVENOUS; SUBCUTANEOUS EVERY 5 MIN PRN
Status: CANCELLED | OUTPATIENT
Start: 2022-06-03

## 2022-06-03 RX ORDER — SODIUM CHLORIDE 9 MG/ML
INJECTION, SOLUTION INTRAVENOUS CONTINUOUS PRN
Status: DISCONTINUED | OUTPATIENT
Start: 2022-06-03 | End: 2022-06-03 | Stop reason: SDUPTHER

## 2022-06-03 RX ORDER — SODIUM CHLORIDE 0.9 % (FLUSH) 0.9 %
5-40 SYRINGE (ML) INJECTION PRN
Status: DISCONTINUED | OUTPATIENT
Start: 2022-06-03 | End: 2022-06-03 | Stop reason: SDUPTHER

## 2022-06-03 RX ORDER — DIGOXIN 125 MCG
125 TABLET ORAL DAILY
Status: DISCONTINUED | OUTPATIENT
Start: 2022-06-03 | End: 2022-06-04 | Stop reason: HOSPADM

## 2022-06-03 RX ORDER — OXYMETAZOLINE HYDROCHLORIDE 0.05 G/100ML
2 SPRAY NASAL ONCE
Status: DISCONTINUED | OUTPATIENT
Start: 2022-06-03 | End: 2022-06-03

## 2022-06-03 RX ORDER — FENTANYL CITRATE 50 UG/ML
25 INJECTION, SOLUTION INTRAMUSCULAR; INTRAVENOUS EVERY 5 MIN PRN
Status: CANCELLED | OUTPATIENT
Start: 2022-06-03

## 2022-06-03 RX ORDER — SODIUM CHLORIDE 0.9 % (FLUSH) 0.9 %
5-40 SYRINGE (ML) INJECTION EVERY 12 HOURS SCHEDULED
Status: DISCONTINUED | OUTPATIENT
Start: 2022-06-03 | End: 2022-06-03 | Stop reason: SDUPTHER

## 2022-06-03 RX ORDER — SODIUM CHLORIDE 0.9 % (FLUSH) 0.9 %
5-40 SYRINGE (ML) INJECTION EVERY 12 HOURS SCHEDULED
Status: CANCELLED | OUTPATIENT
Start: 2022-06-03

## 2022-06-03 RX ORDER — SODIUM CHLORIDE 9 MG/ML
INJECTION, SOLUTION INTRAVENOUS PRN
Status: DISCONTINUED | OUTPATIENT
Start: 2022-06-03 | End: 2022-06-03 | Stop reason: SDUPTHER

## 2022-06-03 RX ORDER — OXYMETAZOLINE HYDROCHLORIDE 0.05 G/100ML
2 SPRAY NASAL 3 TIMES DAILY
Status: DISCONTINUED | OUTPATIENT
Start: 2022-06-03 | End: 2022-06-04 | Stop reason: HOSPADM

## 2022-06-03 RX ORDER — SODIUM CHLORIDE 9 MG/ML
INJECTION, SOLUTION INTRAVENOUS PRN
Status: DISCONTINUED | OUTPATIENT
Start: 2022-06-03 | End: 2022-06-04 | Stop reason: HOSPADM

## 2022-06-03 RX ORDER — LIDOCAINE 4 G/G
2 PATCH TOPICAL DAILY
Status: DISCONTINUED | OUTPATIENT
Start: 2022-06-03 | End: 2022-06-04 | Stop reason: HOSPADM

## 2022-06-03 RX ORDER — HEPARIN SODIUM 1000 [USP'U]/ML
INJECTION, SOLUTION INTRAVENOUS; SUBCUTANEOUS PRN
Status: DISCONTINUED | OUTPATIENT
Start: 2022-06-03 | End: 2022-06-03 | Stop reason: SDUPTHER

## 2022-06-03 RX ORDER — SODIUM CHLORIDE 0.9 % (FLUSH) 0.9 %
5-40 SYRINGE (ML) INJECTION PRN
Status: CANCELLED | OUTPATIENT
Start: 2022-06-03

## 2022-06-03 RX ORDER — FLUMAZENIL 0.1 MG/ML
INJECTION, SOLUTION INTRAVENOUS PRN
Status: DISCONTINUED | OUTPATIENT
Start: 2022-06-03 | End: 2022-06-03 | Stop reason: SDUPTHER

## 2022-06-03 RX ORDER — FENTANYL CITRATE 50 UG/ML
INJECTION, SOLUTION INTRAMUSCULAR; INTRAVENOUS PRN
Status: DISCONTINUED | OUTPATIENT
Start: 2022-06-03 | End: 2022-06-03 | Stop reason: SDUPTHER

## 2022-06-03 RX ORDER — POTASSIUM CHLORIDE 750 MG/1
10 TABLET, FILM COATED, EXTENDED RELEASE ORAL 2 TIMES DAILY
Status: DISCONTINUED | OUTPATIENT
Start: 2022-06-03 | End: 2022-06-04 | Stop reason: HOSPADM

## 2022-06-03 RX ORDER — CYCLOBENZAPRINE HCL 10 MG
10 TABLET ORAL 3 TIMES DAILY PRN
Status: DISCONTINUED | OUTPATIENT
Start: 2022-06-03 | End: 2022-06-04 | Stop reason: HOSPADM

## 2022-06-03 RX ORDER — 0.9 % SODIUM CHLORIDE 0.9 %
1000 INTRAVENOUS SOLUTION INTRAVENOUS ONCE
Status: COMPLETED | OUTPATIENT
Start: 2022-06-03 | End: 2022-06-03

## 2022-06-03 RX ORDER — ONDANSETRON 4 MG/1
4 TABLET, ORALLY DISINTEGRATING ORAL EVERY 8 HOURS PRN
Status: DISCONTINUED | OUTPATIENT
Start: 2022-06-03 | End: 2022-06-04 | Stop reason: HOSPADM

## 2022-06-03 RX ORDER — SODIUM CHLORIDE 0.9 % (FLUSH) 0.9 %
5-40 SYRINGE (ML) INJECTION PRN
Status: DISCONTINUED | OUTPATIENT
Start: 2022-06-03 | End: 2022-06-04 | Stop reason: HOSPADM

## 2022-06-03 RX ORDER — PROPOFOL 10 MG/ML
INJECTION, EMULSION INTRAVENOUS PRN
Status: DISCONTINUED | OUTPATIENT
Start: 2022-06-03 | End: 2022-06-03 | Stop reason: SDUPTHER

## 2022-06-03 RX ORDER — MIDAZOLAM HYDROCHLORIDE 1 MG/ML
INJECTION INTRAMUSCULAR; INTRAVENOUS PRN
Status: DISCONTINUED | OUTPATIENT
Start: 2022-06-03 | End: 2022-06-03 | Stop reason: SDUPTHER

## 2022-06-03 RX ADMIN — PHYTONADIONE 10 MG: 10 INJECTION, EMULSION INTRAMUSCULAR; INTRAVENOUS; SUBCUTANEOUS at 11:47

## 2022-06-03 RX ADMIN — Medication 160 MG: at 13:24

## 2022-06-03 RX ADMIN — SODIUM CHLORIDE 50 ML: 9 INJECTION, SOLUTION INTRAVENOUS at 11:44

## 2022-06-03 RX ADMIN — FENTANYL CITRATE 50 MCG: 50 INJECTION, SOLUTION INTRAMUSCULAR; INTRAVENOUS at 13:48

## 2022-06-03 RX ADMIN — Medication 1500 UNITS: at 11:35

## 2022-06-03 RX ADMIN — VERAPAMIL HYDROCHLORIDE 5 MG: 2.5 INJECTION, SOLUTION INTRAVENOUS at 14:42

## 2022-06-03 RX ADMIN — ROSUVASTATIN CALCIUM 20 MG: 20 TABLET, FILM COATED ORAL at 19:59

## 2022-06-03 RX ADMIN — FENTANYL CITRATE 50 MCG: 50 INJECTION, SOLUTION INTRAMUSCULAR; INTRAVENOUS at 13:20

## 2022-06-03 RX ADMIN — VERAPAMIL HYDROCHLORIDE 5 MG: 2.5 INJECTION, SOLUTION INTRAVENOUS at 14:06

## 2022-06-03 RX ADMIN — SODIUM CHLORIDE: 9 INJECTION, SOLUTION INTRAVENOUS at 13:16

## 2022-06-03 RX ADMIN — IOPAMIDOL 70 ML: 612 INJECTION, SOLUTION INTRAVENOUS at 15:04

## 2022-06-03 RX ADMIN — FLUMAZENIL 0.25 MG: 0.1 INJECTION INTRAVENOUS at 15:10

## 2022-06-03 RX ADMIN — METOPROLOL SUCCINATE 50 MG: 50 TABLET, EXTENDED RELEASE ORAL at 16:52

## 2022-06-03 RX ADMIN — SODIUM CHLORIDE, PRESERVATIVE FREE 10 ML: 5 INJECTION INTRAVENOUS at 20:00

## 2022-06-03 RX ADMIN — DIGOXIN 125 MCG: 125 TABLET ORAL at 16:52

## 2022-06-03 RX ADMIN — HEPARIN SODIUM 5000 UNITS: 1000 INJECTION INTRAVENOUS; SUBCUTANEOUS at 14:06

## 2022-06-03 RX ADMIN — POTASSIUM CHLORIDE 10 MEQ: 750 TABLET, EXTENDED RELEASE ORAL at 20:03

## 2022-06-03 RX ADMIN — PROPOFOL 150 MG: 10 INJECTION, EMULSION INTRAVENOUS at 13:24

## 2022-06-03 RX ADMIN — LIDOCAINE HYDROCHLORIDE 80 MG: 15 INJECTION, SOLUTION EPIDURAL; INFILTRATION; INTRACAUDAL; PERINEURAL at 13:23

## 2022-06-03 RX ADMIN — Medication 200 MCG: at 14:41

## 2022-06-03 RX ADMIN — SODIUM CHLORIDE 1000 ML: 9 INJECTION, SOLUTION INTRAVENOUS at 12:33

## 2022-06-03 RX ADMIN — SODIUM CHLORIDE, POTASSIUM CHLORIDE, SODIUM LACTATE AND CALCIUM CHLORIDE: 600; 310; 30; 20 INJECTION, SOLUTION INTRAVENOUS at 15:58

## 2022-06-03 RX ADMIN — OXYMETAZOLINE HYDROCHLORIDE 2 SPRAY: 0.05 SPRAY NASAL at 20:00

## 2022-06-03 RX ADMIN — MIDAZOLAM 2 MG: 1 INJECTION INTRAMUSCULAR; INTRAVENOUS at 13:16

## 2022-06-03 RX ADMIN — Medication 200 MCG: at 14:15

## 2022-06-03 RX ADMIN — DILTIAZEM HYDROCHLORIDE 360 MG: 180 CAPSULE, COATED, EXTENDED RELEASE ORAL at 16:52

## 2022-06-03 ASSESSMENT — ENCOUNTER SYMPTOMS
VOMITING: 0
RHINORRHEA: 0
SHORTNESS OF BREATH: 0
COUGH: 0
SORE THROAT: 0
ABDOMINAL PAIN: 0
NAUSEA: 1
BACK PAIN: 1

## 2022-06-03 ASSESSMENT — PAIN SCALES - GENERAL: PAINLEVEL_OUTOF10: 0

## 2022-06-03 NOTE — PROCEDURES
angiogram    INDICATIONS: Mr. Nish Major is 81years old right-handed man with recurrent epistaxis for the past 2 weeks presenting as referral for embolization for the epistaxis. A catheter cerebral angiogram is now being perfromed for senting. The purpose, alternative modalities, risks, and benefits of the procedure were discussed with the patient and his wife. The patient signed consent for the procedure. DESCRIPTION OF THE PROCEDURE AND FINDINGS: The patient was brought to the angiography suite and placed supine on the table. Anesthesia team performed general endotracheal anesthesia. Both groins were then prepped and draped in the usual sterile fashion. Bony landmarks were identified, and an imaginary line between the pubic tubercle and the anterior superior iliac spine was identified on the right. This corresponds to the right inguinal ligament. Four fingerbreadths below the right inguinal ligament the right femoral artery pulsations were palpated. Lidocaine 1% was infiltrated into the skin and deeper tissues surrounding the femoral artery. Access to the right femoral artery was obtained using the modified Seldinger technique and an 18 gauge thinwall needle. Pulsatile bright red blood was returned. A J-wire was then used to place a 6-[place] 23 cm sheath into the right common iliac artery. The sheath was then attached to continuous heparinized flush drip. The sheath was then advanced over a glidewire under fluroscopic guidance to the descending aorta where it was double flushed. A four vessel cerebral angiogram and left carotid artery balloon angioplasty and stenting were performed during the course of this examination. Previously prepped 6 [place] envoy catheter was advanced over a Glidewire through the sheath. The catheter was positioned in the descending aorta. Wire was taken out. Double flushing was performed. Glidewire was reintroduced.   The catheter was advanced in to the right common carotid artery. PA and lateral angiograms from the cervical station were perfromed. These angiograms demonstrate normal carotid bifurcation. PA , oblique and lateral angiograms from the craniocervical station were then obtained. The distal cervical, petrous, laceral, cavernous, and supraclinoid carotid segments appear within normal limits. The ophthalmic and small anterior choroidal arteries are visualized and appear normal.  There was no filling through the right external carotid artery and its branches particularly the internal maxillary artery. There are no AVMs AV fistulae. Roadmap was obtained. A previously prepped scepter XC microcatheter with a Synchro 2 microwire were introduced into the guide catheter. Under fluoroscopy the microcatheter was advanced into the right internal maxillary artery. PA and lateral angiograms from the cranial station and injecting through the microcatheter was then obtained. No vascular pathology was identified. Roadmap was acquired. The microcatheter was further advanced into the right posterior valvular artery. The scepter XC balloon was inflated under fluoroscopy. A previously prepped and agitated Embosphere's 100-300 µm were injected slowly through the microcatheter. A total of 1 mL was injected. Follow-up angiogram after the injection was performed. This angiogram demonstrates further slowing of the flow in the right internal maxillary artery bit. A final follow up angiogram of the right common carotid artery from the cranial station was obtained to evaluate for thromboembolic complications. There was no thromboembolic complications. The catheter was then positioned in the left common carotid artery under fluoroscopic guidance. PA and lateral angiograms from the cervical station were perfromed. These angiograms demonstrate normal bifurcation.   There was also slow filling of the left external carotid artery and its branches particularly the internal maxillary artery. Roadmap was obtained. The guide catheter was advanced over the guidewire into the left external carotid artery. The microcatheter and microwire were advanced under fluoroscopy through the guide catheter and positioned in the left internal maxillary artery. PA and lateral angiograms from the cranial station and injecting through the microcatheter was obtained. This angiogram demonstrate almost no filling of the distal parts of the left internal maxillary artery. Mild vasospasm was identified in the left external carotid artery at the location of the tip of the guide catheter. 5 mg of IA verapamil were injected slowly through the left internal carotid artery. The catheter was removed from the system. An angiogram of the right common femoral and external iliac arteries was performed to confirm that the sheath was not in the superficial femoral branch. The sheath was exchanged for 6 [place] Angio-Seal which was successfully deployed and hemostasis achieved. There were no acute complications and the patient tolerated the procedure well. Dr. Patti Spencer was present during the procedure and the image interpretation    Impression:  1. Normal angiogram of the bilateral common carotid arteries. 2.  Slow filling of the bilateral internal maxillary arteries. 3.  No evidence of aneurysm, dissection or AVM of the bilateral internal maxillary arteries and their branches.   4.  Embolization of the right posterior alveolar artery using Embospheres

## 2022-06-03 NOTE — ED NOTES
Pt wanted to get up to use the bathroom. While standing outside the bathroom door pt states he was beginning to feel dizzy and nauseated- ambulated back to room and laid on cart. Pt noted pale and diaphoretic at that time- did not lose consciousness. BP considerably lower than previous readings. Provider updated and orders received.        Antonella Machado RN  06/03/22 7912

## 2022-06-03 NOTE — BRIEF OP NOTE
Brief Postoperative Note      Patient: Adolfo Scott  YOB: 1950  MRN: [id number]    Date of Procedure:     Pre-Op Diagnosis: Epistaxis embolization    Post-Op Diagnosis: Same           Epistaxis right posterior alveolar embolization2  Assistant:    Anesthesia: General anesthesia    Estimated Blood Loss (mL): Minimal    Complications: None    Specimens:   None  Implants:  Embospheres    Drains: * No LDAs found *    Findings:   Slow filling bilateral Internal maxillary arteries with no evidence of underlying vascular pathology  Embolization of the right posterior alveolar branch using 1 ml of the 100-300 micon embospheres  Electronically signed by Ag Rick MD on 6/3/2022 at 3:03 PM

## 2022-06-03 NOTE — ANESTHESIA PRE PROCEDURE
Department of Anesthesiology  Preprocedure Note       Name:  Pao Mansfield   Age:  81 y.o.  :  1950                                          MRN:  [id number]         Date:  6/3/2022      Surgeon: * No surgeons listed *    Procedure: * No procedures listed *    Medications prior to admission:   Prior to Admission medications    Medication Sig Start Date End Date Taking? Authorizing Provider   rosuvastatin (CRESTOR) 20 MG tablet Take 1 tablet by mouth daily 22   Leslie Aldana MD   warfarin (COUMADIN) 2.5 MG tablet Take as directed by [facility] (40 tablets = 30 day supply) 22   Jojo Monteiro MD   nitroGLYCERIN (NITROSTAT) 0.4 MG SL tablet DISSOLVE ONE TABLET UNDER THE TONGUE EVERY 5 MINUTES AS NEEDED FOR CHEST PAIN.   DO NOT EXCEED A TOTAL OF 3 DOSES IN 15 MINUTES  Patient not taking: Reported on 21   Ania Collado MD   dilTIAZem (CARDIZEM CD) 360 MG extended release capsule Take 1 capsule by mouth once daily 21   Leslie Aldana MD   digoxin (LANOXIN) 125 MCG tablet TAKE 1 TABLET BY MOUTH ONCE DAILY 21   Chidi Jackson MD   potassium chloride (KLOR-CON) 10 MEQ extended release tablet TAKE 1 TABLET BY MOUTH TWICE DAILY 21   Chidi Jackson MD   metoprolol succinate (TOPROL XL) 50 MG extended release tablet Take 1 tablet by mouth once daily 21   Leslie Aldana MD   bisacodyl (DULCOLAX) 5 MG EC tablet Take 5 mg by mouth nightly     Historical Provider, MD       Current medications:    Current Facility-Administered Medications   Medication Dose Route Frequency Provider Last Rate Last Admin    oxymetazoline (AFRIN) 0.05 % nasal spray 2 spray  2 spray Each Nostril Once NARESH Mitchell CNP        bisacodyl (DULCOLAX) EC tablet 5 mg  5 mg Oral Nightly Zygmunt NARESH Paz CNP        digoxin (LANOXIN) tablet 125 mcg  1 tablet Oral Daily NARESH Mitchell CNP        dilTIAZem (CARDIZEM CD) extended release capsule 360 mg  360 mg Oral Daily NARESH Reyes CNP        metoprolol succinate (TOPROL XL) extended release tablet 50 mg  50 mg Oral Daily NARESH Reyes CNP        nitroGLYCERIN (NITROSTAT) SL tablet 0.4 mg  0.4 mg SubLINGual Q5 Min PRN NARESH Reyes CNP        potassium chloride (KLOR-CON) extended release tablet 10 mEq  1 tablet Oral BID NARESH Reyes CNP        rosuvastatin (CRESTOR) tablet 20 mg  20 mg Oral Daily NARESH Reyes CNP        sodium chloride flush 0.9 % injection 5-40 mL  5-40 mL IntraVENous 2 times per day NARESH Reyes CNP        sodium chloride flush 0.9 % injection 5-40 mL  5-40 mL IntraVENous PRN NARESH Reyes CNP        0.9 % sodium chloride infusion   IntraVENous PRN NARESH Reyes CNP        ondansetron (ZOFRAN-ODT) disintegrating tablet 4 mg  4 mg Oral Q8H PRN NARESH Reyes CNP        Or    ondansetron [facility]) injection 4 mg  4 mg IntraVENous Q6H PRN NARESH Reyes CNP        polyethylene glycol (GLYCOLAX) packet 17 g  17 g Oral Daily PRN NARESH Reyes CNP        acetaminophen (TYLENOL) tablet 650 mg  650 mg Oral Q6H PRN NARESH Reyes CNP        Or    acetaminophen (TYLENOL) suppository 650 mg  650 mg Rectal Q6H PRN NARESH Reyes CNP        cyclobenzaprine (FLEXERIL) tablet 10 mg  10 mg Oral TID PRN NARESH Reyes CNP        lidocaine 4 % external patch 2 patch  2 patch TransDERmal Daily NAERSH Reyes CNP        lactated ringers infusion   IntraVENous Continuous NARESH Reyes CNP        0.9 % sodium chloride infusion   IntraVENous PRN NARESH Mooney CNP         Current Outpatient Medications   Medication Sig Dispense Refill    rosuvastatin (CRESTOR) 20 MG tablet Take 1 tablet by mouth daily 90 tablet 1    warfarin (COUMADIN) 2.5 MG tablet Take as directed by [facility] (40 tablets = 30 day supply) 40 tablet 11    nitroGLYCERIN (NITROSTAT) 0.4 MG SL tablet DISSOLVE ONE TABLET UNDER THE TONGUE EVERY 5 MINUTES AS NEEDED FOR CHEST PAIN.   DO NOT EXCEED A TOTAL OF 3 DOSES IN 15 MINUTES (Patient not taking: Reported on 5/25/2022) 25 tablet 2    dilTIAZem (CARDIZEM CD) 360 MG extended release capsule Take 1 capsule by mouth once daily 90 capsule 3    digoxin (LANOXIN) 125 MCG tablet TAKE 1 TABLET BY MOUTH ONCE DAILY 90 tablet 3    potassium chloride (KLOR-CON) 10 MEQ extended release tablet TAKE 1 TABLET BY MOUTH TWICE DAILY 180 tablet 3    metoprolol succinate (TOPROL XL) 50 MG extended release tablet Take 1 tablet by mouth once daily 90 tablet 3    bisacodyl (DULCOLAX) 5 MG EC tablet Take 5 mg by mouth nightly          Allergies:  No Known Allergies    Problem List:    Patient Active Problem List   Diagnosis Code    Essential hypertension I10    Hyperlipidemia E78.5    Paroxysmal atrial fibrillation (HCC) I48.0    Obesity due to excess calories E66.09    Dizziness R42    History of gastroesophageal reflux (GERD) E29.52    Diastolic dysfunction E22.39    Anticoagulated on Coumadin Z79.01    Urinary incontinence R32    Elevated PSA R97.20    Coronary artery disease involving native coronary artery of native heart without angina pectoris I25.10    Benign prostatic hyperplasia with urinary frequency N40.1, R35.0    BPH with obstruction/lower urinary tract symptoms N40.1, N13.8    Prostate cancer (HCC) C61    Fever R50.9    Encounter for therapeutic drug monitoring Z51.81    Albuminuria R80.9    Epistaxis R04.0       Past Medical History:        Diagnosis Date    Anesthesia     takes large dose of medications to make him sleepy for surgery    Atrial fibrillation ([facility] [address]     Benign prostatic hyperplasia with urinary frequency 10/21/2019    CAD (coronary artery disease)     Chest pain     Colon polyp 2011    removed    Coronary artery disease involving native coronary artery of native heart without angina pectoris 10/21/2019    Dementia ([facility] [address]     Dizziness     Elevated PSA     GERD (gastroesophageal reflux disease)     Heart disease     Hyperlipidemia     Hypertension        Past Surgical History:        Procedure Laterality Date    ADENOIDECTOMY      CARDIAC CATHETERIZATION  09/23/2011    Right radial artery cannulation. Moderate LV dysfunction. The patient has disease in the ostium of diagonal 1 and diagonal 2 branch, however they are both are at the  ostium of the diagonals. Intervention could compromise flow of LAD. THe LAD has long diffuse calcified lesion 50-60-% anatomically.  CARDIOVASCULAR STRESS TEST  09/23/2011    EF 37%    CARPAL TUNNEL RELEASE      COLONOSCOPY      PROSTATECTOMY N/A 12/3/2020    ROBOTIC ASSISTED LAPAROSCOPIC RADICAL PROSTATECTOMY performed by Aminta Diaz MD at [address] [facility] ECHOCARDIOGRAM  09/23/2011    EF 50-55%    ULTRASOUND PROSTATE/TRANSRECTAL N/A 10/6/2020    CYSTO, TRANSRECTAL ULTRASOUND GUIDED PROSTATE BX performed by Aminta Diaz MD at [address] [place] [address] History:    Social History     Tobacco Use    Smoking status: Never Smoker    Smokeless tobacco: Never Used   Substance Use Topics    Alcohol use:  No                                Counseling given: Not Answered      Vital Signs (Current):   Vitals:    06/03/22 1132 06/03/22 1225 06/03/22 1235 06/03/22 1313   BP: (!) 159/106 109/82 130/88 (!) 147/72   Pulse: 91 (!) 120 (!) 101 96   Resp: 20  20 18   Temp:       TempSrc:       SpO2: 98% 96% 94% 93%   Weight:       Height:                                                  BP Readings from Last 3 Encounters:   06/03/22 (!) 147/72   05/30/22 (!) 172/112   04/21/22 132/86       NPO Status: Time of last liquid consumption: 0700                        Time of last solid consumption: 0700                        Date of last liquid consumption: 06/03/22                        Date of last solid food consumption: 06/03/22    BMI:   Wt Readings from Last 3 Encounters:   06/03/22 240 lb (108.9 kg)   05/30/22 235 lb (106.6 kg)   04/21/22 239 lb (108.4 kg)     Body mass index is 31.66 kg/m². CBC:   Lab Results   Component Value Date    WBC 9.2 06/03/2022    RBC 6.01 06/03/2022    RBC 5.63 05/17/2016    HGB 17.5 06/03/2022    HCT 51.8 06/03/2022    MCV 86.2 06/03/2022    RDW 13.9 06/23/2018     06/03/2022       CMP:   Lab Results   Component Value Date     06/03/2022    K 4.2 06/03/2022    K 3.8 09/20/2020     06/03/2022    CO2 23 06/03/2022    BUN 16 06/03/2022    CREATININE 0.8 06/03/2022    LABGLOM >90 06/03/2022    GLUCOSE 150 06/03/2022    GLUCOSE 93 05/17/2016    PROT 7.0 10/04/2021    CALCIUM 9.1 06/03/2022    BILITOT 0.4 10/04/2021    ALKPHOS 86 10/04/2021    AST 23 10/04/2021    ALT 28 10/04/2021       POC Tests: No results for input(s): POCGLU, POCNA, POCK, POCCL, POCBUN, POCHEMO, POCHCT in the last 72 hours.     Coags:   Lab Results   Component Value Date    PROTIME 44.6 06/02/2022    INR 1.39 06/03/2022    APTT 46.1 03/17/2021       HCG (If Applicable): No results found for: PREGTESTUR, PREGSERUM, HCG, HCGQUANT     ABGs: No results found for: PHART, PO2ART, MPQ3HMC, IPL8FIV, BEART, E6NOQYYY     Type & Screen (If Applicable):  No results found for: LABABO, LABRH    Drug/Infectious Status (If Applicable):  No results found for: HIV, HEPCAB    COVID-19 Screening (If Applicable):   Lab Results   Component Value Date    COVID19 Not Detected 11/25/2020           Anesthesia Evaluation  Patient summary reviewed and Nursing notes reviewed no history of anesthetic complications:   Airway: Mallampati: II  TM distance: >3 FB   Neck ROM: full  Mouth opening: > = 3 FB   Dental:          Pulmonary:Negative Pulmonary ROS   (+) decreased breath sounds                            Cardiovascular:  Exercise tolerance: good (>4 METS),   (+) hypertension:, CAD:, dysrhythmias: atrial fibrillation, ECG reviewed  Rhythm: irregular  Rate: abnormal                    Neuro/Psych:   (+) psychiatric history:            GI/Hepatic/Renal:   (+) GERD:,           Endo/Other: Negative Endo/Other ROS             Pt had no PAT visit       Abdominal:   (+) obese,     Abdomen: soft. Vascular: negative vascular ROS. Other Findings:           Anesthesia Plan      general     ASA 4 - emergent       Induction: intravenous and rapid sequence. MIPS: Postoperative opioids intended and Prophylactic antiemetics administered. Anesthetic plan and risks discussed with patient. Plan discussed with CRNA.                     Rabia Gomes DO   6/3/2022

## 2022-06-03 NOTE — PROGRESS NOTES
1320 Verified consent, H&P, and/or immediate pre-procedure note completed. Staff involved in the case: [employer], Loretta Moore, Ulysses Alvarez, Dr Mannie Hong. Patient received in cath lab for procedure family taken to waiting room. Neuro assessment WNL. 29[address] [facility] Dr Karen Johnson present to discuss anesthesia. 1335 Pre-procedure peripheral pulse,  right pedal 2+, right posterior tibial 2+, left pedal 2+, left posterior tibial 2+  1335 Monitors applied; vitals and sedation per anesthesia. Preparing patient for general anesthesia for procedure. 1337 Patient prepped for procedure; bilateral groins. 1342 Induction of anesthesia and intubation per Dr Karen Johnson. 1352 Procedure started with Dr. Mannie Hong. Timeout performed and the following information was verified: correct patient, correct procedure, correct procedure site, correct position, correct laterality (if applicable), procedure site marked and visible (if applicable), and consents verified. Allergies reviewed. Pertinent diagnostic and radiologic results present and relevant images available (if applicable). All special equipment or special requirements available as applicable. Prophylactic antibiotics given as applicable. Fire risk safety assessment completed, shared with team and appropriate interventions implemented. 1558 No Lidocaine used for access. Access obtained at the right femoral artery with 4fr Micropuncture. 6fr x 25cm sheath inserted. 1405 Angiogram of right common carotid. 1407 Angiogram of the right external carotid artery. [id number] Verapamil 5mg injected into right common carotid artery per Dr Manine Hong. 1423 Angiogram of the right Internal maxillary artery. [facility] balloon inflated. 1431 Injection of 1ml of 100-300um Embospheres to the right posterior angular artery. [facility] balloon deflated. [facility] embolization angiogram.   1438 Angiogram of the left common carotid.    [address] Angiogram of the left external carotid artery. [id number] Verapamil 5mg injected into the left external carotid artery. 56 Angiogram of the left internal maxillary artery. 1453 Angiogram of the left common carotid artery. 1455 Right femoral artery angiogram for angioseal.   1457 Sheath-removed, intact. 6fr angioseal deviced deployed to right femoral artery. 1458 Procedure completed; patient tolerated well.  1500 Femoral site; area soft to touch with no bleeding noted. Post-procedure peripheral pulse unchanged: right pedal 2+, right posterior tibial 2+, left pedal 2+, left posterior tibial 2+.  1505 Femoral dressing remains dry and intact with area soft. Neuro assessment WNL. Anesthesia waking pt up and removing intubation airway. Vitals per anesthesia. 1515 Case end time  1518 Patient on bed, patient exits procedural suite  1522 Patient take to ICU 6. Xray time- 9.9 minutes  470mGy  Sedation time per general anesthesia.   Total contrast- 70ml's

## 2022-06-03 NOTE — ANESTHESIA POSTPROCEDURE EVALUATION
Department of Anesthesiology  Postprocedure Note    Patient: Subhash Lizarraga  MRN: [id number]  YOB: 1950  Date of evaluation: 6/3/2022  Time:  6:59 PM     Procedure Summary     Date: 06/03/22 [facility]    Anesthesia Start: 1308 Anesthesia Stop: 5841    Procedure: STR CATH NEURO W/ ANESTHESIA Diagnosis:     Scheduled Providers:  Responsible Provider: Oliver Groves DO    Anesthesia Type: general ASA Status: 4 - Emergent          Anesthesia Type: No value filed. Sheldon Phase I: Sheldon Score: 10    Sheldon Phase II:      Last vitals: Reviewed and per EMR flowsheets. Anesthesia Post Evaluation    Patient location during evaluation: PACU  Patient participation: complete - patient participated  Level of consciousness: awake and alert  Airway patency: patent  Nausea & Vomiting: no nausea and no vomiting  Complications: no  Cardiovascular status: hemodynamically stable  Respiratory status: acceptable  Hydration status: euvolemic      [facility]  POST-ANESTHESIA NOTE       Name:  Subhash Lizarraga                                         Age:  81 y.o.   MRN:  [id number]      Last Vitals:  BP (!) 161/99   Pulse 95   Temp 97.5 °F (36.4 °C) (Oral)   Resp (!) 0   Ht 6' 1\" (1.854 m)   Wt 252 lb 6.8 oz (114.5 kg)   SpO2 92%   BMI 33.30 kg/m²   Patient Vitals for the past 4 hrs:   BP Temp Temp src Pulse Resp SpO2 Weight   06/03/22 1845 (!) 161/99   95 (!) 0 92 %    06/03/22 1815 (!) 158/96   90 (!) 33 92 %    06/03/22 1800 (!) 159/100   98 18 92 %    06/03/22 1730 (!) 153/102   75 10 94 %    06/03/22 1715 (!) 137/96   85 30 93 %    06/03/22 1700 (!) 160/107   81 22 94 %    06/03/22 1645 (!) 157/107   78 27 92 %    06/03/22 1630 (!) 148/93   89 29 92 %    06/03/22 1615 (!) 156/106   84 21 92 %    06/03/22 1600 (!) 137/95   74 24 96 %    06/03/22 1545 (!) 146/93 97.5 °F (36.4 °C) Oral 85 24 96 %    06/03/22 1535    85 (!) 40 94 % 252 lb 6.8 oz (114.5 kg)   06/03/22 1530 (!) 137/94   91 24 96 %        Level of Consciousness:  Awake    Respiratory:  Stable    Oxygen Saturation:  Stable    Cardiovascular:  Stable    Hydration:  Adequate    PONV:  Stable    Post-op Pain:  Adequate analgesia    Post-op Assessment:  No apparent anesthetic complications    Additional Follow-Up / Treatment / Comment:  None    Emy Carl MD  Debbie 3, 2022   6:59 PM

## 2022-06-03 NOTE — CONSULTS
Department of Otolaryngology  Consult Note    Reason for Consult:  Epistaxis  Requesting Physician:  Dr Justina Haro:  Epistaxis    History Obtained From:  patient, spouse, electronic medical record    HISTORY OF PRESENT ILLNESS:                The patient is a 81 y.o. male who presented to [facility] ER for further evaluation and care of acute epistaxis. Patient is reportedly been battling with recurrent epistaxis for the last few weeks it has been evaluated in the ER several times for this. Previous episodes have been able to be controlled with clamp and Afrin. Patient establish care with [facility]NT this week for further care of his epistaxis. Patient was reportedly evaluated yesterday and had an area in the left nare cauterized. Patient did okay initially, but later in the evening had recurrence of the left-sided epistaxis. Patient will return to my [place] [id number] office today for further care due to persistent epistaxis and was subsequently packed with a Merocel packing. This reportedly did not achieve hemostasis and ENT performed nasal endoscopy which revealed posterior bleed that was not being controlled with Merocel packing. [facility] ENT contacted [facility]'s interventional radiology to coordinate embolization and patient was transferred here for further care. ENT consulted to manage acute epistaxis in the ER. Patient denies any dizziness or lightheadedness at the time of my exam.  He reports that the bleeding typically comes out of the anterior nose until his nose is clamped. Patient on Coumadin with INR today of 3.72. Hospitalist planning to reverse Coumadin with Kcentra and vitamin K.     Past Medical History:        Diagnosis Date    Anesthesia     takes large dose of medications to make him sleepy for surgery    Atrial fibrillation (HCC)     Benign prostatic hyperplasia with urinary frequency 10/21/2019    CAD (coronary artery disease)     Chest pain  Colon polyp 2011    removed    Coronary artery disease involving native coronary artery of native heart without angina pectoris 10/21/2019    Dementia ([facility]     Dizziness     Elevated PSA     GERD (gastroesophageal reflux disease)     Heart disease     Hyperlipidemia     Hypertension        Past Surgical History:        Procedure Laterality Date    ADENOIDECTOMY      CARDIAC CATHETERIZATION  09/23/2011    Right radial artery cannulation. Moderate LV dysfunction. The patient has disease in the ostium of diagonal 1 and diagonal 2 branch, however they are both are at the  ostium of the diagonals. Intervention could compromise flow of LAD. THe LAD has long diffuse calcified lesion 50-60-% anatomically.     CARDIOVASCULAR STRESS TEST  09/23/2011    EF 37%    CARPAL TUNNEL RELEASE      COLONOSCOPY      PROSTATECTOMY N/A 12/3/2020    ROBOTIC ASSISTED LAPAROSCOPIC RADICAL PROSTATECTOMY performed by Vena Duane, MD at [address] ECHOCARDIOGRAM  09/23/2011    EF 50-55%    ULTRASOUND PROSTATE/TRANSRECTAL N/A 10/6/2020    CYSTO, TRANSRECTAL ULTRASOUND GUIDED PROSTATE BX performed by Vena Duane, MD at [address] [place] [address]         Current Medications:   Current Facility-Administered Medications: oxymetazoline (AFRIN) 0.05 % nasal spray 2 spray, 2 spray, Each Nostril, Once  bisacodyl (DULCOLAX) EC tablet 5 mg, 5 mg, Oral, Nightly  digoxin (LANOXIN) tablet 125 mcg, 125 mcg, Oral, Daily  dilTIAZem (CARDIZEM CD) extended release capsule 360 mg, 360 mg, Oral, Daily  metoprolol succinate (TOPROL XL) extended release tablet 50 mg, 50 mg, Oral, Daily  nitroGLYCERIN (NITROSTAT) SL tablet 0.4 mg, 0.4 mg, SubLINGual, Q5 Min PRN  potassium chloride (KLOR-CON) extended release tablet 10 mEq, 10 mEq, Oral, BID  rosuvastatin (CRESTOR) tablet 20 mg, 20 mg, Oral, Daily  sodium chloride flush 0.9 % injection 5-40 mL, 5-40 mL, IntraVENous, 2 times per day  sodium chloride flush 0.9 % injection 5-40 mL, 5-40 mL, IntraVENous, PRN  ondansetron (ZOFRAN-ODT) disintegrating tablet 4 mg, 4 mg, Oral, Q8H PRN **OR** ondansetron (ZOFRAN) injection 4 mg, 4 mg, IntraVENous, Q6H PRN  polyethylene glycol (GLYCOLAX) packet 17 g, 17 g, Oral, Daily PRN  acetaminophen (TYLENOL) tablet 650 mg, 650 mg, Oral, Q6H PRN **OR** acetaminophen (TYLENOL) suppository 650 mg, 650 mg, Rectal, Q6H PRN  cyclobenzaprine (FLEXERIL) tablet 10 mg, 10 mg, Oral, TID PRN  lidocaine 4 % external patch 2 patch, 2 patch, TransDERmal, Daily  lactated ringers infusion, , IntraVENous, Continuous  0.9 % sodium chloride infusion, , IntraVENous, PRN    Allergies:  No Known Allergies     Social History:    TOBACCO:   reports that he has never smoked. He has never used smokeless tobacco.  ETOH:   reports no history of alcohol use. DRUGS:   reports no history of drug use. Family History:       Problem Relation Age of Onset    Heart Surgery Father     Cancer Father         lung    Heart Disease Father         CABG    Alzheimer's Disease Mother     Diabetes Neg Hx     High Blood Pressure Neg Hx     Stroke Neg Hx        REVIEW OF SYSTEMS:    Negative unless stated in HPI    PHYSICAL EXAM:    VITALS:  BP (!) 137/95   Pulse 74   Temp 97.5 °F (36.4 °C) (Oral)   Resp 24   Ht 6' 1\" (1.854 m)   Wt 252 lb 6.8 oz (114.5 kg)   SpO2 96%   BMI 33.30 kg/m²     This is a 81 y.o. male. Patient is alert and oriented to person, place and time. Patient appears well developed, well nourished. Mood is happy with normal affect. Not obviously hearing impaired. Head:   Normocephalic, atraumatic. No obvious masses or lesions noted. Nose:    External nose: Appears midline. No obvious deformity or masses. Septum:   Merisel packing in place upon initial evaluation. See procedure note below for further description of findings throughout management of epistaxis. No septal hematoma. No perforation.   Mucosa:  bleeding  Turbinates: normal and pink Discharge:  bloody    Mouth/Throat:  Lips, tongue and oral cavity: Normal. No masses or lesions noted   Dentition: fair, no malocclusion  Oral mucosa: moist  Oropharynx: Streaking of bright red blood in the oropharynx, but minimal active oozing of bright red blood on my initial evaluation. See procedure note below for further description   Gag reflex is present    Eyes: BELKIS, EOM intact. Conjunctiva moist without discharge. Lungs: Normal effort of breathing, not obviously distressed. Neuro: Cranial nerves II-XII grossly intact. Procedure: Control of posterior epistaxis  Patient was experiencing persistent epistaxis down the oropharynx upon my initial evaluation. This was only mildly trickling down the oropharynx, but was persistent nonetheless. I recommended removal of the Merocel in hopes of better visualizing possible sources of epistaxis to allow for control. Patient and his wife are agreeable with this. Merocel packing removed and patient began to mildly/moderately bleed from the anterior nose. Area of recent cautery of the left anterior septum with a very small bleeder on the anterior edge of the cauterized area. Patient did have some bleeding from the head of the middle turbinate that was visible as well. I instilled Afrin and 4% topical lidocaine into the left nare after suctioning a few small clots from the nostril. Once adequate topical anesthesia was achieved I lightly cauterized the area on the anterior septum as well as on the middle turbinate. This seemed to reduce the rate of bleeding, but there continues to be a mild ooze posteriorly. Nasopore packing trimmed and impregnated with Afrin. This was placed in the left nare and eventually wedged between the middle turbinate and the septum. This greatly reduced the rate of bleeding.   It is very difficult to fully assess for persistent bleeding in the oropharynx given patient's strong gag reflex and very limited time to visualize before patient moves away or closes mouth. However, patient agreed the he did not feel any significant amount of blood trickling down his throat after packing was placed. Overall, patient tolerated procedure well without immediate evidence of complication    DATA:    CBC with Differential:    Lab Results   Component Value Date    WBC 9.2 06/03/2022    RBC 6.01 06/03/2022    RBC 5.63 05/17/2016    HGB 17.5 06/03/2022    HCT 51.8 06/03/2022     06/03/2022    MCV 86.2 06/03/2022    MCH 29.1 06/03/2022    MCHC 33.8 06/03/2022    RDW 13.9 06/23/2018    NRBC 0 06/03/2022    NRBC 0 09/23/2011    SEGSPCT 77.1 06/03/2022    LYMPHOPCT 29.1 05/17/2016    MONOPCT 5.1 06/03/2022    EOSPCT 3.0 05/17/2016    BASOPCT 0.7 05/17/2016    MONOSABS 0.5 06/03/2022    LYMPHSABS 1.5 06/03/2022    EOSABS 0.1 06/03/2022    BASOSABS 0.0 06/03/2022     BMP:    Lab Results   Component Value Date     06/03/2022    K 4.2 06/03/2022    K 3.8 09/20/2020     06/03/2022    CO2 23 06/03/2022    BUN 16 06/03/2022    LABALBU 4.4 10/04/2021    LABALBU 4.5 01/12/2012    CREATININE 0.8 06/03/2022    CALCIUM 9.1 06/03/2022    LABGLOM >90 06/03/2022    GLUCOSE 150 06/03/2022    GLUCOSE 93 05/17/2016     Lab Results   Component Value Date    INR 1.39 (H) 06/03/2022    INR 3.72 (H) 06/03/2022    INR 3.83 (H) 06/02/2022    PROTIME 44.6 (H) 06/02/2022     IMPRESSION/RECOMMENDATIONS:      Acute epistaxis, anticoagulated    -Management of acute epistaxis in the ER as described above. This greatly reduced the rate of bleeding to the point that it seemed to nearly resolve. However, limited ability to fully assess the oropharynx for active bleeding given patient strong gag reflex  -Patient will be admitted for embolization with IR later today  -Afrin 2 sprays to left nostril 3 times daily for 3 days.   This will hopefully prevent recurrence of epistaxis as well as help with dissolving the packing    Electronically signed by SAY Roberto on 6/3/2022 at 4:09 PM

## 2022-06-03 NOTE — ED TRIAGE NOTES
Pt presents to the ED with nose bleed. Pt states it just started on its own this morning, reports no pain.

## 2022-06-03 NOTE — ED TRIAGE NOTES
Pt to rm 08 per intake for evaluation and intervention of ongoing issue w/ nose bleed. Left nare packing in place PTA. States he has been having issues for the last two weeks and today pt sent in per ENT for embolization to stop the bleeding. Bleeding suddenly started up again this morning- denies injury to the area. Pt appears in no acute distress, BP noted elevated at this time, pt states frustration dealing with this- provider is aware. Last oral intake this am around [id number]. Otherwise pt denies other needs or concerns- wife at bedside.

## 2022-06-03 NOTE — H&P
History & Physical        Patient:  Kevin Macias  YOB: 1950    MRN: [id number]     Acct: [de-identified]    PCP: NARESH Curtis CNP    Date of Admission: 6/3/2022    Date of Service: Pt seen/examined on 06/03/22  and Admitted to Inpatient with expected LOS greater than two midnights due to medical therapy. ASSESSMENT/PLAN:    1. Epistaxis    - Patient has been having intermittent nosebleeding out of his left nostril for the past 2 weeks. Is on [address] with coumadin for PAF. Per chart review, patient was seen in [facility] ED 5/30 for left nare epistaxis which resolved with nose clamp and afrin. Patient was discharged with afrin. Patient's wife reports that his nosebleeding started again this morning around 7 AM.  Patient denies recent facial trauma, injury to face, recent falls. Reports no provoking factors. INR 3.72. Patient reports last dose of Coumadin was yesterday evening and has not taken any of his morning medications today. Patient received Kcentra and vitamin K while in the ED. H&H stable and not requiring transfusion at this time. ENT was consulted and evaluated patient in ER where packing was removed and noted that nose bleeding had stopped. Neurology was consulted and ED provider spoke with Dr. Todd Queen, who plans to perform embolization procedure this afternoon at 1330.    - Hold Coumadin. No antiplatelet medications. SCDs for DVT prophylaxis. - Keep NPO -> will start continuous IVF for hydration   - Plan for embolization w/ Dr. Todd Queen 1330 today. - Repeat INR    - Daily CBC   - ENT consulted, and following   - Neurology consulted, appreciate recs. 2. PAF, rate controlled   - ECG notable for atrial fibrillation, rate controlled without ischemic changes or TWI   - Continue cardizem, Toprol XL, digoxin    - Continuous telemetry     3.  Essential hypertension   - Patient noted to be hypertensive while in the ED, however reports he has not taken any of his morning medications. - Continue Cardizem, metoprolol    4. CAD   - Follows Dr. Marialuisa Florian   - Stress test (10/29/2020) notable for LVEF 37%, negative for ischemia, mild LV dysfunction   - Continue BB, statin, CCB    5. Chronic HFrEF, compensated   - Echo (10/31/2019) notable for EF estimated 45%, mild global hypokinesis of LV, hypokinetic motion of the apical anterior wall noted in LV, moderately dilated left atrium, mild aortic regurg, trivial TR, trivial MT   - Hold OAC given #1. Continue BB, statin. - Daily weights, I&Os    - Through chart review, see a note documented by Randolph Easley 9/23/21where patient requested refill prescription for Bumex 2 mg. This medication is not listed in patient's med rec. I called pharmacy and asked if they could run a report. They inform me that patient has not had a recent refill on this prescription and his last refill was on December 21, 2021 for 90-day supply. Patient REGI at time of discovery. Will need to evaluate if patient is taking Bumex 2 mg daily or not. 6. Hyperlipidemia   - Statin   - Through chart review, patient was recently started on fish oil at home was told to stop on 5/27 after having a nose bleed. 7. Right lower back pain   - 10/10 right lower back pain that started suddenly while in the ED. Describes pain as \"spasm\", pain nonradiating in nature, aggravated with laying flat and alleviated with sitting up and massage. Point tenderness to right lower flank, muscles felt tight compared to left side. Patient denies numbness/tingling/weakness in lower extremities. Pain likely a muscle spasm.   - Lidocaine patches and as needed flexeril ordered. 8. Dementia   - As reported per patient's wife. Pt A&O x's 4.     9. H/o prostate CA s/p robotic laparoscopic radical prostatectomy 12/3/20    10.  Obesity   -BMI 31.66 kg/m²   -Discussed and educated on lifestyle modifications      Chief Complaint:  Nose bleed      History Of Present Illness:    81 y.o. male with a muscles felt tight compared to left side. Patient denies numbness/tingling/weakness in lower extremities, dysuria, hematuria. At this time patient will be admitted to the hospital for further management. Past Medical History:          Diagnosis Date    Anesthesia     takes large dose of medications to make him sleepy for surgery    Atrial fibrillation ([address]     Benign prostatic hyperplasia with urinary frequency 10/21/2019    CAD (coronary artery disease)     Chest pain     Colon polyp 2011    removed    Coronary artery disease involving native coronary artery of native heart without angina pectoris 10/21/2019    Dementia ([address]     Dizziness     Elevated PSA     GERD (gastroesophageal reflux disease)     Heart disease     Hyperlipidemia     Hypertension        Past Surgical History:          Procedure Laterality Date    ADENOIDECTOMY      CARDIAC CATHETERIZATION  09/23/2011    Right radial artery cannulation. Moderate LV dysfunction. The patient has disease in the ostium of diagonal 1 and diagonal 2 branch, however they are both are at the  ostium of the diagonals. Intervention could compromise flow of LAD. THe LAD has long diffuse calcified lesion 50-60-% anatomically.  CARDIOVASCULAR STRESS TEST  09/23/2011    EF 37%    CARPAL TUNNEL RELEASE      COLONOSCOPY      PROSTATECTOMY N/A 12/3/2020    ROBOTIC ASSISTED LAPAROSCOPIC RADICAL PROSTATECTOMY performed by Nikita Brown MD at [address] [place] [address] ECHOCARDIOGRAM  09/23/2011    EF 50-55%    ULTRASOUND PROSTATE/TRANSRECTAL N/A 10/6/2020    CYSTO, TRANSRECTAL ULTRASOUND GUIDED PROSTATE BX performed by Nikita Brown MD at [address] [place] [address]         Medications Prior to Admission:      Prior to Admission medications    Medication Sig Start Date End Date Taking?  Authorizing Provider   rosuvastatin (CRESTOR) 20 MG tablet Take 1 tablet by mouth daily 5/2/22   Gianna Fitzgerald MD   warfarin (COUMADIN) 2.5 MG tablet Take as directed by [facility] [address] [facility] (40 tablets = 30 day supply) 4/19/22   Julia Peres MD   nitroGLYCERIN (NITROSTAT) 0.4 MG SL tablet DISSOLVE ONE TABLET UNDER THE TONGUE EVERY 5 MINUTES AS NEEDED FOR CHEST PAIN. DO NOT EXCEED A TOTAL OF 3 DOSES IN 15 MINUTES  Patient not taking: Reported on 5/25/2022 12/21/21   Kun Sandy MD   dilTIAZem (CARDIZEM CD) 360 MG extended release capsule Take 1 capsule by mouth once daily 9/29/21   Nando Herrera MD   digoxin (LANOXIN) 125 MCG tablet TAKE 1 TABLET BY MOUTH ONCE DAILY 9/23/21   Nando Herrera MD   potassium chloride (KLOR-CON) 10 MEQ extended release tablet TAKE 1 TABLET BY MOUTH TWICE DAILY 9/23/21   Chidi Flores MD   metoprolol succinate (TOPROL XL) 50 MG extended release tablet Take 1 tablet by mouth once daily 8/17/21   Nando Herrera MD   bisacodyl (DULCOLAX) 5 MG EC tablet Take 5 mg by mouth nightly     Historical Provider, MD       Allergies:  Patient has no known allergies. Social History:   reports that he has never smoked. He has never used smokeless tobacco. He reports that he does not drink alcohol and does not use drugs. Family History:      Positive as follows:        Problem Relation Age of Onset    Heart Surgery Father     Cancer Father         lung    Heart Disease Father         CABG    Alzheimer's Disease Mother     Diabetes Neg Hx     High Blood Pressure Neg Hx     Stroke Neg Hx        REVIEW OF SYSTEMS:     Constitutional: ROS: positive for  - fatigue  negative for - chills or fever  Head: no headache, no head injury, no migraine. Reports nose bleeding.     Eyes ROS: denies blurred/double vision  Ears ROS: no hearing difficulty, no tinnitus  Mouth and Throat ROS: no ulceration, dysphagia, dental caries  Psychological ROS: no depression, no anxiety, no panic attacks, denies suicide/homicide ideation  Endocrine ROS: denies polyuria, polydypsia, no heat or cold intolerance  Respiratory ROS: no cough, shortness of breath, or wheezing  Cardiovascular ROS: positive for - irregular heartbeat  negative for - chest pain, dyspnea on exertion, edema, orthopnea, palpitations, paroxysmal nocturnal dyspnea, rapid heart rate or shortness of breath  Gastrointestinal ROS: no abdominal pain, change in bowel habits, or black or bloody stools  negative for - nausea/vomiting  Genito-Urinary ROS: denies dysuria, frequency, urgency; denies hematuria  Musculoskeletal ROS: positive for - pain in right, lower back  negative for - gait disturbance, joint pain, joint stiffness, joint swelling or muscular weakness  Neurological ROS: no syncope, no seizures, no numbness or tingling of hands, no numbness or tingling of feet, no paresis  Dermatology: no skin rash, no eczema  Hematology: denies bruising easily, denies bleeding disorders, denies clotting disorders. PHYSICAL EXAM:    BP (!) 159/106   Pulse 91   Temp 98.4 °F (36.9 °C) (Oral)   Resp 20   Ht 6' 1\" (1.854 m)   Wt 240 lb (108.9 kg)   SpO2 98%   BMI 31.66 kg/m²     General appearance:  No apparent distress, appears stated age and cooperative. HEENT:  Normal cephalic, atraumatic without obvious deformity. Pupils equal, round, and reactive to light. Conjunctivae/corneas clear. Blood in posterior oropharynx. Dry blood noted in left nare. Neck: Supple, with full range of motion. No jugular venous distention. Trachea midline. Respiratory:  Normal respiratory effort, able to speak full clear sentences. Clear to auscultation, bilaterally without Rales/Wheezes/Rhonchi. Cardiovascular:  Regular rate and irregular rhythm with normal S1/S2 without murmurs, rubs or gallops. Abdomen: Soft, non-tender, non-distended with normal bowel sounds. Musculoskeletal:  No clubbing, cyanosis or edema bilaterally. Full range of motion without deformity. Skin: Skin color, texture, turgor normal.    Neurologic:  Neurovascularly intact without any focal sensory/motor deficits.  Cranial nerves: II-XII intact, grossly non-focal.  Psychiatric:  Alert and oriented, thought content appropriate  Capillary Refill: Brisk,< 3 seconds   Peripheral Pulses: +2 palpable, equal bilaterally       Labs:     Recent Labs     06/03/22  1013   WBC 9.2   HGB 17.5   HCT 51.8        Recent Labs     06/03/22  1013      K 4.2      CO2 23   BUN 16   CREATININE 0.8   CALCIUM 9.1     No results for input(s): AST, ALT, BILIDIR, BILITOT, ALKPHOS in the last 72 hours. Recent Labs     06/02/22  1203 06/03/22  1013   INR 3.83* 3.72*     No results for input(s): [place] Dec in the last 72 hours. Procalcitonin:  No results for input(s): PROCAL in the last 72 hours. Lactic Acid: No results for input(s): LACTA in the last 72 hours. Urinalysis:      Lab Results   Component Value Date    NITRU NEGATIVE 03/26/2021    WBCUA 0-2 11/25/2020    BACTERIA NONE SEEN 11/25/2020    RBCUA 0-2 11/25/2020    BLOODU NEGATIVE 03/26/2021    SPECGRAV 1.023 12/08/2018    GLUCOSEU NEGATIVE 03/26/2021       Radiology:     No results found. EKG:  I have reviewed the EKG with the following interpretation: Atrial fibrillation, rate controlled without ischemic changes or TWI      [facility] for the opportunity to be involved in this patient's care.     Electronically signed by NARESH Montero CNP on 6/3/2022 at 12:06 PM

## 2022-06-03 NOTE — ED PROVIDER NOTES
Matt ENCOUNTER          Pt Name: Nimesh Paniagua  MRN: [id number]  [place] 1950  Date of evaluation: 6/3/2022  Treating Resident Physician: Sonia Kern MD  Supervising Physician: Dr. Laya Collins     No chief complaint on file. History obtained from the patient. HISTORY OF PRESENT ILLNESS    HPI  Nimesh Paniagua is a 81 y.o. male with PMHx of HLD, PAF on coumadin, CAD, BPH who presents to the emergency department for evaluation of epistaxis. Has a merocel in place, follows with ENT at [facility] here for embolization. He has had intermittent epistaxis occurrences for the last 2 weeks. He was seen at [facility] and was sent here due to concern for posterior epistaxis. States that this was unprovoked and he had no trauma to the face, no falls. States that he did take his Coumadin last night, did not take any medications this morning. He denies chest pain, shortness of breath, lightheadedness, syncope. The patient has no other acute complaints at this time. REVIEW OF SYSTEMS   Review of Systems   HENT: Positive for nosebleeds. All other systems reviewed and are negative.         PAST MEDICAL AND SURGICAL HISTORY     Past Medical History:   Diagnosis Date    Anesthesia     takes large dose of medications to make him sleepy for surgery    Atrial fibrillation ([address]     Benign prostatic hyperplasia with urinary frequency 10/21/2019    CAD (coronary artery disease)     Chest pain     Colon polyp 2011    removed    Coronary artery disease involving native coronary artery of native heart without angina pectoris 10/21/2019    Dementia ([address]     Dizziness     Elevated PSA     GERD (gastroesophageal reflux disease)     Heart disease     Hyperlipidemia     Hypertension      Past Surgical History:   Procedure Laterality Date    ADENOIDECTOMY      CARDIAC CATHETERIZATION  09/23/2011 Right radial artery cannulation. Moderate LV dysfunction. The patient has disease in the ostium of diagonal 1 and diagonal 2 branch, however they are both are at the  ostium of the diagonals. Intervention could compromise flow of LAD. THe LAD has long diffuse calcified lesion 50-60-% anatomically.  CARDIOVASCULAR STRESS TEST  09/23/2011    EF 37%    CARPAL TUNNEL RELEASE      COLONOSCOPY      PROSTATECTOMY N/A 12/3/2020    ROBOTIC ASSISTED LAPAROSCOPIC RADICAL PROSTATECTOMY performed by Kana Salmon MD at [facility] ECHOCARDIOGRAM  09/23/2011    EF 50-55%    ULTRASOUND PROSTATE/TRANSRECTAL N/A 10/6/2020    CYSTO, TRANSRECTAL ULTRASOUND GUIDED PROSTATE BX performed by Kana Salmon MD at [place] [address]     Current Facility-Administered Medications:     oxymetazoline (AFRIN) 0.05 % nasal spray 2 spray, 2 spray, Each Nostril, Once, Curry Jon MD    prothrombin complex concentrate (human) (KCENTRA) infusion 1,500 Units, 1,500 Units, IntraVENous, Once **FOLLOWED BY** 0.9 % sodium chloride infusion, 50 mL, IntraVENous, Once, Curry Jon MD    phytonadione (ADULT) (VITAMIN K) 10 mg in dextrose 5 % 100 mL IVPB, 10 mg, IntraVENous, Once, Curry Jon MD    Current Outpatient Medications:     rosuvastatin (CRESTOR) 20 MG tablet, Take 1 tablet by mouth daily, Disp: 90 tablet, Rfl: 1    warfarin (COUMADIN) 2.5 MG tablet, Take as directed by [facility] (40 tablets = 30 day supply), Disp: 40 tablet, Rfl: 11    nitroGLYCERIN (NITROSTAT) 0.4 MG SL tablet, DISSOLVE ONE TABLET UNDER THE TONGUE EVERY 5 MINUTES AS NEEDED FOR CHEST PAIN.   DO NOT EXCEED A TOTAL OF 3 DOSES IN 15 MINUTES (Patient not taking: Reported on 5/25/2022), Disp: 25 tablet, Rfl: 2    dilTIAZem (CARDIZEM CD) 360 MG extended release capsule, Take 1 capsule by mouth once daily, Disp: 90 capsule, Rfl: 3    digoxin (LANOXIN) 125 MCG tablet, TAKE 1 TABLET BY MOUTH ONCE DAILY, Disp: 90 tablet, Rfl: 3    potassium chloride (KLOR-CON) 10 MEQ extended release tablet, TAKE 1 TABLET BY MOUTH TWICE DAILY, Disp: 180 tablet, Rfl: 3    metoprolol succinate (TOPROL XL) 50 MG extended release tablet, Take 1 tablet by mouth once daily, Disp: 90 tablet, Rfl: 3    bisacodyl (DULCOLAX) 5 MG EC tablet, Take 5 mg by mouth nightly , Disp: , Rfl:       SOCIAL HISTORY     Social History     Social History Narrative    Not on file     Social History     Tobacco Use    Smoking status: Never Smoker    Smokeless tobacco: Never Used   Vaping Use    Vaping Use: Never used   Substance Use Topics    Alcohol use: No    Drug use: No         ALLERGIES   No Known Allergies      FAMILY HISTORY     Family History   Problem Relation Age of Onset    Heart Surgery Father     Cancer Father         lung    Heart Disease Father         CABG    Alzheimer's Disease Mother     Diabetes Neg Hx     High Blood Pressure Neg Hx     Stroke Neg Hx          PREVIOUS RECORDS   Previous records reviewed: I reviewed the patient's past medical records including relevant labs, imaging and procedures. Had a visit here four days ago for epistaxis treated with Afrin. PHYSICAL EXAM     ED Triage Vitals [06/03/22 0954]   BP Temp Temp Source Heart Rate Resp SpO2 Height Weight   (!) 163/107 98.4 °F (36.9 °C) Oral (!) 106 18 95 % 6' 1\" (1.854 m) 240 lb (108.9 kg)     Initial vital signs and nursing assessment reviewed and abnormal from hypertension, tachycardia. Body mass index is 31.66 kg/m². Pulsoximetry is normal per my interpretation. Additional Vital Signs:  Vitals:    06/03/22 1003   BP: (!) 157/105   Pulse: 92   Resp: 18   Temp:    SpO2: 98%       Physical Exam  Vitals reviewed. Constitutional:       Appearance: Normal appearance. He is obese. HENT:      Head: Normocephalic and atraumatic.       Comments: Merisel placed in the left nare     Right Ear: External ear normal.      Left Ear: External ear normal.      Mouth/Throat:      Lips: Pink. Mouth: Mucous membranes are moist.      Comments: Blood in the posterior oropharynx  Eyes:      Extraocular Movements: Extraocular movements intact. Conjunctiva/sclera: Conjunctivae normal.   Cardiovascular:      Rate and Rhythm: Normal rate. Pulses: Normal pulses. Pulmonary:      Effort: Pulmonary effort is normal.   Abdominal:      General: Abdomen is flat. Musculoskeletal:         General: Normal range of motion. Cervical back: Normal range of motion. Skin:     General: Skin is warm. Capillary Refill: Capillary refill takes less than 2 seconds. Neurological:      General: No focal deficit present. Mental Status: He is alert and oriented to person, place, and time. Mental status is at baseline. Psychiatric:         Mood and Affect: Mood normal.         Behavior: Behavior normal.             MEDICAL DECISION MAKING   Initial Assessment: This is a 82-year-old male with past medical history of atrial fibrillation on Coumadin presenting for epistaxis sent from [facility]'s ENT for embolization by our neuro interventionalists. Not symptomatic from acute anemia at this time, perfusing well and mentating appropriately. Merocel in place in the left nare. Did take Cardizem and metoprolol last night. Differential diagnosis includes but is not limited to:  Posterior epistaxis, anterior epistaxis, hemorrhage    Although some of these diagnoses are unlikely they were considered in my medical decision making.     Plan:    Labs  EKG  PT/INR  C/s ENT  C/s neurology  Kcentra, vitamin K  Admit to hospitalist service    ED RESULTS   Laboratory results:  Labs Reviewed   BASIC METABOLIC PANEL - Abnormal; Notable for the following components:       Result Value    Glucose 150 (*)     All other components within normal limits   PROTIME-INR - Abnormal; Notable for the following components:    INR 3.72 (*)     All other components within normal limits   CBC WITH AUTO DIFFERENTIAL   ANION GAP   GLOMERULAR FILTRATION RATE, ESTIMATED   OSMOLALITY   PROTIME-INR       Radiologic studies results:  No orders to display       ED Medications administered this visit:   Medications   oxymetazoline (AFRIN) 0.05 % nasal spray 2 spray (has no administration in time range)   prothrombin complex concentrate (human) (KCENTRA) infusion 1,500 Units (has no administration in time range)     Followed by   0.9 % sodium chloride infusion (has no administration in time range)   phytonadione (ADULT) (VITAMIN K) 10 mg in dextrose 5 % 100 mL IVPB (has no administration in time range)         ED COURSE          MDM:   Patient arriving from [facility] with plan for embolization with Dr. Dallas Castro. He is hemodynamically stable on arrival with a Merocel in place to the left nare. ENT consulted, evaluating patient. Dr. Dallas Castro was made aware that the patient is here. Due to INR 3.6 and life-threatening bleed, decision was made for reversal with Kcentra and administration of vitamin K. Hemoglobin looks okay, does not require PRBC transfusion at this time. Afrin given here in the ED as a temporizing measure. Receiving IV fluids at this time. Admission paged to the hospitalist      MEDICATION CHANGES     New Prescriptions    No medications on file         FINAL DISPOSITION     Final diagnoses:   Posterior epistaxis     Condition: condition: fair  Dispo: Admit to med/surg floor      This transcription was electronically signed. Parts of this transcriptions may have been dictated by use of voice recognition software and electronically transcribed, and parts may have been transcribed with the assistance of an ED scribe. The transcription may contain errors not detected in proofreading. Please refer to my supervising physician's documentation if my documentation differs.     Electronically Signed: Norberto Zamora MD, 06/03/22, 11:25 AM          Norberto Zamora MD  Resident  06/03/22 6013

## 2022-06-03 NOTE — CONSULTS
CRITICAL CARE CONSULT NOTE      Patient:  Adolfo Scott    Unit/Bed:4D-06/006-A  YOB: 1950  MRN: [id number]   PCP: NARESH Khan CNP  Date of Admission: 6/3/2022  Chief Complaint:-Epistaxis    Assessment and Plan:      1. Acute postoperative pulmonary insufficiency: requiring 2 L nasal cannula postoperatively. Obtain chest x-ray. 2. Epistaxis: Patient underwent embolization of the right posterior alveolar artery using embospheres. Noted to have elevated INR patient is status post vitamin K and Kcentra. INR has reduced to 1.39. Monitor closely. 3. Paroxysmal atrial fibrillation: On chronic Coumadin which has been secondary to #2. Awaiting neuro interventional list decisions on anticoagulation plan versus possible watchman. 4. Hypertension: Continue digoxin, Cardizem, Toprol-XL. Maintain systolic blood pressure less than 160.  5. CAD: Follows Dr. Rocio Gracia, stress test was negative for ischemia on 10/29/2020. Continue beta-blocker statin  6. Chronic heart failure with reduced ejection fraction: Echocardiogram showed ejection fraction of 45%, continue with beta-blocker digoxin. Patient states he no longer takes Bumex. 7. Hyperlipidemia: Crestor  8. Right-sided lower back pain: Noted in the emergency department. Patient stated he had a spasm secondary to positioning. Monitor. Lidocaine patches  9. History of prostate cancer: Noted  10. Obesity: Diet and exercise encouraged      INITIAL H AND P AND ICU COURSE:  Adolfo Scott is a 74-year-old white male who presented to [facility] with a chief complaint of nosebleed. He has past medical history of lifetime non-smoker, paroxysmal atrial fib on Coumadin, CAD, hypertension tension, hyperlipidemia, GERD, BPH. Per report patient was having intermittent nosebleed out of his left nostril over the past 2 weeks.   He had been seen in the [facility] emergency department on 5/30 for left nare epistaxis which was resolved with nose clamp and Afrin. He was discharged with Afrin. His wife reported that his nosebleed started again the morning of admission around 7 AM.  Denies any facial trauma or fall. No provoking factors noted. She reports that he is normally seen by ENT at [facility] but was sent here for embolization procedure given recurrent nosebleeds. Upon arrival to the emergency department he did have Merocel in place. Heart rate was mildly tachycardic at 106 blood pressure was hypertensive and he was 95% on room air. INR was noted at 3.72. He reported his last dose of Coumadin was yesterday. He was given Kcentra, vitamin K while in the emergency department. His H&H was stable do not require blood transfusion. ENT was consulted and evaluated patient and ER where packing was removed and noted that nosebleed had stopped. Neurology was consulted in the ED Dr. Bran Watkins who plans to perform embolization procedure this afternoon. He denies any chest pain shortness of breath at that time. He stated he felt very tired. He back pain which is chronic. He was admitted to the hospitalist service for further care. On 6/3/2022 he underwent embolization with Dr. Bran Watkins postprocedure he did present to the ICU for further care. Past Medical History: Per HPI  Family History: Mother: Alzheimer's Father: Lung cancer CABG  Social History: Lifetime non-smoker, denies alcohol use, denies drug use. Review of Systems   Constitutional: Positive for fatigue. Negative for fever. HENT: Positive for nosebleeds. Negative for sore throat and trouble swallowing. Eyes: Negative for photophobia and visual disturbance. Respiratory: Negative for chest tightness, shortness of breath and wheezing. Cardiovascular: Positive for leg swelling (+2 pitting chronic). Negative for chest pain. Gastrointestinal: Negative for abdominal pain, nausea and vomiting.    Endocrine: Negative for polydipsia and polyphagia. Genitourinary: Negative for decreased urine volume and flank pain. Musculoskeletal: Positive for back pain (Intermittent right). Negative for neck pain. Skin: Negative for color change, pallor and rash. Allergic/Immunologic: Negative for food allergies. Neurological: Negative for dizziness, weakness and light-headedness. Hematological: Negative for adenopathy. Psychiatric/Behavioral: Negative for agitation and confusion. The patient is not nervous/anxious. Scheduled Meds:   oxymetazoline  2 spray Each Nostril Once    bisacodyl  5 mg Oral Nightly    digoxin  125 mcg Oral Daily    dilTIAZem  360 mg Oral Daily    metoprolol succinate  50 mg Oral Daily    potassium chloride  10 mEq Oral BID    rosuvastatin  20 mg Oral Daily    sodium chloride flush  5-40 mL IntraVENous 2 times per day    lidocaine  2 patch TransDERmal Daily     Continuous Infusions:   lactated ringers 100 mL/hr at 06/03/22 1558    sodium chloride         PHYSICAL EXAMINATION:  T:  97.5. P:  74. RR:  24. B/P:  137/95. FiO2:  4. O2 Sat:  96.  I/O: 1300/20  Body mass index is 33.3 kg/m². GCS:  15  General:   Acute on chronically ill-appearing white male  HEENT:  normocephalic and atraumatic. No scleral icterus. PERR  Neck: supple. No Thyromegaly. Lungs: clear to auscultation. No retractions  Cardiac: RRR. No JVD. Abdomen: soft. Nontender. Extremities:  No clubbing, cyanosis. + 1 pitting edema bilateral LE     Vasculature: capillary refill < 3 seconds. Palpable dorsalis pedis pulses. Skin:  warm and dry. Psych:  Alert and oriented x3. Affect appropriate  Lymph:  No supraclavicular adenopathy. Neurologic:  No focal deficit. No seizures. Data: (All radiographs, tracings, PFTs, and imaging are personally viewed and interpreted unless otherwise noted).     Sodium 140, potassium 4.2, chloride 104, CO2 23, BUN 16, creatinine 0.8, anion gap 13.0, glucose 150   WBC 9.2, hemoglobin 17.5, hematocrit 51.8, platelet count 789   Telemetry shows atrial fibrillation   INR 1.39   EKG 6/30/2022 reports atrial fibrillation   Echocardiogram 11/8/2019 reports ejection fraction 45% with mild global hypokinesis    Meets Continued ICU Level Care Criteria:    [x] Yes   [] No - Transfer Planned to listed location:  [] HOSPITALIST CONTACTED-      Case and plan discussed with Dr. Reginald Stoll and Dr. Supa Carrera. Electronically signed by Beth Rose. NARESH Noel - CNP  CRITICAL CARE SPECIALIST  Patient seen by me including key components of medical care. Case discussed with NP. Case discussed with Dr. Supa Carrera. Patient s/p emobolization of alveolar artery of the nose. This was for hemostasis of epistaxis. Embolization was successful. No anticoagulation for atrial fibrillation until June 7th. Coumadin will restart in outpatient. Italicized font, if present,  represents changes to the note made by me. CC time 25 minutes. Time was discontiguous. Time does not include procedure. Time does include my direct assessment of the patient and coordination of care. Time represents more than 50% of the time involved with patient care by the [address] [place] [address] team.  Electronically signed by Perez Graff.  Reginald Stoll MD.

## 2022-06-03 NOTE — H&P
Neurology Consult Note    BTTS:7/3/2901       Room:[address] [place] [address]  Patient Name:Alex Mckeon     YOB: 1950     Age:72 y.o. Requesting Physician: Dr. Chasity Godinez    Reason for Consult:  Evaluate for epistaxis     Chief Complaint: No chief complaint on file. Sarah Lakhani is a 81 y.o. male with a history of CAD, HTN, HLD, atrial fibulation on coumadin who presents to [facility] emergency department from [facility] for concern of epistaxis and possible need of embolization. The patient reports his first nose bleed stated two weeks ago when he got up in the morning. He initially was having the nose bleeds every other day and then everyday and here in the last few days it was twice daily. He sees Dr. Tristan Moncada with ENT at [facility] and they have attempted cauterization without success as well as Merocel packing. He does report he had a nose bleed once 5-6 years ago. Of note he does mention that he was taking fish oil for a few days and was told to stop this as it could increase his bleeding risk. His current INR was 3.72, he was given [place] and vitamin K in the emergency department. He reports his nose bleeds can occur at anytime throughout the day. He does report some lightheadedness and dizziness. He denies any speech difficulty, vision changes, weakness, numbness, or lightheadedness. He last had something to eat/drink at [id number] this morning. Review of Systems   Review of Systems   Constitutional: Negative for chills and fever. HENT: Positive for nosebleeds. Negative for rhinorrhea and sore throat. Eyes: Negative for visual disturbance. Respiratory: Negative for cough and shortness of breath. Cardiovascular: Negative for chest pain and palpitations. Gastrointestinal: Positive for nausea. Negative for abdominal pain and vomiting. Genitourinary: Negative for dysuria. Musculoskeletal: Positive for back pain. Negative for arthralgias and myalgias. Skin: Negative for rash. Neurological: Positive for dizziness and light-headedness. Negative for speech difficulty, weakness, numbness and headaches. Psychiatric/Behavioral: The patient is not nervous/anxious. Medications   Scheduled Meds:    oxymetazoline  2 spray Each Nostril Once    bisacodyl  5 mg Oral Nightly    digoxin  1 tablet Oral Daily    dilTIAZem  360 mg Oral Daily    metoprolol succinate  50 mg Oral Daily    potassium chloride  1 tablet Oral BID    rosuvastatin  20 mg Oral Daily    sodium chloride flush  5-40 mL IntraVENous 2 times per day    lidocaine  2 patch TransDERmal Daily    sodium chloride  1,000 mL IntraVENous Once     Continuous Infusions:    sodium chloride       PRN Meds: nitroGLYCERIN, sodium chloride flush, sodium chloride, ondansetron **OR** ondansetron, polyethylene glycol, acetaminophen **OR** acetaminophen, cyclobenzaprine  Medications Prior to Admission:   No current facility-administered medications on file prior to encounter. Current Outpatient Medications on File Prior to Encounter   Medication Sig Dispense Refill    rosuvastatin (CRESTOR) 20 MG tablet Take 1 tablet by mouth daily 90 tablet 1    warfarin (COUMADIN) 2.5 MG tablet Take as directed by [facility] (40 tablets = 30 day supply) 40 tablet 11    nitroGLYCERIN (NITROSTAT) 0.4 MG SL tablet DISSOLVE ONE TABLET UNDER THE TONGUE EVERY 5 MINUTES AS NEEDED FOR CHEST PAIN.   DO NOT EXCEED A TOTAL OF 3 DOSES IN 15 MINUTES (Patient not taking: Reported on 5/25/2022) 25 tablet 2    dilTIAZem (CARDIZEM CD) 360 MG extended release capsule Take 1 capsule by mouth once daily 90 capsule 3    digoxin (LANOXIN) 125 MCG tablet TAKE 1 TABLET BY MOUTH ONCE DAILY 90 tablet 3    potassium chloride (KLOR-CON) 10 MEQ extended release tablet TAKE 1 TABLET BY MOUTH TWICE DAILY 180 tablet 3    metoprolol succinate (TOPROL XL) 50 MG extended release tablet Take 1 tablet by mouth once daily 90 tablet 3    bisacodyl (DULCOLAX) 5 MG EC tablet Take 5 mg by mouth nightly        Past History    Past Medical History:   has a past medical history of Anesthesia, Atrial fibrillation ([address] Benign prostatic hyperplasia with urinary frequency, CAD (coronary artery disease), Chest pain, Colon polyp, Coronary artery disease involving native coronary artery of native heart without angina pectoris, Dementia (HCC), Dizziness, Elevated PSA, GERD (gastroesophageal reflux disease), Heart disease, Hyperlipidemia, and Hypertension. Social History:   reports that he has never smoked. He has never used smokeless tobacco. He reports that he does not drink alcohol and does not use drugs. Family History:   Family History   Problem Relation Age of Onset    Heart Surgery Father     Cancer Father         lung    Heart Disease Father         CABG    Alzheimer's Disease Mother     Diabetes Neg Hx     High Blood Pressure Neg Hx     Stroke Neg Hx        Physical Examination      Vitals:  /88   Pulse (!) 101   Temp 98.4 °F (36.9 °C) (Oral)   Resp 20   Ht 6' 1\" (1.854 m)   Wt 240 lb (108.9 kg)   SpO2 94%   BMI 31.66 kg/m²   Temp (24hrs), Av.4 °F (36.9 °C), Min:98.4 °F (36.9 °C), Max:98.4 °F (36.9 °C)      I/O (24Hr): No intake or output data in the 24 hours ending 22 1250      Physical Exam  Vitals reviewed. Constitutional:       General: He is not in acute distress. Appearance: Normal appearance. He is not ill-appearing. HENT:      Head: Normocephalic and atraumatic. Right Ear: External ear normal.      Left Ear: External ear normal.      Nose: Nose normal.      Mouth/Throat:      Mouth: Mucous membranes are moist.      Pharynx: No oropharyngeal exudate or posterior oropharyngeal erythema. Eyes:      Extraocular Movements: EOM normal.      Pupils: Pupils are equal, round, and reactive to light. Cardiovascular:      Rate and Rhythm: Normal rate. Rhythm irregular.       Pulses:           Dorsalis pedis pulses are 2+ on the right side and 2+ on the left side. Heart sounds: No murmur heard. Pulmonary:      Effort: Pulmonary effort is normal. No respiratory distress. Breath sounds: Normal breath sounds. No wheezing. Abdominal:      General: Bowel sounds are normal.      Palpations: Abdomen is soft. Tenderness: There is no abdominal tenderness. Musculoskeletal:         General: Normal range of motion. Right lower leg: No edema. Left lower leg: No edema. Skin:     General: Skin is warm. Findings: No rash. Neurological:      Mental Status: He is alert and oriented to person, place, and time. Coordination: Finger-Nose-Finger Test and Heel to [employer] normal.   Psychiatric:         Mood and Affect: Mood normal.         Speech: Speech normal.         Behavior: Behavior normal.       Neurologic Exam     Mental Status   Oriented to person, place, and time. Follows 2 step commands. Attention: normal. Concentration: normal.   Speech: speech is normal   Level of consciousness: alert  Knowledge: good. Cranial Nerves     CN II   Visual fields full to confrontation. CN III, IV, VI   Pupils are equal, round, and reactive to light. Extraocular motions are normal.   Right pupil: Size: 3 mm. Shape: regular. Reactivity: brisk. Left pupil: Size: 3 mm. Shape: regular. Reactivity: brisk. CN V   Facial sensation intact. CN VII   Facial expression full, symmetric.      CN VIII   CN VIII normal.   Hearing: intact    CN IX, X   CN IX normal.   CN X normal.   Palate: symmetric    CN XI   CN XI normal.   Right trapezius strength: normal  Left trapezius strength: normal    CN XII   CN XII normal.   Tongue deviation: none    Motor Exam   Muscle bulk: normal  Overall muscle tone: normal  Right arm pronator drift: absent  Left arm pronator drift: absent    BUE: 5/5  BLE: 5/5     Sensory Exam   Light touch normal.     Gait, Coordination, and Reflexes     Coordination   Finger to nose coordination: normal  Heel to shin coordination: normal    Tremor   Resting tremor: absent  Intention tremor: absent  Action tremor: absent       Labs/Imaging/Diagnostics   Labs:  CBC:  Recent Labs     06/03/22  1013   WBC 9.2   RBC 6.01   HGB 17.5   HCT 51.8   MCV 86.2        CHEMISTRIES:  Recent Labs     06/03/22  1013      K 4.2      CO2 23   BUN 16   CREATININE 0.8   GLUCOSE 150*     COAGULATION STUDIES:  Recent Labs     06/02/22  1203 06/03/22  1013 06/03/22  1205   PROTIME 44.6*  --   --    INR 3.83* 3.72* 1.39*     LIVER PROFILE:No results for input(s): AST, ALT, BILIDIR, BILITOT, ALKPHOS in the last 72 hours. CHOLESTEROL AND A1C:No results for input(s): LDLCALC, HDL, CHOL, TRIG, LABA1C in the last 720 hours. Imaging Last 24 Hours:  No results found. Assessment and Plan:        1. Recurrent epistaxis   · Diagnostic cerebral angiogram with possible embolization at 1:30pm with Dr. Federico Martinez  · Benefits and risks (kidney toxicity, infection, hemorrhage, hematoma, stroke, dissection) explained to patient and wife and they agree to proceed with the procedure  · Pending procedure he may need admission to ICU    This patient was seen and evaluated with Dr. Federico Martinez and he is in agreement with the assessment and plan.     Electronically signed by Allean Cogan, APRN - CNP on 6/3/22 at 1:29 PM EDT

## 2022-06-04 VITALS
HEART RATE: 72 BPM | SYSTOLIC BLOOD PRESSURE: 146 MMHG | HEIGHT: 73 IN | DIASTOLIC BLOOD PRESSURE: 105 MMHG | BODY MASS INDEX: 33.46 KG/M2 | TEMPERATURE: 97.8 F | OXYGEN SATURATION: 93 % | WEIGHT: 252.43 LBS | RESPIRATION RATE: 43 BRPM

## 2022-06-04 LAB
ANION GAP SERPL CALCULATED.3IONS-SCNC: 10 MEQ/L (ref 8–16)
BASOPHILS # BLD: 0.3 %
BASOPHILS ABSOLUTE: 0 THOU/MM3 (ref 0–0.1)
BUN BLDV-MCNC: 15 MG/DL (ref 7–22)
CALCIUM SERPL-MCNC: 8.3 MG/DL (ref 8.5–10.5)
CHLORIDE BLD-SCNC: 106 MEQ/L (ref 98–111)
CO2: 24 MEQ/L (ref 23–33)
CREAT SERPL-MCNC: 0.9 MG/DL (ref 0.4–1.2)
EKG Q-T INTERVAL: 370 MS
EKG QRS DURATION: 98 MS
EKG QTC CALCULATION (BAZETT): 450 MS
EKG R AXIS: 37 DEGREES
EKG T AXIS: 68 DEGREES
EKG VENTRICULAR RATE: 89 BPM
EOSINOPHIL # BLD: 0.5 %
EOSINOPHILS ABSOLUTE: 0.1 THOU/MM3 (ref 0–0.4)
ERYTHROCYTE [DISTWIDTH] IN BLOOD BY AUTOMATED COUNT: 14 % (ref 11.5–14.5)
ERYTHROCYTE [DISTWIDTH] IN BLOOD BY AUTOMATED COUNT: 44.8 FL (ref 35–45)
GFR SERPL CREATININE-BSD FRML MDRD: 83 ML/MIN/1.73M2
GLUCOSE BLD-MCNC: 120 MG/DL (ref 70–108)
HCT VFR BLD CALC: 47.1 % (ref 42–52)
HEMOGLOBIN: 15.3 GM/DL (ref 14–18)
IMMATURE GRANS (ABS): 0.03 THOU/MM3 (ref 0–0.07)
IMMATURE GRANULOCYTES: 0.2 %
INR BLD: 1.41 (ref 0.85–1.13)
LYMPHOCYTES # BLD: 17.1 %
LYMPHOCYTES ABSOLUTE: 2.1 THOU/MM3 (ref 1–4.8)
MCH RBC QN AUTO: 28.8 PG (ref 26–33)
MCHC RBC AUTO-ENTMCNC: 32.5 GM/DL (ref 32.2–35.5)
MCV RBC AUTO: 88.5 FL (ref 80–94)
MONOCYTES # BLD: 7.5 %
MONOCYTES ABSOLUTE: 0.9 THOU/MM3 (ref 0.4–1.3)
NUCLEATED RED BLOOD CELLS: 0 /100 WBC
PLATELET # BLD: 197 THOU/MM3 (ref 130–400)
PMV BLD AUTO: 10.7 FL (ref 9.4–12.4)
POTASSIUM REFLEX MAGNESIUM: 3.6 MEQ/L (ref 3.5–5.2)
RBC # BLD: 5.32 MILL/MM3 (ref 4.7–6.1)
SEG NEUTROPHILS: 74.4 %
SEGMENTED NEUTROPHILS ABSOLUTE COUNT: 9.2 THOU/MM3 (ref 1.8–7.7)
SODIUM BLD-SCNC: 140 MEQ/L (ref 135–145)
WBC # BLD: 12.4 THOU/MM3 (ref 4.8–10.8)

## 2022-06-04 PROCEDURE — 36415 COLL VENOUS BLD VENIPUNCTURE: CPT

## 2022-06-04 PROCEDURE — 80048 BASIC METABOLIC PNL TOTAL CA: CPT

## 2022-06-04 PROCEDURE — 93010 ELECTROCARDIOGRAM REPORT: CPT | Performed by: INTERNAL MEDICINE

## 2022-06-04 PROCEDURE — 6370000000 HC RX 637 (ALT 250 FOR IP)

## 2022-06-04 PROCEDURE — 99239 HOSP IP/OBS DSCHRG MGMT >30: CPT | Performed by: INTERNAL MEDICINE

## 2022-06-04 PROCEDURE — 2580000003 HC RX 258

## 2022-06-04 PROCEDURE — 99223 1ST HOSP IP/OBS HIGH 75: CPT | Performed by: INTERNAL MEDICINE

## 2022-06-04 PROCEDURE — 85610 PROTHROMBIN TIME: CPT

## 2022-06-04 PROCEDURE — 85025 COMPLETE CBC W/AUTO DIFF WBC: CPT

## 2022-06-04 PROCEDURE — 99232 SBSQ HOSP IP/OBS MODERATE 35: CPT | Performed by: NURSE PRACTITIONER

## 2022-06-04 RX ORDER — WARFARIN SODIUM 2.5 MG/1
TABLET ORAL
Qty: 40 TABLET | Refills: 11 | Status: ON HOLD | OUTPATIENT
Start: 2022-06-07 | End: 2022-07-19 | Stop reason: SDUPTHER

## 2022-06-04 RX ADMIN — SODIUM CHLORIDE, PRESERVATIVE FREE 10 ML: 5 INJECTION INTRAVENOUS at 09:28

## 2022-06-04 RX ADMIN — METOPROLOL SUCCINATE 50 MG: 50 TABLET, EXTENDED RELEASE ORAL at 09:28

## 2022-06-04 RX ADMIN — DIGOXIN 125 MCG: 125 TABLET ORAL at 09:27

## 2022-06-04 RX ADMIN — POTASSIUM CHLORIDE 10 MEQ: 750 TABLET, EXTENDED RELEASE ORAL at 09:28

## 2022-06-04 RX ADMIN — OXYMETAZOLINE HYDROCHLORIDE 2 SPRAY: 0.05 SPRAY NASAL at 09:28

## 2022-06-04 RX ADMIN — ROSUVASTATIN CALCIUM 20 MG: 20 TABLET, FILM COATED ORAL at 09:27

## 2022-06-04 RX ADMIN — DILTIAZEM HYDROCHLORIDE 360 MG: 180 CAPSULE, COATED, EXTENDED RELEASE ORAL at 09:27

## 2022-06-04 ASSESSMENT — ENCOUNTER SYMPTOMS
RHINORRHEA: 0
VOMITING: 0
EYE REDNESS: 0
CHEST TIGHTNESS: 0
TROUBLE SWALLOWING: 0
BACK PAIN: 1
WHEEZING: 0
ABDOMINAL PAIN: 0
SORE THROAT: 0
COUGH: 0
NAUSEA: 1
COLOR CHANGE: 0
NAUSEA: 0
SHORTNESS OF BREATH: 0
BACK PAIN: 0
PHOTOPHOBIA: 0

## 2022-06-04 NOTE — DISCHARGE SUMMARY
CRITICAL CARE DISCHARGE SUMMARY      Patient:  Lynda Gamino    Unit/Bed:4D-06/006-A  YOB: 1950  MRN: [id number]   PCP: NARESH Moreau CNP  Date of Admission: 6/3/2022  Chief Complaint:- fatigue    Assessment and Plan:      1. Epistaxis: resolved; Patient underwent embolization of the right posterior alveolar artery using embospheres. Noted to have elevated INR patient is status post vitamin K and Kcentra. Neuro interventional recommendation for Coumadin to begin on 6/7 at 2.5 mg daily until recheck on 6/10.  2. Paroxysmal atrial fibrillation: On chronic Coumadin which has been secondary to #1. Neuro interventional recommendation for Coumadin to begin on 6/7 at 2.5 mg daily until recheck on 6/10.  3. Hypertension: Continue digoxin, Cardizem, Toprol-XL. Maintain systolic blood pressure less than 160.  4. CAD: Follows Dr. Alvarado Arroyo, stress test was negative for ischemia on 10/29/2020. Continue beta-blocker statin  5. Chronic heart failure with reduced ejection fraction: Echocardiogram showed ejection fraction of 45%, continue with beta-blocker digoxin. Patient states he no longer takes Bumex. 6. Hyperlipidemia: Crestor  7. Right-sided lower back pain: resolved; Noted in the emergency department. Patient stated he had a spasm secondary to positioning. Monitor. Lidocaine patches  8. History of prostate cancer: Noted  9. Obesity: Diet and exercise encouraged  10. Acute postoperative pulmonary insufficiency: resolved; patient now on room air     INITIAL H AND P AND ICU COURSE:  Lynda Gamino is a 68-year-old white male who presented to [facility] with a chief complaint of nosebleed. He has past medical history of lifetime non-smoker, paroxysmal atrial fib on Coumadin, CAD, hypertension tension, hyperlipidemia, GERD, BPH. Per report patient was having intermittent nosebleed out of his left nostril over the past 2 weeks.   [facility] department on 5/30 for left nare epistaxis which was resolved with nose clamp and Afrin. He was discharged with Afrin. His wife reported that his nosebleed started again the morning of admission around 7 AM.  Denies any facial trauma or fall. No provoking factors noted. She reports that he is normally seen by ENT at [facility] but was sent here for embolization procedure given recurrent nosebleeds. Upon arrival to the emergency department he did have Merocel in place. Heart rate was mildly tachycardic at 106 blood pressure was hypertensive and he was 95% on room air. INR was noted at 3.72. He reported his last dose of Coumadin was yesterday. He was given Kcentra, vitamin K while in the emergency department. His H&H was stable do not require blood transfusion. ENT was consulted and evaluated patient and ER where packing was removed and noted that nosebleed had stopped. Neurology was consulted in the ED Dr. Laura Hays who plans to perform embolization procedure this afternoon. He denies any chest pain shortness of breath at that time. He stated he felt very tired. He back pain which is chronic. He was admitted to the hospitalist service for further care. On 6/3/2022 he underwent embolization with Dr. Laura Hays postprocedure he did present to the ICU for further care. 6/4 patient stable for discharge. This was discussed with neuro intervention. Patient was updated on plan for Coumadin when to resume and when to retest.  All questions were answered.     Past Medical History: Per HPI  Family History: Mother: Alzheimer's Father: Lung cancer CABG  Social History: Lifetime non-smoker, denies alcohol use, denies drug use. Review of Systems   Constitutional: Positive for fatigue. Negative for fever. HENT: Negative for nosebleeds, sneezing and trouble swallowing. Eyes: Negative for photophobia and redness.    Respiratory: Negative for chest tightness, shortness of breath and wheezing. Cardiovascular: Positive for leg swelling. Negative for chest pain. Gastrointestinal: Negative for abdominal pain, nausea and vomiting. Endocrine: Negative for polydipsia and polyphagia. Genitourinary: Negative for decreased urine volume and flank pain. Musculoskeletal: Negative for back pain and neck pain. Skin: Negative for color change, pallor and rash. Allergic/Immunologic: Negative for food allergies. Neurological: Negative for dizziness, weakness and numbness. Hematological: Negative for adenopathy. Psychiatric/Behavioral: Negative for agitation and confusion. The patient is not nervous/anxious. Scheduled Meds:   bisacodyl  5 mg Oral Nightly    digoxin  125 mcg Oral Daily    dilTIAZem  360 mg Oral Daily    metoprolol succinate  50 mg Oral Daily    potassium chloride  10 mEq Oral BID    rosuvastatin  20 mg Oral Daily    sodium chloride flush  5-40 mL IntraVENous 2 times per day    lidocaine  2 patch TransDERmal Daily    oxymetazoline  2 spray Nasal TID     Continuous Infusions:   sodium chloride         PHYSICAL EXAMINATION:  T:  97.6. P:  93. RR:  18. B/P:  144/93. FiO2:  0. O2 Sat:  92.  I/O: 1968/1250  Body mass index is 33.3 kg/m². GCS: 15  General:   Acute on chronically ill-appearing white male  HEENT:  normocephalic and atraumatic. No scleral icterus. PERR  Neck: supple. No Thyromegaly. Lungs: clear to auscultation. No retractions  Cardiac: RRR. No JVD. Abdomen: soft. Nontender. Extremities:  No clubbing, cyanosis. + 1 pitting edema bilateral LE   Vasculature: capillary refill < 3 seconds. Palpable dorsalis pedis pulses. Skin:  warm and dry. Psych:  Alert and oriented x3. Affect appropriate  Lymph:  No supraclavicular adenopathy. Neurologic:  No focal deficit. No seizures. Data: (All radiographs, tracings, PFTs, and imaging are personally viewed and interpreted unless otherwise noted).     Sodium 140, potassium 3.6, chloride 106, CO2 24, BUN 15, creatinine 0.9, anion gap 10.0, glucose 120   WBC 12.4, hemoglobin 15.3, hematocrit 47.1, platelet count 759   Telemetry shows atrial fibrillation   INR 1.41  · EKG 6/30/2022 reports atrial fibrillation  · Echocardiogram 11/8/2019 reports ejection fraction 45% with mild global hypokinesis        Patient discharged in stable and improved condition. Discharge time 36 minutes    Case and plan discussed with Dr. Chava Valentine and neuro intervention. Electronically signed by Geronimo Rodriguez. NARESH Bailey CNP  CRITICAL CARE SPECIALIST    Addendum by Dr. Chava Valentine MD:  I have seen and examined the patient independently. Face to face evaluation and examination was performed. The above evaluation and note has been reviewed. Labs and radiographs were reviewed. I Have discussed with Ms. Geronimo Wilsonsully. NARESH Bailey CNP about this patient in detail. Physical exam was performed by me. See below for details. More than half of the total time for this encounter spent by me. The above assessment and plan has been reviewed. Please see my modifications mentioned below. My modifications:    Physical Exam:  Constitutional: Patient appears in no distress. He was noted to have old blood clot in his left nostril with no active bleeding  Mouth/Throat: Oropharynx is clear and moist.    Neck: Neck supple. Cardiovascular: S1 and S2 heard. Pulmonary/Chest: Bilateral air entry present. Good breath sounds on both sides, diminished at bases. No wheezes. No rales. Abdominal: Soft. No tenderness. Extremities: Patient exhibits bilateral leg edema. Neurological: Patient is awake and alert.      No results found for: PH, PCO2, PO2, HCO3, O2SAT  No results found for: IFIO2, MODE, SETTIDVOL, SETPEEP  CBC: Recent Labs     06/03/22  1013 06/04/22  0347   WBC 9.2 12.4*   HGB 17.5 15.3   HCT 51.8 47.1    197     BMP:  Recent Labs     06/03/22  1013 06/04/22  0347    140   K 4.2 3.6    106   CO2 23 24 BUN 16 15   CREATININE 0.8 0.9   GLUCOSE 150* 120*   CALCIUM 9.1 8.3*     Hepatic: No results for input(s): AST, ALT, ALB, BILITOT, ALKPHOS, LIPASE in the last 72 hours. Invalid input(s): AMYLASE  Cardiac Enzymes: No results for input(s): CKTOTAL, CKMB, TROPONINI in the last 72 hours. BNP: No results for input(s): BNP in the last 72 hours. INR:   Recent Labs     06/02/22  1203 06/02/22  1203 06/03/22  1013 06/03/22  1205 06/04/22  0347   INR 3.83*   < > 3.72* 1.39* 1.41*   PROTIME 44.6*  --   --   --   --     < > = values in this interval not displayed. POC No results for input(s): POCGLU in the last 72 hours. No results for input(s): LACTA in the last 72 hours.        Ivana Winkler MD 6/4/2022 3:13 PM

## 2022-06-04 NOTE — PROGRESS NOTES
Neurology Progress Note    Date:6/4/2022       TRLO:[id number]  Patient Name:Alex Mckeon     YOB: 1950     Age:72 y.o. Requesting Physician: Dr. Juanita De La Torre    Reason for Consult:  Evaluate for epistaxis     Chief Complaint: No chief complaint on file. Subjective     Dany Spain is a 81 y.o. male with a history of CAD, HTN, HLD, atrial fibulation on coumadin who presents to [facility] emergency department from [facility] for concern of epistaxis and possible need of embolization. The patient reports his first nose bleed stated two weeks ago when he got up in the morning. He initially was having the nose bleeds every other day and then everyday and here in the last few days it was twice daily. He sees Dr. Johan León with ENT at [facility] and they have attempted cauterization without success as well as Merocel packing. He does report he had a nose bleed once 5-6 years ago. Of note he does mention that he was taking fish oil for a few days and was told to stop this as it could increase his bleeding risk. His current INR was 3.72, he was given [place] and vitamin K in the emergency department. He reports his nose bleeds can occur at anytime throughout the day. He does report some lightheadedness and dizziness. He denies any speech difficulty, vision changes, weakness, numbness, or lightheadedness. He last had something to eat/drink at [id number] this morning. Interval history 6/4/22:  No acute events overnight. Patient denies any new concerns or complaints. No further nose bleeding. Patient underwent a successful embolization of the right posterior alveolar artery using embospheres with Dr. Chavo Martínez on 6/3/22. Review of Systems   Review of Systems   Constitutional: Negative for chills and fever. HENT: Positive for nosebleeds. Negative for rhinorrhea and sore throat. Eyes: Negative for visual disturbance. Respiratory: Negative for cough and shortness of breath.     Cardiovascular: Negative for release tablet TAKE 1 TABLET BY MOUTH TWICE DAILY 180 tablet 3    metoprolol succinate (TOPROL XL) 50 MG extended release tablet Take 1 tablet by mouth once daily 90 tablet 3    bisacodyl (DULCOLAX) 5 MG EC tablet Take 5 mg by mouth nightly        Past History    Past Medical History:   has a past medical history of Anesthesia, Atrial fibrillation ([facility] [address] Benign prostatic hyperplasia with urinary frequency, CAD (coronary artery disease), Chest pain, Colon polyp, Coronary artery disease involving native coronary artery of native heart without angina pectoris, Dementia ([facility] [address] Dizziness, Elevated PSA, GERD (gastroesophageal reflux disease), Heart disease, Hyperlipidemia, and Hypertension. Social History:   reports that he has never smoked. He has never used smokeless tobacco. He reports that he does not drink alcohol and does not use drugs. Family History:   Family History   Problem Relation Age of Onset    Heart Surgery Father     Cancer Father         lung    Heart Disease Father         CABG    Alzheimer's Disease Mother     Diabetes Neg Hx     High Blood Pressure Neg Hx     Stroke Neg Hx        Physical Examination      Vitals:  BP (!) 144/93   Pulse 82   Temp 97.5 °F (36.4 °C) (Oral)   Resp 30   Ht 6' 1\" (1.854 m)   Wt 252 lb 6.8 oz (114.5 kg)   SpO2 92%   BMI 33.30 kg/m²   Temp (24hrs), Av °F (36.7 °C), Min:97.5 °F (36.4 °C), Max:98.4 °F (36.9 °C)      I/O (24Hr): Intake/Output Summary (Last 24 hours) at 2022 0652  Last data filed at 2022 7591  Gross per 24 hour   Intake 1968.66 ml   Output 1250 ml   Net 718.66 ml         Physical Exam  Vitals reviewed. Constitutional:       General: He is not in acute distress. Appearance: Normal appearance. He is not ill-appearing. HENT:      Head: Normocephalic and atraumatic.       Right Ear: External ear normal.      Left Ear: External ear normal.      Nose: Nose normal.      Mouth/Throat:      Mouth: Mucous membranes are moist. Pharynx: No oropharyngeal exudate or posterior oropharyngeal erythema. Eyes:      Extraocular Movements: EOM normal.      Pupils: Pupils are equal, round, and reactive to light. Cardiovascular:      Rate and Rhythm: Normal rate. Rhythm irregular. Pulses:           Dorsalis pedis pulses are 2+ on the right side and 2+ on the left side. Heart sounds: No murmur heard. Pulmonary:      Effort: Pulmonary effort is normal. No respiratory distress. Breath sounds: Normal breath sounds. No wheezing. Abdominal:      General: Bowel sounds are normal.      Palpations: Abdomen is soft. Tenderness: There is no abdominal tenderness. Musculoskeletal:         General: Normal range of motion. Right lower leg: No edema. Left lower leg: No edema. Skin:     General: Skin is warm. Findings: No rash. Neurological:      Mental Status: He is alert and oriented to person, place, and time. Coordination: Finger-Nose-Finger Test and Heel to [employer] normal.   Psychiatric:         Mood and Affect: Mood normal.         Speech: Speech normal.         Behavior: Behavior normal.       Neurologic Exam     Mental Status   Oriented to person, place, and time. Follows 2 step commands. Attention: normal. Concentration: normal.   Speech: speech is normal   Level of consciousness: alert  Knowledge: good. Cranial Nerves     CN II   Visual fields full to confrontation. CN III, IV, VI   Pupils are equal, round, and reactive to light. Extraocular motions are normal.   Right pupil: Size: 3 mm. Shape: regular. Reactivity: brisk. Left pupil: Size: 3 mm. Shape: regular. Reactivity: brisk. CN V   Facial sensation intact. CN VII   Facial expression full, symmetric.      CN VIII   CN VIII normal.   Hearing: intact    CN IX, X   CN IX normal.   CN X normal.   Palate: symmetric    CN XI   CN XI normal.   Right trapezius strength: normal  Left trapezius strength: normal    CN XII CN XII normal.   Tongue deviation: none    Motor Exam   Muscle bulk: normal  Overall muscle tone: normal  Right arm pronator drift: absent  Left arm pronator drift: absent    BUE: 5/5  BLE: 5/5     Sensory Exam   Light touch normal.     Gait, Coordination, and Reflexes     Coordination   Finger to nose coordination: normal  Heel to shin coordination: normal    Tremor   Resting tremor: absent  Intention tremor: absent  Action tremor: absent       Labs/Imaging/Diagnostics   Labs:  CBC:  Recent Labs     06/03/22  1013 06/04/22  0347   WBC 9.2 12.4*   RBC 6.01 5.32   HGB 17.5 15.3   HCT 51.8 47.1   MCV 86.2 88.5    197     CHEMISTRIES:  Recent Labs     06/03/22  1013 06/04/22  0347    140   K 4.2 3.6    106   CO2 23 24   BUN 16 15   CREATININE 0.8 0.9   GLUCOSE 150* 120*     COAGULATION STUDIES:  Recent Labs     06/02/22  1203 06/02/22  1203 06/03/22  1013 06/03/22  1205 06/04/22  0347   PROTIME 44.6*  --   --   --   --    INR 3.83*   < > 3.72* 1.39* 1.41*    < > = values in this interval not displayed. LIVER PROFILE:No results for input(s): AST, ALT, BILIDIR, BILITOT, ALKPHOS in the last 72 hours. CHOLESTEROL AND A1C:No results for input(s): LDLCALC, HDL, CHOL, TRIG, LABA1C in the last 720 hours. Imaging Last 24 Hours:  No results found. Assessment and Plan:        1. Recurrent epistaxis   · Patient underwent a successful embolization of the right posterior alveolar artery using embospheres with Dr. Jerry Chase on 6/3/22  · He was subsequently admitted to the ICU following the procedure  · Neuro checks per order set  · INR today at 1.41  · Re-start coumadin in 3 days and continue to follow up in coumadin clinic for titration  · Continue to follow up with ENT.  Patient reports he has an appointment mid June  · Report back to ER with worsening nose bleeds  · Patient educated it is ok to remove groin dressing after 24 hours post procedure  · No bathing, swimming or heavy lifting for at least 1 week      This case was discussed with Dr. Anahi Bowman and he is in agreement with the assessment and plan.     Electronically signed by NARESH Whitlock CNP on 6/4/22 at 2:21 PM EDT

## 2022-06-04 NOTE — DISCHARGE SUMMARY
CRITICAL CARE DISCHARGE SUMMARY        Patient:  Ghulam Chu                  Unit/Bed:4D-06/006-A  YOB: 1950  MRN: [id number]          PCP: NARESH Jensen CNP  Date of Admission: 6/3/2022  Chief Complaint:- fatigue     Assessment and Plan:       1. Epistaxis: resolved;  Patient underwent embolization of the right posterior alveolar artery using embospheres.  Noted to have elevated INR patient is status post vitamin K and Kcentra. Neuro interventional recommendation for Coumadin to begin on 6/7 at 2.5 mg daily until recheck on 6/10.  2. Paroxysmal atrial fibrillation: On chronic Coumadin which has been secondary to #1.  Neuro interventional recommendation for Coumadin to begin on 6/7 at 2.5 mg daily until recheck on 6/10.  3. Hypertension: Continue digoxin, Cardizem, Toprol-XL.  Maintain systolic blood pressure less than 160.  4. CAD: Follows Dr. Aleksandar Domingo, stress test was negative for ischemia on 10/29/2020.  Continue beta-blocker statin  5. Chronic heart failure with reduced ejection fraction: Echocardiogram showed ejection fraction of 45%, continue with beta-blocker digoxin.  Patient states he no longer takes Bumex. 6. Hyperlipidemia: Crestor  7. Right-sided lower back pain: resolved; Noted in the emergency department. Deirdre Colon stated he had a spasm secondary to positioning.  Monitor.  Lidocaine patches  8. History of prostate cancer: Noted  9. Obesity: Diet and exercise encouraged  10.  Acute postoperative pulmonary insufficiency: resolved; patient now on room air     INITIAL H AND P AND ICU COURSE:  Adriana Doan a 72-year-old white male who presented to [facility] with a chief complaint of nosebleed. Arabella Benjamin has past medical history of lifetime non-smoker, paroxysmal atrial fib on Coumadin, CAD, hypertension tension, hyperlipidemia, GERD, BPH.  Per report patient was having intermittent nosebleed out of his left nostril over the past 2 weeks.  He had been seen in the [facility] emergency department on 5/30 for left nare epistaxis which was resolved with nose clamp and Gladis Lewis was discharged with Afrin.  His wife reported that his nosebleed started again the morning of admission around 7 AM.  Denies any facial trauma or fall.  No provoking factors noted.  She reports that he is normally seen by ENT at [facility] but was sent here for embolization procedure given recurrent nosebleeds. Venkatesh Hansenovan arrival to the emergency department he did have Merocel in place.  Heart rate was mildly tachycardic at 106 blood pressure was hypertensive and he was 95% on room air. [place] was noted at 3.72. [facility] reported his last dose of Coumadin was yesterday. [facility] was given Kcentra, vitamin K while in the emergency department. [facility] was stable do not require blood transfusion.  ENT was consulted and evaluated patient and ER where packing was removed and noted that nosebleed had stopped.  Neurology was consulted in the ED Dr. Shavon Bhatt who plans to perform embolization procedure this afternoon. [facility] denies any chest pain shortness of breath at that time. [facility] stated he felt very tired.  He back pain which is chronic.  He was admitted to the hospitalist service for further care.  On 6/3/2022 he underwent embolization with Dr. Shavon Bhatt postprocedure he did present to the ICU for further care. 6/4 patient stable for discharge. This was discussed with neuro intervention. Patient was updated on plan for Coumadin when to resume and when to retest.  All questions were answered. Dr. Peggy Jeffrey did come to see patient after he had been discharged. He asked that I follow-up with the patient via telephone. I did reach out to patient and his wife via telephone. I explained that they needed to follow-up in the ENT clinic on Monday or Tuesday to remove packing.   Wife adamantly stated that she was told the packing will dissolve with the Afrin spray that he is to use 3 times a    Continuous Infusions:  Infusions Meds    sodium chloride              PHYSICAL EXAMINATION:  T:  97.6. P:  93. RR:  18. B/P:  144/93. FiO2:  0. O2 Sat:  92.  I/O: 1968/1250  Body mass index is 33.3 kg/m². GCS: 15  General:   Acute on chronically ill-appearing white male  HEENT:  normocephalic and atraumatic. No scleral icterus. PERR  Neck: supple. No Thyromegaly. Lungs: clear to auscultation. No retractions  Cardiac: RRR. No JVD. Abdomen: soft. Nontender. Extremities:  No clubbing, cyanosis. + 1 pitting edema bilateral LE   Vasculature: capillary refill < 3 seconds. Palpable dorsalis pedis pulses. Skin:  warm and dry. Psych:  Alert and oriented x3. Affect appropriate  Lymph:  No supraclavicular adenopathy. Neurologic:  No focal deficit. No seizures.     Data: (All radiographs, tracings, PFTs, and imaging are personally viewed and interpreted unless otherwise noted). · Sodium 140, potassium 3.6, chloride 106, CO2 24, BUN 15, creatinine 0.9, anion gap 10.0, glucose 120  · WBC 12.4, hemoglobin 15.3, hematocrit 47.1, platelet count 756  · Telemetry shows atrial fibrillation  · INR 1.41  · EKG 6/30/2022 reports atrial fibrillation  · Echocardiogram 11/8/2019 reports ejection fraction 45% with mild global hypokinesis           Patient discharged in stable and improved condition.     Discharge time 36 minutes     Case and plan discussed with Dr. Alecia Kyle and neuro intervention.     Electronically signed by NARESH Galarza CNP    Addendum by Dr. Alecia Kyle MD:  I have seen and examined the patient independently. Face to face evaluation and examination was performed. The above evaluation and note has been reviewed. Labs and radiographs were reviewed. I Have discussed with NARESH Dumont CNP about this patient in detail. Physical exam was performed by me. See below for details. More than half of the total time for this encounter spent by me.   The above assessment and plan has been reviewed. Please see my modifications mentioned below. My modifications:     Physical Exam:  Constitutional: Patient appears in no distress. He was noted to have old blood clot in his left nostril with no active bleeding  Mouth/Throat: Oropharynx is clear and moist.    Neck: Neck supple. Cardiovascular: S1 and S2 heard. Pulmonary/Chest: Bilateral air entry present. Good breath sounds on both sides, diminished at bases. No wheezes. No rales. Abdominal: Soft. No tenderness. Extremities: Patient exhibits bilateral leg edema. Neurological: Patient is awake and alert. No results found for: PH, PCO2, PO2, HCO3, O2SAT  No results found for: IFIO2, MODE, SETTIDVOL, SETPEEP  CBC: Recent Labs     06/03/22  1013 06/04/22  0347   WBC 9.2 12.4*   HGB 17.5 15.3   HCT 51.8 47.1    197     BMP:  Recent Labs     06/03/22  1013 06/04/22  0347    140   K 4.2 3.6    106   CO2 23 24   BUN 16 15   CREATININE 0.8 0.9   GLUCOSE 150* 120*   CALCIUM 9.1 8.3*     Hepatic: No results for input(s): AST, ALT, ALB, BILITOT, ALKPHOS, LIPASE in the last 72 hours. Invalid input(s): AMYLASE  Cardiac Enzymes: No results for input(s): CKTOTAL, CKMB, TROPONINI in the last 72 hours. BNP: No results for input(s): BNP in the last 72 hours. INR:   Recent Labs     06/02/22  1203 06/02/22  1203 06/03/22  1013 06/03/22  1205 06/04/22  0347   INR 3.83*   < > 3.72* 1.39* 1.41*   PROTIME 44.6*  --   --   --   --     < > = values in this interval not displayed. POC No results for input(s): POCGLU in the last 72 hours. No results for input(s): LACTA in the last 72 hours.        Subhash Hernandez MD 6/4/2022 3:13 PM

## 2022-06-04 NOTE — PROGRESS NOTES
Clinical Pharmacy Note    Gregorio Armendariz is a 81 y.o. male for whom pharmacy has been asked to manage warfarin therapy. Warfarin to restart in 3 days on 6/7/22. Reason for Admission: epistaxis    Consulting Provider: Lana Bowen CNP  Warfarin dose prior to admission: 5mg W 2.5mg [id number]  Warfarin indication: Afib  Target INR range: 2-3   Outpatient warfarin provider: [facility]    Past Medical History:   Diagnosis Date    Anesthesia     takes large dose of medications to make him sleepy for surgery    Atrial fibrillation ([facility] [address]     Benign prostatic hyperplasia with urinary frequency 10/21/2019    CAD (coronary artery disease)     Chest pain     Colon polyp 2011    removed    Coronary artery disease involving native coronary artery of native heart without angina pectoris 10/21/2019    [facility]     Dizziness     Elevated PSA     GERD (gastroesophageal reflux disease)     Heart disease     Hyperlipidemia     Hypertension      Recent Labs     06/04/22  0347   INR 1.41*     Recent Labs     06/03/22  1013 06/04/22  0347   HGB 17.5 15.3   HCT 51.8 47.1    197     Current warfarin drug-drug interactions: none    Recommendations for discharge:   Date INR Warfarin Dose   6/2/2022 1.41 No Warfarin    6/3/2022 1.39 No Warfarin   6/4/2022 1.41 No warfarin per MD   6/5/2022  No warfarin per MD   6/6/2022  No warfarin per MD   6/7/2022  2.5 mg   6/8/2022  2.5 mg   6/9/2022   2.5 mg   6/10/2022  INR Check with Coumadin Clinic   Of Note: Yariel Turner was given 6/3/2022 1135 with vitamin K 10mg IV 6/3/2022 1147    Coumadin 2.5 mg tabs    Provider dosing warfarin: [facility]    Recheck INR:  6/10/2022 0920 with results to [facility]     Thank you for the consult.    Arielle Carl, PharmD 6/4/2022 1:11 PM

## 2022-06-04 NOTE — PLAN OF CARE
Problem: Discharge Planning  Goal: Discharge to home or other facility with appropriate resources  Outcome: Progressing  Flowsheets (Taken 6/3/2022 2015)  Discharge to home or other facility with appropriate resources:   Identify barriers to discharge with patient and caregiver   Arrange for needed discharge resources and transportation as appropriate     Problem: Pain  Goal: Verbalizes/displays adequate comfort level or baseline comfort level  Outcome: Progressing     Problem: Safety - Adult  Goal: Free from fall injury  Outcome: Progressing     Problem: ABCDS Injury Assessment  Goal: Absence of physical injury  Outcome: Progressing

## 2022-06-04 NOTE — PROGRESS NOTES
Physician Progress Note      Giovanni Nuñez  CSN #:                  [id number]  :                       1950  ADMIT DATE:       6/3/2022 9:53 AM  DISCH DATE:  RESPONDING  PROVIDER #:        Mariza Helm CNP          QUERY TEXT:    Attending,    Patient admitted with epistaxis and noted to take Coumadin for PAF. On   admission, INR noted to be 3.83. If possible, please respond below and   document in the progress notes and discharge summary if you are evaluating   and/or treating any of the following: The medical record reflects the following:  Risk Factors: Coumadin, INR 3.83  Clinical Indicators: epistaxis  Treatment: embolization, Afrin, IVF, vitamin K, KCentra    Thank you! Jose Cruz Love RN, BSN, RHIT, CCDS  Clinical   Options provided:  -- Epistaxis associated with Coumadin  -- Epistaxis unrelated to anticoagulation  -- Other - I will add my own diagnosis  -- Disagree - Not applicable / Not valid  -- Disagree - Clinically unable to determine / Unknown  -- Refer to Clinical Documentation Reviewer    PROVIDER RESPONSE TEXT:    This patient has epistaxis associated with Coumadin.     Query created by: Tu Willis on 2022 11:42 AM      Electronically signed by:  Mariza Helm CNP 2022 12:57 PM

## 2022-06-04 NOTE — DISCHARGE INSTR - MEDS
Recommendations for discharge:   Date INR Warfarin Dose   6/2/2022 1.41 No Warfarin    6/3/2022 1.39 No Warfarin   6/4/2022 1.41 No warfarin per MD   6/5/2022  No warfarin per MD   6/6/2022  No warfarin per MD   6/7/2022  2.5 mg   6/8/2022  2.5 mg   6/9/2022   2.5 mg   6/10/2022  INR Check with Coumadin Clinic

## 2022-06-06 ENCOUNTER — CARE COORDINATION (OUTPATIENT)
Dept: CASE MANAGEMENT | Age: 72
End: 2022-06-06

## 2022-06-06 ENCOUNTER — HOSPITAL ENCOUNTER (EMERGENCY)
Age: 72
Discharge: HOME | End: 2022-06-06
Attending: EMERGENCY MEDICINE
Payer: MEDICARE

## 2022-06-06 VITALS
OXYGEN SATURATION: 97 % | WEIGHT: 252 LBS | TEMPERATURE: 98.4 F | HEART RATE: 63 BPM | SYSTOLIC BLOOD PRESSURE: 162 MMHG | RESPIRATION RATE: 17 BRPM | BODY MASS INDEX: 33.25 KG/M2 | DIASTOLIC BLOOD PRESSURE: 98 MMHG

## 2022-06-06 DIAGNOSIS — R04.0 EPISTAXIS: Primary | ICD-10-CM

## 2022-06-06 LAB
ANION GAP SERPL CALCULATED.3IONS-SCNC: 13 MEQ/L (ref 8–16)
BASOPHILS # BLD: 0.6 %
BASOPHILS ABSOLUTE: 0.1 THOU/MM3 (ref 0–0.1)
BUN BLDV-MCNC: 13 MG/DL (ref 7–22)
CALCIUM SERPL-MCNC: 9.1 MG/DL (ref 8.5–10.5)
CHLORIDE BLD-SCNC: 103 MEQ/L (ref 98–111)
CO2: 24 MEQ/L (ref 23–33)
CREAT SERPL-MCNC: 0.8 MG/DL (ref 0.4–1.2)
EOSINOPHIL # BLD: 1.4 %
EOSINOPHILS ABSOLUTE: 0.1 THOU/MM3 (ref 0–0.4)
ERYTHROCYTE [DISTWIDTH] IN BLOOD BY AUTOMATED COUNT: 13.5 % (ref 11.5–14.5)
ERYTHROCYTE [DISTWIDTH] IN BLOOD BY AUTOMATED COUNT: 42.5 FL (ref 35–45)
GFR SERPL CREATININE-BSD FRML MDRD: > 90 ML/MIN/1.73M2
GLUCOSE BLD-MCNC: 130 MG/DL (ref 70–108)
HCT VFR BLD CALC: 48.4 % (ref 42–52)
HEMOGLOBIN: 16.1 GM/DL (ref 14–18)
IMMATURE GRANS (ABS): 0.05 THOU/MM3 (ref 0–0.07)
IMMATURE GRANULOCYTES: 0.5 %
INR BLD: 1.22 (ref 0.85–1.13)
LYMPHOCYTES # BLD: 13.4 %
LYMPHOCYTES ABSOLUTE: 1.4 THOU/MM3 (ref 1–4.8)
MCH RBC QN AUTO: 29 PG (ref 26–33)
MCHC RBC AUTO-ENTMCNC: 33.3 GM/DL (ref 32.2–35.5)
MCV RBC AUTO: 87.1 FL (ref 80–94)
MONOCYTES # BLD: 6.2 %
MONOCYTES ABSOLUTE: 0.7 THOU/MM3 (ref 0.4–1.3)
NUCLEATED RED BLOOD CELLS: 0 /100 WBC
OSMOLALITY CALCULATION: 281.3 MOSMOL/KG (ref 275–300)
PLATELET # BLD: 200 THOU/MM3 (ref 130–400)
PMV BLD AUTO: 10.4 FL (ref 9.4–12.4)
POTASSIUM REFLEX MAGNESIUM: 3.6 MEQ/L (ref 3.5–5.2)
RBC # BLD: 5.56 MILL/MM3 (ref 4.7–6.1)
SEG NEUTROPHILS: 77.9 %
SEGMENTED NEUTROPHILS ABSOLUTE COUNT: 8.3 THOU/MM3 (ref 1.8–7.7)
SODIUM BLD-SCNC: 140 MEQ/L (ref 135–145)
WBC # BLD: 10.6 THOU/MM3 (ref 4.8–10.8)

## 2022-06-06 PROCEDURE — 80048 BASIC METABOLIC PNL TOTAL CA: CPT

## 2022-06-06 PROCEDURE — 36415 COLL VENOUS BLD VENIPUNCTURE: CPT

## 2022-06-06 PROCEDURE — 30901 CONTROL OF NOSEBLEED: CPT

## 2022-06-06 PROCEDURE — 85610 PROTHROMBIN TIME: CPT

## 2022-06-06 PROCEDURE — 85025 COMPLETE CBC W/AUTO DIFF WBC: CPT

## 2022-06-06 PROCEDURE — 30903 CONTROL OF NOSEBLEED: CPT | Performed by: NURSE PRACTITIONER

## 2022-06-06 PROCEDURE — 99283 EMERGENCY DEPT VISIT LOW MDM: CPT

## 2022-06-06 PROCEDURE — 99282 EMERGENCY DEPT VISIT SF MDM: CPT | Performed by: NURSE PRACTITIONER

## 2022-06-06 ASSESSMENT — ENCOUNTER SYMPTOMS
SORE THROAT: 0
PHOTOPHOBIA: 0
CHEST TIGHTNESS: 0
TROUBLE SWALLOWING: 0
BACK PAIN: 1
ABDOMINAL PAIN: 0
COLOR CHANGE: 0
WHEEZING: 0
VOMITING: 0
SHORTNESS OF BREATH: 0
NAUSEA: 0

## 2022-06-06 ASSESSMENT — PAIN - FUNCTIONAL ASSESSMENT
PAIN_FUNCTIONAL_ASSESSMENT: NONE - DENIES PAIN
PAIN_FUNCTIONAL_ASSESSMENT: NONE - DENIES PAIN

## 2022-06-06 NOTE — ED PROVIDER NOTES
[facility]  EMERGENCY MEDICINE ATTENDING ATTESTATION      Evaluation of Misty JorgensenVolodymyrRichar. Case discussed and care plan developed with resident physician, Dr. Lilia Yoder.   I agree with the resident physician documentation and plan as documented by him, except if my documentation differs. Patient seen, interviewed and examined by me. I reviewed the medical, surgical, family and social history, medications and allergies. I have reviewed the nursing documentation. I have reviewed the patient's vital signs and are abnormal from Hypertension per my interpretation. Body mass index is 33.25 kg/m². Pulsoxymetry is normal per my interpretation. Brief H&P   Patient c/o feeling as if something was falling off the back of his nose last night and noticing some blood on his left nostril. Patient with some tissue paper on it and the bleeding stopped. He was recently admitted to the ICU for as severe posterior epistaxis that required embolization by neuro interventionalists and placement of a fibrin patch in his nose. He states that it may have been the fibrin patch following what he felt last night. While patient was in the ICU patient's wife refused to bring him for follow-up and they stated that they would only come to the emergency department if he needed help. Physical exam is notable for well appearing, stigmata of recent bleeding from the left nostril, no active bleeding. There is hyperemia of the nasal mucosa on the left side with stigmata of recent bleeding from the anterior area, no longer bleeding. Clubbing of fingers. Medical Decision Making   MDM:   1. Patient recovering from recent severe epistaxis with a milder new episode with spontaneous resolution. Plan:    IV line, labs given the recent severe hemorrhage   As patient has a high risk of getting lost to follow-up, will consult ENT to see him in the emergency department.   Patient and his wife are agreeable at this point to follow-up though. Please see the resident physician completed note for final disposition except as documented on this attestation. I have reviewed and interpreted all available lab, radiology and ekg results available at the moment. Diagnosis, treatment and disposition plans were discussed and agreed upon by patient. This transcription was electronically signed. It was dictated by use of voice recognition software and electronically transcribed. The transcription may contain errors not detected in proofreading.      I performed direct supervision and was present for the critical portion following procedures: None  Critical care time on this case: None    Electronically signed by Andrés Xavier MD on 6/6/22 at 9:33 AM EDT          Andrés Xavier MD  06/06/22 1018

## 2022-06-06 NOTE — ED PROVIDER NOTES
[facility] ENCOUNTER          Pt Name: Ghulam Chu  MRN: [id number]  [place] 1950  Date of evaluation: 6/6/2022  Treating Resident Physician: Amado Pinto MD  Supervising Physician: Dr. Alan Richter       Chief Complaint   Patient presents with    Epistaxis     left side     History obtained from the patient. HISTORY OF PRESENT ILLNESS    HPI  Ghulam Chu is a 81 y.o. male with PMHx of PAF on Coumadin, posterior epistaxis s/p recent embolization, HTN, CAD, GERD who presents to the emergency department for evaluation of epistaxis. The patient woke up around 4AM this morning and noticed oozing of blood from his left nare. States that blood got all over his shirt and arms. He attempted to apply nasal pressure and put tissues in his left nare which helped to stop the bleeding. He is not having active bleeding at this time. The patient has continued to hold his Coumadin as instructed. He has also not blown his nose or rubbed his nose. States that this was unprovoked and did not have any trauma to the face. He has been dealing with epistaxis for the last 2 weeks and has had multiple visits to the emergency department here and recently underwent embolization with a one night stay in ICU for observation and discharged without complications. ENT did place nasal packing during the last visit and wife states that she believes the packing dissolved over the weekend. He denies any shortness of breath, lightheadedness, chest pain. The patient has no other acute complaints at this time. REVIEW OF SYSTEMS   Review of Systems   HENT: Positive for nosebleeds. All other systems reviewed and are negative.         PAST MEDICAL AND SURGICAL HISTORY     Past Medical History:   Diagnosis Date    Anesthesia     takes large dose of medications to make him sleepy for surgery    Atrial fibrillation (HCC)     Benign prostatic hyperplasia with urinary frequency 10/21/2019    CAD (coronary artery disease)     Chest pain     Colon polyp 2011    removed    Coronary artery disease involving native coronary artery of native heart without angina pectoris 10/21/2019    Dementia ([address]     Dizziness     Elevated PSA     GERD (gastroesophageal reflux disease)     Heart disease     Hyperlipidemia     Hypertension      Past Surgical History:   Procedure Laterality Date    ADENOIDECTOMY      CARDIAC CATHETERIZATION  09/23/2011    Right radial artery cannulation. Moderate LV dysfunction. The patient has disease in the ostium of diagonal 1 and diagonal 2 branch, however they are both are at the  ostium of the diagonals. Intervention could compromise flow of LAD. THe LAD has long diffuse calcified lesion 50-60-% anatomically.  CARDIOVASCULAR STRESS TEST  09/23/2011    EF 37%    CARPAL TUNNEL RELEASE      COLONOSCOPY      PROSTATECTOMY N/A 12/3/2020    ROBOTIC ASSISTED LAPAROSCOPIC RADICAL PROSTATECTOMY performed by Eric Sosa MD at [address] [place] [address] ECHOCARDIOGRAM  09/23/2011    EF 50-55%    ULTRASOUND PROSTATE/TRANSRECTAL N/A 10/6/2020    CYSTO, TRANSRECTAL ULTRASOUND GUIDED PROSTATE BX performed by Eric Sosa MD at [place] [address]   No current facility-administered medications for this encounter. Current Outpatient Medications:     [START ON 6/7/2022] warfarin (COUMADIN) 2.5 MG tablet, Take as directed by [facility] (40 tablets = 30 day supply), Disp: 40 tablet, Rfl: 11    rosuvastatin (CRESTOR) 20 MG tablet, Take 1 tablet by mouth daily, Disp: 90 tablet, Rfl: 1    nitroGLYCERIN (NITROSTAT) 0.4 MG SL tablet, DISSOLVE ONE TABLET UNDER THE TONGUE EVERY 5 MINUTES AS NEEDED FOR CHEST PAIN.   DO NOT EXCEED A TOTAL OF 3 DOSES IN 15 MINUTES (Patient not taking: Reported on 5/25/2022), Disp: 25 tablet, Rfl: 2    dilTIAZem (CARDIZEM CD) 360 MG extended release capsule, Take 1 capsule by mouth once daily, Disp: 90 capsule, Rfl: 3    digoxin (LANOXIN) 125 MCG tablet, TAKE 1 TABLET BY MOUTH ONCE DAILY, Disp: 90 tablet, Rfl: 3    potassium chloride (KLOR-CON) 10 MEQ extended release tablet, TAKE 1 TABLET BY MOUTH TWICE DAILY, Disp: 180 tablet, Rfl: 3    metoprolol succinate (TOPROL XL) 50 MG extended release tablet, Take 1 tablet by mouth once daily, Disp: 90 tablet, Rfl: 3    bisacodyl (DULCOLAX) 5 MG EC tablet, Take 5 mg by mouth nightly , Disp: , Rfl:       SOCIAL HISTORY     Social History     Social History Narrative    Not on file     Social History     Tobacco Use    Smoking status: Never Smoker    Smokeless tobacco: Never Used   Vaping Use    Vaping Use: Never used   Substance Use Topics    Alcohol use: No    Drug use: No         ALLERGIES   No Known Allergies      FAMILY HISTORY     Family History   Problem Relation Age of Onset    Heart Surgery Father     Cancer Father         lung    Heart Disease Father         CABG    Alzheimer's Disease Mother     Diabetes Neg Hx     High Blood Pressure Neg Hx     Stroke Neg Hx          PREVIOUS RECORDS   Previous records reviewed: I reviewed the patient's past medical records including relevant labs, imaging and procedures. PHYSICAL EXAM     ED Triage Vitals   BP Temp Temp src Pulse Resp SpO2 Height Weight   157/88 98.4F -- 100 20 94% -- --     Initial vital signs and nursing assessment reviewed and abnormal from hypertension. Body mass index is 33.25 kg/m². Pulsoximetry is abnormal per my interpretation. Additional Vital Signs:  Vitals:    06/06/22 0908   BP: (!) 162/98   Pulse:    Resp:    Temp:    SpO2:        Physical Exam  Vitals reviewed. Constitutional:       Appearance: Normal appearance. He is obese. He is not ill-appearing. HENT:      Head: Normocephalic and atraumatic.       Right Ear: External ear normal.      Left Ear: External ear normal.      Nose:      Right Nostril: No foreign body, epistaxis, septal hematoma or occlusion. Left Nostril: Epistaxis present. No foreign body, septal hematoma or occlusion. Comments: Small amount of blood pooling in the posterior left nare. No active bleeding appreciated. No septal hematoma seen. No tenderness to the nasal bridge or face. Eyes:      General: No scleral icterus. Extraocular Movements: Extraocular movements intact. Conjunctiva/sclera: Conjunctivae normal.   Cardiovascular:      Rate and Rhythm: Normal rate and regular rhythm. Pulses: Normal pulses. Heart sounds: Normal heart sounds. Pulmonary:      Effort: Pulmonary effort is normal.      Breath sounds: Normal breath sounds. Abdominal:      General: Abdomen is flat. Palpations: Abdomen is soft. Musculoskeletal:         General: Normal range of motion. Cervical back: Normal range of motion. Skin:     General: Skin is warm and dry. Capillary Refill: Capillary refill takes less than 2 seconds. Neurological:      General: No focal deficit present. Mental Status: He is alert and oriented to person, place, and time. Mental status is at baseline. Psychiatric:         Mood and Affect: Mood normal.         Behavior: Behavior normal.             MEDICAL DECISION MAKING   Initial Assessment: This is a 74-year-old male with past medical history of atrial fibrillation on Coumadin presenting for epistaxis. Not currently taking Coumadin, recent embolization performed by neuro interventional list with a 1 night ICU stay without complications over the weekend. Now returning for persistent epistaxis. Differential diagnosis includes but is not limited to:  Posterior epistaxis, anterior epistaxis, coagulopathy, hemorrhage    Although some of these diagnoses are unlikely they were considered in my medical decision making.     Plan:    CBC, BMP  PT/INR  C/s ENT        ED RESULTS   Laboratory results:  Labs Reviewed   CBC WITH AUTO DIFFERENTIAL - Abnormal; Notable for the following components:       Result Value    Segs Absolute 8.3 (*)     All other components within normal limits   BASIC METABOLIC PANEL W/ REFLEX TO MG FOR LOW K - Abnormal; Notable for the following components:    Glucose 130 (*)     All other components within normal limits   PROTIME-INR - Abnormal; Notable for the following components:    INR 1.22 (*)     All other components within normal limits   ANION GAP   OSMOLALITY   GLOMERULAR FILTRATION RATE, ESTIMATED       Radiologic studies results:  No orders to display       ED Medications administered this visit: Medications - No data to display      ED COURSE     ED Course as of 06/06/22 1017   Mon Jun 06, 2022   0808 Hemoglobin Quant: 16.1  Hemoglobin within normal limits. [JT]   [id number] ENT c/s called to Dr. Sanjay Arrington. Will evaluate him here. [JT]   1007 ENT did evaluated the patient. Recs appreciated. Nasopore dissolvable packing placed in the anterior left nare. Discharge instructions provided. Seems like bleed is anterior from prior cautery sites. Patient to keep Nasopore in for three days, apply 3 squirts of Afrin TID while it is in, then remove and apply Bacitracin ointment to the nare. F/u to ENT office also. Wife seems amenable to this now. [JT]      ED Course User Index  [JT] Valeriy Fuller MD         MDM:   Did not hemodynamically stable upon arrival and throughout ED course. No more active epistaxis here in the department. No septal hematoma seen. No signs of acute anemia or symptoms appreciated. Hemoglobin is within normal limits. ENT did come down to evaluate the patient here. A nasal pore anterior nasal packing was placed. Looks like the epistaxis is due to anterior nasal bleed this time from prior cauterization. Discharge instructions and return precautions given to the patient. They verbalized understanding. They are also amenable to follow-up with ENT in the office.   We will also refer them to the patient's cardiologist for conversation on continuing anticoagulation. Upon discharge the patient appears stable with stable vital signs. Strict return precautions and follow up instructions were discussed with the patient prior to discharge, with which the patient agrees. MEDICATION CHANGES     New Prescriptions    No medications on file         FINAL DISPOSITION     Final diagnoses:   Epistaxis     Condition: condition: good  Dispo: Discharge to home      This transcription was electronically signed. Parts of this transcriptions may have been dictated by use of voice recognition software and electronically transcribed, and parts may have been transcribed with the assistance of an ED scribe. The transcription may contain errors not detected in proofreading. Please refer to my supervising physician's documentation if my documentation differs.     Electronically Signed: Muriel Hodgkins, MD, 06/06/22, 10:17 AM          Muriel Hodgkins, MD  Resident  06/06/22 0001

## 2022-06-06 NOTE — CARE COORDINATION
Care Transitions Outreach Attempt    Call within 2 business days of discharge: Yes   Patient: Christin Justin Patient : 1950 MRN: <[id number]>    Last Discharge [facility]       Complaint Diagnosis Description Type Department Provider    22 Epistaxis Epistaxis ED (DISCHARGE) [facility] ED Heather Quiñones MD            Was this an external facility discharge? No   Discharge Facility: [facility]    Noted following upcoming appointments from discharge chart review:   [facility] follow up appointment(s):   Future Appointments   Date Time Provider Cristina Grier   6/10/2022  9:20 AM ANGELES Soni PEYTON [facility] [place]   2022  9:45 AM Cayden Jorge MD N ENT [facility] [address]   2022  8:30 AM NARESH Peralta - CNP [facility] Medicine [facility] [address]   8/15/2022  7:30 AM Bravo Mei MD N [address] [facility]     1st attempt to reach for Care Transition discharge call unsuccessful. HIPAA compliant message left requesting call back. *Noted to have been in ED 22 as well.    [facility], [facility] [phone]

## 2022-06-06 NOTE — ED NOTES
Tissue packing removed by provider. No bleeding since removal. Patient resting in cot with no signs of distress. Call light in reach. Will continue to monitor.       Tristan Horowitz RN  06/06/22 9882

## 2022-06-06 NOTE — CONSULTS
Department of Otolaryngology  Consult Note    Reason for Consult:  Epistaxis  Requesting Physician:  Dr Carolin Homans:  Nosebleed    History Obtained From:  patient, spouse, electronic medical record    HISTORY OF PRESENT ILLNESS:                The patient is a 81 y.o. male who was presented to [facility] ER this morning for nosebleed. This started around 0400 while patient was lying down sleeping. Bleeding started anteriorly by dripping down his upper lip from the left side of his nose. He made a pack out of rolled Kleenex and soaked through a few at home. By the time he presented to the ER, the bleeding had nearly stopped. His INR is 1.22 today. He has been off his Coumadin that he takes for atrial fibrillation, but is scheduled to restart tomorrow. He is s/p embolization of the right posterior alveolar artery using embospheres 6/3/2022 with Dr Stan Cardoso or recurrent epistaxis. Past Medical History:        Diagnosis Date    Anesthesia     takes large dose of medications to make him sleepy for surgery    Atrial fibrillation ([address]     Benign prostatic hyperplasia with urinary frequency 10/21/2019    CAD (coronary artery disease)     Chest pain     Colon polyp 2011    removed    Coronary artery disease involving native coronary artery of native heart without angina pectoris 10/21/2019    Dementia ([address]     Dizziness     Elevated PSA     GERD (gastroesophageal reflux disease)     Heart disease     Hyperlipidemia     Hypertension        Past Surgical History:        Procedure Laterality Date    ADENOIDECTOMY      CARDIAC CATHETERIZATION  09/23/2011    Right radial artery cannulation. Moderate LV dysfunction. The patient has disease in the ostium of diagonal 1 and diagonal 2 branch, however they are both are at the  ostium of the diagonals. Intervention could compromise flow of LAD. THe LAD has long diffuse calcified lesion 50-60-% anatomically.     CARDIOVASCULAR STRESS TEST  09/23/2011    EF 37%    CARPAL TUNNEL RELEASE      COLONOSCOPY      PROSTATECTOMY N/A 12/3/2020    ROBOTIC ASSISTED LAPAROSCOPIC RADICAL PROSTATECTOMY performed by Nikita Brown MD at [facility] ECHOCARDIOGRAM  09/23/2011    EF 50-55%    ULTRASOUND PROSTATE/TRANSRECTAL N/A 10/6/2020    CYSTO, TRANSRECTAL ULTRASOUND GUIDED PROSTATE BX performed by Nikita Brown MD at [place] [address]         Current Medications:   No current facility-administered medications for this encounter. Allergies:  Review of patient's allergies indicates no known allergies. Social History:    TOBACCO:   reports that he has never smoked. He has never used smokeless tobacco.  ETOH:   reports no history of alcohol use. DRUGS:   reports no history of drug use. Family History:       Problem Relation Age of Onset    Heart Surgery Father     Cancer Father         lung    Heart Disease Father         CABG    Alzheimer's Disease Mother     Diabetes Neg Hx     High Blood Pressure Neg Hx     Stroke Neg Hx        REVIEW OF SYSTEMS:    A complete multi-organ review of systems was performed using a new patient questionnaire, and reviewed by me.   ENT:  negative except as noted in HPI  CONSTITUTIONAL:  negative except as noted in HPI  EYES:  negative except as noted in HPI  RESPIRATORY:  negative except as noted in HPI  CARDIOVASCULAR:  negative except as noted in HPI  GASTROINTESTINAL:  negative except as noted in HPI  GENITOURINARY:  negative except as noted in HPI  MUSCULOSKELETAL:  negative except as noted in HPI  SKIN:  negative except as noted in HPI  ENDOCRINE/METABOLIC: negative except as noted in HPI  HEMATOLOGIC/LYMPHATIC:  negative except as noted in HPI  ALLERGY/IMMUN: negative except as noted in HPI  NEUROLOGICAL:  negative except as noted in HPI  BEHAVIOR/PSYCH:  negative except as noted in HPI    PHYSICAL EXAM:    VITALS:  BP (!) 162/98   Pulse 63   Temp 98.4 °F (36.9 °C) (Oral)   Resp 17   Wt 252 lb (114.3 kg)   SpO2 97%   BMI 33.25 kg/m²     Alert, oriented and cooperative older adult male. No distress. No active nasal bleeding at this time. Rolled Kleenex on his lap with very small amount of dried blood on it. Nasal mucosa overall very dry. Healing abrasions on the right anterior septum without evidence of recent bleeding. Left side with some residual packing between the middle turbinate and septum more posteriorly. Left anterior septum down to anterior floor of nose with healing wound with dried blood. No bleeding in posterior oropharynx. After discussion with patient, Afrin was sprayed throughout the left nasal cavity. A nasopore pack was trimmed and soaked in oxymetazoline, then placed anteriorly on the left side. No active bleeding. Patient tolerated well.     DATA:    CBC with Differential:    Lab Results   Component Value Date    WBC 10.6 06/06/2022    RBC 5.56 06/06/2022    RBC 5.63 05/17/2016    HGB 16.1 06/06/2022    HCT 48.4 06/06/2022     06/06/2022    MCV 87.1 06/06/2022    MCH 29.0 06/06/2022    MCHC 33.3 06/06/2022    RDW 13.9 06/23/2018    NRBC 0 06/06/2022    NRBC 0 09/23/2011    SEGSPCT 77.9 06/06/2022    LYMPHOPCT 29.1 05/17/2016    MONOPCT 6.2 06/06/2022    EOSPCT 3.0 05/17/2016    BASOPCT 0.7 05/17/2016    MONOSABS 0.7 06/06/2022    LYMPHSABS 1.4 06/06/2022    EOSABS 0.1 06/06/2022    BASOSABS 0.1 06/06/2022     BMP:    Lab Results   Component Value Date     06/06/2022    K 3.6 06/06/2022     06/06/2022    CO2 24 06/06/2022    BUN 13 06/06/2022    LABALBU 4.4 10/04/2021    LABALBU 4.5 01/12/2012    CREATININE 0.8 06/06/2022    CALCIUM 9.1 06/06/2022    LABGLOM >90 06/06/2022    GLUCOSE 130 06/06/2022    GLUCOSE 93 05/17/2016       IMPRESSION/RECOMMENDATIONS:      #Acute anterior epistaxis, left  #Long-term (current) anticoagulation  Bleeding stopped prior to my arrival.  Patient has healing wounds to the left anterior septum and onto the anterior floor of nose. I opted to place a trimmed Nasopore (dissolvable pack) that was trimmed to fit snuggly as to provide some pressure to the area and deliver oymetazoline directly to the area. The goal is to heal this area, likely from prior cauterizations. Once the packing has dissolved, use bacitracin ointment BID to help heal the area. Once the area is healed, patient will need BID use of saline product to moisturize the mucosa. He will follow up in ENT office in 5-7 days to re-check the nose. He understands that when he restarts the Coumadin his INR level will rise to a therapeutic range and make him at higher risk for re-bleeding. He is currently in atrial fibrillation with widely variable rate (as low as 40s and as high as 160s) in my presence. He was advised against nose blowing, heavy lifting, strenuous exercise and other activities that may cause increased blood flow to the nose. We reviewed how to apply pressure if bleeding recurs and when to seek medical attention. Patient and wife agreeable to plan of care.       Electronically signed by NARESH Daniels CNP on 6/6/2022 at 10:20 AM

## 2022-06-06 NOTE — ED TRIAGE NOTES
Patient presents to the ED with wife for concerns of a continued nose bleed. Patient currently has a tissue stuffed up nose from a \"trickle of bleeding that started and got worse around 0345 this morning\". No bleeding noted at this time. Patient states he had a procedure completed this past Friday. He denies any pain at this time.

## 2022-06-07 ENCOUNTER — CARE COORDINATION (OUTPATIENT)
Dept: CASE MANAGEMENT | Age: 72
End: 2022-06-07

## 2022-06-07 NOTE — CARE COORDINATION
Zac 45 Transitions Initial Follow Up Call    Call within 2 business days of discharge: Yes    Patient: Hellen Ward Patient : 1950   MRN: [id number]  Reason for Admission: Epistaxis s/p embolization rt artery   Discharge Date: 22 RARS: Readmission Risk Score: 9.2 ( )    Last Discharge [facility]       Complaint Diagnosis Description Type Department Provider    22 Epistaxis Epistaxis ED (DISCHARGE) [facility] ED Alonzo Banks MD        Non-face-to-face services provided:  Obtained and reviewed discharge summary and/or continuity of care documents    Care Transitions 24 Hour Call    Schedule Follow Up Appointment with PCP: Declined  Do you have a copy of your discharge instructions?: Yes  Do you have all of your prescriptions and are they filled?: Yes  Have you been contacted by a [facility] Pharmacist?: No  Have you scheduled your follow up appointment?: Yes  How are you going to get to your appointment?: Car - family or friend to transport  Post Acute Services: Outpatient/Community Services (Comment: Coumadin Clinic )  Do you feel like you have everything you need to keep you well at home?: Yes  Care Transitions Interventions   Home Care Waiver: Declined        Transportation Support: Declined      DME Assistance: Declined     Senior Services: Declined         Future Appointments   Date Time Provider Cristina Grier   6/10/2022  9:20 AM ANGELES Ortega [facility] [place]   2022  9:45 AM MD PARRISH Echeverria [facility] [place] [facility] [place]   2022  8:30 AM NARESH Markham - CNP [facility] Medicine [facility] [place]   8/15/2022  7:30 AM Chidi Lacy MD N [facility] Heart [facility] [address] [facility] to be reviewed by the provider   Additional needs identified to be addressed with provider: denies          Method of communication with provider : none    [address]: Dipika Denney  RARS:  LR    Was this a readmission?  No  Patient stated reason for admission: Nose bleed  Patients top risk factors for readmission: medical condition-Epistaxis, PAFIB on AC, CAD, Htn CHF    Care Transition Nurse (CTN) contacted patient and wife by telephone to perform post hospital discharge assessment. Verified name and  with patient as identifiers. Provided introduction to self, and explanation of the CTN role. Phone Assessment: Patient and Wife very polite however not engaged w/ conversation and somewhat rush thru. Reports no further nose bleeding noted. Packing still in place, only slight clear drainage. Denies pain, h/a, sinus pressure, dizziness, visual disturbances, distress. Reports eating/drinking well w/ b&b wnl. AVS/Meds: Confirmed copy of AVS received, reviewed with Wife. Confirmed Patient obtained and is taking all new and routine rx as directed. Med education provided, questions answered, verbalizes understanding. Stressed importance of med compliance. 1111F medication review completed with Patient . Advised obtaining 90 day supply of routine, prn meds. Advised contacting pharmacy/md for refill needs. Resource Need Assessment:   Living Arrangements: lives w/ spouse   ADLS:independent, drives ; spouse assists as needed  DME:none, denies need   Transportation: self or spouse  HHC:none, denies need   Community Assistance:none, denies need   Referrals Made per CTN with Patient/Family Approval: [id number] [facility] Follow Up Appointments: Discussed importance of 7 day hospital follow up appointment for continuity of care. Transportation confirmed for the above appts. Wife declines CTN offer to schedule w/ PCP or ENT citing she was told to only f/u as needed. Not receptive to education. [id number] Education:   Educated patient about risk for severe COVID-19 due to risk factors according to CDC guidelines. Reviewed red flag symptoms with Wife who verbalized understanding. Discussed COVID vaccination status: Yes-- Moderna x 4.  Education provided on COVID-19 vaccination as appropriate, advised to speak w/ PCP re: vaccine, booster. Discussed exposure protocols and quarantine with CDC Guidelines. Wife was given an opportunity to verbalize any questions and concerns and agrees to contact CTN or health care provider for questions related to their healthcare. Reviewed, advised Covid19 preventative measures including mask covering nose/mouth, social distancing, hand washing. Advance Care Planning  Reports Advanced Directives up to date, reflecting current wishes . Encouraged to place on file w/ PCP, [facility]. Healthcare Decision Maker:    Primary Decision Maker: Hernando [place] - [facility] - [phone]    Advised to contact PCP / ENT 24/7 re: any health concerns for early outpatient intervention in an effort to avoid hospitalization. Discussed benefits of WIC, Urgent Care. Advised 911 if noting acute distress. CTN provided contact information. Plan for follow-up call in 5-7 days based on severity of symptoms and risk factors. Plan for next call: sx, f/u on appts, pcp/ent appts? ??     [address] [facility] [phone]

## 2022-06-10 ENCOUNTER — TELEPHONE (OUTPATIENT)
Dept: PHARMACY | Age: 72
End: 2022-06-10

## 2022-06-10 ENCOUNTER — TELEPHONE (OUTPATIENT)
Dept: CARDIOLOGY CLINIC | Age: 72
End: 2022-06-10

## 2022-06-10 ENCOUNTER — HOSPITAL ENCOUNTER (OUTPATIENT)
Dept: CT IMAGING | Age: 72
Discharge: HOME | End: 2022-06-10
Payer: MEDICARE

## 2022-06-10 ENCOUNTER — TELEPHONE (OUTPATIENT)
Dept: ENT CLINIC | Age: 72
End: 2022-06-10

## 2022-06-10 ENCOUNTER — HOSPITAL ENCOUNTER (OUTPATIENT)
Age: 72
Discharge: HOME | End: 2022-06-10
Payer: MEDICARE

## 2022-06-10 ENCOUNTER — PREP FOR PROCEDURE (OUTPATIENT)
Dept: ENT CLINIC | Age: 72
End: 2022-06-10

## 2022-06-10 ENCOUNTER — OFFICE VISIT (OUTPATIENT)
Dept: ENT CLINIC | Age: 72
End: 2022-06-10
Payer: MEDICARE

## 2022-06-10 ENCOUNTER — APPOINTMENT (OUTPATIENT)
Dept: PHARMACY | Age: 72
End: 2022-06-10
Payer: MEDICARE

## 2022-06-10 VITALS
BODY MASS INDEX: 31.24 KG/M2 | HEIGHT: 73 IN | RESPIRATION RATE: 24 BRPM | OXYGEN SATURATION: 96 % | SYSTOLIC BLOOD PRESSURE: 128 MMHG | DIASTOLIC BLOOD PRESSURE: 84 MMHG | HEART RATE: 113 BPM | WEIGHT: 235.7 LBS | TEMPERATURE: 97.3 F

## 2022-06-10 DIAGNOSIS — T48.5X5A RHINITIS MEDICAMENTOSA: ICD-10-CM

## 2022-06-10 DIAGNOSIS — C61 PROSTATE CANCER (HCC): ICD-10-CM

## 2022-06-10 DIAGNOSIS — R04.0 POSTERIOR EPISTAXIS: ICD-10-CM

## 2022-06-10 DIAGNOSIS — Z79.01 ANTICOAGULATED ON COUMADIN: ICD-10-CM

## 2022-06-10 DIAGNOSIS — R04.0 ANTERIOR EPISTAXIS: ICD-10-CM

## 2022-06-10 DIAGNOSIS — I25.10 CORONARY ARTERY DISEASE INVOLVING NATIVE CORONARY ARTERY OF NATIVE HEART WITHOUT ANGINA PECTORIS: ICD-10-CM

## 2022-06-10 DIAGNOSIS — I48.91 ATRIAL FIBRILLATION WITH RVR (HCC): ICD-10-CM

## 2022-06-10 DIAGNOSIS — R04.0 POSTERIOR EPISTAXIS: Primary | ICD-10-CM

## 2022-06-10 DIAGNOSIS — I48.0 PAROXYSMAL ATRIAL FIBRILLATION (HCC): ICD-10-CM

## 2022-06-10 DIAGNOSIS — J31.0 RHINITIS MEDICAMENTOSA: ICD-10-CM

## 2022-06-10 LAB
ERYTHROCYTE [DISTWIDTH] IN BLOOD BY AUTOMATED COUNT: 14.1 % (ref 11.5–14.5)
ERYTHROCYTE [DISTWIDTH] IN BLOOD BY AUTOMATED COUNT: 44.8 FL (ref 35–45)
HCT VFR BLD CALC: 52.4 % (ref 42–52)
HEMOGLOBIN: 17 GM/DL (ref 14–18)
MCH RBC QN AUTO: 28.9 PG (ref 26–33)
MCHC RBC AUTO-ENTMCNC: 32.4 GM/DL (ref 32.2–35.5)
MCV RBC AUTO: 89 FL (ref 80–94)
PLATELET # BLD: 263 THOU/MM3 (ref 130–400)
PMV BLD AUTO: 10 FL (ref 9.4–12.4)
RBC # BLD: 5.89 MILL/MM3 (ref 4.7–6.1)
WBC # BLD: 8.9 THOU/MM3 (ref 4.8–10.8)

## 2022-06-10 PROCEDURE — 36415 COLL VENOUS BLD VENIPUNCTURE: CPT

## 2022-06-10 PROCEDURE — 1036F TOBACCO NON-USER: CPT | Performed by: OTOLARYNGOLOGY

## 2022-06-10 PROCEDURE — 1111F DSCHRG MED/CURRENT MED MERGE: CPT | Performed by: OTOLARYNGOLOGY

## 2022-06-10 PROCEDURE — 1123F ACP DISCUSS/DSCN MKR DOCD: CPT | Performed by: OTOLARYNGOLOGY

## 2022-06-10 PROCEDURE — 3017F COLORECTAL CA SCREEN DOC REV: CPT | Performed by: OTOLARYNGOLOGY

## 2022-06-10 PROCEDURE — 85027 COMPLETE CBC AUTOMATED: CPT

## 2022-06-10 PROCEDURE — G8417 CALC BMI ABV UP PARAM F/U: HCPCS | Performed by: OTOLARYNGOLOGY

## 2022-06-10 PROCEDURE — 99214 OFFICE O/P EST MOD 30 MIN: CPT | Performed by: OTOLARYNGOLOGY

## 2022-06-10 PROCEDURE — 70486 CT MAXILLOFACIAL W/O DYE: CPT

## 2022-06-10 PROCEDURE — G8427 DOCREV CUR MEDS BY ELIG CLIN: HCPCS | Performed by: OTOLARYNGOLOGY

## 2022-06-10 RX ORDER — IPRATROPIUM BROMIDE 42 UG/1
2 SPRAY, METERED NASAL 3 TIMES DAILY
Qty: 15 ML | Refills: 3 | Status: SHIPPED | OUTPATIENT
Start: 2022-06-10 | End: 2022-06-23 | Stop reason: ALTCHOICE

## 2022-06-10 NOTE — TELEPHONE ENCOUNTER
Pt's wife called to report that pt woke up with a nosebleed and is at Dr. Davi Davila office now, she wants to cancel his appt today. Per chart review, frequent nosebleeds are recent recurrent issue. Gave instructions to hold Coumadin today. Appt rescheduled to Monday.

## 2022-06-10 NOTE — TELEPHONE ENCOUNTER
Pre op Risk Assessment-urgent surgery request d/t recurrent nose bleeds    Procedure Nasal Endoscopy with Hemorrhage Control   Physician Eusebia  Date of surgery/procedure 6/13/22    Last OV 7/29/21  Last Stress 11/16/20  Last Echo 11/8/19  Last Cath no heart cath  Last Stent none  Is patient on blood thinners Coumadin  Hold Meds/how many days has been holding Coumadin since yesterday, Coumadin Clinic aware. Please call Oly at  if clear.

## 2022-06-10 NOTE — TELEPHONE ENCOUNTER
I received a call from Mirna Wright at Dr. Devi Beck office and she said that Dr. Sony Carlos cleared [facility] for [facility] on Monday and she will be faxing information to us.

## 2022-06-10 NOTE — PROGRESS NOTES
[facility] [place] [facility]  [facility] EAR, NOSE AND THROAT  [facility]  Dept: [phone]  Dept Fax: [phone]  Loc: [phone]    Raghav Miller is a 81 y.o. male who was referred byNo ref. provider found for:  Chief Complaint   Patient presents with    Epistaxis     Patient here for epistaxis. Bleed this morning started at 5:00 am.    . HPI:     Raghav Miller is a 81 y.o. male who presents today for evaluation of Epistaxis, Bled this morning, starting at 5:00am.      Packing came out. Bleeding has been intermittent for 2 weeks. Started bleeding this morning at 5:00 am.  Takes Coumadin, stopped for 4 days then started back up. Has A-fib. Used Afrin intermittently for 2 weeks. Had multiple trips to ER, multiple packs and I.R. did an embolization on the right side orders from an outside otolaryngologist.  His bleeding has been from the left side. History:     No Known Allergies  Current Outpatient Medications   Medication Sig Dispense Refill    ipratropium (ATROVENT) 0.06 % nasal spray 2 sprays by Nasal route 3 times daily 15 mL 3    warfarin (COUMADIN) 2.5 MG tablet Take as directed by [facility] (40 tablets = 30 day supply) 40 tablet 11    rosuvastatin (CRESTOR) 20 MG tablet Take 1 tablet by mouth daily 90 tablet 1    nitroGLYCERIN (NITROSTAT) 0.4 MG SL tablet DISSOLVE ONE TABLET UNDER THE TONGUE EVERY 5 MINUTES AS NEEDED FOR CHEST PAIN.   DO NOT EXCEED A TOTAL OF 3 DOSES IN 15 MINUTES 25 tablet 2    dilTIAZem (CARDIZEM CD) 360 MG extended release capsule Take 1 capsule by mouth once daily 90 capsule 3    digoxin (LANOXIN) 125 MCG tablet TAKE 1 TABLET BY MOUTH ONCE DAILY 90 tablet 3    potassium chloride (KLOR-CON) 10 MEQ extended release tablet TAKE 1 TABLET BY MOUTH TWICE DAILY 180 tablet 3    metoprolol succinate (TOPROL XL) 50 MG extended release tablet Take 1 tablet by mouth once daily 90 tablet 3    bisacodyl (DULCOLAX) 5 MG EC tablet Take 5 mg by mouth nightly        No current facility-administered medications for this visit. Past Medical History:   Diagnosis Date    Anesthesia     takes large dose of medications to make him sleepy for surgery    Atrial fibrillation ([address]     Benign prostatic hyperplasia with urinary frequency 10/21/2019    CAD (coronary artery disease)     Chest pain     Colon polyp 2011    removed    Coronary artery disease involving native coronary artery of native heart without angina pectoris 10/21/2019    Dementia ([address]     Dizziness     Elevated PSA     GERD (gastroesophageal reflux disease)     Heart disease     Hyperlipidemia     Hypertension       Past Surgical History:   Procedure Laterality Date    ADENOIDECTOMY      CARDIAC CATHETERIZATION  09/23/2011    Right radial artery cannulation. Moderate LV dysfunction. The patient has disease in the ostium of diagonal 1 and diagonal 2 branch, however they are both are at the  ostium of the diagonals. Intervention could compromise flow of LAD. THe LAD has long diffuse calcified lesion 50-60-% anatomically.     CARDIOVASCULAR STRESS TEST  09/23/2011    EF 37%    CARPAL TUNNEL RELEASE      COLONOSCOPY      PROSTATECTOMY N/A 12/3/2020    ROBOTIC ASSISTED LAPAROSCOPIC RADICAL PROSTATECTOMY performed by Kathia Pitt MD at [address] ECHOCARDIOGRAM  09/23/2011    EF 50-55%    ULTRASOUND PROSTATE/TRANSRECTAL N/A 10/6/2020    CYSTO, TRANSRECTAL ULTRASOUND GUIDED PROSTATE BX performed by Kathia Pitt MD at [address] [place] [address]       Family History   Problem Relation Age of Onset    Heart Surgery Father     Cancer Father         lung    Heart Disease Father         CABG    Alzheimer's Disease Mother     Diabetes Neg Hx     High Blood Pressure Neg Hx     Stroke Neg Hx      Social History     Tobacco Use    Smoking status: Never Smoker    Smokeless tobacco: Never Used Substance Use Topics    Alcohol use: No       Subjective:       Review of Systems    Objective:     /84 (Site: Left Upper Arm, Position: Sitting)   Pulse (!) 113   Temp 97.3 °F (36.3 °C) (Infrared)   Resp 24   Ht 6' 1\" (1.854 m)   Wt 235 lb 11.2 oz (106.9 kg)   SpO2 96%   BMI 31.10 kg/m²     Physical Exam  Vitals and nursing note reviewed. Constitutional:       Appearance: He is well-developed. HENT:      Head: Normocephalic and atraumatic. No laceration. Salivary Glands: Right salivary gland is not diffusely enlarged or tender. Left salivary gland is not diffusely enlarged or tender. Comments:        Right Ear: Hearing, tympanic membrane, ear canal and external ear normal. No drainage or swelling. No middle ear effusion. Tympanic membrane is not perforated or erythematous. Left Ear: Hearing, tympanic membrane, ear canal and external ear normal. No drainage or swelling. No middle ear effusion. Tympanic membrane is not perforated or erythematous. Nose: No septal deviation, mucosal edema or rhinorrhea. Left Nostril: Epistaxis ( Clots and a little bright red blood in the left nasal fossa.) present. Mouth/Throat:      Mouth: Mucous membranes are moist. Mucous membranes are not pale and not dry. No oral lesions. Tongue: No lesions. Pharynx: Oropharynx is clear. Uvula midline. No oropharyngeal exudate or posterior oropharyngeal erythema. Comments: LIps: lips normal     Mallampati 1  Base of tongue: symmetric  Mirror exam deferred due to gag reflex. Eyes:      Extraocular Movements: Extraocular movements intact. Comments: Conjugate gaze   Neck:      Thyroid: No thyroid mass or thyromegaly. Trachea: Phonation normal. No tracheal deviation. Comments:     Pulmonary:      Effort: Pulmonary effort is normal. No retractions. Breath sounds: No stridor. Musculoskeletal:      Cervical back: Normal range of motion and neck supple. Lymphadenopathy:      Cervical: No cervical adenopathy. Neurological:      Mental Status: He is alert and oriented to person, place, and time. Cranial Nerves: Cranial nerves are intact. Cranial nerve deficit: VIIth N function intact bilat. Psychiatric:         Mood and Affect: Mood and affect normal.         Behavior: Behavior is cooperative. Endoscopic control of epistaxis  After the patient's nose had been decongested with Agusto-Synephrine and anesthetized with topical 4% lidocaine, used a 30 degree telescope to evaluate the nasal fossa. Right side did not have any bleeding spot bleeding points or blood. On the left side clots were suctioned. There appeared to be some bright red blood coming from under the left inferior turbinate. This turbinate was lateralized severely and pressed against the lateral wall, and was locked in position. There did not appear to be any other bleeding points. This was a slow ooze. Nasopore was soaked in Afrin and packed along the entire length of the left nasal fossa. Patient was instructed to leave it in place and only place Atrovent on it every 6 hours through the weekend. Data:  All of the past medical history, past surgical history, family history,social history, allergies and current medications were reviewed with the patient. Assessment & Plan   Diagnoses and all orders for this visit:     Diagnosis Orders   1. Posterior epistaxis  ipratropium (ATROVENT) 0.06 % nasal spray    FL NASAL/SINUS SCOPY,W/CONTROL NASAL HEM    CBC    FL NASAL/SINUS SCOPY,W/CONTROL NASAL HEM    CT FACIAL BONES WO CONTRAST   2. Anterior epistaxis  ipratropium (ATROVENT) 0.06 % nasal spray    FL NASAL/SINUS SCOPY,W/CONTROL NASAL HEM    CBC    FL NASAL/SINUS SCOPY,W/CONTROL NASAL HEM    CT FACIAL BONES WO CONTRAST   3. Rhinitis medicamentosa  ipratropium (ATROVENT) 0.06 % nasal spray    Prolonged Afrin use because of bleeding. 4. Atrial fibrillation with RVR ([address]     5. Coronary artery disease involving native coronary artery of native heart without angina pectoris     6. Prostate cancer [facility])         The findings were explained and his questions were answered. Options were discussed including OR for Epistaxis control. I explained that it would be necessary to lift the left inferior turbinate up and use some cautery available in the operating room for more definitive control. No guarantees were made. CT Sinuses-STAT, CBC-STAT, ordered Atrovent to be used in place of the Afrin. Benefits and risks are discussed, along with alternatives, and their questions were answered. No guarantees were made. They request we proceed. ADDENDUM: His hemoglobin has not fallen, in fact it was 17. CT did not show any masses in the sinuses that could be the source of his bleeding. The packing was noted       IBest CMA (IGOR), am scribing for, and in the presence of Dr. Charity Pathak. Electronically signed by FRACISCO ShabazzMA) on 6/10/22 at 8:34 AM EDT. (Please note that portions of this note were completed with a voice recognition program. Efforts were made to edit the dictations butoccasionally words are mis-transcribed.)    I agree to the above documentation placed by my scribe. I have personally evaluated this patient. Additional findings are as noted. I reviewed the scribe's note and agree with the documented findings and plan of care. Any areas of disagreement are corrected. I agree with the chief complaint, past medical history, past surgical history, allergies, medications, social and family history as documented unless otherwise noted below.      Electronically signed by Tracie Deng MD on 6/12/2022 at 11:46 PM

## 2022-06-10 NOTE — PROGRESS NOTES
Physician Progress Note      Trell Wall  [facility] #:                  [id number]  :                       1950  ADMIT DATE:       6/3/2022 9:53 AM  DISCH DATE:        2022 2:00 PM  RESPONDING  PROVIDER #:        David Coleman MD          QUERY TEXT:    Patient admitted with epistaxis and noted documentation of acute post-op   pulmonary insufficiency in critical care consult note. In order to support the   diagnosis of acute post-op pulmonary insufficiency, please include additional   clinical indicators in your documentation. Or please document if the   diagnosis of acute post-op pulmonary insufficiency has been ruled out after   further study. The medical record reflects the following:  Risk Factors:  embolization of right posterior alveolar artery 6/3  Clinical Indicators: from PACU to ICU on 2.5 LPM, weaned to RA before   discharged ; initially with shallow, tachypneic breathing on arrival to ICU   that resolved  Treatment: Supplemental oxygen at 2.5 LPM and admitted to ICU for monitoring   post-op    Acute Respiratory Failure Clinical Indicators per [employer] Training Guide and   Quick Reference Guide:  pO2 < 60 mmHg or SpO2 (pulse oximetry) < 91% breathing room air  pCO2 > 50 and pH < 7.35  P/F ratio (pO2 / FIO2) < 300  pO2 decrease or pCO2 increase by 10 mmHg from baseline (if known)  Supplemental oxygen of 40% or more  Presence of respiratory distress, tachypnea, dyspnea, shortness of breath,   wheezing  Unable to speak in complete sentences  Use of accessory muscles to breathe  Extreme anxiety and feeling of impending doom  Tripod position  Confusion/altered mental status/obtunded  Options provided:  -- Acute Respiratory Failure as evidenced by, Please document evidence.   -- Acute Respiratory Failure ruled out after study  -- Acute Respiratory Failure ruled out after study and Chronic Respiratory   Failure confirmed  -- Other - I will add my own diagnosis  -- Disagree - Not applicable / Not valid  -- Disagree - Clinically unable to determine / Unknown  -- Refer to Clinical Documentation Reviewer    PROVIDER RESPONSE TEXT:    Acute Respiratory Failure has been ruled out after study.     Query created by: Mahad Spencer on 6/10/2022 7:37 AM      Electronically signed by:  Alexsandra DE ANDA 6/10/2022 8:58 AM

## 2022-06-10 NOTE — PATIENT INSTRUCTIONS
Do not take Coumadin aspirin or garlic. We will take a good look on Monday in the operating room. You get a blood count today to assess the amount of blood loss and a CT scan to make sure there is nothing in the sinuses.   This will be in the main operating room

## 2022-06-10 NOTE — TELEPHONE ENCOUNTER
Instructions from Dr Nati Alejandra on 6/10/22, Add patient for Monday schedule.  Main OR.  Will need sinus endoscopes bipolar cautery Surgiflo and Nasopore. .    Patient's wife did not want to schedule a post op appointment today, they wanted to wait until after surgery on Monday in case they don't really need a follow up. And wanted to speak to Dr Nati Alejandra about it.

## 2022-06-13 ENCOUNTER — ANESTHESIA EVENT (OUTPATIENT)
Dept: OPERATING ROOM | Age: 72
End: 2022-06-13
Payer: MEDICARE

## 2022-06-13 ENCOUNTER — HOSPITAL ENCOUNTER (OUTPATIENT)
Age: 72
Setting detail: OUTPATIENT SURGERY
Discharge: HOME | End: 2022-06-13
Attending: OTOLARYNGOLOGY | Admitting: OTOLARYNGOLOGY
Payer: MEDICARE

## 2022-06-13 ENCOUNTER — ANESTHESIA (OUTPATIENT)
Dept: OPERATING ROOM | Age: 72
End: 2022-06-13
Payer: MEDICARE

## 2022-06-13 VITALS
WEIGHT: 230.6 LBS | HEIGHT: 73 IN | HEART RATE: 56 BPM | OXYGEN SATURATION: 93 % | BODY MASS INDEX: 30.56 KG/M2 | TEMPERATURE: 97.5 F | SYSTOLIC BLOOD PRESSURE: 151 MMHG | DIASTOLIC BLOOD PRESSURE: 83 MMHG | RESPIRATION RATE: 20 BRPM

## 2022-06-13 DIAGNOSIS — R04.0 EPISTAXIS: ICD-10-CM

## 2022-06-13 LAB
ERYTHROCYTE [DISTWIDTH] IN BLOOD BY AUTOMATED COUNT: 13.6 % (ref 11.5–14.5)
ERYTHROCYTE [DISTWIDTH] IN BLOOD BY AUTOMATED COUNT: 42.5 FL (ref 35–45)
HCT VFR BLD CALC: 50.3 % (ref 42–52)
HEMOGLOBIN: 16.8 GM/DL (ref 14–18)
INR BLD: 1.18 (ref 0.85–1.13)
MCH RBC QN AUTO: 29 PG (ref 26–33)
MCHC RBC AUTO-ENTMCNC: 33.4 GM/DL (ref 32.2–35.5)
MCV RBC AUTO: 86.9 FL (ref 80–94)
PLATELET # BLD: 296 THOU/MM3 (ref 130–400)
PMV BLD AUTO: 10.6 FL (ref 9.4–12.4)
RBC # BLD: 5.79 MILL/MM3 (ref 4.7–6.1)
WBC # BLD: 9 THOU/MM3 (ref 4.8–10.8)

## 2022-06-13 PROCEDURE — 2720000010 HC SURG SUPPLY STERILE: Performed by: OTOLARYNGOLOGY

## 2022-06-13 PROCEDURE — 31238 NSL/SINS NDSC SRG NSL HEMRRG: CPT | Performed by: OTOLARYNGOLOGY

## 2022-06-13 PROCEDURE — 3600000012 HC SURGERY LEVEL 2 ADDTL 15MIN: Performed by: OTOLARYNGOLOGY

## 2022-06-13 PROCEDURE — 2580000003 HC RX 258: Performed by: OTOLARYNGOLOGY

## 2022-06-13 PROCEDURE — 85027 COMPLETE CBC AUTOMATED: CPT

## 2022-06-13 PROCEDURE — 87186 SC STD MICRODIL/AGAR DIL: CPT

## 2022-06-13 PROCEDURE — 6370000000 HC RX 637 (ALT 250 FOR IP): Performed by: OTOLARYNGOLOGY

## 2022-06-13 PROCEDURE — 2500000003 HC RX 250 WO HCPCS: Performed by: OTOLARYNGOLOGY

## 2022-06-13 PROCEDURE — 6370000000 HC RX 637 (ALT 250 FOR IP)

## 2022-06-13 PROCEDURE — 3600000002 HC SURGERY LEVEL 2 BASE: Performed by: OTOLARYNGOLOGY

## 2022-06-13 PROCEDURE — 87070 CULTURE OTHR SPECIMN AEROBIC: CPT

## 2022-06-13 PROCEDURE — 87147 CULTURE TYPE IMMUNOLOGIC: CPT

## 2022-06-13 PROCEDURE — 6360000002 HC RX W HCPCS

## 2022-06-13 PROCEDURE — 7100000001 HC PACU RECOVERY - ADDTL 15 MIN: Performed by: OTOLARYNGOLOGY

## 2022-06-13 PROCEDURE — 87075 CULTR BACTERIA EXCEPT BLOOD: CPT

## 2022-06-13 PROCEDURE — 7100000010 HC PHASE II RECOVERY - FIRST 15 MIN: Performed by: OTOLARYNGOLOGY

## 2022-06-13 PROCEDURE — 85610 PROTHROMBIN TIME: CPT

## 2022-06-13 PROCEDURE — 2500000003 HC RX 250 WO HCPCS

## 2022-06-13 PROCEDURE — 3700000001 HC ADD 15 MINUTES (ANESTHESIA): Performed by: OTOLARYNGOLOGY

## 2022-06-13 PROCEDURE — 2709999900 HC NON-CHARGEABLE SUPPLY: Performed by: OTOLARYNGOLOGY

## 2022-06-13 PROCEDURE — 3700000000 HC ANESTHESIA ATTENDED CARE: Performed by: OTOLARYNGOLOGY

## 2022-06-13 PROCEDURE — 7100000011 HC PHASE II RECOVERY - ADDTL 15 MIN: Performed by: OTOLARYNGOLOGY

## 2022-06-13 PROCEDURE — 36415 COLL VENOUS BLD VENIPUNCTURE: CPT

## 2022-06-13 PROCEDURE — 87077 CULTURE AEROBIC IDENTIFY: CPT

## 2022-06-13 PROCEDURE — 7100000000 HC PACU RECOVERY - FIRST 15 MIN: Performed by: OTOLARYNGOLOGY

## 2022-06-13 RX ORDER — SUCCINYLCHOLINE/SOD CL,ISO/PF 200MG/10ML
SYRINGE (ML) INTRAVENOUS PRN
Status: DISCONTINUED | OUTPATIENT
Start: 2022-06-13 | End: 2022-06-13 | Stop reason: SDUPTHER

## 2022-06-13 RX ORDER — SODIUM CHLORIDE 9 MG/ML
INJECTION, SOLUTION INTRAVENOUS PRN
Status: CANCELLED | OUTPATIENT
Start: 2022-06-13

## 2022-06-13 RX ORDER — PROPOFOL 10 MG/ML
INJECTION, EMULSION INTRAVENOUS PRN
Status: DISCONTINUED | OUTPATIENT
Start: 2022-06-13 | End: 2022-06-13 | Stop reason: SDUPTHER

## 2022-06-13 RX ORDER — OXYMETAZOLINE HYDROCHLORIDE 0.05 G/100ML
SPRAY NASAL PRN
Status: DISCONTINUED | OUTPATIENT
Start: 2022-06-13 | End: 2022-06-13 | Stop reason: ALTCHOICE

## 2022-06-13 RX ORDER — SODIUM CHLORIDE 9 MG/ML
INJECTION, SOLUTION INTRAVENOUS CONTINUOUS
Status: DISCONTINUED | OUTPATIENT
Start: 2022-06-13 | End: 2022-06-13 | Stop reason: HOSPADM

## 2022-06-13 RX ORDER — SODIUM CHLORIDE 0.9 % (FLUSH) 0.9 %
5-40 SYRINGE (ML) INJECTION PRN
Status: DISCONTINUED | OUTPATIENT
Start: 2022-06-13 | End: 2022-06-13 | Stop reason: HOSPADM

## 2022-06-13 RX ORDER — MORPHINE SULFATE 2 MG/ML
2 INJECTION, SOLUTION INTRAMUSCULAR; INTRAVENOUS EVERY 5 MIN PRN
Status: DISCONTINUED | OUTPATIENT
Start: 2022-06-13 | End: 2022-06-13 | Stop reason: HOSPADM

## 2022-06-13 RX ORDER — PHENYLEPHRINE HYDROCHLORIDE 10 MG/ML
INJECTION INTRAVENOUS PRN
Status: DISCONTINUED | OUTPATIENT
Start: 2022-06-13 | End: 2022-06-13 | Stop reason: SDUPTHER

## 2022-06-13 RX ORDER — FENTANYL CITRATE 50 UG/ML
INJECTION, SOLUTION INTRAMUSCULAR; INTRAVENOUS PRN
Status: DISCONTINUED | OUTPATIENT
Start: 2022-06-13 | End: 2022-06-13 | Stop reason: SDUPTHER

## 2022-06-13 RX ORDER — SODIUM CHLORIDE 0.9 % (FLUSH) 0.9 %
5-40 SYRINGE (ML) INJECTION EVERY 12 HOURS SCHEDULED
Status: DISCONTINUED | OUTPATIENT
Start: 2022-06-13 | End: 2022-06-13 | Stop reason: HOSPADM

## 2022-06-13 RX ORDER — SODIUM CHLORIDE 9 MG/ML
INJECTION, SOLUTION INTRAVENOUS PRN
Status: DISCONTINUED | OUTPATIENT
Start: 2022-06-13 | End: 2022-06-13 | Stop reason: HOSPADM

## 2022-06-13 RX ORDER — SODIUM CHLORIDE 0.9 % (FLUSH) 0.9 %
5-40 SYRINGE (ML) INJECTION PRN
Status: CANCELLED | OUTPATIENT
Start: 2022-06-13

## 2022-06-13 RX ORDER — LIDOCAINE HYDROCHLORIDE 20 MG/ML
INJECTION, SOLUTION INTRAVENOUS PRN
Status: DISCONTINUED | OUTPATIENT
Start: 2022-06-13 | End: 2022-06-13 | Stop reason: SDUPTHER

## 2022-06-13 RX ORDER — ROCURONIUM BROMIDE 10 MG/ML
INJECTION, SOLUTION INTRAVENOUS PRN
Status: DISCONTINUED | OUTPATIENT
Start: 2022-06-13 | End: 2022-06-13 | Stop reason: SDUPTHER

## 2022-06-13 RX ORDER — LABETALOL 20 MG/4 ML (5 MG/ML) INTRAVENOUS SYRINGE
10
Status: DISCONTINUED | OUTPATIENT
Start: 2022-06-13 | End: 2022-06-13 | Stop reason: HOSPADM

## 2022-06-13 RX ORDER — MINERAL OIL AND WHITE PETROLATUM 150; 830 MG/G; MG/G
OINTMENT OPHTHALMIC PRN
Status: DISCONTINUED | OUTPATIENT
Start: 2022-06-13 | End: 2022-06-13 | Stop reason: SDUPTHER

## 2022-06-13 RX ORDER — LIDOCAINE HYDROCHLORIDE AND EPINEPHRINE 10; 10 MG/ML; UG/ML
INJECTION, SOLUTION INFILTRATION; PERINEURAL PRN
Status: DISCONTINUED | OUTPATIENT
Start: 2022-06-13 | End: 2022-06-13 | Stop reason: ALTCHOICE

## 2022-06-13 RX ORDER — SODIUM CHLORIDE 0.9 % (FLUSH) 0.9 %
5-40 SYRINGE (ML) INJECTION EVERY 12 HOURS SCHEDULED
Status: CANCELLED | OUTPATIENT
Start: 2022-06-13

## 2022-06-13 RX ORDER — ONDANSETRON 2 MG/ML
INJECTION INTRAMUSCULAR; INTRAVENOUS PRN
Status: DISCONTINUED | OUTPATIENT
Start: 2022-06-13 | End: 2022-06-13 | Stop reason: SDUPTHER

## 2022-06-13 RX ORDER — FENTANYL CITRATE 50 UG/ML
50 INJECTION, SOLUTION INTRAMUSCULAR; INTRAVENOUS EVERY 5 MIN PRN
Status: DISCONTINUED | OUTPATIENT
Start: 2022-06-13 | End: 2022-06-13 | Stop reason: HOSPADM

## 2022-06-13 RX ADMIN — ROCURONIUM BROMIDE 25 MG: 10 INJECTION INTRAVENOUS at 16:55

## 2022-06-13 RX ADMIN — Medication 140 MG: at 16:44

## 2022-06-13 RX ADMIN — SUGAMMADEX 200 MG: 100 INJECTION, SOLUTION INTRAVENOUS at 17:50

## 2022-06-13 RX ADMIN — ROCURONIUM BROMIDE 10 MG: 10 INJECTION INTRAVENOUS at 17:34

## 2022-06-13 RX ADMIN — ONDANSETRON 4 MG: 2 INJECTION INTRAMUSCULAR; INTRAVENOUS at 17:49

## 2022-06-13 RX ADMIN — PROPOFOL 180 MG: 10 INJECTION, EMULSION INTRAVENOUS at 16:44

## 2022-06-13 RX ADMIN — ROCURONIUM BROMIDE 5 MG: 10 INJECTION INTRAVENOUS at 16:44

## 2022-06-13 RX ADMIN — LIDOCAINE HYDROCHLORIDE 100 MG: 20 INJECTION, SOLUTION INTRAVENOUS at 16:44

## 2022-06-13 RX ADMIN — ROCURONIUM BROMIDE 20 MG: 10 INJECTION INTRAVENOUS at 17:04

## 2022-06-13 RX ADMIN — PHENYLEPHRINE HYDROCHLORIDE 100 MCG: 10 INJECTION INTRAVENOUS at 17:10

## 2022-06-13 RX ADMIN — PHENYLEPHRINE HYDROCHLORIDE 100 MCG: 10 INJECTION INTRAVENOUS at 17:27

## 2022-06-13 RX ADMIN — SODIUM CHLORIDE: 9 INJECTION, SOLUTION INTRAVENOUS at 13:13

## 2022-06-13 RX ADMIN — MINERAL OIL AND WHITE PETROLATUM 2 APPLICATOR: 150; 830 OINTMENT OPHTHALMIC at 16:53

## 2022-06-13 RX ADMIN — FENTANYL CITRATE 100 MCG: 50 INJECTION, SOLUTION INTRAMUSCULAR; INTRAVENOUS at 16:42

## 2022-06-13 RX ADMIN — SODIUM CHLORIDE: 9 INJECTION, SOLUTION INTRAVENOUS at 17:28

## 2022-06-13 RX ADMIN — PHENYLEPHRINE HYDROCHLORIDE 100 MCG: 10 INJECTION INTRAVENOUS at 17:01

## 2022-06-13 ASSESSMENT — PAIN SCALES - GENERAL
PAINLEVEL_OUTOF10: 0

## 2022-06-13 NOTE — ANESTHESIA PRE PROCEDURE
Department of Anesthesiology  Preprocedure Note       Name:  Tarun Lo   Age:  81 y.o.  :  1950                                          MRN:  [id number]         Date:  2022      Surgeon: Yumiko Greer):  Belen Zamudio MD    Procedure: Procedure(s):  Nasal Endoscopy Control of Nasal Hemorrhage    Medications prior to admission:   Prior to Admission medications    Medication Sig Start Date End Date Taking? Authorizing Provider   ipratropium (ATROVENT) 0.06 % nasal spray 2 sprays by Nasal route 3 times daily 6/10/22   Belen Zamudio MD   warfarin (COUMADIN) 2.5 MG tablet Take as directed by [facility] (40 tablets = 30 day supply) 22   NARESH Peña CNP   rosuvastatin (CRESTOR) 20 MG tablet Take 1 tablet by mouth daily 22   Chidi Avilez MD   nitroGLYCERIN (NITROSTAT) 0.4 MG SL tablet DISSOLVE ONE TABLET UNDER THE TONGUE EVERY 5 MINUTES AS NEEDED FOR CHEST PAIN.   DO NOT EXCEED A TOTAL OF 3 DOSES IN 15 MINUTES 21   Charo Barrientos MD   dilTIAZem (CARDIZEM CD) 360 MG extended release capsule Take 1 capsule by mouth once daily 21   Tori Garcia MD   digoxin (LANOXIN) 125 MCG tablet TAKE 1 TABLET BY MOUTH ONCE DAILY 21   Tori Garcia MD   potassium chloride (KLOR-CON) 10 MEQ extended release tablet TAKE 1 TABLET BY MOUTH TWICE DAILY 21   Chidi Avilez MD   metoprolol succinate (TOPROL XL) 50 MG extended release tablet Take 1 tablet by mouth once daily 21   Tori Garcia MD   bisacodyl (DULCOLAX) 5 MG EC tablet Take 5 mg by mouth nightly     Historical Provider, MD       Current medications:    Current Facility-Administered Medications   Medication Dose Route Frequency Provider Last Rate Last Admin    0.9 % sodium chloride infusion   IntraVENous PRN Belen Zamudio MD        sodium chloride flush 0.9 % injection 5-40 mL  5-40 mL IntraVENous 2 times per day Belen Zamudio MD        sodium chloride flush 0.9 % injection 5-40 mL  5-40 mL IntraVENous PRN Belen Zamudio MD        0.9 % sodium chloride infusion   IntraVENous Continuous Belen Zamudio MD           Allergies:  No Known Allergies    Problem List:    Patient Active Problem List   Diagnosis Code    Essential hypertension I10    Hyperlipidemia E78.5    Paroxysmal atrial fibrillation (HCC) I48.0    Obesity due to excess calories E66.09    Dizziness R42    History of gastroesophageal reflux (GERD) O14.48    Diastolic dysfunction V71.91    Anticoagulated on Coumadin Z79.01    Urinary incontinence R32    Elevated PSA R97.20    Coronary artery disease involving native coronary artery of native heart without angina pectoris I25.10    Benign prostatic hyperplasia with urinary frequency N40.1, R35.0    BPH with obstruction/lower urinary tract symptoms N40.1, N13.8    Prostate cancer ([facility] [address] C61    Fever R50.9    Encounter for therapeutic drug monitoring Z51.81    Albuminuria R80.9    Posterior epistaxis R04.0       Past Medical History:        Diagnosis Date    Anesthesia     takes large dose of medications to make him sleepy for surgery    Atrial fibrillation ([facility]     Benign prostatic hyperplasia with urinary frequency 10/21/2019    CAD (coronary artery disease)     Chest pain     Colon polyp 2011    removed    Coronary artery disease involving native coronary artery of native heart without angina pectoris 10/21/2019    Dementia ([facility]     Dizziness     Elevated PSA     GERD (gastroesophageal reflux disease)     Heart disease     Hyperlipidemia     Hypertension        Past Surgical History:        Procedure Laterality Date    ADENOIDECTOMY      CARDIAC CATHETERIZATION  09/23/2011    Right radial artery cannulation. Moderate LV dysfunction. The patient has disease in the ostium of diagonal 1 and diagonal 2 branch, however they are both are at the  ostium of the diagonals. Intervention could compromise flow of LAD.  THe LAD has long diffuse calcified lesion 50-60-% anatomically.  CARDIOVASCULAR STRESS TEST  09/23/2011    EF 37%    CARPAL TUNNEL RELEASE      COLONOSCOPY      PROSTATECTOMY N/A 12/3/2020    ROBOTIC ASSISTED LAPAROSCOPIC RADICAL PROSTATECTOMY performed by Gwendolyn Warren MD at [facility] ECHOCARDIOGRAM  09/23/2011    EF 50-55%    ULTRASOUND PROSTATE/TRANSRECTAL N/A 10/6/2020    CYSTO, TRANSRECTAL ULTRASOUND GUIDED PROSTATE BX performed by Gwendolyn Warren MD at [address] History:    Social History     Tobacco Use    Smoking status: Never Smoker    Smokeless tobacco: Never Used   Substance Use Topics    Alcohol use:  No                                Counseling given: Not Answered      Vital Signs (Current):   Vitals:    06/13/22 1230   BP: (!) 135/91   Pulse: 84   Resp: 18   Temp: 97 °F (36.1 °C)   TempSrc: Temporal   SpO2: 92%                                              BP Readings from Last 3 Encounters:   06/13/22 (!) 135/91   06/10/22 128/84   06/06/22 (!) 162/98       NPO Status:                                                                                 BMI:   Wt Readings from Last 3 Encounters:   06/10/22 235 lb 11.2 oz (106.9 kg)   06/06/22 252 lb (114.3 kg)   06/03/22 252 lb 6.8 oz (114.5 kg)     There is no height or weight on file to calculate BMI.    CBC:   Lab Results   Component Value Date    WBC 8.9 06/10/2022    RBC 5.89 06/10/2022    RBC 5.63 05/17/2016    HGB 17.0 06/10/2022    HCT 52.4 06/10/2022    MCV 89.0 06/10/2022    RDW 13.9 06/23/2018     06/10/2022       CMP:   Lab Results   Component Value Date     06/06/2022    K 3.6 06/06/2022     06/06/2022    CO2 24 06/06/2022    BUN 13 06/06/2022    CREATININE 0.8 06/06/2022    LABGLOM >90 06/06/2022    GLUCOSE 130 06/06/2022    GLUCOSE 93 05/17/2016    PROT 7.0 10/04/2021    CALCIUM 9.1 06/06/2022    BILITOT 0.4 10/04/2021    ALKPHOS 86 10/04/2021    AST 23 10/04/2021    ALT 28 10/04/2021       POC Tests: No results for input(s): POCGLU, POCNA, POCK, POCCL, POCBUN, POCHEMO, POCHCT in the last 72 hours. Coags:   Lab Results   Component Value Date    PROTIME 44.6 06/02/2022    INR 1.22 06/06/2022    APTT 46.1 03/17/2021       HCG (If Applicable): No results found for: PREGTESTUR, PREGSERUM, HCG, HCGQUANT     ABGs: No results found for: PHART, PO2ART, JPZ3YLD, TEW8HHR, BEART, K7AAOITC     Type & Screen (If Applicable):  Lab Results   Component Value Date    LABRH POS 06/03/2022       Drug/Infectious Status (If Applicable):  No results found for: HIV, HEPCAB    COVID-19 Screening (If Applicable):   Lab Results   Component Value Date    COVID19 Not Detected 11/25/2020           Anesthesia Evaluation    Airway: Mallampati: III  TM distance: >3 FB   Neck ROM: full  Mouth opening: > = 3 FB   Dental:          Pulmonary:   (+) decreased breath sounds                            Cardiovascular:    (+) hypertension:, CAD:, dysrhythmias: atrial fibrillation,         Rhythm: regular                      Neuro/Psych:   (+) psychiatric history:            GI/Hepatic/Renal:   (+) GERD:,           Endo/Other:                     Abdominal:   (+) obese,           Vascular: Other Findings:           Anesthesia Plan      general     ASA 4     (Off coumadin  )  Induction: intravenous. MIPS: Postoperative opioids intended and Prophylactic antiemetics administered. Anesthetic plan and risks discussed with patient and spouse. Plan discussed with CRNA.                     Tavia Man MD   6/13/2022

## 2022-06-13 NOTE — ANESTHESIA POSTPROCEDURE EVALUATION
Department of Anesthesiology  Postprocedure Note    Patient: Tarun Lo  MRN: [id number]  YOB: 1950  Date of evaluation: 6/13/2022  Time:  6:28 PM     Procedure Summary     Date: 06/13/22 Room / Location: [facility]  / [facility]    Anesthesia Start: 1640 Anesthesia Stop: 6652    Procedure: Nasal Endoscopy Control of Nasal Hemorrhage (N/A Nose) Diagnosis:       Epistaxis      (Epistaxis [R04.0])    Surgeons: Belen Zamudio MD Responsible Provider: Gunner Chirinos DO    Anesthesia Type: general ASA Status: 4          Anesthesia Type: No value filed. Sheldon Phase I: Sheldon Score: 9    Sheldon Phase II:      Last vitals: Reviewed and per EMR flowsheets.        Anesthesia Post Evaluation    Patient location during evaluation: PACU  Patient participation: complete - patient participated  Level of consciousness: awake  Airway patency: patent  Nausea & Vomiting: no nausea  Complications: no  Cardiovascular status: hemodynamically stable  Respiratory status: acceptable  Hydration status: stable

## 2022-06-13 NOTE — BRIEF OP NOTE
Brief Postoperative Note      Patient: Kevin Macias  YOB: 1950  MRN: [id number]    Date of Procedure: 6/13/2022    Pre-Op Diagnosis: Posterior Epistaxis     Post-Op Diagnosis: Same       Procedure(s):  Endoscopic control of epistaxis    Surgeon(s):  Sunny Nguyen MD    Assistant:  * No surgical staff found *    Anesthesia: General    Estimated Blood Loss (mL): Trace    Complications: None    Specimens:   ID Type Source Tests Collected by Time Destination   1 : Left nare swab  Swab Nose CULTURE, ANAEROBIC AND AEROBIC Sunny Nguyen MD 6/13/2022 1710        Implants:  * No implants in log *      Drains: * No LDAs found *    Findings: Partially dissolved Nasopore left nasal fossa. Purulent secretions and marked erythema worse on the left than the right. BETH obtained. This was very friable and erythematous. There was once suspicious area in the left inferior meatus posteriorly against lateral wall that was cauterized. Also high and posterior septum on the left had oozing when brushed would with the scope that was difficult to stop. Bipolar cautery was used in both areas and then overlaid with Surgiflo. No other bleeding was identified. No lesions were noted. Both sides of the nasal fossa and the nasopharynx were very carefully and methodically evaluated even though the bleeding was completely on the left side. Patient is on day 4 of being off Coumadin for A. fib. We will continue that until his follow-up visit in 3 or 4 days. At that time we will review his cultures and we would know whether he was having any further bleeding at that point. He had no bleeding in the last 3 days over the weekend since I saw him in the office. He and his wife are interested in his getting a watchman for his atrial fibrillation to possibly get off the anticoagulation which is giving him lots of trouble. I explained that they should begin that process immediately.   I suspect the process takes

## 2022-06-13 NOTE — PROGRESS NOTES

## 2022-06-13 NOTE — PROGRESS NOTES
Pt returned to [facility] room 19. Vitals and assessment as charted. 0.9 infusing, @950ml to count from PACU. Pt has sandwhich and water. Family at the bedside. Pt and family verbalized understanding of discharge criteria and call light use. Call light in reach.

## 2022-06-13 NOTE — PROGRESS NOTES
ADMITTED TO [facility] AND ORIENTED TO UNIT. SCDS ON. FALL AND BAND ON. PT VERBALIZED APPROVAL FOR FIRST NAME, LAST INITIAL AND PHYSICIAN NAME ON UNIT WHITEBOARD. Wife, Ruth Ambrose is with the patient.

## 2022-06-13 NOTE — PROGRESS NOTES
1801: pt arrives to pacu. Pt on room air and placed on 10L face tent. VSS. Respirations unlabored. Pt responds to verbal stimulation. Pt denies pain   1810: pt resting in bed. Pt denies pain    1815: pt resting in bed with eyes closed   1820: pt sitting up eating ice chips   1825: pt resting in bed, pt states pain is tolerable   1831: pt meets discharge criteria from pacu.  Pt transported to [facility]

## 2022-06-13 NOTE — H&P
[facility] [place] [facility]  [facility] EAR, NOSE AND THROAT  [facility]  Dept: [phone]  Dept Fax: [phone]  Loc: [phone]    Panfilo Nagy is a 81 y.o. male who was referred byNo ref. provider found for:  Chief Complaint   Patient presents with    Epistaxis     Patient here for epistaxis. Bleed this morning started at 5:00 am.    . HPI:     Panfilo Nagy is a 81 y.o. male who presents today for evaluation of Epistaxis, Bled this morning, starting at 5:00am.      Packing came out. Bleeding has been intermittent for 2 weeks. Started bleeding this morning at 5:00 am.  Takes Coumadin, stopped for 4 days then started back up. Has A-fib. Used Afrin intermittently for 2 weeks. Had multiple trips to ER, multiple packs and I.R. did an embolization on the right side orders from an outside otolaryngologist.  His bleeding has been from the left side. History:     No Known Allergies  Current Outpatient Medications   Medication Sig Dispense Refill    ipratropium (ATROVENT) 0.06 % nasal spray 2 sprays by Nasal route 3 times daily 15 mL 3    warfarin (COUMADIN) 2.5 MG tablet Take as directed by [facility] (40 tablets = 30 day supply) 40 tablet 11    rosuvastatin (CRESTOR) 20 MG tablet Take 1 tablet by mouth daily 90 tablet 1    nitroGLYCERIN (NITROSTAT) 0.4 MG SL tablet DISSOLVE ONE TABLET UNDER THE TONGUE EVERY 5 MINUTES AS NEEDED FOR CHEST PAIN.   DO NOT EXCEED A TOTAL OF 3 DOSES IN 15 MINUTES 25 tablet 2    dilTIAZem (CARDIZEM CD) 360 MG extended release capsule Take 1 capsule by mouth once daily 90 capsule 3    digoxin (LANOXIN) 125 MCG tablet TAKE 1 TABLET BY MOUTH ONCE DAILY 90 tablet 3    potassium chloride (KLOR-CON) 10 MEQ extended release tablet TAKE 1 TABLET BY MOUTH TWICE DAILY 180 tablet 3    metoprolol succinate (TOPROL XL) 50 MG extended release tablet Take 1 tablet by mouth once daily 90 tablet 3    bisacodyl (DULCOLAX) 5 MG EC tablet Take 5 mg by mouth nightly        No current facility-administered medications for this visit. Past Medical History:   Diagnosis Date    Anesthesia     takes large dose of medications to make him sleepy for surgery    Atrial fibrillation ([address]     Benign prostatic hyperplasia with urinary frequency 10/21/2019    CAD (coronary artery disease)     Chest pain     Colon polyp 2011    removed    Coronary artery disease involving native coronary artery of native heart without angina pectoris 10/21/2019    Dementia ([address]     Dizziness     Elevated PSA     GERD (gastroesophageal reflux disease)     Heart disease     Hyperlipidemia     Hypertension       Past Surgical History:   Procedure Laterality Date    ADENOIDECTOMY      CARDIAC CATHETERIZATION  09/23/2011    Right radial artery cannulation. Moderate LV dysfunction. The patient has disease in the ostium of diagonal 1 and diagonal 2 branch, however they are both are at the  ostium of the diagonals. Intervention could compromise flow of LAD. THe LAD has long diffuse calcified lesion 50-60-% anatomically.     CARDIOVASCULAR STRESS TEST  09/23/2011    EF 37%    CARPAL TUNNEL RELEASE      COLONOSCOPY      PROSTATECTOMY N/A 12/3/2020    ROBOTIC ASSISTED LAPAROSCOPIC RADICAL PROSTATECTOMY performed by Kana Salmon MD at [address] ECHOCARDIOGRAM  09/23/2011    EF 50-55%    ULTRASOUND PROSTATE/TRANSRECTAL N/A 10/6/2020    CYSTO, TRANSRECTAL ULTRASOUND GUIDED PROSTATE BX performed by Kana Salmon MD at [address] [place] [address]       Family History   Problem Relation Age of Onset    Heart Surgery Father     Cancer Father         lung    Heart Disease Father         CABG    Alzheimer's Disease Mother     Diabetes Neg Hx     High Blood Pressure Neg Hx     Stroke Neg Hx      Social History     Tobacco Use    Smoking status: Never Smoker    Smokeless tobacco: Never Used Substance Use Topics    Alcohol use: No       Subjective:       Review of Systems    Objective:     /84 (Site: Left Upper Arm, Position: Sitting)   Pulse (!) 113   Temp 97.3 °F (36.3 °C) (Infrared)   Resp 24   Ht 6' 1\" (1.854 m)   Wt 235 lb 11.2 oz (106.9 kg)   SpO2 96%   BMI 31.10 kg/m²     Physical Exam  Vitals and nursing note reviewed. Constitutional:       Appearance: He is well-developed. HENT:      Head: Normocephalic and atraumatic. No laceration. Salivary Glands: Right salivary gland is not diffusely enlarged or tender. Left salivary gland is not diffusely enlarged or tender. Comments:        Right Ear: Hearing, tympanic membrane, ear canal and external ear normal. No drainage or swelling. No middle ear effusion. Tympanic membrane is not perforated or erythematous. Left Ear: Hearing, tympanic membrane, ear canal and external ear normal. No drainage or swelling. No middle ear effusion. Tympanic membrane is not perforated or erythematous. Nose: No septal deviation, mucosal edema or rhinorrhea. Left Nostril: Epistaxis ( Clots and a little bright red blood in the left nasal fossa.) present. Mouth/Throat:      Mouth: Mucous membranes are moist. Mucous membranes are not pale and not dry. No oral lesions. Tongue: No lesions. Pharynx: Oropharynx is clear. Uvula midline. No oropharyngeal exudate or posterior oropharyngeal erythema. Comments: LIps: lips normal     Mallampati 1  Base of tongue: symmetric  Mirror exam deferred due to gag reflex. Eyes:      Extraocular Movements: Extraocular movements intact. Comments: Conjugate gaze   Neck:      Thyroid: No thyroid mass or thyromegaly. Trachea: Phonation normal. No tracheal deviation. Comments:     Pulmonary:      Effort: Pulmonary effort is normal. No retractions. Breath sounds: No stridor. Musculoskeletal:      Cervical back: Normal range of motion and neck supple. Lymphadenopathy:      Cervical: No cervical adenopathy. Neurological:      Mental Status: He is alert and oriented to person, place, and time. Cranial Nerves: Cranial nerves are intact. Cranial nerve deficit: VIIth N function intact bilat. Psychiatric:         Mood and Affect: Mood and affect normal.         Behavior: Behavior is cooperative. Endoscopic control of epistaxis  After the patient's nose had been decongested with Agusto-Synephrine and anesthetized with topical 4% lidocaine, used a 30 degree telescope to evaluate the nasal fossa. Right side did not have any bleeding spot bleeding points or blood. On the left side clots were suctioned. There appeared to be some bright red blood coming from under the left inferior turbinate. This turbinate was lateralized severely and pressed against the lateral wall, and was locked in position. There did not appear to be any other bleeding points. This was a slow ooze. Nasopore was soaked in Afrin and packed along the entire length of the left nasal fossa. Patient was instructed to leave it in place and only place Atrovent on it every 6 hours through the weekend. Data:  All of the past medical history, past surgical history, family history,social history, allergies and current medications were reviewed with the patient. Assessment & Plan   Diagnoses and all orders for this visit:     Diagnosis Orders   1. Posterior epistaxis  ipratropium (ATROVENT) 0.06 % nasal spray    CO NASAL/SINUS SCOPY,W/CONTROL NASAL HEM    CBC    CO NASAL/SINUS SCOPY,W/CONTROL NASAL HEM    CT FACIAL BONES WO CONTRAST   2. Anterior epistaxis  ipratropium (ATROVENT) 0.06 % nasal spray    CO NASAL/SINUS SCOPY,W/CONTROL NASAL HEM    CBC    CO NASAL/SINUS SCOPY,W/CONTROL NASAL HEM    CT FACIAL BONES WO CONTRAST   3. Rhinitis medicamentosa  ipratropium (ATROVENT) 0.06 % nasal spray    Prolonged Afrin use because of bleeding. 4. Atrial fibrillation with RVR ([address]     5. Coronary artery disease involving native coronary artery of native heart without angina pectoris     6. Prostate cancer [facility])         The findings were explained and his questions were answered. Options were discussed including OR for Epistaxis control. I explained that it would be necessary to lift the left inferior turbinate up and use some cautery available in the operating room for more definitive control. No guarantees were made. CT Sinuses-STAT, CBC-STAT, ordered Atrovent to be used in place of the Afrin. Benefits and risks are discussed, along with alternatives, and their questions were answered. No guarantees were made. They request we proceed. ADDENDUM: His hemoglobin has not fallen, in fact it was 17. CT did not show any masses in the sinuses that could be the source of his bleeding. The packing was noted       IAlisha CMA ([facility]), am scribing for, and in the presence of Dr. Sunny Nguyen. Electronically signed by Brianna Greene CMA ([facility]) on 6/10/22 at 8:34 AM EDT. (Please note that portions of this note were completed with a voice recognition program. Efforts were made to edit the dictations butoccasionally words are mis-transcribed.)    I agree to the above documentation placed by my scribe. I have personally evaluated this patient. Additional findings are as noted. I reviewed the scribe's note and agree with the documented findings and plan of care. Any areas of disagreement are corrected. I agree with the chief complaint, past medical history, past surgical history, allergies, medications, social and family history as documented unless otherwise noted below.      Electronically signed by Alem Greene MD on 6/12/2022 at 11:46 PM

## 2022-06-13 NOTE — INTERVAL H&P NOTE
Pt Name: Caron Couch  MRN: [id number]  YOB: 1950  Date of evaluation: 6/13/2022    I have examined the patient and reviewed the H&P/Consult and there are no changes to the patient or plans.          Electronically signed by Jose Carlos Rivero MD on 6/13/2022 at 4:40 PM

## 2022-06-14 ENCOUNTER — TELEPHONE (OUTPATIENT)
Dept: PHARMACY | Age: 72
End: 2022-06-14

## 2022-06-14 ENCOUNTER — CARE COORDINATION (OUTPATIENT)
Dept: CASE MANAGEMENT | Age: 72
End: 2022-06-14

## 2022-06-14 NOTE — TELEPHONE ENCOUNTER
Called and spoke with patient's wife Darleen Hoover. Patient had procedure with Dr. Sydney Ledesma yesterday 6/13/22 due to recent epistaxis. Per Darleen Hoover, patient has been instructed to hold Coumadin for now. Patient has follow up appointment with Dr. Sydney Ledesma on Thursday, 6/16 to discuss whether Coumadin should be restarted. Also planning to talk to Dr. Delmy Moncada to determine if patient would be eligible for Watchman procedure. Added to [facility] calendar to follow up on Thursday to determine plan for restarting Coumadin.     Mandeep Sosa, PharmD   6/14/2022, 9:31 AM

## 2022-06-14 NOTE — OP NOTE
[address] [place] [address]                                OPERATIVE REPORT    PATIENT NAME: Devin Wang             :        1950  MED REC NO:   [id number]                           ROOM:  ACCOUNT NO:   [de-identified]                           ADMIT DATE: 2022  PROVIDER:     LORIE Kidd Reason:  2022    OPERATION:  Endoscopic control of posterior epistaxis. SURGEON:  Chip Romero. Hilario Orosco MD    ANESTHESIA:  General endotracheal.    PREOPERATIVE DIAGNOSES:  Recurrent posterior epistaxis, anticoagulated  on Coumadin for paroxysmal atrial fibrillation, prostate cancer. POSTOPERATIVE DIAGNOSES:  Recurrent posterior epistaxis, anticoagulated  on Coumadin for paroxysmal atrial fibrillation, prostate cancer. HISTORY AND OPERATIVE FINDINGS:  This 53-year-old male has been having  problems with left-sided epistaxis for several weeks now. He has  maintained his hemoglobin, which was an excess of 17, and he has had  multiple nosebleeds exclusively on the left side that has been  transiently controlled with packing, but then it break loose. He got a  second opinion from a physician at [facility], who was unable to stop the  bleeding and then set him up for embolization procedure here at [address] [place] [address]. The left posterior alveolar artery was embolized successfully. The left-sided bleeding however continued and I saw him in the office. This was three days ago and saw blood coming from under the left middle  turbinate as well as posteriorly in eye. I controlled this with Devorah Nava and scheduled him for the procedure today. Findings of surgery revealed lots of purulent secretions and residual  NasoPore in the left nasal fossa. Cultures were taken for BETH. The  mucosa was extremely erythematous and friable.     DESCRIPTION OF PROCEDURE:  There were two areas of particular interest,  the first being under the left inferior turbinate posteriorly against  the lateral wall. Photograph of this was taken and the area was  cauterized with bipolar cautery. It was initially difficult to stop the  bleeding, but the cautery seemed to take care of it. The other location was high posteriorly on the left on the septum. The  septal bleeder was then cauterized with bipolar cautery. Surgiflo was  layered over the area. In between, there was serial packing with  cottonoids impregnated with Afrin. Neurovascular blocks were placed  with 1% lidocaine with epinephrine. At the end of the procedure, the patient had no bleeding. The airway  was opened up on the left side with removal of the packing. I informed his wife that tentatively the most likely sources of the  bleeding had been cauterized, but there were no guarantees that even  those would not bleed again. Very specific restriction of his  activities and holding anticoagulant were discussed in detail. After being awakened from anesthetic, he went to Recovery and there were  no complications.   Estimated blood loss during the procedure was  extremely minimal.        Greg Wheeler M.D.    D: 06/13/2022 18:50:29       T: 06/13/2022 22:44:33     [email or web address]  Job#: [id number]     Doc#: [id number]    CC:  Domingo Snellen, CNP

## 2022-06-14 NOTE — CARE COORDINATION
[facility] Transitions Follow Up Call    2022    Patient: Christin Justin  Patient : 1950   MRN: [id number]  Reason for Admission: epistaxis  Discharge Date: 22 RARS: Readmission Risk Score: 9.2 ( )         Spoke with: Lizbeth Jackson (wife) today and she says Jovani Diaz is doing ok. Denies bleeding from nose, pain, dizziness. Using the Afrin nasal spray as directed. Eating, drinking & sleeping ok. Coumadin remains on hold. Appt. Scheduled w/ Dr. Harlan Kendrick re: Watchman procedure 22. Wife concerned as they do not have a computer or smart phone which could be a factor in Jovani Diaz being a candidate. She placed a call to Dr. Charmayne Reil nurse to ask about this. Post op f/u w/ Dr. Lurdes Interiano 22. No other concerns voiced at this time. Care Transitions Subsequent and Final Call    Schedule Follow Up Appointment with PCP: Completed  Subsequent and Final Calls  Do you have any ongoing symptoms?: Yes  Onset of Patient-reported symptoms: In the past 7 days  Interventions for patient-reported symptoms: Other  Have your medications changed?: No  Do you have any questions related to your medications?: No  Do you currently have any active services?: No  Are you currently active with any services?: Outpatient/Community Services  Do you have any needs or concerns that I can assist you with?: No  Identified Barriers: Lack of Education  Care Transitions Interventions   Home Care Waiver: Declined        Transportation Support: Declined      DME Assistance: Declined     Senior Services: Declined    Disease Specific Clinic: Declined      Other Interventions:         Care Transitions Follow Up Call    Needs to be reviewed by the provider   Additional needs identified to be addressed with provider: No  none             Method of communication with provider : none      Care Transition Nurse contacted the family by telephone to follow up after admission on 22. Verified name and  with family as identifiers.     Addressed

## 2022-06-16 ENCOUNTER — OFFICE VISIT (OUTPATIENT)
Dept: ENT CLINIC | Age: 72
End: 2022-06-16
Payer: MEDICARE

## 2022-06-16 ENCOUNTER — TELEPHONE (OUTPATIENT)
Dept: PHARMACY | Age: 72
End: 2022-06-16

## 2022-06-16 VITALS
TEMPERATURE: 97.3 F | DIASTOLIC BLOOD PRESSURE: 70 MMHG | HEART RATE: 80 BPM | WEIGHT: 235.7 LBS | SYSTOLIC BLOOD PRESSURE: 136 MMHG | OXYGEN SATURATION: 97 % | HEIGHT: 73 IN | RESPIRATION RATE: 14 BRPM | BODY MASS INDEX: 31.24 KG/M2

## 2022-06-16 DIAGNOSIS — R04.0 POSTERIOR EPISTAXIS: Primary | ICD-10-CM

## 2022-06-16 DIAGNOSIS — Z98.890 STATUS POST SURGERY: ICD-10-CM

## 2022-06-16 DIAGNOSIS — J34.89 NASAL CRUSTING: ICD-10-CM

## 2022-06-16 DIAGNOSIS — I48.0 PAROXYSMAL ATRIAL FIBRILLATION (HCC): ICD-10-CM

## 2022-06-16 DIAGNOSIS — A49.01 STAPHYLOCOCCUS AUREUS INFECTION: ICD-10-CM

## 2022-06-16 PROCEDURE — G8427 DOCREV CUR MEDS BY ELIG CLIN: HCPCS | Performed by: OTOLARYNGOLOGY

## 2022-06-16 PROCEDURE — 99214 OFFICE O/P EST MOD 30 MIN: CPT | Performed by: OTOLARYNGOLOGY

## 2022-06-16 PROCEDURE — 3017F COLORECTAL CA SCREEN DOC REV: CPT | Performed by: OTOLARYNGOLOGY

## 2022-06-16 PROCEDURE — G8417 CALC BMI ABV UP PARAM F/U: HCPCS | Performed by: OTOLARYNGOLOGY

## 2022-06-16 PROCEDURE — 1123F ACP DISCUSS/DSCN MKR DOCD: CPT | Performed by: OTOLARYNGOLOGY

## 2022-06-16 PROCEDURE — 31231 NASAL ENDOSCOPY DX: CPT | Performed by: OTOLARYNGOLOGY

## 2022-06-16 PROCEDURE — 1036F TOBACCO NON-USER: CPT | Performed by: OTOLARYNGOLOGY

## 2022-06-16 PROCEDURE — 1111F DSCHRG MED/CURRENT MED MERGE: CPT | Performed by: OTOLARYNGOLOGY

## 2022-06-16 RX ORDER — CEFDINIR 300 MG/1
300 CAPSULE ORAL 2 TIMES DAILY
Qty: 20 CAPSULE | Refills: 0 | Status: SHIPPED | OUTPATIENT
Start: 2022-06-16 | End: 2022-06-26

## 2022-06-16 ASSESSMENT — ENCOUNTER SYMPTOMS
APNEA: 0
STRIDOR: 0
WHEEZING: 0
SINUS PRESSURE: 0
CHOKING: 0
ABDOMINAL PAIN: 0
VOICE CHANGE: 0
DIARRHEA: 0
COLOR CHANGE: 0
CHEST TIGHTNESS: 0
RHINORRHEA: 0
COUGH: 0
NAUSEA: 0
FACIAL SWELLING: 0
TROUBLE SWALLOWING: 0
SORE THROAT: 0
VOMITING: 0
SHORTNESS OF BREATH: 0

## 2022-06-16 NOTE — PATIENT INSTRUCTIONS
oxemetalisine-stay on today, tonight and tomorrow. Prescribed Cefdinir.       Resume warfarin at your usual dose this evening

## 2022-06-16 NOTE — TELEPHONE ENCOUNTER
Per chart review, pt was instructed today by ENT to return to usual Coumadin regimen. Called wife, appt for INR check made for 6/23.

## 2022-06-16 NOTE — PROGRESS NOTES
[facility] [place] [facility]  [facility] EAR, NOSE AND THROAT  [facility]  Dept: [phone]  Dept Fax: [phone]  Loc: [phone]    Virginia Morales is a 81 y.o. male who was referred byNo ref. provider found for:  Chief Complaint   Patient presents with   [place] Post-Op Check     Patient is here post-op nasal endoscopy control of nasal hemorrhage 6/13/22. Patient needs a note stating that he is okay with the watchman sent to Dr. Vern Amaral   .    HPI:     Virginia Morales is a 81 y.o. male who presents today for post op Endoscopic control of posterior epistaxis on 6/13/22. Patient needs a note stating that he is ok with the watchman sent to Dr. Daina Stark. Staphylococcus aureus Abnormal culture from 6/13. His bleed was on the left side. Felt like he had a clot that went down his throat. He doesn't drink water. He's not taking antibiotics. Explained to him that the bleeding he had during the operation was pretty significant. He was recommended to talk with Dr. Bre Chen about the [facility]. Has an appointment with Dr. Daina Stark on June 23 rd. The recommendations is in the note with Dr. Bre Chen and since Dr. Daina Stark is in the same office the note would not be needed. He had a high hemoglobin, so anemia is not an issue    History:     No Known Allergies  Current Outpatient Medications   Medication Sig Dispense Refill    rosuvastatin (CRESTOR) 20 MG tablet Take 1 tablet by mouth daily 90 tablet 1    nitroGLYCERIN (NITROSTAT) 0.4 MG SL tablet DISSOLVE ONE TABLET UNDER THE TONGUE EVERY 5 MINUTES AS NEEDED FOR CHEST PAIN.   DO NOT EXCEED A TOTAL OF 3 DOSES IN 15 MINUTES 25 tablet 2    dilTIAZem (CARDIZEM CD) 360 MG extended release capsule Take 1 capsule by mouth once daily 90 capsule 3    digoxin (LANOXIN) 125 MCG tablet TAKE 1 TABLET BY MOUTH ONCE DAILY 90 tablet 3    potassium chloride (KLOR-CON) 10 MEQ extended release tablet TAKE 1 TABLET BY MOUTH TWICE DAILY 180 tablet 3    metoprolol succinate (TOPROL XL) 50 MG extended release tablet Take 1 tablet by mouth once daily 90 tablet 3    bisacodyl (DULCOLAX) 5 MG EC tablet Take 5 mg by mouth nightly       warfarin (COUMADIN) 2.5 MG tablet Take as directed by [facility] (40 tablets = 30 day supply) 40 tablet 11     No current facility-administered medications for this visit. Past Medical History:   Diagnosis Date    Anesthesia     takes large dose of medications to make him sleepy for surgery    Atrial fibrillation ([address]     Benign prostatic hyperplasia with urinary frequency 10/21/2019    CAD (coronary artery disease)     Chest pain     Colon polyp 2011    removed    Coronary artery disease involving native coronary artery of native heart without angina pectoris 10/21/2019    Dementia ([address]     Dizziness     Elevated PSA     GERD (gastroesophageal reflux disease)     Heart disease     Hyperlipidemia     Hypertension       Past Surgical History:   Procedure Laterality Date    ADENOIDECTOMY      CARDIAC CATHETERIZATION  09/23/2011    Right radial artery cannulation. Moderate LV dysfunction. The patient has disease in the ostium of diagonal 1 and diagonal 2 branch, however they are both are at the  ostium of the diagonals. Intervention could compromise flow of LAD. THe LAD has long diffuse calcified lesion 50-60-% anatomically.     CARDIOVASCULAR STRESS TEST  09/23/2011    EF 37%    CARPAL TUNNEL RELEASE      COLONOSCOPY      NASAL SINUS SURGERY N/A 6/13/2022    Nasal Endoscopy Control of Nasal Hemorrhage performed by Augie Zamora MD at [address] [facility] 12/3/2020    ROBOTIC [address] performed by Vic Mendes MD at [address] [facility] ECHOCARDIOGRAM  09/23/2011    EF 50-55%    ULTRASOUND PROSTATE/TRANSRECTAL N/A 10/6/2020    CYSTO, TRANSRECTAL ULTRASOUND GUIDED PROSTATE BX performed by Vic Mendes MD at [facility]    VASECTOMY       Family History   Problem Relation Age of Onset    Heart Surgery Father     Cancer Father         lung    Heart Disease Father         CABG    Alzheimer's Disease Mother     Diabetes Neg Hx     High Blood Pressure Neg Hx     Stroke Neg Hx      Social History     Tobacco Use    Smoking status: Never Smoker    Smokeless tobacco: Never Used   Substance Use Topics    Alcohol use: No       Subjective:       Review of Systems   Constitutional: Negative for activity change, appetite change, chills, diaphoresis, fatigue, fever and unexpected weight change. HENT: Negative for congestion, dental problem, ear discharge, ear pain, facial swelling, hearing loss, mouth sores, nosebleeds, postnasal drip, rhinorrhea, sinus pressure, sneezing, sore throat, tinnitus, trouble swallowing and voice change. Eyes: Negative for visual disturbance. Respiratory: Negative for apnea, cough, choking, chest tightness, shortness of breath, wheezing and stridor. Cardiovascular: Negative for chest pain, palpitations and leg swelling. Gastrointestinal: Negative for abdominal pain, diarrhea, nausea and vomiting. Endocrine: Negative for cold intolerance, heat intolerance, polydipsia and polyuria. Genitourinary: Negative for dysuria, enuresis and hematuria. Musculoskeletal: Negative for arthralgias, gait problem, neck pain and neck stiffness. Skin: Negative for color change and rash. Allergic/Immunologic: Negative for environmental allergies, food allergies and immunocompromised state. Neurological: Negative for dizziness, syncope, facial asymmetry, speech difficulty, light-headedness and headaches. Hematological: Negative for adenopathy. Does not bruise/bleed easily. Psychiatric/Behavioral: Negative for confusion and sleep disturbance. The patient is not nervous/anxious.         Objective:     /70 (Site: Left Upper Arm, Position: Sitting)   Pulse 80   Temp 97.3 °F (36.3 °C) (Infrared)   Resp 14   Ht 6' 1\" (1.854 m)   Wt 235 lb 11.2 oz (106.9 kg)   SpO2 97%   BMI 31.10 kg/m²     Physical Exam    RIGID NASAL ENDOSCOPY    After adequate level decongestion and anesthesia was achieved with topical sprays, nasal fossa was examined with a 30° rigid sinus telescope. There was some significant nasal crusting that was removed. Data:  All of the past medical history, past surgical history, family history,social history, allergies and current medications were reviewed with the patient. Assessment & Plan   Diagnoses and all orders for this visit:     Diagnosis Orders   1. Posterior epistaxis  PA NASAL ENDOSCOPY,DX   2. Staphylococcus aureus infection  cefdinir (OMNICEF) 300 MG capsule   3. Status post endoscopic control of nasal hemorrhage  cefdinir (OMNICEF) 300 MG capsule    PA NASAL ENDOSCOPY,DX   4. Nasal crusting  cefdinir (OMNICEF) 300 MG capsule   5. Paroxysmal atrial fibrillation (HCC)         The findings were explained and his questions were answered. Options were discussed including ordering Cefdinir. Follow up in 2 weeks. 2 week follow up    Return in about 2 weeks (around 6/30/2022) for Epistaxis. IChato CMA ([facility]), am scribing for, and in the presence of Dr. Azalia Maxwell. Electronically signed by Tania Walter CMA ([facility]) on 6/16/22 at 10:29 AM EDT. (Please note that portions of this note were completed with a voice recognition program. Efforts were made to edit the dictations butoccasionally words are mis-transcribed.)    I agree to the above documentation placed by my scribe. I have personally evaluated this patient. Additional findings are as noted. I reviewed the scribe's note and agree with the documented findings and plan of care. Any areas of disagreement are corrected.  I agree with the chief complaint, past medical history, past surgical history, allergies, medications, social and family history as documented unless otherwise noted below.      Electronically signed by Anoop Lui MD on 6/28/2022 at 4:50 PM

## 2022-06-18 LAB
AEROBIC CULTURE: ABNORMAL
AEROBIC CULTURE: ABNORMAL
ANAEROBIC CULTURE: ABNORMAL
ORGANISM: ABNORMAL

## 2022-06-21 ENCOUNTER — CARE COORDINATION (OUTPATIENT)
Dept: CASE MANAGEMENT | Age: 72
End: 2022-06-21

## 2022-06-21 NOTE — CARE COORDINATION
[facility] Transitions Follow Up Call    2022    Patient: Adolfo Scott  Patient : 1950   MRN: [id number]  Reason for Admission: epistaxis  Discharge Date: 22 RARS: Readmission Risk Score: 9.2 ( )         Spoke with Angelique Brian, said he is doing ok. Denies any nose bleeds. Has occasional dizziness but relates it to anxiety. Encouraged him to take his BP when he is dizzy to make sure it is not too low. Resuming Coumadin and will have INR on , along with Dr Gonzalez Forward to discuss Watchman. Saw Dr Faraz Levy last week and will see again next month. Denies needs. No other questions or concerns at this time. Will continue to follow. Care Transitions Subsequent and Final Call    Subsequent and Final Calls  Do you have any ongoing symptoms?: No  Have your medications changed?: Yes  Patient Reports: Coumadin resumed  Do you have any questions related to your medications?: No  Do you currently have any active services?: Yes  Are you currently active with any services?: Outpatient/Community Services  Do you have any needs or concerns that I can assist you with?: No  Identified Barriers: Lack of Education  Care Transitions Interventions   Home Care Waiver: Declined        Transportation Support: Declined      DME Assistance: Declined     Senior Services: Declined    Disease Specific Clinic: Declined      Other Interventions:       Care Transitions Follow Up Call    Needs to be reviewed by the provider   Additional needs identified to be addressed with provider: No  none             Method of communication with provider : none      Care Transition Nurse contacted the patient by telephone to follow up after admission on 22. Verified name and  with patient as identifiers. Addressed changes since last contact: none  Discussed follow-up appointments. Advance Care Planning:   Does patient have an Advance Directive: reviewed and current.      CTN reviewed discharge instructions, medical action plan and red flags with patient and discussed any barriers to care and/or understanding of plan of care after discharge. Discussed appropriate site of care based on symptoms and resources available to patient including: PCP  Specialist. The patient agrees to contact the PCP office for questions related to their healthcare. Patients top risk factors for readmission: lack of knowledge about disease  medical condition-epistaxis, Afib, CAD, HTN  medication management  Interventions to address risk factors: Scheduled appointment with PCP-8/8 and Scheduled appointment with Specialist-6/23 7/7 8/15      Non-[facility] follow up appointment(s): latoya    CTN provided contact information for future needs. Plan for follow-up call in 7-10 days based on severity of symptoms and risk factors.   Plan for next call: symptom management-any bleeding since resuming Coumadin,  BP?  final call if stable      Follow Up  Future Appointments   Date Time Provider Cristina Grier   6/23/2022  7:20 AM ANGELES Bliss [facility]   6/23/2022  8:00 AM Marcio Monreal MD N SRPX [facility] [address]   7/7/2022  8:15 AM Victoriano Ho MD N [facility] [address]   8/8/2022  8:30 AM NARESH Dias - CNP [facility] Medicine [facility]   8/15/2022  7:30 AM Gwen Apple [facility] Heart [address] Mary Lamb RN

## 2022-06-23 ENCOUNTER — OFFICE VISIT (OUTPATIENT)
Dept: CARDIOLOGY CLINIC | Age: 72
End: 2022-06-23
Payer: MEDICARE

## 2022-06-23 ENCOUNTER — TELEPHONE (OUTPATIENT)
Dept: CARDIOLOGY CLINIC | Age: 72
End: 2022-06-23

## 2022-06-23 ENCOUNTER — HOSPITAL ENCOUNTER (OUTPATIENT)
Dept: PHARMACY | Age: 72
Setting detail: THERAPIES SERIES
Discharge: HOME | End: 2022-06-23
Payer: MEDICARE

## 2022-06-23 VITALS
SYSTOLIC BLOOD PRESSURE: 149 MMHG | DIASTOLIC BLOOD PRESSURE: 98 MMHG | WEIGHT: 234.4 LBS | HEART RATE: 59 BPM | HEIGHT: 73 IN | BODY MASS INDEX: 31.07 KG/M2

## 2022-06-23 DIAGNOSIS — I50.22 CHRONIC SYSTOLIC (CONGESTIVE) HEART FAILURE (HCC): ICD-10-CM

## 2022-06-23 DIAGNOSIS — I48.11 LONGSTANDING PERSISTENT ATRIAL FIBRILLATION (HCC): Primary | ICD-10-CM

## 2022-06-23 DIAGNOSIS — Z51.81 ENCOUNTER FOR THERAPEUTIC DRUG MONITORING: ICD-10-CM

## 2022-06-23 DIAGNOSIS — Z79.01 ANTICOAGULATED ON COUMADIN: Primary | ICD-10-CM

## 2022-06-23 LAB — POC INR: 1.5 (ref 0.8–1.2)

## 2022-06-23 PROCEDURE — 1111F DSCHRG MED/CURRENT MED MERGE: CPT | Performed by: INTERNAL MEDICINE

## 2022-06-23 PROCEDURE — 85610 PROTHROMBIN TIME: CPT

## 2022-06-23 PROCEDURE — 1123F ACP DISCUSS/DSCN MKR DOCD: CPT | Performed by: INTERNAL MEDICINE

## 2022-06-23 PROCEDURE — 1036F TOBACCO NON-USER: CPT | Performed by: INTERNAL MEDICINE

## 2022-06-23 PROCEDURE — 36416 COLLJ CAPILLARY BLOOD SPEC: CPT

## 2022-06-23 PROCEDURE — G8417 CALC BMI ABV UP PARAM F/U: HCPCS | Performed by: INTERNAL MEDICINE

## 2022-06-23 PROCEDURE — 3017F COLORECTAL CA SCREEN DOC REV: CPT | Performed by: INTERNAL MEDICINE

## 2022-06-23 PROCEDURE — 99211 OFF/OP EST MAY X REQ PHY/QHP: CPT

## 2022-06-23 PROCEDURE — G8427 DOCREV CUR MEDS BY ELIG CLIN: HCPCS | Performed by: INTERNAL MEDICINE

## 2022-06-23 PROCEDURE — 99215 OFFICE O/P EST HI 40 MIN: CPT | Performed by: INTERNAL MEDICINE

## 2022-06-23 NOTE — PROGRESS NOTES
NP, discuss Watchman. Dr. Aislinn Bai patient. Patient currently on ATB due to recent nose operation.

## 2022-06-23 NOTE — PROGRESS NOTES
[facility] [address] [place] [address]  Dept: [phone]  Dept Fax: [phone]  Loc: [phone]    Visit Date: 6/23/2022    Mr. Latosha Phillips is a 81 y.o. male  who presented for:  Chief Complaint   Patient presents with    Follow-up     to discuss watchman    Atrial Fibrillation       HPI:   HPI   82 yo M referred for [facility] by ENT. Has been having recurrent epistaxis for over 4 weeks. Takes Coumadin. He has had multiple ENT procedure. No blood transfusions. Some days it would \"gush\" out. He had have nasal embolization on the right side but still bleeding. He had surgery by Dr. Genet Bedolla 1 week ago, and he cauterized 2 spots in the nose. 11/2020 - stress test without ischemia. No chest pain, angina, XIONG, orthopnea, PND, sob at rest, palpitations, LE edema, or syncope. No problems swallowing. No dysphagia or odynophagia. No anesthesia issue. Ef 45%, moderately dilated LA. Takes Coumadin for many years. Chronically in AFib. No DCCV. Current Outpatient Medications:     cefdinir (OMNICEF) 300 MG capsule, Take 1 capsule by mouth 2 times daily for 10 days, Disp: 20 capsule, Rfl: 0    warfarin (COUMADIN) 2.5 MG tablet, Take as directed by [facility] (40 tablets = 30 day supply), Disp: 40 tablet, Rfl: 11    rosuvastatin (CRESTOR) 20 MG tablet, Take 1 tablet by mouth daily, Disp: 90 tablet, Rfl: 1    nitroGLYCERIN (NITROSTAT) 0.4 MG SL tablet, DISSOLVE ONE TABLET UNDER THE TONGUE EVERY 5 MINUTES AS NEEDED FOR CHEST PAIN.   DO NOT EXCEED A TOTAL OF 3 DOSES IN 15 MINUTES, Disp: 25 tablet, Rfl: 2    dilTIAZem (CARDIZEM CD) 360 MG extended release capsule, Take 1 capsule by mouth once daily, Disp: 90 capsule, Rfl: 3    digoxin (LANOXIN) 125 MCG tablet, TAKE 1 TABLET BY MOUTH ONCE DAILY, Disp: 90 tablet, Rfl: 3    potassium chloride (KLOR-CON) 10 MEQ extended release tablet, TAKE 1 TABLET BY MOUTH TWICE DAILY, Disp: 180 tablet, Rfl: 3    metoprolol succinate (TOPROL XL) 50 MG extended release tablet, Take 1 tablet by mouth once daily, Disp: 90 tablet, Rfl: 3    bisacodyl (DULCOLAX) 5 MG EC tablet, Take 5 mg by mouth nightly , Disp: , Rfl:     Past Medical History  Antonella Woods  has a past medical history of Anesthesia, Atrial fibrillation ([facility] [address] Benign prostatic hyperplasia with urinary frequency, CAD (coronary artery disease), Chest pain, Colon polyp, Coronary artery disease involving native coronary artery of native heart without angina pectoris, Dementia ([address] Dizziness, Elevated PSA, GERD (gastroesophageal reflux disease), Heart disease, Hyperlipidemia, and Hypertension. Social History  Antonella Woods  reports that he has never smoked. He has never used smokeless tobacco. He reports that he does not drink alcohol and does not use drugs. Family History  Antonella Woods family history includes Alzheimer's Disease in his mother; Cancer in his father; Heart Disease in his father; Heart Surgery in his father. There is no family history of bicuspid aortic valve, aneurysms, heart transplant, pacemakers, defibrillators, or sudden cardiac death. Past Surgical History   Past Surgical History:   Procedure Laterality Date    ADENOIDECTOMY      CARDIAC CATHETERIZATION  09/23/2011    Right radial artery cannulation. Moderate LV dysfunction. The patient has disease in the ostium of diagonal 1 and diagonal 2 branch, however they are both are at the  ostium of the diagonals. Intervention could compromise flow of LAD. THe LAD has long diffuse calcified lesion 50-60-% anatomically.     CARDIOVASCULAR STRESS TEST  09/23/2011    EF 37%    CARPAL TUNNEL RELEASE      COLONOSCOPY      NASAL SINUS SURGERY N/A 6/13/2022    Nasal Endoscopy Control of Nasal Hemorrhage performed by Priscila Ford MD at [facility] 12/3/2020    ROBOTIC [address] performed by Margarita Orantes MD at [facility]    TONSILLECTOMY      TRANSTHORACIC ECHOCARDIOGRAM  09/23/2011    EF 50-55%    ULTRASOUND PROSTATE/TRANSRECTAL N/A 10/6/2020    CYSTO, TRANSRECTAL ULTRASOUND GUIDED PROSTATE BX performed by Nikita Brown MD at [address] [place] [address]         Review of Systems   Constitutional: Negative for chills and fever  HENT: Negative for congestion, sinus pressure, sneezing and sore throat. Eyes: Negative for pain, discharge, redness and itching. Respiratory: Negative for apnea, cough  Gastrointestinal: Negative for blood in stool, constipation, diarrhea   Endocrine: Negative for cold intolerance, heat intolerance, polydipsia. Genitourinary: Negative for dysuria, enuresis, flank pain and hematuria. Musculoskeletal: Negative for arthralgias, joint swelling and neck pain. Neurological: Negative for numbness and headaches. Psychiatric/Behavioral: Negative for agitation, confusion, decreased concentration and dysphoric mood. Objective:     BP (!) 149/98   Pulse 59   Ht 6' 1\" (1.854 m)   Wt 234 lb 6.4 oz (106.3 kg)   BMI 30.93 kg/m²     Wt Readings from Last 3 Encounters:   06/23/22 234 lb 6.4 oz (106.3 kg)   06/16/22 235 lb 11.2 oz (106.9 kg)   06/13/22 230 lb 9.6 oz (104.6 kg)     BP Readings from Last 3 Encounters:   06/23/22 (!) 149/98   06/16/22 136/70   06/13/22 (!) 151/83       Nursing note and vitals reviewed. Physical Exam   Constitutional: Oriented to person, place, and time. Appears well-developed and well-nourished. HENT:   Head: Normocephalic and atraumatic. Eyes: EOM are normal. Pupils are equal, round, and reactive to light. Neck: Normal range of motion. Neck supple. No JVD present. Cardiovascular: Irregular rate and rhythm, normal heart sounds and intact distal pulses. No murmur heard. Pulmonary/Chest: Effort normal and breath sounds normal. No respiratory distress. No wheezes. No rales. Abdominal: Soft. Bowel sounds are normal. No distension. There is no tenderness. Musculoskeletal: Normal range of motion. No edema. Neurological: Alert and oriented to person, place, and time. No cranial nerve deficit. Coordination normal.   Skin: Skin is warm and dry. Psychiatric: Normal mood and affect.        Lab Results   Component Value Date    CKTOTAL 126 09/23/2011    CKTOTAL 179 09/23/2011    CKMB 1.6 09/23/2011    CKMB 2.6 09/23/2011       Lab Results   Component Value Date    WBC 9.0 06/13/2022    RBC 5.79 06/13/2022    RBC 5.63 05/17/2016    HGB 16.8 06/13/2022    HCT 50.3 06/13/2022    MCV 86.9 06/13/2022    MCH 29.0 06/13/2022    MCHC 33.4 06/13/2022    RDW 13.9 06/23/2018     06/13/2022    MPV 10.6 06/13/2022       Lab Results   Component Value Date     06/06/2022    K 3.6 06/06/2022     06/06/2022    CO2 24 06/06/2022    BUN 13 06/06/2022    LABALBU 4.4 10/04/2021    LABALBU 4.5 01/12/2012    CREATININE 0.8 06/06/2022    CALCIUM 9.1 06/06/2022    LABGLOM >90 06/06/2022    GLUCOSE 130 06/06/2022    GLUCOSE 93 05/17/2016       Lab Results   Component Value Date    ALKPHOS 86 10/04/2021    ALT 28 10/04/2021    AST 23 10/04/2021    PROT 7.0 10/04/2021    BILITOT 0.4 10/04/2021    BILIDIR <0.2 03/26/2021    LABALBU 4.4 10/04/2021    LABALBU 4.5 01/12/2012       Lab Results   Component Value Date    MG 2.2 03/26/2021       Lab Results   Component Value Date    INR 1.50 (H) 06/23/2022    INR 1.18 (H) 06/13/2022    INR 1.22 (H) 06/06/2022    PROTIME 44.6 (H) 06/02/2022         Lab Results   Component Value Date    LABA1C 6.3 04/19/2022       Lab Results   Component Value Date    TRIG 119 04/19/2022    HDL 28 04/19/2022    HDL 37 10/20/2010    LDLCALC 85 10/04/2021    LDLDIRECT 88.85 04/19/2022    LABVLDL 27 05/17/2016       Lab Results   Component Value Date    TSH 4.160 10/04/2021         Testing Reviewed:      I have individually reviewed the cardiac test below:    ECHO: Results for orders placed during the hospital encounter of 11/08/19    ECHO Complete 2D W Doppler W Color    Narrative  Transthoracic Echocardiography Report (TTE)    Demographics    Patient Name   Ashlee Lea       Gender              Male  Nino Zhang    MR #           [id number]           Race                    Ethnicity    Account #      [de-identified]           Room Number    Accession      [id number]           Date of Study       11/08/2019  Number    Date of Birth  1950          Referring Physician Ulysses Issa MD    Age            81 year(s)          Sonographer         León Keating,  [facility]    Interpreting        Ulysses Dunne MD    Procedure    Type of Study    TTE procedure:ECHOCARDIOGRAM COMPLETE 2D W DOPPLER W COLOR. Procedure Date  Date: 11/08/2019 Start: 08:08 AM    Study Location: Echo Lab  Technical Quality: Adequate visualization    Indications:Coronary artery disease. Additional Medical History:Hyperlipidemia, hypertension, obesity, atrial  fibrillation, GERD, diastolic dysfunction, COPD    Patient Status: Routine    Height: 73 inches Weight: 238.01 pounds BSA: 2.32 m^2 BMI: 31.4 kg/m^2    BP: 136/82 mmHg    Conclusions    Summary  Ejection fraction is visually estimated at 45%. There was mild global hypokinesis of the left ventricle. Hypokinetic motion of the apical anterior wall noted in the left  ventricle. Moderately dilated left atrium. Signature    ----------------------------------------------------------------  Electronically signed by Ulysses Villafana MD (Interpreting  physician) on 11/08/2019 at 05:57 PM  ----------------------------------------------------------------    Findings    Mitral Valve  Mild mitral regurgitation is present. Aortic Valve  Aortic valve appears tricuspid. Mild aortic regurgitation is noted. Tricuspid Valve  Trivial tricuspid regurgitation visualized. Pulmonic Valve  Trivial pulmonic regurgitation visualized. Left Atrium  Moderately dilated left atrium.     Left Ventricle  Ejection fraction is visually estimated at 45%. There was mild global hypokinesis of the left ventricle. Hypokinetic motion of the apical anterior wall noted in the left  ventricle. Right Atrium  Right atrial size was normal.    Right Ventricle  The right ventricular size was normal with normal systolic function and  wall thickness. Pericardial Effusion  The pericardium was normal in appearance with no evidence of a pericardial  effusion. Pleural Effusion  No evidence of pleural effusion. Aorta / Great Vessels  -Aortic root dimension within normal limits.  -The Pulmonary artery is within normal limits. -IVC size is within normal limits with normal respiratory phasic changes.     M-Mode/2D Measurements & Calculations    LV Diastolic   LV Systolic Dimension:    AV Cusp Separation: 2.1 cmLA  Dimension: 5.9 4.6 cm                    Dimension: 5.2 cmAO Root  cm             LV Volume Diastolic: 300  Dimension: 3.6 cmLA Area: 22.6  LV FS:22 %     ml                        cm^2  LV PW          LV Volume Systolic: 30.5  Diastolic: 1   ml  cm             LV EDV/LV EDV Index: 173  Septum         ml/75 m^2LV ESV/LV ESV    RV Diastolic Dimension: 2.9 cm  Diastolic: 1   Index: 94.9 ml/42 m^2  cm             EF Calculated: 43.8 %     LA/Aorta: 1.44    LV Length: 7.5 cm         LA volume/Index: 70.7 ml /30m^2    LV Area  Diastolic:  22.6 cm^2  LV Area  Systolic: 43.7  cm^2    Doppler Measurements & Calculations    MV Peak E-Wave: 106 cm/s AV Peak Velocity: 111  LVOT Peak Velocity: 86.6  cm/s                   cm/s  MV Peak Gradient: 4.49   AV Peak Gradient: 4.93 LVOT Peak Gradient: 3 mmHg  mmHg                     mmHg  TV Peak E-Wave: 48 cm/s  MV Deceleration Time:  232 msec                                        TV Peak Gradient: 0.92  mmHg  AV P1/2t: 626 msec     TR Velocity:214 cm/s  TR Gradient:18.32 mmHg  PV Peak Velocity: 60.9  cm/s  MR Velocity: 437 cm/s    AV DVI (Vmax):0.78     PV Peak Gradient: 1.48  mmHg    [id number]%[id number]       Assessment/Plan   Long standing persistent Afib, [id number] = 4    We had a long discussion with the patient and myself regarding the risks/benefits of stroke prophylaxis and anticoagulation using ACC guidelines and risk calculators. Shared decision making has been completed with her primary physician (see EMR). We discussed the options including no prophylaxis, aspirin only, DOACs, Warfarin, and mechanical occlusion of KASHIF (Watchman). The patient was able to comprehend their overall risk of stroke versus bleeding. The patient agreed that the patient was not a candidate for long-term anticoagulation due to the above issues. The patient is clinically a candidate for left atrial appendage occlusion using the Watchman device. Patient and family were informed of the risk/benefits of the procedure, the need for further imaging pre-procedure (SANG/CTA) and post-procedure (SANG) at specified intervals. Further, the patient understands that they will require anticoagulation before the procedure and after the procedure in the short-term. The patient is clinically a candidate for short-term anticoagulation. Further, discussion regarding the possibility of CVA related to other etiologies other than afib were also discussed including intracranial disease, carotid disease, cerebral vascular malformation, aortic atheroma, non-KASHIF cardioembolic source, etc.  Patient understood that the WATCHMAN device is designed to reduce the risk of CVA in the setting of afib only and does not reduce the risk of CVA related to any other cause of stroke.   All parties also understand that if post-procedure the patient develops another reason for anti-coagulation that this is a separate issue from the stroke prophylaxis for atrial fibrillation and that the device does not negate need for

## 2022-06-23 NOTE — PROGRESS NOTES
Medication Management [address]  [phone] (phone)  [phone] (fax)    Mr. Christin Justin is a 81 y.o.  male with history of Afib who presents today for anticoagulation monitoring and adjustment. Over the past month, patient has had issues with epistaxis. He had a nasal endoscopy done on 6/13/22. He was instructed by ENT to resume coumadin on 6/16. Patient verifies current dosing regimen and tablet strength. No missed or extra doses. Patient denies s/s bleeding/bruising/swelling/SOB/chest pain-some bruising on his hand from IV  No blood in urine or stool. No dietary changes. No changes in medication/OTC agents/Herbals.-Patient is on cefdinir until 6/25  No change in alcohol use or tobacco use. No change in activity level. Patient denies headaches/dizziness/lightheadedness/falls. No vomiting/diarrhea or acute illness. No Procedures scheduled in the future at this time.-Discussing with Dr Harlan Kendrick the possibility of Watchman today    Assessment:   Lab Results   Component Value Date    INR 1.50 (H) 06/23/2022    INR 1.18 (H) 06/13/2022    INR 1.22 (H) 06/06/2022     INR subtherapeutic   Recent Labs     06/23/22  0659   INR 1.50*     Patient is normally a 6 week check and stable. Plan:  Coumadin 5 mg x 1 then continue Coumadin 5 mg W, 2.5 mg MTThFSS. Recheck INR in 2 week(s). Patient reminded to call the Anticoagulation Clinic with any signs or symptoms of bleeding or with any medication changes. Patient given instructions utilizing the teach back method. After visit summary printed and reviewed with patient. Discharged ambulatory in no apparent distress.         For Pharmacy Admin Tracking Only     Intervention Detail: Dose Adjustment: 1, reason: Therapy Optimization   Total # of Interventions Recommended: 1   Total # of Interventions Accepted: 1   Time Spent (min): 0900 Cooper Gandhi, PharmD, BCPS  6/23/2022  7:24 AM

## 2022-06-27 ENCOUNTER — CLINICAL DOCUMENTATION (OUTPATIENT)
Dept: SPIRITUAL SERVICES | Facility: CLINIC | Age: 72
End: 2022-06-27

## 2022-06-27 ENCOUNTER — TELEPHONE (OUTPATIENT)
Dept: ENT CLINIC | Age: 72
End: 2022-06-27

## 2022-06-27 DIAGNOSIS — A49.01 STAPHYLOCOCCUS AUREUS INFECTION: Primary | ICD-10-CM

## 2022-06-27 DIAGNOSIS — Z98.890 STATUS POST SURGERY: ICD-10-CM

## 2022-06-27 DIAGNOSIS — R04.0 POSTERIOR EPISTAXIS: ICD-10-CM

## 2022-06-27 NOTE — ACP (ADVANCE CARE PLANNING)
Advance Care Planning   Advance Care Planning Note  Ambulatory Spiritual Care Services    Date:  6/27/2022    Received request from patient. Consultation conversation participants:   Patient who understands ACP conversation     Goals of ACP Conversation:  Discuss advance care planning documents    Health Care Decision Makers:      Primary Decision Maker: Fabio Diggs - [phone]    Secondary Decision Maker: Delmy White - [phone]    Supplemental (Other) Decision Maker: Alejandra Dyer - [phone]     Summary:  Verified [facility]    Advance Care Planning Documents (Patient Wishes)  Currently on file:   Healthcare Power of /Advance Directive Appointment of Health Care Agent  Living Will/Advance Directive    Interventions:   received documents from patient, who verified that documents were complete and no changes were needed at this time.  copied original documents to be faxed to Medical Records, and scanned into her file. However, upon review of patient's file, documents are already scanned into her chart. No further action was needed.  updated Health Care Decision Maker contact information.     Outcomes:  ACP Discussion: Completed    Electronically signed by Ricardo Goldsmith on 6/27/2022 at 2:33 PM.

## 2022-06-28 RX ORDER — CEFDINIR 300 MG/1
300 CAPSULE ORAL 2 TIMES DAILY
Qty: 20 CAPSULE | Refills: 0 | Status: CANCELLED | OUTPATIENT
Start: 2022-06-28 | End: 2022-07-08

## 2022-06-28 RX ORDER — CEFDINIR 300 MG/1
300 CAPSULE ORAL 2 TIMES DAILY
Qty: 28 CAPSULE | Refills: 1 | Status: SHIPPED | OUTPATIENT
Start: 2022-06-28 | End: 2022-07-12

## 2022-06-28 NOTE — TELEPHONE ENCOUNTER
Is come to my attention the patient has canceled his follow-up appointment. It was made for a 3-week visit instead of 2 and I will not be in the office. He has had extensive and repeated packing in his nasal fossa which has     Dr. Isela Sorensen has requested that I guarantee that he will be \"free of infection for 45 days\" so that he can place the watchman. Patient has a current infection with MSSA which was still present at his last visit. He was given 10 days of Cefdinir which has just finished. I will continue that until I can see him in the office and see if the infection has cleared. As for guaranteeing that he will be free of infection for 45 days, I do not see any way that I could certify that. On anyone.

## 2022-06-29 NOTE — TELEPHONE ENCOUNTER
Patient is scheduled for 8:20am on next Tuesday, July 5th and I told them to  the prescription and get started on it right away.

## 2022-06-30 ENCOUNTER — CARE COORDINATION (OUTPATIENT)
Dept: CASE MANAGEMENT | Age: 72
End: 2022-06-30

## 2022-06-30 NOTE — CARE COORDINATION
[facility] [facility] Transitions Follow Up Call-Final    2022    Patient: Druscilla Sandhoff  Patient : 1950   MRN: [id number]  Reason for Admission: epistaxis  Discharge Date: 22 RARS: Readmission Risk Score: 9.2 ( )         Spoke with wife, Wilfredo Diop, said Esther Maxwell is doing great. Denies any further nose bleeds. Saw ENT and still has some infection and is taking Omnicef. He will see Dr Genet Bedolla again next week for clearance for the Watchman procedure which is tentatively scheduled for . Denies needs. Final call. No other questions or concerns at this time. Care Transitions Subsequent and Final Call    Subsequent and Final Calls  Do you have any ongoing symptoms?: No  Have your medications changed?: Yes  Patient Reports: Arya Ritchie added  Do you have any questions related to your medications?: No  Do you currently have any active services?: Yes  Are you currently active with any services?: Outpatient/Community Services  Do you have any needs or concerns that I can assist you with?: No  Identified Barriers: Lack of Education  Care Transitions Interventions   Home Care Waiver: Declined        Transportation Support: Declined      DME Assistance: Declined     Senior Services: Declined    Disease Specific Clinic: Declined      Other Interventions:       Care Transitions Follow Up Call    Needs to be reviewed by the provider   Additional needs identified to be addressed with provider: No  none             Method of communication with provider : none      Care Transition Nurse contacted the family by telephone to follow up after admission on 22. Verified name and  with family as identifiers. Addressed changes since last contact: none  Discussed follow-up appointments. Advance Care Planning:   Does patient have an Advance Directive: reviewed and current.      CTN reviewed discharge instructions, medical action plan and red flags with family and discussed any barriers to care and/or understanding of plan of care after discharge. Discussed appropriate site of care based on symptoms and resources available to patient including: PCP  Specialist. The family agrees to contact the PCP office for questions related to their healthcare. Patients top risk factors for readmission: lack of knowledge about disease  medical condition-epistaxis, Afib, CAD, HTN  medication management  Interventions to address risk factors: Scheduled appointment with PCP-8/8 and Scheduled appointment with Specialist-7/5 12/1      Non-[facility] follow up appointment(s): na    CTN provided contact information for future needs. No further follow-up call indicated based on severity of symptoms and risk factors.       Follow Up  Future Appointments   Date Time Provider Cristina Grier   7/5/2022  8:20 AM Priscila Ford MD N ENT [facility] Anaya Torres   7/7/2022  7:20 AM ANGELES Palmer [facility] Aanya Wolff   8/8/2022  8:30 AM NARESH Rodriguez - CNP [facility] Medicine [facility] Anaya Torres   12/1/2022  3:30 PM Laurent Ramírez MD N [facility] [address], RN

## 2022-07-05 ENCOUNTER — OFFICE VISIT (OUTPATIENT)
Dept: ENT CLINIC | Age: 72
End: 2022-07-05
Payer: MEDICARE

## 2022-07-05 VITALS
HEIGHT: 73 IN | WEIGHT: 238.9 LBS | RESPIRATION RATE: 16 BRPM | BODY MASS INDEX: 31.66 KG/M2 | SYSTOLIC BLOOD PRESSURE: 134 MMHG | OXYGEN SATURATION: 97 % | DIASTOLIC BLOOD PRESSURE: 76 MMHG | HEART RATE: 63 BPM | TEMPERATURE: 97.2 F

## 2022-07-05 DIAGNOSIS — Z79.01 ANTICOAGULATED ON COUMADIN: ICD-10-CM

## 2022-07-05 DIAGNOSIS — I48.0 PAROXYSMAL ATRIAL FIBRILLATION (HCC): ICD-10-CM

## 2022-07-05 DIAGNOSIS — R04.0 POSTERIOR EPISTAXIS: Primary | ICD-10-CM

## 2022-07-05 DIAGNOSIS — J34.89 NASAL CRUSTING: ICD-10-CM

## 2022-07-05 DIAGNOSIS — A49.01 STAPHYLOCOCCUS AUREUS INFECTION: ICD-10-CM

## 2022-07-05 DIAGNOSIS — R04.0 ANTERIOR EPISTAXIS: ICD-10-CM

## 2022-07-05 PROCEDURE — G8427 DOCREV CUR MEDS BY ELIG CLIN: HCPCS | Performed by: OTOLARYNGOLOGY

## 2022-07-05 PROCEDURE — G8417 CALC BMI ABV UP PARAM F/U: HCPCS | Performed by: OTOLARYNGOLOGY

## 2022-07-05 PROCEDURE — 1036F TOBACCO NON-USER: CPT | Performed by: OTOLARYNGOLOGY

## 2022-07-05 PROCEDURE — 3017F COLORECTAL CA SCREEN DOC REV: CPT | Performed by: OTOLARYNGOLOGY

## 2022-07-05 PROCEDURE — 31231 NASAL ENDOSCOPY DX: CPT | Performed by: OTOLARYNGOLOGY

## 2022-07-05 PROCEDURE — 99213 OFFICE O/P EST LOW 20 MIN: CPT | Performed by: OTOLARYNGOLOGY

## 2022-07-05 PROCEDURE — 1123F ACP DISCUSS/DSCN MKR DOCD: CPT | Performed by: OTOLARYNGOLOGY

## 2022-07-05 ASSESSMENT — ENCOUNTER SYMPTOMS
WHEEZING: 0
SHORTNESS OF BREATH: 0
NAUSEA: 0
VOMITING: 0
RHINORRHEA: 0
VOICE CHANGE: 0
DIARRHEA: 0
CHEST TIGHTNESS: 0
STRIDOR: 0
ABDOMINAL PAIN: 0
SORE THROAT: 0
SINUS PRESSURE: 0
CHOKING: 0
COLOR CHANGE: 0
APNEA: 0
COUGH: 0
FACIAL SWELLING: 0
TROUBLE SWALLOWING: 0

## 2022-07-05 NOTE — PROGRESS NOTES
[facility] [place] [facility]  [facility] EAR, NOSE AND THROAT  [facility]  Dept: [phone]  Dept Fax: [phone]  Loc: [phone]    Sammy Cadena is a 81 y.o. male who was referred byNo ref. provider found for:  Chief Complaint   Patient presents with    Follow-up     patient is here to make sure infection has cleared up   . HPI:     Sammy Cadena is a 81 y.o. male who presents today for follow-up on his epistaxis, and at the request of Dr. Gerald Ta to verify the patient is free of infection. He had repeated packing of the left side of his nose for rather severe epistaxis. He also had an embolization on the right side. He is planning to get a watchman inserted. Culture of his nasal fossa on June 13, 2022 showed MSSA. He has been on Cefdinir since that time and says he feels fine. History:     No Known Allergies  Current Outpatient Medications   Medication Sig Dispense Refill    rosuvastatin (CRESTOR) 20 MG tablet Take 1 tablet by mouth daily 90 tablet 1    nitroGLYCERIN (NITROSTAT) 0.4 MG SL tablet DISSOLVE ONE TABLET UNDER THE TONGUE EVERY 5 MINUTES AS NEEDED FOR CHEST PAIN. DO NOT EXCEED A TOTAL OF 3 DOSES IN 15 MINUTES 25 tablet 2    dilTIAZem (CARDIZEM CD) 360 MG extended release capsule Take 1 capsule by mouth once daily 90 capsule 3    digoxin (LANOXIN) 125 MCG tablet TAKE 1 TABLET BY MOUTH ONCE DAILY 90 tablet 3    potassium chloride (KLOR-CON) 10 MEQ extended release tablet TAKE 1 TABLET BY MOUTH TWICE DAILY 180 tablet 3    bisacodyl (DULCOLAX) 5 MG EC tablet Take 5 mg by mouth nightly      metoprolol succinate (TOPROL XL) 50 MG extended release tablet Take 1 tablet by mouth once daily 90 tablet 0    aspirin 81 MG chewable tablet Take 1 tablet by mouth in the morning.  30 tablet 3    warfarin (COUMADIN) 2.5 MG tablet Indications: HOLD UNTIL RETURN VISIT LATER THIS WEEK Take as directed by [address] (40 tablets = 30 day supply) 40 tablet 11     No current facility-administered medications for this visit. Past Medical History:   Diagnosis Date    Anesthesia     takes large dose of medications to make him sleepy for surgery    Atrial fibrillation ([address]     Benign prostatic hyperplasia with urinary frequency 10/21/2019    CAD (coronary artery disease)     Chest pain     Colon polyp 2011    removed    Coronary artery disease involving native coronary artery of native heart without angina pectoris 10/21/2019    Dementia ([address]     Dizziness     Elevated PSA     GERD (gastroesophageal reflux disease)     Heart disease     Hyperlipidemia     Hypertension       Past Surgical History:   Procedure Laterality Date    ADENOIDECTOMY      CARDIAC CATHETERIZATION  09/23/2011    Right radial artery cannulation. Moderate LV dysfunction. The patient has disease in the ostium of diagonal 1 and diagonal 2 branch, however they are both are at the  ostium of the diagonals. Intervention could compromise flow of LAD. THe LAD has long diffuse calcified lesion 50-60-% anatomically.     CARDIOVASCULAR STRESS TEST  09/23/2011    EF 37%    CARPAL TUNNEL RELEASE      COLONOSCOPY      NASAL SINUS SURGERY N/A 6/13/2022    Nasal Endoscopy Control of Nasal Hemorrhage performed by Clifton Wong MD at [facility] N/A 12/3/2020    ROBOTIC ASSISTED [address] performed by Livia Mac MD at [address] ECHOCARDIOGRAM  09/23/2011    EF 50-55%    ULTRASOUND PROSTATE/TRANSRECTAL N/A 10/6/2020    CYSTO, TRANSRECTAL ULTRASOUND GUIDED PROSTATE BX performed by Livia Mac MD at [facility]       Family History   Problem Relation Age of Onset    Heart Surgery Father     Cancer Father         lung    Heart Disease Father         CABG    Alzheimer's Disease Mother     Diabetes Neg Hx     High Blood Pressure Neg Hx     Stroke Neg Hx      Social History     Tobacco Use    Smoking status: Never    Smokeless tobacco: Never   Substance Use Topics    Alcohol use: No       Subjective:      Review of Systems   Constitutional:  Negative for activity change, appetite change, chills, diaphoresis, fatigue, fever and unexpected weight change. HENT:  Negative for congestion, dental problem, ear discharge, ear pain, facial swelling, hearing loss, mouth sores, nosebleeds, postnasal drip, rhinorrhea, sinus pressure, sneezing, sore throat, tinnitus, trouble swallowing and voice change. Eyes:  Negative for visual disturbance. Respiratory:  Negative for apnea, cough, choking, chest tightness, shortness of breath, wheezing and stridor. Cardiovascular:  Negative for chest pain, palpitations and leg swelling. Gastrointestinal:  Negative for abdominal pain, diarrhea, nausea and vomiting. Endocrine: Negative for cold intolerance, heat intolerance, polydipsia and polyuria. Genitourinary:  Negative for dysuria, enuresis and hematuria. Musculoskeletal:  Negative for arthralgias, gait problem, neck pain and neck stiffness. Skin:  Negative for color change and rash. Allergic/Immunologic: Negative for environmental allergies, food allergies and immunocompromised state. Neurological:  Negative for dizziness, syncope, facial asymmetry, speech difficulty, light-headedness and headaches. Hematological:  Negative for adenopathy. Does not bruise/bleed easily. Psychiatric/Behavioral:  Negative for confusion and sleep disturbance. The patient is not nervous/anxious. Objective:   /76 (Site: Left Upper Arm, Position: Sitting)   Pulse 63   Temp 97.2 °F (36.2 °C) (Infrared)   Resp 16   Ht 6' 1\" (1.854 m)   Wt 238 lb 14.4 oz (108.4 kg)   SpO2 97%   BMI 31.52 kg/m²     Physical Exam   Nose: Normal appearance on anterior rhinoscopy except for some crusting on the left side of the septum. Nasal endoscopy:   There is a crust associated with some purulent secretions on the left anterior

## 2022-07-07 ENCOUNTER — HOSPITAL ENCOUNTER (OUTPATIENT)
Dept: PHARMACY | Age: 72
Setting detail: THERAPIES SERIES
Discharge: HOME | End: 2022-07-07
Payer: MEDICARE

## 2022-07-07 DIAGNOSIS — Z79.01 ANTICOAGULATED ON COUMADIN: Primary | ICD-10-CM

## 2022-07-07 DIAGNOSIS — Z51.81 ENCOUNTER FOR THERAPEUTIC DRUG MONITORING: ICD-10-CM

## 2022-07-07 LAB
AEROBIC CULTURE: NORMAL
POC INR: 2.3 (ref 0.8–1.2)

## 2022-07-07 PROCEDURE — 36416 COLLJ CAPILLARY BLOOD SPEC: CPT | Performed by: PHARMACIST

## 2022-07-07 PROCEDURE — 85610 PROTHROMBIN TIME: CPT | Performed by: PHARMACIST

## 2022-07-07 PROCEDURE — 99211 OFF/OP EST MAY X REQ PHY/QHP: CPT | Performed by: PHARMACIST

## 2022-07-07 NOTE — PROGRESS NOTES
Medication Management [address]   [phone] (phone)  [phone] (fax)    Mr. Frankie Gotti is a 81 y.o.  male with history of Afib who presents today for anticoagulation monitoring and adjustment. Patient verifies current dosing regimen and tablet strength. No missed or extra doses. Patient denies s/s bleeding/bruising/swelling/SOB/chest pain  No blood in urine or stool. No dietary changes. No changes in medication/OTC agents/Herbals. No change in alcohol use or tobacco use. No change in activity level. Patient denies headaches/dizziness/lightheadedness/falls. No vomiting/diarrhea or acute illness. Watchman tentatively planned for 7/19, waiting for confirmation from Dr Kalen Hollis office. Instructed pt and wife to notify [facility] when this gets scheduled. Assessment:   Lab Results   Component Value Date    INR 2.30 (H) 07/07/2022    INR 1.50 (H) 06/23/2022    INR 1.18 (H) 06/13/2022     INR therapeutic   Recent Labs     07/07/22  0719   INR 2.30*         Plan:  Continue Coumadin 5mg W and 2.5mg [id number]. Recheck INR in 3 week(s) (1 week after tentative Watchman). Patient reminded to call the Anticoagulation Clinic with any signs or symptoms of bleeding or with any medication changes. Patient given instructions utilizing the teach back method. After visit summary printed and reviewed with patient. Discharged ambulatory in no apparent distress.     For Pharmacy Admin Tracking Only     Time Spent (min): 20

## 2022-07-11 ENCOUNTER — HOSPITAL ENCOUNTER (OUTPATIENT)
Age: 72
Discharge: HOME | End: 2022-07-11
Payer: MEDICARE

## 2022-07-11 ENCOUNTER — TELEPHONE (OUTPATIENT)
Dept: ENT CLINIC | Age: 72
End: 2022-07-11

## 2022-07-11 DIAGNOSIS — D45 ACQUIRED POLYCYTHEMIA VERA (HCC): ICD-10-CM

## 2022-07-11 LAB
BASOPHILS # BLD: 0.4 %
BASOPHILS ABSOLUTE: 0 THOU/MM3 (ref 0–0.1)
EOSINOPHIL # BLD: 4 %
EOSINOPHILS ABSOLUTE: 0.4 THOU/MM3 (ref 0–0.4)
ERYTHROCYTE [DISTWIDTH] IN BLOOD BY AUTOMATED COUNT: 13.6 % (ref 11.5–14.5)
ERYTHROCYTE [DISTWIDTH] IN BLOOD BY AUTOMATED COUNT: 42.3 FL (ref 35–45)
HCT VFR BLD CALC: 50.7 % (ref 42–52)
HEMOGLOBIN: 16.8 GM/DL (ref 14–18)
IMMATURE GRANS (ABS): 0.02 THOU/MM3 (ref 0–0.07)
IMMATURE GRANULOCYTES: 0.2 %
LYMPHOCYTES # BLD: 22.3 %
LYMPHOCYTES ABSOLUTE: 2.1 THOU/MM3 (ref 1–4.8)
MCH RBC QN AUTO: 28.6 PG (ref 26–33)
MCHC RBC AUTO-ENTMCNC: 33.1 GM/DL (ref 32.2–35.5)
MCV RBC AUTO: 86.4 FL (ref 80–94)
MONOCYTES # BLD: 6.1 %
MONOCYTES ABSOLUTE: 0.6 THOU/MM3 (ref 0.4–1.3)
NUCLEATED RED BLOOD CELLS: 0 /100 WBC
PLATELET # BLD: 222 THOU/MM3 (ref 130–400)
PMV BLD AUTO: 10.7 FL (ref 9.4–12.4)
RBC # BLD: 5.87 MILL/MM3 (ref 4.7–6.1)
SEG NEUTROPHILS: 67 %
SEGMENTED NEUTROPHILS ABSOLUTE COUNT: 6.4 THOU/MM3 (ref 1.8–7.7)
WBC # BLD: 9.5 THOU/MM3 (ref 4.8–10.8)

## 2022-07-11 PROCEDURE — 85025 COMPLETE CBC W/AUTO DIFF WBC: CPT

## 2022-07-11 NOTE — TELEPHONE ENCOUNTER
Called patient wife and informed. She verbalized understanding. She wanted to know if patient is currently cleared from ENT for the [address] [facility] [address].

## 2022-07-11 NOTE — TELEPHONE ENCOUNTER
Patient wife called and left a message stating that patient had a culture completed and they wanted to know the results.

## 2022-07-14 ENCOUNTER — TELEPHONE (OUTPATIENT)
Dept: CARDIOLOGY CLINIC | Age: 72
End: 2022-07-14

## 2022-07-14 ENCOUNTER — OFFICE VISIT (OUTPATIENT)
Dept: ENT CLINIC | Age: 72
End: 2022-07-14
Payer: MEDICARE

## 2022-07-14 VITALS
TEMPERATURE: 97.3 F | OXYGEN SATURATION: 97 % | HEIGHT: 73 IN | WEIGHT: 238.4 LBS | DIASTOLIC BLOOD PRESSURE: 90 MMHG | SYSTOLIC BLOOD PRESSURE: 154 MMHG | HEART RATE: 88 BPM | BODY MASS INDEX: 31.6 KG/M2 | RESPIRATION RATE: 20 BRPM

## 2022-07-14 DIAGNOSIS — J34.89 NASAL CRUSTING: ICD-10-CM

## 2022-07-14 DIAGNOSIS — R04.0 ANTERIOR EPISTAXIS: ICD-10-CM

## 2022-07-14 DIAGNOSIS — R04.0 POSTERIOR EPISTAXIS: Primary | ICD-10-CM

## 2022-07-14 DIAGNOSIS — I48.11 LONGSTANDING PERSISTENT ATRIAL FIBRILLATION (HCC): ICD-10-CM

## 2022-07-14 DIAGNOSIS — Z95.818 PRESENCE OF WATCHMAN LEFT ATRIAL APPENDAGE CLOSURE DEVICE: Primary | ICD-10-CM

## 2022-07-14 PROCEDURE — 31231 NASAL ENDOSCOPY DX: CPT | Performed by: OTOLARYNGOLOGY

## 2022-07-14 PROCEDURE — 3017F COLORECTAL CA SCREEN DOC REV: CPT | Performed by: OTOLARYNGOLOGY

## 2022-07-14 PROCEDURE — 1036F TOBACCO NON-USER: CPT | Performed by: OTOLARYNGOLOGY

## 2022-07-14 PROCEDURE — G8417 CALC BMI ABV UP PARAM F/U: HCPCS | Performed by: OTOLARYNGOLOGY

## 2022-07-14 PROCEDURE — 1123F ACP DISCUSS/DSCN MKR DOCD: CPT | Performed by: OTOLARYNGOLOGY

## 2022-07-14 PROCEDURE — 99214 OFFICE O/P EST MOD 30 MIN: CPT | Performed by: OTOLARYNGOLOGY

## 2022-07-14 PROCEDURE — G8427 DOCREV CUR MEDS BY ELIG CLIN: HCPCS | Performed by: OTOLARYNGOLOGY

## 2022-07-14 ASSESSMENT — ENCOUNTER SYMPTOMS
CHEST TIGHTNESS: 0
VOMITING: 0
NAUSEA: 0
COUGH: 0
DIARRHEA: 0
RHINORRHEA: 0
SINUS PRESSURE: 0
SHORTNESS OF BREATH: 0
COLOR CHANGE: 0
FACIAL SWELLING: 0
SORE THROAT: 0
ABDOMINAL PAIN: 0
TROUBLE SWALLOWING: 0
WHEEZING: 0
STRIDOR: 0
APNEA: 0
VOICE CHANGE: 0
CHOKING: 0

## 2022-07-14 NOTE — TELEPHONE ENCOUNTER
SANG is scheduled with Dr. Kwong Po [phone] arrival 2pm.     Faxed to [address] [place] [address]

## 2022-07-14 NOTE — TELEPHONE ENCOUNTER
WATCHMAN shared decision form obtained from Dr. Norma Alpers. [id number] = [id number]    The patient was diagnosed with Longstanding Persistent Atrial Fibrillation      Orders received from Dr. Awa Mejia to schedule the patient for a WATCHMAN procedure, have the patient hold Coumadin 4 days prior and hold all medications the morning of the procedure. Patient will be instructed to bring the medications with him in the bottles. Hold Coumadin  starting on 7/15/2022  WATCHMAN: 7/19/2022 at 0700 have the patient arrive at 0500  Discharge caregiver: wife Sandy Parrish procedural orders: Structural heart follow up phone call on POD 1, have the patient restrart Coumadin on the evening of POD 1 post implant, obtain a CBC and BMP prior to the 45 day SANG (9/2/2022).      CBC/BMP/SANG:   Follow up date with primary cardiologist: Dr. Isabel Dominguez, can you please schedule this patients SANG?

## 2022-07-14 NOTE — TELEPHONE ENCOUNTER
Spoke to [employer] CNP via [facility] and she has stated that the patient the patient was cleared to move forward from an infectious standpoint.

## 2022-07-14 NOTE — TELEPHONE ENCOUNTER
Dr. Varun Ng just called stating that the patient is not cleared from an infectious standpoint. Even though the culture did not show any growth he wants to visually look at the ulceration to ensure there is not purulent drainage or signs of infection present before releasing the patient to have his WATCHMAN. The WATCHMAN will be cancelled and rescheduled when Dr. Varun Ng has given his clearance to move forward. Patient called and notified that the procedure will be on hold until clearance from Dr. Varun Ng. Dr. Varun Ng has requested that the patient make an appointment to get scoped. Dinorah, please cancel the SANG. Thank you!

## 2022-07-14 NOTE — PROGRESS NOTES
[facility] [place] [facility]  [facility] EAR, NOSE AND THROAT  [facility]  Dept: [phone]  Dept Fax: [phone]  Loc: [phone]    Michelle Rothman is a 81 y.o. male who was referred byNo ref. provider found for:  Chief Complaint   Patient presents with    Follow-up     Paitent is here to dicuss the results of the culture and clearance for the watchman. Rossana Ashton HPI:     Michelle Rothman is a 81 y.o. male who presents today for Patient is here to discuss the results of the culture and clearance for the watchman. States he's been feeling good and can smell now. His bleeding was on the left. History:     No Known Allergies  Current Outpatient Medications   Medication Sig Dispense Refill    warfarin (COUMADIN) 2.5 MG tablet Take as directed by [facility] (40 tablets = 30 day supply) 40 tablet 11    rosuvastatin (CRESTOR) 20 MG tablet Take 1 tablet by mouth daily 90 tablet 1    nitroGLYCERIN (NITROSTAT) 0.4 MG SL tablet DISSOLVE ONE TABLET UNDER THE TONGUE EVERY 5 MINUTES AS NEEDED FOR CHEST PAIN. DO NOT EXCEED A TOTAL OF 3 DOSES IN 15 MINUTES 25 tablet 2    dilTIAZem (CARDIZEM CD) 360 MG extended release capsule Take 1 capsule by mouth once daily 90 capsule 3    digoxin (LANOXIN) 125 MCG tablet TAKE 1 TABLET BY MOUTH ONCE DAILY 90 tablet 3    potassium chloride (KLOR-CON) 10 MEQ extended release tablet TAKE 1 TABLET BY MOUTH TWICE DAILY 180 tablet 3    metoprolol succinate (TOPROL XL) 50 MG extended release tablet Take 1 tablet by mouth once daily 90 tablet 3    bisacodyl (DULCOLAX) 5 MG EC tablet Take 5 mg by mouth nightly        No current facility-administered medications for this visit.      Past Medical History:   Diagnosis Date    Anesthesia     takes large dose of medications to make him sleepy for surgery    Atrial fibrillation [facility])     Benign prostatic hyperplasia with urinary frequency 10/21/2019    CAD (coronary artery disease)     Chest pain     Colon polyp 2011    removed    Coronary artery disease involving native coronary artery of native heart without angina pectoris 10/21/2019    Dementia ([address]     Dizziness     Elevated PSA     GERD (gastroesophageal reflux disease)     Heart disease     Hyperlipidemia     Hypertension       Past Surgical History:   Procedure Laterality Date    ADENOIDECTOMY      CARDIAC CATHETERIZATION  09/23/2011    Right radial artery cannulation. Moderate LV dysfunction. The patient has disease in the ostium of diagonal 1 and diagonal 2 branch, however they are both are at the  ostium of the diagonals. Intervention could compromise flow of LAD. THe LAD has long diffuse calcified lesion 50-60-% anatomically. CARDIOVASCULAR STRESS TEST  09/23/2011    EF 37%    CARPAL TUNNEL RELEASE      COLONOSCOPY      NASAL SINUS SURGERY N/A 6/13/2022    Nasal Endoscopy Control of Nasal Hemorrhage performed by Hakan Roger MD at [place] [address] 12/3/2020    ROBOTIC ASSISTED [address] performed by Remberto Anderson MD at [facility] ECHOCARDIOGRAM  09/23/2011    EF 50-55%    ULTRASOUND PROSTATE/TRANSRECTAL N/A 10/6/2020    CYSTO, TRANSRECTAL ULTRASOUND GUIDED PROSTATE BX performed by Remberto Anderson MD at [facility]       Family History   Problem Relation Age of Onset    Heart Surgery Father     Cancer Father         lung    Heart Disease Father         CABG    Alzheimer's Disease Mother     Diabetes Neg Hx     High Blood Pressure Neg Hx     Stroke Neg Hx      Social History     Tobacco Use    Smoking status: Never Smoker    Smokeless tobacco: Never Used   Substance Use Topics    Alcohol use: No       Subjective:       Review of Systems   Constitutional: Negative for activity change, appetite change, chills, diaphoresis, fatigue, fever and unexpected weight change.    HENT: Negative for congestion, dental problem, ear discharge, ear pain, facial swelling, hearing loss, mouth sores, nosebleeds, postnasal drip, rhinorrhea, sinus pressure, sneezing, sore throat, tinnitus, trouble swallowing and voice change. Eyes: Negative for visual disturbance. Respiratory: Negative for apnea, cough, choking, chest tightness, shortness of breath, wheezing and stridor. Cardiovascular: Negative for chest pain, palpitations and leg swelling. Gastrointestinal: Negative for abdominal pain, diarrhea, nausea and vomiting. Endocrine: Negative for cold intolerance, heat intolerance, polydipsia and polyuria. Genitourinary: Negative for dysuria, enuresis and hematuria. Musculoskeletal: Negative for arthralgias, gait problem, neck pain and neck stiffness. Skin: Negative for color change and rash. Allergic/Immunologic: Negative for environmental allergies, food allergies and immunocompromised state. Neurological: Negative for dizziness, syncope, facial asymmetry, speech difficulty, light-headedness and headaches. Hematological: Negative for adenopathy. Does not bruise/bleed easily. Psychiatric/Behavioral: Negative for confusion and sleep disturbance. The patient is not nervous/anxious. Objective:     BP (!) 154/90 (Site: Right Upper Arm, Position: Sitting)   Pulse 88   Temp 97.3 °F (36.3 °C) (Infrared)   Resp 20   Ht 6' 1\" (1.854 m)   Wt 238 lb 6.4 oz (108.1 kg)   SpO2 97%   BMI 31.45 kg/m²     Physical Exam    RIGID NASAL ENDOSCOPY    After adequate level decongestion and anesthesia was achieved with topical sprays, nasal fossa was examined with a 30° rigid sinus telescope. The nasal fossa was free of any crusting or purulent secretions. Sources of bleeding appeared to have mucosal covering of them. Ulceration was essentially healed on the nasal septum. No apparent ENT infection.       Data:  All of the past medical history, past surgical history, family history,social history, allergies and current medications were reviewed with the patient. Assessment & Plan   Diagnoses and all orders for this visit:     Diagnosis Orders   1. Posterior epistaxis     2. Anterior epistaxis     3. Nasal crusting         The findings were explained and his questions were answered. Options were discussed No visible ENT infection that would preclude from proceeding with surgery. Discussed with [facility] cardiology in detail. She stated the will not need faxed copy of their form. Epistaxis is unpredictable. As explained to the patient and Ron. I could not guarantee that he will not have a nosebleed in the 45 days following placement of the device while he is anticoagulated. On the other hand, there is no reason not to proceed from an ENT standpoint at this time, since he has not had any epistaxis for a few weeks now. .  I saw no evidence of any persistent infection today. There was evidence on the last visit with purulent secretions, even though the culture showed no growth, while he was on antibiotics. I, Lyndsay Cuellar CMA ([facility]), am scribing for, and in the presence of Dr. Nessa Mera. Electronically signed by FRACISCO Au[facility]) on 7/14/22 at 4:03 PM EDT. (Please note that portions of this note were completed with a voice recognition program. Efforts were made to edit the dictations butoccasionally words are mis-transcribed.)    I agree to the above documentation placed by my scribe. I have personally evaluated this patient. Additional findings are as noted. I reviewed the scribe's note and agree with the documented findings and plan of care. Any areas of disagreement are corrected. I agree with the chief complaint, past medical history, past surgical history, allergies, medications, social and family history as documented unless otherwise noted below.      Electronically signed by Magali Arriaga MD on 7/17/2022 at 7:12 PM

## 2022-07-15 NOTE — TELEPHONE ENCOUNTER
Dr Caroline Alvarado had follow up on 07/14/22 with patient to discuss clearance for the Watchman. Per Etelvina Mendoza for Dr Caroline Alvarado, Dr Caroline Alvarado called the [address] [place] [address] and spoke to Hiwot Harris in detail regarding patient. Hiwot Harris told Dr Caroline Alvarado since they can see his note in Epic he would not need to sign the form, just complete his note giving the clearance. Ronnie Downey has left Dr Caroline Alvarado a reminder to complete the note.

## 2022-07-18 ENCOUNTER — PREP FOR PROCEDURE (OUTPATIENT)
Dept: CARDIOLOGY | Age: 72
End: 2022-07-18

## 2022-07-18 RX ORDER — SODIUM CHLORIDE 9 MG/ML
75 INJECTION, SOLUTION INTRAVENOUS CONTINUOUS
Status: CANCELLED | OUTPATIENT
Start: 2022-07-18

## 2022-07-18 RX ORDER — SODIUM CHLORIDE 0.9 % (FLUSH) 0.9 %
5-40 SYRINGE (ML) INJECTION PRN
Status: CANCELLED | OUTPATIENT
Start: 2022-07-18

## 2022-07-18 RX ORDER — SODIUM CHLORIDE 9 MG/ML
INJECTION, SOLUTION INTRAVENOUS PRN
Status: CANCELLED | OUTPATIENT
Start: 2022-07-18

## 2022-07-18 RX ORDER — ACETAMINOPHEN 325 MG/1
650 TABLET ORAL EVERY 4 HOURS PRN
Status: CANCELLED | OUTPATIENT
Start: 2022-07-18

## 2022-07-18 RX ORDER — SODIUM CHLORIDE 0.9 % (FLUSH) 0.9 %
5-40 SYRINGE (ML) INJECTION EVERY 12 HOURS SCHEDULED
Status: CANCELLED | OUTPATIENT
Start: 2022-07-18

## 2022-07-19 ENCOUNTER — HOSPITAL ENCOUNTER (INPATIENT)
Dept: INPATIENT UNIT | Age: 72
LOS: 1 days | Discharge: HOME | DRG: 274 | End: 2022-07-19
Attending: INTERNAL MEDICINE | Admitting: INTERNAL MEDICINE
Payer: MEDICARE

## 2022-07-19 VITALS
HEIGHT: 73 IN | WEIGHT: 238 LBS | SYSTOLIC BLOOD PRESSURE: 130 MMHG | DIASTOLIC BLOOD PRESSURE: 72 MMHG | TEMPERATURE: 97.6 F | HEART RATE: 98 BPM | OXYGEN SATURATION: 94 % | RESPIRATION RATE: 27 BRPM | BODY MASS INDEX: 31.54 KG/M2

## 2022-07-19 PROBLEM — I48.11 LONGSTANDING PERSISTENT ATRIAL FIBRILLATION (HCC): Status: ACTIVE | Noted: 2022-07-19

## 2022-07-19 PROBLEM — I48.19 PERSISTENT ATRIAL FIBRILLATION (HCC): Status: ACTIVE | Noted: 2022-07-19

## 2022-07-19 LAB
ABO: NORMAL
ACTIVATED CLOTTING TIME: 503 SECONDS (ref 1–150)
ALBUMIN SERPL-MCNC: 3.8 G/DL (ref 3.5–5.1)
ALP BLD-CCNC: 70 U/L (ref 38–126)
ALT SERPL-CCNC: 23 U/L (ref 11–66)
ANION GAP SERPL CALCULATED.3IONS-SCNC: 12 MEQ/L (ref 8–16)
ANTIBODY SCREEN: NORMAL
APTT: 36.3 SECONDS (ref 22–38)
AST SERPL-CCNC: 23 U/L (ref 5–40)
BILIRUB SERPL-MCNC: 1 MG/DL (ref 0.3–1.2)
BUN BLDV-MCNC: 12 MG/DL (ref 7–22)
CALCIUM SERPL-MCNC: 9 MG/DL (ref 8.5–10.5)
CHLORIDE BLD-SCNC: 104 MEQ/L (ref 98–111)
CO2: 26 MEQ/L (ref 23–33)
CREAT SERPL-MCNC: 0.9 MG/DL (ref 0.4–1.2)
EKG Q-T INTERVAL: 326 MS
EKG QRS DURATION: 106 MS
EKG QTC CALCULATION (BAZETT): 422 MS
EKG R AXIS: 27 DEGREES
EKG T AXIS: 69 DEGREES
EKG VENTRICULAR RATE: 101 BPM
ERYTHROCYTE [DISTWIDTH] IN BLOOD BY AUTOMATED COUNT: 13.5 % (ref 11.5–14.5)
ERYTHROCYTE [DISTWIDTH] IN BLOOD BY AUTOMATED COUNT: 42 FL (ref 35–45)
GFR SERPL CREATININE-BSD FRML MDRD: 83 ML/MIN/1.73M2
GLUCOSE BLD-MCNC: 114 MG/DL (ref 70–108)
HCT VFR BLD CALC: 51.2 % (ref 42–52)
HEMOGLOBIN: 16.5 GM/DL (ref 14–18)
INR BLD: 1.29 (ref 0.85–1.13)
MCH RBC QN AUTO: 27.9 PG (ref 26–33)
MCHC RBC AUTO-ENTMCNC: 32.2 GM/DL (ref 32.2–35.5)
MCV RBC AUTO: 86.6 FL (ref 80–94)
PLATELET # BLD: 228 THOU/MM3 (ref 130–400)
PMV BLD AUTO: 10.9 FL (ref 9.4–12.4)
POTASSIUM SERPL-SCNC: 3.9 MEQ/L (ref 3.5–5.2)
RBC # BLD: 5.91 MILL/MM3 (ref 4.7–6.1)
RH FACTOR: NORMAL
SODIUM BLD-SCNC: 142 MEQ/L (ref 135–145)
TOTAL PROTEIN: 6.6 G/DL (ref 6.1–8)
WBC # BLD: 7.7 THOU/MM3 (ref 4.8–10.8)

## 2022-07-19 PROCEDURE — 36415 COLL VENOUS BLD VENIPUNCTURE: CPT

## 2022-07-19 PROCEDURE — 6360000002 HC RX W HCPCS

## 2022-07-19 PROCEDURE — 86901 BLOOD TYPING SEROLOGIC RH(D): CPT

## 2022-07-19 PROCEDURE — 2580000003 HC RX 258: Performed by: NURSE PRACTITIONER

## 2022-07-19 PROCEDURE — 33340 PERQ CLSR TCAT L ATR APNDGE: CPT | Performed by: INTERNAL MEDICINE

## 2022-07-19 PROCEDURE — 80053 COMPREHEN METABOLIC PANEL: CPT

## 2022-07-19 PROCEDURE — 33340 PERQ CLSR TCAT L ATR APNDGE: CPT

## 2022-07-19 PROCEDURE — 2500000003 HC RX 250 WO HCPCS

## 2022-07-19 PROCEDURE — 93010 ELECTROCARDIOGRAM REPORT: CPT | Performed by: INTERNAL MEDICINE

## 2022-07-19 PROCEDURE — C1893 INTRO/SHEATH, FIXED,NON-PEEL: HCPCS

## 2022-07-19 PROCEDURE — 02L73DK OCCLUSION OF LEFT ATRIAL APPENDAGE WITH INTRALUMINAL DEVICE, PERCUTANEOUS APPROACH: ICD-10-PCS | Performed by: INTERNAL MEDICINE

## 2022-07-19 PROCEDURE — 85027 COMPLETE CBC AUTOMATED: CPT

## 2022-07-19 PROCEDURE — 1200000000 HC SEMI PRIVATE

## 2022-07-19 PROCEDURE — 85730 THROMBOPLASTIN TIME PARTIAL: CPT

## 2022-07-19 PROCEDURE — B24BZZ4 ULTRASONOGRAPHY OF HEART WITH AORTA, TRANSESOPHAGEAL: ICD-10-PCS | Performed by: INTERNAL MEDICINE

## 2022-07-19 PROCEDURE — 85610 PROTHROMBIN TIME: CPT

## 2022-07-19 PROCEDURE — 85347 COAGULATION TIME ACTIVATED: CPT

## 2022-07-19 PROCEDURE — APPSS60 APP SPLIT SHARED TIME 46-60 MINUTES: Performed by: PHYSICIAN ASSISTANT

## 2022-07-19 PROCEDURE — 6360000004 HC RX CONTRAST MEDICATION: Performed by: INTERNAL MEDICINE

## 2022-07-19 PROCEDURE — 86850 RBC ANTIBODY SCREEN: CPT

## 2022-07-19 PROCEDURE — 6360000002 HC RX W HCPCS: Performed by: NURSE PRACTITIONER

## 2022-07-19 PROCEDURE — 93005 ELECTROCARDIOGRAM TRACING: CPT | Performed by: NURSE PRACTITIONER

## 2022-07-19 PROCEDURE — C1760 CLOSURE DEV, VASC: HCPCS

## 2022-07-19 PROCEDURE — C1894 INTRO/SHEATH, NON-LASER: HCPCS

## 2022-07-19 PROCEDURE — C1889 IMPLANT/INSERT DEVICE, NOC: HCPCS

## 2022-07-19 PROCEDURE — 86900 BLOOD TYPING SEROLOGIC ABO: CPT

## 2022-07-19 PROCEDURE — 86923 COMPATIBILITY TEST ELECTRIC: CPT

## 2022-07-19 RX ORDER — SODIUM CHLORIDE 0.9 % (FLUSH) 0.9 %
5-40 SYRINGE (ML) INJECTION PRN
Status: DISCONTINUED | OUTPATIENT
Start: 2022-07-19 | End: 2022-07-19 | Stop reason: HOSPADM

## 2022-07-19 RX ORDER — SODIUM CHLORIDE 9 MG/ML
INJECTION, SOLUTION INTRAVENOUS PRN
Status: DISCONTINUED | OUTPATIENT
Start: 2022-07-19 | End: 2022-07-19 | Stop reason: HOSPADM

## 2022-07-19 RX ORDER — OXYCODONE HYDROCHLORIDE AND ACETAMINOPHEN 5; 325 MG/1; MG/1
1 TABLET ORAL EVERY 4 HOURS PRN
Status: DISCONTINUED | OUTPATIENT
Start: 2022-07-19 | End: 2022-07-19 | Stop reason: HOSPADM

## 2022-07-19 RX ORDER — ACETAMINOPHEN 325 MG/1
650 TABLET ORAL EVERY 4 HOURS PRN
Status: DISCONTINUED | OUTPATIENT
Start: 2022-07-19 | End: 2022-07-19 | Stop reason: HOSPADM

## 2022-07-19 RX ORDER — SODIUM CHLORIDE 9 MG/ML
INJECTION, SOLUTION INTRAVENOUS CONTINUOUS
Status: DISCONTINUED | OUTPATIENT
Start: 2022-07-19 | End: 2022-07-19 | Stop reason: HOSPADM

## 2022-07-19 RX ORDER — ASPIRIN 81 MG/1
81 TABLET, CHEWABLE ORAL DAILY
Status: DISCONTINUED | OUTPATIENT
Start: 2022-07-20 | End: 2022-07-19 | Stop reason: HOSPADM

## 2022-07-19 RX ORDER — SODIUM CHLORIDE 0.9 % (FLUSH) 0.9 %
5-40 SYRINGE (ML) INJECTION EVERY 12 HOURS SCHEDULED
Status: DISCONTINUED | OUTPATIENT
Start: 2022-07-19 | End: 2022-07-19 | Stop reason: HOSPADM

## 2022-07-19 RX ORDER — SODIUM CHLORIDE 9 MG/ML
75 INJECTION, SOLUTION INTRAVENOUS CONTINUOUS
Status: DISCONTINUED | OUTPATIENT
Start: 2022-07-19 | End: 2022-07-19 | Stop reason: HOSPADM

## 2022-07-19 RX ORDER — WARFARIN SODIUM 2.5 MG/1
TABLET ORAL
Qty: 40 TABLET | Refills: 11 | Status: SHIPPED
Start: 2022-07-20 | End: 2022-09-14 | Stop reason: ALTCHOICE

## 2022-07-19 RX ORDER — ASPIRIN 81 MG/1
81 TABLET, CHEWABLE ORAL DAILY
Qty: 30 TABLET | Refills: 3 | Status: SHIPPED | OUTPATIENT
Start: 2022-07-20

## 2022-07-19 RX ADMIN — IOPAMIDOL 20 ML: 755 INJECTION, SOLUTION INTRAVENOUS at 08:00

## 2022-07-19 RX ADMIN — CEFAZOLIN SODIUM 2000 MG: 10 INJECTION, POWDER, FOR SOLUTION INTRAVENOUS at 06:00

## 2022-07-19 RX ADMIN — SODIUM CHLORIDE 75 ML/HR: 9 INJECTION, SOLUTION INTRAVENOUS at 05:48

## 2022-07-19 ASSESSMENT — LIFESTYLE VARIABLES
HOW OFTEN DO YOU HAVE A DRINK CONTAINING ALCOHOL: NEVER
HOW MANY STANDARD DRINKS CONTAINING ALCOHOL DO YOU HAVE ON A TYPICAL DAY: PATIENT DOES NOT DRINK

## 2022-07-19 NOTE — CARE COORDINATION
7/19/22, 7:52 AM EDT  DISCHARGE PLANNING EVALUATION:    Belkys Oliver       Admitted: 7/19/2022/ 0500   Hospital day: 0   Location: [facility]/009-A Reason for admit: Persistent atrial fibrillation ([address] [I48.19]   PMH:  has a past medical history of Anesthesia, Atrial fibrillation ([address] Benign prostatic hyperplasia with urinary frequency, CAD (coronary artery disease), Chest pain, Colon polyp, Coronary artery disease involving native coronary artery of native heart without angina pectoris, Dementia ([address] Dizziness, Elevated PSA, GERD (gastroesophageal reflux disease), Heart disease, Hyperlipidemia, and Hypertension. Procedure:   7/19 Watchman procedure. Barriers to Discharge:  Here for elective procedure. Pt is scheduled for Watchman procedure today. If pt meets discharge criteria, will plan discharge later today. PCP: Li Fox, APRN - CNP   %  Patient's Healthcare Decision Maker: Named in [address] [place] [address]    Patient Goals/Plan/Treatment Preferences: Spoke with Peter Fall. He lives at home with his wife, plans to return there at discharge. He is independent and drives. Denies any DME or HH needs. Verified insurance and PCP. Transportation/Food Security/Housekeeping Addressed:  No issues identified. 7/19/22, 11:11 AM EDT    Patient goals/plan/ treatment preferences discussed by  and . Patient goals/plan/ treatment preferences reviewed with patient/ family. Patient/ family verbalize understanding of discharge plan and are in agreement with goal/plan/treatment preferences. Understanding was demonstrated using the teach back method. AVS provided by RN at time of discharge, which includes all necessary medical information pertaining to the patients current course of illness, treatment, post-discharge goals of care, and treatment preferences.      Services At/After Discharge: None       IMM Letter  IMM Letter given to Patient/Family/Significant other/Guardian/POA/by[de-identified]

## 2022-07-19 NOTE — PROGRESS NOTES
Returned to 2E09. Monitor attached showing A-fib. Dressing to right groin dry and intact. No bleeding, swelling, or edema noted.   0.9NSS infusing with approx 500 ml remaining

## 2022-07-19 NOTE — DISCHARGE INSTRUCTIONS
Post [facility] Discharge Instructions     Your follow-up appointment and echocardiogram will be scheduled by Dr. Kalen chambers and their office staff and will call you with the date and time. We are here for you! If you have any questions please call: Moris Griffiths, RN   [phone]      Do you have the help you need at home? ACTIVITY:  NO DRIVING times 24 hours. You may ride in a car. Sterile dressing applied, no showering, minimal ambulation at home, no flights of stairs or no heavy lifting. Further activity limitations will be addressed at the time of this appointment. Do not lift more than five to ten pounds for the first week you are home.  (A gallon of milk is 7 lbs)  Get up and get dressed every day. Avoid wearing tight or restrictive clothing. Do not stay in bed. Set a daily routine. This is an important step in re-gaining your strength. Walk as much as you can. This will help facilitate the healing process. Plan rest periods during the day. If possible avoid strenuous or vigorous activities and exercise if possible (I.e. climbing stairs)  Avoid work that increases muscle tension.        (straining with bowel movements, moving furniture, etc.)  -  While coughing, sneezing or during bowel movement, support the puncture site by applying gentle compression with the palm of your hand    WOUND CARE:  You may shower 48 hours post procedure. Shower every day. No bathtubs, pools, or hot tubs for the first week you are home. Cleanse wound with mild soap and water. Reapply a clean bandaid on the puncture site daily times 5 days or until site is healed. Keep wound dry. A small amount of bloody or clear drainage is normal.   Watch for signs of infections: redness, incision hot to the touch, fever greater than 101 degrees, swelling at the groin or incision site, or discolored drainage from your incision.      NORMAL OBSERVATIONS:  - Slight bump or groin tenderness, which may last up to one week. - Some bruising or discomfort/tenderness    NUTRITION:   Low fat, low salt diet (guidelines for Heart Healthy eating)  Limit caffeine to 1-2 cups per day (coffee, tea, chocolate, soda)  Eat high fiber to avoid constipation and straining during bowel movements (fresh veggies and fruit, whole grain)  Limit alcohol to two servings a day ( 8 oz beer, 1 oz liquor, 4 oz wine)    HEALTHY HABITS:  No Smoking!!!! If you need help to stop smoking please call your doctor. Attempt to achieve and maintain your ideal body weight. We suggest that you walk as much as you can, but do not exhaust yourself. Set a goal and work your way up to it at a paced rate. MEDICATIONS:  DO NOT STOP TAKING ANY MEDICATION OR YOUR PRESCRIBED ANTICOAGULATION WITHOUT SPEAKING WITH YOUR CARDIOLOGIST! Take your pills as ordered at time of discharge. Continue all anticoagulation as instructed at discharge. Avoid all over the counter medications, herbal, and natural supplements unless you have discussed them with your doctor. It is important to follow your doctor's orders, especially if blood thinning drugs are prescribed. Your doctor will monitor your medicine and advise you when or if you can stop taking it. What symptoms or health problems do you need to look out for after you leave the hospital? Call your physician if you have any of these symptoms ([phone]): Weight pound gain of 3 pounds in one day or 5 pounds in one week.  Weight gain is NOT normal.    Chest pain or discomfort  Swelling of the legs, ankles or feet  Increasing shortness of breath  Change in the color or temperature of your lower legs and feet  Develop abdominal pain or unrelieved nausea and/or vomiting  Develop any redness, incision, or limited movement of your arms or legs  Signs of infection: redness, incision hot to the touch, fevers greater than 101 degrees, or colored drainage from your incision  Hematoma is an accumulation of fluid in the tissues at the groin surgical site. Symptoms may include: a lump near the groin surgical site, clear fluid draining from the site, redness, warmth, or swelling. Antibiotic coverage will be needed post WATCHMAN for 6 months post implant if the following is needed:       Dental procedures including cleanings      Gastrointestinal (GI) or genitourinary () procedures in patients with ongoing GI or  tract infection      Respiratory procedures involving incision or biopsy      Procedures on infected skin or muscle    PLEASE CALL THE STRUCTURAL HEART OFFICE IF DENTAL PROCEDURES ARE NEEDED [phone]    What to expect post WATCHMAN implant:   -     Office will call you in one week for follow up  45 days post implant a SANG and labs will be obtained to evaluate need for further anticoagulation use. If any issues with bleeding within the 45 day period, please contact Naa Lomax Dr Coordinator: [phone]. If endothelialization or healing is seen on the SANG, the anticoagulation will be stopped and Plavix will be started. Plavix and Aspirin will continue until the 6 month justice post implant. The [address] will notify you when this needs to occur. SANG, Labs and a 1 year follow up appointment will be needed with Naa eller. 2 year follow up appointment will be needed with your Naa watermaner.

## 2022-07-19 NOTE — BRIEF OP NOTE
[address] [address]  Sedation/Analgesia Post Sedation Record    Pt Name: Nelly Black  Account number: [de-identified]  MRN: [id number]  YOB: 1950  Procedure Performed By: MD MD Janet Martinez, [address]  Primary Care Physician: NARESH Rosenbaum - CNP  Date: 7/19/2022    POST-PROCEDURE    Physicians/Assistants: MD MD Janet Martinez, JATINDER    Procedure Performed:WATCHMAN      Sedation/Anesthesia: Versed/ Fentanyl and 2% xylocaine local anesthesia. Estimated Blood Loss: < 50 ml. Specimens Removed: None         Disposition of Specimen: N/A        Complications: No Immediate Complications.        Post-procedure Diagnosis/Findings:     WFLX 27 mm               MD MD Janet Martinez, JATINDER  Electronically signed 7/19/2022 at 7:49 AM  Interventional Cardiology

## 2022-07-19 NOTE — PROGRESS NOTES
Patient admitted to 2E09  Ambulatory for Watchman. Patient NPO. Patient accompanied by spouse. Vital signs obtained. Assessment and data collection intiated. Oriented to room. Policies and procedures for 2E explained. All questions answered with no further questions at this time. Fall prevention and safety precautions discussed with patient.

## 2022-07-19 NOTE — PROCEDURES
[address] [place] [address]                            CARDIAC CATHETERIZATION    PATIENT NAME: Hazel Null             :        1950  MED REC NO:   [id number]                           ROOM:       [id number]  ACCOUNT NO:   [de-identified]                           ADMIT DATE: 2022  PROVIDER:     Eliane Potter MD    DATE OF PROCEDURE:  2022    PROCEDURE:  Percutaneous left atrial appendage occlusion (Watchman). INDICATION:  Longstanding persistent atrial fibrillation, CHADS2-VASC  score of 4, not a candidate for a long-term anticoagulation. DESCRIPTION OF PROCEDURE:  After informed consent was obtained from the  patient, the patient was brought to the cardiac catheterization  laboratory and prepped in sterile fashion. Right femoral vein was  chosen as primary point of access. The patient was sedated and then SANG  probe was inserted. No left atrial or left atrial appendage thrombus  was seen. Interatrial septum was deemed appropriate for transseptal  puncture. After infiltration of right inguinal region with 2% lidocaine  using micropuncture and modified Seldinger technique under fluoroscopic  guidance and ultrasound guidance, I was able to insert a 12-Cameroonian  sheath into the right femoral vein over the 0.035 Supra Core wire. I  then inserted a pre-shaped 8.5-Cameroonian VersaCross sheath into the SVC. Using SANG guidance performed mid-posterior transseptal puncture without  complication. Heparin IV was given, ACT was above > 250 seconds. I advanced the VersaCross sheath across into the left  atrium. I then exchanged out for a Watchman 12-Cameroonian anterior curved  sheath and placed on the left atrium. Then, this was deaired and  aspirated appropriately. Under wet-to-wet connection, inserted a  5-Cameroonian pigtail catheter into the anterior curved sheath.   In the CURTIS  caudal projection, manipulated the left atrial appendage. Angiography  was performed. Based on preprocedure SANG measurements and the  angiography, Watchman FLX 27-mm device was chosen and loaded per  [facility] recommendations. This was appropriately loaded, deaired. I then removed the pigtail catheter and inserted the device into the  anterior curved sheath, lined up the appropriately fluoroscopic markers. We then slowly and steadily deployed the device. Device was deployed in  appropriate fashion. Tug test was done demonstrating good anchoring,  sizing and seal were checked on SANG and angiography confirming no leak  around the device. No color flow around the device. There was about  10% to 15% compression and the position of the device was appropriate. Given that we had met the P.A.S.S. criteria, I elected to release the  device. Device was released without any complication. I checked the  septum again, small left-to-right shunt was seen. No pericardial  effusion was seen. Device remained stable at the end of the procedure. All equipments were removed from the patient. IV protamine was given. The patient tolerated the procedure well. IMMEDIATE COMPLICATIONS:  None. MEDICATIONS:  See EMR. ACCESS:  [employer] was used for hemostasis. ESTIMATED BLOOD LOSS:  Less than 50 mL. SUMMARY:  Successful percutaneous left atrial appendage occlusion with a  Watchman FLX 27-mm device. PLAN:  1. Bedrest.  2.  Optimal medical therapy. 3.  Risk factor management. 4.  Routine access site care. 5.  IV fluids. 6.  Overnight inpatient admission. 7.  Restart home meds. 8.  Follow up with [facility] per protocol. 9.  45-day SANG  10. Follow up the patient for early discharge protocol. All the above was explained to the patient and the patient's family. They were agreeable and amenable to the plan.         Lawanda Melendrez MD    D: 07/19/2022 7:56:27       T: 07/19/2022 8:58:13     [email or web address]  Job#: [id number]     Doc#: [id number]    CC:

## 2022-07-19 NOTE — DISCHARGE SUMMARY
potassium chloride 10 MEQ extended release tablet  Commonly known as: KLOR-CON  TAKE 1 TABLET BY MOUTH TWICE DAILY     rosuvastatin 20 MG tablet  Commonly known as: Crestor  Take 1 tablet by mouth daily     warfarin 2.5 MG tablet  Commonly known as: COUMADIN  Take as directed. If you are unsure how to take this medication, talk to your nurse or doctor. Original instructions: Indications: HOLD UNTIL RETURN VISIT LATER THIS WEEK Take as directed by [facility] (40 tablets = 30 day supply)  Start taking on: July 20, 2022               Where to Get Your Medications        These medications were sent to [address] [place] [address] [phone]  [address] [place] [address]      Phone: [phone]   aspirin 81 MG chewable tablet       Information about where to get these medications is not yet available    Ask your nurse or doctor about these medications  warfarin 2.5 MG tablet       Recommendations:  Non-valvular afibrillation s/p successful percutaneous left atrial appendage occlusion, [id number]: 4  You will restart [address] [place] [address] Road the following day  SANG and Labs at 45 days and 12 months  At 45 days, if WATCHMAN device is well seated with adequate seal and residual leak < 5 mm, then: continue Aspirin 81 and start Plavix 75 mg q day    Antibiotic prophylaxis (amoxicillin 2 g once 60 minutes prior to procedure) for 6 months for:         Dental procedures including cleanings         Gastrointestinal (GI) or genitourinary () procedures in patients with ongoing GI or  tract infection         Respiratory procedures involving incision or biopsy        Procedures on infected skin or muscle    Disposition: It is medically necessary that patients undergoing percutaneous left atrial appendage occlusion are admitted as an inpatient. This patient had a recovery that was earlier than expected and can be discharged today.  Patient will undergo SANG at 45 days following WATCHMAN implant. Labs: Will be ordered by the Structural [facility] Coordinator as outpatient. Patient Instructions: Activity: Activity as tolerated. No driving for 24 hours following procedure. Diet: Cardiac Diet     Follow-up visits: Will be called to you within one week from day of discharge.     Discharge condition: Stable    Disposition: Home    Time spent on discharge: 65 minutes      Electronically signed by Marian Hannah PA-C on 7/19/2022 at 10:18 AM

## 2022-07-19 NOTE — H&P
[facility]  Sedation/Analgesia History & Physical    Pt Name: Patrick Zapien  Account number: [de-identified]  MRN: [id number]  YOB: 1950  Provider Performing Procedure: Moises Saldaña MD MD  Referring Provider: Moises Saldaña MD   Primary Care Physician: NARESH Cervantes - JO  Date: 7/19/2022    PRE-PROCEDURE    Code Status: FULL CODE  Brief History/Pre-Procedure Diagnosis:   Long standing persistent Afib, [id number] = 4  Not a candidate for long term [address] [place] [address] Road    Consent: : I have discussed with the patient risks, benefits, and alternatives (and relevant risks, benefits, and side effects related to alternatives or not receiving care), and likelihood of the success. The patient and/or representative appear to understand and agree to proceed. The discussion encompasses risks, benefits, and side effects related to the alternatives and the risks related to not receiving the proposed care, treatment, and services. The indication, risks and benefits of the procedure and possible therapeutic consequences and alternatives were discussed with the patient. The patient was given the opportunity to ask questions and to have them answered to his/her satisfaction. Risks of the procedure include but are not limited to the following: Bleeding, hematoma including retroperitoneal hemmorhage, infection, pain and discomfort, injury to the aorta and other blood vessels, rhythm disturbance, low blood pressure, myocardial infarction, stroke, kidney damage/failure, myocardial perforation, allergic reactions to sedatives/contrast material, loss of pulse/vascular compromise requiring surgery, aneurysm/pseudoaneurysm formation, possible loss of a limb/hand/leg, needing blood transfusion, requiring emergent open heart surgery or emergent coronary intervention, the need for intubation/respiratory support, the requirement for defibrillation/cardioversion, and death.  Alternatives to and omission of the suggested procedure were discussed. The patient had no further questions and wished to proceed; the consent form was signed. MEDICAL HISTORY   has a past medical history of Anesthesia, Atrial fibrillation ([facility] [address] Benign prostatic hyperplasia with urinary frequency, CAD (coronary artery disease), Chest pain, Colon polyp, Coronary artery disease involving native coronary artery of native heart without angina pectoris, Dementia ([address] Dizziness, Elevated PSA, GERD (gastroesophageal reflux disease), Heart disease, Hyperlipidemia, and Hypertension. SURGICAL HISTORY   has a past surgical history that includes cardiovascular stress test (09/23/2011); transthoracic echocardiogram (09/23/2011); Cardiac catheterization (09/23/2011); Carpal tunnel release; Tonsillectomy; Adenoidectomy; Vasectomy; Colonoscopy; Ultrasound Prostate/Transrectal (N/A, 10/6/2020); Prostatectomy (N/A, 12/3/2020); and Nasal sinus surgery (N/A, 6/13/2022). Additional information:       ALLERGIES   Allergies as of 07/19/2022    (No Known Allergies)     Additional information:       MEDICATIONS     Current Facility-Administered Medications:     0.9 % sodium chloride infusion, 75 mL/hr, IntraVENous, Continuous, Yuea Jennifer, APRN - CNP, Last Rate: 75 mL/hr at 07/19/22 0548, 75 mL/hr at 07/19/22 0548    sodium chloride flush 0.9 % injection 5-40 mL, 5-40 mL, IntraVENous, 2 times per day, Margaretha [place], APRN - CNP    sodium chloride flush 0.9 % injection 5-40 mL, 5-40 mL, IntraVENous, PRN, Margaretha Jennifer, APRN - CNP    0.9 % sodium chloride infusion, , IntraVENous, PRN, Margaretha Jennifer, APRN - CNP    acetaminophen (TYLENOL) tablet 650 mg, 650 mg, Oral, Q4H PRN, Margaretha Jennifer, APRN - CNP    0.9 % sodium chloride infusion, , IntraVENous, PRN, Margaretha Jennifer, APRN - CNP  Prior to Admission medications    Medication Sig Start Date End Date Taking?  Authorizing Provider   warfarin (COUMADIN) 2.5 MG tablet Take as directed by [facility] (40 tablets = 30 day supply) 6/7/22   NARESH Casillas - CNP   rosuvastatin (CRESTOR) 20 MG tablet Take 1 tablet by mouth daily 5/2/22   Chidi Vazquez MD   nitroGLYCERIN (NITROSTAT) 0.4 MG SL tablet DISSOLVE ONE TABLET UNDER THE TONGUE EVERY 5 MINUTES AS NEEDED FOR CHEST PAIN.   DO NOT EXCEED A TOTAL OF 3 DOSES IN 15 MINUTES 12/21/21   Joesph Sam MD   dilTIAZem (CARDIZEM CD) 360 MG extended release capsule Take 1 capsule by mouth once daily 9/29/21   Morro Marin MD   digoxin (LANOXIN) 125 MCG tablet TAKE 1 TABLET BY MOUTH ONCE DAILY 9/23/21   Morro Marin MD   potassium chloride (KLOR-CON) 10 MEQ extended release tablet TAKE 1 TABLET BY MOUTH TWICE DAILY 9/23/21   Morro Marin MD   metoprolol succinate (TOPROL XL) 50 MG extended release tablet Take 1 tablet by mouth once daily 8/17/21   Morro Marin MD   bisacodyl (DULCOLAX) 5 MG EC tablet Take 5 mg by mouth nightly    Historical Provider, MD     Additional information:       VITAL SIGNS   Vitals:    07/19/22 0540   BP:    Pulse: 92   Resp:    Temp:    SpO2:        PHYSICAL:   General: No acute distress  HEENT:  Unremarkable for age  Neck: without increased JVD, carotid pulses 2+ bilaterally without bruits  Heart: Irregularly irregular, S1 & S2 WNL, S4 gallop, without murmurs or rubs   NYHA: 2  Lungs: Clear to auscultation    Abdomen: BS present, without HSM, masses, or tenderness   Extremities: without C,C,E.  Pulses 2+ bilaterally  Mental Status: Alert & Oriented        PLANNED PROCEDURE   []Cath  []PCI                []Pacemaker/AICD  [x]SANG             []Cardioversion []Peripheral angiography/PTA  [x]Other: WATCHMAN     SEDATION  Planned agent:[x]Midazolam []Meperidine [x]Sublimaze []Morphine  []Diazepam  [x]Other: Propofol    ASA Classification:  []1 []2 [x]3 []4 []5  Class 1: A normal healthy patient  Class 2: Pt with mild to moderate systemic disease  Class 3: Severe systemic disease or disturbance  Class 4: Severe systemic disorders that are already life threatening. Class 5: Moribund pt with little chances of survival, for more than 24 hours. Mallampati I Airway Classification:   []1 []2 [x]3 []4    [x]Pre-procedure diagnostic studies complete and results available. Comment:    [x]Previous sedation/anesthesia experiences assessed. Comment:    [x]The patient is an appropriate candidate to undergo the planned procedure sedation and anesthesia. (Refer to nursing sedation/analgesia documentation record)  [x]Formulation and discussion of sedation/procedure plan, risks, and expectations with patient and/or responsible adult completed. [x]Patient examined immediately prior to the procedure.  (Refer to nursing sedation/analgesia documentation record)    Twan Martinez MD MD   Electronically signed 7/19/2022 at 7:12 AM

## 2022-07-19 NOTE — PROGRESS NOTES
Ambulated in hallway. Dressing to right groin remains dry and intact, no bleeding, swelling, or edema noted.

## 2022-07-20 ENCOUNTER — TELEPHONE (OUTPATIENT)
Dept: CARDIOLOGY CLINIC | Age: 72
End: 2022-07-20

## 2022-07-20 ENCOUNTER — TELEPHONE (OUTPATIENT)
Dept: FAMILY MEDICINE CLINIC | Age: 72
End: 2022-07-20

## 2022-07-20 NOTE — TELEPHONE ENCOUNTER
Post WATCHMAN (same day discharge) follow up call: The WATCHMAN was implanted on 7/19/2022 by Dr. Tone Garcia and met all criteria to be discharged the same day. A follow up phone call was made and patient states that he is doing well. No complaints of pain, hematoma, bruising, oozing, numbness or tingling. Patient was discharged home on 7/19/2022 and Coumadin and states that he be will starting it this evening. The below education was reviewed and the 45 day SANG is set for 9/12/2022. Education was given regarding:  Walk as much as you can. This will help facilitate the healing process. Cleanse wound with mild soap and water. Keep wound dry. A small amount of bloody or clear drainage is normal.   Watch for signs of infections: redness, incision hot to the touch, fever greater than 101 degrees, swelling at the groin or incision site, or discolored drainage from your incision. DO NOT STOP TAKING ANY MEDICATION OR YOUR PRESCRIBED ANTICOAGULATION WITHOUT SPEAKING WITH YOUR CARDIOLOGIST! Take your pills as ordered at time of discharge. Continue all anticoagulation as instructed at discharge. Call with any signs of bleeding. Antibiotic coverage will be needed post WATCHMAN for 6 months post implant if the following is needed: dental procedures including cleanings, gastrointestinal (GI) or genitourinary (), respiratory procedures and procedures on infected skin or muscle    What symptoms or health problems do you need to look out for after you leave the hospital? Call if you have any of these symptoms ([phone]): Weight pound gain of 3 pounds in one day or 5 pounds in one week.  Weight gain is NOT normal.    Chest pain or discomfort  Swelling of the legs, ankles or feet  Increasing shortness of breath  Change in the color or temperature of your lower legs and feet  Develop abdominal pain or unrelieved nausea and/or vomiting  Develop any redness, incision, or limited movement of your arms or legs  Signs of infection: redness, incision hot to the touch, fevers greater than 101 degrees, or colored drainage from your incision  Hematoma is an accumulation of fluid in the tissues at the groin surgical site. Symptoms may include: a lump near the groin surgical site, clear fluid draining from the site, redness, warmth, or swelling. What to expect post WATCHMAN implant:  45 days post implant a SANG and labs will be obtained to evaluate need for further anticoagulation use. If any issues with bleeding within the 45 day period, please contact Naa Lomax Dr Coordinator: [phone]. Patient was instructed to [facility] with any of the above concerns at  [phone].

## 2022-07-26 RX ORDER — METOPROLOL SUCCINATE 50 MG/1
TABLET, EXTENDED RELEASE ORAL
Qty: 90 TABLET | Refills: 0 | Status: ON HOLD | OUTPATIENT
Start: 2022-07-26 | End: 2022-09-29 | Stop reason: HOSPADM

## 2022-07-27 ENCOUNTER — HOSPITAL ENCOUNTER (OUTPATIENT)
Dept: PHARMACY | Age: 72
Setting detail: THERAPIES SERIES
Discharge: HOME | End: 2022-07-27
Payer: MEDICARE

## 2022-07-27 DIAGNOSIS — Z79.01 ANTICOAGULATED ON COUMADIN: Primary | ICD-10-CM

## 2022-07-27 DIAGNOSIS — Z51.81 ENCOUNTER FOR THERAPEUTIC DRUG MONITORING: ICD-10-CM

## 2022-07-27 LAB — POC INR: 1.7 (ref 0.8–1.2)

## 2022-07-27 PROCEDURE — 99212 OFFICE O/P EST SF 10 MIN: CPT

## 2022-07-27 PROCEDURE — 85610 PROTHROMBIN TIME: CPT

## 2022-07-27 PROCEDURE — 36416 COLLJ CAPILLARY BLOOD SPEC: CPT

## 2022-07-27 NOTE — PROGRESS NOTES
Medication Management [address] [place] [address]  [phone] (phone)  [phone] (fax)    Mr. Day Jacobsen is a 81 y.o.  male with history of Afib who presents today for anticoagulation monitoring and adjustment. Patient verifies current dosing regimen and tablet strength. No extra doses. Patient was off Coumadin 7/15/22-7/19/22 for Watchman procedure. Patient denies s/s bleeding/swelling/SOB/chest pain. Patient has some bruising from IVs.   No blood in urine or stool. Patient had more greens last week. No changes in medication/Herbals. Patient started on aspirin 81 mg daily. No change in alcohol use or tobacco use. No change in activity level. Patient denies headaches/dizziness/lightheadedness/falls. No vomiting/diarrhea or acute illness. No Procedures scheduled in the future at this time. Assessment:   Lab Results   Component Value Date    INR 1.70 (H) 07/27/2022    INR 1.29 (H) 07/19/2022    INR 2.30 (H) 07/07/2022     INR subtherapeutic   Recent Labs     07/27/22  0709   INR 1.70*     Patient is subtherapeutic today which is likely due to being off Coumadin multiple days for a recent procedure and increased greens. Previously, patient was fairly well-controlled while on current regimen. Plan:  Coumadin 6.25 mg x 1 dose today 7/27/22 then continue Coumadin 5 mg W and 2.5 mg [id number]. Recheck INR in 2 week(s). Patient reminded to call the Anticoagulation Clinic with any signs or symptoms of bleeding or with any medication changes. Patient given instructions utilizing the teach back method. After visit summary printed and reviewed with patient. Discharged ambulatory in no apparent distress.     For Pharmacy Admin Tracking Only    Intervention Detail: Dose Adjustment: 1, reason: Therapy Optimization  Total # of Interventions Recommended: 1  Total # of Interventions Accepted: 1  Time Spent (min): 3873  [facility], PharmD, [facility]  7/27/2022  8:32 AM

## 2022-08-08 ENCOUNTER — OFFICE VISIT (OUTPATIENT)
Dept: FAMILY MEDICINE CLINIC | Age: 72
End: 2022-08-08
Payer: MEDICARE

## 2022-08-08 VITALS
WEIGHT: 239 LBS | TEMPERATURE: 98 F | SYSTOLIC BLOOD PRESSURE: 132 MMHG | DIASTOLIC BLOOD PRESSURE: 76 MMHG | RESPIRATION RATE: 16 BRPM | BODY MASS INDEX: 32.37 KG/M2 | HEART RATE: 80 BPM | HEIGHT: 72 IN

## 2022-08-08 DIAGNOSIS — R22.43 LOCALIZED SWELLING OF BOTH LOWER LEGS: ICD-10-CM

## 2022-08-08 DIAGNOSIS — Z00.00 MEDICARE ANNUAL WELLNESS VISIT, SUBSEQUENT: Primary | ICD-10-CM

## 2022-08-08 DIAGNOSIS — N40.1 BENIGN PROSTATIC HYPERPLASIA WITH URINARY FREQUENCY: ICD-10-CM

## 2022-08-08 DIAGNOSIS — E78.5 HYPERLIPIDEMIA, UNSPECIFIED HYPERLIPIDEMIA TYPE: ICD-10-CM

## 2022-08-08 DIAGNOSIS — R73.01 IFG (IMPAIRED FASTING GLUCOSE): ICD-10-CM

## 2022-08-08 DIAGNOSIS — I48.0 PAROXYSMAL ATRIAL FIBRILLATION (HCC): ICD-10-CM

## 2022-08-08 DIAGNOSIS — I10 ESSENTIAL HYPERTENSION: ICD-10-CM

## 2022-08-08 DIAGNOSIS — R35.0 BENIGN PROSTATIC HYPERPLASIA WITH URINARY FREQUENCY: ICD-10-CM

## 2022-08-08 PROCEDURE — G0439 PPPS, SUBSEQ VISIT: HCPCS | Performed by: NURSE PRACTITIONER

## 2022-08-08 PROCEDURE — 1123F ACP DISCUSS/DSCN MKR DOCD: CPT | Performed by: NURSE PRACTITIONER

## 2022-08-08 PROCEDURE — 3017F COLORECTAL CA SCREEN DOC REV: CPT | Performed by: NURSE PRACTITIONER

## 2022-08-08 SDOH — ECONOMIC STABILITY: FOOD INSECURITY: WITHIN THE PAST 12 MONTHS, THE FOOD YOU BOUGHT JUST DIDN'T LAST AND YOU DIDN'T HAVE MONEY TO GET MORE.: NEVER TRUE

## 2022-08-08 SDOH — ECONOMIC STABILITY: FOOD INSECURITY: WITHIN THE PAST 12 MONTHS, YOU WORRIED THAT YOUR FOOD WOULD RUN OUT BEFORE YOU GOT MONEY TO BUY MORE.: NEVER TRUE

## 2022-08-08 ASSESSMENT — ENCOUNTER SYMPTOMS
WHEEZING: 0
COLOR CHANGE: 0
SORE THROAT: 0
BACK PAIN: 0
EYE PAIN: 0
TROUBLE SWALLOWING: 0
SHORTNESS OF BREATH: 0
DIARRHEA: 0
COUGH: 0
ABDOMINAL PAIN: 0
VOMITING: 0
NAUSEA: 0
SINUS PAIN: 0
FACIAL SWELLING: 0

## 2022-08-08 ASSESSMENT — PATIENT HEALTH QUESTIONNAIRE - PHQ9
3. TROUBLE FALLING OR STAYING ASLEEP: 0
2. FEELING DOWN, DEPRESSED OR HOPELESS: 3
9. THOUGHTS THAT YOU WOULD BE BETTER OFF DEAD, OR OF HURTING YOURSELF: 0
6. FEELING BAD ABOUT YOURSELF - OR THAT YOU ARE A FAILURE OR HAVE LET YOURSELF OR YOUR FAMILY DOWN: 1
SUM OF ALL RESPONSES TO PHQ QUESTIONS 1-9: 8
1. LITTLE INTEREST OR PLEASURE IN DOING THINGS: 3
SUM OF ALL RESPONSES TO PHQ QUESTIONS 1-9: 8
SUM OF ALL RESPONSES TO PHQ QUESTIONS 1-9: 8
10. IF YOU CHECKED OFF ANY PROBLEMS, HOW DIFFICULT HAVE THESE PROBLEMS MADE IT FOR YOU TO DO YOUR WORK, TAKE CARE OF THINGS AT HOME, OR GET ALONG WITH OTHER PEOPLE: 1
5. POOR APPETITE OR OVEREATING: 0
4. FEELING TIRED OR HAVING LITTLE ENERGY: 1
SUM OF ALL RESPONSES TO PHQ QUESTIONS 1-9: 8
SUM OF ALL RESPONSES TO PHQ9 QUESTIONS 1 & 2: 6
7. TROUBLE CONCENTRATING ON THINGS, SUCH AS READING THE NEWSPAPER OR WATCHING TELEVISION: 0
8. MOVING OR SPEAKING SO SLOWLY THAT OTHER PEOPLE COULD HAVE NOTICED. OR THE OPPOSITE, BEING SO FIGETY OR RESTLESS THAT YOU HAVE BEEN MOVING AROUND A LOT MORE THAN USUAL: 0

## 2022-08-08 ASSESSMENT — LIFESTYLE VARIABLES: HOW OFTEN DO YOU HAVE A DRINK CONTAINING ALCOHOL: NEVER

## 2022-08-08 ASSESSMENT — SOCIAL DETERMINANTS OF HEALTH (SDOH): HOW HARD IS IT FOR YOU TO PAY FOR THE VERY BASICS LIKE FOOD, HOUSING, MEDICAL CARE, AND HEATING?: NOT HARD AT ALL

## 2022-08-08 NOTE — PROGRESS NOTES
Medicare Annual Wellness Visit    Jia Burgess is here for Medicare AWV (Positive depression screening based off of recent health issues. ) and Leg Swelling (Intermittent lower extremity edema. )    Assessment & Plan   Medicare annual wellness visit, subsequent  Hyperlipidemia, unspecified hyperlipidemia type  -     Lipid Panel; Future  Essential hypertension  -     Comprehensive Metabolic Panel; Future  -     CBC with Auto Differential; Future  IFG (impaired fasting glucose)  -     Hemoglobin A1C; Future  Benign prostatic hyperplasia with urinary frequency  -     PSA Prostatic Specific Antigen; Future    Recommendations for Preventive Services Due: see orders and patient instructions/AVS.  Recommended screening schedule for the next 5-10 years is provided to the patient in written form: see Patient Instructions/AVS.     Return in 6 months (on 2/8/2023), or if symptoms worsen or fail to improve, for Medicare Annual Wellness Visit in 1 year, Routine follow up, Medication check. Subjective     Patient's complete Health Risk Assessment and screening values have been reviewed and are found in Flowsheets. The following problems were reviewed today and where indicated follow up appointments were made and/or referrals ordered.     Positive Risk Factor Screenings with Interventions:    Fall Risk:  Do you feel unsteady or are you worried about falling? : (!) yes  2 or more falls in past year?: no  Fall with injury in past year?: no   Fall Risk Interventions:    Home safety tips provided     Depression:  PHQ-2 Score: 6  PHQ-9 Total Score: 8    Severity:1-4 = minimal depression, 5-9 = mild depression, 10-14 = moderate depression, 15-19 = moderately severe depression, 20-27 = severe depression  Depression Interventions:  Regular exercise recommended- 3-5 times per week, 30-45 minutes per session  Relaxation techniques discussed  Patient declines any further evaluation/treatment for this issue          General Health and ACP:  General  In general, how would you say your health is?: Good  In the past 7 days, have you experienced any of the following: New or Increased Pain, New or Increased Fatigue, Loneliness, Social Isolation, Stress or Anger?: (!) Yes  Select all that apply: (!) New or Increased Fatigue, Social Isolation, Anger  Do you get the social and emotional support that you need?: Yes  Do you have a Living Will?: Yes    Advance Directives       Power of  Living Will ACP-Advance Directive ACP-Power of     Not on File Filed on 11/28/16 Filed Not on File        General Health Risk Interventions:  Fatigue: regular exercise recommended- 3-5 times per week, 30-45 minutes per session, pt knows that his fatigue if from recent procedures and that will get better. Recommended pt get rest and increase clear fluids as well. Stress: regular exercise recommended- 3-5 times per week, 30-45 minutes per session, relaxation techniques discussed    Health Habits/Nutrition:  Physical Activity: Inactive    Days of Exercise per Week: 0 days    Minutes of Exercise per Session: 0 min     Have you lost any weight without trying in the past 3 months?: No  Body mass index: (!) 32.87  Have you seen the dentist within the past year?: Yes  Health Habits/Nutrition Interventions:  Inadequate physical activity:  patient is not ready to increase his/her physical activity level at this time    Hearing/Vision:  Do you or your family notice any trouble with your hearing that hasn't been managed with hearing aids?: (!) Yes  Do you have difficulty driving, watching TV, or doing any of your daily activities because of your eyesight?: No  Have you had an eye exam within the past year?: (!) No  No results found.   Hearing/Vision Interventions:  Vision concerns:  patient encouraged to make appointment with his/her eye specialist            Objective   Vitals:    08/08/22 0807   BP: 132/76   Pulse: 80   Resp: 16   Temp: 98 °F (36.7 °C) TempSrc: Oral   Weight: 239 lb (108.4 kg)   Height: 5' 11.5\" (1.816 m)      Body mass index is 32.87 kg/m². No Known Allergies  Prior to Visit Medications    Medication Sig Taking? Authorizing Provider   metoprolol succinate (TOPROL XL) 50 MG extended release tablet Take 1 tablet by mouth once daily Yes Darlen Sicard, APRN - CNP   aspirin 81 MG chewable tablet Take 1 tablet by mouth in the morning. Yes Sandrine Grijalva PA-C   warfarin (COUMADIN) 2.5 MG tablet Indications: HOLD UNTIL RETURN VISIT LATER THIS WEEK Take as directed by [facility] (40 tablets = 30 day supply) Yes Sandrine Grijalva PA-C   rosuvastatin (CRESTOR) 20 MG tablet Take 1 tablet by mouth daily Yes Marco A Oh MD   nitroGLYCERIN (NITROSTAT) 0.4 MG SL tablet DISSOLVE ONE TABLET UNDER THE TONGUE EVERY 5 MINUTES AS NEEDED FOR CHEST PAIN.   DO NOT EXCEED A TOTAL OF 3 DOSES IN 15 MINUTES Yes Justin Lan MD   dilTIAZem (CARDIZEM CD) 360 MG extended release capsule Take 1 capsule by mouth once daily Yes Marco A Oh MD   digoxin (LANOXIN) 125 MCG tablet TAKE 1 TABLET BY MOUTH ONCE DAILY Yes Marco A Oh MD   potassium chloride (KLOR-CON) 10 MEQ extended release tablet TAKE 1 TABLET BY MOUTH TWICE DAILY Yes Marco A Oh MD   bisacodyl (DULCOLAX) 5 MG EC tablet Take 5 mg by mouth nightly Yes Historical Provider, MD Feliz (Including outside providers/suppliers regularly involved in providing care):   Patient Care Team:  Mortimer Griffiths, APRN - CNP as PCP - General (Family Nurse Practitioner)  Mortimer Griffiths, APRN - CNP as PCP - [facility]  Brianna Lo RN as Nurse Navigator (Oncology)  Marco A Oh MD as Cardiologist (Cardiology)     Reviewed and updated this visit:  Tobacco  Allergies  Meds  Problems  Med Hx  Surg Hx  Soc Hx  Fam Hx          Electronically signed by NARESH Dennis CNP on 8/8/2022 at 9:58 AM

## 2022-08-08 NOTE — PROGRESS NOTES
[facility]  [address] [place] [id number]  Dept: [phone]  Dept Fax: [phone]  Loc: [phone]     2022     Elia Olsen (:  1950) is a 81 y.o. male, here for evaluation of the following medical concerns:    Chief Complaint   Patient presents with    Medicare AWV     Positive depression screening based off of recent health issues. Leg Swelling     Intermittent lower extremity edema. Pt presents to the office today for AWV and leg swelling and follow up on chronic health problems. Will be on coumadin for 45 days and then SANG to see if he can come off the coumadin. Pt is having some leg swelling to bialteral lower extremities since his procedure. When he sleeps at night, the swelling goes away. Pt has not been drinking a lot of clear fluids. Pt does not have any pain in his legs, no SOB. Treatment Adherence:   Medication compliance:  compliant all of the time  Diet compliance:  compliant most of the time  Weight trend: stable  Current exercise: no regular exercise  Barriers: impairment:  physical: recent heart procedure. Hypertension:  Home blood pressure monitoring: No. Patient denies chest pain, shortness of breath, and headache. Antihypertensive medication side effects: no medication side effects noted. Use of agents associated with hypertension: none. Sodium (meq/L)   Date Value   2022 142    BUN (mg/dL)   Date Value   2022 12    Glucose   Date Value   2022 114 mg/dL (H)   2016 93 mg/dl      Potassium (meq/L)   Date Value   2022 3.9     Potassium reflex Magnesium (meq/L)   Date Value   2022 3.6    Creatinine (mg/dL)   Date Value   2022 0.9         Hyperlipidemia:  No new myalgias or GI upset on rosuvastatin (Crestor).      Lab Results   Component Value Date    CHOL 151 10/04/2021    TRIG 119 2022    HDL EVERY 5 MINUTES AS NEEDED FOR CHEST PAIN. DO NOT EXCEED A TOTAL OF 3 DOSES IN 15 MINUTES Yes Justin Lan MD   dilTIAZem (CARDIZEM CD) 360 MG extended release capsule Take 1 capsule by mouth once daily Yes Cristino Sorensen MD   digoxin (LANOXIN) 125 MCG tablet TAKE 1 TABLET BY MOUTH ONCE DAILY Yes Cristino Sorensen MD   potassium chloride (KLOR-CON) 10 MEQ extended release tablet TAKE 1 TABLET BY MOUTH TWICE DAILY Yes Cristino Sorensen MD   bisacodyl (DULCOLAX) 5 MG EC tablet Take 5 mg by mouth nightly Yes Historical Provider, MD        Social History     Tobacco Use    Smoking status: Never    Smokeless tobacco: Never   Substance Use Topics    Alcohol use: No        Vitals:    08/08/22 0807   BP: 132/76   Pulse: 80   Resp: 16   Temp: 98 °F (36.7 °C)   TempSrc: Oral   Weight: 239 lb (108.4 kg)   Height: 5' 11.5\" (1.816 m)     Estimated body mass index is 32.87 kg/m² as calculated from the following:    Height as of this encounter: 5' 11.5\" (1.816 m). Weight as of this encounter: 239 lb (108.4 kg). Physical Exam  Vitals reviewed. Constitutional:       General: He is not in acute distress. Appearance: Normal appearance. He is well-developed. HENT:      Head: Normocephalic and atraumatic. Right Ear: Hearing, tympanic membrane, ear canal and external ear normal.      Left Ear: Hearing, tympanic membrane, ear canal and external ear normal.      Nose: Nose normal. No nasal tenderness. Mouth/Throat:      Lips: Pink. Mouth: Mucous membranes are moist. No oral lesions. Pharynx: Oropharynx is clear. Uvula midline. Eyes:      General:         Right eye: No discharge. Left eye: No discharge. Conjunctiva/sclera: Conjunctivae normal.   Neck:      Vascular: No carotid bruit. Trachea: No tracheal deviation. Cardiovascular:      Rate and Rhythm: Normal rate and regular rhythm. Heart sounds: Normal heart sounds. No murmur heard.   Pulmonary:      Effort: Pulmonary effort is normal. No respiratory distress. Breath sounds: Normal breath sounds. Abdominal:      General: Bowel sounds are normal.      Palpations: Abdomen is soft. Tenderness: There is no abdominal tenderness. Musculoskeletal:      Cervical back: Full passive range of motion without pain and neck supple. Right lower le+ Edema present. Left lower le+ Edema present. Lymphadenopathy:      Head:      Right side of head: No submental, submandibular, tonsillar, preauricular, posterior auricular or occipital adenopathy. Left side of head: No submental, submandibular, tonsillar, preauricular, posterior auricular or occipital adenopathy. Cervical: No cervical adenopathy. Skin:     General: Skin is warm and dry. Findings: No rash. Neurological:      General: No focal deficit present. Mental Status: He is alert and oriented to person, place, and time. Coordination: Coordination normal.   Psychiatric:         Mood and Affect: Mood normal.         Behavior: Behavior normal.         Thought Content: Thought content normal.         Judgment: Judgment normal.       ASSESSMENT/PLAN:  1. Medicare annual wellness visit, subsequent    2. Hyperlipidemia, unspecified hyperlipidemia type  - Lipid Panel; Future    3. Essential hypertension  - Comprehensive Metabolic Panel; Future  - CBC with Auto Differential; Future    4. IFG (impaired fasting glucose)  - Hemoglobin A1C; Future    5. Benign prostatic hyperplasia with urinary frequency  - PSA Prostatic Specific Antigen; Future    6. Paroxysmal atrial fibrillation (HCC)    7. Localized swelling of both lower legs    - Rest and elevate legs above the level of the heart 3 times daily for 20 min each time  - Increase clear fluids  - Wear compression stockings while up and around during the day to help with swelling.   - Discussed BPH and prostates cancer HX.   Pt would like our office to monitor PSA's and only refer to urology if needed. PSA ordered for next lab draw. - Work on diet and exercise. - Reviewed mini cog information with patient and his wife, pt memory intact and clock drawing normal, pt assessment benign today. Pt having some memory issues at home per wife, but could be related to recent stressors. I recommended monitoring this and will discuss further at next appt. Pt and wife agreeable. - Follow up with Cardiology as planned. Return in 6 months (on 2/8/2023), or if symptoms worsen or fail to improve, for Medicare Annual Wellness Visit in 1 year, Routine follow up, Medication check. Patient given educational materials - see patient instructions. Discussed use, benefit, and side effects of prescribed medications. All patient questions answered. Pt voiced understanding. Reviewed health maintenance. An electronic signature was used to authenticate this note.     --NARESH Stewart - CNP on 8/8/2022 at 10:05 AM

## 2022-08-08 NOTE — PATIENT INSTRUCTIONS
Personalized Preventive Plan for Nelly Black - 8/8/2022  Medicare offers a range of preventive health benefits. Some of the tests and screenings are paid in full while other may be subject to a deductible, co-insurance, and/or copay. Some of these benefits include a comprehensive review of your medical history including lifestyle, illnesses that may run in your family, and various assessments and screenings as appropriate. After reviewing your medical record and screening and assessments performed today your provider may have ordered immunizations, labs, imaging, and/or referrals for you. A list of these orders (if applicable) as well as your Preventive Care list are included within your After Visit Summary for your review. Other Preventive Recommendations:    A preventive eye exam performed by an eye specialist is recommended every 1-2 years to screen for glaucoma; cataracts, macular degeneration, and other eye disorders. A preventive dental visit is recommended every 6 months. Try to get at least 150 minutes of exercise per week or 10,000 steps per day on a pedometer . Order or download the FREE \"Exercise & Physical Activity: Your Everyday Guide\" from [employer]. Call 4-457.854.4999 or search [employer] online. You need 4796-7906 mg of calcium and 2265-2536 IU of vitamin D per day. It is possible to meet your calcium requirement with diet alone, but a vitamin D supplement is usually necessary to meet this goal.  When exposed to the sun, use a sunscreen that protects against both UVA and UVB radiation with an SPF of 30 or greater. Reapply every 2 to 3 hours or after sweating, drying off with a towel, or swimming. Always wear a seat belt when traveling in a car. Always wear a helmet when riding a bicycle or motorcycle.

## 2022-08-11 ENCOUNTER — HOSPITAL ENCOUNTER (OUTPATIENT)
Dept: PHARMACY | Age: 72
Setting detail: THERAPIES SERIES
Discharge: HOME | End: 2022-08-11
Payer: MEDICARE

## 2022-08-11 DIAGNOSIS — Z51.81 ENCOUNTER FOR THERAPEUTIC DRUG MONITORING: ICD-10-CM

## 2022-08-11 DIAGNOSIS — Z79.01 ANTICOAGULATED ON COUMADIN: Primary | ICD-10-CM

## 2022-08-11 LAB — POC INR: 2.8 (ref 0.8–1.2)

## 2022-08-11 PROCEDURE — 99211 OFF/OP EST MAY X REQ PHY/QHP: CPT

## 2022-08-11 PROCEDURE — 85610 PROTHROMBIN TIME: CPT

## 2022-08-11 PROCEDURE — 36416 COLLJ CAPILLARY BLOOD SPEC: CPT

## 2022-08-11 NOTE — PROGRESS NOTES
Medication Management [address]   [phone] (phone)  [phone] (fax)    Mr. Talon Aponte is a 81 y.o.  male with history of Afib who presents today for anticoagulation monitoring and adjustment. Patient verifies current dosing regimen and tablet strength. No missed or extra doses. Reports some mild SOB without chest pain that improves after resting. Has been having this since surgery. No blood in urine or stool. No dietary changes. No changes in medication/OTC agents/Herbals. No change in alcohol use or tobacco use. Has been trying to be more active recently. Patient denies headaches/dizziness/lightheadedness/falls. No vomiting/diarrhea or acute illness. No Procedures scheduled in the future at this time. Assessment:   Lab Results   Component Value Date    INR 2.80 (H) 08/11/2022    INR 1.70 (H) 07/27/2022    INR 1.29 (H) 07/19/2022     INR therapeutic   Recent Labs     08/11/22  0717   INR 2.80*     Patient states he will be evaluated ~45 days after his Watchman procedure to see if he needs to continue warfarin therapy. Plan:  Continue Coumadin 5mg W and 2.5mg SuMTuThFSa. Recheck INR in 3 week(s) per patient preference. Patient reminded to call the Anticoagulation Clinic with any signs or symptoms of bleeding or with any medication changes. Patient given instructions utilizing the teach back method. After visit summary printed and reviewed with patient. Discharged ambulatory in no apparent distress. For Pharmacy Admin Tracking Only    Time Spent (min): 20    Remy Smith PharmD  8/11/2022 7:27 AM

## 2022-09-01 ENCOUNTER — HOSPITAL ENCOUNTER (OUTPATIENT)
Dept: PHARMACY | Age: 72
Setting detail: THERAPIES SERIES
Discharge: HOME | End: 2022-09-01
Payer: MEDICARE

## 2022-09-01 DIAGNOSIS — Z51.81 ENCOUNTER FOR THERAPEUTIC DRUG MONITORING: ICD-10-CM

## 2022-09-01 DIAGNOSIS — Z79.01 ANTICOAGULATED ON COUMADIN: Primary | ICD-10-CM

## 2022-09-01 LAB — POC INR: 2.5 (ref 0.8–1.2)

## 2022-09-01 PROCEDURE — 36416 COLLJ CAPILLARY BLOOD SPEC: CPT | Performed by: PHARMACIST

## 2022-09-01 PROCEDURE — 99211 OFF/OP EST MAY X REQ PHY/QHP: CPT | Performed by: PHARMACIST

## 2022-09-01 PROCEDURE — 85610 PROTHROMBIN TIME: CPT | Performed by: PHARMACIST

## 2022-09-09 ENCOUNTER — HOSPITAL ENCOUNTER (OUTPATIENT)
Age: 72
Discharge: HOME | End: 2022-09-09
Payer: MEDICARE

## 2022-09-09 ENCOUNTER — PREP FOR PROCEDURE (OUTPATIENT)
Dept: CARDIOLOGY | Age: 72
End: 2022-09-09

## 2022-09-09 DIAGNOSIS — I48.11 LONGSTANDING PERSISTENT ATRIAL FIBRILLATION (HCC): ICD-10-CM

## 2022-09-09 DIAGNOSIS — Z95.818 PRESENCE OF WATCHMAN LEFT ATRIAL APPENDAGE CLOSURE DEVICE: ICD-10-CM

## 2022-09-09 LAB
ANION GAP SERPL CALCULATED.3IONS-SCNC: 12 MEQ/L (ref 8–16)
BUN BLDV-MCNC: 19 MG/DL (ref 7–22)
CALCIUM SERPL-MCNC: 9.3 MG/DL (ref 8.5–10.5)
CHLORIDE BLD-SCNC: 104 MEQ/L (ref 98–111)
CO2: 26 MEQ/L (ref 23–33)
CREAT SERPL-MCNC: 1 MG/DL (ref 0.4–1.2)
ERYTHROCYTE [DISTWIDTH] IN BLOOD BY AUTOMATED COUNT: 14.1 % (ref 11.5–14.5)
ERYTHROCYTE [DISTWIDTH] IN BLOOD BY AUTOMATED COUNT: 43.3 FL (ref 35–45)
GFR SERPL CREATININE-BSD FRML MDRD: 74 ML/MIN/1.73M2
GLUCOSE BLD-MCNC: 120 MG/DL (ref 70–108)
HCT VFR BLD CALC: 51.6 % (ref 42–52)
HEMOGLOBIN: 17.1 GM/DL (ref 14–18)
MCH RBC QN AUTO: 28.4 PG (ref 26–33)
MCHC RBC AUTO-ENTMCNC: 33.1 GM/DL (ref 32.2–35.5)
MCV RBC AUTO: 85.6 FL (ref 80–94)
PLATELET # BLD: 241 THOU/MM3 (ref 130–400)
PMV BLD AUTO: 10.1 FL (ref 9.4–12.4)
POTASSIUM SERPL-SCNC: 4.7 MEQ/L (ref 3.5–5.2)
RBC # BLD: 6.03 MILL/MM3 (ref 4.7–6.1)
SODIUM BLD-SCNC: 142 MEQ/L (ref 135–145)
WBC # BLD: 8.7 THOU/MM3 (ref 4.8–10.8)

## 2022-09-09 PROCEDURE — 36415 COLL VENOUS BLD VENIPUNCTURE: CPT

## 2022-09-09 PROCEDURE — 85027 COMPLETE CBC AUTOMATED: CPT

## 2022-09-09 PROCEDURE — 80048 BASIC METABOLIC PNL TOTAL CA: CPT

## 2022-09-09 RX ORDER — SODIUM CHLORIDE 0.9 % (FLUSH) 0.9 %
5-40 SYRINGE (ML) INJECTION PRN
Status: CANCELLED | OUTPATIENT
Start: 2022-09-09

## 2022-09-09 RX ORDER — SODIUM CHLORIDE 9 MG/ML
INJECTION, SOLUTION INTRAVENOUS PRN
Status: CANCELLED | OUTPATIENT
Start: 2022-09-09

## 2022-09-09 RX ORDER — SODIUM CHLORIDE 0.9 % (FLUSH) 0.9 %
5-40 SYRINGE (ML) INJECTION EVERY 12 HOURS SCHEDULED
Status: CANCELLED | OUTPATIENT
Start: 2022-09-09

## 2022-09-12 ENCOUNTER — HOSPITAL ENCOUNTER (OUTPATIENT)
Age: 72
Setting detail: OUTPATIENT SURGERY
Discharge: HOME | End: 2022-09-12
Attending: NUCLEAR MEDICINE | Admitting: NUCLEAR MEDICINE
Payer: MEDICARE

## 2022-09-12 VITALS
DIASTOLIC BLOOD PRESSURE: 88 MMHG | TEMPERATURE: 97 F | HEIGHT: 73 IN | RESPIRATION RATE: 18 BRPM | WEIGHT: 239 LBS | HEART RATE: 81 BPM | OXYGEN SATURATION: 95 % | BODY MASS INDEX: 31.68 KG/M2 | SYSTOLIC BLOOD PRESSURE: 148 MMHG

## 2022-09-12 DIAGNOSIS — I48.11 LONGSTANDING PERSISTENT ATRIAL FIBRILLATION (HCC): ICD-10-CM

## 2022-09-12 DIAGNOSIS — Z95.818 PRESENCE OF WATCHMAN LEFT ATRIAL APPENDAGE CLOSURE DEVICE: ICD-10-CM

## 2022-09-12 LAB
LV EF: 45 %
LVEF MODALITY: NORMAL

## 2022-09-12 PROCEDURE — 93320 DOPPLER ECHO COMPLETE: CPT

## 2022-09-12 PROCEDURE — 93312 ECHO TRANSESOPHAGEAL: CPT | Performed by: NUCLEAR MEDICINE

## 2022-09-12 PROCEDURE — 7100000010 HC PHASE II RECOVERY - FIRST 15 MIN: Performed by: NUCLEAR MEDICINE

## 2022-09-12 PROCEDURE — 2580000003 HC RX 258: Performed by: STUDENT IN AN ORGANIZED HEALTH CARE EDUCATION/TRAINING PROGRAM

## 2022-09-12 PROCEDURE — 99152 MOD SED SAME PHYS/QHP 5/>YRS: CPT | Performed by: NUCLEAR MEDICINE

## 2022-09-12 PROCEDURE — 6360000002 HC RX W HCPCS: Performed by: NUCLEAR MEDICINE

## 2022-09-12 PROCEDURE — 93325 DOPPLER ECHO COLOR FLOW MAPG: CPT

## 2022-09-12 PROCEDURE — 7100000011 HC PHASE II RECOVERY - ADDTL 15 MIN: Performed by: NUCLEAR MEDICINE

## 2022-09-12 PROCEDURE — 93312 ECHO TRANSESOPHAGEAL: CPT

## 2022-09-12 RX ORDER — FENTANYL CITRATE 50 UG/ML
INJECTION, SOLUTION INTRAMUSCULAR; INTRAVENOUS PRN
Status: DISCONTINUED | OUTPATIENT
Start: 2022-09-12 | End: 2022-09-12 | Stop reason: ALTCHOICE

## 2022-09-12 RX ORDER — SODIUM CHLORIDE 0.9 % (FLUSH) 0.9 %
5-40 SYRINGE (ML) INJECTION EVERY 12 HOURS SCHEDULED
Status: DISCONTINUED | OUTPATIENT
Start: 2022-09-12 | End: 2022-09-12 | Stop reason: HOSPADM

## 2022-09-12 RX ORDER — SODIUM CHLORIDE 0.9 % (FLUSH) 0.9 %
5-40 SYRINGE (ML) INJECTION PRN
Status: DISCONTINUED | OUTPATIENT
Start: 2022-09-12 | End: 2022-09-12 | Stop reason: HOSPADM

## 2022-09-12 RX ORDER — MIDAZOLAM HYDROCHLORIDE 1 MG/ML
INJECTION INTRAMUSCULAR; INTRAVENOUS PRN
Status: DISCONTINUED | OUTPATIENT
Start: 2022-09-12 | End: 2022-09-12 | Stop reason: ALTCHOICE

## 2022-09-12 RX ORDER — SODIUM CHLORIDE 9 MG/ML
INJECTION, SOLUTION INTRAVENOUS PRN
Status: DISCONTINUED | OUTPATIENT
Start: 2022-09-12 | End: 2022-09-12 | Stop reason: HOSPADM

## 2022-09-12 RX ADMIN — SODIUM CHLORIDE: 9 INJECTION, SOLUTION INTRAVENOUS at 13:22

## 2022-09-12 ASSESSMENT — PAIN SCALES - GENERAL: PAINLEVEL_OUTOF10: 0

## 2022-09-12 ASSESSMENT — PAIN - FUNCTIONAL ASSESSMENT: PAIN_FUNCTIONAL_ASSESSMENT: NONE - DENIES PAIN

## 2022-09-12 NOTE — H&P
[facility]  Sedation/Analgesia History & Physical    Pt Name: Reynold Goff  Account number: [de-identified]  MRN: [id number]  YOB: 1950  Provider Performing Procedure: Peterson Arreola MD MD [facility]  Primary Care Physician: NARESH Davidson CNP  Date: 9/12/2022    PRE-PROCEDURE    Code Status: FULL CODE  Brief History/Pre-Procedure Diagnosis:   See chart      Consent: : I have discussed with the patient risks, benefits, and alternatives (and relevant risks, benefits, and side effects related to alternatives or not receiving care), and likelihood of the success. The patient and/or representative appear to understand and agree to proceed. The discussion encompasses risks, benefits, and side effects related to the alternatives and the risks related to not receiving the proposed care, treatment, and services. MEDICAL HISTORY  []ASHD/ANGINA/MI/CHF   [x]Hypertension  []Diabetes  []Hyperlipidemia  []Smoking  []Family Hx of ASHD  []Additional information:       has a past medical history of Anesthesia, Atrial fibrillation ([address] Benign prostatic hyperplasia with urinary frequency, CAD (coronary artery disease), Chest pain, Colon polyp, Coronary artery disease involving native coronary artery of native heart without angina pectoris, Dementia ([address] Dizziness, Elevated PSA, GERD (gastroesophageal reflux disease), Heart disease, Hyperlipidemia, and Hypertension. SURGICAL HISTORY   has a past surgical history that includes cardiovascular stress test (09/23/2011); transthoracic echocardiogram (09/23/2011); Cardiac catheterization (09/23/2011); Carpal tunnel release; Tonsillectomy; Adenoidectomy; Vasectomy; Colonoscopy; Ultrasound Prostate/Transrectal (N/A, 10/6/2020); Prostatectomy (N/A, 12/3/2020); and Nasal sinus surgery (N/A, 6/13/2022).   Additional information:       ALLERGIES   Allergies as of 07/14/2022    (No Known Allergies)     Additional SIGNS   Vitals:    09/12/22 1312   BP: 129/72   Pulse: 74   Resp: 16   Temp: 97.1 °F (36.2 °C)   SpO2: 96%       PHYSICAL:   General: No acute distress  HEENT:  Unremarkable for age  Neck: without increased JVD, carotid pulses 2+ bilaterally without bruits  Heart: RRR, S1 & S2 WNL, S4 gallop, without murmurs or rubs    Lungs: Clear to auscultation    Abdomen: BS present, without HSM, masses, or tenderness    Extremities: without C,C,E.  Pulses 2+ bilaterally  Mental Status: Alert & Oriented        PLANNED PROCEDURE   []Cath  []PCI                []Pacemaker/AICD  [x]SANG             []Cardioversion []Peripheral angiography/PTA  []Other:      SEDATION  Planned agent:[x]Midazolam []Meperidine [x]Sublimaze []Morphine  []Diazepam  []Other:     ASA Classification:  []1 [x]2 []3 []4 []5  Class 1: A normal healthy patient  Class 2: Pt with mild to moderate systemic disease  Class 3: Severe systemic disease or disturbance  Class 4: Severe systemic disorders that are already life threatening. Class 5: Moribund pt with little chances of survival, for more than 24 hours. Mallampati I Airway Classification:   []1 [x]2 []3 []4    [x]Pre-procedure diagnostic studies complete and results available. Comment:    [x]Previous sedation/anesthesia experiences assessed. Comment:    [x]The patient is an appropriate candidate to undergo the planned procedure sedation and anesthesia. (Refer to nursing sedation/analgesia documentation record)  [x]Formulation and discussion of sedation/procedure plan, risks, and expectations with patient and/or responsible adult completed. [x]Patient examined immediately prior to the procedure.  (Refer to nursing sedation/analgesia documentation record)    Alis Oseguera MD MD [address] [facility]  Electronically signed 9/12/2022 at 2:16 PM

## 2022-09-12 NOTE — TELEPHONE ENCOUNTER
Adriana Garcia called requesting a refill on the following medications:  Requested Prescriptions     Pending Prescriptions Disp Refills    potassium chloride (KLOR-CON) 10 MEQ extended release tablet 180 tablet 3     Sig: TAKE 1 TABLET BY MOUTH TWICE DAILY    digoxin (LANOXIN) 125 MCG tablet 90 tablet 3     Sig: TAKE 1 TABLET BY MOUTH ONCE DAILY     Pharmacy verified: [facility] on [address]  . pv      Date of last visit: 6/23/2022  Date of next visit (if applicable): 67/5/6920

## 2022-09-12 NOTE — PROGRESS NOTES
admitted to endoscopy for a SANG. Procedure verified and consent signed. Call light in reach, wife at side.

## 2022-09-13 ENCOUNTER — TELEPHONE (OUTPATIENT)
Dept: CARDIOLOGY CLINIC | Age: 72
End: 2022-09-13

## 2022-09-13 RX ORDER — POTASSIUM CHLORIDE 750 MG/1
TABLET, FILM COATED, EXTENDED RELEASE ORAL
Qty: 180 TABLET | Refills: 0 | Status: ON HOLD | OUTPATIENT
Start: 2022-09-13 | End: 2022-09-27

## 2022-09-13 RX ORDER — DIGOXIN 125 MCG
TABLET ORAL
Qty: 90 TABLET | Refills: 0 | Status: SHIPPED | OUTPATIENT
Start: 2022-09-13

## 2022-09-13 RX ORDER — CLOPIDOGREL BISULFATE 75 MG/1
75 TABLET ORAL DAILY
Qty: 30 TABLET | Refills: 3 | Status: SHIPPED | OUTPATIENT
Start: 2022-09-13 | End: 2022-10-17 | Stop reason: SDUPTHER

## 2022-09-13 NOTE — TELEPHONE ENCOUNTER
This patient has completed his 45 day post WATCHMAN SANG with the below results.  [facility] from your standpoint are you ok with stopping the patient's Coumadin, continuing Aspirin and starting the patient on Plavix 75mg daily?

## 2022-09-14 PROBLEM — Z79.01 ANTICOAGULATED ON COUMADIN: Status: RESOLVED | Noted: 2018-07-25 | Resolved: 2022-09-14

## 2022-09-14 PROBLEM — Z51.81 ENCOUNTER FOR THERAPEUTIC DRUG MONITORING: Status: RESOLVED | Noted: 2021-04-19 | Resolved: 2022-09-14

## 2022-09-14 RX ORDER — POTASSIUM CHLORIDE 750 MG/1
TABLET, FILM COATED, EXTENDED RELEASE ORAL
Qty: 180 TABLET | Refills: 0 | Status: SHIPPED | OUTPATIENT
Start: 2022-09-14 | End: 2022-10-05

## 2022-09-14 RX ORDER — DIGOXIN 125 MCG
TABLET ORAL
Qty: 90 TABLET | Refills: 0 | Status: SHIPPED | OUTPATIENT
Start: 2022-09-14 | End: 2022-09-26 | Stop reason: SDUPTHER

## 2022-09-26 ENCOUNTER — APPOINTMENT (OUTPATIENT)
Dept: GENERAL RADIOLOGY | Age: 72
DRG: 308 | End: 2022-09-26
Payer: MEDICARE

## 2022-09-26 ENCOUNTER — OFFICE VISIT (OUTPATIENT)
Dept: CARDIOLOGY CLINIC | Age: 72
End: 2022-09-26
Payer: MEDICARE

## 2022-09-26 ENCOUNTER — HOSPITAL ENCOUNTER (INPATIENT)
Age: 72
LOS: 3 days | Discharge: HOME | DRG: 308 | End: 2022-09-29
Attending: EMERGENCY MEDICINE | Admitting: HOSPITALIST
Payer: MEDICARE

## 2022-09-26 VITALS
WEIGHT: 238 LBS | HEART RATE: 126 BPM | BODY MASS INDEX: 31.54 KG/M2 | SYSTOLIC BLOOD PRESSURE: 145 MMHG | DIASTOLIC BLOOD PRESSURE: 106 MMHG | HEIGHT: 73 IN

## 2022-09-26 DIAGNOSIS — E78.5 HYPERLIPIDEMIA, UNSPECIFIED HYPERLIPIDEMIA TYPE: ICD-10-CM

## 2022-09-26 DIAGNOSIS — I10 ESSENTIAL HYPERTENSION: ICD-10-CM

## 2022-09-26 DIAGNOSIS — R06.02 SOB (SHORTNESS OF BREATH): Primary | ICD-10-CM

## 2022-09-26 DIAGNOSIS — I48.91 ATRIAL FIBRILLATION WITH RVR (HCC): Primary | ICD-10-CM

## 2022-09-26 DIAGNOSIS — I48.0 PAROXYSMAL ATRIAL FIBRILLATION (HCC): ICD-10-CM

## 2022-09-26 DIAGNOSIS — Z95.818 PRESENCE OF WATCHMAN LEFT ATRIAL APPENDAGE CLOSURE DEVICE: ICD-10-CM

## 2022-09-26 LAB
ALBUMIN SERPL-MCNC: 4.2 G/DL (ref 3.5–5.1)
ALP BLD-CCNC: 87 U/L (ref 38–126)
ALT SERPL-CCNC: 18 U/L (ref 11–66)
ANION GAP SERPL CALCULATED.3IONS-SCNC: 13 MEQ/L (ref 8–16)
AST SERPL-CCNC: 22 U/L (ref 5–40)
BASOPHILS # BLD: 1.1 %
BASOPHILS ABSOLUTE: 0.1 THOU/MM3 (ref 0–0.1)
BILIRUB SERPL-MCNC: 1 MG/DL (ref 0.3–1.2)
BILIRUBIN DIRECT: < 0.2 MG/DL (ref 0–0.3)
BUN BLDV-MCNC: 11 MG/DL (ref 7–22)
CALCIUM SERPL-MCNC: 9.6 MG/DL (ref 8.5–10.5)
CHLORIDE BLD-SCNC: 104 MEQ/L (ref 98–111)
CO2: 24 MEQ/L (ref 23–33)
CREAT SERPL-MCNC: 1 MG/DL (ref 0.4–1.2)
DIGOXIN LEVEL: 0.7 NG/ML (ref 0.5–2)
EKG Q-T INTERVAL: 324 MS
EKG Q-T INTERVAL: 334 MS
EKG QRS DURATION: 92 MS
EKG QRS DURATION: 98 MS
EKG QTC CALCULATION (BAZETT): 420 MS
EKG QTC CALCULATION (BAZETT): 475 MS
EKG R AXIS: 17 DEGREES
EKG R AXIS: 22 DEGREES
EKG T AXIS: 49 DEGREES
EKG T AXIS: 75 DEGREES
EKG VENTRICULAR RATE: 101 BPM
EKG VENTRICULAR RATE: 122 BPM
EOSINOPHIL # BLD: 2.9 %
EOSINOPHILS ABSOLUTE: 0.3 THOU/MM3 (ref 0–0.4)
ERYTHROCYTE [DISTWIDTH] IN BLOOD BY AUTOMATED COUNT: 14.5 % (ref 11.5–14.5)
ERYTHROCYTE [DISTWIDTH] IN BLOOD BY AUTOMATED COUNT: 44.6 FL (ref 35–45)
GFR SERPL CREATININE-BSD FRML MDRD: 74 ML/MIN/1.73M2
GLUCOSE BLD-MCNC: 103 MG/DL (ref 70–108)
HCT VFR BLD CALC: 50.2 % (ref 42–52)
HEMOGLOBIN: 16.4 GM/DL (ref 14–18)
IMMATURE GRANS (ABS): 0.03 THOU/MM3 (ref 0–0.07)
IMMATURE GRANULOCYTES: 0.3 %
LYMPHOCYTES # BLD: 21.9 %
LYMPHOCYTES ABSOLUTE: 2 THOU/MM3 (ref 1–4.8)
MAGNESIUM: 2 MG/DL (ref 1.6–2.4)
MCH RBC QN AUTO: 28 PG (ref 26–33)
MCHC RBC AUTO-ENTMCNC: 32.7 GM/DL (ref 32.2–35.5)
MCV RBC AUTO: 85.7 FL (ref 80–94)
MONOCYTES # BLD: 7.5 %
MONOCYTES ABSOLUTE: 0.7 THOU/MM3 (ref 0.4–1.3)
NUCLEATED RED BLOOD CELLS: 0 /100 WBC
PLATELET # BLD: 248 THOU/MM3 (ref 130–400)
PMV BLD AUTO: 10.4 FL (ref 9.4–12.4)
POTASSIUM REFLEX MAGNESIUM: 4.4 MEQ/L (ref 3.5–5.2)
PRO-BNP: 2467 PG/ML (ref 0–900)
RBC # BLD: 5.86 MILL/MM3 (ref 4.7–6.1)
SEG NEUTROPHILS: 66.3 %
SEGMENTED NEUTROPHILS ABSOLUTE COUNT: 6.2 THOU/MM3 (ref 1.8–7.7)
SODIUM BLD-SCNC: 141 MEQ/L (ref 135–145)
T4 FREE: 1.26 NG/DL (ref 0.93–1.76)
TOTAL PROTEIN: 7.2 G/DL (ref 6.1–8)
TROPONIN T: < 0.01 NG/ML
TSH SERPL DL<=0.05 MIU/L-ACNC: 5.55 UIU/ML (ref 0.4–4.2)
WBC # BLD: 9.3 THOU/MM3 (ref 4.8–10.8)

## 2022-09-26 PROCEDURE — 84443 ASSAY THYROID STIM HORMONE: CPT

## 2022-09-26 PROCEDURE — 84439 ASSAY OF FREE THYROXINE: CPT

## 2022-09-26 PROCEDURE — 2580000003 HC RX 258

## 2022-09-26 PROCEDURE — 93005 ELECTROCARDIOGRAM TRACING: CPT

## 2022-09-26 PROCEDURE — 1036F TOBACCO NON-USER: CPT | Performed by: NURSE PRACTITIONER

## 2022-09-26 PROCEDURE — 80048 BASIC METABOLIC PNL TOTAL CA: CPT

## 2022-09-26 PROCEDURE — 85025 COMPLETE CBC W/AUTO DIFF WBC: CPT

## 2022-09-26 PROCEDURE — 3017F COLORECTAL CA SCREEN DOC REV: CPT | Performed by: NURSE PRACTITIONER

## 2022-09-26 PROCEDURE — 99213 OFFICE O/P EST LOW 20 MIN: CPT | Performed by: NURSE PRACTITIONER

## 2022-09-26 PROCEDURE — 2500000003 HC RX 250 WO HCPCS: Performed by: STUDENT IN AN ORGANIZED HEALTH CARE EDUCATION/TRAINING PROGRAM

## 2022-09-26 PROCEDURE — 6360000002 HC RX W HCPCS

## 2022-09-26 PROCEDURE — 83880 ASSAY OF NATRIURETIC PEPTIDE: CPT

## 2022-09-26 PROCEDURE — 96374 THER/PROPH/DIAG INJ IV PUSH: CPT

## 2022-09-26 PROCEDURE — 93010 ELECTROCARDIOGRAM REPORT: CPT | Performed by: NUCLEAR MEDICINE

## 2022-09-26 PROCEDURE — 2580000003 HC RX 258: Performed by: STUDENT IN AN ORGANIZED HEALTH CARE EDUCATION/TRAINING PROGRAM

## 2022-09-26 PROCEDURE — 80076 HEPATIC FUNCTION PANEL: CPT

## 2022-09-26 PROCEDURE — 1200000003 HC TELEMETRY R&B

## 2022-09-26 PROCEDURE — G8427 DOCREV CUR MEDS BY ELIG CLIN: HCPCS | Performed by: NURSE PRACTITIONER

## 2022-09-26 PROCEDURE — 93000 ELECTROCARDIOGRAM COMPLETE: CPT | Performed by: NURSE PRACTITIONER

## 2022-09-26 PROCEDURE — 84484 ASSAY OF TROPONIN QUANT: CPT

## 2022-09-26 PROCEDURE — 6370000000 HC RX 637 (ALT 250 FOR IP)

## 2022-09-26 PROCEDURE — 99223 1ST HOSP IP/OBS HIGH 75: CPT

## 2022-09-26 PROCEDURE — G8417 CALC BMI ABV UP PARAM F/U: HCPCS | Performed by: NURSE PRACTITIONER

## 2022-09-26 PROCEDURE — 36415 COLL VENOUS BLD VENIPUNCTURE: CPT

## 2022-09-26 PROCEDURE — 71046 X-RAY EXAM CHEST 2 VIEWS: CPT

## 2022-09-26 PROCEDURE — 1123F ACP DISCUSS/DSCN MKR DOCD: CPT | Performed by: NURSE PRACTITIONER

## 2022-09-26 PROCEDURE — 80162 ASSAY OF DIGOXIN TOTAL: CPT

## 2022-09-26 PROCEDURE — 93005 ELECTROCARDIOGRAM TRACING: CPT | Performed by: STUDENT IN AN ORGANIZED HEALTH CARE EDUCATION/TRAINING PROGRAM

## 2022-09-26 PROCEDURE — 83735 ASSAY OF MAGNESIUM: CPT

## 2022-09-26 PROCEDURE — 99285 EMERGENCY DEPT VISIT HI MDM: CPT

## 2022-09-26 RX ORDER — SODIUM CHLORIDE 9 MG/ML
INJECTION, SOLUTION INTRAVENOUS PRN
Status: DISCONTINUED | OUTPATIENT
Start: 2022-09-26 | End: 2022-09-29 | Stop reason: HOSPADM

## 2022-09-26 RX ORDER — SODIUM CHLORIDE 0.9 % (FLUSH) 0.9 %
10 SYRINGE (ML) INJECTION EVERY 12 HOURS SCHEDULED
Status: DISCONTINUED | OUTPATIENT
Start: 2022-09-26 | End: 2022-09-29 | Stop reason: HOSPADM

## 2022-09-26 RX ORDER — ONDANSETRON 2 MG/ML
4 INJECTION INTRAMUSCULAR; INTRAVENOUS EVERY 6 HOURS PRN
Status: DISCONTINUED | OUTPATIENT
Start: 2022-09-26 | End: 2022-09-29 | Stop reason: HOSPADM

## 2022-09-26 RX ORDER — MAGNESIUM SULFATE IN WATER 40 MG/ML
2000 INJECTION, SOLUTION INTRAVENOUS PRN
Status: DISCONTINUED | OUTPATIENT
Start: 2022-09-26 | End: 2022-09-29 | Stop reason: HOSPADM

## 2022-09-26 RX ORDER — DILTIAZEM HYDROCHLORIDE 5 MG/ML
10 INJECTION INTRAVENOUS ONCE
Status: COMPLETED | OUTPATIENT
Start: 2022-09-26 | End: 2022-09-26

## 2022-09-26 RX ORDER — METOPROLOL SUCCINATE 50 MG/1
50 TABLET, EXTENDED RELEASE ORAL DAILY
Status: DISCONTINUED | OUTPATIENT
Start: 2022-09-26 | End: 2022-09-27

## 2022-09-26 RX ORDER — POTASSIUM CHLORIDE 20 MEQ/1
40 TABLET, EXTENDED RELEASE ORAL PRN
Status: DISCONTINUED | OUTPATIENT
Start: 2022-09-26 | End: 2022-09-29 | Stop reason: HOSPADM

## 2022-09-26 RX ORDER — ENOXAPARIN SODIUM 100 MG/ML
30 INJECTION SUBCUTANEOUS 2 TIMES DAILY
Status: DISCONTINUED | OUTPATIENT
Start: 2022-09-26 | End: 2022-09-29 | Stop reason: HOSPADM

## 2022-09-26 RX ORDER — DIGOXIN 125 MCG
125 TABLET ORAL DAILY
Status: DISCONTINUED | OUTPATIENT
Start: 2022-09-27 | End: 2022-09-29 | Stop reason: HOSPADM

## 2022-09-26 RX ORDER — ONDANSETRON 4 MG/1
4 TABLET, ORALLY DISINTEGRATING ORAL EVERY 8 HOURS PRN
Status: DISCONTINUED | OUTPATIENT
Start: 2022-09-26 | End: 2022-09-29 | Stop reason: HOSPADM

## 2022-09-26 RX ORDER — FUROSEMIDE 10 MG/ML
40 INJECTION INTRAMUSCULAR; INTRAVENOUS ONCE
Status: COMPLETED | OUTPATIENT
Start: 2022-09-26 | End: 2022-09-26

## 2022-09-26 RX ORDER — POTASSIUM CHLORIDE 7.45 MG/ML
10 INJECTION INTRAVENOUS PRN
Status: DISCONTINUED | OUTPATIENT
Start: 2022-09-26 | End: 2022-09-29 | Stop reason: HOSPADM

## 2022-09-26 RX ORDER — DILTIAZEM HYDROCHLORIDE 180 MG/1
360 CAPSULE, COATED, EXTENDED RELEASE ORAL DAILY
Status: DISCONTINUED | OUTPATIENT
Start: 2022-09-26 | End: 2022-09-28

## 2022-09-26 RX ORDER — CLOPIDOGREL BISULFATE 75 MG/1
75 TABLET ORAL DAILY
Status: DISCONTINUED | OUTPATIENT
Start: 2022-09-27 | End: 2022-09-29 | Stop reason: HOSPADM

## 2022-09-26 RX ORDER — SODIUM CHLORIDE 0.9 % (FLUSH) 0.9 %
10 SYRINGE (ML) INJECTION PRN
Status: DISCONTINUED | OUTPATIENT
Start: 2022-09-26 | End: 2022-09-29 | Stop reason: HOSPADM

## 2022-09-26 RX ORDER — ASPIRIN 81 MG/1
81 TABLET, CHEWABLE ORAL DAILY
Status: DISCONTINUED | OUTPATIENT
Start: 2022-09-27 | End: 2022-09-29 | Stop reason: HOSPADM

## 2022-09-26 RX ORDER — ACETAMINOPHEN 325 MG/1
650 TABLET ORAL EVERY 6 HOURS PRN
Status: DISCONTINUED | OUTPATIENT
Start: 2022-09-26 | End: 2022-09-29 | Stop reason: HOSPADM

## 2022-09-26 RX ORDER — ROSUVASTATIN CALCIUM 20 MG/1
20 TABLET, COATED ORAL DAILY
Status: DISCONTINUED | OUTPATIENT
Start: 2022-09-26 | End: 2022-09-29 | Stop reason: HOSPADM

## 2022-09-26 RX ORDER — POLYETHYLENE GLYCOL 3350 17 G/17G
17 POWDER, FOR SOLUTION ORAL DAILY PRN
Status: DISCONTINUED | OUTPATIENT
Start: 2022-09-26 | End: 2022-09-29 | Stop reason: HOSPADM

## 2022-09-26 RX ADMIN — FUROSEMIDE 40 MG: 10 INJECTION, SOLUTION INTRAMUSCULAR; INTRAVENOUS at 18:46

## 2022-09-26 RX ADMIN — DILTIAZEM HYDROCHLORIDE 5 MG/HR: 5 INJECTION, SOLUTION INTRAVENOUS at 12:47

## 2022-09-26 RX ADMIN — ROSUVASTATIN CALCIUM 20 MG: 20 TABLET, FILM COATED ORAL at 20:28

## 2022-09-26 RX ADMIN — METOPROLOL SUCCINATE 50 MG: 50 TABLET, EXTENDED RELEASE ORAL at 18:45

## 2022-09-26 RX ADMIN — DILTIAZEM HYDROCHLORIDE 10 MG: 5 INJECTION, SOLUTION INTRAVENOUS at 12:43

## 2022-09-26 RX ADMIN — SODIUM CHLORIDE, PRESERVATIVE FREE 10 ML: 5 INJECTION INTRAVENOUS at 20:29

## 2022-09-26 RX ADMIN — ENOXAPARIN SODIUM 30 MG: 100 INJECTION SUBCUTANEOUS at 20:28

## 2022-09-26 RX ADMIN — BISACODYL 5 MG: 5 TABLET, COATED ORAL at 20:28

## 2022-09-26 ASSESSMENT — ENCOUNTER SYMPTOMS
CONSTIPATION: 0
CHEST TIGHTNESS: 0
ABDOMINAL PAIN: 0
SORE THROAT: 0
WHEEZING: 0
CHEST TIGHTNESS: 1
SHORTNESS OF BREATH: 1
COLOR CHANGE: 0
VOMITING: 0
ABDOMINAL DISTENTION: 0
NAUSEA: 0
DIARRHEA: 0
RHINORRHEA: 0
BACK PAIN: 0
COUGH: 1

## 2022-09-26 ASSESSMENT — PAIN - FUNCTIONAL ASSESSMENT: PAIN_FUNCTIONAL_ASSESSMENT: NONE - DENIES PAIN

## 2022-09-26 NOTE — ED NOTES
Bedside report received from Tony Feliz, [facility]. Pt is resting on cot with family at bedside. Pt requesting to use restroom. RN provided patient with urinal. Pt HR is 115.      Maria Peters RN  09/26/22 0953

## 2022-09-26 NOTE — PROGRESS NOTES
Pt C/O sob, anxious, feels like body is shaking inside, swelling in the legs, fatigued      Pt denies CP,Headache, dizziness, heart palpitations

## 2022-09-26 NOTE — ED PROVIDER NOTES
[place] ENCOUNTER          Pt Name: Sonya Cesar  MRN: [id number]  [place] 1950  Date of Evaluation: 9/26/2022  Treating Resident Physician: Toan Mcknight MD  Supervising Physician: Jenene Moritz, MD    CHIEF COMPLAINT       Chief Complaint   Patient presents with    Other     Sent from cardiologist for afib     History obtained from chart review and the patient. HISTORY OF PRESENT ILLNESS    HPI    Sonya Cesar is a 81 y.o. male with a past medical history significant for persistent atrial fibrillation, hyperlipidemia, hypertension, diastolic dysfunction, BPH, recent placement of watchman device , presents to the emergency department for evaluation of shortness of breath, feeling unwell. Patient has a history of longstanding atrial fibrillation recently had a watchman device placed and stopped warfarin about 2 weeks ago. He was also started on digoxin at that time since then he has had shortness of breath which has been worse at night he also feels a sense of anxiety and just feeling unwell. This morning this feeling persisted during the day which is the first time that is happened, he is got concerned and called the cardiology office who directed him to their office where he was seen and then sent to the emergency department for further evaluation. He still having shortness of breath now at rest, denies any chest pain, does have a feeling of anxiety and impending doom. The patient has no other acute complaints at this time. REVIEW OF SYSTEMS   Review of Systems   Constitutional:  Positive for activity change and fatigue. Negative for appetite change, chills, diaphoresis, fever and unexpected weight change. HENT:  Negative for congestion, rhinorrhea and sore throat. Respiratory:  Positive for cough and shortness of breath. Negative for chest tightness and wheezing.     Cardiovascular:  Positive for palpitations and leg swelling. Negative for chest pain. Gastrointestinal:  Negative for abdominal distention, abdominal pain, constipation, diarrhea, nausea and vomiting. Genitourinary:  Negative for dysuria, hematuria and urgency. Musculoskeletal:  Negative for back pain and neck pain. Skin:  Negative for color change, pallor, rash and wound. Allergic/Immunologic: Negative for environmental allergies, food allergies and immunocompromised state. Neurological:  Negative for dizziness, syncope, weakness, numbness and headaches. Hematological:  Negative for adenopathy. Does not bruise/bleed easily. Psychiatric/Behavioral:  The patient is nervous/anxious. All other systems reviewed and are negative. PAST MEDICAL AND SURGICAL HISTORY     Past Medical History:   Diagnosis Date    Anesthesia     takes large dose of medications to make him sleepy for surgery    Atrial fibrillation ([address]     Benign prostatic hyperplasia with urinary frequency 10/21/2019    CAD (coronary artery disease)     Chest pain     Colon polyp 2011    removed    Coronary artery disease involving native coronary artery of native heart without angina pectoris 10/21/2019    Dementia ([address]     Dizziness     Elevated PSA     GERD (gastroesophageal reflux disease)     Heart disease     Hyperlipidemia     Hypertension      Past Surgical History:   Procedure Laterality Date    ADENOIDECTOMY      CARDIAC CATHETERIZATION  09/23/2011    Right radial artery cannulation. Moderate LV dysfunction. The patient has disease in the ostium of diagonal 1 and diagonal 2 branch, however they are both are at the  ostium of the diagonals. Intervention could compromise flow of LAD. THe LAD has long diffuse calcified lesion 50-60-% anatomically.     CARDIOVASCULAR STRESS TEST  09/23/2011    EF 37%    CARPAL TUNNEL RELEASE      COLONOSCOPY      NASAL SINUS SURGERY N/A 6/13/2022    Nasal Endoscopy Control of Nasal Hemorrhage performed by Idania Love MD at [facility] N/A 12/3/2020    ROBOTIC ASSISTED LAPAROSCOPIC RADICAL PROSTATECTOMY performed by Ellie Juarez MD at [facility] [facility]      TRANSESOPHAGEAL ECHOCARDIOGRAM N/A 9/12/2022    TRANSESOPHAGEAL ECHOCARDIOGRAM performed by Ravinder Zamora MD at [facility] ECHOCARDIOGRAM  09/23/2011    EF 50-55%    ULTRASOUND PROSTATE/TRANSRECTAL N/A 10/6/2020    CYSTO, TRANSRECTAL ULTRASOUND GUIDED PROSTATE BX performed by Ellie Juarez MD at [facility]     Current Facility-Administered Medications:     [COMPLETED] dilTIAZem injection 10 mg, 10 mg, IntraVENous, Once, 10 mg at 09/26/22 1243 **FOLLOWED BY** dilTIAZem 125 mg in dextrose 5 % 125 mL infusion, 2.5-15 mg/hr, IntraVENous, Continuous, Mendel Curie, MD, Last Rate: 5 mL/hr at 09/26/22 1247, 5 mg/hr at 09/26/22 1247    Current Outpatient Medications:     potassium chloride (KLOR-CON) 10 MEQ extended release tablet, TAKE 1 TABLET BY MOUTH TWICE DAILY, Disp: 180 tablet, Rfl: 0    potassium chloride (KLOR-CON) 10 MEQ extended release tablet, Take 1 tablet by mouth twice daily, Disp: 180 tablet, Rfl: 0    digoxin (LANOXIN) 125 MCG tablet, Take 1 tablet by mouth once daily, Disp: 90 tablet, Rfl: 0    clopidogrel (PLAVIX) 75 MG tablet, Take 1 tablet by mouth daily, Disp: 30 tablet, Rfl: 3    metoprolol succinate (TOPROL XL) 50 MG extended release tablet, Take 1 tablet by mouth once daily, Disp: 90 tablet, Rfl: 0    aspirin 81 MG chewable tablet, Take 1 tablet by mouth in the morning., Disp: 30 tablet, Rfl: 3    rosuvastatin (CRESTOR) 20 MG tablet, Take 1 tablet by mouth daily, Disp: 90 tablet, Rfl: 1    nitroGLYCERIN (NITROSTAT) 0.4 MG SL tablet, DISSOLVE ONE TABLET UNDER THE TONGUE EVERY 5 MINUTES AS NEEDED FOR CHEST PAIN.   DO NOT EXCEED A TOTAL OF 3 DOSES IN 15 MINUTES, Disp: 25 tablet, Rfl: 2    dilTIAZem (CARDIZEM CD) 360 MG extended release capsule, Take 1 capsule by mouth once daily, Disp: 90 capsule, Rfl: 3    bisacodyl (DULCOLAX) 5 MG EC tablet, Take 5 mg by mouth nightly, Disp: , Rfl:       SOCIAL HISTORY     Social History     Social History Narrative    Not on file     Social History     Tobacco Use    Smoking status: Never    Smokeless tobacco: Never   Vaping Use    Vaping Use: Never used   Substance Use Topics    Alcohol use: No    Drug use: No         ALLERGIES   No Known Allergies      FAMILY HISTORY     Family History   Problem Relation Age of Onset    Heart Surgery Father     Cancer Father         lung    Heart Disease Father         CABG    Alzheimer's Disease Mother     Diabetes Neg Hx     High Blood Pressure Neg Hx     Stroke Neg Hx          PREVIOUS RECORDS   Previous records reviewed:  Notes earlier today from cardiology, prior cardiology notes . PHYSICAL EXAM     ED Triage Vitals   BP Temp Temp Source Heart Rate Resp SpO2 Height Weight   09/26/22 1155 09/26/22 1152 09/26/22 1152 09/26/22 1152 09/26/22 1152 09/26/22 1152 -- --   (!) 166/111 98.9 °F (37.2 °C) Oral (!) 150 20 96 %       Initial vital signs and nursing assessment reviewed and abnormal from atrial fibrillation at rate of 150 initially . Body mass index is 31.4 kg/m². Pulsoximetry is normal per my interpretation. Additional Vital Signs:  Vitals:    09/26/22 1319   BP: (!) 169/104   Pulse: (!) 115   Resp: 22   Temp:    SpO2: 96%       Physical Exam  Vitals and nursing note reviewed. Constitutional:       General: He is not in acute distress. Appearance: Normal appearance. He is normal weight. He is not ill-appearing, toxic-appearing or diaphoretic. HENT:      Head: Normocephalic and atraumatic. Nose: Nose normal.      Mouth/Throat:      Mouth: Mucous membranes are moist.      Pharynx: Oropharynx is clear. Eyes:      Conjunctiva/sclera: Conjunctivae normal.   Cardiovascular:      Rate and Rhythm: Tachycardia present. Rhythm irregular. Pulses: Normal pulses. Heart sounds: Normal heart sounds. Pulmonary:      Effort: Pulmonary effort is normal.      Breath sounds: Normal breath sounds. Abdominal:      General: Abdomen is flat. Bowel sounds are normal.      Palpations: Abdomen is soft. Tenderness: There is no abdominal tenderness. Musculoskeletal:         General: No swelling or tenderness. Cervical back: Normal range of motion. Right lower leg: No edema. Left lower leg: No edema. Skin:     General: Skin is warm and dry. Capillary Refill: Capillary refill takes less than 2 seconds. Neurological:      Mental Status: He is alert and oriented to person, place, and time. MEDICAL DECISION MAKING   Initial Differential Diagnosis: (includes but is not limited to)  Atrial fibrillation with RVR  Electrolyte abnormalities  Digoxin toxicity  Symptomatic atrial fibrillation    Plan:   ECG  Monitor  IV  CBC, BMP, LFT, Trop, BNP, TSH, Digoxin, Magnesium  Chest X-ray  IV diltiazem bolus than infusion  Admit to Hospitalist    Summary:  Patient still in atrial fibrillation on arrival to emergency department with a rate in the 150s, when I saw him high rate in the 120s to 130s, BP stable, still having shortness of breath, denies any chest pain, still having anxiety feeling. Was given diltiazem bolus followed by infusion. Labs unremarkable less elevated TSH at 5.5, BNP at 2467. Consulted hospitalist for admission, also consulted cardiology as they referred patient to us and he will need a formal cardiology consult on the floor.     ED RESULTS   Laboratory results:  Labs Reviewed   BRAIN NATRIURETIC PEPTIDE - Abnormal; Notable for the following components:       Result Value    Pro-BNP 2467.0 (*)     All other components within normal limits   TSH WITH REFLEX - Abnormal; Notable for the following components:    TSH 5.550 (*)     All other components within normal limits   GLOMERULAR FILTRATION RATE, ESTIMATED - Abnormal; Notable for the following components:    Est, Glom Filt Rate 74 (*)     All other components within normal limits   CBC WITH AUTO DIFFERENTIAL   BASIC METABOLIC PANEL W/ REFLEX TO MG FOR LOW K   HEPATIC FUNCTION PANEL   TROPONIN   DIGOXIN LEVEL   MAGNESIUM   ANION GAP   T4, FREE       Radiologic studies results:  XR CHEST (2 VW)   Final Result   1. No interval change since previous study dated 21 April 2022.   2. Mild cardiomegaly. 3. Tiny calcified granuloma in the left lower lobe. 4. Thoracic spondylosis. **This report has been created using voice recognition software. It may contain minor errors which are inherent in voice recognition technology. **      Final report electronically signed by DR Vamsi Astudillo on 9/26/2022 12:28 PM          ED Medications administered this visit:   Medications   dilTIAZem injection 10 mg (10 mg IntraVENous Given 9/26/22 1243)     Followed by   dilTIAZem 125 mg in dextrose 5 % 125 mL infusion (5 mg/hr IntraVENous New Bag 9/26/22 1247)         ED COURSE     ED Course as of 09/26/22 1325   Mon Sep 26, 2022   1236 Referral for admission sent to [place], [place] (Hospitalist) via Steve Evangelista.    [SC]      ED Course User Index  [SC] Luis Lopez MD           PROCEDURES   Procedures      MEDICATION CHANGES     New Prescriptions    No medications on file         FINAL DISPOSITION     Final diagnoses:   Atrial fibrillation with RVR ([address]       Condition: condition: stable  Dispo: Admit to telemetry      This transcription was electronically signed. Parts of this transcriptions may have been dictated by use of voice recognition software and electronically transcribed, and parts may have been transcribed with the assistance of an ED scribe. The transcription may contain errors not detected in proofreading. Please refer to my supervising physician's documentation if my documentation differs.     Electronically Signed: Srinivas Richards MD, 09/26/22, 1:25 PM         Luis Lopez MD  Resident  09/26/22 7873

## 2022-09-26 NOTE — ED PROVIDER NOTES
[address] [facility]  EMERGENCY MEDICINE ATTENDING ATTESTATION      Evaluation of Tyrone Sandhoff Jones-Bateman. Case discussed and care plan developed with resident physician. I agree with the resident physician documentation and plan as documented by him, except if my documentation differs. Patient seen, interviewed and examined by me. I reviewed the medical, surgical, family and social history, medications and allergies. I have reviewed the nursing documentation. I have reviewed the patient's vital signs and are abnormal from hypertension  per my interpretation. Body mass index is 31.4 kg/m². Pulsoxymetry is normal per my interpretation. Brief H&P   Patient c/o sensation of anxiety and shortness of breath since last night. Patient's wife states that this has been going for at least a week. Patient is status post watchman device due to epistaxis while on anticoagulation. Patient saw his cardiologist NP today and was advised to come to the emergency department and be admitted due to his heart rate. Physical exam is notable for well appearing, tachycardic on arrival, improved to the 120s spontaneously, down to 99 after Cardizem. Otherwise nonfocal exam.      Medical Decision Making   MDM:   A. fib with RVR  Plan:   IV line, labs  EKG  Imaging  Medication  Observation in the ED while awaiting results  Patient will need to be admitted    Please see the resident physician completed note for final disposition except as documented on this attestation. I have reviewed and interpreted all available lab, radiology and ekg results available at the moment. Diagnosis, treatment and disposition plans were discussed and agreed upon by patient. This transcription was electronically signed. It was dictated by use of voice recognition software and electronically transcribed. The transcription may contain errors not detected in proofreading.      I performed direct supervision and was present for the critical portion following procedures: None  Critical care time on this case: None    Electronically signed by Leah Sarabia MD on 9/26/22 at 1:32 PM EDT        Leah Sarabia MD  09/26/22 4440

## 2022-09-26 NOTE — PROGRESS NOTES
[facility] PROFESSIONAL SERVICES  [facility]   [address]    [address] [address]   Dept: [phone]   Dept Fax: [phone]   Loc: [phone]      Chief Complaint   Patient presents with    Follow-up     Sob, lower edma      Called office and requested appointment regarding increasing shortness of breath along with lower extremity edema. Primary Cardiologist:  Dr. Jarad Dorantes  Structural Heart: [place] MD    Last seen in the office by Dr. Jarad Dorantes on 7/29/2021 for checkup regarding known coronary artery disease, atrial fibrillation and hypertension. Patient also with history of prostate cancer; post surgery. Clinically stable on this visit. No bleeding issues on Coumadin. Few episodes of dizziness and lightheadedness. Some lower extremity edema with mild discoloration. Edema felt likely related to Cardizem. Considered lowering the Cardizem dose and increasing metoprolol dose. No other major changes. Refer to Dr. Roman Ring regarding watchman and seen per Dr. Roman Ring in the office on 6/23/2022. Patient had been having recurrent episodes of epistaxis for over 4 weeks. Currently still on Coumadin. Status post multiple ENT procedures. Longstanding persistent atrial fibs, [id number] equals 4. Options regarding watchman or adjustments in anticoagulation with overall risk of stroke versus bleeding discussed. Patient decided to move forward with watchman. Watchman device placed 7/19/2022. Today's visit:   Subjective: Increasingly short breath for past couple weeks.    Ankles more swollen - off and on worse  PND and orthopnea  Having to sleep on 2 pillow on his side in order to breathe and sleep  Having to sleep sitting up in his recliner at times to be able to breathe at night  Worried that this is due to Plavix - was started after 45-day SANG for WATCHMAN  No bleeding issues  Breathing seems to become worse at night - takes the Plavix in the evening  Afib with RVR -  with auscultation - known afib      General:   No fever, no chills, mild fatigue; no certain weight loss/gain  Pulmonary:    Dyspnea as above, no wheezing  Cardiac:    Denies recent chest discomfort; uncertain regarding palpitations  GI:     No nausea or vomiting, no abdominal pain, no black or tarry stools, no blood in stools  Neuro:    No dizziness or light headedness  Musculoskeletal:  No recent active issues, no new musculoskeletal pain  Extremities:   Swelling as above; no claudication symptoms      Past Medical History:   Diagnosis Date    Anesthesia     takes large dose of medications to make him sleepy for surgery    Atrial fibrillation ([facility]     Benign prostatic hyperplasia with urinary frequency 10/21/2019    CAD (coronary artery disease)     Chest pain     Colon polyp 2011    removed    Coronary artery disease involving native coronary artery of native heart without angina pectoris 10/21/2019    Dementia ([facility]     Dizziness     Elevated PSA     GERD (gastroesophageal reflux disease)     Heart disease     Hyperlipidemia     Hypertension        No Known Allergies    Current Outpatient Medications   Medication Sig Dispense Refill    digoxin (LANOXIN) 125 MCG tablet Take 1 tablet by mouth once daily 90 tablet 0    aspirin 81 MG chewable tablet Take 1 tablet by mouth in the morning. 30 tablet 3    nitroGLYCERIN (NITROSTAT) 0.4 MG SL tablet DISSOLVE ONE TABLET UNDER THE TONGUE EVERY 5 MINUTES AS NEEDED FOR CHEST PAIN.   DO NOT EXCEED A TOTAL OF 3 DOSES IN 15 MINUTES 25 tablet 2    bisacodyl (DULCOLAX) 5 MG EC tablet Take 5 mg by mouth nightly      spironolactone (ALDACTONE) 25 MG tablet Take 1 tablet by mouth daily 90 tablet 3    dilTIAZem (CARDIZEM CD) 120 MG extended release capsule Take 1 capsule by mouth daily 90 capsule 3    clopidogrel (PLAVIX) 75 MG tablet Take 1 tablet by mouth daily 90 tablet 3    metoprolol (LOPRESSOR) 100 MG tablet Take 1 tablet by mouth 2 times daily 180 tablet 3 rosuvastatin (CRESTOR) 20 MG tablet Take 1 tablet by mouth daily 90 tablet 3    furosemide (LASIX) 40 MG tablet Take 1 tablet by mouth daily 90 tablet 3    potassium chloride (KLOR-CON M) 20 MEQ extended release tablet Take 20 mEq by mouth 2 times daily       No current facility-administered medications for this visit. Social History     Socioeconomic History    Marital status:      Spouse name: Geri Campoverde    Number of children: 2    Years of education: None    Highest education level: None   Tobacco Use    Smoking status: Never    Smokeless tobacco: Never   Vaping Use    Vaping Use: Never used   Substance and Sexual Activity    Alcohol use: No    Drug use: No    Sexual activity: Not Currently     Social Determinants of Health     Financial Resource Strain: Low Risk     Difficulty of Paying Living Expenses: Not hard at all   Food Insecurity: No Food Insecurity    Worried About Running Out of Food in the Last Year: Never true    Ran Out of Food in the Last Year: Never true   Physical Activity: Inactive    Days of Exercise per Week: 0 days    Minutes of Exercise per Session: 0 min       Family History   Problem Relation Age of Onset    Heart Surgery Father     Cancer Father         lung    Heart Disease Father         CABG    Alzheimer's Disease Mother     Diabetes Neg Hx     High Blood Pressure Neg Hx     Stroke Neg Hx        Blood pressure (!) 145/106, pulse (!) 126, height 6' 1\" (1.854 m), weight 238 lb (108 kg). General:   Well developed, well nourished, no acute distress, slightly anxious, nontoxic  Lungs:               Clear to auscultation  Heart:    Heart rate rapid, irregularly irregular with no murmur, rubs  Abdomen:   Soft, non tender, no organomegalies, positive bowel sounds  Extremities:   1 + ankle edema bilaterally, no cyanosis, good peripheral pulses  Neurological:   Awake, alert, oriented.  No obvious focal deficits  Musculoskeletal:  No obvious deformities    EK2022:  -[facility]-[id number] 11:54:21 [facility] ROUTINE RECORD  Atrial fibrillation with rapid ventricular response  Anteroseptal infarct , age undetermined  Abnormal ECG  When compared with ECG of 19-JUL-2022 05:10,  Anteroseptal infarct is now Present  Confirmed by Enrique Lucia ([id number]) on 9/26/2022 12:14:59 PM    SANG: 9/12/2022  Indications:Post Watchmen device. Post 45-day placement. Additional Medical History:Hyperlipidemia, hypertension, obesity, atrial  fibrillation, GERD, diastolic dysfunction, COPD, coronary artery disease. Conclusions    Summary   Ejection fraction is visually estimated at 45%. There was mild global hypokinesis of the left ventricle. Mildly dilated left atrium. LAAO device in good position   no thrombi   no leaks   Aortic valve appears tricuspid. Mild-to-moderate aortic regurgitation is noted. Mild to Moderate mitral regurgitation is present. Signature    ----------------------------------------------------------------   Electronically signed by Ko Mcguire MD (Interpreting   physician) on 09/12/2022 at 07:00 PM    Watchman: 7/19/2022  DATE OF PROCEDURE:  07/19/2022     PROCEDURE:  Percutaneous left atrial appendage occlusion (Watchman). INDICATION:  Longstanding persistent atrial fibrillation, CHADS2-VASC  score of 4, not a candidate for a long-term anticoagulation. SUMMARY:  Successful percutaneous left atrial appendage occlusion with a  Watchman FLX 27-mm device. PLAN:  1. Bedrest.  2.  Optimal medical therapy. 3.  Risk factor management. 4.  Routine access site care. 5.  IV fluids. 6.  Overnight inpatient admission. 7.  Restart home meds. 8.  Follow up with [facility] per protocol. 9.  45-day SANG  10. Follow up the patient for early discharge protocol. All the above was explained to the patient and the patient's family. They were agreeable and amenable to the plan. Katy Dubose MD   D: 07/19/2022     Diagnosis Orders   1. SOB (shortness of breath)  EKG 12 Lead      2. Paroxysmal atrial fibrillation (HCC)        3. Essential hypertension        4. Hyperlipidemia, unspecified hyperlipidemia type        5. Presence of Watchman left atrial appendage closure device            Orders Placed This Encounter   Procedures    EKG 12 Lead     Order Specific Question:   Reason for Exam?     Answer: Other     Continue Dr Burks Peers current treatment plan  Patient noted to be in atrial fib with RVR. Requires evaluation in the emergency department and then treatment as warranted. Patient taken to the ED per wheelchair. Follow-up evaluation to be determined after ED evaluation. Patient Instructions   Seek evaluation in ED due to your elevated HR and breathing sx. Return in about 2 months (around 12/1/2022), or if symptoms worsen or fail to improve, for Dr. Erin Klein .       Serenity Lunsford, APRN - CNP

## 2022-09-26 NOTE — ED NOTES
ED nurse-to-nurse bedside report    Chief Complaint   Patient presents with    Other     Sent from cardiologist for afib      LOC: alert and orientated to name, place, date  Vital signs   Vitals:    09/26/22 1158 09/26/22 1159 09/26/22 1248 09/26/22 1319   BP:  (!) 159/115 (!) 155/89 (!) 169/104   Pulse:  (!) 120 (!) 126 (!) 115   Resp:  18 26 22   Temp:       TempSrc:       SpO2:   97% 96%   Weight: 238 lb (108 kg)      Height: 6' 1\" (1.854 m)         Pain:    Pain Interventions: n/a  Pain Goal: n/a  Oxygen: No    Current needs required ra   Telemetry: Yes  LDAs:   Peripheral IV 09/26/22 Right Antecubital (Active)   Site Assessment Clean, dry & intact 09/26/22 1319   Line Status Infusing 09/26/22 1319     Continuous Infusions:    dilTIAZem 5 mg/hr (09/26/22 1247)     Mobility: Requires assistance * 1  [place] Fall Risk Score:    Fall Risk 8/8/2022 8/2/2021 4/26/2021 10/21/2019 5/10/2018 11/3/2017   2 or more falls in past year? no no no no no no   Fall with injury in past year? no no no no no no     Fall Interventions: side rails, call light  Report given to: Santosh Paula RN  09/26/22 3219

## 2022-09-26 NOTE — ED NOTES
Pt transported to [facility]. Floor contacted, spoke with Cameron Mcallister.      Siobhan Dillon  09/26/22 6414

## 2022-09-26 NOTE — ED TRIAGE NOTES
Patient was feeling short of breath and anxious today so he called the cardiologist. He states he had an EKG done upstairs and they sent him to the ED for further evaluation. Patient denies any chest pain or abdominal pain. He denies shortness of breath. He states he almost feels back to normal. Patient resting on cart in no distress at this time.

## 2022-09-27 LAB
ANION GAP SERPL CALCULATED.3IONS-SCNC: 11 MEQ/L (ref 8–16)
BASOPHILS # BLD: 0.8 %
BASOPHILS ABSOLUTE: 0.1 THOU/MM3 (ref 0–0.1)
BUN BLDV-MCNC: 14 MG/DL (ref 7–22)
CALCIUM SERPL-MCNC: 9.3 MG/DL (ref 8.5–10.5)
CHLORIDE BLD-SCNC: 103 MEQ/L (ref 98–111)
CO2: 28 MEQ/L (ref 23–33)
CREAT SERPL-MCNC: 1.2 MG/DL (ref 0.4–1.2)
EOSINOPHIL # BLD: 2.8 %
EOSINOPHILS ABSOLUTE: 0.2 THOU/MM3 (ref 0–0.4)
ERYTHROCYTE [DISTWIDTH] IN BLOOD BY AUTOMATED COUNT: 14.6 % (ref 11.5–14.5)
ERYTHROCYTE [DISTWIDTH] IN BLOOD BY AUTOMATED COUNT: 44.5 FL (ref 35–45)
GFR SERPL CREATININE-BSD FRML MDRD: 60 ML/MIN/1.73M2
GLUCOSE BLD-MCNC: 101 MG/DL (ref 70–108)
HCT VFR BLD CALC: 47.8 % (ref 42–52)
HEMOGLOBIN: 15.6 GM/DL (ref 14–18)
IMMATURE GRANS (ABS): 0.02 THOU/MM3 (ref 0–0.07)
IMMATURE GRANULOCYTES: 0.2 %
LYMPHOCYTES # BLD: 25.2 %
LYMPHOCYTES ABSOLUTE: 2.2 THOU/MM3 (ref 1–4.8)
MCH RBC QN AUTO: 28 PG (ref 26–33)
MCHC RBC AUTO-ENTMCNC: 32.6 GM/DL (ref 32.2–35.5)
MCV RBC AUTO: 85.7 FL (ref 80–94)
MONOCYTES # BLD: 8.4 %
MONOCYTES ABSOLUTE: 0.7 THOU/MM3 (ref 0.4–1.3)
NUCLEATED RED BLOOD CELLS: 0 /100 WBC
PLATELET # BLD: 230 THOU/MM3 (ref 130–400)
PMV BLD AUTO: 10.9 FL (ref 9.4–12.4)
POTASSIUM REFLEX MAGNESIUM: 4.1 MEQ/L (ref 3.5–5.2)
RBC # BLD: 5.58 MILL/MM3 (ref 4.7–6.1)
SEG NEUTROPHILS: 62.6 %
SEGMENTED NEUTROPHILS ABSOLUTE COUNT: 5.6 THOU/MM3 (ref 1.8–7.7)
SODIUM BLD-SCNC: 142 MEQ/L (ref 135–145)
WBC # BLD: 8.9 THOU/MM3 (ref 4.8–10.8)

## 2022-09-27 PROCEDURE — 6360000002 HC RX W HCPCS: Performed by: HOSPITALIST

## 2022-09-27 PROCEDURE — 99223 1ST HOSP IP/OBS HIGH 75: CPT | Performed by: INTERNAL MEDICINE

## 2022-09-27 PROCEDURE — 2500000003 HC RX 250 WO HCPCS: Performed by: STUDENT IN AN ORGANIZED HEALTH CARE EDUCATION/TRAINING PROGRAM

## 2022-09-27 PROCEDURE — 2580000003 HC RX 258

## 2022-09-27 PROCEDURE — 6360000002 HC RX W HCPCS

## 2022-09-27 PROCEDURE — 99233 SBSQ HOSP IP/OBS HIGH 50: CPT | Performed by: HOSPITALIST

## 2022-09-27 PROCEDURE — 6370000000 HC RX 637 (ALT 250 FOR IP)

## 2022-09-27 PROCEDURE — 80048 BASIC METABOLIC PNL TOTAL CA: CPT

## 2022-09-27 PROCEDURE — 2580000003 HC RX 258: Performed by: STUDENT IN AN ORGANIZED HEALTH CARE EDUCATION/TRAINING PROGRAM

## 2022-09-27 PROCEDURE — 6360000002 HC RX W HCPCS: Performed by: INTERNAL MEDICINE

## 2022-09-27 PROCEDURE — 36415 COLL VENOUS BLD VENIPUNCTURE: CPT

## 2022-09-27 PROCEDURE — 1200000003 HC TELEMETRY R&B

## 2022-09-27 PROCEDURE — 85025 COMPLETE CBC W/AUTO DIFF WBC: CPT

## 2022-09-27 PROCEDURE — 6370000000 HC RX 637 (ALT 250 FOR IP): Performed by: HOSPITALIST

## 2022-09-27 RX ORDER — FUROSEMIDE 10 MG/ML
40 INJECTION INTRAMUSCULAR; INTRAVENOUS 2 TIMES DAILY
Status: DISCONTINUED | OUTPATIENT
Start: 2022-09-27 | End: 2022-09-28

## 2022-09-27 RX ORDER — METOPROLOL TARTRATE 50 MG/1
50 TABLET, FILM COATED ORAL 2 TIMES DAILY
Status: DISCONTINUED | OUTPATIENT
Start: 2022-09-27 | End: 2022-09-28

## 2022-09-27 RX ORDER — FUROSEMIDE 10 MG/ML
20 INJECTION INTRAMUSCULAR; INTRAVENOUS ONCE
Status: COMPLETED | OUTPATIENT
Start: 2022-09-27 | End: 2022-09-27

## 2022-09-27 RX ADMIN — SODIUM CHLORIDE, PRESERVATIVE FREE 10 ML: 5 INJECTION INTRAVENOUS at 10:04

## 2022-09-27 RX ADMIN — CLOPIDOGREL BISULFATE 75 MG: 75 TABLET ORAL at 10:02

## 2022-09-27 RX ADMIN — ENOXAPARIN SODIUM 30 MG: 100 INJECTION SUBCUTANEOUS at 10:04

## 2022-09-27 RX ADMIN — DIGOXIN 125 MCG: 125 TABLET ORAL at 10:02

## 2022-09-27 RX ADMIN — METOPROLOL TARTRATE 50 MG: 50 TABLET, FILM COATED ORAL at 20:48

## 2022-09-27 RX ADMIN — ENOXAPARIN SODIUM 30 MG: 100 INJECTION SUBCUTANEOUS at 20:50

## 2022-09-27 RX ADMIN — DILTIAZEM HYDROCHLORIDE 7.5 MG/HR: 5 INJECTION, SOLUTION INTRAVENOUS at 02:54

## 2022-09-27 RX ADMIN — SODIUM CHLORIDE, PRESERVATIVE FREE 10 ML: 5 INJECTION INTRAVENOUS at 20:48

## 2022-09-27 RX ADMIN — ASPIRIN 81 MG CHEWABLE TABLET 81 MG: 81 TABLET CHEWABLE at 10:01

## 2022-09-27 RX ADMIN — ROSUVASTATIN CALCIUM 20 MG: 20 TABLET, FILM COATED ORAL at 20:48

## 2022-09-27 RX ADMIN — FUROSEMIDE 40 MG: 10 INJECTION, SOLUTION INTRAMUSCULAR; INTRAVENOUS at 17:45

## 2022-09-27 RX ADMIN — FUROSEMIDE 20 MG: 10 INJECTION, SOLUTION INTRAMUSCULAR; INTRAVENOUS at 10:03

## 2022-09-27 RX ADMIN — METOPROLOL TARTRATE 50 MG: 50 TABLET, FILM COATED ORAL at 10:47

## 2022-09-27 NOTE — FLOWSHEET NOTE
09/27/22 1814   Safe Environment   Safety Measures Other (comment)  (Virtual nurse rounding complete)   Patient in bed awake and alert. Denies any needs at this time. Call light is within reach.

## 2022-09-27 NOTE — PLAN OF CARE
Problem: Discharge Planning  Goal: Discharge to home or other facility with appropriate resources  Outcome: Progressing  Flowsheets (Taken 9/27/2022 0341)  Discharge to home or other facility with appropriate resources:   Identify barriers to discharge with patient and caregiver   Arrange for needed discharge resources and transportation as appropriate   Identify discharge learning needs (meds, wound care, etc)     Problem: Safety - Adult  Goal: Free from fall injury  Outcome: Progressing  Flowsheets (Taken 9/27/2022 0341)  Free From Fall Injury:   Instruct family/caregiver on patient safety   Based on caregiver fall risk screen, instruct family/caregiver to ask for assistance with transferring infant if caregiver noted to have fall risk factors     Problem: ABCDS Injury Assessment  Goal: Absence of physical injury  Outcome: Progressing  Flowsheets (Taken 9/27/2022 0341)  Absence of Physical Injury: Implement safety measures based on patient assessment

## 2022-09-27 NOTE — PROGRESS NOTES
09/27/22 1711   Encounter Summary   Encounter Overview/Reason  Initial Encounter   Service Provided For: Patient   Referral/Consult From: [facility]   Support System Spouse; Family members   Last Encounter  09/27/22   Complexity of Encounter High   Begin Time 1640   End Time  1705   Total Time Calculated 25 min   Assessment/Intervention/Outcome   Assessment Anxious; Powerlessness;Sad;Tearful   Intervention Active listening;Empowerment   Outcome Receptive     Notes from  Rev. Irizarry:    ASSESSMENT    P was helpless and emotionally overwhelmed due to his condition and lack of stability in his health. He was tearful, as he has been suffering from different health problems. \"Confused as to who to believe\" and the situation is \"aggravating\" but he is looking forward to improvement by tomorrow. He said that his doctors talked to him today. INTERVENTION    I listened carefully to all his stories. His wife is also having problems, he said. So, I offered to pray for him. I prayed for the patient and his recovery. Asked him to trust in God continuously. OUTCOME    He was somewhat relieved of the anxiety created by his health condition. CARE PLAN    Continue to visit, pray, and help Claudia Reese in his time of distress and discouragement.

## 2022-09-27 NOTE — PROGRESS NOTES
Hospitalist Progress Note      Patient:  Nini Hemphill    Unit/Bed:6K-28/028-A  YOB: 1950  MRN: [id number]   Acct: [de-identified]   PCP: NARESH Rosenbaum CNP  Date of Admission: 9/26/2022    Assessment/Plan:    Persistent atrial fibrillation, with RVR:               PT is s/p watchman device procedure 7/19/2022. TTE on 9/12/2022 shows LAAO in good position without thrombi or leaks. [id number] 4. HR initially 150bpm on presentation. Started on cardizem gtt in ED with subsequent improvement of rate, now low 110-100bpm. Labs show if anything, slightly elevated TSH w/ normal Free T4, Dig level WNL. No leukocytosis. CXR stable. EKG upon admission without acute ischemic changes. Start lasix 40mg IV x 1 dose, on metoprolol, Digoxin, Pt off Diltiazem drip; on Diltiazem 360 Qd at home which was held on admission; HR in 70s; instead changed Metoprolol to SR from XR and increased dose 50 BID; will reassess response. If rate inadequately controlled consider placing back on Diltiazem (suggest starting w/ SR for dose titration); alternatively one could consider adding Amiodarone (instead of Digoxin)the patient off 6-week [address] [place] [address] post appendages closure device. Acute on chronic decompensated HFrEF:             likely d/t under-treatment +/- A inadequately rate-controlled A Fib. Last TTE 9/12/2022 shows EF 45% with mild-moderate aortic regurgitation, Mild-moderate mitral regurgitation. Pt appears volume-overload w/ +++ pitting edema BLE and wet crackles at lung bases. Continue daily weights, strict I&O, telemetry. Fluid restricted, Low sodium diet. Lasix 20mg IV x 1 dose today w/ daily reassessment. He will need standing diuretics dose; CHF clinical referral arranged. Hx of CAD:               Stable, Serial trop negative and ECG negative for any new/dynamic ischemic changes. OMT.  Monitor       Accelerated essential HTN:               BP fairly controlled now on current regimen. CCM and monitor     5. Mildly elevated TSH w/ free T4 WNLs. Denies symptoms of hypothyroidism; recommend PCP repeat TSH within 4 wks and consider treating for subclinical hypothyroidism if still elevated. Chief Complaint: SOB    Initial H and P:-    Admitted overnight for evaluation and management of SOB and A Fib w/ RVR. Subjective (past 24 hours):   Feeling improved. denies CP/SOB/fever/chills/cough/sputum/recent sick contact exposure or travel/presyncope/palpitation. When told about him having known CHF, he became tearful and reported not being aware of it! Past medical history, family history, social history and allergies reviewed again and is unchanged since admission. ROS (All review of systems completed. Pertinent positives noted. Otherwise All other systems reviewed and negative.)     Medications:  Reviewed    Infusion Medications    dilTIAZem 7.5 mg/hr (09/27/22 0254)    sodium chloride       Scheduled Medications    aspirin  81 mg Oral Daily    bisacodyl  5 mg Oral Nightly    clopidogrel  75 mg Oral Daily    digoxin  125 mcg Oral Daily    [Held by provider] dilTIAZem  360 mg Oral Daily    metoprolol succinate  50 mg Oral Daily    rosuvastatin  20 mg Oral Daily    sodium chloride flush  10 mL IntraVENous 2 times per day    enoxaparin  30 mg SubCUTAneous BID     PRN Meds: sodium chloride flush, sodium chloride, ondansetron **OR** ondansetron, polyethylene glycol, acetaminophen **OR** acetaminophen, potassium chloride **OR** potassium alternative oral replacement **OR** potassium chloride, magnesium sulfate      Intake/Output Summary (Last 24 hours) at 9/27/2022 0752  Last data filed at 9/27/2022 0300  Gross per 24 hour   Intake --   Output 2220 ml   Net -2220 ml       Diet:  ADULT DIET; Regular;  Low Sodium (2 gm); 2000 ml    Physical Exam:  BP (!) 130/90   Pulse 81   Temp 97.9 °F (36.6 °C) (Oral)   Resp 18   Ht 6' 1\" (1.854 m)   Wt 226 lb 6.6 oz (102.7 kg)   SpO2 98%   BMI 29.87 kg/m²   General appearance: No apparent distress, appears stated age and cooperative. HEENT: Pupils equal, round, and reactive to light. Conjunctivae/corneas clear. Neck: Supple, with full range of motion. No jugular venous distention. Trachea midline. Respiratory:  Normal respiratory effort. Clear to auscultation, bilaterally without Rales/Wheezes/Rhonchi. Cardiovascular: Regular rate and rhythm with normal S1/S2 without murmurs, rubs or gallops. Abdomen: Soft, non-tender, non-distended with normal bowel sounds. Musculoskeletal: passive and active ROM x 4 extremities. Skin: Skin color, texture, turgor normal.  No rashes or lesions. Neurologic:  Neurovascularly intact without any focal sensory/motor deficits. Cranial nerves: II-XII intact, grossly non-focal.  Psychiatric: Alert and oriented, thought content appropriate, normal insight  Capillary Refill: Brisk,< 3 seconds   Peripheral Pulses: +2 palpable, equal bilaterally     Labs:   Recent Labs     09/26/22  1155 09/27/22  0528   WBC 9.3 8.9   HGB 16.4 15.6   HCT 50.2 47.8    230     Recent Labs     09/26/22  1155 09/27/22  0528    142   K 4.4 4.1    103   CO2 24 28   BUN 11 14   CREATININE 1.0 1.2   CALCIUM 9.6 9.3     Recent Labs     09/26/22  1155   AST 22   ALT 18   BILIDIR <0.2   BILITOT 1.0   ALKPHOS 87     No results for input(s): INR in the last 72 hours. No results for input(s): Aleene Sauger in the last 72 hours.     Microbiology:    Blood culture #1:   Lab Results   Component Value Date/Time    BC No growth-preliminary  No growth   03/23/2017 12:50 AM       Blood culture #2:No results found for: Antwan Boo    Organism:  Lab Results   Component Value Date/Time    ORG Staphylococcus aureus 06/13/2022 05:10 PM       No results found for: LABGRAM    MRSA culture only:No results found for: [facility]    Urine culture: No results found for: LABURIN    Respiratory culture: No results found for: CULTRESP    Aerobic and Anaerobic :  Lab Results   Component Value Date/Time    LABAERO  07/05/2022 12:00 PM     Nose or nares swab specimens are useful for the identification of carriers of Neisseria meningitidis, Staphylococcus aureus, or Streptococcus pneumoniae. Nose or nares cultures have no utility in the diagnosis of otitis media. No growth-preliminary Normal sheila     Lab Results   Component Value Date/Time    LABANAE  06/13/2022 05:10 PM     No anaerobes isolated- preliminary No anaerobes isolated       Urinalysis:      Lab Results   Component Value Date/Time    NITRU NEGATIVE 03/26/2021 12:10 PM    WBCUA 0-2 11/25/2020 06:40 AM    BACTERIA NONE SEEN 11/25/2020 06:40 AM    RBCUA 0-2 11/25/2020 06:40 AM    BLOODU NEGATIVE 03/26/2021 12:10 PM    SPECGRAV 1.023 12/08/2018 04:36 PM    GLUCOSEU NEGATIVE 03/26/2021 12:10 PM       Radiology:  XR CHEST (2 VW)   Final Result   1. No interval change since previous study dated 21 April 2022.   2. Mild cardiomegaly. 3. Tiny calcified granuloma in the left lower lobe. 4. Thoracic spondylosis. **This report has been created using voice recognition software. It may contain minor errors which are inherent in voice recognition technology. **      Final report electronically signed by DR Phi Moreno on 9/26/2022 12:28 PM        XR CHEST (2 VW)    Result Date: 9/26/2022  PROCEDURE: XR CHEST (2 VW) CLINICAL INFORMATION: tachycardia. COMPARISON: Chest x-ray dated 21 April 2022. TECHNIQUE: PA and lateral views the chest. FINDINGS: There is mild cardiomegaly. .  The mediastinum is not widened. There are no pulmonary infiltrates or effusions. There is a tiny calcified granuloma in the left lower lobe  The pulmonary vascularity is normal. There is mild thoracic spondylosis. .      1. No interval change since previous study dated 21 April 2022. 2. Mild cardiomegaly. 3. Tiny calcified granuloma in the left lower lobe. 4. Thoracic spondylosis.  **This report has been created using voice recognition software. It may contain minor errors which are inherent in voice recognition technology. ** Final report electronically signed by DR Serge Larson on 9/26/2022 12:28 PM    With RN in room, patient was updated about and agreed upon the treatment plan, all the questions and concerns were addressed.     Electronically signed by Laura Forrester MD on 9/27/2022 at 7:52 AM

## 2022-09-27 NOTE — CONSULTS
[facility]  Cardiology Consult        Patient:  Verónica Gloria  YOB: 1950  MRN: [id number]     Acct: [de-identified]    PCP: NARESH Valentine CNP    Date of Admission: 9/26/2022      Reason for Consultation:  Afib RVR      History Of Present Illness:    81 y.o. pleasant male c long standing persistent A. fib, status post recent watchman implantation, LV dysfunction with ejection fraction 40 to 45% who presented to the hospital with complaints of shortness of breath. As per patient he reports feeling more congestion in the chest since having his watchman placed. He thinks that this is due to possibly medication change. He is concerned about why he has not been told about his ejection fraction being low in the past.  He used to follow-up with Dr. Loretta Evans many years ago and subsequently switched to Dr. Zaira Schultz. When he presented to the ER he was noted to be in A. fib with RVR. His proBNP was elevated. Prior to this he was reporting significant orthopnea and increased dyspnea on exertion. He has been started on IV Cardizem drip which controlled the heart rate. Currently his heart rate is between 85 to 95 bpm.  He is also on metoprolol, Cardizem and digoxin at home. He was recently switched from warfarin to Plavix. He reports some pedal edema. He has been started on IV diuresis. He reports feeling better though he still has mild edema in the legs. Cardiology was consulted for further evaluation. All labs, EKG's, diagnostic testing and images as well as cardiac cath, stress testing were reviewed during this encounter.     Past Medical History:          Diagnosis Date    Anesthesia     takes large dose of medications to make him sleepy for surgery    Atrial fibrillation [facility])     Benign prostatic hyperplasia with urinary frequency 10/21/2019    CAD (coronary artery disease)     Chest pain     Colon polyp 2011    removed    Coronary artery disease involving native coronary artery of native heart without angina pectoris 10/21/2019    Dementia ([address]     Dizziness     Elevated PSA     GERD (gastroesophageal reflux disease)     Heart disease     Hyperlipidemia     Hypertension        Past Surgical History:          Procedure Laterality Date    ADENOIDECTOMY      CARDIAC CATHETERIZATION  09/23/2011    Right radial artery cannulation. Moderate LV dysfunction. The patient has disease in the ostium of diagonal 1 and diagonal 2 branch, however they are both are at the  ostium of the diagonals. Intervention could compromise flow of LAD. THe LAD has long diffuse calcified lesion 50-60-% anatomically. CARDIOVASCULAR STRESS TEST  09/23/2011    EF 37%    CARPAL TUNNEL RELEASE      COLONOSCOPY      NASAL SINUS SURGERY N/A 6/13/2022    Nasal Endoscopy Control of Nasal Hemorrhage performed by Ugo Murphy MD at [place] [address] 12/3/2020    ROBOTIC ASSISTED [address] performed by Chiquita Rubi MD at [facility]      TRANSESOPHAGEAL ECHOCARDIOGRAM N/A 9/12/2022    TRANSESOPHAGEAL ECHOCARDIOGRAM performed by Michael Groves MD at [facility] ECHOCARDIOGRAM  09/23/2011    EF 50-55%    ULTRASOUND PROSTATE/TRANSRECTAL N/A 10/6/2020    CYSTO, TRANSRECTAL ULTRASOUND GUIDED PROSTATE BX performed by Chiquita Rubi MD at [facility]         Medications Prior to Admission:      Prior to Admission medications    Medication Sig Start Date End Date Taking?  Authorizing Provider   potassium chloride (KLOR-CON) 10 MEQ extended release tablet TAKE 1 TABLET BY MOUTH TWICE DAILY 9/14/22   NARESH Noble CNP   digoxin (LANOXIN) 125 MCG tablet Take 1 tablet by mouth once daily 9/13/22   NARESH Noble CNP   clopidogrel (PLAVIX) 75 MG tablet Take 1 tablet by mouth daily 9/13/22   NARESH Noble CNP   metoprolol succinate (TOPROL XL) 50 MG extended release tablet Take 1 tablet by mouth ondansetron (ZOFRAN-ODT) disintegrating tablet 4 mg  4 mg Oral Q8H PRN Nicolle SAY Amin-C        Or    ondansetron (ZOFRAN) injection 4 mg  4 mg IntraVENous Q6H PRN Nicolle Eloisa PA-C        polyethylene glycol (GLYCOLAX) packet 17 g  17 g Oral Daily PRN Nicolle Eloisa PA-C        acetaminophen (TYLENOL) tablet 650 mg  650 mg Oral Q6H PRN Nicolle Eloisa PA-C        Or    acetaminophen (TYLENOL) suppository 650 mg  650 mg Rectal Q6H PRN Nicolle Eloisa, PA-C        potassium chloride (KLOR-CON M) extended release tablet 40 mEq  40 mEq Oral PRN Nicolle Eloisa PA-C        Or    potassium bicarb-citric acid (EFFER-K) effervescent tablet 40 mEq  40 mEq Oral PRN Nicolle Eloisa, PA-C        Or    potassium chloride 10 mEq/100 mL IVPB (Peripheral Line)  10 mEq IntraVENous PRN Nicolle Eloisa, PA-C        magnesium sulfate 2000 mg in 50 mL IVPB premix  2,000 mg IntraVENous PRN Nicolle Eloisa, PA-C           Allergies:  Patient has no known allergies. Social History:    TOBACCO:   reports that he has never smoked. He has never used smokeless tobacco.  ETOH:   reports no history of alcohol use. Family History:        Problem Relation Age of Onset    Heart Surgery Father     Cancer Father         lung    Heart Disease Father         CABG    Alzheimer's Disease Mother     Diabetes Neg Hx     High Blood Pressure Neg Hx     Stroke Neg Hx          Review of Systems -   General ROS: negative  Psychological ROS: negative  Hematological and Lymphatic ROS: No history of blood clots or bleeding disorder. Respiratory ROS: no cough, shortness of breath, or wheezing  Cardiovascular ROS: As per HPI  Gastrointestinal ROS: negative  Genito-Urinary ROS: no dysuria, trouble voiding, or hematuria  Musculoskeletal ROS: negative  Neurological ROS: no TIA or stroke symptoms  Dermatological ROS: negative    All others reviewed and are negative.        BP (!) 145/94   Pulse 83   Temp 98.1 °F (36.7 °C) (Oral)   Resp 18 Ht 6' 1\" (1.854 m)   Wt 226 lb 6.6 oz (102.7 kg)   SpO2 94%   BMI 29.87 kg/m²       Physical Examination:   General appearance - alert, in no distress  Mental status - alert, oriented to person, place, and time  Neck - supple, no significant adenopathy, no JVD, or carotid bruits  Chest - clear to auscultation, no wheezes, mild bibasilar Rales  Heart -irregular regular rhythm, normal S1, S2, no murmurs, rubs, clicks or gallops  Abdomen - soft, nontender, nondistended, no masses or organomegaly  Neurological - alert, oriented, normal speech, no focal findings or movement disorder noted  Musculoskeletal - no joint tenderness, deformity or swelling  Extremities - peripheral pulses normal, no pedal edema, no clubbing or cyanosis  Skin - normal coloration and turgor, no rashes, no suspicious skin lesions noted      LABS:    Recent Labs     09/26/22  1155   TROPONINT < 0.010     CBC:   Lab Results   Component Value Date/Time    WBC 8.9 09/27/2022 05:28 AM    RBC 5.58 09/27/2022 05:28 AM    RBC 5.63 05/17/2016 07:30 AM    HGB 15.6 09/27/2022 05:28 AM    HCT 47.8 09/27/2022 05:28 AM    MCV 85.7 09/27/2022 05:28 AM    MCH 28.0 09/27/2022 05:28 AM    MCHC 32.6 09/27/2022 05:28 AM    RDW 13.9 06/23/2018 06:39 AM     09/27/2022 05:28 AM    MPV 10.9 09/27/2022 05:28 AM     BMP:    Lab Results   Component Value Date/Time     09/27/2022 05:28 AM    K 4.1 09/27/2022 05:28 AM     09/27/2022 05:28 AM    CO2 28 09/27/2022 05:28 AM    BUN 14 09/27/2022 05:28 AM    LABALBU 4.2 09/26/2022 11:55 AM    LABALBU 4.5 01/12/2012 08:05 AM    CREATININE 1.2 09/27/2022 05:28 AM    CALCIUM 9.3 09/27/2022 05:28 AM    LABGLOM 60 09/27/2022 05:28 AM    GLUCOSE 101 09/27/2022 05:28 AM    GLUCOSE 93 05/17/2016 07:30 AM     Hepatic Function Panel:    Lab Results   Component Value Date/Time    ALKPHOS 87 09/26/2022 11:55 AM    ALT 18 09/26/2022 11:55 AM    AST 22 09/26/2022 11:55 AM    PROT 7.2 09/26/2022 11:55 AM    BILITOT 1.0 09/26/2022 11:55 AM    BILIDIR <0.2 09/26/2022 11:55 AM    LABALBU 4.2 09/26/2022 11:55 AM    LABALBU 4.5 01/12/2012 08:05 AM     Magnesium:    Lab Results   Component Value Date/Time    MG 2.0 09/26/2022 11:55 AM     Warfarin PT/INR:  No components found for: PTPATWAR, PTINRWAR  HgBA1c:    Lab Results   Component Value Date/Time    LABA1C 6.3 04/19/2022 07:13 AM     FLP:    Lab Results   Component Value Date/Time    TRIG 119 04/19/2022 07:13 AM    HDL 28 04/19/2022 07:13 AM    HDL 37 10/20/2010 12:00 AM    LDLCALC 85 10/04/2021 08:32 AM    LDLDIRECT 88.85 04/19/2022 07:13 AM    LABVLDL 27 05/17/2016 07:30 AM     TSH:    Lab Results   Component Value Date/Time    TSH 5.550 09/26/2022 11:55 AM     BNP: No components found for: PRO-BNP      Assessment/Plan:    Patient Active Problem List   Diagnosis    Essential hypertension    Hyperlipidemia    Paroxysmal atrial fibrillation (HCC)    Obesity due to excess calories    Dizziness    History of gastroesophageal reflux (GERD)    Diastolic dysfunction    Urinary incontinence    Elevated PSA    Coronary artery disease involving native coronary artery of native heart without angina pectoris    Benign prostatic hyperplasia with urinary frequency    BPH with obstruction/lower urinary tract symptoms    Prostate cancer (HCC)    Fever    Albuminuria    Posterior epistaxis    Chronic systolic (congestive) heart failure    Persistent atrial fibrillation (HCC)    Longstanding persistent atrial fibrillation (HCC)    Atrial fibrillation with RVR (HCC)     A. fib with RVR, now rate controlled  Acute CHF exacerbation likely due to A. fib with RVR  Status post watchman  LV dysfunction with ejection fraction of 40 to 45%  Hypertension  Hyperlipidemia  BPH    Telemetry  Would suggest to give Lasix 40mg BID for 1 more day  Continue Aspirin/plavix  Continue metoprolol/diltiazem/digoxin  Continue rest of the management  Follow up with  [facility] as outpatient     Please do note hesitate to contact me for any further questions. Thank you for the opportunity to be involved in this patient's care.     Code Status: Full Code    Electronically signed by Douglas Goldsmith MD on 9/27/2022 at 4:26 PM

## 2022-09-27 NOTE — CARE COORDINATION
9/27/22, 7:45 AM EDT  DISCHARGE PLANNING EVALUATION:    Fabrice Lyle       Admitted: 9/26/2022/ [address] day: 1   Location: [facility]/028-A Reason for admit: Atrial fibrillation with RVR ([facility] [I48.91]   PMH:  has a past medical history of Anesthesia, Atrial fibrillation ([address] Benign prostatic hyperplasia with urinary frequency, CAD (coronary artery disease), Chest pain, Colon polyp, Coronary artery disease involving native coronary artery of native heart without angina pectoris, Dementia ([address] Dizziness, Elevated PSA, GERD (gastroesophageal reflux disease), Heart disease, Hyperlipidemia, and Hypertension. Procedure:   9/26 CXR 1. No interval change since previous study dated 21 April 2022.   2. Mild cardiomegaly. 3. Tiny calcified granuloma in the left lower lobe. 4. Thoracic spondylosis. Barriers to Discharge:  From ED, telemetry, Cardiology consult, Asa, Plavix, Cardizem drip, Lovenox, electrolyte replacement protocol. PCP: NARESH Santo CNP  Readmission Risk Score: 10.6%  Patient's Healthcare Decision Maker: Named in Scanned ACP Document    Patient Goals/Plan/Treatment Preferences: Met with Lashonda Dawkins. He currently lives at home with his spouse. He is independent. Plan is to return home at discharge. He denies need for DME and declines HH. Will follow for potential needs. PCP and insurance verified. Transportation/Food Security/Housekeeping Addressed:  No issues identified.

## 2022-09-27 NOTE — PROGRESS NOTES
Pharmacy Medication History Note      List of current medications patient is taking is complete. Source of information: Patient and patient provided medication bottles    Changes made to medication list:  Medications removed (include reason, ex. therapy complete or physician discontinued):  Duplicate potassium chloride 10 mEq- list clean up    Medications added/doses adjusted:  None    Other notes:  Patient reports getting chronically constipated from his medications, so he started taking the bisacodyl 5 mg nightly on his own    Patient has never had to use the nitroglycerin 0.4 SL tablets, but carries them in his pocket daily  Denies use of other OTC or herbal medications.     Allergies reviewed- No known allergies    Electronically signed by Gurinder Quan on 9/27/2022 at 8:53 AM

## 2022-09-28 LAB
ANION GAP SERPL CALCULATED.3IONS-SCNC: 10 MEQ/L (ref 8–16)
BASOPHILS # BLD: 1.1 %
BASOPHILS ABSOLUTE: 0.1 THOU/MM3 (ref 0–0.1)
BUN BLDV-MCNC: 21 MG/DL (ref 7–22)
CALCIUM SERPL-MCNC: 9.3 MG/DL (ref 8.5–10.5)
CHLORIDE BLD-SCNC: 101 MEQ/L (ref 98–111)
CO2: 29 MEQ/L (ref 23–33)
CREAT SERPL-MCNC: 1.2 MG/DL (ref 0.4–1.2)
EOSINOPHIL # BLD: 3.7 %
EOSINOPHILS ABSOLUTE: 0.3 THOU/MM3 (ref 0–0.4)
ERYTHROCYTE [DISTWIDTH] IN BLOOD BY AUTOMATED COUNT: 14.9 % (ref 11.5–14.5)
ERYTHROCYTE [DISTWIDTH] IN BLOOD BY AUTOMATED COUNT: 44.3 FL (ref 35–45)
GFR SERPL CREATININE-BSD FRML MDRD: 60 ML/MIN/1.73M2
GLUCOSE BLD-MCNC: 112 MG/DL (ref 70–108)
HCT VFR BLD CALC: 50.6 % (ref 42–52)
HEMOGLOBIN: 16.5 GM/DL (ref 14–18)
IMMATURE GRANS (ABS): 0.02 THOU/MM3 (ref 0–0.07)
IMMATURE GRANULOCYTES: 0.3 %
LYMPHOCYTES # BLD: 20 %
LYMPHOCYTES ABSOLUTE: 1.5 THOU/MM3 (ref 1–4.8)
MCH RBC QN AUTO: 27.6 PG (ref 26–33)
MCHC RBC AUTO-ENTMCNC: 32.6 GM/DL (ref 32.2–35.5)
MCV RBC AUTO: 84.6 FL (ref 80–94)
MONOCYTES # BLD: 9.3 %
MONOCYTES ABSOLUTE: 0.7 THOU/MM3 (ref 0.4–1.3)
NUCLEATED RED BLOOD CELLS: 0 /100 WBC
PLATELET # BLD: 238 THOU/MM3 (ref 130–400)
PMV BLD AUTO: 10.9 FL (ref 9.4–12.4)
POTASSIUM REFLEX MAGNESIUM: 3.8 MEQ/L (ref 3.5–5.2)
RBC # BLD: 5.98 MILL/MM3 (ref 4.7–6.1)
SEG NEUTROPHILS: 65.6 %
SEGMENTED NEUTROPHILS ABSOLUTE COUNT: 5 THOU/MM3 (ref 1.8–7.7)
SODIUM BLD-SCNC: 140 MEQ/L (ref 135–145)
WBC # BLD: 7.6 THOU/MM3 (ref 4.8–10.8)

## 2022-09-28 PROCEDURE — 6370000000 HC RX 637 (ALT 250 FOR IP): Performed by: PHYSICIAN ASSISTANT

## 2022-09-28 PROCEDURE — 85025 COMPLETE CBC W/AUTO DIFF WBC: CPT

## 2022-09-28 PROCEDURE — 6370000000 HC RX 637 (ALT 250 FOR IP)

## 2022-09-28 PROCEDURE — 36415 COLL VENOUS BLD VENIPUNCTURE: CPT

## 2022-09-28 PROCEDURE — 6360000002 HC RX W HCPCS: Performed by: INTERNAL MEDICINE

## 2022-09-28 PROCEDURE — 6370000000 HC RX 637 (ALT 250 FOR IP): Performed by: HOSPITALIST

## 2022-09-28 PROCEDURE — 2580000003 HC RX 258

## 2022-09-28 PROCEDURE — 6360000002 HC RX W HCPCS

## 2022-09-28 PROCEDURE — 99232 SBSQ HOSP IP/OBS MODERATE 35: CPT | Performed by: NURSE PRACTITIONER

## 2022-09-28 PROCEDURE — 80048 BASIC METABOLIC PNL TOTAL CA: CPT

## 2022-09-28 PROCEDURE — 99232 SBSQ HOSP IP/OBS MODERATE 35: CPT | Performed by: PHYSICIAN ASSISTANT

## 2022-09-28 PROCEDURE — 1200000003 HC TELEMETRY R&B

## 2022-09-28 PROCEDURE — 6370000000 HC RX 637 (ALT 250 FOR IP): Performed by: NURSE PRACTITIONER

## 2022-09-28 RX ORDER — DILTIAZEM HYDROCHLORIDE 120 MG/1
120 CAPSULE, COATED, EXTENDED RELEASE ORAL DAILY
Status: DISCONTINUED | OUTPATIENT
Start: 2022-09-28 | End: 2022-09-29 | Stop reason: HOSPADM

## 2022-09-28 RX ORDER — METOPROLOL TARTRATE 100 MG/1
100 TABLET ORAL 2 TIMES DAILY
Status: DISCONTINUED | OUTPATIENT
Start: 2022-09-28 | End: 2022-09-29 | Stop reason: HOSPADM

## 2022-09-28 RX ORDER — HYDROXYZINE HYDROCHLORIDE 25 MG/1
25 TABLET, FILM COATED ORAL ONCE
Status: COMPLETED | OUTPATIENT
Start: 2022-09-28 | End: 2022-09-28

## 2022-09-28 RX ORDER — SPIRONOLACTONE 25 MG/1
25 TABLET ORAL DAILY
Status: DISCONTINUED | OUTPATIENT
Start: 2022-09-28 | End: 2022-09-29 | Stop reason: HOSPADM

## 2022-09-28 RX ADMIN — BISACODYL 5 MG: 5 TABLET, COATED ORAL at 20:28

## 2022-09-28 RX ADMIN — CLOPIDOGREL BISULFATE 75 MG: 75 TABLET ORAL at 08:50

## 2022-09-28 RX ADMIN — ASPIRIN 81 MG CHEWABLE TABLET 81 MG: 81 TABLET CHEWABLE at 08:49

## 2022-09-28 RX ADMIN — HYDROXYZINE HYDROCHLORIDE 25 MG: 25 TABLET, FILM COATED ORAL at 10:51

## 2022-09-28 RX ADMIN — METOPROLOL TARTRATE 100 MG: 100 TABLET, FILM COATED ORAL at 20:28

## 2022-09-28 RX ADMIN — ENOXAPARIN SODIUM 30 MG: 100 INJECTION SUBCUTANEOUS at 20:30

## 2022-09-28 RX ADMIN — FUROSEMIDE 40 MG: 10 INJECTION, SOLUTION INTRAMUSCULAR; INTRAVENOUS at 08:51

## 2022-09-28 RX ADMIN — METOPROLOL TARTRATE 50 MG: 50 TABLET, FILM COATED ORAL at 08:50

## 2022-09-28 RX ADMIN — ENOXAPARIN SODIUM 30 MG: 100 INJECTION SUBCUTANEOUS at 08:51

## 2022-09-28 RX ADMIN — SPIRONOLACTONE 25 MG: 25 TABLET ORAL at 17:36

## 2022-09-28 RX ADMIN — DIGOXIN 125 MCG: 125 TABLET ORAL at 08:50

## 2022-09-28 RX ADMIN — SODIUM CHLORIDE, PRESERVATIVE FREE 10 ML: 5 INJECTION INTRAVENOUS at 20:29

## 2022-09-28 RX ADMIN — SODIUM CHLORIDE, PRESERVATIVE FREE 10 ML: 5 INJECTION INTRAVENOUS at 10:51

## 2022-09-28 RX ADMIN — ROSUVASTATIN CALCIUM 20 MG: 20 TABLET, FILM COATED ORAL at 20:28

## 2022-09-28 RX ADMIN — DILTIAZEM HYDROCHLORIDE 120 MG: 120 CAPSULE, EXTENDED RELEASE ORAL at 17:36

## 2022-09-28 ASSESSMENT — PAIN SCALES - GENERAL: PAINLEVEL_OUTOF10: 0

## 2022-09-28 NOTE — PLAN OF CARE
Problem: Discharge Planning  Goal: Discharge to home or other facility with appropriate resources  Flowsheets (Taken 9/27/2022 0341 by Robyn Tim RN)  Discharge to home or other facility with appropriate resources:   Identify barriers to discharge with patient and caregiver   Arrange for needed discharge resources and transportation as appropriate   Identify discharge learning needs (meds, wound care, etc)     Problem: Safety - Adult  Goal: Free from fall injury  Flowsheets (Taken 9/27/2022 0341 by Robyn Tim RN)  Free From Fall Injury:   Instruct family/caregiver on patient safety   Based on caregiver fall risk screen, instruct family/caregiver to ask for assistance with transferring infant if caregiver noted to have fall risk factors     Problem: ABCDS Injury Assessment  Goal: Absence of physical injury  Flowsheets (Taken 9/27/2022 0341 by Robyn Tim RN)  Absence of Physical Injury: Implement safety measures based on patient assessment     Problem: Chronic Conditions and Co-morbidities  Goal: Patient's chronic conditions and co-morbidity symptoms are monitored and maintained or improved  Flowsheets (Taken 9/28/2022 0145)  Care Plan - Patient's Chronic Conditions and Co-Morbidity Symptoms are Monitored and Maintained or Improved:   Collaborate with multidisciplinary team to address chronic and comorbid conditions and prevent exacerbation or deterioration   Update acute care plan with appropriate goals if chronic or comorbid symptoms are exacerbated and prevent overall improvement and discharge

## 2022-09-28 NOTE — FLOWSHEET NOTE
09/28/22 1001   Safe Environment   Safety Measures   (Virtual RN rounds complete)   PT did not respond to audio, camera turned on in the interest of pt safety. Resting comfortably in bed, no needs identified.

## 2022-09-28 NOTE — PROGRESS NOTES
Hospitalist Progress Note    Patient:  Jeimy Corley      Unit/Bed:6K-28/028-A    YOB: 1950    MRN: [id number]       Acct: [de-identified]     PCP: Joaquim Boeck, APRN - CNP    Date of Admission: 9/26/2022    Assessment/Plan:    Persistent atrial fibrillation, with RVR:  S/P Watchman device 7/19/2022. SANG on 9/12/22 shows EF 45%. WOH4HE9-LOAl Score 3. HR between  in last 24 hours. Continues on telemetry monitoring. Continues on Plavix and ASA. Was recently switched from Coumadin to Plavix. Metoprolol tartrate restarted 9/27/22. Continues on Digoxin home dose. Home dose of Cardizem continues to be on hold. Will consider restarting on SR for dose titration - defer to cariology. 2. Acute on Chronic decompensated HFrEF: Improved  Likely due to inadequately rate controlled Afib. SANG on 9/12/2022 shows EF 45%. CHF clinic referral completed. Continue daily weights, strict I+O, cardiac telemetry. Continue with 2L fluid restriction and low sodium diet. Continue Lasix 40mg IV BID. 3. Accelerated essential hypertension: Improved. BP's 891-987'Q systolic. Continue Metoprolol tartrate. Continue to monitor closely. 4. CAD noted on previous cath from 2011. Stress test since this time was negative for induced ischemia. ASA/Plavix/Statin/BB      Dispo: home when ok with cardiology. Chief Complaint: Shortness of Breath    Hospital Course: Patient presented to the ED from cardiology office with complaints of anxiety with rapid heart rate, SOB, chest heaviness, and tunnel vision x 4 days. He also presented with BLE edema with an elevated BNP, normal troponin, and stable CXR. He was started on a Cardizem gtt in ED with improvement in heart rate. Patient was started on diuretics. Cardiology was consulted. Appreciate recommendations. Subjective:  Denies any fever, chills. Denies dizziness or lightheadedness. Denies chest pain or shortness of breath. + XIONG. Leg edema improved.  Denies nausea or abdominal pain. Denies dysuria or hematuria. Anxious, angry and frustrated about what he feels is the lack of communications from providers on what's wrong with him. Wants to see a \"doctor that speaks english\"      Medications:  Reviewed    Infusion Medications    sodium chloride       Scheduled Medications    hydrOXYzine HCl  25 mg Oral Once    metoprolol tartrate  50 mg Oral BID    furosemide  40 mg IntraVENous BID    aspirin  81 mg Oral Daily    bisacodyl  5 mg Oral Nightly    clopidogrel  75 mg Oral Daily    digoxin  125 mcg Oral Daily    [Held by provider] dilTIAZem  360 mg Oral Daily    rosuvastatin  20 mg Oral Daily    sodium chloride flush  10 mL IntraVENous 2 times per day    enoxaparin  30 mg SubCUTAneous BID     PRN Meds: sodium chloride flush, sodium chloride, ondansetron **OR** ondansetron, polyethylene glycol, acetaminophen **OR** acetaminophen, potassium chloride **OR** potassium alternative oral replacement **OR** potassium chloride, magnesium sulfate      Intake/Output Summary (Last 24 hours) at 9/28/2022 1033  Last data filed at 9/28/2022 1017  Gross per 24 hour   Intake 920 ml   Output 2850 ml   Net -1930 ml       Diet:  ADULT DIET; Regular; Low Sodium (2 gm); 2000 ml    Exam:  BP (!) 149/100   Pulse (!) 125   Temp 97.9 °F (36.6 °C) (Oral)   Resp 20   Ht 6' 1\" (1.854 m)   Wt 226 lb 6.6 oz (102.7 kg)   SpO2 95%   BMI 29.87 kg/m²     General appearance: No apparent distress, appears stated age and cooperative. HEENT: Pupils equal, round, and reactive to light. Conjunctivae/corneas clear. Neck: Supple, with full range of motion. No jugular venous distention. Trachea midline. Respiratory:  Normal respiratory effort. Clear to auscultation, bilaterally without Rales/Wheezes/Rhonchi. Denies SOB or chest pain. Cardiovascular: Regular rate and rhythm with normal S1/S2 without murmurs, rubs or gallops.   Abdomen: Soft, obese, non-tender, non-distended with normal bowel sounds. Musculoskeletal: passive and active ROM x 4 extremities. Skin: Skin color, texture, turgor normal.  No rashes or lesions. Neurologic:  Neurovascularly intact without any focal sensory/motor deficits. Cranial nerves: II-XII intact, grossly non-focal.  Psychiatric: Alert and oriented, thought content appropriate, normal insight. Anxious. Capillary Refill: Brisk,< 3 seconds   Peripheral Pulses: +2 palpable, equal bilaterally without edema. Labs:   Recent Labs     09/26/22  1155 09/27/22  0528 09/28/22  0457   WBC 9.3 8.9 7.6   HGB 16.4 15.6 16.5   HCT 50.2 47.8 50.6    230 238     Recent Labs     09/26/22  1155 09/27/22  0528 09/28/22  0457    142 140   K 4.4 4.1 3.8    103 101   CO2 24 28 29   BUN 11 14 21   CREATININE 1.0 1.2 1.2   CALCIUM 9.6 9.3 9.3     Recent Labs     09/26/22  1155   AST 22   ALT 18   BILIDIR <0.2   BILITOT 1.0   ALKPHOS 87     No results for input(s): INR in the last 72 hours. No results for input(s): Daryl Gubler in the last 72 hours. Urinalysis:      Lab Results   Component Value Date/Time    NITRU NEGATIVE 03/26/2021 12:10 PM    WBCUA 0-2 11/25/2020 06:40 AM    BACTERIA NONE SEEN 11/25/2020 06:40 AM    RBCUA 0-2 11/25/2020 06:40 AM    BLOODU NEGATIVE 03/26/2021 12:10 PM    SPECGRAV 1.023 12/08/2018 04:36 PM    GLUCOSEU NEGATIVE 03/26/2021 12:10 PM       Radiology:  XR CHEST (2 VW)   Final Result   1. No interval change since previous study dated 21 April 2022.   2. Mild cardiomegaly. 3. Tiny calcified granuloma in the left lower lobe. 4. Thoracic spondylosis. **This report has been created using voice recognition software. It may contain minor errors which are inherent in voice recognition technology. **      Final report electronically signed by DR Kimmy Brown on 9/26/2022 12:28 PM          Diet: ADULT DIET; Regular;  Low Sodium (2 gm); 2000 ml    DVT prophylaxis: [] Lovenox                                 [] SCDs [] SQ Heparin                                 [] Encourage ambulation           [x] Already on Anticoagulation     Disposition:    [x] Home       [] TCU       [] Rehab       [] Psych       [] SNF       [] [place]       [] Other-    Code Status: Full Code          Electronically signed by Yusuf MAURERN, RN, AG-ACNP Student on 9/28/2022 at 10:33 AM

## 2022-09-28 NOTE — CARE COORDINATION
9/28/22, 2:40 PM EDT    DISCHARGE ON GOING EVALUATION    Mitchell Monk day: 2  Location: -28/028-A Reason for admit: Atrial fibrillation with RVR ([address] [I48.91]   Procedure: none  Barriers to Discharge: Plan home with ok with Cardiology, yesterday they wanted one more day of IV lasix bid. Afebrile. Afib 110's. On room air. Asa, plavix, digoxin, lovenox, IV lasix bid, lopressor, crestor, Electrolyte replacement protocols. HF RN consulted. PCP: NARESH Bagley CNP  Readmission Risk Score: 11.8%  Patient Goals/Plan/Treatment Preferences: Home with spouse. Denies needs, declines HH.

## 2022-09-28 NOTE — PROGRESS NOTES
Cardiology Progress Note      Patient:  Akhil Garza  YOB: 1950  MRN: [id number]   Acct: [de-identified]  Admit Date:  9/26/2022  Primary Cardiologist: Danielle Acevedo MD    Note per dr Avila Christina for Consultation:  Afib RVR        History Of Present Illness:    81 y.o. pleasant male c long standing persistent A. fib, status post recent watchman implantation, LV dysfunction with ejection fraction 40 to 45% who presented to the hospital with complaints of shortness of breath. As per patient he reports feeling more congestion in the chest since having his watchman placed. He thinks that this is due to possibly medication change. He is concerned about why he has not been told about his ejection fraction being low in the past.  He used to follow-up with Dr. Morales Alba many years ago and subsequently switched to Dr. Martha Hines. When he presented to the ER he was noted to be in A. fib with RVR. His proBNP was elevated. Prior to this he was reporting significant orthopnea and increased dyspnea on exertion. He has been started on IV Cardizem drip which controlled the heart rate. Currently his heart rate is between 85 to 95 bpm.  He is also on metoprolol, Cardizem and digoxin at home. He was recently switched from warfarin to Plavix. He reports some pedal edema. He has been started on IV diuresis. He reports feeling better though he still has mild edema in the legs. Cardiology was consulted for further evaluation. \"    Subjective (Events in last 24 hours): pt awake and alert. NAD. No cp or sob. Edema resolved.   No orthopnea  On RA    Net I/o -3.9 L    Objective:   BP (!) 139/92   Pulse (!) 113   Temp 97.9 °F (36.6 °C) (Oral)   Resp 20   Ht 6' 1\" (1.854 m)   Wt 226 lb 6.6 oz (102.7 kg)   SpO2 92%   BMI 29.87 kg/m²        TELEMETRY: afib rvr 120    Physical Exam:  General Appearance: alert and oriented to person, place and time, in no acute distress  Cardiovascular: irregularly irregular  Pulmonary/Chest: clear to auscultation bilaterally- no wheezes, rales or rhonchi, normal air movement, no respiratory distress  Abdomen: soft, non-tender, non-distended, normal bowel sounds, no masses Extremities: no cyanosis, clubbing or edema, pulse   Skin: warm and dry, no rash or erythema  Head: normocephalic and atraumatic  Eyes: pupils equal, round, and reactive to light  Neck: supple and non-tender without mass, no thyromegaly   Neurological: alert, oriented, normal speech, no focal findings or movement disorder noted    Medications:    metoprolol tartrate  50 mg Oral BID    furosemide  40 mg IntraVENous BID    aspirin  81 mg Oral Daily    bisacodyl  5 mg Oral Nightly    clopidogrel  75 mg Oral Daily    digoxin  125 mcg Oral Daily    [Held by provider] dilTIAZem  360 mg Oral Daily    rosuvastatin  20 mg Oral Daily    sodium chloride flush  10 mL IntraVENous 2 times per day    enoxaparin  30 mg SubCUTAneous BID      sodium chloride       sodium chloride flush, 10 mL, PRN  sodium chloride, , PRN  ondansetron, 4 mg, Q8H PRN   Or  ondansetron, 4 mg, Q6H PRN  polyethylene glycol, 17 g, Daily PRN  acetaminophen, 650 mg, Q6H PRN   Or  acetaminophen, 650 mg, Q6H PRN  potassium chloride, 40 mEq, PRN   Or  potassium alternative oral replacement, 40 mEq, PRN   Or  potassium chloride, 10 mEq, PRN  magnesium sulfate, 2,000 mg, PRN        Lab Data:    Cardiac Enzymes:  No results for input(s): CKTOTAL, CKMB, CKMBINDEX, TROPONINI in the last 72 hours.     CBC:   Lab Results   Component Value Date/Time    WBC 7.6 09/28/2022 04:57 AM    RBC 5.98 09/28/2022 04:57 AM    RBC 5.63 05/17/2016 07:30 AM    HGB 16.5 09/28/2022 04:57 AM    HCT 50.6 09/28/2022 04:57 AM     09/28/2022 04:57 AM       CMP:    Lab Results   Component Value Date/Time     09/28/2022 04:57 AM    K 3.8 09/28/2022 04:57 AM     09/28/2022 04:57 AM    CO2 29 09/28/2022 04:57 AM    BUN 21 09/28/2022 04:57 AM    CREATININE 1.2 09/28/2022 04:57 AM    LABGLOM 60 09/28/2022 04:57 AM    GLUCOSE 112 09/28/2022 04:57 AM    GLUCOSE 93 05/17/2016 07:30 AM    CALCIUM 9.3 09/28/2022 04:57 AM       Hepatic Function Panel:    Lab Results   Component Value Date/Time    ALKPHOS 87 09/26/2022 11:55 AM    ALT 18 09/26/2022 11:55 AM    AST 22 09/26/2022 11:55 AM    PROT 7.2 09/26/2022 11:55 AM    BILITOT 1.0 09/26/2022 11:55 AM    BILIDIR <0.2 09/26/2022 11:55 AM    LABALBU 4.2 09/26/2022 11:55 AM    LABALBU 4.5 01/12/2012 08:05 AM       Magnesium:    Lab Results   Component Value Date/Time    MG 2.0 09/26/2022 11:55 AM       PT/INR:    Lab Results   Component Value Date/Time    PROTIME 44.6 06/02/2022 12:03 PM    INR 2.50 09/01/2022 07:18 AM    INR 1.29 07/19/2022 05:38 AM       HgBA1c:    Lab Results   Component Value Date/Time    LABA1C 6.3 04/19/2022 07:13 AM       FLP:    Lab Results   Component Value Date/Time    TRIG 119 04/19/2022 07:13 AM    HDL 28 04/19/2022 07:13 AM    HDL 37 10/20/2010 12:00 AM    LDLCALC 85 10/04/2021 08:32 AM    LDLDIRECT 88.85 04/19/2022 07:13 AM    LABVLDL 27 05/17/2016 07:30 AM       TSH:    Lab Results   Component Value Date/Time    TSH 5.550 09/26/2022 11:55 AM       Assessment:    Persistent afib with rvr  Hx watchman 2/97/14  Acute systolic CHF, likely due to afib rvr - improved  Known CMP - ef 45 per SANG 9/12/22   HTN  HLD  BPH    Plan:    Daily I/o and weights  2 liter fluid restriction and 2gm sodium diet  Keep mag >2 and K >4  Cont asa/plavix/BB/dig  Increase lopressor to 100 bid  Add cardizem 120 daily  Stop bumex  Add aldactone  Bmp, bnp in am  No indication for ischemic w/up at this time  Chf clinic 1 week  F/up dr Lazaro Lutz 4 weeks only  ?home tomorrow        Electronically signed by Devi Sanchez PA-C on 9/28/2022 at 2:55 PM

## 2022-09-28 NOTE — PROGRESS NOTES
Educated patient regarding heart failure management by explaining the importance of obtaining daily weights at the same time every day with approximately the same amount of clothing, how to recognize symptoms, low sodium diet and taking prescribed medications as ordered. Patient was given our heart failure booklet, a weight chart. Patient was receptive to the education given and verbalized understanding. New patient appointment sched in [facility] CHF clinic. Wife at bedside at time of education. 05-Nov-2019 20:11

## 2022-09-28 NOTE — PROGRESS NOTES
Upon entering the room, the patient seem agitated. He stated Sis Sauceda has had a lot going on today and is just very confused\". The patients wife was at the bedside. Upon assessment, patient heart rhythm sounded irregular with a fast heart beat. Patient stated \"this problem has been going on for a while now\". Notified primary RN. Patient then appeared less agitated. Call light is within reach and will continue to monitor.

## 2022-09-29 ENCOUNTER — APPOINTMENT (OUTPATIENT)
Dept: PHARMACY | Age: 72
End: 2022-09-29
Payer: MEDICARE

## 2022-09-29 VITALS
WEIGHT: 230.82 LBS | HEART RATE: 94 BPM | OXYGEN SATURATION: 94 % | DIASTOLIC BLOOD PRESSURE: 90 MMHG | HEIGHT: 73 IN | SYSTOLIC BLOOD PRESSURE: 110 MMHG | TEMPERATURE: 97.8 F | BODY MASS INDEX: 30.59 KG/M2 | RESPIRATION RATE: 20 BRPM

## 2022-09-29 LAB
ANION GAP SERPL CALCULATED.3IONS-SCNC: 12 MEQ/L (ref 8–16)
BASOPHILS # BLD: 1.1 %
BASOPHILS ABSOLUTE: 0.1 THOU/MM3 (ref 0–0.1)
BUN BLDV-MCNC: 26 MG/DL (ref 7–22)
CALCIUM SERPL-MCNC: 9.6 MG/DL (ref 8.5–10.5)
CHLORIDE BLD-SCNC: 103 MEQ/L (ref 98–111)
CO2: 27 MEQ/L (ref 23–33)
CREAT SERPL-MCNC: 1.3 MG/DL (ref 0.4–1.2)
EOSINOPHIL # BLD: 3.9 %
EOSINOPHILS ABSOLUTE: 0.3 THOU/MM3 (ref 0–0.4)
ERYTHROCYTE [DISTWIDTH] IN BLOOD BY AUTOMATED COUNT: 14.5 % (ref 11.5–14.5)
ERYTHROCYTE [DISTWIDTH] IN BLOOD BY AUTOMATED COUNT: 44.2 FL (ref 35–45)
GFR SERPL CREATININE-BSD FRML MDRD: 54 ML/MIN/1.73M2
GLUCOSE BLD-MCNC: 111 MG/DL (ref 70–108)
HCT VFR BLD CALC: 50.6 % (ref 42–52)
HEMOGLOBIN: 16.4 GM/DL (ref 14–18)
IMMATURE GRANS (ABS): 0.02 THOU/MM3 (ref 0–0.07)
IMMATURE GRANULOCYTES: 0.2 %
LYMPHOCYTES # BLD: 22.3 %
LYMPHOCYTES ABSOLUTE: 1.8 THOU/MM3 (ref 1–4.8)
MCH RBC QN AUTO: 27.7 PG (ref 26–33)
MCHC RBC AUTO-ENTMCNC: 32.4 GM/DL (ref 32.2–35.5)
MCV RBC AUTO: 85.5 FL (ref 80–94)
MONOCYTES # BLD: 8.5 %
MONOCYTES ABSOLUTE: 0.7 THOU/MM3 (ref 0.4–1.3)
NUCLEATED RED BLOOD CELLS: 0 /100 WBC
PLATELET # BLD: 253 THOU/MM3 (ref 130–400)
PMV BLD AUTO: 10.7 FL (ref 9.4–12.4)
POTASSIUM REFLEX MAGNESIUM: 4.1 MEQ/L (ref 3.5–5.2)
POTASSIUM SERPL-SCNC: 4.1 MEQ/L (ref 3.5–5.2)
PRO-BNP: 1976 PG/ML (ref 0–900)
RBC # BLD: 5.92 MILL/MM3 (ref 4.7–6.1)
SEG NEUTROPHILS: 64 %
SEGMENTED NEUTROPHILS ABSOLUTE COUNT: 5.2 THOU/MM3 (ref 1.8–7.7)
SODIUM BLD-SCNC: 142 MEQ/L (ref 135–145)
WBC # BLD: 8.2 THOU/MM3 (ref 4.8–10.8)

## 2022-09-29 PROCEDURE — 85025 COMPLETE CBC W/AUTO DIFF WBC: CPT

## 2022-09-29 PROCEDURE — 6370000000 HC RX 637 (ALT 250 FOR IP): Performed by: PHYSICIAN ASSISTANT

## 2022-09-29 PROCEDURE — 6360000002 HC RX W HCPCS

## 2022-09-29 PROCEDURE — 80048 BASIC METABOLIC PNL TOTAL CA: CPT

## 2022-09-29 PROCEDURE — 83880 ASSAY OF NATRIURETIC PEPTIDE: CPT

## 2022-09-29 PROCEDURE — 2580000003 HC RX 258

## 2022-09-29 PROCEDURE — 6370000000 HC RX 637 (ALT 250 FOR IP)

## 2022-09-29 PROCEDURE — 99239 HOSP IP/OBS DSCHRG MGMT >30: CPT | Performed by: NURSE PRACTITIONER

## 2022-09-29 PROCEDURE — 36415 COLL VENOUS BLD VENIPUNCTURE: CPT

## 2022-09-29 RX ORDER — SPIRONOLACTONE 25 MG/1
25 TABLET ORAL DAILY
Qty: 30 TABLET | Refills: 0 | Status: SHIPPED | OUTPATIENT
Start: 2022-09-30 | End: 2022-10-17 | Stop reason: SDUPTHER

## 2022-09-29 RX ORDER — DILTIAZEM HYDROCHLORIDE 120 MG/1
120 CAPSULE, COATED, EXTENDED RELEASE ORAL DAILY
Qty: 30 CAPSULE | Refills: 0 | Status: SHIPPED | OUTPATIENT
Start: 2022-09-30 | End: 2022-10-17 | Stop reason: SDUPTHER

## 2022-09-29 RX ORDER — METOPROLOL TARTRATE 100 MG/1
100 TABLET ORAL 2 TIMES DAILY
Qty: 60 TABLET | Refills: 0 | Status: SHIPPED | OUTPATIENT
Start: 2022-09-29 | End: 2022-10-17 | Stop reason: SDUPTHER

## 2022-09-29 RX ADMIN — SPIRONOLACTONE 25 MG: 25 TABLET ORAL at 09:00

## 2022-09-29 RX ADMIN — DIGOXIN 125 MCG: 125 TABLET ORAL at 09:00

## 2022-09-29 RX ADMIN — ASPIRIN 81 MG CHEWABLE TABLET 81 MG: 81 TABLET CHEWABLE at 09:00

## 2022-09-29 RX ADMIN — CLOPIDOGREL BISULFATE 75 MG: 75 TABLET ORAL at 09:00

## 2022-09-29 RX ADMIN — DILTIAZEM HYDROCHLORIDE 120 MG: 120 CAPSULE, EXTENDED RELEASE ORAL at 09:00

## 2022-09-29 RX ADMIN — METOPROLOL TARTRATE 100 MG: 100 TABLET, FILM COATED ORAL at 09:00

## 2022-09-29 RX ADMIN — ENOXAPARIN SODIUM 30 MG: 100 INJECTION SUBCUTANEOUS at 09:00

## 2022-09-29 RX ADMIN — SODIUM CHLORIDE, PRESERVATIVE FREE 10 ML: 5 INJECTION INTRAVENOUS at 09:01

## 2022-09-29 ASSESSMENT — PAIN SCALES - GENERAL
PAINLEVEL_OUTOF10: 0
PAINLEVEL_OUTOF10: 0

## 2022-09-29 NOTE — PROGRESS NOTES
Roddy Soto was evaluated today and a DME order was entered for a wheeled walker because he requires this to successfully complete daily living tasks of ambulating. A wheeled walker is necessary due to the patient's unsteady gait, upper body weakness, and inability to  an ambulation device; and he can ambulate only by pushing a walker instead of a lesser assistive device such as a cane, crutch, or standard walker. The need for this equipment was discussed with the patient and he understands and is in agreement.

## 2022-09-29 NOTE — CARE COORDINATION
9/29/22, 12:04 PM EDT    Per report, anticipate discharge today. Patient goals/plan/ treatment preferences discussed by  and . Patient goals/plan/ treatment preferences reviewed with patient/ family. Patient/ family verbalize understanding of discharge plan and are in agreement with goal/plan/treatment preferences. Understanding was demonstrated using the teach back method. AVS provided by RN at time of discharge, which includes all necessary medical information pertaining to the patients current course of illness, treatment, post-discharge goals of care, and treatment preferences. Spoke with Micah Petersen and his wife regarding home needs. He would like a walker with wheels and a seat. Reports he walks slowly at home and feels support is needed. Seat requested as he likes to walk outside of home but might need to rest. Explained that the seat will cost  approx $40 as not covered by medicare. He and wife are agreeable with cost. Prefers black or navy blue color. Did question cost after insurance; dme is unable to quote a cost, invoice will be submitted to insurance and cost will be determined based on copays, deductibles, etc. Verbalized understanding and reports they have met all out of pocket cost. Voices concerns that they might receive a used walker, reassured that all equipment sold is new. Messaged provider via perfect serve while in room. Tere Burciaga CNP plans to order. Micah Petersen prefers to use [facility] and have delivered to room. Explained that DME will be back in main hospital by 1330 and this CM will call to notify of needs. Micah Petersen and wife deny further discharge needs. Encouraged to notify staff if needs arise. 1332 Message left for Merlinda Rooks at [facility].  [phone] Order placed, spoke with Merlinda Rooks at DME, will deliver to room.      Services At/After Discharge: DME       IMM Letter  IMM Letter given to Patient/Family/Significant other/Guardian/POA/by[de-identified] YAO Peter   IMM Letter date given[de-identified] 09/29/22  IMM Letter time given[de-identified] 1351

## 2022-09-29 NOTE — DISCHARGE SUMMARY
Discharge Summary    Patient:  Graham Shabazz  YOB: 1950    MRN: [id number]   Acct: [de-identified]    Primary Care Physician: NARESH Bagley CNP    Admit date:  9/26/2022    Discharge date:   9/29/2022      Discharge Diagnoses and Hospital Course:     Presented to the ED with complaints of SOB. Persistent atrial fibrillation, with RVR: Rate now CVR with appropriate medication changes. S/P Watchman device 7/19/2022. SANG on 9/12/22 shows EF 45%. [id number] Score 3. HR since stabilized. Continues on Plavix and ASA. Was recently switched from Coumadin to Plavix. Continued on digoxin home dose. Cardiology increased Lopressor to 100 mg BID, restarted Cardizem at reduced dose of120. RX given to reflect these changes. 2. Acute on Chronic decompensated HFrEF: Improved  Likely due to inadequately rate controlled Afib. SANG on 9/12/2022 shows EF 45%. CHF clinic referral completed. Continue daily weights, strict I+O, cardiac telemetry. Continue with 2L fluid restriction and low sodium diet. Cardiology started Aldactone. Rx given. 3. Accelerated essential hypertension: Improved. BP's 265-286'M systolic. Continue Metoprolol tartrate, and Cardizem. Continue to monitor closely. 4. CAD, by history:    Noted on previous cath from 2011. Stress test since this time was negative for induced ischemia. ASA/Plavix/Statin/BB    Rate is now controlled. No SOB, minimal XIONG. Lower ext edema has resolved. Overall volume status has greatly improved. Cardiology recs to hold Bumex on discharge. Home today with plan above. Patient wishes only to see Dr. Roman Ring in the cardiology clinic.         Admitted for: (HPI): Atrial Fibrillation with RVR      Consultants:  Cardiology, CHF Clinic    Discharge Medications:       Medication List        START taking these medications      metoprolol 100 MG tablet  Commonly known as: LOPRESSOR  Take 1 tablet by mouth 2 times daily     spironolactone 25 MG tablet  Commonly known as: ALDACTONE  Take 1 tablet by mouth daily  Start taking on: September 30, 2022            CHANGE how you take these medications      dilTIAZem 120 MG extended release capsule  Commonly known as: CARDIZEM CD  Take 1 capsule by mouth daily  Start taking on: September 30, 2022  What changed:   medication strength  See the new instructions. CONTINUE taking these medications      aspirin 81 MG chewable tablet  Take 1 tablet by mouth in the morning. bisacodyl 5 MG EC tablet  Commonly known as: DULCOLAX     clopidogrel 75 MG tablet  Commonly known as: Plavix  Take 1 tablet by mouth daily     digoxin 125 MCG tablet  Commonly known as: LANOXIN  Take 1 tablet by mouth once daily     nitroGLYCERIN 0.4 MG SL tablet  Commonly known as: NITROSTAT  DISSOLVE ONE TABLET UNDER THE TONGUE EVERY 5 MINUTES AS NEEDED FOR CHEST PAIN.   DO NOT EXCEED A TOTAL OF 3 DOSES IN 15 MINUTES     potassium chloride 10 MEQ extended release tablet  Commonly known as: KLOR-CON  TAKE 1 TABLET BY MOUTH TWICE DAILY     rosuvastatin 20 MG tablet  Commonly known as: Crestor  Take 1 tablet by mouth daily            STOP taking these medications      metoprolol succinate 50 MG extended release tablet  Commonly known as: TOPROL XL               Where to Get Your Medications        These medications were sent to [address] [place] [address] , [address] [facility], [place] [address]      Phone: [phone]   dilTIAZem 120 MG extended release capsule  metoprolol 100 MG tablet  spironolactone 25 MG tablet           Physical Exam:    Vitals:  Vitals:    09/29/22 0330 09/29/22 0849 09/29/22 1149 09/29/22 1457   BP: (!) 147/115 137/84 111/86 (!) 110/90   Pulse: (!) 102 (!) 109 82 94   Resp: 20 18 20 20   Temp: 97.7 °F (36.5 °C) 97.8 °F (36.6 °C) 97.5 °F (36.4 °C) 97.8 °F (36.6 °C)   TempSrc: Oral Oral Oral Oral   SpO2: 96% 96% 94% 94%   Weight: Height:         Weight: Weight: 230 lb 13.2 oz (104.7 kg)     24 hour intake/output:  Intake/Output Summary (Last 24 hours) at 9/29/2022 1654  Last data filed at 9/29/2022 1457  Gross per 24 hour   Intake 720 ml   Output 225 ml   Net 495 ml       General appearance - alert, well appearing, and in no distress  Chest - clear to auscultation, no wheezes, rales or rhonchi, symmetric air entry  Heart - normal rate, irregular rhythm, normal S1, S2, no murmurs, rubs, clicks or gallops  Abdomen - soft, nontender, nondistended, no masses or organomegaly  Obese: Yes; Protuberant: No   Neurological - alert, oriented, normal speech, no focal findings or movement disorder noted  Extremities - peripheral pulses normal, no pedal edema, no clubbing or cyanosis  Skin - normal coloration and turgor, no rashes, no suspicious skin lesions noted      Radiology reports as per the Radiologist  Radiology: XR CHEST (2 VW)    Result Date: 9/26/2022  PROCEDURE: XR CHEST (2 VW) CLINICAL INFORMATION: tachycardia. COMPARISON: Chest x-ray dated 21 April 2022. TECHNIQUE: PA and lateral views the chest. FINDINGS: There is mild cardiomegaly. .  The mediastinum is not widened. There are no pulmonary infiltrates or effusions. There is a tiny calcified granuloma in the left lower lobe  The pulmonary vascularity is normal. There is mild thoracic spondylosis. .      1. No interval change since previous study dated 21 April 2022. 2. Mild cardiomegaly. 3. Tiny calcified granuloma in the left lower lobe. 4. Thoracic spondylosis. **This report has been created using voice recognition software. It may contain minor errors which are inherent in voice recognition technology. ** Final report electronically signed by DR Jeffrey Mariscal on 9/26/2022 12:28 PM       Results for orders placed or performed during the hospital encounter of 09/26/22   CBC with Auto Differential   Result Value Ref Range    WBC 9.3 4.8 - 10.8 thou/mm3    RBC 5.86 4.70 - 6.10 mill/mm3 Hemoglobin 16.4 14.0 - 18.0 gm/dl    Hematocrit 50.2 42.0 - 52.0 %    MCV 85.7 80.0 - 94.0 fL    MCH 28.0 26.0 - 33.0 pg    MCHC 32.7 32.2 - 35.5 gm/dl    RDW-CV 14.5 11.5 - 14.5 %    RDW-SD 44.6 35.0 - 45.0 fL    Platelets 128 670 - 139 thou/mm3    MPV 10.4 9.4 - 12.4 fL    Seg Neutrophils 66.3 %    Lymphocytes 21.9 %    Monocytes 7.5 %    Eosinophils 2.9 %    Basophils 1.1 %    Immature Granulocytes 0.3 %    Segs Absolute 6.2 1.8 - 7.7 thou/mm3    Lymphocytes Absolute 2.0 1.0 - 4.8 thou/mm3    Monocytes Absolute 0.7 0.4 - 1.3 thou/mm3    Eosinophils Absolute 0.3 0.0 - 0.4 thou/mm3    Basophils Absolute 0.1 0.0 - 0.1 thou/mm3    Immature Grans (Abs) 0.03 0.00 - 0.07 thou/mm3    nRBC 0 /100 wbc   Basic Metabolic Panel w/ Reflex to MG   Result Value Ref Range    Sodium 141 135 - 145 meq/L    Potassium reflex Magnesium 4.4 3.5 - 5.2 meq/L    Chloride 104 98 - 111 meq/L    CO2 24 23 - 33 meq/L    Glucose 103 70 - 108 mg/dL    BUN 11 7 - 22 mg/dL    Creatinine 1.0 0.4 - 1.2 mg/dL    Calcium 9.6 8.5 - 10.5 mg/dL   Hepatic Function Panel   Result Value Ref Range    Albumin 4.2 3.5 - 5.1 g/dL    Total Bilirubin 1.0 0.3 - 1.2 mg/dL    Bilirubin, Direct <0.2 0.0 - 0.3 mg/dL    Alkaline Phosphatase 87 38 - 126 U/L    AST 22 5 - 40 U/L    ALT 18 11 - 66 U/L    Total Protein 7.2 6.1 - 8.0 g/dL   Troponin   Result Value Ref Range    Troponin T < 0.010 ng/ml   Brain Natriuretic Peptide   Result Value Ref Range    Pro-BNP 2467.0 (H) 0.0 - 900.0 pg/mL   TSH with Reflex   Result Value Ref Range    TSH 5.550 (H) 0.400 - 4.200 uIU/mL   Digoxin Level   Result Value Ref Range    Digoxin Lvl 0.7 0.5 - 2.0 ng/mL   Magnesium   Result Value Ref Range    Magnesium 2.0 1.6 - 2.4 mg/dL   T4, Free   Result Value Ref Range    T4 Free 1.26 0.93 - 1.76 ng/dL   Anion Gap   Result Value Ref Range    Anion Gap 13.0 8.0 - 16.0 meq/L   Glomerular Filtration Rate, Estimated   Result Value Ref Range    Est, Glom Filt Rate 74 (A) ml/min/1.73m2   Basic Metabolic Panel w/ Reflex to MG   Result Value Ref Range    Sodium 142 135 - 145 meq/L    Potassium reflex Magnesium 4.1 3.5 - 5.2 meq/L    Chloride 103 98 - 111 meq/L    CO2 28 23 - 33 meq/L    Glucose 101 70 - 108 mg/dL    BUN 14 7 - 22 mg/dL    Creatinine 1.2 0.4 - 1.2 mg/dL    Calcium 9.3 8.5 - 10.5 mg/dL   CBC with Auto Differential   Result Value Ref Range    WBC 8.9 4.8 - 10.8 thou/mm3    RBC 5.58 4.70 - 6.10 mill/mm3    Hemoglobin 15.6 14.0 - 18.0 gm/dl    Hematocrit 47.8 42.0 - 52.0 %    MCV 85.7 80.0 - 94.0 fL    MCH 28.0 26.0 - 33.0 pg    MCHC 32.6 32.2 - 35.5 gm/dl    RDW-CV 14.6 (H) 11.5 - 14.5 %    RDW-SD 44.5 35.0 - 45.0 fL    Platelets 261 503 - 243 thou/mm3    MPV 10.9 9.4 - 12.4 fL    Seg Neutrophils 62.6 %    Lymphocytes 25.2 %    Monocytes 8.4 %    Eosinophils 2.8 %    Basophils 0.8 %    Immature Granulocytes 0.2 %    Segs Absolute 5.6 1.8 - 7.7 thou/mm3    Lymphocytes Absolute 2.2 1.0 - 4.8 thou/mm3    Monocytes Absolute 0.7 0.4 - 1.3 thou/mm3    Eosinophils Absolute 0.2 0.0 - 0.4 thou/mm3    Basophils Absolute 0.1 0.0 - 0.1 thou/mm3    Immature Grans (Abs) 0.02 0.00 - 0.07 thou/mm3    nRBC 0 /100 wbc   Anion Gap   Result Value Ref Range    Anion Gap 11.0 8.0 - 16.0 meq/L   Glomerular Filtration Rate, Estimated   Result Value Ref Range    Est, Glom Filt Rate 60 (A) GE/ZJP/2.44J6   Basic Metabolic Panel w/ Reflex to MG   Result Value Ref Range    Sodium 140 135 - 145 meq/L    Potassium reflex Magnesium 3.8 3.5 - 5.2 meq/L    Chloride 101 98 - 111 meq/L    CO2 29 23 - 33 meq/L    Glucose 112 (H) 70 - 108 mg/dL    BUN 21 7 - 22 mg/dL    Creatinine 1.2 0.4 - 1.2 mg/dL    Calcium 9.3 8.5 - 10.5 mg/dL   CBC with Auto Differential   Result Value Ref Range    WBC 7.6 4.8 - 10.8 thou/mm3    RBC 5.98 4.70 - 6.10 mill/mm3    Hemoglobin 16.5 14.0 - 18.0 gm/dl    Hematocrit 50.6 42.0 - 52.0 %    MCV 84.6 80.0 - 94.0 fL    MCH 27.6 26.0 - 33.0 pg    MCHC 32.6 32.2 - 35.5 gm/dl    RDW-CV 14.9 (H) 11.5 - 14.5 % RDW-SD 44.3 35.0 - 45.0 fL    Platelets 101 978 - 018 thou/mm3    MPV 10.9 9.4 - 12.4 fL    Seg Neutrophils 65.6 %    Lymphocytes 20.0 %    Monocytes 9.3 %    Eosinophils 3.7 %    Basophils 1.1 %    Immature Granulocytes 0.3 %    Segs Absolute 5.0 1.8 - 7.7 thou/mm3    Lymphocytes Absolute 1.5 1.0 - 4.8 thou/mm3    Monocytes Absolute 0.7 0.4 - 1.3 thou/mm3    Eosinophils Absolute 0.3 0.0 - 0.4 thou/mm3    Basophils Absolute 0.1 0.0 - 0.1 thou/mm3    Immature Grans (Abs) 0.02 0.00 - 0.07 thou/mm3    nRBC 0 /100 wbc   Anion Gap   Result Value Ref Range    Anion Gap 10.0 8.0 - 16.0 meq/L   Glomerular Filtration Rate, Estimated   Result Value Ref Range    Est, Glom Filt Rate 60 (A) SH/NYU/9.43A9   Basic Metabolic Panel w/ Reflex to MG   Result Value Ref Range    Sodium 142 135 - 145 meq/L    Potassium reflex Magnesium 4.1 3.5 - 5.2 meq/L    Chloride 103 98 - 111 meq/L    CO2 27 23 - 33 meq/L    Glucose 111 (H) 70 - 108 mg/dL    BUN 26 (H) 7 - 22 mg/dL    Creatinine 1.3 (H) 0.4 - 1.2 mg/dL    Calcium 9.6 8.5 - 10.5 mg/dL   CBC with Auto Differential   Result Value Ref Range    WBC 8.2 4.8 - 10.8 thou/mm3    RBC 5.92 4.70 - 6.10 mill/mm3    Hemoglobin 16.4 14.0 - 18.0 gm/dl    Hematocrit 50.6 42.0 - 52.0 %    MCV 85.5 80.0 - 94.0 fL    MCH 27.7 26.0 - 33.0 pg    MCHC 32.4 32.2 - 35.5 gm/dl    RDW-CV 14.5 11.5 - 14.5 %    RDW-SD 44.2 35.0 - 45.0 fL    Platelets 509 437 - 961 thou/mm3    MPV 10.7 9.4 - 12.4 fL    Seg Neutrophils 64.0 %    Lymphocytes 22.3 %    Monocytes 8.5 %    Eosinophils 3.9 %    Basophils 1.1 %    Immature Granulocytes 0.2 %    Segs Absolute 5.2 1.8 - 7.7 thou/mm3    Lymphocytes Absolute 1.8 1.0 - 4.8 thou/mm3    Monocytes Absolute 0.7 0.4 - 1.3 thou/mm3    Eosinophils Absolute 0.3 0.0 - 0.4 thou/mm3    Basophils Absolute 0.1 0.0 - 0.1 thou/mm3    Immature Grans (Abs) 0.02 0.00 - 0.07 thou/mm3    nRBC 0 /100 wbc   Basic Metabolic Panel   Result Value Ref Range    Potassium 4.1 3.5 - 5.2 meq/L   Brain Natriuretic Peptide   Result Value Ref Range    Pro-BNP 1976.0 (H) 0.0 - 900.0 pg/mL   Anion Gap   Result Value Ref Range    Anion Gap 12.0 8.0 - 16.0 meq/L   Glomerular Filtration Rate, Estimated   Result Value Ref Range    Est, Glom Filt Rate 54 (A) ml/min/1.73m2   EKG 12 Lead   Result Value Ref Range    Ventricular Rate 122 BPM    QRS Duration 92 ms    Q-T Interval 334 ms    QTc Calculation (Bazett) 475 ms    R Axis 22 degrees    T Axis 75 degrees   EKG 12 lead   Result Value Ref Range    Ventricular Rate 101 BPM    QRS Duration 98 ms    Q-T Interval 324 ms    QTc Calculation (Bazett) 420 ms    R Axis 17 degrees    T Axis 49 degrees       Diet:  ADULT DIET; Regular; Low Sodium (2 gm); 2000 ml    Activity:  Activity as tolerated (Patient may move about with assist as indicated or with supervision.)    Follow-up:  in 1 weeks with NARESH Vaca CNP,  in 4 weeks with Arben Greco.  Erin Klein MD    Disposition: home    Condition: Stable      Time Spent: 60 minutes      NARESH Godwin CNP      Discharging Hospitalist NP

## 2022-09-29 NOTE — FLOWSHEET NOTE
09/29/22 1108   Safe Environment   Safety Measures   (Virtual RN rounds complete)   PT sitting upright in bed, conversing with staff. Denies needs at this time. No acute distress noted. August Gongora

## 2022-09-30 ENCOUNTER — CARE COORDINATION (OUTPATIENT)
Dept: CASE MANAGEMENT | Age: 72
End: 2022-09-30

## 2022-09-30 DIAGNOSIS — I48.91 ATRIAL FIBRILLATION WITH RVR (HCC): Primary | ICD-10-CM

## 2022-09-30 PROCEDURE — 1111F DSCHRG MED/CURRENT MED MERGE: CPT | Performed by: NURSE PRACTITIONER

## 2022-09-30 NOTE — CARE COORDINATION
[facility] Care Transitions Initial Follow Up Call    Call within 2 business days of discharge: Yes    LPN Care Coordinator contacted the family by telephone to perform post hospital discharge assessment. Verified name and  with family as identifiers. Provided introduction to self, and explanation of the LPN Care Coordinator role. Patient: Belkys Oliver Patient : 1950   MRN: [id number]  Reason for Admission: Persistent atrial fibrillation, with RVR / Acute on Chronic decompensated HFrEF  Discharge Date: 22 RARS: Readmission Risk Score: 13.5      Last Discharge  Street       Date Complaint Diagnosis Description Type Department Provider    22 Other Atrial fibrillation with RVR [facility]) ED to Hosp-Admission (Discharged) (ADMITTED) MARY GoldK NARESH Logan. .. Was this an external facility discharge? No Discharge Facility: [facility]    Challenges to be reviewed by the provider   Additional needs identified to be addressed with provider: No  none               Method of communication with provider: none. Called and spoke with patient's Wife, Aman Hoover. Wife states patient is doing fine. Denies episodes of rapid heart rate or palpitations, dizziness, fatigue, SOB or chest pain at this time. Patient denies weight gain Weight at hospital 230# and this #. Denies SOB or wheezing, coughing, extremity edema, abdominal edema, or chest pain. Patient understands Fluid restriction (2 Liters) and Low NA diet. Has not taken BP yet this morning. Has appointment with PCP 10/4/2022 and CHF Clinic 10/5/2022. PLAN FOR NEXT CALL: Check BP, Weight, fluid intake, NA intake, Swelling, and any Worsening Symptoms. Reviewed medications with Wife. Confirmed Patient obtained and is taking medications as directed. There are no questions concerning medications at this time. Stressed importance of medication compliance. 1111F Medication Reconciliation completed. Routed to PCP. Explained the Transition to Home Program to patient. Patient is called after discharge by CTN to make sure all needs are met and to see if patient has any questions or problems. Expresses understanding. Will continue to follow. Calli Nassar LPN    [phone]  [facility] / [place] [address] Coordinator    LPN Care Coordinator reviewed discharge instructions with family who verbalized understanding. The family was given an opportunity to ask questions and does not have any further questions or concerns at this time. Were discharge instructions available to patient? Yes. Reviewed appropriate site of care based on symptoms and resources available to patient including: PCP  Specialist  When to call 911. The family agrees to contact the PCP office for questions related to their healthcare. Advance Care Planning:   Does patient have an Advance Directive: reviewed and current. Medication reconciliation was performed with family, who verbalizes understanding of administration of home medications. Medications reviewed, 1111F entered: yes    Was patient discharged with a pulse oximeter? no    Non-face-to-face services provided:  Obtained and reviewed discharge summary and/or continuity of care documents    Offered patient enrollment in the Remote Patient Monitoring (RPM) program for in-home monitoring: NA.    Care Transitions 24 Hour Call    Schedule Follow Up Appointment with PCP: Completed  Do you have a copy of your discharge instructions?: Yes  Do you have all of your prescriptions and are they filled?: Yes  Have you been contacted by a [facility] Pharmacist?: No  Have you scheduled your follow up appointment?: Yes  How are you going to get to your appointment?: Car - family or friend to transport  [facility]:  Outpatient/Community Services (Comment: Coumadin Clinic )  Do you feel like you have everything you need to keep you well at home?: Yes  Care Transitions Interventions  Disease Specific Clinic: Declined               Follow Up  Future Appointments   Date Time Provider Cristina Cherrie   10/4/2022 11:00 AM NARESH Queen CNP [facility] Medicine [facility]   10/5/2022  8:00 AM [facility]ESH Delgado [facility] [address]   10/31/2022  8:30 AM NARESH Arrieta [facility]NP [facility] [address]   12/1/2022  3:30 PM Kali Saucedo MD [facility] [address]   2/8/2023  8:00 AM NARESH Queen CNP [facility] Medicine [facility] Coordinator provided contact information. Plan for follow-up call in 3-5 days based on severity of symptoms and risk factors.   Plan for next call: symptom management-Acute on Chronic decompensated HFrEF    Calli Nassar LPN

## 2022-10-02 ENCOUNTER — APPOINTMENT (OUTPATIENT)
Dept: GENERAL RADIOLOGY | Age: 72
End: 2022-10-02
Payer: MEDICARE

## 2022-10-02 ENCOUNTER — HOSPITAL ENCOUNTER (EMERGENCY)
Age: 72
Discharge: HOME | End: 2022-10-03
Attending: EMERGENCY MEDICINE
Payer: MEDICARE

## 2022-10-02 DIAGNOSIS — I48.11 LONGSTANDING PERSISTENT ATRIAL FIBRILLATION (HCC): Primary | ICD-10-CM

## 2022-10-02 DIAGNOSIS — R06.09 CHRONIC DYSPNEA: ICD-10-CM

## 2022-10-02 DIAGNOSIS — I50.22 CHRONIC SYSTOLIC CONGESTIVE HEART FAILURE (HCC): ICD-10-CM

## 2022-10-02 LAB
ALBUMIN SERPL-MCNC: 3.8 G/DL (ref 3.5–5.1)
ALLEN TEST: POSITIVE
ALP BLD-CCNC: 74 U/L (ref 38–126)
ALT SERPL-CCNC: 28 U/L (ref 11–66)
ANION GAP SERPL CALCULATED.3IONS-SCNC: 11 MEQ/L (ref 8–16)
APTT: 37.4 SECONDS (ref 22–38)
AST SERPL-CCNC: 27 U/L (ref 5–40)
BASE EXCESS (CALCULATED): -1.5 MMOL/L (ref -2.5–2.5)
BASOPHILS # BLD: 1.2 %
BASOPHILS ABSOLUTE: 0.1 THOU/MM3 (ref 0–0.1)
BILIRUB SERPL-MCNC: 0.8 MG/DL (ref 0.3–1.2)
BUN BLDV-MCNC: 16 MG/DL (ref 7–22)
CALCIUM SERPL-MCNC: 9.8 MG/DL (ref 8.5–10.5)
CHLORIDE BLD-SCNC: 104 MEQ/L (ref 98–111)
CO2: 24 MEQ/L (ref 23–33)
COLLECTED BY:: ABNORMAL
CREAT SERPL-MCNC: 1.1 MG/DL (ref 0.4–1.2)
DEVICE: ABNORMAL
DIGOXIN LEVEL: 0.7 NG/ML (ref 0.5–2)
EOSINOPHIL # BLD: 3.3 %
EOSINOPHILS ABSOLUTE: 0.2 THOU/MM3 (ref 0–0.4)
ERYTHROCYTE [DISTWIDTH] IN BLOOD BY AUTOMATED COUNT: 14.5 % (ref 11.5–14.5)
ERYTHROCYTE [DISTWIDTH] IN BLOOD BY AUTOMATED COUNT: 44.7 FL (ref 35–45)
FLU A ANTIGEN: NEGATIVE
FLU B ANTIGEN: NEGATIVE
GFR SERPL CREATININE-BSD FRML MDRD: 66 ML/MIN/1.73M2
GLUCOSE BLD-MCNC: 93 MG/DL (ref 70–108)
HCO3: 22 MMOL/L (ref 23–28)
HCT VFR BLD CALC: 50.5 % (ref 42–52)
HEMOGLOBIN: 16.2 GM/DL (ref 14–18)
IMMATURE GRANS (ABS): 0.02 THOU/MM3 (ref 0–0.07)
IMMATURE GRANULOCYTES: 0.3 %
INR BLD: 1.17 (ref 0.85–1.13)
LYMPHOCYTES # BLD: 31 %
LYMPHOCYTES ABSOLUTE: 2.3 THOU/MM3 (ref 1–4.8)
MAGNESIUM: 2.1 MG/DL (ref 1.6–2.4)
MCH RBC QN AUTO: 27.5 PG (ref 26–33)
MCHC RBC AUTO-ENTMCNC: 32.1 GM/DL (ref 32.2–35.5)
MCV RBC AUTO: 85.7 FL (ref 80–94)
MONOCYTES # BLD: 9 %
MONOCYTES ABSOLUTE: 0.7 THOU/MM3 (ref 0.4–1.3)
NUCLEATED RED BLOOD CELLS: 0 /100 WBC
O2 SATURATION: 95 %
OSMOLALITY CALCULATION: 278.4 MOSMOL/KG (ref 275–300)
PCO2: 34 MMHG (ref 35–45)
PH BLOOD GAS: 7.42 (ref 7.35–7.45)
PLATELET # BLD: 243 THOU/MM3 (ref 130–400)
PMV BLD AUTO: 10.5 FL (ref 9.4–12.4)
PO2: 75 MMHG (ref 71–104)
POTASSIUM SERPL-SCNC: 4.9 MEQ/L (ref 3.5–5.2)
PRO-BNP: 4107 PG/ML (ref 0–900)
PROCALCITONIN: 0.04 NG/ML (ref 0.01–0.09)
RBC # BLD: 5.89 MILL/MM3 (ref 4.7–6.1)
SARS-COV-2, NAAT: NOT  DETECTED
SEG NEUTROPHILS: 55.2 %
SEGMENTED NEUTROPHILS ABSOLUTE COUNT: 4.1 THOU/MM3 (ref 1.8–7.7)
SODIUM BLD-SCNC: 139 MEQ/L (ref 135–145)
SOURCE, BLOOD GAS: ABNORMAL
T4 FREE: 1.36 NG/DL (ref 0.93–1.76)
TOTAL PROTEIN: 7.1 G/DL (ref 6.1–8)
TROPONIN T: < 0.01 NG/ML
TROPONIN T: < 0.01 NG/ML
TSH SERPL DL<=0.05 MIU/L-ACNC: 8.66 UIU/ML (ref 0.4–4.2)
WBC # BLD: 7.5 THOU/MM3 (ref 4.8–10.8)

## 2022-10-02 PROCEDURE — 82803 BLOOD GASES ANY COMBINATION: CPT

## 2022-10-02 PROCEDURE — 99285 EMERGENCY DEPT VISIT HI MDM: CPT

## 2022-10-02 PROCEDURE — 87635 SARS-COV-2 COVID-19 AMP PRB: CPT

## 2022-10-02 PROCEDURE — 96361 HYDRATE IV INFUSION ADD-ON: CPT

## 2022-10-02 PROCEDURE — 83735 ASSAY OF MAGNESIUM: CPT

## 2022-10-02 PROCEDURE — 80162 ASSAY OF DIGOXIN TOTAL: CPT

## 2022-10-02 PROCEDURE — 36415 COLL VENOUS BLD VENIPUNCTURE: CPT

## 2022-10-02 PROCEDURE — 93005 ELECTROCARDIOGRAM TRACING: CPT | Performed by: EMERGENCY MEDICINE

## 2022-10-02 PROCEDURE — 94761 N-INVAS EAR/PLS OXIMETRY MLT: CPT

## 2022-10-02 PROCEDURE — 96374 THER/PROPH/DIAG INJ IV PUSH: CPT

## 2022-10-02 PROCEDURE — 83880 ASSAY OF NATRIURETIC PEPTIDE: CPT

## 2022-10-02 PROCEDURE — 84484 ASSAY OF TROPONIN QUANT: CPT

## 2022-10-02 PROCEDURE — 85730 THROMBOPLASTIN TIME PARTIAL: CPT

## 2022-10-02 PROCEDURE — 84439 ASSAY OF FREE THYROXINE: CPT

## 2022-10-02 PROCEDURE — 85610 PROTHROMBIN TIME: CPT

## 2022-10-02 PROCEDURE — 36600 WITHDRAWAL OF ARTERIAL BLOOD: CPT

## 2022-10-02 PROCEDURE — 71045 X-RAY EXAM CHEST 1 VIEW: CPT

## 2022-10-02 PROCEDURE — 80053 COMPREHEN METABOLIC PANEL: CPT

## 2022-10-02 PROCEDURE — 85025 COMPLETE CBC W/AUTO DIFF WBC: CPT

## 2022-10-02 PROCEDURE — 84443 ASSAY THYROID STIM HORMONE: CPT

## 2022-10-02 PROCEDURE — 87804 INFLUENZA ASSAY W/OPTIC: CPT

## 2022-10-02 PROCEDURE — 84145 PROCALCITONIN (PCT): CPT

## 2022-10-02 PROCEDURE — 2580000003 HC RX 258: Performed by: EMERGENCY MEDICINE

## 2022-10-02 RX ORDER — BUMETANIDE 0.25 MG/ML
1 INJECTION, SOLUTION INTRAMUSCULAR; INTRAVENOUS ONCE
Status: COMPLETED | OUTPATIENT
Start: 2022-10-02 | End: 2022-10-03

## 2022-10-02 RX ORDER — 0.9 % SODIUM CHLORIDE 0.9 %
1000 INTRAVENOUS SOLUTION INTRAVENOUS ONCE
Status: DISCONTINUED | OUTPATIENT
Start: 2022-10-02 | End: 2022-10-03

## 2022-10-02 RX ORDER — DILTIAZEM HYDROCHLORIDE 5 MG/ML
10 INJECTION INTRAVENOUS ONCE
Status: DISCONTINUED | OUTPATIENT
Start: 2022-10-02 | End: 2022-10-02

## 2022-10-02 RX ADMIN — SODIUM CHLORIDE 1000 ML: 9 INJECTION, SOLUTION INTRAVENOUS at 22:30

## 2022-10-02 ASSESSMENT — PAIN - FUNCTIONAL ASSESSMENT: PAIN_FUNCTIONAL_ASSESSMENT: NONE - DENIES PAIN

## 2022-10-03 ENCOUNTER — CARE COORDINATION (OUTPATIENT)
Dept: CASE MANAGEMENT | Age: 72
End: 2022-10-03

## 2022-10-03 VITALS
RESPIRATION RATE: 20 BRPM | BODY MASS INDEX: 30.35 KG/M2 | HEART RATE: 91 BPM | OXYGEN SATURATION: 97 % | DIASTOLIC BLOOD PRESSURE: 103 MMHG | SYSTOLIC BLOOD PRESSURE: 133 MMHG | TEMPERATURE: 98.9 F | HEIGHT: 73 IN | WEIGHT: 229 LBS

## 2022-10-03 LAB
EKG Q-T INTERVAL: 334 MS
EKG QRS DURATION: 92 MS
EKG QTC CALCULATION (BAZETT): 449 MS
EKG R AXIS: 5 DEGREES
EKG T AXIS: 58 DEGREES
EKG VENTRICULAR RATE: 109 BPM

## 2022-10-03 PROCEDURE — 2500000003 HC RX 250 WO HCPCS: Performed by: EMERGENCY MEDICINE

## 2022-10-03 PROCEDURE — 93010 ELECTROCARDIOGRAM REPORT: CPT | Performed by: NUCLEAR MEDICINE

## 2022-10-03 RX ORDER — FUROSEMIDE 20 MG/1
10 TABLET ORAL DAILY
Qty: 5 TABLET | Refills: 0 | Status: SHIPPED | OUTPATIENT
Start: 2022-10-03 | End: 2022-10-17

## 2022-10-03 RX ADMIN — BUMETANIDE 1 MG: 0.25 INJECTION INTRAMUSCULAR; INTRAVENOUS at 00:03

## 2022-10-03 ASSESSMENT — PAIN - FUNCTIONAL ASSESSMENT: PAIN_FUNCTIONAL_ASSESSMENT: NONE - DENIES PAIN

## 2022-10-03 NOTE — ED PROVIDER NOTES
[address] ENCOUNTER      PATIENT NAME: Jean Marie Valero  MRN: [id number]  : 1950  XIONG: 10/2/2022  PROVIDER: Javier Lemus MD      CHIEF COMPLAINT       Chief Complaint   Patient presents with    Shortness of Breath       Patient is seen and evaluated in a timely fashion. Nurses Notes are reviewed and I agree except as noted in the HPI. HISTORY OF PRESENT ILLNESS    Jean Marie Valero is a 81 y.o. male who presents to Emergency Department with Shortness of Breath     Patient has been having increasing shortness breath over last 2-3 days duration. Past medical history remarkable for atrial fibrillation rate control with Cardizem CD. S/p Watchman on 2022. Patient is also taking aspirin and Plavix. No fever, no chills. No cough. No chest pain. No nausea or vomiting. No abdominal pain. No leg edema. Other past medical history remarkable for CAD, CHF, GERD, obesity, and hypertension. This HPI was provided by patient. REVIEW OF SYSTEMS   Ten-point review of systems is negative except those documented in above HPI including constitutional, HEENT, respiratory, cardiovascular, gastrointestinal, genitourinary, musculoskeletal, skin, neurological, hematological and behavioral.      PAST MEDICAL HISTORY    has a past medical history of Anesthesia, Atrial fibrillation ([address] Benign prostatic hyperplasia with urinary frequency, CAD (coronary artery disease), Chest pain, Colon polyp, Coronary artery disease involving native coronary artery of native heart without angina pectoris, Dementia (HCC), Dizziness, Elevated PSA, GERD (gastroesophageal reflux disease), Heart disease, Hyperlipidemia, and Hypertension. SURGICAL HISTORY      has a past surgical history that includes cardiovascular stress test (2011); transthoracic echocardiogram (2011); Cardiac catheterization (2011); Carpal tunnel release; Tonsillectomy;  Adenoidectomy; Vasectomy; Colonoscopy; Ultrasound Prostate/Transrectal (N/A, 10/6/2020); Prostatectomy (N/A, 12/3/2020); Nasal sinus surgery (N/A, 2022); and transesophageal echocardiogram (N/A, 2022). CURRENT MEDICATIONS       Discharge Medication List as of 10/3/2022 12:25 AM        CONTINUE these medications which have NOT CHANGED    Details   metoprolol (LOPRESSOR) 100 MG tablet Take 1 tablet by mouth 2 times daily, Disp-60 tablet, R-0Normal      dilTIAZem (CARDIZEM CD) 120 MG extended release capsule Take 1 capsule by mouth daily, Disp-30 capsule, R-0Normal      spironolactone (ALDACTONE) 25 MG tablet Take 1 tablet by mouth daily, Disp-30 tablet, R-0Normal      potassium chloride (KLOR-CON) 10 MEQ extended release tablet TAKE 1 TABLET BY MOUTH TWICE DAILY, Disp-180 tablet, R-0Normal      digoxin (LANOXIN) 125 MCG tablet Take 1 tablet by mouth once daily, Disp-90 tablet, R-0Normal      clopidogrel (PLAVIX) 75 MG tablet Take 1 tablet by mouth daily, Disp-30 tablet, R-3Normal      aspirin 81 MG chewable tablet Take 1 tablet by mouth in the morning., Disp-30 tablet, R-3Normal      rosuvastatin (CRESTOR) 20 MG tablet Take 1 tablet by mouth daily, Disp-90 tablet, R-1Normal      nitroGLYCERIN (NITROSTAT) 0.4 MG SL tablet DISSOLVE ONE TABLET UNDER THE TONGUE EVERY 5 MINUTES AS NEEDED FOR CHEST PAIN. DO NOT EXCEED A TOTAL OF 3 DOSES IN 15 MINUTES, Disp-25 tablet, R-2Normal      bisacodyl (DULCOLAX) 5 MG EC tablet Take 5 mg by mouth nightlyHistorical Med             ALLERGIES     has No Known Allergies. FAMILY HISTORY     He indicated that his mother is . He indicated that his father is . He indicated that his sister is alive. He indicated that his maternal grandmother is . He indicated that his maternal grandfather is . He indicated that his paternal grandmother is . He indicated that his paternal grandfather is . He indicated that the status of his neg hx is unknown. family history includes Alzheimer's Disease in his mother; Cancer in his father; Heart Disease in his father; Heart Surgery in his father. SOCIAL HISTORY      reports that he has never smoked. He has never used smokeless tobacco. He reports that he does not drink alcohol and does not use drugs. PHYSICAL EXAM      height is 6' 1\" (1.854 m) and weight is 229 lb (103.9 kg). His rectal temperature is 98.9 °F (37.2 °C). His blood pressure is 133/103 (abnormal) and his pulse is 91. His respiration is 20 and oxygen saturation is 97%. Physical Exam  Vitals and nursing note reviewed. Constitutional:       Appearance: He is well-developed. He is not diaphoretic. HENT:      Head: Normocephalic and atraumatic. Nose: Nose normal.   Eyes:      General: No scleral icterus. Right eye: No discharge. Left eye: No discharge. Conjunctiva/sclera: Conjunctivae normal.      Pupils: Pupils are equal, round, and reactive to light. Neck:      Vascular: No JVD. Trachea: No tracheal deviation. Cardiovascular:      Rate and Rhythm: Tachycardia present. Rhythm irregular. Heart sounds: Normal heart sounds. No murmur heard. No friction rub. No gallop. Pulmonary:      Effort: Pulmonary effort is normal. No respiratory distress. Breath sounds: Normal breath sounds. No stridor. No wheezing or rales. Chest:      Chest wall: No tenderness. Abdominal:      General: Bowel sounds are normal. There is no distension. Palpations: Abdomen is soft. There is no mass. Tenderness: There is no abdominal tenderness. There is no guarding or rebound. Hernia: No hernia is present. Musculoskeletal:         General: No tenderness or deformity. Cervical back: Normal range of motion and neck supple. Lymphadenopathy:      Cervical: No cervical adenopathy. Skin:     General: Skin is warm and dry. Capillary Refill: Capillary refill takes less than 2 seconds.       Coloration: Skin is not pale. Findings: No erythema or rash. Neurological:      Mental Status: He is alert and oriented to person, place, and time. Cranial Nerves: No cranial nerve deficit. Sensory: No sensory deficit. Motor: No abnormal muscle tone. Coordination: Coordination normal.      Deep Tendon Reflexes: Reflexes normal.   Psychiatric:         Behavior: Behavior normal.         Thought Content: Thought content normal.         Judgment: Judgment normal.     ANCILLARY TEST RESULTS   EKG:    Interpreted by me  Atrial fibrillation with RVR, ventricular rate of 109 bpm, QRS duration 92 ms,  ms, no ST elevation or acute T wave    LAB RESULTS:  Results for orders placed or performed during the hospital encounter of 10/02/22   Rapid influenza A/B antigens    Specimen: Nasopharyngeal   Result Value Ref Range    Flu A Antigen Negative NEGATIVE    Flu B Antigen Negative NEGATIVE   COVID-19, Rapid   Result Value Ref Range    SARS-CoV-2, NAAT NOT  DETECTED NOT DETECTED   CBC with Auto Differential   Result Value Ref Range    WBC 7.5 4.8 - 10.8 thou/mm3    RBC 5.89 4.70 - 6.10 mill/mm3    Hemoglobin 16.2 14.0 - 18.0 gm/dl    Hematocrit 50.5 42.0 - 52.0 %    MCV 85.7 80.0 - 94.0 fL    MCH 27.5 26.0 - 33.0 pg    MCHC 32.1 (L) 32.2 - 35.5 gm/dl    RDW-CV 14.5 11.5 - 14.5 %    RDW-SD 44.7 35.0 - 45.0 fL    Platelets 599 692 - 898 thou/mm3    MPV 10.5 9.4 - 12.4 fL    Seg Neutrophils 55.2 %    Lymphocytes 31.0 %    Monocytes 9.0 %    Eosinophils 3.3 %    Basophils 1.2 %    Immature Granulocytes 0.3 %    Segs Absolute 4.1 1.8 - 7.7 thou/mm3    Lymphocytes Absolute 2.3 1.0 - 4.8 thou/mm3    Monocytes Absolute 0.7 0.4 - 1.3 thou/mm3    Eosinophils Absolute 0.2 0.0 - 0.4 thou/mm3    Basophils Absolute 0.1 0.0 - 0.1 thou/mm3    Immature Grans (Abs) 0.02 0.00 - 0.07 thou/mm3    nRBC 0 /100 wbc   Comprehensive Metabolic Panel   Result Value Ref Range    Glucose 93 70 - 108 mg/dL    Creatinine 1.1 0.4 - 1.2 mg/dL    BUN 16 7 - 22 mg/dL    Sodium 139 135 - 145 meq/L    Potassium 4.9 3.5 - 5.2 meq/L    Chloride 104 98 - 111 meq/L    CO2 24 23 - 33 meq/L    Calcium 9.8 8.5 - 10.5 mg/dL    AST 27 5 - 40 U/L    Alkaline Phosphatase 74 38 - 126 U/L    Total Protein 7.1 6.1 - 8.0 g/dL    Albumin 3.8 3.5 - 5.1 g/dL    Total Bilirubin 0.8 0.3 - 1.2 mg/dL    ALT 28 11 - 66 U/L   Brain Natriuretic Peptide   Result Value Ref Range    Pro-BNP 4107.0 (H) 0.0 - 900.0 pg/mL   Magnesium   Result Value Ref Range    Magnesium 2.1 1.6 - 2.4 mg/dL   TSH with Reflex   Result Value Ref Range    TSH 8.660 (H) 0.400 - 4.200 uIU/mL   APTT   Result Value Ref Range    aPTT 37.4 22.0 - 38.0 seconds   Protime-INR   Result Value Ref Range    INR 1.17 (H) 0.85 - 1.13   Procalcitonin   Result Value Ref Range    Procalcitonin 0.04 0.01 - 0.09 ng/mL   Blood Gas, Arterial   Result Value Ref Range    pH, Blood Gas 7.42 7.35 - 7.45    PCO2 34 (L) 35 - 45 mmhg    PO2 75 71 - 104 mmhg    HCO3 22 (L) 23 - 28 mmol/l    Base Excess (Calculated) -1.5 -2.5 - 2.5 mmol/l    O2 Sat 95 %    DEVICE Room Air     Morgan Test Positive     Source: L Radial     COLLECTED BY: [id number]    Troponin   Result Value Ref Range    Troponin T < 0.010 ng/ml   Digoxin Level   Result Value Ref Range    Digoxin Lvl 0.7 0.5 - 2.0 ng/mL   Anion Gap   Result Value Ref Range    Anion Gap 11.0 8.0 - 16.0 meq/L   Glomerular Filtration Rate, Estimated   Result Value Ref Range    Est, Glom Filt Rate 66 (A) ml/min/1.73m2   Osmolality   Result Value Ref Range    Osmolality Calc 278.4 275.0 - 300.0 mOsmol/kg   T4, Free   Result Value Ref Range    T4 Free 1.36 0.93 - 1.76 ng/dL   Troponin   Result Value Ref Range    Troponin T < 0.010 ng/ml   EKG 12 Lead   Result Value Ref Range    Ventricular Rate 109 BPM    QRS Duration 92 ms    Q-T Interval 334 ms    QTc Calculation (Bazett) 449 ms    R Axis 5 degrees    T Axis 58 degrees       RADIOLOGY REPORTS  XR CHEST PORTABLE   Final Result   1.  No acute infiltrate or pleural effusion. 2. Cardiomegaly is again noted. This document has been electronically signed by: Karen Peres M.D. on    10/02/2022 10:01 PM          [facility]) AND ED COURSE   Patient is seen and evaluated at 9:46 PM EDT. DDx: Atrial fibrillation with RVR, CHF, ACS, electrolyte derangement, sepsis  Plan: Large-bore IV, NS 1 L bolus, Cardizem bolus followed by cardizem drip    Patient is aware that he has permanent atrial fibrillation. He is status post watchman procedure, due to side effects from anticoagulation (he was on Coumadin with significant epistaxis), anticoagulation was switched to aspirin and Plavix. His lungs are clear, no crackles, physical exam does not suggest pulmonary edema. Labs are reviewed. BNP 4107, otherwise labs are reassuring. Troponin neg x 2. Chest x-ray shows cardiomegaly which is stable. Patient's ventricular rate is less than 100 most of the time during ED stay. Patient is already on Cardizem CD and metoprolol. Patient has chronic CHF with baseline EF around 82%, this certainly can contribute to his chronic shortness of breath. Patient has no hypoxemia, no pulmonary edema, no hypertensive emergency, no clinical findings suggesting decompensated CHF. No indication for admission. Patient is already on spironolactone, I recommend adding Lasix at a ratio of 50:2    Discharged with cardiology follow-up appointment set up tomorrow. Consult  None    Procedures  None    Medications   bumetanide (BUMEX) injection 1 mg (1 mg IntraVENous Given 10/3/22 0003)       Vitals:    10/02/22 2115 10/02/22 2230 10/02/22 2241 10/03/22 0004   BP: (!) 159/90 (!) 136/96  (!) 133/103   Pulse: (!) 108 94 99 91   Resp: 22 24 21 20   Temp: 98.9 °F (37.2 °C)      TempSrc: Rectal      SpO2: 98% 97% 97% 97%   Weight: 229 lb (103.9 kg)      Height: 6' 1\" (1.854 m)            FINAL IMPRESSION AND DISPOSITION      1.  Longstanding persistent atrial fibrillation ([address] 2. Chronic systolic congestive heart failure ([facility] [address] [facility]    3. Chronic dyspnea        DISPOSITION Decision To Discharge 10/03/2022 12:22:04 AM      PATIENT REFERRED TO:  [facility]  [address]  [phone]  On 10/4/2022  ED discharge follow up.  Appointment at 9:30 am.  DISCHARGE MEDICATIONS:  Discharge Medication List as of 10/3/2022 12:25 AM        START taking these medications    Details   furosemide (LASIX) 20 MG tablet Take 0.5 tablets by mouth daily for 10 days, Disp-5 tablet, R-0Normal           (Please note that portions of this note were completed with a voice recognition program.  Efforts were made to edit the dictations but occasionally words aremis-transcribed.)  MD Edy Dey MD  10/03/22 1415

## 2022-10-03 NOTE — ED TRIAGE NOTES
Pt comes to ED through triage for c/c of shortness of breath. Pt states he was discharged from the hospital \"4 days ago. \" Pt states that he was admitted for heart failure. Pt takes plavix, last dose at 1630 this evening. Pt states that he has gained 3lbs in 3 days. Pt reports that he has had a decrease in appetite since discharge. Pt denies chest pain. EKG completed.

## 2022-10-03 NOTE — CARE COORDINATION
[facility] Care Transitions Follow Up Call    Care Transition Nurse contacted the family by telephone to follow up after admission on 22, ED on 10/2/22. Verified name and  with family as identifiers. Patient: El Almendarez  Patient : 1950   MRN: [id number]  Reason for Admission: A fib with RVR  Discharge Date: 10/3/22 RARS: Readmission Risk Score: 13.5      Needs to be reviewed by the provider   Additional needs identified to be addressed with provider: No  none             Method of communication with provider: none. Spoke to pt's wife Altagracia Grimaldo who stated pt is doing OK, went to ED on 10/2/22 d/t hyperventilating and panic attack r/t anxiety. Pt was having SOB. Pt does not have medication for anxiety. Pt had weight gain of 3 lbs in 3 days as well, was prescribed 10 MG lasix x 10 days. Stated he has beginnings of dementia and watches too much news and gets upset and angry. Wife has tried talking to him about cutting back on news, stated pt does not listen. Stated weight today is 226#. Wife is keeping log of wrights and BP. BP last 4 days is today-127/93 pulse 85,   10/2- 126/88, 77  10/1- 115/80, 84  - 126/86, 61    Pt is restricting fluids to 2 liters daily and watching sodium intake limit to 2 g daily. Appetite is fair. Informed her of Cardiolgy ED f/u tomorrow as well as PCP f/u tomorrow. Wife stated she did not know about ED f/u and is only taking him to PCP appt. Pt also has CHF f/u on 10/5/22. Wife will discuss anxiety medication with Cardiologist rather than PCP d/y cardiac meds and concerns with interactions. Addressed changes since last contact:  medications-Started Lasix 10 MG daily, taking new meds  Aldactone and metoprolol  Discussed follow-up appointments.    Follow Up  Future Appointments   Date Time Provider Cristina Grier   10/4/2022 11:00 AM NARESH Shipman CNP [facility] - [facility]   10/5/2022  8:00 AM NARESH Gomez CNP [facility] CHF P - [place]   10/31/2022  8:30 AM NARESH Love - CNP [facility] - [place]   12/1/2022  3:30  [facility] 63[facility] PARRISH Erickson [facility] Heart [facility] - [place] [address] [place] [email or web address]   2/8/2023  8:00 AM NARESH Shipman - CNP [facility] Transition Nurse reviewed discharge instructions with family and discussed any barriers to care and/or understanding of plan of care after discharge. Discussed appropriate site of care based on symptoms and resources available to patient including: PCP  Specialist  When to call [address] The family agrees to contact the PCP office for questions related to their healthcare. Patients top risk factors for readmission: ineffective coping  Interventions to address risk factors: Obtained and reviewed discharge summary and/or continuity of care documents    Offered patient enrollment in the Remote Patient Monitoring (RPM) program for in-home monitoring: NA.     Care Transitions Subsequent and Final Call    Subsequent and Final Calls  Do you have any ongoing symptoms?: No  Have your medications changed?: Yes  Patient Reports: lasix 10 MG x 10 days  Do you currently have any active services?: No  Are you currently active with any services?: Outpatient/Community Services  Do you have any needs or concerns that I can assist you with?: No  Identified Barriers: None  Care Transitions Interventions  Disease Specific Clinic: Declined      Other Interventions:             Care Transition Nurse provided contact information for future needs. Plan for follow-up call in 5-7 days based on severity of symptoms and risk factors.   Plan for next call: symptom management-anxiety, SOB, weight, BP review  follow-up appointment-PCP 10/4, CHF clinic 10/5    Ephraim Brown RN

## 2022-10-04 ENCOUNTER — OFFICE VISIT (OUTPATIENT)
Dept: FAMILY MEDICINE CLINIC | Age: 72
End: 2022-10-04

## 2022-10-04 VITALS
BODY MASS INDEX: 30.34 KG/M2 | DIASTOLIC BLOOD PRESSURE: 86 MMHG | SYSTOLIC BLOOD PRESSURE: 120 MMHG | HEART RATE: 80 BPM | RESPIRATION RATE: 18 BRPM | WEIGHT: 230 LBS

## 2022-10-04 DIAGNOSIS — E78.5 HYPERLIPIDEMIA, UNSPECIFIED HYPERLIPIDEMIA TYPE: ICD-10-CM

## 2022-10-04 DIAGNOSIS — Z09 HOSPITAL DISCHARGE FOLLOW-UP: Primary | ICD-10-CM

## 2022-10-04 DIAGNOSIS — I25.10 CORONARY ARTERY DISEASE INVOLVING NATIVE CORONARY ARTERY OF NATIVE HEART WITHOUT ANGINA PECTORIS: ICD-10-CM

## 2022-10-04 DIAGNOSIS — I50.22 CHRONIC SYSTOLIC (CONGESTIVE) HEART FAILURE (HCC): ICD-10-CM

## 2022-10-04 DIAGNOSIS — I10 ESSENTIAL HYPERTENSION: ICD-10-CM

## 2022-10-04 PROBLEM — I48.91 ATRIAL FIBRILLATION WITH RVR (HCC): Status: RESOLVED | Noted: 2022-09-26 | Resolved: 2022-10-04

## 2022-10-04 PROBLEM — I48.11 LONGSTANDING PERSISTENT ATRIAL FIBRILLATION (HCC): Status: RESOLVED | Noted: 2022-07-19 | Resolved: 2022-10-04

## 2022-10-04 PROCEDURE — 99214 OFFICE O/P EST MOD 30 MIN: CPT | Performed by: NURSE PRACTITIONER

## 2022-10-04 PROCEDURE — 1111F DSCHRG MED/CURRENT MED MERGE: CPT | Performed by: NURSE PRACTITIONER

## 2022-10-04 NOTE — PROGRESS NOTES
[facility]  [address] [place] [id number]  Dept: [phone]  Dept Fax: [phone]  Loc: [phone]       Post-Discharge Transitional Care  Follow Up      Sonya Cesar   YOB: 1950    Date of Office Visit:  10/4/2022  Date of Hospital Admission: 10/2/22  Date of Hospital Discharge: 10/3/22  Risk of hospital readmission (high >=14%. Medium >=10%) :Readmission Risk Score: 13.5      Care management risk score Rising risk (score 2-5) and Complex Care (Scores >=6): No Risk Score On File     Non face to face  following discharge, date last encounter closed (first attempt may have been earlier): 09/30/2022    Call initiated 2 business days of discharge: Yes    ASSESSMENT/PLAN:   Hospital discharge follow-up  -     MO DISCHARGE MEDS RECONCILED W/ CURRENT OUTPATIENT MED LIST  Hyperlipidemia, unspecified hyperlipidemia type  Chronic systolic (congestive) heart failure    - Follow up with CHF clinic tomorrow as planned. - Discussed anxiety. Pt would like to talk to CHF clinic before adding any medication for anxiety. Atarax PRN offered, pt would like to hold for now. - Call office with any questions or concerns, or if symptoms are getting worse or changing  - Keep appt in Feb 2023. Medical Decision Making: moderate complexity  Return if symptoms worsen or fail to improve. Subjective:   HPI:  Follow up of Hospital problems/diagnosis(es):   Chief Complaint   Patient presents with    Follow-Up from [facility] follow up for A-fib with RVR, discharged 10/3/22. No concerns at this time. Inpatient course: Discharge summary reviewed- see chart. Admit date:  9/26/2022                        Discharge date:   9/29/2022        Discharge Diagnoses and Hospital Course:      Presented to the ED with complaints of SOB.       Persistent atrial fibrillation, with RVR: Rate now CVR with appropriate medication changes. S/P Watchman device 7/19/2022. SANG on 9/12/22 shows EF 45%. [id number] Score 3. HR since stabilized. Continues on Plavix and ASA. Was recently switched from Coumadin to Plavix. Continued on digoxin home dose. Cardiology increased Lopressor to 100 mg BID, restarted Cardizem at reduced dose of120. RX given to reflect these changes. 2. Acute on Chronic decompensated HFrEF: Improved  Likely due to inadequately rate controlled Afib. SANG on 9/12/2022 shows EF 45%. CHF clinic referral completed. Continue daily weights, strict I+O, cardiac telemetry. Continue with 2L fluid restriction and low sodium diet. Cardiology started Aldactone. Rx given. 3. Accelerated essential hypertension: Improved. BP's 423-276'S systolic. Continue Metoprolol tartrate, and Cardizem. Continue to monitor closely. 4. CAD, by history:    Noted on previous cath from 2011. Stress test since this time was negative for induced ischemia. ASA/Plavix/Statin/BB     Rate is now controlled. No SOB, minimal XIONG. Lower ext edema has resolved. Overall volume status has greatly improved. Cardiology recs to hold Bumex on discharge. Home today with plan above. Patient wishes only to see Dr. Beni Cuello in the cardiology clinic. Admitted for: (HPI): Atrial Fibrillation with RVR        Consultants:  Cardiology, CHF Clinic      Interval history/Current status:     Doing better since being home. He did go back to the ER for some SOB yesterday, but was released with lasix and has a follow up with CHF clinic tomorrow 10/5/22. Pt denies any SOB, chest pain or weakness at this time. No more than 64 oz of water daily. Working on diet to balance the swelling and water intake. Pt aware that it will take a few months for body to adjust after the watchman procedure. He does get anxious and has trouble sleeping at times. Not interested in medications until he sees the CHF clinic.       Patient Active Problem List   Diagnosis    Essential hypertension    Hyperlipidemia    Paroxysmal atrial fibrillation (HCC)    Obesity due to excess calories    Dizziness    History of gastroesophageal reflux (GERD)    Diastolic dysfunction    Urinary incontinence    Elevated PSA    Coronary artery disease involving native coronary artery of native heart without angina pectoris    Benign prostatic hyperplasia with urinary frequency    BPH with obstruction/lower urinary tract symptoms    Prostate cancer ([facility] [address]    Fever    Albuminuria    Posterior epistaxis    Chronic systolic (congestive) heart failure    Persistent atrial fibrillation ([facility] [address]       Medications listed as ordered at the time of discharge from hospital     Medication List            Accurate as of October 4, 2022 12:17 PM. If you have any questions, ask your nurse or doctor. CONTINUE taking these medications      aspirin 81 MG chewable tablet  Take 1 tablet by mouth in the morning. bisacodyl 5 MG EC tablet  Commonly known as: DULCOLAX     clopidogrel 75 MG tablet  Commonly known as: Plavix  Take 1 tablet by mouth daily     digoxin 125 MCG tablet  Commonly known as: LANOXIN  Take 1 tablet by mouth once daily     dilTIAZem 120 MG extended release capsule  Commonly known as: CARDIZEM CD  Take 1 capsule by mouth daily     furosemide 20 MG tablet  Commonly known as: Lasix  Take 0.5 tablets by mouth daily for 10 days     metoprolol 100 MG tablet  Commonly known as: LOPRESSOR  Take 1 tablet by mouth 2 times daily     nitroGLYCERIN 0.4 MG SL tablet  Commonly known as: NITROSTAT  DISSOLVE ONE TABLET UNDER THE TONGUE EVERY 5 MINUTES AS NEEDED FOR CHEST PAIN.   DO NOT EXCEED A TOTAL OF 3 DOSES IN 15 MINUTES     potassium chloride 10 MEQ extended release tablet  Commonly known as: KLOR-CON  TAKE 1 TABLET BY MOUTH TWICE DAILY     rosuvastatin 20 MG tablet  Commonly known as: Crestor  Take 1 tablet by mouth daily     spironolactone 25 MG tablet  Commonly known as: ALDACTONE  Take 1 tablet by mouth daily                Medications marked \"taking\" at this time  Outpatient Medications Marked as Taking for the 10/4/22 encounter (Office Visit) with NARESH Rogers CNP   Medication Sig Dispense Refill    furosemide (LASIX) 20 MG tablet Take 0.5 tablets by mouth daily for 10 days 5 tablet 0    metoprolol (LOPRESSOR) 100 MG tablet Take 1 tablet by mouth 2 times daily 60 tablet 0    dilTIAZem (CARDIZEM CD) 120 MG extended release capsule Take 1 capsule by mouth daily 30 capsule 0    spironolactone (ALDACTONE) 25 MG tablet Take 1 tablet by mouth daily 30 tablet 0    potassium chloride (KLOR-CON) 10 MEQ extended release tablet TAKE 1 TABLET BY MOUTH TWICE DAILY 180 tablet 0    digoxin (LANOXIN) 125 MCG tablet Take 1 tablet by mouth once daily 90 tablet 0    clopidogrel (PLAVIX) 75 MG tablet Take 1 tablet by mouth daily 30 tablet 3    aspirin 81 MG chewable tablet Take 1 tablet by mouth in the morning. 30 tablet 3    rosuvastatin (CRESTOR) 20 MG tablet Take 1 tablet by mouth daily 90 tablet 1    nitroGLYCERIN (NITROSTAT) 0.4 MG SL tablet DISSOLVE ONE TABLET UNDER THE TONGUE EVERY 5 MINUTES AS NEEDED FOR CHEST PAIN. DO NOT EXCEED A TOTAL OF 3 DOSES IN 15 MINUTES 25 tablet 2    bisacodyl (DULCOLAX) 5 MG EC tablet Take 5 mg by mouth nightly          Medications patient taking as of now reconciled against medications ordered at time of hospital discharge: Yes    A comprehensive review of systems was negative except for what was noted in the HPI.     Objective:    /86   Pulse 80   Resp 18   Wt 230 lb (104.3 kg)   BMI 30.34 kg/m²   General Appearance: alert and oriented to person, place and time, well-developed and well-nourished, in no acute distress  Skin: warm and dry, no rash or erythema  Head: normocephalic and atraumatic  Eyes: conjunctivae normal  ENT: tympanic membrane, external ear and ear canal normal bilaterally, oropharynx clear and moist with normal mucous membranes  Neck: neck supple and non tender without mass   Pulmonary/Chest: clear to auscultation bilaterally- no wheezes, rales or rhonchi, normal air movement, no respiratory distress  Cardiovascular: normal rate, normal S1 and S2, no gallops, and no carotid bruits  Abdomen: non-distended  Extremities: no cyanosis and no clubbing  Musculoskeletal: no swollen joints  Neurologic: gait and coordination normal and speech normal      An electronic signature was used to authenticate this note.   --Dane Nava, APRN - CNP

## 2022-10-05 ENCOUNTER — OFFICE VISIT (OUTPATIENT)
Dept: CARDIOLOGY CLINIC | Age: 72
End: 2022-10-05
Payer: MEDICARE

## 2022-10-05 VITALS
SYSTOLIC BLOOD PRESSURE: 130 MMHG | DIASTOLIC BLOOD PRESSURE: 80 MMHG | WEIGHT: 228.8 LBS | OXYGEN SATURATION: 97 % | BODY MASS INDEX: 30.19 KG/M2 | HEART RATE: 76 BPM

## 2022-10-05 DIAGNOSIS — I50.42 CHRONIC COMBINED SYSTOLIC AND DIASTOLIC CONGESTIVE HEART FAILURE (HCC): Primary | ICD-10-CM

## 2022-10-05 DIAGNOSIS — I50.22 CHRONIC SYSTOLIC CONGESTIVE HEART FAILURE, NYHA CLASS 2 (HCC): ICD-10-CM

## 2022-10-05 PROCEDURE — 99215 OFFICE O/P EST HI 40 MIN: CPT | Performed by: NURSE PRACTITIONER

## 2022-10-05 PROCEDURE — 3017F COLORECTAL CA SCREEN DOC REV: CPT | Performed by: NURSE PRACTITIONER

## 2022-10-05 PROCEDURE — G8484 FLU IMMUNIZE NO ADMIN: HCPCS | Performed by: NURSE PRACTITIONER

## 2022-10-05 PROCEDURE — 1036F TOBACCO NON-USER: CPT | Performed by: NURSE PRACTITIONER

## 2022-10-05 PROCEDURE — G8417 CALC BMI ABV UP PARAM F/U: HCPCS | Performed by: NURSE PRACTITIONER

## 2022-10-05 PROCEDURE — 1123F ACP DISCUSS/DSCN MKR DOCD: CPT | Performed by: NURSE PRACTITIONER

## 2022-10-05 PROCEDURE — 1111F DSCHRG MED/CURRENT MED MERGE: CPT | Performed by: NURSE PRACTITIONER

## 2022-10-05 PROCEDURE — G8427 DOCREV CUR MEDS BY ELIG CLIN: HCPCS | Performed by: NURSE PRACTITIONER

## 2022-10-05 RX ORDER — POTASSIUM CHLORIDE 750 MG/1
TABLET, FILM COATED, EXTENDED RELEASE ORAL
Qty: 6 TABLET | Refills: 0
Start: 2022-10-05 | End: 2022-10-12 | Stop reason: SDUPTHER

## 2022-10-05 ASSESSMENT — ENCOUNTER SYMPTOMS
NAUSEA: 0
VOMITING: 0
SHORTNESS OF BREATH: 1
ABDOMINAL DISTENTION: 0
COUGH: 0

## 2022-10-05 NOTE — Clinical Note
FYI: pt does not want to follow here. Does not feel they need the Lasix. Continued LE swelling, elevated BNP, continued SOB.

## 2022-10-05 NOTE — PATIENT INSTRUCTIONS
You may receive a survey regarding the care you received during your visit. Your input is valuable to us. We encourage you to complete and return your survey. We hope you will choose us in the future for your healthcare needs. Continue:  Continue current medications  Daily weights and record  Fluid restriction of 2 Liters per day  Limit sodium in diet to around 1845-0506 mg/day  Monitor BP  Activity as tolerated     Call the Heart Failure Clinic for any of the following symptoms: [phone]  Weight gain of 2-3 pounds in 1 day or 5 pounds in 1 week  Increased shortness of breath  Shortness of breath while laying down  Cough  Chest pain  Swelling in feet, ankles or legs  Tenderness or bloating in the abdomen  Fatigue   Decreased appetite or nausea   Confusion        Repeat blood work on 10/24/22    Only take 1 potassium pill daily with the Lasix. Stop taking the Potassium (Klor-Con) when you stop the Lasix pill      Continue diet/fluid adherence  Continue daily wts.   F/U w/ Cardiology  Does not want to follow in the CHF clinic

## 2022-10-10 ENCOUNTER — CARE COORDINATION (OUTPATIENT)
Dept: CASE MANAGEMENT | Age: 72
End: 2022-10-10

## 2022-10-10 NOTE — CARE COORDINATION
Care Transitions Outreach Attempt    Call within 2 business days of discharge: No   Attempted to reach patient for transitions of care follow up. Unable to reach patient. Patient: Celestino Zuniga Patient : 1950 MRN: [id number]    Last Discharge [address]       Date Complaint Diagnosis Description Type Department Provider    10/2/22 Shortness of Breath Longstanding persistent atrial fibrillation ([facility] [address] . .. ED (DISCHARGE) MARY Miles MD          Attempted to reach patient for ER transitions of care follow up. Unable to reach patient. Left HIPAA Compliant message on Voice Mail to call [facility] (number given) with any questions and an update on patient's condition since discharge. Will continue to follow. Samira Crandall LPN    [phone]  [facility] / [place] [address] Coordinator        Was this an external facility discharge?  No Discharge Facility:     Noted following upcoming appointments from discharge chart review:   [facility] follow up appointment(s):   Future Appointments   Date Time Provider Cristina Grier   10/31/2022  8:30 AM NARESH Alexandre - CNP [facility] [place]   2022  3:30 PM Radha Sol MD [facility] Heart [address] [facility]   2023  8:00 AM NARESH Erwin - CNP [facility] [place]     Non-[facility] follow up appointment(s):

## 2022-10-12 ENCOUNTER — CARE COORDINATION (OUTPATIENT)
Dept: CASE MANAGEMENT | Age: 72
End: 2022-10-12

## 2022-10-12 ENCOUNTER — OFFICE VISIT (OUTPATIENT)
Dept: CARDIOLOGY CLINIC | Age: 72
End: 2022-10-12
Payer: MEDICARE

## 2022-10-12 ENCOUNTER — TELEPHONE (OUTPATIENT)
Dept: CARDIOLOGY CLINIC | Age: 72
End: 2022-10-12

## 2022-10-12 VITALS
HEART RATE: 100 BPM | OXYGEN SATURATION: 97 % | DIASTOLIC BLOOD PRESSURE: 82 MMHG | WEIGHT: 228 LBS | SYSTOLIC BLOOD PRESSURE: 138 MMHG | BODY MASS INDEX: 30.22 KG/M2 | HEIGHT: 73 IN

## 2022-10-12 DIAGNOSIS — I50.42 CHRONIC COMBINED SYSTOLIC AND DIASTOLIC CONGESTIVE HEART FAILURE (HCC): ICD-10-CM

## 2022-10-12 DIAGNOSIS — R06.02 SOB (SHORTNESS OF BREATH): Primary | ICD-10-CM

## 2022-10-12 DIAGNOSIS — I48.11 LONGSTANDING PERSISTENT ATRIAL FIBRILLATION (HCC): ICD-10-CM

## 2022-10-12 PROCEDURE — 1123F ACP DISCUSS/DSCN MKR DOCD: CPT | Performed by: INTERNAL MEDICINE

## 2022-10-12 PROCEDURE — G8484 FLU IMMUNIZE NO ADMIN: HCPCS | Performed by: INTERNAL MEDICINE

## 2022-10-12 PROCEDURE — 99214 OFFICE O/P EST MOD 30 MIN: CPT | Performed by: INTERNAL MEDICINE

## 2022-10-12 PROCEDURE — G8417 CALC BMI ABV UP PARAM F/U: HCPCS | Performed by: INTERNAL MEDICINE

## 2022-10-12 PROCEDURE — 3017F COLORECTAL CA SCREEN DOC REV: CPT | Performed by: INTERNAL MEDICINE

## 2022-10-12 PROCEDURE — 1036F TOBACCO NON-USER: CPT | Performed by: INTERNAL MEDICINE

## 2022-10-12 PROCEDURE — 1111F DSCHRG MED/CURRENT MED MERGE: CPT | Performed by: INTERNAL MEDICINE

## 2022-10-12 PROCEDURE — G8427 DOCREV CUR MEDS BY ELIG CLIN: HCPCS | Performed by: INTERNAL MEDICINE

## 2022-10-12 RX ORDER — POTASSIUM CHLORIDE 20 MEQ/1
20 TABLET, EXTENDED RELEASE ORAL 2 TIMES DAILY
COMMUNITY

## 2022-10-12 RX ORDER — FUROSEMIDE 40 MG/1
40 TABLET ORAL DAILY
Qty: 90 TABLET | Refills: 3 | Status: SHIPPED | OUTPATIENT
Start: 2022-10-12

## 2022-10-12 RX ORDER — POTASSIUM CHLORIDE 1500 MG/1
20 TABLET, FILM COATED, EXTENDED RELEASE ORAL 2 TIMES DAILY
Qty: 6 TABLET | Refills: 0 | Status: SHIPPED | OUTPATIENT
Start: 2022-10-12 | End: 2022-10-12 | Stop reason: ALTCHOICE

## 2022-10-12 NOTE — PROGRESS NOTES
[address] [address] [place] [address]  Dept: [phone]  Dept Fax: [phone]  Loc: [phone]    Visit Date: 10/12/2022    Mr. Geovanni Valles is a 81 y.o. male  who presented for:  Chief Complaint   Patient presents with    Follow-up       HPI:   HPI   82 yo M s/p WATCHMAN. No further bleeding. He has felt sob since Plavix. His EF is 45%. When he sits up he feels better, and when he is laying down it it is worse. CXR without acute congestion. Has some edema. HR 100s. Sometimes during the day he feels well. No LOC. No bleeding. Current Outpatient Medications:     potassium chloride (KLOR-CON) 10 MEQ extended release tablet, TAKE 1 TABLET BY MOUTH DAILY, Disp: 6 tablet, Rfl: 0    furosemide (LASIX) 20 MG tablet, Take 0.5 tablets by mouth daily for 10 days, Disp: 5 tablet, Rfl: 0    metoprolol (LOPRESSOR) 100 MG tablet, Take 1 tablet by mouth 2 times daily, Disp: 60 tablet, Rfl: 0    dilTIAZem (CARDIZEM CD) 120 MG extended release capsule, Take 1 capsule by mouth daily, Disp: 30 capsule, Rfl: 0    spironolactone (ALDACTONE) 25 MG tablet, Take 1 tablet by mouth daily, Disp: 30 tablet, Rfl: 0    digoxin (LANOXIN) 125 MCG tablet, Take 1 tablet by mouth once daily, Disp: 90 tablet, Rfl: 0    clopidogrel (PLAVIX) 75 MG tablet, Take 1 tablet by mouth daily, Disp: 30 tablet, Rfl: 3    aspirin 81 MG chewable tablet, Take 1 tablet by mouth in the morning., Disp: 30 tablet, Rfl: 3    rosuvastatin (CRESTOR) 20 MG tablet, Take 1 tablet by mouth daily, Disp: 90 tablet, Rfl: 1    nitroGLYCERIN (NITROSTAT) 0.4 MG SL tablet, DISSOLVE ONE TABLET UNDER THE TONGUE EVERY 5 MINUTES AS NEEDED FOR CHEST PAIN.   DO NOT EXCEED A TOTAL OF 3 DOSES IN 15 MINUTES, Disp: 25 tablet, Rfl: 2    bisacodyl (DULCOLAX) 5 MG EC tablet, Take 5 mg by mouth nightly, Disp: , Rfl:     Past Medical History  Sedrick Bert  has a past medical history of Anesthesia, Atrial fibrillation ([facility] [address] Benign prostatic hyperplasia with urinary frequency, CAD (coronary artery disease), Chest pain, Colon polyp, Coronary artery disease involving native coronary artery of native heart without angina pectoris, Dementia (HCC), Dizziness, Elevated PSA, GERD (gastroesophageal reflux disease), Heart disease, Hyperlipidemia, and Hypertension. Social History  Sedrick Noel  reports that he has never smoked. He has never used smokeless tobacco. He reports that he does not drink alcohol and does not use drugs. Family History  Sedrick Noel family history includes Alzheimer's Disease in his mother; Cancer in his father; Heart Disease in his father; Heart Surgery in his father. There is no family history of bicuspid aortic valve, aneurysms, heart transplant, pacemakers, defibrillators, or sudden cardiac death. Past Surgical History   Past Surgical History:   Procedure Laterality Date    ADENOIDECTOMY      CARDIAC CATHETERIZATION  09/23/2011    Right radial artery cannulation. Moderate LV dysfunction. The patient has disease in the ostium of diagonal 1 and diagonal 2 branch, however they are both are at the  ostium of the diagonals. Intervention could compromise flow of LAD. THe LAD has long diffuse calcified lesion 50-60-% anatomically.     CARDIOVASCULAR STRESS TEST  09/23/2011    EF 37%    CARPAL TUNNEL RELEASE      COLONOSCOPY      NASAL SINUS SURGERY N/A 06/13/2022    Nasal Endoscopy Control of Nasal Hemorrhage performed by Ivett Ibanez MD at [address] [place] [address]  08/2022    [place] procedure    PROSTATECTOMY N/A 12/03/2020    ROBOTIC ASSISTED LAPAROSCOPIC RADICAL PROSTATECTOMY performed by Bebo Ghotra MD at [place] [address]      TRANSESOPHAGEAL ECHOCARDIOGRAM N/A 09/12/2022    TRANSESOPHAGEAL ECHOCARDIOGRAM performed by [address], MD at [address] [place] [address] ECHOCARDIOGRAM  09/23/2011    EF 50-55%    ULTRASOUND PROSTATE/TRANSRECTAL N/A 10/06/2020    CYSTO, TRANSRECTAL ULTRASOUND GUIDED PROSTATE BX performed by Sinai Franks MD at [facility]         Review of Systems   Constitutional: Negative for chills and fever  HENT: Negative for congestion, sinus pressure, sneezing and sore throat. Eyes: Negative for pain, discharge, redness and itching. Respiratory: Negative for apnea, cough  Gastrointestinal: Negative for blood in stool, constipation, diarrhea   Endocrine: Negative for cold intolerance, heat intolerance, polydipsia. Genitourinary: Negative for dysuria, enuresis, flank pain and hematuria. Musculoskeletal: Negative for arthralgias, joint swelling and neck pain. Neurological: Negative for numbness and headaches. Psychiatric/Behavioral: Negative for agitation, confusion, decreased concentration and dysphoric mood. Objective:     /82   Pulse 100   Ht 6' 1\" (1.854 m)   Wt 228 lb (103.4 kg) Comment: stated from home this morning  SpO2 97%   BMI 30.08 kg/m²     Wt Readings from Last 3 Encounters:   10/12/22 228 lb (103.4 kg)   10/05/22 228 lb 12.8 oz (103.8 kg)   10/04/22 230 lb (104.3 kg)     BP Readings from Last 3 Encounters:   10/12/22 138/82   10/05/22 130/80   10/04/22 120/86       Nursing note and vitals reviewed. Physical Exam   Constitutional: Oriented to person, place, and time. Appears well-developed and well-nourished. HENT:   Head: Normocephalic and atraumatic. Eyes: EOM are normal. Pupils are equal, round, and reactive to light. Neck: Normal range of motion. Neck supple. No JVD present. Cardiovascular: Normal rate, regular rhythm, normal heart sounds and intact distal pulses. No murmur heard. Pulmonary/Chest: Effort normal and breath sounds normal. No respiratory distress. No wheezes. No rales. Abdominal: Soft. Bowel sounds are normal. No distension. There is no tenderness. Musculoskeletal: Normal range of motion. No edema.    Neurological: Alert and oriented to person, place, and time. No cranial nerve deficit. Coordination normal.   Skin: Skin is warm and dry. Psychiatric: Normal mood and affect.        Lab Results   Component Value Date/Time    CKTOTAL 126 09/23/2011 08:59 AM    CKTOTAL 179 09/23/2011 03:20 AM    CKMB 1.6 09/23/2011 08:59 AM    CKMB 2.6 09/23/2011 03:20 AM       Lab Results   Component Value Date/Time    WBC 7.5 10/02/2022 09:36 PM    RBC 5.89 10/02/2022 09:36 PM    RBC 5.63 05/17/2016 07:30 AM    HGB 16.2 10/02/2022 09:36 PM    HCT 50.5 10/02/2022 09:36 PM    MCV 85.7 10/02/2022 09:36 PM    MCH 27.5 10/02/2022 09:36 PM    MCHC 32.1 10/02/2022 09:36 PM    RDW 13.9 06/23/2018 06:39 AM     10/02/2022 09:36 PM    MPV 10.5 10/02/2022 09:36 PM       Lab Results   Component Value Date/Time     10/02/2022 09:36 PM    K 4.9 10/02/2022 09:36 PM    K 4.1 09/29/2022 05:54 AM     10/02/2022 09:36 PM    CO2 24 10/02/2022 09:36 PM    BUN 16 10/02/2022 09:36 PM    LABALBU 3.8 10/02/2022 09:36 PM    LABALBU 4.5 01/12/2012 08:05 AM    CREATININE 1.1 10/02/2022 09:36 PM    CALCIUM 9.8 10/02/2022 09:36 PM    LABGLOM 66 10/02/2022 09:36 PM    GLUCOSE 93 10/02/2022 09:36 PM    GLUCOSE 93 05/17/2016 07:30 AM       Lab Results   Component Value Date/Time    ALKPHOS 74 10/02/2022 09:36 PM    ALT 28 10/02/2022 09:36 PM    AST 27 10/02/2022 09:36 PM    PROT 7.1 10/02/2022 09:36 PM    BILITOT 0.8 10/02/2022 09:36 PM    BILIDIR <0.2 09/26/2022 11:55 AM    LABALBU 3.8 10/02/2022 09:36 PM    LABALBU 4.5 01/12/2012 08:05 AM       Lab Results   Component Value Date/Time    MG 2.1 10/02/2022 09:36 PM       Lab Results   Component Value Date    INR 1.17 (H) 10/02/2022    INR 2.50 (H) 09/01/2022    INR 2.80 (H) 08/11/2022    PROTIME 44.6 (H) 06/02/2022         Lab Results   Component Value Date/Time    LABA1C 6.3 04/19/2022 07:13 AM       Lab Results   Component Value Date/Time    TRIG 119 04/19/2022 07:13 AM    HDL 28 04/19/2022 07:13 AM    HDL 37 10/20/2010 12:00 AM    LDLCALC 85 10/04/2021 08:32 AM    LDLDIRECT 88.85 04/19/2022 07:13 AM    LABVLDL 27 05/17/2016 07:30 AM       Lab Results   Component Value Date/Time    TSH 8.660 10/02/2022 09:36 PM         Testing Reviewed:      I have individually reviewed the cardiac test below:    ECHO: No results found for this or any previous visit. Assessment/Plan   Severe SOB/orthopnea  S/p WATCHMAN - 45 day SANG without issues  Cardiomyopathy, EF 45%, NYHA II  HTN  HLD  Hold Plavix to assess symptoms, give Lasix 40 mg BID x 3 days, then 40 mg q day. Continue the rest of the meds. Monitor sob. CHF clinic next week. KCL replacement. Fuid/restrict. BNP was > 4000. Discussed diet/exercise/BP/weight loss/health lifestyle choices/lipids; the patient understands the goals and will try to comply.     Disposition:  6 weeks         Electronically signed by Khloe Contreras MD   10/12/2022 at 12:03 PM EDT

## 2022-10-12 NOTE — PROGRESS NOTES
Can't catch his breath, ever since started on Plavix  Worse at night, hasn't slept in 3 days, can't lay flat, even recliner hasn't been helping lately

## 2022-10-12 NOTE — TELEPHONE ENCOUNTER
Familia Hernandez from pharmacy questioning script sent for potassium. Hernando needing clarification before filling? Outpatient Medication Detail     Disp Refills Start End    potassium chloride (KLOR-CON M) 20 MEQ TBCR extended release tablet 6 tablet 0 10/12/2022     Sig - Route:  Take 1 tablet by mouth 2 times daily TAKE 1 TABLET BY MOUTH DAILY - Oral    Sent to pharmacy as: Potassium Chloride ER 20 MEQ Oral Tablet Extended Release Ricarda FRENCH)    E-Prescribing Status: Receipt confirmed by pharmacy (10/12/2022 12:13 PM EDT)

## 2022-10-12 NOTE — CARE COORDINATION
[facility] Care Transitions Follow Up Call    LPN Care Coordinator contacted the patient by telephone to follow up after admission on 2022. Verified name and  with patient as identifiers. Patient: Wilder Lin  Patient : 1950   MRN: [id number]  Reason for Admission: Persistent atrial fibrillation, with RVR / Acute on Chronic decompensated HFrEF  Discharge Date: 10/3/22 RARS: Readmission Risk Score: 13.5      Needs to be reviewed by the provider   Additional needs identified to be addressed with provider: No  none             Method of communication with provider: none. Patient states he just saw cardiologist today and is SOB. Lasix was increased to 40 mg daily and Potassium was added due to increase of lasix. BP this morning 131/92 Pulse 97. Patient denies weight gain, wheezing, coughing, extremity edema, abdominal edema, or chest pain. Denies episodes of palpitations, dizziness, fatigue, chest pain at this time. Patient confirms all medications are available and are being taken as directed. Patient has no needs or concerns for writer at this time. PLAN FOR NEXT CALL: SOB, Medications lasix, swelling and any Worsening Symptoms. Will continue to follow. Kristin Howe LPN    [phone]  [facility] / [place] [address] Coordinator        Addressed changes since last contact:  More SOB  Discussed follow-up appointments. If no appointment was previously scheduled, appointment scheduling offered: No.   Is follow up appointment scheduled within 7 days of discharge? Yes.     Follow Up  Future Appointments   Date Time Provider Cristina Grier   10/17/2022 11:00 AM NARESH Hopson - CNP N [facility] CHF [facility]   2023  8:00 AM NARESH Osorio - CNP [facility] [place]   2023 11:30 AM Sis Torrez MD N [facility] Heart [facility]     Non-[facility] follow up appointment(s): 10/4/2022    LPN Care Coordinator reviewed discharge instructions with patient and discussed any barriers to care and/or understanding of plan of care after discharge. Discussed appropriate site of care based on symptoms and resources available to patient including: PCP  Specialist  When to call 911. The patient agrees to contact the PCP office for questions related to their healthcare. Advance Care Planning:   reviewed and current. Patients top risk factors for readmission: lack of knowledge about disease, medical condition-Persistent atrial fibrillation, with RVR / Acute on Chronic decompensated HFrEF, and medication management  Interventions to address risk factors: Obtained and reviewed discharge summary and/or continuity of care documents    Offered patient enrollment in the Remote Patient Monitoring (RPM) program for in-home monitoring: NA.     Care Transitions Subsequent and Final Call    Subsequent and Final Calls  Care Transitions Interventions  Other Interventions:             LPN Care Coordinator provided contact information for future needs. Plan for follow-up call in 3-5 days based on severity of symptoms and risk factors.   Plan for next call: symptom management-Persistent atrial fibrillation, with RVR / Acute on Chronic decompensated HFrEF    Priya Queen LPN

## 2022-10-14 ENCOUNTER — CARE COORDINATION (OUTPATIENT)
Dept: CASE MANAGEMENT | Age: 72
End: 2022-10-14

## 2022-10-14 NOTE — CARE COORDINATION
Care Transitions Outreach Attempt    Call within 2 business days of discharge: No   Attempted to reach patient for transitions of care follow up. Unable to reach patient. Patient: Graham Shabazz Patient : 1950 MRN: [id number]    Last Discharge  Street       Date Complaint Diagnosis Description Type Department Provider    10/2/22 Shortness of Breath Longstanding persistent atrial fibrillation ([address] . .. ED (DISCHARGE) STR MAGAN Paz MD          Attempted to reach patient for transitions of care follow up. Unable to reach patient. Left HIPAA Compliant message on Voice Mail to call [facility] (number given) with any questions and an update on patient's condition since discharge. Will continue to follow. Naga Higginbotham LPN    [phone]  [facility] / [place] [address] Coordinator        Was this an external facility discharge?  No Discharge Facility:     Noted following upcoming appointments from discharge chart review:   [facility] follow up appointment(s):   Future Appointments   Date Time Provider Cristina Grier   10/17/2022 11:00 AM NARESH Gaviria - CNP N SRPX CHF [facility] [address]   2023  8:00 AM NAREHS Acevedo - [address]   2023 11:30 AM Ventura James MD N SRPX Heart [address] [facility] follow up appointment(s):

## 2022-10-17 ENCOUNTER — HOSPITAL ENCOUNTER (OUTPATIENT)
Age: 72
Discharge: HOME | End: 2022-10-17
Payer: MEDICARE

## 2022-10-17 ENCOUNTER — OFFICE VISIT (OUTPATIENT)
Dept: CARDIOLOGY CLINIC | Age: 72
End: 2022-10-17
Payer: MEDICARE

## 2022-10-17 ENCOUNTER — HOSPITAL ENCOUNTER (OUTPATIENT)
Dept: NON INVASIVE DIAGNOSTICS | Age: 72
Discharge: HOME | End: 2022-10-17
Payer: MEDICARE

## 2022-10-17 ENCOUNTER — HOSPITAL ENCOUNTER (OUTPATIENT)
Dept: GENERAL RADIOLOGY | Age: 72
Discharge: HOME | End: 2022-10-17
Payer: MEDICARE

## 2022-10-17 VITALS
SYSTOLIC BLOOD PRESSURE: 100 MMHG | OXYGEN SATURATION: 97 % | DIASTOLIC BLOOD PRESSURE: 62 MMHG | HEART RATE: 72 BPM | WEIGHT: 223 LBS | BODY MASS INDEX: 29.42 KG/M2

## 2022-10-17 DIAGNOSIS — I50.42 CHRONIC COMBINED SYSTOLIC AND DIASTOLIC CONGESTIVE HEART FAILURE (HCC): ICD-10-CM

## 2022-10-17 DIAGNOSIS — R06.02 SOB (SHORTNESS OF BREATH): ICD-10-CM

## 2022-10-17 DIAGNOSIS — I48.0 PAROXYSMAL ATRIAL FIBRILLATION (HCC): ICD-10-CM

## 2022-10-17 DIAGNOSIS — R06.02 SHORTNESS OF BREATH: ICD-10-CM

## 2022-10-17 DIAGNOSIS — I50.22 CHRONIC SYSTOLIC CONGESTIVE HEART FAILURE, NYHA CLASS 2 (HCC): Primary | ICD-10-CM

## 2022-10-17 LAB
ANION GAP SERPL CALCULATED.3IONS-SCNC: 15 MEQ/L (ref 8–16)
BUN BLDV-MCNC: 33 MG/DL (ref 7–22)
CALCIUM SERPL-MCNC: 9.9 MG/DL (ref 8.5–10.5)
CHLORIDE BLD-SCNC: 105 MEQ/L (ref 98–111)
CO2: 22 MEQ/L (ref 23–33)
CREAT SERPL-MCNC: 1.2 MG/DL (ref 0.4–1.2)
GFR SERPL CREATININE-BSD FRML MDRD: 60 ML/MIN/1.73M2
GLUCOSE BLD-MCNC: 114 MG/DL (ref 70–108)
POTASSIUM SERPL-SCNC: 4.2 MEQ/L (ref 3.5–5.2)
SODIUM BLD-SCNC: 142 MEQ/L (ref 135–145)

## 2022-10-17 PROCEDURE — 71046 X-RAY EXAM CHEST 2 VIEWS: CPT

## 2022-10-17 PROCEDURE — 99213 OFFICE O/P EST LOW 20 MIN: CPT | Performed by: NURSE PRACTITIONER

## 2022-10-17 PROCEDURE — 3017F COLORECTAL CA SCREEN DOC REV: CPT | Performed by: NURSE PRACTITIONER

## 2022-10-17 PROCEDURE — G8427 DOCREV CUR MEDS BY ELIG CLIN: HCPCS | Performed by: NURSE PRACTITIONER

## 2022-10-17 PROCEDURE — 1036F TOBACCO NON-USER: CPT | Performed by: NURSE PRACTITIONER

## 2022-10-17 PROCEDURE — G8484 FLU IMMUNIZE NO ADMIN: HCPCS | Performed by: NURSE PRACTITIONER

## 2022-10-17 PROCEDURE — 1111F DSCHRG MED/CURRENT MED MERGE: CPT | Performed by: NURSE PRACTITIONER

## 2022-10-17 PROCEDURE — 80048 BASIC METABOLIC PNL TOTAL CA: CPT

## 2022-10-17 PROCEDURE — 1123F ACP DISCUSS/DSCN MKR DOCD: CPT | Performed by: NURSE PRACTITIONER

## 2022-10-17 PROCEDURE — 93270 REMOTE 30 DAY ECG REV/REPORT: CPT

## 2022-10-17 PROCEDURE — G8417 CALC BMI ABV UP PARAM F/U: HCPCS | Performed by: NURSE PRACTITIONER

## 2022-10-17 PROCEDURE — 36415 COLL VENOUS BLD VENIPUNCTURE: CPT

## 2022-10-17 RX ORDER — ROSUVASTATIN CALCIUM 20 MG/1
20 TABLET, COATED ORAL DAILY
Qty: 90 TABLET | Refills: 3 | Status: SHIPPED | OUTPATIENT
Start: 2022-10-17

## 2022-10-17 RX ORDER — CLOPIDOGREL BISULFATE 75 MG/1
75 TABLET ORAL DAILY
Qty: 90 TABLET | Refills: 3 | Status: SHIPPED | OUTPATIENT
Start: 2022-10-17

## 2022-10-17 RX ORDER — SPIRONOLACTONE 25 MG/1
25 TABLET ORAL DAILY
Qty: 90 TABLET | Refills: 3 | Status: SHIPPED | OUTPATIENT
Start: 2022-10-17

## 2022-10-17 RX ORDER — DILTIAZEM HYDROCHLORIDE 120 MG/1
120 CAPSULE, COATED, EXTENDED RELEASE ORAL DAILY
Qty: 90 CAPSULE | Refills: 3 | Status: SHIPPED | OUTPATIENT
Start: 2022-10-17

## 2022-10-17 RX ORDER — METOPROLOL TARTRATE 100 MG/1
100 TABLET ORAL 2 TIMES DAILY
Qty: 180 TABLET | Refills: 3 | Status: SHIPPED | OUTPATIENT
Start: 2022-10-17

## 2022-10-17 ASSESSMENT — ENCOUNTER SYMPTOMS
ABDOMINAL DISTENTION: 0
VOMITING: 0
SHORTNESS OF BREATH: 1
COUGH: 0
NAUSEA: 0

## 2022-10-17 NOTE — PATIENT INSTRUCTIONS
You may receive a survey regarding the care you received during your visit. Your input is valuable to us. We encourage you to complete and return your survey. We hope you will choose us in the future for your healthcare needs.     Continue:  Continue current medications  Daily weights and record  Fluid restriction of 2 Liters per day  Limit sodium in diet to around 0511-7705 mg/day  Monitor BP  Activity as tolerated     Call the Heart Failure Clinic for any of the following symptoms: [phone]  Weight gain of 2-3 pounds in 1 day or 5 pounds in 1 week  Increased shortness of breath  Shortness of breath while laying down  Cough  Chest pain  Swelling in feet, ankles or legs  Tenderness or bloating in the abdomen  Fatigue   Decreased appetite or nausea   Confusion

## 2022-10-17 NOTE — PROCEDURES
The skin was prepped and a 30 day cardiac event monitor was applied. The patient was instructed on the documentation of symptoms and the purpose of the monitor as well as the things to avoid while wearing the monitor. The patient was instructed to remove and return the monitor on 11/16/2022.   The serial number of the monitor that was applied is [id number]

## 2022-10-17 NOTE — PROGRESS NOTES
Heart Failure Clinic       Visit Date: 10/17/2022  Cardiologist:  Dr. Nickolas Floyd  Primary Care Physician: Dr. Lupe Calles, APRN - CNP    Kodak Avery is a 81 y.o. male who presents today for:  Chief Complaint   Patient presents with    Congestive Heart Failure         HPI:     TYPE HF: HFrEF 40-45%   Cause:   Device: Watchman 7/19/22  HX: Afib w/ RVR (watchman) CAD, HLD, HTN   Dry Wt:  228 on 10/5/22, 223 on 10/17/22      Kodak Avery is a 81 y.o. male who presents to the office for a new patient visit in the heart failure clinic. Concerns today: pt here today for a 1 week f/u post seeing Dr. Nickolas Floyd. He did order for him to take an extra 40 of lasix for 3 days, they did not do it, they said the didn't know. Pt states that he was able to lay flat to sleep last night. Urinating well on his Lasix. Still c/o of intermittent SOB unrelated to activity with intermittent dry cough. Visit on 10/5/22: new pt today, referred at recent hospital admission, CHF exacerbation likely due to Afib w/ RVR Started on lopressor (was on Toprol) and aldactone. Chest xray was negative for pulmonary vascular congestion. He was IV diuresed w/ improved SOB and LE swelling. Pt does not really want to follow here, they have a fixed income. They also feel that because they cut back on fluid oral intake that they will not have any other fluid overload issues. States that he urinated better when he was not on the water pill. Pt does note orthopnea, lower leg swelling and worsening SOB before the hospital admission, Tiffany Clarke continues but at baseline. Hospitalization:      Admit date:  9/26/2022                        Discharge date:   9/29/2022       Persistent atrial fibrillation, with RVR: Rate now CVR with appropriate medication changes. S/P Watchman device 7/19/2022. SANG on 9/12/22 shows EF 45%. [id number] Score 3. HR since stabilized. Continues on Plavix and ASA.  Was recently switched from Coumadin to Plavix. Continued on digoxin home dose. Cardiology increased Lopressor to 100 mg BID, restarted Cardizem at reduced dose of120. RX given to reflect these changes. 2. Acute on Chronic decompensated HFrEF: Improved  Likely due to inadequately rate controlled Afib. SANG on 9/12/2022 shows EF 45%. CHF clinic referral completed. Continue daily weights, strict I+O, cardiac telemetry. Continue with 2L fluid restriction and low sodium diet. Cardiology started Aldactone. Rx given. 3. Accelerated essential hypertension: Improved. BP's 674-683'K systolic. Continue Metoprolol tartrate, and Cardizem. Continue to monitor closely. 4. CAD, by history:    Noted on previous cath from 2011. Stress test since this time was negative for induced ischemia. ASA/Plavix/Statin/BB     Rate is now controlled. No SOB, minimal XIONG. Lower ext edema has resolved. Overall volume status has greatly improved. Cardiology recs to hold Bumex on discharge. Home today with plan above. Patient wishes only to see Dr. Zoya Berkowitz in the cardiology clinic.         Activity: ADLs performed w/ XIONG   Diet: following    Patient has:  Chest Pain: no  SOB: yes  Orthopnea/PND: yes, resolved  TRENT: no  Edema: yes, improved  Fatigue: yes  Abdominal bloating: no  Cough: no  Appetite: good      Past Medical History:   Diagnosis Date    Anesthesia     takes large dose of medications to make him sleepy for surgery    Atrial fibrillation ([address]     Benign prostatic hyperplasia with urinary frequency 10/21/2019    CAD (coronary artery disease)     Chest pain     Colon polyp 2011    removed    Coronary artery disease involving native coronary artery of native heart without angina pectoris 10/21/2019    Dementia ([address]     Dizziness     Elevated PSA     GERD (gastroesophageal reflux disease)     Heart disease     Hyperlipidemia     Hypertension      Past Surgical History:   Procedure Laterality Date    ADENOIDECTOMY      CARDIAC CATHETERIZATION  09/23/2011    Right radial artery cannulation. Moderate LV dysfunction. The patient has disease in the ostium of diagonal 1 and diagonal 2 branch, however they are both are at the  ostium of the diagonals. Intervention could compromise flow of LAD. THe LAD has long diffuse calcified lesion 50-60-% anatomically.     CARDIOVASCULAR STRESS TEST  09/23/2011    EF 37%    CARPAL TUNNEL RELEASE      COLONOSCOPY      NASAL SINUS SURGERY N/A 06/13/2022    Nasal Endoscopy Control of Nasal Hemorrhage performed by Jaswinder Mccarthy MD at [address]  08/2022    Watchman procedure    PROSTATECTOMY N/A 12/03/2020    ROBOTIC ASSISTED LAPAROSCOPIC RADICAL PROSTATECTOMY performed by Linda Loaiza MD at [facility]      TRANSESOPHAGEAL ECHOCARDIOGRAM N/A 09/12/2022    TRANSESOPHAGEAL ECHOCARDIOGRAM performed by Marco A Madison MD at [facility] ECHOCARDIOGRAM  09/23/2011    EF 50-55%    ULTRASOUND PROSTATE/TRANSRECTAL N/A 10/06/2020    CYSTO, TRANSRECTAL ULTRASOUND GUIDED PROSTATE BX performed by Linda Loaiza MD at [facility]       Family History   Problem Relation Age of Onset    Heart Surgery Father     Cancer Father         lung    Heart Disease Father         CABG    Alzheimer's Disease Mother     Diabetes Neg Hx     High Blood Pressure Neg Hx     Stroke Neg Hx      Social History     Tobacco Use    Smoking status: Never    Smokeless tobacco: Never   Substance Use Topics    Alcohol use: No     Current Outpatient Medications   Medication Sig Dispense Refill    spironolactone (ALDACTONE) 25 MG tablet Take 1 tablet by mouth daily 90 tablet 3    dilTIAZem (CARDIZEM CD) 120 MG extended release capsule Take 1 capsule by mouth daily 90 capsule 3    clopidogrel (PLAVIX) 75 MG tablet Take 1 tablet by mouth daily 90 tablet 3    metoprolol (LOPRESSOR) 100 MG tablet Take 1 tablet by mouth 2 times daily 180 tablet 3    rosuvastatin (CRESTOR) 20 MG tablet Take 1 tablet by mouth daily 90 tablet 3 furosemide (LASIX) 40 MG tablet Take 1 tablet by mouth daily 90 tablet 3    potassium chloride (KLOR-CON M) 20 MEQ extended release tablet Take 20 mEq by mouth 2 times daily      digoxin (LANOXIN) 125 MCG tablet Take 1 tablet by mouth once daily 90 tablet 0    aspirin 81 MG chewable tablet Take 1 tablet by mouth in the morning. 30 tablet 3    nitroGLYCERIN (NITROSTAT) 0.4 MG SL tablet DISSOLVE ONE TABLET UNDER THE TONGUE EVERY 5 MINUTES AS NEEDED FOR CHEST PAIN. DO NOT EXCEED A TOTAL OF 3 DOSES IN 15 MINUTES 25 tablet 2    bisacodyl (DULCOLAX) 5 MG EC tablet Take 5 mg by mouth nightly       No current facility-administered medications for this visit. No Known Allergies    SUBJECTIVE:   Review of Systems   Constitutional:  Positive for fatigue. Negative for activity change, appetite change and diaphoresis. Respiratory:  Positive for shortness of breath. Negative for cough. Cardiovascular:  Negative for chest pain, palpitations and leg swelling. Gastrointestinal:  Negative for abdominal distention, nausea and vomiting. Neurological:  Negative for weakness, light-headedness and headaches. Hematological:  Negative for adenopathy. Psychiatric/Behavioral:  Negative for sleep disturbance. OBJECTIVE:   Today's Vitals:  /62   Pulse 72   Wt 223 lb (101.2 kg)   SpO2 97%   BMI 29.42 kg/m²     Physical Exam  Vitals reviewed. Constitutional:       General: He is not in acute distress. Appearance: Normal appearance. He is well-developed. He is not diaphoretic. HENT:      Head: Normocephalic and atraumatic. Eyes:      Conjunctiva/sclera: Conjunctivae normal.   Cardiovascular:      Rate and Rhythm: Normal rate. Rhythm irregular. Heart sounds: Normal heart sounds. No murmur heard. Pulmonary:      Effort: Pulmonary effort is normal. No respiratory distress. Breath sounds: Normal breath sounds. No wheezing or rales.    Abdominal:      General: Bowel sounds are normal. There is no distension. Palpations: Abdomen is soft. Tenderness: There is no abdominal tenderness. Musculoskeletal:         General: Normal range of motion. Cervical back: Normal range of motion and neck supple. Right lower leg: No edema. Left lower leg: No edema. Skin:     General: Skin is warm and dry. Capillary Refill: Capillary refill takes less than 2 seconds. Neurological:      Mental Status: He is alert and oriented to person, place, and time. Coordination: Coordination normal.   Psychiatric:         Behavior: Behavior normal.       Wt Readings from Last 3 Encounters:   10/17/22 223 lb (101.2 kg)   10/12/22 228 lb (103.4 kg)   10/05/22 228 lb 12.8 oz (103.8 kg)     BP Readings from Last 3 Encounters:   10/17/22 100/62   10/12/22 138/82   10/05/22 130/80     Pulse Readings from Last 3 Encounters:   10/17/22 72   10/12/22 100   10/05/22 76     Body mass index is 29.42 kg/m². ECHO:    Summary   Ejection fraction is visually estimated at 45%. There was mild global hypokinesis of the left ventricle. Mildly dilated left atrium. LAAO device in good position   no thrombi   no leaks   Aortic valve appears tricuspid. Mild-to-moderate aortic regurgitation is noted. Mild to Moderate mitral regurgitation is present. Signature      ----------------------------------------------------------------   Electronically signed by Mohsen Nunn MD (Interpreting   physician) on 09/12/2022 at 07:00 PM   ----------------------------------------------------------------       CATH/STRESS:   Conclusions      Summary   This Nuclear Medicine study was negative for ischemia . technically difficult scan   abnormal E F      Recommendation   Medical management.       Signatures      ----------------------------------------------------------------   Electronically signed by Mohsen Nunn MD (Interpreting   Cardiologist) on 11/16/2020 at 14:42 ----------------------------------------------------------------      Results reviewed:  BNP: No results found for: BNP  CBC:   Lab Results   Component Value Date/Time    WBC 7.5 10/02/2022 09:36 PM    RBC 5.89 10/02/2022 09:36 PM    RBC 5.63 05/17/2016 07:30 AM    HGB 16.2 10/02/2022 09:36 PM    HCT 50.5 10/02/2022 09:36 PM     10/02/2022 09:36 PM     CMP:    Lab Results   Component Value Date/Time     10/17/2022 06:43 AM    K 4.2 10/17/2022 06:43 AM    K 4.1 09/29/2022 05:54 AM     10/17/2022 06:43 AM    CO2 22 10/17/2022 06:43 AM    BUN 33 10/17/2022 06:43 AM    CREATININE 1.2 10/17/2022 06:43 AM    LABGLOM 60 10/17/2022 06:43 AM    GLUCOSE 114 10/17/2022 06:43 AM    GLUCOSE 93 05/17/2016 07:30 AM    CALCIUM 9.9 10/17/2022 06:43 AM     Hepatic Function Panel:    Lab Results   Component Value Date/Time    ALKPHOS 74 10/02/2022 09:36 PM    ALT 28 10/02/2022 09:36 PM    AST 27 10/02/2022 09:36 PM    PROT 7.1 10/02/2022 09:36 PM    BILITOT 0.8 10/02/2022 09:36 PM    BILIDIR <0.2 09/26/2022 11:55 AM    LABALBU 3.8 10/02/2022 09:36 PM    LABALBU 4.5 01/12/2012 08:05 AM     Magnesium:    Lab Results   Component Value Date/Time    MG 2.1 10/02/2022 09:36 PM     PT/INR:    Lab Results   Component Value Date/Time    PROTIME 44.6 06/02/2022 12:03 PM    INR 1.17 10/02/2022 09:36 PM     Lipids:    Lab Results   Component Value Date/Time    TRIG 119 04/19/2022 07:13 AM    HDL 28 04/19/2022 07:13 AM    HDL 37 10/20/2010 12:00 AM    LDLCALC 85 10/04/2021 08:32 AM    LDLDIRECT 88.85 04/19/2022 07:13 AM    LABVLDL 27 05/17/2016 07:30 AM       ASSESSMENT AND PLAN:   The patient's condition/symptoms are stable        Diagnosis Orders   1. Chronic systolic congestive heart failure, NYHA class 2 ([facility])        2. Paroxysmal atrial fibrillation ([facility])  Cardiac event monitor      3.  Shortness of breath  XR CHEST (2 VW)        Continue:  GDMT:   ACE/ARB/ARNI -    BB - Lopressor 100 BID   Diuretic - Lasix 40 daily  AA - Aldactone 25 daily  SGLT2 -    Vasodilator -   Other - ASA, Plavix Digoxin KCL Cardizem      HFmrEF 45% 9/2022 (50-55% 2017)  S/P Watchman 7/2022  Stable, resolution of lower leg swelling bilaterally. Still noting intermittent SOB with no activity. It comes and goes w/ no correlation to activity      GDMT: Not at this time    Lab reviewed - K 4.2 Cr 1.2 Mag 2.1 TSH 8.66  Pro BNP is back up from hospitalization  ECHO 2022: mild to mod IA, mild to mod MR, LAAO in good position, mildly dilated LA  CATH none  Negative stress 2020    Blood work to be done in 2 weeks. Event monitor ordered  Chest xray today    No medication changes today    Continue diet/fluid adherence  Continue daily wts. F/U w/ Cardiology  F/u in 6 weeks. Tolerating above noted HF meds, no ill side effects noted. Will continue to monitor kidney function and electrolytes. Will optimize as tolerated. Pt is compliant w/ medications. Total visit time of 20 minutes has been spent with patient on education of symptoms, management, medication, and plan of care; as well as review of chart: labs, ECHO, radiology reports, etc.   I personally spent more then 50% of the appt time face to face with the patient. Daily weights  Fluid restriction of 2 Liters per day  Limit sodium in diet to around 2356-2243 mg/day  Monitor BP  Activity as tolerated     Patient was instructed to call the [facility] for any changes in symptoms as noted in AVS.      No follow-ups on file. or sooner if needed     Patient given educational materials - see patient instructions. We discussed the importance of weighing oneself and recording daily. We also discussed the importance of a low sodium diet, higher sodium foods to avoid and better low sodium food options. Patient verbalizes understanding of plan of care using teach back method, and is agreeable to the treatment plan.        Electronically signed by NARESH Gonzalez CNP on 10/17/2022 at 11:46 AM

## 2022-10-19 ENCOUNTER — CARE COORDINATION (OUTPATIENT)
Dept: CASE MANAGEMENT | Age: 72
End: 2022-10-19

## 2022-10-19 NOTE — CARE COORDINATION
[facility] Care Transitions Follow Up Call    LPN Care Coordinator contacted the patient by telephone to follow up after admission on 2022. Verified name and  with patient as identifiers. Patient: Jean Marie Valero  Patient : 1950   MRN: [id number]  Reason for Admission: Persistent atrial fibrillation, with RVR / Acute on Chronic decompensated HFrEF  Discharge Date: 10/3/22 RARS: Readmission Risk Score: 13.5      Needs to be reviewed by the provider   Additional needs identified to be addressed with provider: No  none             Method of communication with provider: none. Patient is still SOB, somewhat less than before but SOB at rest also. Continues taking Lasix 40 mg daily. Patient denies weight gain (220 yesterday and 221 today), wheezing, coughing, extremity edema, abdominal edema, or chest pain. BP yesterday 111/75 Pulse 57  BP today 123/96 Pulse 106. Wearing a 30 day Event Monitor. Has appointment in December with PCP. Patient confirms all medications are available and are being taken as directed. PLAN FOR NEXT CALL: SOB, Monitor, BP Wt. and any Worsening Symptoms. Will continue to follow. Bogdan Mckay LPN    [phone]  [address] [place] [address] Coordinator    Addressed changes since last contact:   SOB Event Monitor on. Discussed follow-up appointments. If no appointment was previously scheduled, appointment scheduling offered: No.   Is follow up appointment scheduled within 7 days of discharge? Yes.     Follow Up  Future Appointments   Date Time Provider Cristina Grier   2022  8:00 AM NARESH Oliver - CNP N SRPX CHF [address]   2023  8:00 AM NARESH Salomon - CNP Fam Medicine [address]   2023 11:30 AM MD PARRISH Schulte SRPX Heart [address]     [id number] [place] follow up appointment(s): 10/4/2022 PCP    LPN Care Coordinator reviewed discharge instructions with patient and discussed any barriers to care and/or understanding of plan of care after discharge. Discussed appropriate site of care based on symptoms and resources available to patient including: PCP  Specialist  When to call 911. The patient agrees to contact the PCP office for questions related to their healthcare. Advance Care Planning:   reviewed and current. Patients top risk factors for readmission: lack of knowledge about disease, medical condition-Persistent atrial fibrillation, with RVR / Acute on Chronic decompensated HFrEF, and medication management  Interventions to address risk factors: Obtained and reviewed discharge summary and/or continuity of care documents    Offered patient enrollment in the Remote Patient Monitoring (RPM) program for in-home monitoring: NA.     Care Transitions Subsequent and Final Call    Subsequent and Final Calls  Care Transitions Interventions  Other Interventions:             LPN Care Coordinator provided contact information for future needs. Plan for follow-up call in 5-7 days based on severity of symptoms and risk factors.   Plan for next call: symptom management-Persistent atrial fibrillation, with RVR / Acute on Chronic decompensated HFrEF    Linda Burks LPN

## 2022-10-27 ENCOUNTER — TELEPHONE (OUTPATIENT)
Dept: CARDIOLOGY CLINIC | Age: 72
End: 2022-10-27

## 2022-10-27 ENCOUNTER — CARE COORDINATION (OUTPATIENT)
Dept: CASE MANAGEMENT | Age: 72
End: 2022-10-27

## 2022-10-27 DIAGNOSIS — I48.0 PAROXYSMAL ATRIAL FIBRILLATION (HCC): Primary | ICD-10-CM

## 2022-10-27 NOTE — CARE COORDINATION
[facility] Care Transitions Follow Up Call    [facility] Care Coordinator contacted the patient by telephone to follow up after admission on 2022. Verified name and  with patient as identifiers. Patient: Dusty Calloway  Patient : 1950   MRN: [id number]  Reason for Admission:  Persistent atrial fibrillation, with RVR / Acute on Chronic decompensated HFrEF  Discharge Date: 10/3/22 RARS: Readmission Risk Score: 13.5      Needs to be reviewed by the provider   Additional needs identified to be addressed with provider: No  none             Method of communication with provider: none. Patient states she is doing well with no issues. Just took off cardiac Monitor and is returning to office. Denies SOB, CP, Weight gain and swelling. Patient confirms all medications are available and are being taken as directed. Patient has no needs or concerns for writer at this time. Instructed patient that this is the Final Transition Call and to call their Specialist/PCP for any problems or issues that may occur. Expresses understanding. Gokul Mcneil LPN    [phone]  [address] [place] [address] / [place] [address] Coordinator      Addressed changes since last contact:  none  Discussed follow-up appointments. If no appointment was previously scheduled, appointment scheduling offered: No.   Is follow up appointment scheduled within 7 days of discharge? Yes. Follow Up  Future Appointments   Date Time Provider Cristina Grier   2022  8:00 AM NARESH Shepherd CNP SRPX CHF [place]   2023  8:00 AM NARESH Tierney CNP Fam Medicine [facility] [place]   2023 11:30 AM MD PARRISH Lemus SRPX Heart P - [place] [address]     [id number] [address] follow up appointment(s): 10/4/2022    [facility] Care Coordinator reviewed discharge instructions with patient and discussed any barriers to care and/or understanding of plan of care after discharge.  Discussed appropriate site of care based on symptoms and resources available to patient including: PCP  Specialist  When to call 911. The patient agrees to contact the PCP office for questions related to their healthcare. Advance Care Planning:   reviewed and current. Patients top risk factors for readmission: lack of knowledge about disease, medical condition-Persistent atrial fibrillation, with RVR / Acute on Chronic decompensated HFrEF, and medication management  Interventions to address risk factors: Obtained and reviewed discharge summary and/or continuity of care documents    Offered patient enrollment in the Remote Patient Monitoring (RPM) program for in-home monitoring: NA.     Care Transitions Subsequent and Final Call    Subsequent and Final Calls  Care Transitions Interventions  Other Interventions:             LPN Care Coordinator provided contact information for future needs. No further follow-up call indicated based on severity of symptoms and risk factors.   Plan for next call:     Shin Muniz LPN

## 2022-10-27 NOTE — TELEPHONE ENCOUNTER
Please see cardiac strips day 11 of 30  Patient states he wasn't having symptoms. Patient turned in monitor this afternoon. He doesn't want to wear it.

## 2022-10-31 NOTE — TELEPHONE ENCOUNTER
Spoke with patient at length. PT states he has had afib for a long time. Pt states he does not have symptoms, however upon further discussion pt admits to being SOB and fatigued for quite some time. Pt unsure about Cardioversion, has many questions. States it has never been mentioned until now. Attempted to answer pt's questions. However, pt still very unsure. He thought his rate was controlled. Wants to know success rate of cardioversion. Appt scheduled with [facility] for further discussion.

## 2022-10-31 NOTE — TELEPHONE ENCOUNTER
Pt called back. Wants to just go ahead and schedule cardioversion. States he took a walk with his wife and feels as though his symptoms are worsening. Wants to proceed with Cardioversion. Order placed and given to scheduling. Instructed pt to report to ED if symptoms persist/worsen further. Pt expressed understanding.

## 2022-11-01 ENCOUNTER — HOSPITAL ENCOUNTER (OUTPATIENT)
Age: 72
Discharge: HOME | End: 2022-11-01
Payer: MEDICARE

## 2022-11-01 DIAGNOSIS — I50.22 CHRONIC SYSTOLIC CONGESTIVE HEART FAILURE, NYHA CLASS 2 (HCC): ICD-10-CM

## 2022-11-01 LAB
ANION GAP SERPL CALCULATED.3IONS-SCNC: 11 MEQ/L (ref 8–16)
BUN BLDV-MCNC: 22 MG/DL (ref 7–22)
CALCIUM SERPL-MCNC: 9.7 MG/DL (ref 8.5–10.5)
CHLORIDE BLD-SCNC: 101 MEQ/L (ref 98–111)
CO2: 27 MEQ/L (ref 23–33)
CREAT SERPL-MCNC: 1.2 MG/DL (ref 0.4–1.2)
GFR SERPL CREATININE-BSD FRML MDRD: > 60 ML/MIN/1.73M2
GLUCOSE BLD-MCNC: 103 MG/DL (ref 70–108)
POTASSIUM SERPL-SCNC: 4.5 MEQ/L (ref 3.5–5.2)
SODIUM BLD-SCNC: 139 MEQ/L (ref 135–145)

## 2022-11-01 PROCEDURE — 36415 COLL VENOUS BLD VENIPUNCTURE: CPT

## 2022-11-01 PROCEDURE — 80048 BASIC METABOLIC PNL TOTAL CA: CPT

## 2022-11-04 NOTE — PROCEDURES
[address] [place] [address]                                 EVENT MONITOR    PATIENT NAME: Luda Mccarty             :        1950  MED REC NO:   [id number]                           ROOM:  ACCOUNT NO:   [de-identified]                           ADMIT DATE: 10/17/2022  PROVIDER:     Vidal Del Rio MD      TEST TYPE:  Two-week event monitor. MONITORING PERIOD:  10/17/2022 to 10/30/2022    INDICATIONS:  Atrial fibrillation with sustained ventricular response  with a heart rate 70 beats per minute. FINDINGS:  No signs of nonsustained VT or high-grade pauses were noted. The patient was in atrial fibrillation 9 days 23 hours and 30 minutes. 7% of time the patient had rapid atrial fibrillation and 93% of the time  the patient had controlled ventricular response. Less than 1% of the  time, the patient had ventricular ectopy with less than 1% of the time,  the patient had supraventricular ectopy. The patient also had episodes of nonsustained VT intermixed with the  atrial fibrillation. No high-grade AV block was seen. SUMMARY:  Atrial fibrillation with RVR. PLAN:   Follow up with Cardiology to discuss further management.         Kyle Petty MD    D: 2022 16:32:43       T: 2022 23:00:22     [email or web address]  Job#: [id number]     Doc#: [id number]    CC:    (Retain this field even if not dictated or not decipherable

## 2022-11-08 ENCOUNTER — TELEPHONE (OUTPATIENT)
Dept: FAMILY MEDICINE CLINIC | Age: 72
End: 2022-11-08

## 2022-11-08 ENCOUNTER — PRE-PROCEDURE TELEPHONE (OUTPATIENT)
Dept: INPATIENT UNIT | Age: 72
End: 2022-11-08

## 2022-11-08 ENCOUNTER — TELEPHONE (OUTPATIENT)
Dept: CARDIOLOGY CLINIC | Age: 72
End: 2022-11-08

## 2022-11-08 NOTE — TELEPHONE ENCOUNTER
Patient wife called in   Bp low today  100/63 just feels blah  11/1   116/77  11/3   109/64  11/4   111/78      Sched for cardioversion on 11/10

## 2022-11-08 NOTE — TELEPHONE ENCOUNTER
Reviewed chart, Cardiology and CHF clinic would be the best to discuss this with the patient since he is scheduled for cardioversion with them in 2 days. It looks like pt wife did call and leave a message with them also. I would recommend waiting to see their response. If pt has chest pain, SOB or weakness he needs to go to ER immediately.  Thanks -WS

## 2022-11-08 NOTE — PROGRESS NOTES
Called patient  scheduled for cardioversion,  instructed to bring in  license,  Insurance cards, overnight bag, medications in the original bottles. Patient instructed on precedure and where and when to arrive. Medication to take day of procedure  went over with patient. NPO after midnight , 12 hour fasting. Wear comfortable clean clothes, shower morning of and night before with liquid antibacterial,and  remove jewery. Follow all instructions given  to you by your doctor. Please notify your doctor if you need to cancel or reschedule your procedure.  needed at discharge. Instructed to take usual heart medications, no diabetic meds am of procedure. NO water pills or digoxin am of procedure.

## 2022-11-08 NOTE — TELEPHONE ENCOUNTER
Wife calls to report that patient has recently had low blood pressures and is feeling \"blah\" (dizzy/lightheaded). Today b/p is 100/63 and 100/74. More recently running 116/77, 109/64, 111/78. Pulse between 60-70's. Patient has taken his morning medications already today. (Digoxin, metoprolol, diltiazem, lasix, aldactone)  He is on a fluid restricted diet per CHF clinic (64 oz daily) She will reach out to their office for guidance also. She did mention that patient is scheduled for cardioversion at [facility] this Thursday. Please advise.    Denies SOB, chest pain, etc.

## 2022-11-09 ENCOUNTER — TELEPHONE (OUTPATIENT)
Dept: FAMILY MEDICINE CLINIC | Age: 72
End: 2022-11-09

## 2022-11-09 ENCOUNTER — PREP FOR PROCEDURE (OUTPATIENT)
Dept: CARDIOLOGY | Age: 72
End: 2022-11-09

## 2022-11-09 RX ORDER — SODIUM CHLORIDE 0.9 % (FLUSH) 0.9 %
5-40 SYRINGE (ML) INJECTION EVERY 12 HOURS SCHEDULED
Status: CANCELLED | OUTPATIENT
Start: 2022-11-09

## 2022-11-09 RX ORDER — SODIUM CHLORIDE 0.9 % (FLUSH) 0.9 %
5-40 SYRINGE (ML) INJECTION PRN
Status: CANCELLED | OUTPATIENT
Start: 2022-11-09

## 2022-11-09 RX ORDER — SODIUM CHLORIDE 9 MG/ML
INJECTION, SOLUTION INTRAVENOUS PRN
Status: CANCELLED | OUTPATIENT
Start: 2022-11-09

## 2022-11-09 NOTE — TELEPHONE ENCOUNTER
Noted.  Pt has CHF and and follows up with the CHF clinic and his cardiac specialties, the most appropriate management of his cardiac medication is with that office/clinic. I will continue to see the patient for annual exams and bela check ups, but cardiology needs to manage his cardiac medications.  Thanks -WS

## 2022-11-10 ENCOUNTER — APPOINTMENT (OUTPATIENT)
Dept: CARDIAC CATH/INVASIVE PROCEDURES | Age: 72
End: 2022-11-10
Payer: MEDICARE

## 2022-11-10 ENCOUNTER — HOSPITAL ENCOUNTER (OUTPATIENT)
Dept: INPATIENT UNIT | Age: 72
Discharge: HOME | End: 2022-11-10
Attending: INTERNAL MEDICINE | Admitting: INTERNAL MEDICINE
Payer: MEDICARE

## 2022-11-10 VITALS
OXYGEN SATURATION: 96 % | BODY MASS INDEX: 29.16 KG/M2 | SYSTOLIC BLOOD PRESSURE: 126 MMHG | WEIGHT: 220 LBS | HEART RATE: 67 BPM | HEIGHT: 73 IN | DIASTOLIC BLOOD PRESSURE: 77 MMHG | RESPIRATION RATE: 20 BRPM | TEMPERATURE: 97.4 F

## 2022-11-10 LAB
EKG Q-T INTERVAL: 376 MS
EKG Q-T INTERVAL: 434 MS
EKG QRS DURATION: 102 MS
EKG QRS DURATION: 106 MS
EKG QTC CALCULATION (BAZETT): 449 MS
EKG QTC CALCULATION (BAZETT): 454 MS
EKG R AXIS: 49 DEGREES
EKG R AXIS: 52 DEGREES
EKG T AXIS: 83 DEGREES
EKG T AXIS: 93 DEGREES
EKG VENTRICULAR RATE: 66 BPM
EKG VENTRICULAR RATE: 86 BPM
ERYTHROCYTE [DISTWIDTH] IN BLOOD BY AUTOMATED COUNT: 14.7 % (ref 11.5–14.5)
ERYTHROCYTE [DISTWIDTH] IN BLOOD BY AUTOMATED COUNT: 45 FL (ref 35–45)
HCT VFR BLD CALC: 53.3 % (ref 42–52)
HEMOGLOBIN: 17.2 GM/DL (ref 14–18)
INR BLD: 1.15 (ref 0.85–1.13)
LV EF: 38 %
LVEF MODALITY: NORMAL
MCH RBC QN AUTO: 27.6 PG (ref 26–33)
MCHC RBC AUTO-ENTMCNC: 32.3 GM/DL (ref 32.2–35.5)
MCV RBC AUTO: 85.6 FL (ref 80–94)
PLATELET # BLD: 218 THOU/MM3 (ref 130–400)
PMV BLD AUTO: 11.3 FL (ref 9.4–12.4)
RBC # BLD: 6.23 MILL/MM3 (ref 4.7–6.1)
WBC # BLD: 6.3 THOU/MM3 (ref 4.8–10.8)

## 2022-11-10 PROCEDURE — 2500000003 HC RX 250 WO HCPCS

## 2022-11-10 PROCEDURE — 6370000000 HC RX 637 (ALT 250 FOR IP)

## 2022-11-10 PROCEDURE — 93312 ECHO TRANSESOPHAGEAL: CPT

## 2022-11-10 PROCEDURE — 93325 DOPPLER ECHO COLOR FLOW MAPG: CPT | Performed by: INTERNAL MEDICINE

## 2022-11-10 PROCEDURE — 92960 CARDIOVERSION ELECTRIC EXT: CPT | Performed by: INTERNAL MEDICINE

## 2022-11-10 PROCEDURE — 85610 PROTHROMBIN TIME: CPT

## 2022-11-10 PROCEDURE — 93325 DOPPLER ECHO COLOR FLOW MAPG: CPT

## 2022-11-10 PROCEDURE — 93312 ECHO TRANSESOPHAGEAL: CPT | Performed by: INTERNAL MEDICINE

## 2022-11-10 PROCEDURE — 93320 DOPPLER ECHO COMPLETE: CPT | Performed by: INTERNAL MEDICINE

## 2022-11-10 PROCEDURE — 2580000003 HC RX 258: Performed by: NURSE PRACTITIONER

## 2022-11-10 PROCEDURE — 85027 COMPLETE CBC AUTOMATED: CPT

## 2022-11-10 PROCEDURE — 92960 CARDIOVERSION ELECTRIC EXT: CPT

## 2022-11-10 PROCEDURE — 93005 ELECTROCARDIOGRAM TRACING: CPT | Performed by: INTERNAL MEDICINE

## 2022-11-10 PROCEDURE — 93320 DOPPLER ECHO COMPLETE: CPT

## 2022-11-10 PROCEDURE — 36415 COLL VENOUS BLD VENIPUNCTURE: CPT

## 2022-11-10 PROCEDURE — 93010 ELECTROCARDIOGRAM REPORT: CPT | Performed by: NUCLEAR MEDICINE

## 2022-11-10 PROCEDURE — 6360000002 HC RX W HCPCS

## 2022-11-10 RX ORDER — SODIUM CHLORIDE 0.9 % (FLUSH) 0.9 %
5-40 SYRINGE (ML) INJECTION PRN
Status: DISCONTINUED | OUTPATIENT
Start: 2022-11-10 | End: 2022-11-10 | Stop reason: HOSPADM

## 2022-11-10 RX ORDER — SODIUM CHLORIDE 9 MG/ML
INJECTION, SOLUTION INTRAVENOUS PRN
Status: DISCONTINUED | OUTPATIENT
Start: 2022-11-10 | End: 2022-11-10 | Stop reason: HOSPADM

## 2022-11-10 RX ORDER — AMIODARONE HYDROCHLORIDE 200 MG/1
200 TABLET ORAL 2 TIMES DAILY
Qty: 60 TABLET | Refills: 3 | Status: SHIPPED | OUTPATIENT
Start: 2022-11-10

## 2022-11-10 RX ORDER — SODIUM CHLORIDE 0.9 % (FLUSH) 0.9 %
5-40 SYRINGE (ML) INJECTION EVERY 12 HOURS SCHEDULED
Status: DISCONTINUED | OUTPATIENT
Start: 2022-11-10 | End: 2022-11-10 | Stop reason: HOSPADM

## 2022-11-10 RX ADMIN — SODIUM CHLORIDE: 9 INJECTION, SOLUTION INTRAVENOUS at 14:01

## 2022-11-10 NOTE — FLOWSHEET NOTE
Pt discharge instructions with AVS review completed. Questions and concerns addressed. Wife present. Dangled and up in room to prepare for discharge to home post SANG and cardioversion. Iv fluids stopped. Iv catheter removed. Telemetry off. Remains in atrial fibrillation.

## 2022-11-10 NOTE — H&P
[address] [address]  Sedation/Analgesia History & Physical    Pt Name: Mike Chen  Account number: [de-identified]  MRN: [id number]  YOB: 1950  Provider Performing Procedure: Monet Epps DO MD  Referring Provider: Cristina Lock MD   Primary Care Physician: Garth Frost APRN - CNP  Date: 11/10/2022    PRE-PROCEDURE    Code Status: FULL CODE  Brief History/Pre-Procedure Diagnosis: Persistent atrial fibrillation s/p Watchman 7/2022    Consent: : I have discussed with the patient risks, benefits, and alternatives (and relevant risks, benefits, and side effects related to alternatives or not receiving care), and likelihood of the success. The patient and/or representative appear to understand and agree to proceed. The discussion encompasses risks, benefits, and side effects related to the alternatives and the risks related to not receiving the proposed care, treatment, and services. The indication, risks and benefits of the procedure and possible therapeutic consequences and alternatives were discussed with the patient. The patient was given the opportunity to ask questions and to have them answered to his/her satisfaction. Risks of the procedure include but are not limited to the following: Bleeding, hematoma including retroperitoneal hemmorhage, infection, pain and discomfort, injury to the aorta and other blood vessels, rhythm disturbance, low blood pressure, myocardial infarction, stroke, kidney damage/failure, myocardial perforation, allergic reactions to sedatives/contrast material, loss of pulse/vascular compromise requiring surgery, aneurysm/pseudoaneurysm formation, possible loss of a limb/hand/leg, needing blood transfusion, requiring emergent open heart surgery or emergent coronary intervention, the need for intubation/respiratory support, the requirement for defibrillation/cardioversion, and death. Alternatives to and omission of the suggested procedure were discussed. The patient had no further questions and wished to proceed; the consent form was signed. MEDICAL HISTORY   has a past medical history of Anesthesia, Atrial fibrillation ([facility] [address] Benign prostatic hyperplasia with urinary frequency, CAD (coronary artery disease), Chest pain, Colon polyp, Coronary artery disease involving native coronary artery of native heart without angina pectoris, Dementia ([facility] [address] Dizziness, Elevated PSA, GERD (gastroesophageal reflux disease), Heart disease, Hyperlipidemia, and Hypertension. SURGICAL HISTORY   has a past surgical history that includes cardiovascular stress test (09/23/2011); transthoracic echocardiogram (09/23/2011); Cardiac catheterization (09/23/2011); Carpal tunnel release; Tonsillectomy; Adenoidectomy; Vasectomy; Colonoscopy; Ultrasound Prostate/Transrectal (N/A, 10/06/2020); Prostatectomy (N/A, 12/03/2020); Nasal sinus surgery (N/A, 06/13/2022); transesophageal echocardiogram (N/A, 09/12/2022); and other surgical history (08/2022). Additional information:       ALLERGIES   Allergies as of 11/10/2022    (No Known Allergies)     Additional information:       MEDICATIONS     Current Facility-Administered Medications:     sodium chloride flush 0.9 % injection 5-40 mL, 5-40 mL, IntraVENous, 2 times per day, Serenity L Hempfling, APRN - CNP    sodium chloride flush 0.9 % injection 5-40 mL, 5-40 mL, IntraVENous, PRN, Serenity L Hempfling, APRN - CNP    0.9 % sodium chloride infusion, , IntraVENous, PRN, Serenity L Hempfling, APRN - CNP  Prior to Admission medications    Medication Sig Start Date End Date Taking?  Authorizing Provider   spironolactone (ALDACTONE) 25 MG tablet Take 1 tablet by mouth daily 10/17/22   Maxkaty León APRN - JO   dilTIAZem (CARDIZEM CD) 120 MG extended release capsule Take 1 capsule by mouth daily 10/17/22   Leonciocine Mau APRN - CNP   clopidogrel (PLAVIX) 75 MG tablet Take 1 tablet by mouth daily 10/17/22   Leonciocine Mau APRN - CNP metoprolol (LOPRESSOR) 100 MG tablet Take 1 tablet by mouth 2 times daily 10/17/22   NARESH Florence - CNP   rosuvastatin (CRESTOR) 20 MG tablet Take 1 tablet by mouth daily 10/17/22   NARESH Florence - CNP   furosemide (LASIX) 40 MG tablet Take 1 tablet by mouth daily 10/12/22   Santosh Marie MD   potassium chloride (KLOR-CON M) 20 MEQ extended release tablet Take 20 mEq by mouth 2 times daily    Historical Provider, MD   digoxin (LANOXIN) 125 MCG tablet Take 1 tablet by mouth once daily 9/13/22   NARESH Gomez - CNP   aspirin 81 MG chewable tablet Take 1 tablet by mouth in the morning. 7/20/22   Libby Beltran PA-C   nitroGLYCERIN (NITROSTAT) 0.4 MG SL tablet DISSOLVE ONE TABLET UNDER THE TONGUE EVERY 5 MINUTES AS NEEDED FOR CHEST PAIN.   DO NOT EXCEED A TOTAL OF 3 DOSES IN 15 MINUTES 12/21/21   Alphonzo Bernheim, MD   bisacodyl (DULCOLAX) 5 MG EC tablet Take 5 mg by mouth nightly    Historical Provider, MD     Additional information:       VITAL SIGNS   Vitals:    11/10/22 1230   BP: 124/77   Pulse: 85   Resp: 21   Temp: 97.4 °F (36.3 °C)   SpO2: 96%       PHYSICAL:   General: No acute distress  HEENT:  Unremarkable for age  Neck: without increased JVD, carotid pulses 2+ bilaterally without bruits  Heart: Regular rate, irregular rhythm, S1 & S2 WNL, no S4 gallop, without murmurs or rubs   NYHA: 2  Lungs: Clear to auscultation    Abdomen: BS present, without HSM, masses, or tenderness    Extremities: without C,C,E.  Pulses 2+ bilaterally  Mental Status: Alert & Oriented        PLANNED PROCEDURE   []Cath  []PCI                []Pacemaker/AICD  [x]SANG             [x]Cardioversion []Peripheral angiography/PTA  []Other:      SEDATION  Planned agent:[x]Midazolam []Meperidine [x]Sublimaze []Morphine  []Diazepam  []Other:     ASA Classification:  []1 []2 [x]3 []4 []5  Class 1: A normal healthy patient  Class 2: Pt with mild to moderate systemic disease  Class 3: Severe systemic disease or disturbance  Class 4: Severe systemic disorders that are already life threatening. Class 5: Moribund pt with little chances of survival, for more than 24 hours. Mallampati I Airway Classification:   []1 []2 [x]3 []4    [x]Pre-procedure diagnostic studies complete and results available. Comment:    [x]Previous sedation/anesthesia experiences assessed. Comment:    [x]The patient is an appropriate candidate to undergo the planned procedure sedation and anesthesia. (Refer to nursing sedation/analgesia documentation record)  [x]Formulation and discussion of sedation/procedure plan, risks, and expectations with patient and/or responsible adult completed. [x]Patient examined immediately prior to the procedure. (Refer to nursing sedation/analgesia documentation record)    Colleen Casillas DO MD   Electronically signed 11/10/2022 at 1:27 PM     Attending Supervising Physician's [address]  I performed a history and physical examination on the patient and discussed the management with the resident physician. I reviewed and agree with the findings and plan as documented in the resident's note.     Electronically signed by Lynda Knox MD on 11/10/22 at 4:26 PM EST

## 2022-11-10 NOTE — FLOWSHEET NOTE
Patient admitted to [address] [place] [address] for cardioversion. Patient NPO. Patient accompanied by wife. Vital signs obtained. Assessment and data collection intiated. Oriented to room. Policies and procedures for 2E explained. All questions answered with no further questions at this time. Fall prevention and safety precautions discussed with patient. Explained patients right to have family, representative or physician notified of their admission. Patient has Declined for physician to be notified. Patient has Declined for family/representative to be notified.

## 2022-11-10 NOTE — OP NOTE
Transesophageal Echocardiogram/DCCV    Indication:  Afib    Informed Consent:  The indication, risks and benefits of the procedure and possible therapeutic consequences and alternatives were discussed with the patient. The patient was given the opportunity to ask questions and to have them answered to his/her satisfaction. Risks of the procedure include but are not limited to the following: Bleeding, infection, pain and discomfort, injury to esophagus, rhythm disturbance, low blood pressure, myocardial infarction, stroke, kidney damage/failure, allergic reactions to sedatives/contrast material, loss of pulse/vascular compromise requiring surgery, needing blood transfusion, requiring emergent open heart surgery or emergent coronary intervention, the need for intubation/respiratory support, the requirement for defibrillation/cardioversion, aspiration pneumonia, skin burns, malignant dysrhythmias and death. Alternatives to and omission of the suggested procedure were discussed. The patient had no further questions and wished to proceed; the consent form was signed. Sedation:  Fentanyl/Versed, See EMR for dosages    Airway: Biteblock    Procedure:  Under continuous hemodynamic and respiratory monitoring, the patient was given Fentanyl IV and Versed IV till sedation was judged to be appropriate based on BP, RR, presence of gag reflex, and neurologic status. SANG probe inserted without complication. SANG performed (please see full report). Probe withdrawn and the patient was neurologically, hemodynamically, and respiratory intact. No reversal agents given. Thereafter, pads were applied to the patients chest in the standard position. A biphasic, 360 J shock was delivered x 1. The patient converted to sinus rhythm and then converted back to Afib. ECG performed. Reversal agents were given (0.4 mg Narcan and 0.4 mg of Flumazenil) IV.   Neurologic status was assessed and the patient was completely intact without any deficits. Post Procedure:  Orders placed, monitor x 4 hours    Summary:  Unsuccessful, uncomplicated SANG-DCCV with moderate sedation. Plan:    1) Monitor post procedure  2) Post procedure OAC, initiate amiodarone PO  3) F/u scheduled as outpatient    Case and findings discussed with the patient and family. They were agreeable an amenable to the plan.

## 2022-11-10 NOTE — PLAN OF CARE
Problem: Chronic Conditions and Co-morbidities  Goal: Patient's chronic conditions and co-morbidity symptoms are monitored and maintained or improved  11/10/2022 1602 by Grzegorz Hung RN  Outcome: Adequate for Discharge  11/10/2022 1214 by Grzegorz Hung RN  Outcome: Progressing     Problem: Discharge Planning  Goal: Discharge to home or other facility with appropriate resources  11/10/2022 1602 by Grzegorz Hung RN  Outcome: Adequate for Discharge  11/10/2022 1214 by Grzegorz Hung RN  Outcome: Progressing     Problem: ABCDS Injury Assessment  Goal: Absence of physical injury  Outcome: Adequate for Discharge

## 2022-11-10 NOTE — PLAN OF CARE
Problem: Chronic Conditions and Co-morbidities  Goal: Patient's chronic conditions and co-morbidity symptoms are monitored and maintained or improved  Outcome: Progressing   Atrial fib  Problem: Discharge Planning  Goal: Discharge to home or other facility with appropriate resources  Outcome: Progressing

## 2022-11-10 NOTE — DISCHARGE INSTRUCTIONS
Notify physician if difficulty swallowing, breathing, coughing up, or spitting up blood. amiodarone (oral)  Pronunciation:  PAULA BROOKS beatrice porras  Brand:  Pacerone  What is the most important information I should know about amiodarone? Amiodarone is for use only in treating life-threatening heart rhythm disorders. You should not take this medicine if you are allergic to amiodarone or iodine, or if you have heart block, a history of slow heartbeats that have caused you to faint, or if your heart cannot pump blood properly. Amiodarone can cause dangerous side effects on your heart, liver, lungs, or vision. Call your doctor or get medical help at once if you have: chest pain, fast or pounding heartbeats, trouble breathing, vision problems, upper stomach pain, vomiting, dark urine, jaundice (yellowing of the skin or eyes), or if you cough up blood. What is amiodarone? Amiodarone affects the rhythm of your heartbeats. Amiodarone is used to help keep the heart beating normally in people with life-threatening heart rhythm disorders of the ventricles (the lower chambers of the heart that allow blood to flow out of the heart). Amiodarone is used to treat ventricular tachycardia or ventricular fibrillation. Amiodarone is for use only in treating life-threatening heart rhythm disorders. Amiodarone may also be used for purposes not listed in this medication guide. What should I discuss with my healthcare provider before taking amiodarone? You should not use this medicine if you are allergic to amiodarone or iodine, or if you have:  a serious heart condition called \"AV block\" (2nd or 3rd degree), unless you have a pacemaker;  a history of slow heartbeats that have caused you to faint; or  if your heart cannot pump blood properly. Amiodarone can cause dangerous side effects on your heart, liver, lungs, or thyroid.     Tell your doctor if you have ever had:  asthma or another lung disorder;  liver disease;  a thyroid disorder;  vision problems;  high or low blood pressure;  an electrolyte imbalance (such as low levels of potassium or magnesium in your blood); or  if you have a pacemaker or defibrillator implanted in your chest.  Taking amiodarone during pregnancy may harm an unborn baby, or cause thyroid problems or abnormal heartbeats in the baby after it is born. Amiodarone may also affect the child's growth or development (speech, movement, academic skills) later in life. Tell your doctor if you are pregnant or if you become pregnant. You should not breast-feed while taking amiodarone, and for several months after stopping. Amiodarone takes a long time to clear from your body. Talk to your doctor about the best way to feed your baby during this time. How should I take amiodarone? Follow all directions on your prescription label and read all medication guides or instruction sheets. Your doctor may occasionally change your dose. Use the medicine exactly as directed. You will receive your first few doses in a hospital setting, where your heart rhythm can be monitored. If you have been taking another heart rhythm medicine, you may need to gradually stop taking it when you start using amiodarone. Follow your doctor's dosing instructions very carefully. You may take amiodarone with or without food, but take it the same way each time. It may take up to 3 weeks before your heart rhythm improves. Keep using the medicine as directed even if you feel well. Amiodarone can have long lasting effects on your body. You may need frequent medical tests while using this medicine and for several months after your last dose. If you need surgery (including laser eye surgery), tell the surgeon ahead of time that you are using amiodarone. This medicine can affect the results of certain medical tests. Tell any doctor who treats you that you are using amiodarone. Store at room temperature away from moisture, heat, and light.   What happens if I miss a dose? Skip the missed dose and use your next dose at the regular time. Do not use two doses at one time. What happens if I overdose? Seek emergency medical attention or call the Poison Help line at 1-153.677.6382. An overdose of amiodarone can be fatal.  Overdose symptoms may include weakness, slow heart rate, feeling light-headed, or loss of consciousness. What should I avoid while taking amiodarone? Avoid driving or hazardous activity until you know how this medicine will affect you. Your reactions could be impaired. Grapefruit may interact with amiodarone and lead to unwanted side effects. Avoid the use of grapefruit products. Avoid taking an herbal supplement containing Gina's wort. Amiodarone could make you sunburn more easily. Avoid sunlight or tanning beds. Wear protective clothing and use sunscreen (SPF 30 or higher) when you are outdoors. What are the possible side effects of amiodarone? Get emergency medical help if you have signs of an allergic reaction: hives; difficulty breathing; swelling of your face, lips, tongue, or throat. Amiodarone takes a long time to completely clear from your body. You may continue to have side effects from amiodarone after you stop using it.     Call your doctor at once if you have any of these side effects, even if they occur up to several months after you stop using amiodarone:  wheezing, cough, chest pain, cough with bloody mucus, fever;  a new or a worsening irregular heartbeat pattern (fast, slow, or pounding heartbeats);  a light-headed feeling, like you might pass out;  blurred vision, seeing halos around lights (your eyes may be more sensitive to light);  liver problems --nausea, vomiting, stomach pain (upper right side), tiredness, dark urine, jaundice (yellowing of the skin or eyes);  nerve problems --loss of coordination, muscle weakness, uncontrolled muscle movement, or a prickly feeling in your hands or lower legs;  signs of overactive thyroid --weight loss, thinning hair, feeling hot, increased sweating, tremors, feeling nervous or irritable, irregular menstrual periods, swelling in your neck (goiter); or  signs of underactive thyroid --weight gain, tiredness, depression, trouble concentrating, feeling cold. Common side effects may include:  nausea, vomiting, loss of appetite; or  constipation. This is not a complete list of side effects and others may occur. Call your doctor for medical advice about side effects. You may report side effects to FDA at 1-244-FDA-6743. What other drugs will affect amiodarone? Sometimes it is not safe to use certain medications at the same time. Some drugs can affect your blood levels of other drugs you take, which may increase side effects or make the medications less effective. Amiodarone takes a long time to completely clear from your body. Drug interactions are possible for up to several months after you stop using amiodarone. Talk to your doctor before taking any medication during this time. Keep track of how long it has been since your last dose of amiodarone. Many drugs can affect amiodarone. This includes prescription and over-the-counter medicines, vitamins, and herbal products. Not all possible interactions are listed here. Tell your doctor about all your current medicines and any medicine you start or stop using. Where can I get more information? Your doctor or pharmacist can provide more information about amiodarone. Remember, keep this and all other medicines out of the reach of children, never share your medicines with others, and use this medication only for the indication prescribed. Every effort has been made to ensure that the information provided by Adriel Patino Dr is accurate, up-to-date, and complete, but no guarantee is made to that effect. Drug information contained herein may be time sensitive.  [employer] information has been compiled for use by healthcare practitioners and consumers in the [place] and therefore [employer] does not warrant that uses outside of the [place] are appropriate, unless specifically indicated otherwise. [facility] drug information does not endorse drugs, diagnose patients or recommend therapy. [facility] drug information is an informational resource designed to assist licensed healthcare practitioners in caring for their patients and/or to serve consumers viewing this service as a supplement to, and not a substitute for, the expertise, skill, knowledge and judgment of healthcare practitioners. The absence of a warning for a given drug or drug combination in no way should be construed to indicate that the drug or drug combination is safe, effective or appropriate for any given patient. [facility] does not assume any responsibility for any aspect of healthcare administered with the aid of information [facility] provides. The information contained herein is not intended to cover all possible uses, directions, precautions, warnings, drug interactions, allergic reactions, or adverse effects. If you have questions about the drugs you are taking, check with your doctor, nurse or pharmacist.  Copyright [id number] [address] [place] [address] Dr KAM. Version: 7.01. Revision date: 11/6/2018. Care instructions adapted under license by [facility] ([facility]). If you have questions about a medical condition or this instruction, always ask your healthcare professional. [place] [address] any warranty or liability for your use of this information. Electrical Cardioversion: What to Expect at [facility]     Electrical cardioversion is a treatment for an abnormal heartbeat, such as atrial fibrillation, supraventricular tachycardia, or ventricular tachycardia (VT). Your doctor used a brief electrical shock to reset your heart's rhythm. After the procedure, you may have redness, like a sunburn, where the patches were.  The medicines you got to make you sleepy may make you feel drowsy for the rest of the day. You may feel soreness or discomfort in your chest wall for a few days. Your doctor may have you take medicines to help the heart beat normally and to prevent blood clots. This care sheet gives you a general idea about how long it will take for you to recover. But each person recovers at a different pace. Follow the steps below to feel better as quickly as possible. How can you care for yourself at home? Medicines    If the doctor gave you a sedative: For 24 hours, don't do anything that requires attention to detail. It takes time for the medicine's effects to completely wear off. For your safety, do not drive or operate any machinery that could be dangerous. Wait until the medicine wears off and you can think clearly and react easily. Be safe with medicines. Take your medicines exactly as prescribed. Call your doctor if you think you are having a problem with your medicine. You may take one or more of the following medicines:  Rate-control medicines to slow the heart rate. Rhythm-control medicines that help the heart keep a normal rhythm. Blood thinners, also called anticoagulants, which help prevent blood clots. You will get more details on the specific medicines your doctor prescribes. Be sure you know how to take your medicines safely. Do not take any vitamins, over-the-counter medicines, or herbal products without talking to your doctor first.   Exercise    Talk to your doctor about what type and level of exercise are safe for you. When you exercise, watch for signs that your heart is working too hard. You are pushing too hard if you cannot talk while you are exercising. If you become short of breath or dizzy or have chest pain, sit down and rest right away. Check your pulse regularly. Place two fingers on the artery at the palm side of your wrist in line with your thumb. If your heartbeat seems uneven or fast, talk to your doctor. Heart-healthy lifestyle    Do not smoke. If you need help quitting, talk to your doctor about stop-smoking programs and medicines. These can increase your chances of quitting for good. Eat heart-healthy foods. Limit sodium, alcohol, and sugar. Stay at a healthy weight. Lose weight if you need to. Manage other health problems. If you think you may have a problem with alcohol or drug use, talk to your doctor. Follow-up care is a key part of your treatment and safety. Be sure to make and go to all appointments, and call your doctor if you are having problems. It's also a good idea to know your test results and keep a list of the medicines you take. When should you call for help? Call 911 anytime you think you may need emergency care. For example, call if:    You passed out (lost consciousness). You have symptoms of a heart attack. These may include:  Chest pain or pressure, or a strange feeling in the chest.  Sweating. Shortness of breath. Nausea or vomiting. Pain, pressure, or a strange feeling in the back, neck, jaw, or upper belly or in one or both shoulders or arms. Lightheadedness or sudden weakness. A fast or irregular heartbeat. After calling 911, the  may tell you to chew 1 adult-strength or 2 to 4 low-dose aspirin. Wait for an ambulance. Do not try to drive yourself. You have symptoms of a stroke. These may include:  Sudden numbness, tingling, weakness, or loss of movement in your face, arm, or leg, especially on only one side of your body. Sudden vision changes. Sudden trouble speaking. Sudden confusion or trouble understanding simple statements. Sudden problems with walking or balance. A sudden, severe headache that is different from past headaches. Call your doctor now or seek immediate medical care if:    You feel dizzy or lightheaded, or you feel like you may faint. You have a fast or irregular heartbeat.    Watch closely for any changes in your [employer], and be sure to contact your doctor if you have any problems. Where can you learn more? Go to [email or web address] and sign in to your [employer] account. Enter A617 in the [facility] box to learn more about \"Electrical Cardioversion: What to Expect at Home. \"     If you do not have an account, please click on the \"Sign Up Now\" link. Current as of: January 10, 2022               Content Version: 13.4  © 2006-2022 [employer]. Care instructions adapted under license by [facility] ([facility]). If you have questions about a medical condition or this instruction, always ask your healthcare professional. [address] any warranty or liability for your use of this information.

## 2022-11-10 NOTE — FLOWSHEET NOTE
Received from cath lab. Alert and cooperative. Taking sips of water. Remains in atrial fibrillation  0.9 normal saline cont. Wife at bedside. Denies pain or needs.  Resting with easy resp

## 2022-11-14 ENCOUNTER — TELEPHONE (OUTPATIENT)
Dept: CARDIOLOGY CLINIC | Age: 72
End: 2022-11-14

## 2022-11-14 NOTE — TELEPHONE ENCOUNTER
Patient called   Canceled follow up appointment 12/1  Does not want to r/s in CHF clinic  Only wants to follow with Alan Humphrey

## 2022-11-21 NOTE — TELEPHONE ENCOUNTER
Mary Sanchez called requesting a refill on the following medications:  Requested Prescriptions     Pending Prescriptions Disp Refills    potassium chloride (KLOR-CON M) 20 MEQ extended release tablet 60 tablet      Sig: Take 1 tablet by mouth 2 times daily     Pharmacy verified: [facility] on [address] [facility]  [place]       Date of last visit: 10/12/2022  Date of next visit (if applicable): 44/72/8730

## 2022-11-22 ENCOUNTER — OFFICE VISIT (OUTPATIENT)
Dept: CARDIOLOGY CLINIC | Age: 72
End: 2022-11-22
Payer: MEDICARE

## 2022-11-22 VITALS
HEART RATE: 71 BPM | WEIGHT: 221.4 LBS | DIASTOLIC BLOOD PRESSURE: 78 MMHG | SYSTOLIC BLOOD PRESSURE: 127 MMHG | HEIGHT: 73 IN | BODY MASS INDEX: 29.34 KG/M2

## 2022-11-22 DIAGNOSIS — Z95.818 PRESENCE OF WATCHMAN LEFT ATRIAL APPENDAGE CLOSURE DEVICE: ICD-10-CM

## 2022-11-22 DIAGNOSIS — I10 ESSENTIAL HYPERTENSION: ICD-10-CM

## 2022-11-22 DIAGNOSIS — I48.0 PAROXYSMAL ATRIAL FIBRILLATION (HCC): Primary | ICD-10-CM

## 2022-11-22 DIAGNOSIS — I25.10 CORONARY ARTERY DISEASE INVOLVING NATIVE CORONARY ARTERY OF NATIVE HEART WITHOUT ANGINA PECTORIS: ICD-10-CM

## 2022-11-22 DIAGNOSIS — I50.22 CHRONIC SYSTOLIC CONGESTIVE HEART FAILURE, NYHA CLASS 2 (HCC): ICD-10-CM

## 2022-11-22 DIAGNOSIS — E78.5 HYPERLIPIDEMIA, UNSPECIFIED HYPERLIPIDEMIA TYPE: ICD-10-CM

## 2022-11-22 PROCEDURE — 1123F ACP DISCUSS/DSCN MKR DOCD: CPT | Performed by: NURSE PRACTITIONER

## 2022-11-22 PROCEDURE — 3074F SYST BP LT 130 MM HG: CPT | Performed by: NURSE PRACTITIONER

## 2022-11-22 PROCEDURE — 3078F DIAST BP <80 MM HG: CPT | Performed by: NURSE PRACTITIONER

## 2022-11-22 PROCEDURE — 99213 OFFICE O/P EST LOW 20 MIN: CPT | Performed by: NURSE PRACTITIONER

## 2022-11-22 PROCEDURE — G8427 DOCREV CUR MEDS BY ELIG CLIN: HCPCS | Performed by: NURSE PRACTITIONER

## 2022-11-22 PROCEDURE — G8484 FLU IMMUNIZE NO ADMIN: HCPCS | Performed by: NURSE PRACTITIONER

## 2022-11-22 PROCEDURE — G8417 CALC BMI ABV UP PARAM F/U: HCPCS | Performed by: NURSE PRACTITIONER

## 2022-11-22 PROCEDURE — 3017F COLORECTAL CA SCREEN DOC REV: CPT | Performed by: NURSE PRACTITIONER

## 2022-11-22 PROCEDURE — 1036F TOBACCO NON-USER: CPT | Performed by: NURSE PRACTITIONER

## 2022-11-22 RX ORDER — POTASSIUM CHLORIDE 20 MEQ/1
20 TABLET, EXTENDED RELEASE ORAL 2 TIMES DAILY
Qty: 60 TABLET | Refills: 0 | Status: SHIPPED | OUTPATIENT
Start: 2022-11-22

## 2022-11-22 NOTE — PATIENT INSTRUCTIONS
Continue current medications as prescribed. Stay as active as you can. Eat heart healthy diet. Continue to monitor your fluid and salt intake. Continue to monitor your blood pressure and weight as you have been. Follow-up with your PCP as scheduled. Follow-up with Dr. Walterine Babinski in 3 months as scheduled or sooner if need.

## 2022-11-22 NOTE — PROGRESS NOTES
[facility] [place] PROFESSIONAL SERVICES  [facility]   [address] [place] [address] DrAzalea   [address]   [address] [address]   Dept: [phone]   Dept Fax: [phone]: [phone]      Chief Complaint   Patient presents with    [facility]-Up [facility] follow-up: Failed DC-CV    Primary Cardiologist: Structural Heart: Zach Kenny MD    11/10/22: Unsuccessful, uncomplicated SANG-DCCV with moderate sedation    Follow-up 10/5/22 in CHF clinic: now follows only with Dr. Pam Lopez as he requests  TYPE HF: HFrEF 40-45%   Cause:   Device: Watchman 7/19/22  HX: Afib w/ RVR (watchman) CAD, HLD, HTN   Dry Wt:  228 on 10/5/22  Concerns today: new pt today, referred at recent hospital admission, CHF exacerbation likely due to Afib w/ RVR Started on lopressor (was on Toprol) and aldactone. Chest xray was negative for pulmonary vascular congestion. He was IV diuresed w/ improved SOB and LE swelling. Pt does not really want to follow here, they have a fixed income. They also feel that because they cut back on fluid oral intake that they will not have any other fluid overload issues. States that he urinated better when he was not on the water pill. Pt does note orthopnea, lower leg swelling and worsening SOB before the hospital admission, Arlice Never continues but at baseline. GDMT:              ACE/ARB/ARNI -               BB - Lopressor 100 BID              Diuretic - Lasix 10 daily x 10 days  AA - Aldactone 25 daily  SGLT2 -    Vasodilator -   Other - ASA, Plavix Digoxin KCL 10 daily x 10  HFmrEF 45% 9/2022 (50-55% 2017)  S/P Watchman 7/2022  Stable, +1 lower leg swelling. States he urinated more when he was not on his lasix. To bring weights to appointment with Serenity. Last seen in office on 9/26/22 per this writer. Per office note:  Called office and requested appointment regarding increasing shortness of breath along with lower extremity edema. Increasingly short breath for past couple weeks.    Ankles more swollen - off and on worse  PND and orthopnea  Having to sleep on 2 pillow on his side in order to breathe and sleep  Having to sleep sitting up in his recliner at times to be able to breathe at night  Worried that this is due to Plavix - was started after 45-day SANG for WATCHMAN  No bleeding issues  Breathing seems to become worse at night - takes the Plavix in the evening  Afib with RVR -  with auscultation - known afib   Diagnosis Orders   1. SOB (shortness of breath)  EKG 12 Lead       2. Paroxysmal atrial fibrillation (HCC)          3. Essential hypertension          4. Hyperlipidemia, unspecified hyperlipidemia type          5. Presence of Watchman left atrial appendage closure device             Continue Dr Yuli Hercules current treatment plan  Patient noted to be in atrial fib with RVR. Requires evaluation in the emergency department and then treatment as warranted. Patient taken to the ED per wheelchair. Follow-up evaluation to be determined after ED evaluation. Prior encounters:   Last seen in the office by Dr. Elle Romeo on 7/29/2021 for checkup regarding known coronary artery disease, atrial fibrillation and hypertension. Patient also with history of prostate cancer; post surgery. Clinically stable on this visit. No bleeding issues on Coumadin. Few episodes of dizziness and lightheadedness. Some lower extremity edema with mild discoloration. Edema felt likely related to Cardizem. Considered lowering the Cardizem dose and increasing metoprolol dose. No other major changes. Refer to Dr. Reji Wong regarding watchman and seen per Dr. Reji Wong in the office on 6/23/2022. Patient had been having recurrent episodes of epistaxis for over 4 weeks. Currently still on Coumadin. Status post multiple ENT procedures. Longstanding persistent atrial fibs, [id number] equals 4. Options regarding watchman or adjustments in anticoagulation with overall risk of stroke versus bleeding discussed.   Patient decided to move forward with watchman. Watchman device placed 7/19/2022.     Today's visit:   Subjective:  Had been short of breath and weak prior to coming in for the CV  Has been feeling better since hospitalization - tolerating activity much better  Breathing better - able to walk in from parking lot and come up the stairs today without issue  No anginal sx  No lightheadedness or dizziness; no syncope or near syncope  Small amount nasal seepage - little bit of pink cast - no overt nose bleeds  Little bit of swelling in evening in balls of his feet - goes away OVN  Tolerating meds    General:   No fever, no chills, improved fatigue; no certain weight loss/gain  Pulmonary:    No dyspnea, no wheezing  Cardiac:    Denies recent chest discomfort; uncertain regarding palpitations  GI:     No nausea or vomiting, no abdominal pain, no black or tarry stools, no blood in stools  Neuro:     No dizziness or light headedness  Musculoskeletal:  No recent active issues, no new musculoskeletal pain  Extremities:   Swelling as above; no claudication symptoms      Past Medical History:   Diagnosis Date    Anesthesia     takes large dose of medications to make him sleepy for surgery    Atrial fibrillation ([address]     Benign prostatic hyperplasia with urinary frequency 10/21/2019    CAD (coronary artery disease)     Chest pain     Colon polyp 2011    removed    Coronary artery disease involving native coronary artery of native heart without angina pectoris 10/21/2019    Dementia (HCC)     Dizziness     Elevated PSA     GERD (gastroesophageal reflux disease)     Heart disease     Hyperlipidemia     Hypertension        No Known Allergies    Current Outpatient Medications   Medication Sig Dispense Refill    potassium chloride (KLOR-CON M) 20 MEQ extended release tablet Take 1 tablet by mouth 2 times daily 60 tablet 0    amiodarone (CORDARONE) 200 MG tablet Take 1 tablet by mouth 2 times daily 60 tablet 3    spironolactone (ALDACTONE) 25 MG tablet Take 1 tablet by mouth daily 90 tablet 3    dilTIAZem (CARDIZEM CD) 120 MG extended release capsule Take 1 capsule by mouth daily 90 capsule 3    clopidogrel (PLAVIX) 75 MG tablet Take 1 tablet by mouth daily 90 tablet 3    metoprolol (LOPRESSOR) 100 MG tablet Take 1 tablet by mouth 2 times daily 180 tablet 3    rosuvastatin (CRESTOR) 20 MG tablet Take 1 tablet by mouth daily 90 tablet 3    furosemide (LASIX) 40 MG tablet Take 1 tablet by mouth daily 90 tablet 3    digoxin (LANOXIN) 125 MCG tablet Take 1 tablet by mouth once daily 90 tablet 0    aspirin 81 MG chewable tablet Take 1 tablet by mouth in the morning. 30 tablet 3    nitroGLYCERIN (NITROSTAT) 0.4 MG SL tablet DISSOLVE ONE TABLET UNDER THE TONGUE EVERY 5 MINUTES AS NEEDED FOR CHEST PAIN. DO NOT EXCEED A TOTAL OF 3 DOSES IN 15 MINUTES 25 tablet 2    bisacodyl (DULCOLAX) 5 MG EC tablet Take 5 mg by mouth nightly       No current facility-administered medications for this visit.        Social History     Socioeconomic History    Marital status:      Spouse name: [employer]    Number of children: 2    Years of education: None    Highest education level: None   Tobacco Use    Smoking status: Never    Smokeless tobacco: Never   Vaping Use    Vaping Use: Never used   Substance and Sexual Activity    Alcohol use: No    Drug use: No    Sexual activity: Not Currently     Social Determinants of Health     Financial Resource Strain: Low Risk     Difficulty of Paying Living Expenses: Not hard at all   Food Insecurity: No Food Insecurity    Worried About Running Out of Food in the Last Year: Never true    Ran Out of Food in the Last Year: Never true   Physical Activity: Inactive    Days of Exercise per Week: 0 days    Minutes of Exercise per Session: 0 min       Family History   Problem Relation Age of Onset    Heart Surgery Father     Cancer Father         lung    Heart Disease Father         CABG    Alzheimer's Disease Mother     Diabetes Neg Hx     High Blood Pressure Neg Hx     Stroke Neg Hx        Blood pressure 127/78, pulse 71, height 6' 1\" (1.854 m), weight 221 lb 6.4 oz (100.4 kg). General:   Well developed, well nourished, no acute distress, bright, pleasant, appears well; accompanied by spouse  Lungs:               Clear to auscultation  Heart:    Normal rate, irregularly irregular with no murmur, rubs  Abdomen:   Soft, non tender, positive bowel sounds  Extremities:   No edema, no cyanosis, good peripheral pulses  Neurological:   Awake, alert, oriented. No obvious focal deficits  Musculoskeletal:  No obvious deformities    EK/10/22  Atrial fibrillation  Nonspecific T wave abnormality  Abnormal ECG  When compared with ECG of 10-NOV-2022 12:26,  Nonspecific T wave abnormality no longer evident in Inferior leads  Nonspecific T wave abnormality is worse in Lateral leads  Confirmed by Fred Boyd ([id number]) on 11/10/2022 7:20:26 PM    EK2022:  [id number] 11:54:21 [facility] ROUTINE RECORD  Atrial fibrillation with rapid ventricular response  Anteroseptal infarct , age undetermined  Abnormal ECG  When compared with ECG of 2022 05:10,  Anteroseptal infarct is now Present  Confirmed by Fred Boyd ([id number]) on 2022 12:14:59 PM    SANG: 11/10/22  Summary   Mildly dilated left ventricular size and moderately reduced systolic   function. There was moderate global hypokinesis. Wall thickness was within normal limits. Ejection fraction was estimated at 35-40%. Left atrial size was severely dilated with no thrombus identified. APPENDAGE: S/p WATCHMAN - no thrombus or peridevice leak identified. Mild aortic regurgitaiton. There was trace mitral regurgitation. Signature    ----------------------------------------------------------------   Electronically signed by Catrachito Mahoney MD (Interpreting   physician) on 2022 at 07:00 AM    SANG: 2022  Indications:Post Watchmen device.   Post 45-day placement. Additional Medical History:Hyperlipidemia, hypertension, obesity, atrial  fibrillation, GERD, diastolic dysfunction, COPD, coronary artery disease. Conclusions    Summary   Ejection fraction is visually estimated at 45%. There was mild global hypokinesis of the left ventricle. Mildly dilated left atrium. LAAO device in good position   no thrombi   no leaks   Aortic valve appears tricuspid. Mild-to-moderate aortic regurgitation is noted. Mild to Moderate mitral regurgitation is present. Signature    ----------------------------------------------------------------   Electronically signed by Nicol Donaldson MD (Interpreting   physician) on 09/12/2022 at 07:00 PM    Watchman: 7/19/2022  DATE OF PROCEDURE:  07/19/2022     PROCEDURE:  Percutaneous left atrial appendage occlusion (Watchman). INDICATION:  Longstanding persistent atrial fibrillation, CHADS2-VASC  score of 4, not a candidate for a long-term anticoagulation. SUMMARY:  Successful percutaneous left atrial appendage occlusion with a  Watchman FLX 27-mm device. PLAN:  1. Bedrest.  2.  Optimal medical therapy. 3.  Risk factor management. 4.  Routine access site care. 5.  IV fluids. 6.  Overnight inpatient admission. 7.  Restart home meds. 8.  Follow up with [facility] per protocol. 9.  45-day SANG  10. Follow up the patient for early discharge protocol. All the above was explained to the patient and the patient's family. They were agreeable and amenable to the plan. Elisa Rodgers MD   D: 07/19/2022     Diagnosis Orders   1. Paroxysmal atrial fibrillation (HCC)        2. Presence of Watchman left atrial appendage closure device        3. Chronic systolic congestive heart failure, NYHA class 2 ([address]        4. Coronary artery disease involving native coronary artery of native heart without angina pectoris        5. Essential hypertension        6.  Hyperlipidemia, unspecified hyperlipidemia type        Wishes to follow with Dr. El Servin. Does not want to follow with CHF clinic. Doing much better. Remains in afib but rate now controlled. Tolerating activities. Tolerating meds. No bleeding on ASA and Plavix. (No OAC; WATCHMAN). No stroke sx. No evidence of decompensated HF; slight drop in EF was noted on SANG. No orders of the defined types were placed in this encounter. Continue Dr Gricelda Moses current treatment plan. Patient Instructions   Continue current medications as prescribed. Stay as active as you can. Eat heart healthy diet. Continue to monitor your fluid and salt intake. Continue to monitor your blood pressure and weight as you have been. Follow-up with your PCP as scheduled. Follow-up with Dr. El Servin in 3 months as scheduled or sooner if need. Return in about 3 months (around 2/22/2023), or if symptoms worsen or fail to improve, for Dr. El Servin.       Serenity Lunsford, APRN - CNP

## 2022-11-22 NOTE — PROGRESS NOTES
Hospital follow up    EKG done 11/10/2022    Patient denies having any chest pain, palpitations, dizziness and LUCAS.     Patient c/o sob that is improving

## 2022-12-27 RX ORDER — POTASSIUM CHLORIDE 20 MEQ/1
20 TABLET, EXTENDED RELEASE ORAL 2 TIMES DAILY
Qty: 60 TABLET | Refills: 2 | Status: SHIPPED | OUTPATIENT
Start: 2022-12-27

## 2022-12-27 NOTE — TELEPHONE ENCOUNTER
Wilder Lin called requesting a refill on the following medications:  Requested Prescriptions     Pending Prescriptions Disp Refills    potassium chloride (KLOR-CON M) 20 MEQ extended release tablet 60 tablet 0     Sig: Take 1 tablet by mouth 2 times daily     [facility]   . pv      Date of last visit:   Date of next visit (if applicable): 8/0/0136

## 2022-12-29 RX ORDER — DIGOXIN 125 MCG
TABLET ORAL
Qty: 90 TABLET | Refills: 1 | Status: SHIPPED | OUTPATIENT
Start: 2022-12-29

## 2022-12-29 NOTE — TELEPHONE ENCOUNTER
Hong Silverman called requesting a refill on the following medications:  Requested Prescriptions     Pending Prescriptions Disp Refills    digoxin (LANOXIN) 125 MCG tablet 90 tablet 100 Satish gallagher      Date of last visit: 11-22-22  Date of next visit (if applicable): 8/8/1365

## 2023-01-20 ENCOUNTER — TELEPHONE (OUTPATIENT)
Dept: CARDIOLOGY CLINIC | Age: 73
End: 2023-01-20

## 2023-01-20 NOTE — TELEPHONE ENCOUNTER
This patient has completed 6 months of DAPT post Watchman. According to the [facility] pharmacological guidelines it is recommended to stop Plavix and continue Aspirin 81mg daily. After reviewing the patient's chart there is no further indication to continue Plavix. Dr. Snider Shallow are you ok with stopping Plavix and continuing Aspirin 81mg daily?

## 2023-02-01 ENCOUNTER — HOSPITAL ENCOUNTER (OUTPATIENT)
Age: 73
Discharge: HOME | End: 2023-02-01
Payer: MEDICARE

## 2023-02-01 DIAGNOSIS — R73.01 IFG (IMPAIRED FASTING GLUCOSE): ICD-10-CM

## 2023-02-01 DIAGNOSIS — I10 ESSENTIAL HYPERTENSION: ICD-10-CM

## 2023-02-01 DIAGNOSIS — E78.5 HYPERLIPIDEMIA, UNSPECIFIED HYPERLIPIDEMIA TYPE: ICD-10-CM

## 2023-02-01 DIAGNOSIS — R35.0 BENIGN PROSTATIC HYPERPLASIA WITH URINARY FREQUENCY: ICD-10-CM

## 2023-02-01 DIAGNOSIS — N40.1 BENIGN PROSTATIC HYPERPLASIA WITH URINARY FREQUENCY: ICD-10-CM

## 2023-02-01 LAB
ALBUMIN SERPL BCG-MCNC: 4.7 G/DL (ref 3.5–5.1)
ALP SERPL-CCNC: 61 U/L (ref 38–126)
ALT SERPL W/O P-5'-P-CCNC: 54 U/L (ref 11–66)
ANION GAP SERPL CALC-SCNC: 14 MEQ/L (ref 8–16)
AST SERPL-CCNC: 33 U/L (ref 5–40)
BASOPHILS ABSOLUTE: 0.1 THOU/MM3 (ref 0–0.1)
BASOPHILS NFR BLD AUTO: 1 %
BILIRUB SERPL-MCNC: 0.7 MG/DL (ref 0.3–1.2)
BUN SERPL-MCNC: 25 MG/DL (ref 7–22)
CALCIUM SERPL-MCNC: 9.5 MG/DL (ref 8.5–10.5)
CHLORIDE SERPL-SCNC: 102 MEQ/L (ref 98–111)
CHOLEST SERPL-MCNC: 113 MG/DL (ref 100–199)
CO2 SERPL-SCNC: 26 MEQ/L (ref 23–33)
CREAT SERPL-MCNC: 1.4 MG/DL (ref 0.4–1.2)
DEPRECATED MEAN GLUCOSE BLD GHB EST-ACNC: 147 MG/DL (ref 70–126)
DEPRECATED RDW RBC AUTO: 52.3 FL (ref 35–45)
EOSINOPHIL NFR BLD AUTO: 3 %
EOSINOPHILS ABSOLUTE: 0.3 THOU/MM3 (ref 0–0.4)
ERYTHROCYTE [DISTWIDTH] IN BLOOD BY AUTOMATED COUNT: 18 % (ref 11.5–14.5)
GFR SERPL CREATININE-BSD FRML MDRD: 54 ML/MIN/1.73M2
GLUCOSE SERPL-MCNC: 112 MG/DL (ref 70–108)
HBA1C MFR BLD HPLC: 6.9 % (ref 4.4–6.4)
HCT VFR BLD AUTO: 55.3 % (ref 42–52)
HDLC SERPL-MCNC: 31 MG/DL
HGB BLD-MCNC: 18.3 GM/DL (ref 14–18)
IMM GRANULOCYTES # BLD AUTO: 0.04 THOU/MM3 (ref 0–0.07)
IMM GRANULOCYTES NFR BLD AUTO: 0.5 %
LDLC SERPL CALC-MCNC: 54 MG/DL
LYMPHOCYTES ABSOLUTE: 2.9 THOU/MM3 (ref 1–4.8)
LYMPHOCYTES NFR BLD AUTO: 33.6 %
MCH RBC QN AUTO: 28.6 PG (ref 26–33)
MCHC RBC AUTO-ENTMCNC: 33.1 GM/DL (ref 32.2–35.5)
MCV RBC AUTO: 86.4 FL (ref 80–94)
MONOCYTES ABSOLUTE: 0.5 THOU/MM3 (ref 0.4–1.3)
MONOCYTES NFR BLD AUTO: 6 %
NEUTROPHILS NFR BLD AUTO: 55.9 %
NRBC BLD AUTO-RTO: 0 /100 WBC
PLATELET # BLD AUTO: 221 THOU/MM3 (ref 130–400)
PMV BLD AUTO: 10.1 FL (ref 9.4–12.4)
POTASSIUM SERPL-SCNC: 4.7 MEQ/L (ref 3.5–5.2)
PROT SERPL-MCNC: 7.1 G/DL (ref 6.1–8)
PSA SERPL-MCNC: < 0.02 NG/ML (ref 0–1)
RBC # BLD AUTO: 6.4 MILL/MM3 (ref 4.7–6.1)
SEGMENTED NEUTROPHILS ABSOLUTE COUNT: 4.9 THOU/MM3 (ref 1.8–7.7)
SODIUM SERPL-SCNC: 142 MEQ/L (ref 135–145)
TRIGL SERPL-MCNC: 138 MG/DL (ref 0–199)
WBC # BLD AUTO: 8.7 THOU/MM3 (ref 4.8–10.8)

## 2023-02-01 PROCEDURE — 83036 HEMOGLOBIN GLYCOSYLATED A1C: CPT

## 2023-02-01 PROCEDURE — 80061 LIPID PANEL: CPT

## 2023-02-01 PROCEDURE — 36415 COLL VENOUS BLD VENIPUNCTURE: CPT

## 2023-02-01 PROCEDURE — 80053 COMPREHEN METABOLIC PANEL: CPT

## 2023-02-01 PROCEDURE — 84153 ASSAY OF PSA TOTAL: CPT

## 2023-02-01 PROCEDURE — 85025 COMPLETE CBC W/AUTO DIFF WBC: CPT

## 2023-03-13 ENCOUNTER — TELEPHONE (OUTPATIENT)
Dept: CARDIOLOGY CLINIC | Age: 73
End: 2023-03-13

## 2023-03-13 RX ORDER — AMIODARONE HYDROCHLORIDE 200 MG/1
200 TABLET ORAL 2 TIMES DAILY
Qty: 60 TABLET | Refills: 3 | Status: SHIPPED | OUTPATIENT
Start: 2023-03-13 | End: 2023-03-14 | Stop reason: SDUPTHER

## 2023-03-13 NOTE — TELEPHONE ENCOUNTER
Mike Chen called requesting a refill on the following medications:  Requested Prescriptions     Pending Prescriptions Disp Refills    amiodarone (CORDARONE) 200 MG tablet 60 tablet 3     Sig: Take 1 tablet by mouth 2 times daily     Pharmacy verified: [facility] on [address]      Date of last visit: 11/22/2022  Date of next visit (if applicable): 94/31/8297

## 2023-03-13 NOTE — TELEPHONE ENCOUNTER
Pt spouse is calling and wants to see if the patient is still supposed to be taking the Amiodorone.   Please advise pt at [phone]

## 2023-03-14 RX ORDER — AMIODARONE HYDROCHLORIDE 200 MG/1
200 TABLET ORAL 2 TIMES DAILY
Qty: 180 TABLET | Refills: 2 | Status: SHIPPED | OUTPATIENT
Start: 2023-03-14

## 2023-03-14 NOTE — TELEPHONE ENCOUNTER
Patient's spouse is calling and wanting to know if this was sent in with a 90 day supply. She said that she requested it as 90 day and that someone was supposed to call her back but hasn't.

## 2023-03-20 RX ORDER — POTASSIUM CHLORIDE 20 MEQ/1
20 TABLET, EXTENDED RELEASE ORAL 2 TIMES DAILY
Qty: 60 TABLET | Refills: 3 | Status: SHIPPED | OUTPATIENT
Start: 2023-03-20

## 2023-03-20 NOTE — TELEPHONE ENCOUNTER
Dioni Araiza called requesting a refill on the following medications:    REQ 3 MONTH SUPPLY  Requested Prescriptions     Pending Prescriptions Disp Refills    potassium chloride (KLOR-CON M) 20 MEQ extended release tablet 60 tablet 2     Sig: Take 1 tablet by mouth 2 times daily     Pharmacy verified:  [facility] [place]      Date of last visit: 11-22-22  Date of next visit (if applicable): 85-10-71

## 2023-03-27 RX ORDER — FUROSEMIDE 40 MG/1
40 TABLET ORAL DAILY
Qty: 90 TABLET | Refills: 3 | Status: SHIPPED | OUTPATIENT
Start: 2023-03-27

## 2023-03-27 RX ORDER — POTASSIUM CHLORIDE 20 MEQ/1
20 TABLET, EXTENDED RELEASE ORAL 2 TIMES DAILY
Qty: 180 TABLET | Refills: 3 | Status: SHIPPED | OUTPATIENT
Start: 2023-03-27

## 2023-03-27 RX ORDER — DIGOXIN 125 MCG
TABLET ORAL
Qty: 90 TABLET | Refills: 3 | Status: SHIPPED | OUTPATIENT
Start: 2023-03-27

## 2023-03-27 RX ORDER — SPIRONOLACTONE 25 MG/1
25 TABLET ORAL DAILY
Qty: 90 TABLET | Refills: 3 | Status: SHIPPED | OUTPATIENT
Start: 2023-03-27

## 2023-03-27 RX ORDER — METOPROLOL TARTRATE 100 MG/1
100 TABLET ORAL 2 TIMES DAILY
Qty: 180 TABLET | Refills: 3 | Status: SHIPPED | OUTPATIENT
Start: 2023-03-27

## 2023-03-27 RX ORDER — DILTIAZEM HYDROCHLORIDE 120 MG/1
120 CAPSULE, COATED, EXTENDED RELEASE ORAL DAILY
Qty: 90 CAPSULE | Refills: 3 | Status: SHIPPED | OUTPATIENT
Start: 2023-03-27

## 2023-03-27 NOTE — TELEPHONE ENCOUNTER
Pt's wife calls in to PCP stating that she Is struggling getting the pt's meds refilled by cardiology office. She states that he is due for several med refills and is approaching a few as well. I spoke with [address] [place] [address] and the pt is in need/will be in need of refills in the next several weeks. Wife is requesting that these be sent to [facility] on [address] I advised her that we could forward these on to cardiology office for her. While speaking with [facility] they stated that the meds sent on 10-12-22 will be out of refills im unsure if they refills  or why they wont last until 10-12-23.

## 2023-04-04 ENCOUNTER — TELEPHONE (OUTPATIENT)
Dept: CARDIOLOGY CLINIC | Age: 73
End: 2023-04-04

## 2023-04-04 DIAGNOSIS — Z95.818 PRESENCE OF WATCHMAN LEFT ATRIAL APPENDAGE CLOSURE DEVICE: Primary | ICD-10-CM

## 2023-04-04 DIAGNOSIS — I48.0 PAROXYSMAL ATRIAL FIBRILLATION (HCC): ICD-10-CM

## 2023-04-04 NOTE — TELEPHONE ENCOUNTER
SANG scheduled for 7/12/23 at 2:30pm patient to arrive at 1:00pm with Dr. Liz Prescott to be obtained prior to 1 year appt. 1 year post Tiesha appt scheduled with Serenity for 7/24/23 at 10:30am  Patient notified and said he will check his schedule. Future appts and lab orders mailed.

## 2023-04-04 NOTE — TELEPHONE ENCOUNTER
Orders received from Dr. Roman Ring to schedule the patient for a 1 year post Watchman follow up appointment with Serenity Lunsford along with a SANG, CBC and BMP prior. Watchman implanted on 7/19/2022    Lisseth/Chelsey can you please call patient to schedule testing and follow-up appt. Dr. Roman Ring please agree.

## 2023-06-28 RX ORDER — AMIODARONE HYDROCHLORIDE 200 MG/1
200 TABLET ORAL 2 TIMES DAILY
Qty: 180 TABLET | Refills: 2 | Status: SHIPPED | OUTPATIENT
Start: 2023-06-28

## 2023-07-06 ENCOUNTER — TELEPHONE (OUTPATIENT)
Dept: CARDIOLOGY CLINIC | Age: 73
End: 2023-07-06

## 2023-07-06 DIAGNOSIS — I48.0 PAROXYSMAL A-FIB (HCC): Primary | ICD-10-CM

## 2023-07-10 ENCOUNTER — HOSPITAL ENCOUNTER (OUTPATIENT)
Age: 73
Discharge: HOME | End: 2023-07-10
Payer: MEDICARE

## 2023-07-10 DIAGNOSIS — Z95.818 PRESENCE OF WATCHMAN LEFT ATRIAL APPENDAGE CLOSURE DEVICE: ICD-10-CM

## 2023-07-10 DIAGNOSIS — I48.0 PAROXYSMAL ATRIAL FIBRILLATION (HCC): ICD-10-CM

## 2023-07-10 LAB
ANION GAP SERPL CALC-SCNC: 13 MEQ/L (ref 8–16)
AUTO DIFF PNL BLD: ABNORMAL
BASOPHILS ABSOLUTE: 0.1 THOU/MM3 (ref 0–0.1)
BASOPHILS NFR BLD AUTO: 0.7 %
BUN SERPL-MCNC: 46 MG/DL (ref 7–22)
CALCIUM SERPL-MCNC: 10.5 MG/DL (ref 8.5–10.5)
CHLORIDE SERPL-SCNC: 101 MEQ/L (ref 98–111)
CO2 SERPL-SCNC: 27 MEQ/L (ref 23–33)
CREAT SERPL-MCNC: 1.8 MG/DL (ref 0.4–1.2)
DEPRECATED RDW RBC AUTO: 47.1 FL (ref 35–45)
EOSINOPHIL NFR BLD AUTO: 2.4 %
EOSINOPHILS ABSOLUTE: 0.2 THOU/MM3 (ref 0–0.4)
ERYTHROCYTE [DISTWIDTH] IN BLOOD BY AUTOMATED COUNT: 13.2 % (ref 11.5–14.5)
GFR SERPL CREATININE-BSD FRML MDRD: 40 ML/MIN/1.73M2
GLUCOSE SERPL-MCNC: 113 MG/DL (ref 70–108)
HCT VFR BLD AUTO: 57.8 % (ref 42–52)
HGB BLD-MCNC: 18.6 GM/DL (ref 14–18)
IMM GRANULOCYTES # BLD AUTO: 0.04 THOU/MM3 (ref 0–0.07)
IMM GRANULOCYTES NFR BLD AUTO: 0.4 %
LYMPHOCYTES ABSOLUTE: 2.5 THOU/MM3 (ref 1–4.8)
LYMPHOCYTES NFR BLD AUTO: 26.5 %
MCH RBC QN AUTO: 31.2 PG (ref 26–33)
MCHC RBC AUTO-ENTMCNC: 32.2 GM/DL (ref 32.2–35.5)
MCV RBC AUTO: 97 FL (ref 80–94)
MONOCYTES ABSOLUTE: 0.8 THOU/MM3 (ref 0.4–1.3)
MONOCYTES NFR BLD AUTO: 8.4 %
NEUTROPHILS NFR BLD AUTO: 61.6 %
NRBC BLD AUTO-RTO: 0 /100 WBC
PATHOLOGIST REVIEW: ABNORMAL
PLATELET # BLD AUTO: 232 THOU/MM3 (ref 130–400)
PMV BLD AUTO: 10.3 FL (ref 9.4–12.4)
POTASSIUM SERPL-SCNC: 5.5 MEQ/L (ref 3.5–5.2)
RBC # BLD AUTO: 5.96 MILL/MM3 (ref 4.7–6.1)
SCAN OF BLOOD SMEAR: NORMAL
SEGMENTED NEUTROPHILS ABSOLUTE COUNT: 5.8 THOU/MM3 (ref 1.8–7.7)
SODIUM SERPL-SCNC: 141 MEQ/L (ref 135–145)
WBC # BLD AUTO: 9.4 THOU/MM3 (ref 4.8–10.8)

## 2023-07-10 PROCEDURE — 80048 BASIC METABOLIC PNL TOTAL CA: CPT

## 2023-07-10 PROCEDURE — 85025 COMPLETE CBC W/AUTO DIFF WBC: CPT

## 2023-07-10 PROCEDURE — 36415 COLL VENOUS BLD VENIPUNCTURE: CPT

## 2023-07-12 ENCOUNTER — HOSPITAL ENCOUNTER (OUTPATIENT)
Age: 73
Setting detail: OUTPATIENT SURGERY
Discharge: HOME | End: 2023-07-12
Attending: NUCLEAR MEDICINE | Admitting: NUCLEAR MEDICINE
Payer: MEDICARE

## 2023-07-12 VITALS
DIASTOLIC BLOOD PRESSURE: 64 MMHG | OXYGEN SATURATION: 99 % | RESPIRATION RATE: 16 BRPM | HEART RATE: 62 BPM | TEMPERATURE: 97.4 F | WEIGHT: 223 LBS | BODY MASS INDEX: 29.55 KG/M2 | HEIGHT: 73 IN | SYSTOLIC BLOOD PRESSURE: 120 MMHG

## 2023-07-12 DIAGNOSIS — I48.0 PAROXYSMAL ATRIAL FIBRILLATION (HCC): ICD-10-CM

## 2023-07-12 DIAGNOSIS — Z95.818 PRESENCE OF WATCHMAN LEFT ATRIAL APPENDAGE CLOSURE DEVICE: ICD-10-CM

## 2023-07-12 LAB
LV EF: 55 %
LVEF MODALITY: NORMAL

## 2023-07-12 PROCEDURE — 93312 ECHO TRANSESOPHAGEAL: CPT

## 2023-07-12 PROCEDURE — 2580000003 HC RX 258: Performed by: NUCLEAR MEDICINE

## 2023-07-12 PROCEDURE — 93312 ECHO TRANSESOPHAGEAL: CPT | Performed by: NUCLEAR MEDICINE

## 2023-07-12 PROCEDURE — 6360000002 HC RX W HCPCS: Performed by: NUCLEAR MEDICINE

## 2023-07-12 PROCEDURE — 93320 DOPPLER ECHO COMPLETE: CPT

## 2023-07-12 PROCEDURE — 93325 DOPPLER ECHO COLOR FLOW MAPG: CPT

## 2023-07-12 PROCEDURE — 7100000010 HC PHASE II RECOVERY - FIRST 15 MIN: Performed by: NUCLEAR MEDICINE

## 2023-07-12 PROCEDURE — 99152 MOD SED SAME PHYS/QHP 5/>YRS: CPT | Performed by: NUCLEAR MEDICINE

## 2023-07-12 RX ORDER — SODIUM CHLORIDE 0.9 % (FLUSH) 0.9 %
5-40 SYRINGE (ML) INJECTION PRN
Status: DISCONTINUED | OUTPATIENT
Start: 2023-07-12 | End: 2023-07-12 | Stop reason: HOSPADM

## 2023-07-12 RX ORDER — SODIUM CHLORIDE 0.9 % (FLUSH) 0.9 %
5-40 SYRINGE (ML) INJECTION EVERY 12 HOURS SCHEDULED
Status: DISCONTINUED | OUTPATIENT
Start: 2023-07-12 | End: 2023-07-12 | Stop reason: HOSPADM

## 2023-07-12 RX ORDER — SODIUM CHLORIDE 9 MG/ML
INJECTION, SOLUTION INTRAVENOUS CONTINUOUS
Status: DISCONTINUED | OUTPATIENT
Start: 2023-07-12 | End: 2023-07-12 | Stop reason: HOSPADM

## 2023-07-12 RX ORDER — SODIUM CHLORIDE 9 MG/ML
25 INJECTION, SOLUTION INTRAVENOUS PRN
Status: DISCONTINUED | OUTPATIENT
Start: 2023-07-12 | End: 2023-07-12 | Stop reason: HOSPADM

## 2023-07-12 RX ORDER — MIDAZOLAM HYDROCHLORIDE 1 MG/ML
INJECTION INTRAMUSCULAR; INTRAVENOUS PRN
Status: DISCONTINUED | OUTPATIENT
Start: 2023-07-12 | End: 2023-07-12 | Stop reason: ALTCHOICE

## 2023-07-12 RX ORDER — FENTANYL CITRATE 50 UG/ML
INJECTION, SOLUTION INTRAMUSCULAR; INTRAVENOUS PRN
Status: DISCONTINUED | OUTPATIENT
Start: 2023-07-12 | End: 2023-07-12 | Stop reason: ALTCHOICE

## 2023-07-12 RX ADMIN — SODIUM CHLORIDE: 9 INJECTION, SOLUTION INTRAVENOUS at 12:42

## 2023-07-12 ASSESSMENT — PAIN SCALES - GENERAL: PAINLEVEL_OUTOF10: 0

## 2023-07-12 ASSESSMENT — PAIN - FUNCTIONAL ASSESSMENT: PAIN_FUNCTIONAL_ASSESSMENT: NONE - DENIES PAIN

## 2023-07-12 NOTE — H&P
[address]  Sedation/Analgesia History & Physical    Pt Name: Manisha Villa  Account number: [de-identified]  MRN: [id number]  YOB: 1950  Provider Performing Procedure: Lita Morrow MD MD [facility]  Primary Care Physician: NARESH Alvarez - JO  Date: 7/12/2023    PRE-PROCEDURE    Code Status: FULL CODE  Brief History/Pre-Procedure Diagnosis:   Watchman      Consent: : I have discussed with the patient risks, benefits, and alternatives (and relevant risks, benefits, and side effects related to alternatives or not receiving care), and likelihood of the success. The patient and/or representative appear to understand and agree to proceed. The discussion encompasses risks, benefits, and side effects related to the alternatives and the risks related to not receiving the proposed care, treatment, and services. MEDICAL HISTORY  []ASHD/ANGINA/MI/CHF   [x]Hypertension  []Diabetes  []Hyperlipidemia  []Smoking  []Family Hx of ASHD  []Additional information:       has a past medical history of Anesthesia, Atrial fibrillation ([address]), Benign prostatic hyperplasia with urinary frequency, CAD (coronary artery disease), Chest pain, Colon polyp, Coronary artery disease involving native coronary artery of native heart without angina pectoris, Dementia ([address]), Dizziness, Elevated PSA, GERD (gastroesophageal reflux disease), Heart disease, Hyperlipidemia, and Hypertension. SURGICAL HISTORY   has a past surgical history that includes cardiovascular stress test (09/23/2011); transthoracic echocardiogram (09/23/2011); Cardiac catheterization (09/23/2011); Carpal tunnel release; Tonsillectomy; Adenoidectomy; Vasectomy; Colonoscopy; Ultrasound Prostate/Transrectal (N/A, 10/06/2020); Prostatectomy (N/A, 12/03/2020); Nasal sinus surgery (N/A, 06/13/2022); transesophageal echocardiogram (N/A, 09/12/2022); and other surgical history (08/2022).   Additional

## 2023-07-17 NOTE — TELEPHONE ENCOUNTER
I called and talked to patient to try and get an appointment scheduled as watchman was done 7/19/2022.  1 year SANG completed but patient is refusing to make a 1 year watchman appointment. He states,\"why would I come in just so you can charge medicare. \"  I informed patient that these are guidelines for the procedure. Dr. Jihan Chandler are patient's cardiologist and he has an appt in December. Patient not wanting to follow watchman guidelines for follow-up. Are you wanting patient to keep his appt in December? Please advise.

## 2023-07-17 NOTE — TELEPHONE ENCOUNTER
He needs to follow the guidelines  If he is mistrusting of our care, advise obtaining another physician to manage him  This is standard protocol

## 2023-07-18 NOTE — TELEPHONE ENCOUNTER
Patient called back with wife and they decided to schedule appointment on August 22nd at 5 with Brandon Daly NP

## 2023-07-18 NOTE — TELEPHONE ENCOUNTER
Spoke with patient. Notified him that Dr. Teri Hammond would be discharging him as a patient due to patient not being compliant with follow up testing and orders. Patient asked if he had time to discuss with his wife and think about it. I told the patient I will call later this week to see what his decision is that being a chance to follow orders and scheduling testing or be discharged and find another Cardiologist. Patient voiced understanding.

## 2023-08-09 ENCOUNTER — OFFICE VISIT (OUTPATIENT)
Dept: FAMILY MEDICINE CLINIC | Age: 73
End: 2023-08-09
Payer: MEDICARE

## 2023-08-09 VITALS
SYSTOLIC BLOOD PRESSURE: 130 MMHG | TEMPERATURE: 97.8 F | HEIGHT: 72 IN | RESPIRATION RATE: 16 BRPM | BODY MASS INDEX: 30.54 KG/M2 | HEART RATE: 56 BPM | WEIGHT: 225.5 LBS | DIASTOLIC BLOOD PRESSURE: 70 MMHG

## 2023-08-09 DIAGNOSIS — C61 PROSTATE CANCER (HCC): ICD-10-CM

## 2023-08-09 DIAGNOSIS — D45 ACQUIRED POLYCYTHEMIA VERA (HCC): ICD-10-CM

## 2023-08-09 DIAGNOSIS — R35.0 BENIGN PROSTATIC HYPERPLASIA WITH URINARY FREQUENCY: ICD-10-CM

## 2023-08-09 DIAGNOSIS — N18.30 STAGE 3 CHRONIC KIDNEY DISEASE, UNSPECIFIED WHETHER STAGE 3A OR 3B CKD (HCC): ICD-10-CM

## 2023-08-09 DIAGNOSIS — I50.22 CHRONIC SYSTOLIC CONGESTIVE HEART FAILURE, NYHA CLASS 2 (HCC): ICD-10-CM

## 2023-08-09 DIAGNOSIS — Z00.00 MEDICARE ANNUAL WELLNESS VISIT, SUBSEQUENT: Primary | ICD-10-CM

## 2023-08-09 DIAGNOSIS — I20.9 ANGINA PECTORIS, UNSPECIFIED (HCC): ICD-10-CM

## 2023-08-09 DIAGNOSIS — E78.5 HYPERLIPIDEMIA, UNSPECIFIED HYPERLIPIDEMIA TYPE: ICD-10-CM

## 2023-08-09 DIAGNOSIS — E11.9 TYPE 2 DIABETES MELLITUS WITHOUT COMPLICATION, WITHOUT LONG-TERM CURRENT USE OF INSULIN (HCC): ICD-10-CM

## 2023-08-09 DIAGNOSIS — N40.1 BENIGN PROSTATIC HYPERPLASIA WITH URINARY FREQUENCY: ICD-10-CM

## 2023-08-09 DIAGNOSIS — I25.119 ATHEROSCLEROSIS OF NATIVE CORONARY ARTERY OF NATIVE HEART WITH ANGINA PECTORIS (HCC): ICD-10-CM

## 2023-08-09 DIAGNOSIS — I10 ESSENTIAL HYPERTENSION: ICD-10-CM

## 2023-08-09 LAB
HBA1C MFR BLD: 6.4 %
HBA1C MFR BLD: 6.4 % (ref 4.3–5.7)

## 2023-08-09 PROCEDURE — G0439 PPPS, SUBSEQ VISIT: HCPCS | Performed by: NURSE PRACTITIONER

## 2023-08-09 PROCEDURE — 3078F DIAST BP <80 MM HG: CPT | Performed by: NURSE PRACTITIONER

## 2023-08-09 PROCEDURE — 83037 HB GLYCOSYLATED A1C HOME DEV: CPT | Performed by: NURSE PRACTITIONER

## 2023-08-09 PROCEDURE — 3017F COLORECTAL CA SCREEN DOC REV: CPT | Performed by: NURSE PRACTITIONER

## 2023-08-09 PROCEDURE — 1123F ACP DISCUSS/DSCN MKR DOCD: CPT | Performed by: NURSE PRACTITIONER

## 2023-08-09 PROCEDURE — 3044F HG A1C LEVEL LT 7.0%: CPT | Performed by: NURSE PRACTITIONER

## 2023-08-09 PROCEDURE — 3074F SYST BP LT 130 MM HG: CPT | Performed by: NURSE PRACTITIONER

## 2023-08-09 SDOH — ECONOMIC STABILITY: FOOD INSECURITY: WITHIN THE PAST 12 MONTHS, YOU WORRIED THAT YOUR FOOD WOULD RUN OUT BEFORE YOU GOT MONEY TO BUY MORE.: NEVER TRUE

## 2023-08-09 SDOH — ECONOMIC STABILITY: HOUSING INSECURITY
IN THE LAST 12 MONTHS, WAS THERE A TIME WHEN YOU DID NOT HAVE A STEADY PLACE TO SLEEP OR SLEPT IN A SHELTER (INCLUDING NOW)?: NO

## 2023-08-09 SDOH — ECONOMIC STABILITY: FOOD INSECURITY: WITHIN THE PAST 12 MONTHS, THE FOOD YOU BOUGHT JUST DIDN'T LAST AND YOU DIDN'T HAVE MONEY TO GET MORE.: NEVER TRUE

## 2023-08-09 SDOH — ECONOMIC STABILITY: INCOME INSECURITY: HOW HARD IS IT FOR YOU TO PAY FOR THE VERY BASICS LIKE FOOD, HOUSING, MEDICAL CARE, AND HEATING?: NOT HARD AT ALL

## 2023-08-09 ASSESSMENT — ENCOUNTER SYMPTOMS
SHORTNESS OF BREATH: 0
NAUSEA: 0
SORE THROAT: 0
EYE PAIN: 0
BACK PAIN: 0
FACIAL SWELLING: 0
WHEEZING: 0
COUGH: 0
TROUBLE SWALLOWING: 0
ABDOMINAL PAIN: 0
COLOR CHANGE: 0
VOMITING: 0
DIARRHEA: 0
SINUS PAIN: 0

## 2023-08-09 ASSESSMENT — PATIENT HEALTH QUESTIONNAIRE - PHQ9
SUM OF ALL RESPONSES TO PHQ9 QUESTIONS 1 & 2: 2
SUM OF ALL RESPONSES TO PHQ QUESTIONS 1-9: 2
2. FEELING DOWN, DEPRESSED OR HOPELESS: 1
SUM OF ALL RESPONSES TO PHQ QUESTIONS 1-9: 2
1. LITTLE INTEREST OR PLEASURE IN DOING THINGS: 1

## 2023-08-22 ENCOUNTER — OFFICE VISIT (OUTPATIENT)
Dept: CARDIOLOGY CLINIC | Age: 73
End: 2023-08-22
Payer: MEDICARE

## 2023-08-22 VITALS
DIASTOLIC BLOOD PRESSURE: 80 MMHG | HEIGHT: 72 IN | SYSTOLIC BLOOD PRESSURE: 130 MMHG | WEIGHT: 224 LBS | BODY MASS INDEX: 30.34 KG/M2 | HEART RATE: 60 BPM

## 2023-08-22 DIAGNOSIS — I50.22 CHRONIC SYSTOLIC CONGESTIVE HEART FAILURE, NYHA CLASS 2 (HCC): ICD-10-CM

## 2023-08-22 DIAGNOSIS — I48.0 PAROXYSMAL A-FIB (HCC): ICD-10-CM

## 2023-08-22 DIAGNOSIS — I10 ESSENTIAL HYPERTENSION: ICD-10-CM

## 2023-08-22 DIAGNOSIS — Z95.818 PRESENCE OF WATCHMAN LEFT ATRIAL APPENDAGE CLOSURE DEVICE: Primary | ICD-10-CM

## 2023-08-22 DIAGNOSIS — I25.10 CORONARY ARTERY DISEASE INVOLVING NATIVE CORONARY ARTERY OF NATIVE HEART WITHOUT ANGINA PECTORIS: ICD-10-CM

## 2023-08-22 DIAGNOSIS — E78.5 HYPERLIPIDEMIA, UNSPECIFIED HYPERLIPIDEMIA TYPE: ICD-10-CM

## 2023-08-22 PROCEDURE — 3075F SYST BP GE 130 - 139MM HG: CPT | Performed by: NURSE PRACTITIONER

## 2023-08-22 PROCEDURE — 1036F TOBACCO NON-USER: CPT | Performed by: NURSE PRACTITIONER

## 2023-08-22 PROCEDURE — 1123F ACP DISCUSS/DSCN MKR DOCD: CPT | Performed by: NURSE PRACTITIONER

## 2023-08-22 PROCEDURE — 99213 OFFICE O/P EST LOW 20 MIN: CPT | Performed by: NURSE PRACTITIONER

## 2023-08-22 PROCEDURE — 3017F COLORECTAL CA SCREEN DOC REV: CPT | Performed by: NURSE PRACTITIONER

## 2023-08-22 PROCEDURE — G8427 DOCREV CUR MEDS BY ELIG CLIN: HCPCS | Performed by: NURSE PRACTITIONER

## 2023-08-22 PROCEDURE — 3079F DIAST BP 80-89 MM HG: CPT | Performed by: NURSE PRACTITIONER

## 2023-08-22 PROCEDURE — G8417 CALC BMI ABV UP PARAM F/U: HCPCS | Performed by: NURSE PRACTITIONER

## 2023-08-22 NOTE — PROGRESS NOTES
INDICATION:  Longstanding persistent atrial fibrillation, CHADS2-VASC  score of 4, not a candidate for a long-term anticoagulation. SUMMARY:  Successful percutaneous left atrial appendage occlusion with a  Watchman FLX 27-mm device. PLAN:  1. Bedrest.  2.  Optimal medical therapy. 3.  Risk factor management. 4.  Routine access site care. 5.  IV fluids. 6.  Overnight inpatient admission. 7.  Restart home meds. 8.  Follow up with [facility] per protocol. 9.  45-day SANG  10. Follow up the patient for early discharge protocol. All the above was explained to the patient and the patient's family. They were agreeable and amenable to the plan. Sonny Villa MD   D: 07/19/2022    ASSESSMENT:    Diagnosis Orders   1. Presence of Watchman left atrial appendage closure device        2. Paroxysmal A-fib ([address])        3. Chronic systolic congestive heart failure, NYHA class 2 ([address])        4. Coronary artery disease involving native coronary artery of native heart without angina pectoris        5. Essential hypertension        6. Hyperlipidemia, unspecified hyperlipidemia type          Patient and spouse upset; thought were seeing Dr. Tammy Bhatt today. Voiced that they did not know about routine follow-up appointments pertaining to post-WATCHMAN management. Writer apologized for Marleni Ignacio - discussed rationale for NP to see in follow-up vs Dr. Tammy Bhatt in use of time and skill set in providing care to all patients. Discussed Valve program guidelines that dictate follow-up plan. They both verbalize understanding. Patient doing well. No stroke sx, no anginal sx or evidence of decompensated HF. Euvolemic on exam. Tolerating meds; no bleeding on ASA. Believes extremities feel little colder during the night on the ASA. Has noted mild dependent rubra of both feet after sitting. PT, AT 1+ bilaterally. No skin breakdown or open wounds. No claudication sx.  Did discuss option of

## 2023-08-22 NOTE — PATIENT INSTRUCTIONS
Continue current medications as prescribed. Stay as active as you can. Eat heart healthy diet. Follow-up with your PCP as scheduled. Follow-up with Dr. Blane Le in one year as scheduled or sooner if need. You may receive a survey regarding the care you received during your visit. Your input is valuable to us. We encourage you to complete and return your survey. We hope you will choose us in the future for your healthcare needs.

## 2023-09-28 ENCOUNTER — TELEPHONE (OUTPATIENT)
Dept: CARDIOLOGY CLINIC | Age: 73
End: 2023-09-28

## 2023-09-28 NOTE — TELEPHONE ENCOUNTER
Pt wife states pt has been coughing for 5-6 months and its getting worse, pt is on amiodarone and it states to contact dr montanez if this happens, as he is getting chest discomfort with the cough. Asking recommendations?

## 2023-10-02 ENCOUNTER — OFFICE VISIT (OUTPATIENT)
Dept: CARDIOLOGY CLINIC | Age: 73
End: 2023-10-02

## 2023-10-02 ENCOUNTER — HOSPITAL ENCOUNTER (OUTPATIENT)
Age: 73
Discharge: HOME | End: 2023-10-02
Payer: MEDICARE

## 2023-10-02 VITALS
BODY MASS INDEX: 30.48 KG/M2 | WEIGHT: 225 LBS | SYSTOLIC BLOOD PRESSURE: 118 MMHG | DIASTOLIC BLOOD PRESSURE: 70 MMHG | HEIGHT: 72 IN | HEART RATE: 62 BPM

## 2023-10-02 DIAGNOSIS — Z79.899 ON AMIODARONE THERAPY: ICD-10-CM

## 2023-10-02 DIAGNOSIS — I48.0 PAROXYSMAL A-FIB (HCC): ICD-10-CM

## 2023-10-02 DIAGNOSIS — R05.3 PERSISTENT DRY COUGH: ICD-10-CM

## 2023-10-02 DIAGNOSIS — E87.5 HYPERKALEMIA: Primary | ICD-10-CM

## 2023-10-02 LAB
ANION GAP SERPL CALC-SCNC: 10 MEQ/L (ref 8–16)
BUN SERPL-MCNC: 52 MG/DL (ref 7–22)
CALCIUM SERPL-MCNC: 9.3 MG/DL (ref 8.5–10.5)
CHLORIDE SERPL-SCNC: 104 MEQ/L (ref 98–111)
CO2 SERPL-SCNC: 24 MEQ/L (ref 23–33)
CREAT SERPL-MCNC: 1.9 MG/DL (ref 0.4–1.2)
DIGOXIN SERPL-MCNC: 1.6 NG/ML (ref 0.5–2)
GFR SERPL CREATININE-BSD FRML MDRD: 37 ML/MIN/1.73M2
GLUCOSE SERPL-MCNC: 138 MG/DL (ref 70–108)
POTASSIUM SERPL-SCNC: 5.7 MEQ/L (ref 3.5–5.2)
SODIUM SERPL-SCNC: 138 MEQ/L (ref 135–145)
TSH SERPL DL<=0.005 MIU/L-ACNC: 2.03 UIU/ML (ref 0.4–4.2)

## 2023-10-02 PROCEDURE — 80048 BASIC METABOLIC PNL TOTAL CA: CPT

## 2023-10-02 PROCEDURE — 84443 ASSAY THYROID STIM HORMONE: CPT

## 2023-10-02 PROCEDURE — 36415 COLL VENOUS BLD VENIPUNCTURE: CPT

## 2023-10-02 PROCEDURE — 80162 ASSAY OF DIGOXIN TOTAL: CPT

## 2023-10-02 RX ORDER — AMIODARONE HYDROCHLORIDE 200 MG/1
200 TABLET ORAL DAILY
Qty: 180 TABLET | Refills: 2
Start: 2023-10-02

## 2023-10-02 NOTE — PROGRESS NOTES
[facility] PROFESSIONAL SERVICES  HEART SPECIALISTS [address] [address]   Dept: [phone]   Dept Fax: [phone]: [phone]    Primary Cardiologist: Structural Heart: Saba Craig MD    Chief Complaint   Patient presents with    Follow-up    Medication Problem     Last seen in office on 8/22/23 per this writer. Per office note:  ASSESSMENT:     Diagnosis Orders   1. Presence of Watchman left atrial appendage closure device          2. Paroxysmal A-fib ([address] [facility])          3. Chronic systolic congestive heart failure, NYHA class 2 ([address] [facility])          4. Coronary artery disease involving native coronary artery of native heart without angina pectoris          5. Essential hypertension          6. Hyperlipidemia, unspecified hyperlipidemia type             Patient and spouse upset; thought were seeing Dr. Chevy Mai today. Voiced that they did not know about routine follow-up appointments pertaining to post-WATCHMAN management. Writer apologized for [facility] - discussed rationale for NP to see in follow-up vs Dr. Chevy Mai in use of time and skill set in providing care to all patients. Discussed Valve program guidelines that dictate follow-up plan. They both verbalize understanding. Patient doing well. No stroke sx, no anginal sx or evidence of decompensated HF. Euvolemic on exam. Tolerating meds; no bleeding on ASA. Believes extremities feel little colder during the night on the ASA. Has noted mild dependent rubra of both feet after sitting. PT, AT 1+ bilaterally. No skin breakdown or open wounds. No claudication sx. Did discuss option of vascular screening exam - information given at discharge for them to pursue if desired. Continue Dr Delmer Lu current treatment plan. Patient Instructions   Continue current medications as prescribed. Stay as active as you can. Eat heart healthy diet. Follow-up with your PCP as scheduled.    Follow-up with

## 2023-10-02 NOTE — PROGRESS NOTES
They state patient has a terrible cough, lots of fatigue, feels weak, nervousness, irritability and feels depressed. He feels like symptoms have been progressing and are just getting worse. Cough drops seems to settle down the cough but nothing ever takes care of the cough. He received the COVID/Influenza combination vaccine 2 years ago and wonders if it is from that as well. They feel like this could be due to a medication he is on.

## 2023-10-03 DIAGNOSIS — I50.22 CHRONIC SYSTOLIC CONGESTIVE HEART FAILURE, NYHA CLASS 2 (HCC): ICD-10-CM

## 2023-10-03 DIAGNOSIS — N18.9 CHRONIC KIDNEY DISEASE, UNSPECIFIED CKD STAGE: Primary | ICD-10-CM

## 2023-10-03 DIAGNOSIS — I10 ESSENTIAL HYPERTENSION: ICD-10-CM

## 2023-10-03 DIAGNOSIS — Z95.818 PRESENCE OF WATCHMAN LEFT ATRIAL APPENDAGE CLOSURE DEVICE: ICD-10-CM

## 2023-10-03 DIAGNOSIS — I48.0 PAROXYSMAL A-FIB (HCC): ICD-10-CM

## 2023-10-03 DIAGNOSIS — I25.10 CORONARY ARTERY DISEASE INVOLVING NATIVE CORONARY ARTERY OF NATIVE HEART WITHOUT ANGINA PECTORIS: ICD-10-CM

## 2023-10-03 NOTE — RESULT ENCOUNTER NOTE
Called and spoke to patient. Aldactone and KCL supplement placed on hold. Encouraged to drink more water. Referral to Nephrology placed - patient aware. Will check on him on Friday morning - likely labs next week - will address on Friday.

## 2023-10-04 ENCOUNTER — TELEPHONE (OUTPATIENT)
Dept: CARDIOLOGY CLINIC | Age: 73
End: 2023-10-04

## 2023-10-04 NOTE — TELEPHONE ENCOUNTER
----- Message from NARESH Hussein CNP sent at 10/3/2023  5:12 PM EDT -----  Called and spoke to patient. Aldactone and KCL supplement placed on hold. Encouraged to drink more water. Referral to Nephrology placed - patient aware. Will check on him on Friday morning - likely labs next week - will address on Friday.

## 2023-10-07 NOTE — PATIENT INSTRUCTIONS
Decrease Amiodarone from 200 mg BID to daily. Have labs drawn as ordered     Have PFT study and US of thyroid as scheduled. Continue current medications as prescribed. Follow-up with your PCP as scheduled. Follow-up with Dr. Aaliyah Porras or Babar Astudillo CNP as scheduled or sooner if need.

## 2023-10-10 ENCOUNTER — HOSPITAL ENCOUNTER (OUTPATIENT)
Age: 73
Discharge: HOME | End: 2023-10-10
Payer: MEDICARE

## 2023-10-10 DIAGNOSIS — I48.0 PAROXYSMAL A-FIB (HCC): ICD-10-CM

## 2023-10-10 DIAGNOSIS — E87.5 HYPERKALEMIA: ICD-10-CM

## 2023-10-10 LAB
ANION GAP SERPL CALC-SCNC: 13 MEQ/L (ref 8–16)
BUN SERPL-MCNC: 44 MG/DL (ref 7–22)
CALCIUM SERPL-MCNC: 9.8 MG/DL (ref 8.5–10.5)
CHLORIDE SERPL-SCNC: 102 MEQ/L (ref 98–111)
CO2 SERPL-SCNC: 23 MEQ/L (ref 23–33)
CREAT SERPL-MCNC: 1.6 MG/DL (ref 0.4–1.2)
GFR SERPL CREATININE-BSD FRML MDRD: 46 ML/MIN/1.73M2
GLUCOSE SERPL-MCNC: 108 MG/DL (ref 70–108)
POTASSIUM SERPL-SCNC: 4.7 MEQ/L (ref 3.5–5.2)
SODIUM SERPL-SCNC: 138 MEQ/L (ref 135–145)

## 2023-10-10 PROCEDURE — 36415 COLL VENOUS BLD VENIPUNCTURE: CPT

## 2023-10-10 PROCEDURE — 80048 BASIC METABOLIC PNL TOTAL CA: CPT

## 2023-10-17 ENCOUNTER — HOSPITAL ENCOUNTER (OUTPATIENT)
Dept: PULMONOLOGY | Age: 73
Discharge: HOME | End: 2023-10-17
Attending: NURSE PRACTITIONER
Payer: MEDICARE

## 2023-10-17 ENCOUNTER — HOSPITAL ENCOUNTER (OUTPATIENT)
Dept: ULTRASOUND IMAGING | Age: 73
Discharge: HOME | End: 2023-10-17
Attending: NURSE PRACTITIONER
Payer: MEDICARE

## 2023-10-17 DIAGNOSIS — Z79.899 ON AMIODARONE THERAPY: ICD-10-CM

## 2023-10-17 DIAGNOSIS — I48.0 PAROXYSMAL A-FIB (HCC): ICD-10-CM

## 2023-10-17 DIAGNOSIS — R05.3 PERSISTENT DRY COUGH: ICD-10-CM

## 2023-10-17 PROCEDURE — 76536 US EXAM OF HEAD AND NECK: CPT

## 2023-10-17 PROCEDURE — 94060 EVALUATION OF WHEEZING: CPT

## 2023-10-17 PROCEDURE — 94726 PLETHYSMOGRAPHY LUNG VOLUMES: CPT

## 2023-10-17 PROCEDURE — 94729 DIFFUSING CAPACITY: CPT

## 2023-10-17 RX ORDER — ROSUVASTATIN CALCIUM 20 MG/1
20 TABLET, COATED ORAL DAILY
Qty: 90 TABLET | Refills: 3 | Status: SHIPPED | OUTPATIENT
Start: 2023-10-17

## 2023-10-17 NOTE — TELEPHONE ENCOUNTER
Lucia Aguayo called requesting a refill on the following medications:  Requested Prescriptions     Pending Prescriptions Disp Refills    rosuvastatin (CRESTOR) 20 MG tablet 90 tablet 3     Sig: Take 1 tablet by mouth daily     Pharmacy verified:[facility]   . pv      Date of last visit:   Date of next visit (if applicable): Visit date not found

## 2023-10-18 ENCOUNTER — TELEPHONE (OUTPATIENT)
Dept: CARDIOLOGY CLINIC | Age: 73
End: 2023-10-18

## 2023-10-18 NOTE — TELEPHONE ENCOUNTER
Results of ultrasound of thyroid:  CERVICAL LYMPH NODES:   1. There are no pathologically enlarged lymph nodes adjacent to the thyroid gland. IMPRESSION:  1. Subcentimeter thyroid nodules are noted. Follow-up should be based on patient's risk factors and labwork. 2. Mild thyromegaly.

## 2023-10-19 ENCOUNTER — OFFICE VISIT (OUTPATIENT)
Dept: NEPHROLOGY | Age: 73
End: 2023-10-19
Payer: MEDICARE

## 2023-10-19 VITALS
OXYGEN SATURATION: 96 % | BODY MASS INDEX: 31.09 KG/M2 | WEIGHT: 229.2 LBS | HEART RATE: 52 BPM | DIASTOLIC BLOOD PRESSURE: 72 MMHG | SYSTOLIC BLOOD PRESSURE: 117 MMHG

## 2023-10-19 DIAGNOSIS — I10 PRIMARY HYPERTENSION: ICD-10-CM

## 2023-10-19 DIAGNOSIS — N18.31 STAGE 3A CHRONIC KIDNEY DISEASE (HCC): Primary | ICD-10-CM

## 2023-10-19 DIAGNOSIS — E87.5 HYPERKALEMIA: ICD-10-CM

## 2023-10-19 PROCEDURE — 99204 OFFICE O/P NEW MOD 45 MIN: CPT | Performed by: INTERNAL MEDICINE

## 2023-10-19 PROCEDURE — 1123F ACP DISCUSS/DSCN MKR DOCD: CPT | Performed by: INTERNAL MEDICINE

## 2023-10-19 PROCEDURE — 1036F TOBACCO NON-USER: CPT | Performed by: INTERNAL MEDICINE

## 2023-10-19 PROCEDURE — G8484 FLU IMMUNIZE NO ADMIN: HCPCS | Performed by: INTERNAL MEDICINE

## 2023-10-19 PROCEDURE — 3078F DIAST BP <80 MM HG: CPT | Performed by: INTERNAL MEDICINE

## 2023-10-19 PROCEDURE — G8427 DOCREV CUR MEDS BY ELIG CLIN: HCPCS | Performed by: INTERNAL MEDICINE

## 2023-10-19 PROCEDURE — 3074F SYST BP LT 130 MM HG: CPT | Performed by: INTERNAL MEDICINE

## 2023-10-19 PROCEDURE — 3017F COLORECTAL CA SCREEN DOC REV: CPT | Performed by: INTERNAL MEDICINE

## 2023-10-19 PROCEDURE — G8417 CALC BMI ABV UP PARAM F/U: HCPCS | Performed by: INTERNAL MEDICINE

## 2023-10-19 NOTE — PATIENT INSTRUCTIONS
Drugs to Avoid with Chronic Kidney Disease    Non-steroidal anti-inflammatory drugs (NSAIDS)    Potential complication and side effects of NSAIDS include:  Kidney injury and worsening kidney function   Risk of stomach ulcer and intestinal bleeding  Fluid retention and edema  Increased Blood Pressure    Non-Steroidal Anti-Inflammatory Drugs    Celecoxib (Celebrex)  Choline and magnesium salicylates (CMT, Trisosal, Trilisate)  Choline salicylate (Arthropan)  Diclofenac (Voltaren, Arthrotec, Cataflam, Cambia, Voltaren-XR, Zipsor)  Diflunisal (Dolobid)  Etodolac (Lodine XL, Lodine)  Famotidine + Ibuprofen (Duexis)  Fenoprofen (Nalfon, Nalfon 200)  Furbiprofen (Asaid)  Ibuprofen (Advil, Motrin, Midol, Nuprin, Genpril, Dolgesic,Profen,, Vicoprofen,Combunox, Actiprofen, Addaprin, Caldolor, Haltran, Q-Profen,Ibren, Menadol, Rufen,Saleto-200, [place],Ultraprin, Uni-Pro,Wal-Profen)  Indomethacin (Indocin, Indomethegan, Indo-Joe)   Ketoprofen (Orudis  KT, Oruvail, Actron)  Ketorolac (Toradol, Sprix)  Magnesium Sulfate (Arthritab, Yasmeen Select, Deshawn's pills, Margarito, Mobidin, Mobogesic)  Meclofenamate Sodium (Ponstel)  Meloxicam (Mobic)  Naproxen (Aleve, Anaprox, Naprosyn, EC-Naprosyn, Naprelan, All Day Pain Relief, Aflaxen, Anaprox-DS, Midol Extended Relief, Naprelan,Prevacid NapraPac, Naprapac, Vimovo)  Nabumetone (Relafen)  Oxaprozin (Daypro)  Piroxicam (Feldene)  Rofecoxib (Vioxx)  Salsalate (Amigesic, Anaflex 750, Disalcid, Marthritic, Mono-gesic, Salflex, Salsitab)  Sodium salicylate (various generics)  Sulindac (Clinoril)  Tolmetin (Tolectin)  Valdecoxib (Bextra)  Mefenamic Acid (Ponstel)  Famotidine and Ibuprofen (Duexis) but not famotidine by itself  Meclofenamate (Meclomen)    Antibiotics  Bactrim  Gentamycin  Tobramycin  Vancomycin  Tetracycline  Macrobid    Other                  IV contrast dye

## 2023-10-19 NOTE — PROGRESS NOTES
Pas 2-3 weeks had severe back pain, that has resolved. He now has pain lower right abdomen pain. Increases when going from sitting to standing. He is taking tylenol OTC for pain and applying heat compresses with little relief.
tablet 3     No current facility-administered medications for this visit. No current facility-administered medications for this visit. Review of Systems:   Review of Systems   Pertinent positives stated above in HPI. All other systems were reviewed and were negative. ROS: As in HPI        Physical exam:   Physical Exam well-developed elderly gentleman in no distress  Constitutional:    Vitals:   Vitals:    10/19/23 1254   BP: 117/72   Pulse: 52   SpO2: 96%       Skin: no rash, turgor is normal  Heent: Pupils are reactive to light. Throat is clear. Oral mucosa is moist.  Neck: no bruits or jvd noted **  Cardiovascular:  S1, S2 without murmur **  Respiratory: Clear with no wheezes,rhonchi or rales   Abdomen: soft,non tender,good bowel sound and no palpable mass**  Ext: No lower extremity edema  Psychiatric: mood and affect appropriate**  Musculoskeletal:  Rom, muscular strength intact **  CNS: Very awake and very alert. Well-oriented. Normal speech. Good motor strength. No obvious focal deficit.       Data:   Labs:  Lab Results   Component Value Date     10/10/2023     10/02/2023     07/10/2023    K 4.7 10/10/2023    K 5.7 (H) 10/02/2023    K 5.5 (H) 07/10/2023     10/10/2023    CO2 23 10/10/2023    CO2 24 10/02/2023    CO2 27 07/10/2023    CREATININE 1.6 (H) 10/10/2023    CREATININE 1.9 (H) 10/02/2023    CREATININE 1.8 (H) 07/10/2023    BUN 44 (H) 10/10/2023    BUN 52 (H) 10/02/2023    BUN 46 (H) 07/10/2023    GLUCOSE 108 10/10/2023    GLUCOSE 138 (H) 10/02/2023    GLUCOSE 113 (H) 07/10/2023    PHOS 3.0 07/21/2018    WBC 9.4 07/10/2023    WBC 8.7 02/01/2023    WBC 6.3 11/10/2022    HGB 18.6 (H) 07/10/2023    HGB 18.3 (H) 02/01/2023    HGB 17.2 11/10/2022    HCT 57.8 (H) 07/10/2023    HCT 55.3 (H) 02/01/2023    HCT 53.3 (H) 11/10/2022    MCV 97.0 (H) 07/10/2023     07/10/2023         Imaging:  CXR results:  @ studies        Thank you Dr. Cameron Herron, APRN - CNP for

## 2023-10-25 DIAGNOSIS — R05.3 PERSISTENT DRY COUGH: ICD-10-CM

## 2023-10-25 DIAGNOSIS — I10 ESSENTIAL HYPERTENSION: ICD-10-CM

## 2023-10-25 DIAGNOSIS — I48.11 LONGSTANDING PERSISTENT ATRIAL FIBRILLATION (HCC): ICD-10-CM

## 2023-10-25 DIAGNOSIS — Z79.899 ON AMIODARONE THERAPY: Primary | ICD-10-CM

## 2023-10-25 DIAGNOSIS — I50.22 CHRONIC SYSTOLIC CONGESTIVE HEART FAILURE, NYHA CLASS 2 (HCC): ICD-10-CM

## 2023-10-25 DIAGNOSIS — I48.0 PAROXYSMAL A-FIB (HCC): ICD-10-CM

## 2023-10-25 RX ORDER — AMIODARONE HYDROCHLORIDE 200 MG/1
200 TABLET ORAL 2 TIMES DAILY
Qty: 180 TABLET | Refills: 2 | Status: ON HOLD
Start: 2023-10-25 | End: 2023-12-07 | Stop reason: HOSPADM

## 2023-10-25 NOTE — TELEPHONE ENCOUNTER
Wife on HIPAA Courtney Dougherty calling about lab, thyroid ultrasound, and PFT results? They are requesting [facility] to call them at [phone].

## 2023-10-26 NOTE — TELEPHONE ENCOUNTER
FYI-pt wife states that she made a mistake as pt is only taking Amiodarone Daily, and pt is doing well!

## 2023-11-13 ENCOUNTER — TELEPHONE (OUTPATIENT)
Dept: NEPHROLOGY | Age: 73
End: 2023-11-13

## 2023-11-13 ENCOUNTER — HOSPITAL ENCOUNTER (OUTPATIENT)
Age: 73
Discharge: HOME | End: 2023-11-13
Payer: MEDICARE

## 2023-11-13 ENCOUNTER — HOSPITAL ENCOUNTER (OUTPATIENT)
Dept: ULTRASOUND IMAGING | Age: 73
Discharge: HOME | End: 2023-11-13
Attending: INTERNAL MEDICINE
Payer: MEDICARE

## 2023-11-13 DIAGNOSIS — N18.31 STAGE 3A CHRONIC KIDNEY DISEASE (HCC): ICD-10-CM

## 2023-11-13 LAB
25(OH)D3 SERPL-MCNC: 17 NG/ML (ref 30–100)
ANION GAP SERPL CALC-SCNC: 14 MEQ/L (ref 8–16)
BUN SERPL-MCNC: 19 MG/DL (ref 7–22)
C3C SERPL-MCNC: 137 MG/DL (ref 90–180)
C4 SERPL-MCNC: 21 MG/DL (ref 10–40)
CALCIUM SERPL-MCNC: 9.4 MG/DL (ref 8.5–10.5)
CHLORIDE SERPL-SCNC: 105 MEQ/L (ref 98–111)
CO2 SERPL-SCNC: 26 MEQ/L (ref 23–33)
CREAT SERPL-MCNC: 1.3 MG/DL (ref 0.4–1.2)
CREAT UR-MCNC: 290.2 MG/DL
GFR SERPL CREATININE-BSD FRML MDRD: 58 ML/MIN/1.73M2
GLUCOSE SERPL-MCNC: 101 MG/DL (ref 70–108)
POTASSIUM SERPL-SCNC: 3.8 MEQ/L (ref 3.5–5.2)
PROT UR-MCNC: 45.9 MG/DL
PROT/CREAT 24H UR: 0.16 MG/G{CREAT}
PTH-INTACT SERPL-MCNC: 128.1 PG/ML (ref 15–65)
SODIUM SERPL-SCNC: 145 MEQ/L (ref 135–145)

## 2023-11-13 PROCEDURE — 86160 COMPLEMENT ANTIGEN: CPT

## 2023-11-13 PROCEDURE — 82306 VITAMIN D 25 HYDROXY: CPT

## 2023-11-13 PROCEDURE — 36415 COLL VENOUS BLD VENIPUNCTURE: CPT

## 2023-11-13 PROCEDURE — 83970 ASSAY OF PARATHORMONE: CPT

## 2023-11-13 PROCEDURE — 83883 ASSAY NEPHELOMETRY NOT SPEC: CPT

## 2023-11-13 PROCEDURE — 76770 US EXAM ABDO BACK WALL COMP: CPT

## 2023-11-13 PROCEDURE — 80048 BASIC METABOLIC PNL TOTAL CA: CPT

## 2023-11-13 PROCEDURE — 86038 ANTINUCLEAR ANTIBODIES: CPT

## 2023-11-13 PROCEDURE — 84156 ASSAY OF PROTEIN URINE: CPT

## 2023-11-13 PROCEDURE — 82570 ASSAY OF URINE CREATININE: CPT

## 2023-11-13 NOTE — TELEPHONE ENCOUNTER
----- Message from Robles Joseph MD sent at 11/13/2023 11:32 AM EST -----  Kidney ultrasound is normal except for right kidney being a little smaller than the left kidney but the size is still within the normal limit.

## 2023-11-15 LAB
KAPPA/LAMBDA FREE LIGHT CHAINS: NORMAL
NUCLEAR IGG SER QL IA: NORMAL

## 2023-11-20 ENCOUNTER — OFFICE VISIT (OUTPATIENT)
Dept: NEPHROLOGY | Age: 73
End: 2023-11-20
Payer: MEDICARE

## 2023-11-20 VITALS
HEART RATE: 56 BPM | HEIGHT: 73 IN | DIASTOLIC BLOOD PRESSURE: 82 MMHG | OXYGEN SATURATION: 98 % | WEIGHT: 232 LBS | BODY MASS INDEX: 30.75 KG/M2 | SYSTOLIC BLOOD PRESSURE: 132 MMHG

## 2023-11-20 DIAGNOSIS — E55.9 VITAMIN D DEFICIENCY: ICD-10-CM

## 2023-11-20 DIAGNOSIS — I10 PRIMARY HYPERTENSION: ICD-10-CM

## 2023-11-20 DIAGNOSIS — R80.9 PROTEINURIA, UNSPECIFIED TYPE: ICD-10-CM

## 2023-11-20 DIAGNOSIS — N25.81 HYPERPARATHYROIDISM, SECONDARY RENAL (HCC): ICD-10-CM

## 2023-11-20 DIAGNOSIS — N18.31 STAGE 3A CHRONIC KIDNEY DISEASE (HCC): Primary | ICD-10-CM

## 2023-11-20 PROCEDURE — G8484 FLU IMMUNIZE NO ADMIN: HCPCS | Performed by: INTERNAL MEDICINE

## 2023-11-20 PROCEDURE — 3017F COLORECTAL CA SCREEN DOC REV: CPT | Performed by: INTERNAL MEDICINE

## 2023-11-20 PROCEDURE — G8427 DOCREV CUR MEDS BY ELIG CLIN: HCPCS | Performed by: INTERNAL MEDICINE

## 2023-11-20 PROCEDURE — 99214 OFFICE O/P EST MOD 30 MIN: CPT | Performed by: INTERNAL MEDICINE

## 2023-11-20 PROCEDURE — G8417 CALC BMI ABV UP PARAM F/U: HCPCS | Performed by: INTERNAL MEDICINE

## 2023-11-20 PROCEDURE — 1036F TOBACCO NON-USER: CPT | Performed by: INTERNAL MEDICINE

## 2023-11-20 PROCEDURE — 1123F ACP DISCUSS/DSCN MKR DOCD: CPT | Performed by: INTERNAL MEDICINE

## 2023-11-20 PROCEDURE — 3075F SYST BP GE 130 - 139MM HG: CPT | Performed by: INTERNAL MEDICINE

## 2023-11-20 PROCEDURE — 3079F DIAST BP 80-89 MM HG: CPT | Performed by: INTERNAL MEDICINE

## 2023-11-20 RX ORDER — DOCUSATE SODIUM 100 MG/1
100 CAPSULE, LIQUID FILLED ORAL NIGHTLY
COMMUNITY

## 2023-11-20 NOTE — PROGRESS NOTES
MINUTES AS NEEDED FOR CHEST PAIN. DO NOT EXCEED A TOTAL OF 3 DOSES IN 15 MINUTES (Patient not taking: Reported on 11/20/2023) 25 tablet 2     No current facility-administered medications for this visit.        Lab Results:    CBC:   Lab Results   Component Value Date    WBC 9.4 07/10/2023    HGB 18.6 (H) 07/10/2023    HCT 57.8 (H) 07/10/2023    MCV 97.0 (H) 07/10/2023     07/10/2023     BMP:    Lab Results   Component Value Date     11/13/2023     10/10/2023     10/02/2023    K 3.8 11/13/2023    K 4.7 10/10/2023    K 5.7 (H) 10/02/2023     11/13/2023     10/10/2023     10/02/2023    CO2 26 11/13/2023    CO2 23 10/10/2023    CO2 24 10/02/2023    BUN 19 11/13/2023    BUN 44 (H) 10/10/2023    BUN 52 (H) 10/02/2023    CREATININE 1.3 (H) 11/13/2023    CREATININE 1.6 (H) 10/10/2023    CREATININE 1.9 (H) 10/02/2023    GLUCOSE 101 11/13/2023    GLUCOSE 108 10/10/2023    GLUCOSE 138 (H) 10/02/2023      Hepatic:   Lab Results   Component Value Date    AST 33 02/01/2023    AST 27 10/02/2022    AST 22 09/26/2022    ALT 54 02/01/2023    ALT 28 10/02/2022    ALT 18 09/26/2022    BILITOT 0.7 02/01/2023    BILITOT 0.8 10/02/2022    BILITOT 1.0 09/26/2022    ALKPHOS 61 02/01/2023    ALKPHOS 74 10/02/2022    ALKPHOS 87 09/26/2022     BNP: No results found for: \"BNP\"  Lipids:   Lab Results   Component Value Date    CHOL 113 02/01/2023    HDL 31 02/01/2023     INR:   Lab Results   Component Value Date    INR 1.15 (H) 11/10/2022    INR 1.17 (H) 10/02/2022    INR 2.50 (H) 09/01/2022     URINE:   Lab Results   Component Value Date/Time    PROTUR 45.9 11/13/2023 06:44 AM     Lab Results   Component Value Date/Time    NITRU NEGATIVE 03/26/2021 12:10 PM    COLORU YELLOW 03/26/2021 12:10 PM    PHUR 5.0 03/26/2021 12:10 PM    WBCUA 0-2 11/25/2020 06:40 AM    RBCUA 0-2 11/25/2020 06:40 AM    YEAST NONE SEEN 11/25/2020 06:40 AM    BACTERIA NONE SEEN 11/25/2020 06:40 AM    CLARITYU clear 09/12/2018 07:37

## 2023-11-28 ENCOUNTER — TELEPHONE (OUTPATIENT)
Dept: CARDIOLOGY CLINIC | Age: 73
End: 2023-11-28

## 2023-11-29 NOTE — TELEPHONE ENCOUNTER
Pts wife calling, pt has had increased coughing the last three days. Coughing so hard it makes his chest feel tight. Also, affecting his sleep. When pt saw Serenity last, some medication changes were made and wife states cough did get better for awhile, but it's back. No other cold / flu symptoms. Asking for [facility]'s advise.
ROHINI    Pt wife called. She states the cough mysteriously just stopped. He is not coughing at all anymore.
09:00

## 2023-12-06 ENCOUNTER — APPOINTMENT (OUTPATIENT)
Dept: CT IMAGING | Age: 73
End: 2023-12-06
Payer: MEDICARE

## 2023-12-06 ENCOUNTER — HOSPITAL ENCOUNTER (OUTPATIENT)
Age: 73
Setting detail: OBSERVATION
Discharge: HOME | End: 2023-12-07
Attending: EMERGENCY MEDICINE | Admitting: INTERNAL MEDICINE
Payer: MEDICARE

## 2023-12-06 ENCOUNTER — APPOINTMENT (OUTPATIENT)
Dept: GENERAL RADIOLOGY | Age: 73
End: 2023-12-06
Payer: MEDICARE

## 2023-12-06 DIAGNOSIS — K44.9 HIATAL HERNIA: ICD-10-CM

## 2023-12-06 DIAGNOSIS — R05.3 CHRONIC COUGH: Primary | ICD-10-CM

## 2023-12-06 PROBLEM — I48.91 ATRIAL FIBRILLATION WITH SLOW VENTRICULAR RESPONSE (HCC): Status: ACTIVE | Noted: 2023-12-06

## 2023-12-06 LAB
ALBUMIN SERPL BCG-MCNC: 3.9 G/DL (ref 3.5–5.1)
ALP SERPL-CCNC: 66 U/L (ref 38–126)
ALT SERPL W/O P-5'-P-CCNC: 71 U/L (ref 11–66)
ANION GAP SERPL CALC-SCNC: 10 MEQ/L (ref 8–16)
AST SERPL-CCNC: 55 U/L (ref 5–40)
BASOPHILS ABSOLUTE: 0.1 THOU/MM3 (ref 0–0.1)
BASOPHILS NFR BLD AUTO: 0.8 %
BILIRUB CONJ SERPL-MCNC: < 0.2 MG/DL (ref 0–0.3)
BILIRUB SERPL-MCNC: 0.4 MG/DL (ref 0.3–1.2)
BUN SERPL-MCNC: 23 MG/DL (ref 7–22)
CALCIUM SERPL-MCNC: 8.9 MG/DL (ref 8.5–10.5)
CHLORIDE SERPL-SCNC: 107 MEQ/L (ref 98–111)
CO2 SERPL-SCNC: 25 MEQ/L (ref 23–33)
CREAT SERPL-MCNC: 1.4 MG/DL (ref 0.4–1.2)
DEPRECATED RDW RBC AUTO: 45.9 FL (ref 35–45)
DIGOXIN SERPL-MCNC: 1.2 NG/ML (ref 0.5–2)
EOSINOPHIL NFR BLD AUTO: 2.3 %
EOSINOPHILS ABSOLUTE: 0.2 THOU/MM3 (ref 0–0.4)
ERYTHROCYTE [DISTWIDTH] IN BLOOD BY AUTOMATED COUNT: 13 % (ref 11.5–14.5)
FLUAV RNA RESP QL NAA+PROBE: NOT DETECTED
FLUBV RNA RESP QL NAA+PROBE: NOT DETECTED
GFR SERPL CREATININE-BSD FRML MDRD: 53 ML/MIN/1.73M2
GLUCOSE SERPL-MCNC: 102 MG/DL (ref 70–108)
HCT VFR BLD AUTO: 52.9 % (ref 42–52)
HGB BLD-MCNC: 17.5 GM/DL (ref 14–18)
IMM GRANULOCYTES # BLD AUTO: 0.04 THOU/MM3 (ref 0–0.07)
IMM GRANULOCYTES NFR BLD AUTO: 0.5 %
LIPASE SERPL-CCNC: 38.3 U/L (ref 5.6–51.3)
LYMPHOCYTES ABSOLUTE: 1.6 THOU/MM3 (ref 1–4.8)
LYMPHOCYTES NFR BLD AUTO: 21.6 %
MCH RBC QN AUTO: 31.6 PG (ref 26–33)
MCHC RBC AUTO-ENTMCNC: 33.1 GM/DL (ref 32.2–35.5)
MCV RBC AUTO: 95.7 FL (ref 80–94)
MONOCYTES ABSOLUTE: 0.6 THOU/MM3 (ref 0.4–1.3)
MONOCYTES NFR BLD AUTO: 8.1 %
NEUTROPHILS NFR BLD AUTO: 66.7 %
NRBC BLD AUTO-RTO: 0 /100 WBC
NT-PROBNP SERPL IA-MCNC: 1261 PG/ML (ref 0–124)
OSMOLALITY SERPL CALC.SUM OF ELEC: 287 MOSMOL/KG (ref 275–300)
PLATELET # BLD AUTO: 280 THOU/MM3 (ref 130–400)
PMV BLD AUTO: 10.7 FL (ref 9.4–12.4)
POTASSIUM SERPL-SCNC: 5 MEQ/L (ref 3.5–5.2)
PROCALCITONIN SERPL IA-MCNC: 0.06 NG/ML (ref 0.01–0.09)
PROT SERPL-MCNC: 6.1 G/DL (ref 6.1–8)
RBC # BLD AUTO: 5.53 MILL/MM3 (ref 4.7–6.1)
REASON FOR REJECTION: NORMAL
REJECTED TEST: NORMAL
SARS-COV-2 RNA RESP QL NAA+PROBE: NOT DETECTED
SEGMENTED NEUTROPHILS ABSOLUTE COUNT: 5 THOU/MM3 (ref 1.8–7.7)
SODIUM SERPL-SCNC: 142 MEQ/L (ref 135–145)
TROPONIN, HIGH SENSITIVITY: 16 NG/L (ref 0–12)
TROPONIN, HIGH SENSITIVITY: 16 NG/L (ref 0–12)
WBC # BLD AUTO: 7.5 THOU/MM3 (ref 4.8–10.8)

## 2023-12-06 PROCEDURE — 36415 COLL VENOUS BLD VENIPUNCTURE: CPT

## 2023-12-06 PROCEDURE — 6360000002 HC RX W HCPCS: Performed by: INTERNAL MEDICINE

## 2023-12-06 PROCEDURE — 74177 CT ABD & PELVIS W/CONTRAST: CPT

## 2023-12-06 PROCEDURE — 96372 THER/PROPH/DIAG INJ SC/IM: CPT

## 2023-12-06 PROCEDURE — 83690 ASSAY OF LIPASE: CPT

## 2023-12-06 PROCEDURE — G0378 HOSPITAL OBSERVATION PER HR: HCPCS

## 2023-12-06 PROCEDURE — 71275 CT ANGIOGRAPHY CHEST: CPT

## 2023-12-06 PROCEDURE — 93010 ELECTROCARDIOGRAM REPORT: CPT | Performed by: INTERNAL MEDICINE

## 2023-12-06 PROCEDURE — 2580000003 HC RX 258: Performed by: INTERNAL MEDICINE

## 2023-12-06 PROCEDURE — 87636 SARSCOV2 & INF A&B AMP PRB: CPT

## 2023-12-06 PROCEDURE — 93005 ELECTROCARDIOGRAM TRACING: CPT | Performed by: EMERGENCY MEDICINE

## 2023-12-06 PROCEDURE — 6360000004 HC RX CONTRAST MEDICATION: Performed by: EMERGENCY MEDICINE

## 2023-12-06 PROCEDURE — 84484 ASSAY OF TROPONIN QUANT: CPT

## 2023-12-06 PROCEDURE — 71045 X-RAY EXAM CHEST 1 VIEW: CPT

## 2023-12-06 PROCEDURE — 80048 BASIC METABOLIC PNL TOTAL CA: CPT

## 2023-12-06 PROCEDURE — 80162 ASSAY OF DIGOXIN TOTAL: CPT

## 2023-12-06 PROCEDURE — 85025 COMPLETE CBC W/AUTO DIFF WBC: CPT

## 2023-12-06 PROCEDURE — 99285 EMERGENCY DEPT VISIT HI MDM: CPT

## 2023-12-06 PROCEDURE — 83880 ASSAY OF NATRIURETIC PEPTIDE: CPT

## 2023-12-06 PROCEDURE — 84145 PROCALCITONIN (PCT): CPT

## 2023-12-06 PROCEDURE — 80076 HEPATIC FUNCTION PANEL: CPT

## 2023-12-06 RX ORDER — ACETAMINOPHEN 650 MG/1
650 SUPPOSITORY RECTAL EVERY 6 HOURS PRN
Status: DISCONTINUED | OUTPATIENT
Start: 2023-12-06 | End: 2023-12-07 | Stop reason: HOSPADM

## 2023-12-06 RX ORDER — POLYETHYLENE GLYCOL 3350 17 G/17G
17 POWDER, FOR SOLUTION ORAL DAILY PRN
Status: DISCONTINUED | OUTPATIENT
Start: 2023-12-06 | End: 2023-12-07 | Stop reason: HOSPADM

## 2023-12-06 RX ORDER — METOPROLOL TARTRATE 100 MG/1
100 TABLET ORAL 2 TIMES DAILY
Status: DISCONTINUED | OUTPATIENT
Start: 2023-12-06 | End: 2023-12-07

## 2023-12-06 RX ORDER — ROSUVASTATIN CALCIUM 20 MG/1
20 TABLET, COATED ORAL DAILY
Status: DISCONTINUED | OUTPATIENT
Start: 2023-12-07 | End: 2023-12-07 | Stop reason: HOSPADM

## 2023-12-06 RX ORDER — NITROGLYCERIN 0.4 MG/1
0.4 TABLET SUBLINGUAL EVERY 5 MIN PRN
Status: DISCONTINUED | OUTPATIENT
Start: 2023-12-06 | End: 2023-12-07 | Stop reason: HOSPADM

## 2023-12-06 RX ORDER — SODIUM CHLORIDE 0.9 % (FLUSH) 0.9 %
5-40 SYRINGE (ML) INJECTION EVERY 12 HOURS SCHEDULED
Status: DISCONTINUED | OUTPATIENT
Start: 2023-12-06 | End: 2023-12-07 | Stop reason: HOSPADM

## 2023-12-06 RX ORDER — ASPIRIN 81 MG/1
81 TABLET, CHEWABLE ORAL DAILY
Status: DISCONTINUED | OUTPATIENT
Start: 2023-12-07 | End: 2023-12-07 | Stop reason: HOSPADM

## 2023-12-06 RX ORDER — ENOXAPARIN SODIUM 100 MG/ML
30 INJECTION SUBCUTANEOUS 2 TIMES DAILY
Status: DISCONTINUED | OUTPATIENT
Start: 2023-12-06 | End: 2023-12-07 | Stop reason: HOSPADM

## 2023-12-06 RX ORDER — DILTIAZEM HYDROCHLORIDE 120 MG/1
120 CAPSULE, COATED, EXTENDED RELEASE ORAL DAILY
Status: DISCONTINUED | OUTPATIENT
Start: 2023-12-07 | End: 2023-12-07 | Stop reason: HOSPADM

## 2023-12-06 RX ORDER — ONDANSETRON 2 MG/ML
4 INJECTION INTRAMUSCULAR; INTRAVENOUS EVERY 6 HOURS PRN
Status: DISCONTINUED | OUTPATIENT
Start: 2023-12-06 | End: 2023-12-07 | Stop reason: HOSPADM

## 2023-12-06 RX ORDER — DOCUSATE SODIUM 100 MG/1
100 CAPSULE, LIQUID FILLED ORAL NIGHTLY
Status: DISCONTINUED | OUTPATIENT
Start: 2023-12-06 | End: 2023-12-07 | Stop reason: HOSPADM

## 2023-12-06 RX ORDER — ONDANSETRON 4 MG/1
4 TABLET, ORALLY DISINTEGRATING ORAL EVERY 8 HOURS PRN
Status: DISCONTINUED | OUTPATIENT
Start: 2023-12-06 | End: 2023-12-07 | Stop reason: HOSPADM

## 2023-12-06 RX ORDER — SODIUM CHLORIDE 0.9 % (FLUSH) 0.9 %
5-40 SYRINGE (ML) INJECTION PRN
Status: DISCONTINUED | OUTPATIENT
Start: 2023-12-06 | End: 2023-12-07 | Stop reason: HOSPADM

## 2023-12-06 RX ORDER — ACETAMINOPHEN 325 MG/1
650 TABLET ORAL EVERY 6 HOURS PRN
Status: DISCONTINUED | OUTPATIENT
Start: 2023-12-06 | End: 2023-12-07 | Stop reason: HOSPADM

## 2023-12-06 RX ORDER — CHOLECALCIFEROL (VITAMIN D3) 50 MCG
2000 TABLET ORAL DAILY
COMMUNITY

## 2023-12-06 RX ORDER — SODIUM CHLORIDE 9 MG/ML
INJECTION, SOLUTION INTRAVENOUS PRN
Status: DISCONTINUED | OUTPATIENT
Start: 2023-12-06 | End: 2023-12-07 | Stop reason: HOSPADM

## 2023-12-06 RX ADMIN — SODIUM CHLORIDE, PRESERVATIVE FREE 10 ML: 5 INJECTION INTRAVENOUS at 20:54

## 2023-12-06 RX ADMIN — IOPAMIDOL 80 ML: 755 INJECTION, SOLUTION INTRAVENOUS at 15:25

## 2023-12-06 RX ADMIN — ENOXAPARIN SODIUM 30 MG: 100 INJECTION SUBCUTANEOUS at 20:54

## 2023-12-06 ASSESSMENT — PAIN - FUNCTIONAL ASSESSMENT: PAIN_FUNCTIONAL_ASSESSMENT: NONE - DENIES PAIN

## 2023-12-06 NOTE — H&P
Internal Medicine  History and Physical    Patient:  Alvino Valenzuela  MRN: [id number]      History Obtained From:  patient, spouse  PCP: NARESH Lee CNP    CHIEF COMPLAINT:  cough, SOB, fatigue, gurgly abd    HISTORY OF PRESENT ILLNESS:   The patient is a 72 y.o. male who presents with recurrent cough shortness of breath and generalized weakness going on in the last few months. Developed some gurgly sound in the abdomen. Patient denies fever denies chills. He denies chest pain. No nausea no vomiting. Patient with a known history of chronic atrial fibrillation with prior left atrial appendage occlusion maintained on amiodarone. Seen in emergency room, his EKG showed atrial fibrillation with slow ventricular response 48. However dynamic telemetry showed heart rate dipping to about 38. Evaluated with CTA chest negative for PE or any acute lung pathology. CT scan of the abdomen showed a small sliding hiatal hernia. Labs showed mild renal insufficiency creatinine 1.4 elevated AST and ALT , proBNP. Patient is admitted for further evaluation of the shortness of breath, cough and profound bradycardia. Past Medical History:        Diagnosis Date    Acquired polycythemia vera ([address]) 8/9/2023    Anesthesia     takes large dose of medications to make him sleepy for surgery    Atrial fibrillation ([address])     Benign prostatic hyperplasia with urinary frequency 10/21/2019    CAD (coronary artery disease)     Chest pain     Colon polyp 2011    removed    Coronary artery disease involving native coronary artery of native heart without angina pectoris 10/21/2019    Dementia ([address])     Dizziness     Elevated PSA     GERD (gastroesophageal reflux disease)     Heart disease     Hyperlipidemia     Hypertension        Past Surgical History:        Procedure Laterality Date    ADENOIDECTOMY      CARDIAC CATHETERIZATION  09/23/2011    Right radial artery cannulation. Moderate LV dysfunction.  The patient has

## 2023-12-06 NOTE — ED NOTES
Patient resting in bed with family at bedside, denies any further needs or concerns at this time. Call light in reach. Will continue to monitor.       Brayan Huerta RN  12/06/23 2036

## 2023-12-06 NOTE — ED NOTES
Patient resting in bed with wife at bedside. Respirations unlabored. No distress noted. Call light within reach.      Angel[facility] Huerta [facility] RN  12/06/23 7176

## 2023-12-06 NOTE — PROGRESS NOTES
VN completed admission questions and home medication list at this time with pt. Pt resting in bed with call light in reach. No voiced questions or concerns at this time. Pt referred to page 13 in hospital handbook for fall and safety precautions. Virtual services explained and pt agreed.

## 2023-12-06 NOTE — ED PROVIDER NOTES
BPM    QRS Duration 114 ms    Q-T Interval 540 ms    QTc Calculation (Bazett) 456 ms    R Axis -6 degrees    T Axis 123 degrees     (Any cultures that may have been sent were not resulted at the time of this patient visit)    [facility] and ED COURSE   Patient was seen and evaluated at 2:46 PM EST. DDx include but not limited to: Includes but not limited to: Pneumonia, bronchitis, CHF, PE, COPD, asthma, pulmonary edema, pleural effusion, AMI, URI, influenza, sinusitis, allergies, anxiety    Initial management include:   Large bore IV  Basic labs  CTA chest  CT AP    Summary:    Stable ED stay. ED workups are reassuring. No clear etiology of patient's chronic coughing. I suspect patient has GERD and hiatal hernia. Patient has persistent and significant bradycardia in ED although he is asymptomatic. His ventricular rate was as low as 33/minute. EKG shows atrial fibrillation with SVR. Patient is on amiodarone, beta-blocker, calcium channel blocker, and digoxin. I feel these medications should be held. Given his age and significant bradycardia, I feel admission for observation is appropriate, discussed with and admitted by Dr. Shaye Nicole.      ED Medications administered this visit:  (None if blank)  Medications   aspirin chewable tablet 81 mg (has no administration in time range)   dilTIAZem (CARDIZEM CD) extended release capsule 120 mg (has no administration in time range)   docusate sodium (COLACE) capsule 100 mg (has no administration in time range)   metoprolol (LOPRESSOR) tablet 100 mg (100 mg Oral Not Given 12/6/23 2013)   rosuvastatin (CRESTOR) tablet 20 mg (has no administration in time range)   nitroGLYCERIN (NITROSTAT) SL tablet 0.4 mg (has no administration in time range)   sodium chloride flush 0.9 % injection 5-40 mL (has no administration in time range)   sodium chloride flush 0.9 % injection 5-40 mL (has no administration in time range)   0.9 % sodium chloride infusion (has no

## 2023-12-06 NOTE — ED NOTES
Presents to ED from home with wife with complaints of worsening shortness of breath. Pt admits he has been feeling more fatigued and short of breath since August, but reports since yesterday he has been short of breath just sitting down. PT noted to be bradycardic upon arrival. EKG completed.      Efren Huerta RN  12/06/23 2098

## 2023-12-07 VITALS
WEIGHT: 235 LBS | SYSTOLIC BLOOD PRESSURE: 147 MMHG | HEIGHT: 73 IN | TEMPERATURE: 97.5 F | RESPIRATION RATE: 16 BRPM | DIASTOLIC BLOOD PRESSURE: 92 MMHG | OXYGEN SATURATION: 97 % | HEART RATE: 52 BPM | BODY MASS INDEX: 31.14 KG/M2

## 2023-12-07 LAB
ALBUMIN SERPL BCG-MCNC: 3.7 G/DL (ref 3.5–5.1)
ALP SERPL-CCNC: 62 U/L (ref 38–126)
ALT SERPL W/O P-5'-P-CCNC: 66 U/L (ref 11–66)
ANION GAP SERPL CALC-SCNC: 11 MEQ/L (ref 8–16)
AST SERPL-CCNC: 44 U/L (ref 5–40)
BILIRUB CONJ SERPL-MCNC: < 0.2 MG/DL (ref 0–0.3)
BILIRUB SERPL-MCNC: 0.5 MG/DL (ref 0.3–1.2)
BUN SERPL-MCNC: 18 MG/DL (ref 7–22)
CALCIUM SERPL-MCNC: 9.1 MG/DL (ref 8.5–10.5)
CHLORIDE SERPL-SCNC: 107 MEQ/L (ref 98–111)
CO2 SERPL-SCNC: 28 MEQ/L (ref 23–33)
CREAT SERPL-MCNC: 1.3 MG/DL (ref 0.4–1.2)
DEPRECATED RDW RBC AUTO: 45.8 FL (ref 35–45)
EKG Q-T INTERVAL: 410 MS
EKG Q-T INTERVAL: 540 MS
EKG Q-T INTERVAL: 616 MS
EKG QRS DURATION: 100 MS
EKG QRS DURATION: 112 MS
EKG QRS DURATION: 114 MS
EKG QTC CALCULATION (BAZETT): 388 MS
EKG QTC CALCULATION (BAZETT): 456 MS
EKG QTC CALCULATION (BAZETT): 556 MS
EKG R AXIS: -6 DEGREES
EKG R AXIS: 71 DEGREES
EKG R AXIS: 78 DEGREES
EKG T AXIS: -175 DEGREES
EKG T AXIS: 112 DEGREES
EKG T AXIS: 123 DEGREES
EKG VENTRICULAR RATE: 43 BPM
EKG VENTRICULAR RATE: 49 BPM
EKG VENTRICULAR RATE: 54 BPM
ERYTHROCYTE [DISTWIDTH] IN BLOOD BY AUTOMATED COUNT: 13.2 % (ref 11.5–14.5)
GFR SERPL CREATININE-BSD FRML MDRD: 58 ML/MIN/1.73M2
GLUCOSE SERPL-MCNC: 102 MG/DL (ref 70–108)
HCT VFR BLD AUTO: 50.7 % (ref 42–52)
HGB BLD-MCNC: 16.6 GM/DL (ref 14–18)
INR PPP: 1 (ref 0.85–1.13)
MAGNESIUM SERPL-MCNC: 2 MG/DL (ref 1.6–2.4)
MCH RBC QN AUTO: 31 PG (ref 26–33)
MCHC RBC AUTO-ENTMCNC: 32.7 GM/DL (ref 32.2–35.5)
MCV RBC AUTO: 94.6 FL (ref 80–94)
PLATELET # BLD AUTO: 204 THOU/MM3 (ref 130–400)
PMV BLD AUTO: 10.3 FL (ref 9.4–12.4)
POTASSIUM SERPL-SCNC: 4 MEQ/L (ref 3.5–5.2)
PROT SERPL-MCNC: 6.2 G/DL (ref 6.1–8)
RBC # BLD AUTO: 5.36 MILL/MM3 (ref 4.7–6.1)
SODIUM SERPL-SCNC: 146 MEQ/L (ref 135–145)
T4 FREE SERPL-MCNC: 1.57 NG/DL (ref 0.93–1.76)
TSH SERPL DL<=0.005 MIU/L-ACNC: 3.15 UIU/ML (ref 0.4–4.2)
WBC # BLD AUTO: 7.9 THOU/MM3 (ref 4.8–10.8)

## 2023-12-07 PROCEDURE — 93010 ELECTROCARDIOGRAM REPORT: CPT | Performed by: INTERNAL MEDICINE

## 2023-12-07 PROCEDURE — 85610 PROTHROMBIN TIME: CPT

## 2023-12-07 PROCEDURE — 93005 ELECTROCARDIOGRAM TRACING: CPT | Performed by: INTERNAL MEDICINE

## 2023-12-07 PROCEDURE — 6360000002 HC RX W HCPCS: Performed by: INTERNAL MEDICINE

## 2023-12-07 PROCEDURE — 6370000000 HC RX 637 (ALT 250 FOR IP)

## 2023-12-07 PROCEDURE — 85027 COMPLETE CBC AUTOMATED: CPT

## 2023-12-07 PROCEDURE — 6370000000 HC RX 637 (ALT 250 FOR IP): Performed by: INTERNAL MEDICINE

## 2023-12-07 PROCEDURE — G0378 HOSPITAL OBSERVATION PER HR: HCPCS

## 2023-12-07 PROCEDURE — 84439 ASSAY OF FREE THYROXINE: CPT

## 2023-12-07 PROCEDURE — 82248 BILIRUBIN DIRECT: CPT

## 2023-12-07 PROCEDURE — 96372 THER/PROPH/DIAG INJ SC/IM: CPT

## 2023-12-07 PROCEDURE — 83735 ASSAY OF MAGNESIUM: CPT

## 2023-12-07 PROCEDURE — 36415 COLL VENOUS BLD VENIPUNCTURE: CPT

## 2023-12-07 PROCEDURE — 84443 ASSAY THYROID STIM HORMONE: CPT

## 2023-12-07 PROCEDURE — 2580000003 HC RX 258: Performed by: INTERNAL MEDICINE

## 2023-12-07 PROCEDURE — 80053 COMPREHEN METABOLIC PANEL: CPT

## 2023-12-07 RX ORDER — METOPROLOL TARTRATE 50 MG/1
50 TABLET, FILM COATED ORAL 2 TIMES DAILY
Status: DISCONTINUED | OUTPATIENT
Start: 2023-12-07 | End: 2023-12-07 | Stop reason: HOSPADM

## 2023-12-07 RX ORDER — OMEPRAZOLE 20 MG/1
20 CAPSULE, DELAYED RELEASE ORAL
Qty: 30 CAPSULE | Refills: 0 | Status: SHIPPED | OUTPATIENT
Start: 2023-12-07

## 2023-12-07 RX ORDER — METOPROLOL TARTRATE 50 MG/1
50 TABLET, FILM COATED ORAL 2 TIMES DAILY
Qty: 60 TABLET | Refills: 3 | Status: SHIPPED | OUTPATIENT
Start: 2023-12-07

## 2023-12-07 RX ORDER — LOSARTAN POTASSIUM 25 MG/1
25 TABLET ORAL DAILY
Qty: 30 TABLET | Refills: 5 | Status: SHIPPED | OUTPATIENT
Start: 2023-12-07

## 2023-12-07 RX ORDER — VITAMIN B COMPLEX
2000 TABLET ORAL DAILY
Status: DISCONTINUED | OUTPATIENT
Start: 2023-12-07 | End: 2023-12-07 | Stop reason: HOSPADM

## 2023-12-07 RX ORDER — LISINOPRIL 10 MG/1
10 TABLET ORAL DAILY
Status: DISCONTINUED | OUTPATIENT
Start: 2023-12-07 | End: 2023-12-07 | Stop reason: HOSPADM

## 2023-12-07 RX ADMIN — SODIUM CHLORIDE, PRESERVATIVE FREE 10 ML: 5 INJECTION INTRAVENOUS at 08:45

## 2023-12-07 RX ADMIN — ROSUVASTATIN CALCIUM 20 MG: 20 TABLET, FILM COATED ORAL at 08:46

## 2023-12-07 RX ADMIN — LISINOPRIL 10 MG: 10 TABLET ORAL at 14:16

## 2023-12-07 RX ADMIN — METOPROLOL TARTRATE 50 MG: 50 TABLET, FILM COATED ORAL at 08:46

## 2023-12-07 RX ADMIN — VITAMIN D, TAB 1000IU (100/BT) 2000 UNITS: 25 TAB at 14:16

## 2023-12-07 RX ADMIN — DILTIAZEM HYDROCHLORIDE 120 MG: 120 CAPSULE, COATED, EXTENDED RELEASE ORAL at 08:46

## 2023-12-07 RX ADMIN — ENOXAPARIN SODIUM 30 MG: 100 INJECTION SUBCUTANEOUS at 08:45

## 2023-12-07 RX ADMIN — ASPIRIN 81 MG: 81 TABLET, CHEWABLE ORAL at 08:46

## 2023-12-07 NOTE — FLOWSHEET NOTE
12/07/23 1001   Safe Environment   Safety Measures Call light within reach; Family at bedside;Side rails X 2;Standard Safety Measures  (Virtual Nurse Safety Round)     VN completed safety rounds. Camera accessed for safety. Patient visualized alert in bed. Family at bedside. No s/s of any distress noted. Call bell and personal items within reach. No needs at this time.

## 2023-12-07 NOTE — PLAN OF CARE
Problem: Discharge Planning  Goal: Discharge to home or other facility with appropriate resources  Outcome: Progressing  Flowsheets (Taken 12/7/2023 0245)  Discharge to home or other facility with appropriate resources:   Identify barriers to discharge with patient and caregiver   Arrange for needed discharge resources and transportation as appropriate   Identify discharge learning needs (meds, wound care, etc)   Arrange for interpreters to assist at discharge as needed   Refer to discharge planning if patient needs post-hospital services based on physician order or complex needs related to functional status, cognitive ability or social support system     Problem: Safety - Adult  Goal: Free from fall injury  Outcome: Progressing  Flowsheets (Taken 12/7/2023 0245)  Free From Fall Injury: Instruct family/caregiver on patient safety     Problem: ABCDS Injury Assessment  Goal: Absence of physical injury  Outcome: Progressing  Flowsheets (Taken 12/7/2023 0245)  Absence of Physical Injury: Implement safety measures based on patient assessment     Problem: Pain  Goal: Verbalizes/displays adequate comfort level or baseline comfort level  Outcome: Progressing  Flowsheets (Taken 12/7/2023 0245)  Verbalizes/displays adequate comfort level or baseline comfort level:   Encourage patient to monitor pain and request assistance   Assess pain using appropriate pain scale   Administer analgesics based on type and severity of pain and evaluate response   Implement non-pharmacological measures as appropriate and evaluate response   Consider cultural and social influences on pain and pain management   Notify Licensed Independent Practitioner if interventions unsuccessful or patient reports new pain     Problem: Skin/Tissue Integrity  Goal: Absence of new skin breakdown  Description: 1. Monitor for areas of redness and/or skin breakdown  2. Assess vascular access sites hourly  3.   Every 4-6 hours minimum:  Change oxygen saturation

## 2023-12-07 NOTE — PLAN OF CARE
Problem: Discharge Planning  Goal: Discharge to home or other facility with appropriate resources  12/7/2023 1817 by Diego Schirmer, RN  Outcome: Adequate for Discharge  12/7/2023 1817 by Diego Schirmer, RN  Outcome: Adequate for Discharge     Problem: Safety - Adult  Goal: Free from fall injury  12/7/2023 1817 by Diego Schirmer, RN  Outcome: Adequate for Discharge  12/7/2023 1817 by Diego Schirmer, RN  Outcome: Adequate for Discharge     Problem: ABCDS Injury Assessment  Goal: Absence of physical injury  12/7/2023 1817 by Diego Schirmer, RN  Outcome: Adequate for Discharge  12/7/2023 1817 by Diego Schirmer, RN  Outcome: Adequate for Discharge     Problem: Pain  Goal: Verbalizes/displays adequate comfort level or baseline comfort level  12/7/2023 1817 by Diego Schirmer, RN  Outcome: Adequate for Discharge  12/7/2023 1817 by Diego Schirmer, RN  Outcome: Adequate for Discharge     Problem: Skin/Tissue Integrity  Goal: Absence of new skin breakdown  Description: 1. Monitor for areas of redness and/or skin breakdown  2. Assess vascular access sites hourly  3. Every 4-6 hours minimum:  Change oxygen saturation probe site  4. Every 4-6 hours:  If on nasal continuous positive airway pressure, respiratory therapy assess nares and determine need for appliance change or resting period.   12/7/2023 1817 by Diego Schirmer, RN  Outcome: Adequate for Discharge  12/7/2023 1817 by Diego Schirmer, RN  Outcome: Adequate for Discharge     Problem: Cardiovascular - Adult  Goal: Maintains optimal cardiac output and hemodynamic stability  12/7/2023 1817 by Diego Schirmer, RN  Outcome: Adequate for Discharge  12/7/2023 1817 by Diego Schirmer, RN  Outcome: Adequate for Discharge     Problem: Respiratory - Adult  Goal: Achieves optimal ventilation and oxygenation  12/7/2023 1817 by Diego Schirmer, RN  Outcome: Adequate for Discharge  12/7/2023 1817 by Diego Schirmer, RN  Outcome: Adequate for Discharge     Problem: Musculoskeletal - Adult  Goal: Return mobility

## 2023-12-07 NOTE — PROGRESS NOTES
AVS reviewed with patient and wife. New prescriptions and follow up appointments educated on. Patient and wife verbalize understanding. Patient discharged home with wife.

## 2023-12-07 NOTE — FLOWSHEET NOTE
12/07/23 1246   Safe Environment   Safety Measures Call light within reach; Family at bedside;Side rails X 2;Standard Safety Measures  (Virtual Nurse Safety Round)     VN completed safety rounds. Camera accessed for safety. Patient visualized alert in bed. Family at bedside. No s/s of any distress noted. Call bell and personal items within reach. No needs at this time.

## 2023-12-07 NOTE — CARE COORDINATION
so, who? Yes (wife present)  Plans to Return to Present Housing: Yes  Other Identified Issues/Barriers to RETURNING to current housing: none  Potential Assistance needed at discharge: N/A            Potential DME:    Patient expects to discharge to:  [address] for transportation at discharge: Self    Financial    Payor: MEDICARE / Plan: MEDICARE PART A AND B / Product Type: *No Product type* /     Does insurance require precert for SNF: No    Potential assistance Purchasing Medications: No  Meds-to-Beds request: Yes      [address] [phone]  [address]  [place] [address]  Phone: [phone] Fax: [phone]    [facility] [address] [facility] [address] [address] [facility]  [address] [address]  [address] [facility] [address]  Phone: [phone] Fax: [phone]      Notes:    Factors facilitating achievement of predicted outcomes: Family support, Motivated, Cooperative, Pleasant, and Sense of humor    Barriers to discharge: n/a    Additional Case Management Notes: Presented with sob, found to be in afib. Internal med following. Cardio consult. ASA. Cardizem. Metoprolol. Lovenox. Crestor. Procedure:   12/6 CT abdomen:   1. Normal appendix. No bowel obstruction. Colonic diverticulosis without diverticulitis. 2. Stable small sliding-type hiatal hernia. Numerous chronic findings are discussed. Please see CTA chest performed the same day and dictated separately for additional assessment. 12/6 CTA chest:   1. No pulmonary embolism. 2. No acute intrathoracic findings. 12/6 CXR:   1. No acute intrathoracic process. 2. Mild stable cardiomegaly.     The Plan for Transition of Care is related to the following treatment goals of Hiatal hernia [K44.9]  Chronic cough [R05.3]  Atrial fibrillation with slow ventricular response ([address]) [I48.91]    Patient Goals/Plan/Treatment Preferences: Spoke with Jesus Lover and wife, plans to return

## 2024-02-07 ENCOUNTER — HOSPITAL ENCOUNTER (OUTPATIENT)
Age: 74
Discharge: HOME | End: 2024-02-07
Payer: MEDICARE

## 2024-02-07 DIAGNOSIS — C61 PROSTATE CANCER (HCC): ICD-10-CM

## 2024-02-07 DIAGNOSIS — E78.5 HYPERLIPIDEMIA, UNSPECIFIED HYPERLIPIDEMIA TYPE: ICD-10-CM

## 2024-02-07 DIAGNOSIS — I10 ESSENTIAL HYPERTENSION: ICD-10-CM

## 2024-02-07 DIAGNOSIS — E11.9 TYPE 2 DIABETES MELLITUS WITHOUT COMPLICATION, WITHOUT LONG-TERM CURRENT USE OF INSULIN (HCC): ICD-10-CM

## 2024-02-07 LAB
ALBUMIN SERPL BCG-MCNC: 4.5 G/DL (ref 3.5–5.1)
ALP SERPL-CCNC: 71 U/L (ref 38–126)
ALT SERPL W/O P-5'-P-CCNC: 68 U/L (ref 11–66)
ANION GAP SERPL CALC-SCNC: 18 MEQ/L (ref 8–16)
AST SERPL-CCNC: 43 U/L (ref 5–40)
BASOPHILS ABSOLUTE: 0.1 THOU/MM3 (ref 0–0.1)
BASOPHILS NFR BLD AUTO: 0.9 %
BILIRUB SERPL-MCNC: 0.8 MG/DL (ref 0.3–1.2)
BUN SERPL-MCNC: 29 MG/DL (ref 7–22)
CALCIUM SERPL-MCNC: 10.1 MG/DL (ref 8.5–10.5)
CHLORIDE SERPL-SCNC: 104 MEQ/L (ref 98–111)
CHOLEST SERPL-MCNC: 115 MG/DL (ref 100–199)
CO2 SERPL-SCNC: 22 MEQ/L (ref 23–33)
CREAT SERPL-MCNC: 1.3 MG/DL (ref 0.4–1.2)
CREAT UR-MCNC: 303.7 MG/DL
DEPRECATED MEAN GLUCOSE BLD GHB EST-ACNC: 123 MG/DL (ref 70–126)
DEPRECATED RDW RBC AUTO: 45.1 FL (ref 35–45)
EOSINOPHIL NFR BLD AUTO: 2.4 %
EOSINOPHILS ABSOLUTE: 0.2 THOU/MM3 (ref 0–0.4)
ERYTHROCYTE [DISTWIDTH] IN BLOOD BY AUTOMATED COUNT: 12.8 % (ref 11.5–14.5)
GFR SERPL CREATININE-BSD FRML MDRD: 58 ML/MIN/1.73M2
GLUCOSE SERPL-MCNC: 107 MG/DL (ref 70–108)
HBA1C MFR BLD HPLC: 6.1 % (ref 4.4–6.4)
HCT VFR BLD AUTO: 59.4 % (ref 42–52)
HDLC SERPL-MCNC: 40 MG/DL
HGB BLD-MCNC: 19.2 GM/DL (ref 14–18)
IMM GRANULOCYTES # BLD AUTO: 0.03 THOU/MM3 (ref 0–0.07)
IMM GRANULOCYTES NFR BLD AUTO: 0.4 %
LDLC SERPL CALC-MCNC: 62 MG/DL
LYMPHOCYTES ABSOLUTE: 2 THOU/MM3 (ref 1–4.8)
LYMPHOCYTES NFR BLD AUTO: 26.9 %
MCH RBC QN AUTO: 30.7 PG (ref 26–33)
MCHC RBC AUTO-ENTMCNC: 32.3 GM/DL (ref 32.2–35.5)
MCV RBC AUTO: 95 FL (ref 80–94)
MICROALBUMIN UR-MCNC: 7.86 MG/DL
MICROALBUMIN/CREAT RATIO PNL UR: 26 MG/G (ref 0–30)
MONOCYTES ABSOLUTE: 0.6 THOU/MM3 (ref 0.4–1.3)
MONOCYTES NFR BLD AUTO: 7.5 %
NEUTROPHILS NFR BLD AUTO: 61.9 %
NRBC BLD AUTO-RTO: 0 /100 WBC
PLATELET # BLD AUTO: 190 THOU/MM3 (ref 130–400)
PMV BLD AUTO: 11.1 FL (ref 9.4–12.4)
POTASSIUM SERPL-SCNC: 4.2 MEQ/L (ref 3.5–5.2)
PROT SERPL-MCNC: 7.5 G/DL (ref 6.1–8)
PSA SERPL-MCNC: < 0.02 NG/ML (ref 0–1)
RBC # BLD AUTO: 6.25 MILL/MM3 (ref 4.7–6.1)
SEGMENTED NEUTROPHILS ABSOLUTE COUNT: 4.7 THOU/MM3 (ref 1.8–7.7)
SODIUM SERPL-SCNC: 144 MEQ/L (ref 135–145)
T4 FREE SERPL-MCNC: 1.67 NG/DL (ref 0.93–1.76)
TRIGL SERPL-MCNC: 67 MG/DL (ref 0–199)
TSH SERPL DL<=0.005 MIU/L-ACNC: 2.78 UIU/ML (ref 0.4–4.2)
WBC # BLD AUTO: 7.6 THOU/MM3 (ref 4.8–10.8)

## 2024-02-07 PROCEDURE — 80061 LIPID PANEL: CPT

## 2024-02-07 PROCEDURE — 83036 HEMOGLOBIN GLYCOSYLATED A1C: CPT

## 2024-02-07 PROCEDURE — 36415 COLL VENOUS BLD VENIPUNCTURE: CPT

## 2024-02-07 PROCEDURE — 84153 ASSAY OF PSA TOTAL: CPT

## 2024-02-07 PROCEDURE — 84443 ASSAY THYROID STIM HORMONE: CPT

## 2024-02-07 PROCEDURE — 84439 ASSAY OF FREE THYROXINE: CPT

## 2024-02-07 PROCEDURE — 85025 COMPLETE CBC W/AUTO DIFF WBC: CPT

## 2024-02-07 PROCEDURE — 82043 UR ALBUMIN QUANTITATIVE: CPT

## 2024-02-07 PROCEDURE — 80053 COMPREHEN METABOLIC PANEL: CPT

## 2024-02-14 ENCOUNTER — OFFICE VISIT (OUTPATIENT)
Dept: FAMILY MEDICINE CLINIC | Age: 74
End: 2024-02-14
Payer: MEDICARE

## 2024-02-14 VITALS
HEART RATE: 72 BPM | DIASTOLIC BLOOD PRESSURE: 72 MMHG | TEMPERATURE: 97.8 F | RESPIRATION RATE: 16 BRPM | SYSTOLIC BLOOD PRESSURE: 142 MMHG | BODY MASS INDEX: 30.74 KG/M2 | WEIGHT: 233 LBS

## 2024-02-14 DIAGNOSIS — D45 ACQUIRED POLYCYTHEMIA VERA (HCC): ICD-10-CM

## 2024-02-14 DIAGNOSIS — E11.22 TYPE 2 DIABETES MELLITUS WITH STAGE 3 CHRONIC KIDNEY DISEASE, WITHOUT LONG-TERM CURRENT USE OF INSULIN, UNSPECIFIED WHETHER STAGE 3A OR 3B CKD (HCC): ICD-10-CM

## 2024-02-14 DIAGNOSIS — I50.22 CHRONIC SYSTOLIC CONGESTIVE HEART FAILURE, NYHA CLASS 2 (HCC): ICD-10-CM

## 2024-02-14 DIAGNOSIS — I48.91 ATRIAL FIBRILLATION, UNSPECIFIED TYPE (HCC): ICD-10-CM

## 2024-02-14 DIAGNOSIS — N18.30 TYPE 2 DIABETES MELLITUS WITH STAGE 3 CHRONIC KIDNEY DISEASE, WITHOUT LONG-TERM CURRENT USE OF INSULIN, UNSPECIFIED WHETHER STAGE 3A OR 3B CKD (HCC): ICD-10-CM

## 2024-02-14 DIAGNOSIS — E11.9 TYPE 2 DIABETES MELLITUS WITHOUT COMPLICATION, WITHOUT LONG-TERM CURRENT USE OF INSULIN (HCC): Primary | ICD-10-CM

## 2024-02-14 DIAGNOSIS — N18.31 STAGE 3A CHRONIC KIDNEY DISEASE (HCC): ICD-10-CM

## 2024-02-14 DIAGNOSIS — C61 PROSTATE CANCER (HCC): ICD-10-CM

## 2024-02-14 DIAGNOSIS — N25.81 HYPERPARATHYROIDISM, SECONDARY RENAL (HCC): ICD-10-CM

## 2024-02-14 DIAGNOSIS — I25.119 ATHEROSCLEROSIS OF NATIVE CORONARY ARTERY OF NATIVE HEART WITH ANGINA PECTORIS (HCC): ICD-10-CM

## 2024-02-14 DIAGNOSIS — I50.23 ACUTE ON CHRONIC SYSTOLIC (CONGESTIVE) HEART FAILURE (HCC): ICD-10-CM

## 2024-02-14 PROCEDURE — 3044F HG A1C LEVEL LT 7.0%: CPT | Performed by: NURSE PRACTITIONER

## 2024-02-14 PROCEDURE — 2022F DILAT RTA XM EVC RTNOPTHY: CPT | Performed by: NURSE PRACTITIONER

## 2024-02-14 PROCEDURE — 1123F ACP DISCUSS/DSCN MKR DOCD: CPT | Performed by: NURSE PRACTITIONER

## 2024-02-14 PROCEDURE — 1036F TOBACCO NON-USER: CPT | Performed by: NURSE PRACTITIONER

## 2024-02-14 PROCEDURE — G8417 CALC BMI ABV UP PARAM F/U: HCPCS | Performed by: NURSE PRACTITIONER

## 2024-02-14 PROCEDURE — G8484 FLU IMMUNIZE NO ADMIN: HCPCS | Performed by: NURSE PRACTITIONER

## 2024-02-14 PROCEDURE — 3078F DIAST BP <80 MM HG: CPT | Performed by: NURSE PRACTITIONER

## 2024-02-14 PROCEDURE — 3017F COLORECTAL CA SCREEN DOC REV: CPT | Performed by: NURSE PRACTITIONER

## 2024-02-14 PROCEDURE — G8427 DOCREV CUR MEDS BY ELIG CLIN: HCPCS | Performed by: NURSE PRACTITIONER

## 2024-02-14 PROCEDURE — 3077F SYST BP >= 140 MM HG: CPT | Performed by: NURSE PRACTITIONER

## 2024-02-14 PROCEDURE — 99214 OFFICE O/P EST MOD 30 MIN: CPT | Performed by: NURSE PRACTITIONER

## 2024-02-14 RX ORDER — NITROGLYCERIN 0.4 MG/1
0.4 TABLET SUBLINGUAL EVERY 5 MIN PRN
Qty: 25 TABLET | Refills: 0 | Status: SHIPPED | OUTPATIENT
Start: 2024-02-14

## 2024-02-14 RX ORDER — NITROGLYCERIN 0.4 MG/1
0.4 TABLET SUBLINGUAL PRN
COMMUNITY
End: 2024-02-14

## 2024-02-14 NOTE — PROGRESS NOTES
Systems   Constitutional:  Negative for chills, fatigue and fever.   HENT:  Negative for congestion, facial swelling, sinus pain, sore throat and trouble swallowing.    Eyes:  Negative for pain and visual disturbance.   Respiratory:  Negative for cough, shortness of breath and wheezing.    Cardiovascular:  Negative for chest pain and palpitations.   Gastrointestinal:  Negative for abdominal pain, diarrhea, nausea and vomiting.   Genitourinary:  Negative for difficulty urinating, dysuria and urgency.   Musculoskeletal:  Negative for back pain, gait problem and neck pain.   Skin:  Negative for color change and rash.   Neurological:  Negative for dizziness, seizures, weakness and headaches.   Psychiatric/Behavioral:  Negative for agitation and sleep disturbance. The patient is not nervous/anxious.        Prior to Visit Medications    Medication Sig Taking? Authorizing Provider   nitroGLYCERIN (NITROSTAT) 0.4 MG SL tablet Place 1 tablet under the tongue every 5 minutes as needed for Chest pain up to max of 3 total doses. If no relief after 1 dose, call 911. Yes Annie Eckert APRN - CNP   metoprolol tartrate (LOPRESSOR) 50 MG tablet Take 1 tablet by mouth 2 times daily Yes Karen Headley MD   losartan (COZAAR) 25 MG tablet Take 1 tablet by mouth daily Yes Karen Headley MD   vitamin D (CHOLECALCIFEROL) 50 MCG (2000 UT) TABS tablet Take 1 tablet by mouth daily Yes ProviderSkyler MD   rosuvastatin (CRESTOR) 20 MG tablet Take 1 tablet by mouth daily Yes Serenity Lunsford APRN - CNP   furosemide (LASIX) 40 MG tablet Take 1 tablet by mouth daily Yes Laurent Valdez MD   dilTIAZem (CARDIZEM CD) 120 MG extended release capsule Take 1 capsule by mouth daily  Patient taking differently: Take 1 capsule by mouth at bedtime Yes Laurent Valdez MD   aspirin 81 MG chewable tablet Take 1 tablet by mouth in the morning. Yes Kyle Henry PA-C   bisacodyl (DULCOLAX) 5 MG EC tablet Take 1 tablet by mouth nightly Yes

## 2024-03-25 RX ORDER — FUROSEMIDE 40 MG/1
40 TABLET ORAL DAILY
Qty: 90 TABLET | Refills: 1 | Status: SHIPPED | OUTPATIENT
Start: 2024-03-25

## 2024-03-25 RX ORDER — LOSARTAN POTASSIUM 25 MG/1
25 TABLET ORAL DAILY
Qty: 90 TABLET | Refills: 1 | Status: SHIPPED | OUTPATIENT
Start: 2024-03-25

## 2024-04-17 RX ORDER — DILTIAZEM HYDROCHLORIDE 120 MG/1
120 CAPSULE, COATED, EXTENDED RELEASE ORAL NIGHTLY
Qty: 90 CAPSULE | Refills: 3 | Status: SHIPPED | OUTPATIENT
Start: 2024-04-17

## 2024-04-17 NOTE — TELEPHONE ENCOUNTER
Alex Mckeon called requesting a refill on the following medications:  Requested Prescriptions     Pending Prescriptions Disp Refills    dilTIAZem (CARDIZEM CD) 120 MG extended release capsule [Pharmacy Med Name: dilTIAZem HCl ER Coated Beads 120 MG Oral Capsule Extended Release 24 Hour] 90 capsule 0     Sig: Take 1 capsule by mouth once daily     Pharmacy verified:  [email or web address] [address] [place] [address] RD - P [phone] - F [phone]     Date of last visit: 10/02/2023  Date of next visit (if applicable): 8/13/2024

## 2024-04-26 NOTE — TELEPHONE ENCOUNTER
I would recommend that he take Toprol XL 75 mg daily. We increased because his Afib was not well controlled at times. Slid to ground-given glucose

## 2024-05-07 ENCOUNTER — APPOINTMENT (OUTPATIENT)
Dept: CT IMAGING | Age: 74
End: 2024-05-07
Payer: MEDICARE

## 2024-05-07 ENCOUNTER — HOSPITAL ENCOUNTER (OUTPATIENT)
Age: 74
Setting detail: OBSERVATION
Discharge: HOME | End: 2024-05-08
Attending: INTERNAL MEDICINE | Admitting: INTERNAL MEDICINE
Payer: MEDICARE

## 2024-05-07 ENCOUNTER — APPOINTMENT (OUTPATIENT)
Dept: MRI IMAGING | Age: 74
End: 2024-05-07
Payer: MEDICARE

## 2024-05-07 DIAGNOSIS — R40.4 TRANSIENT ALTERATION OF AWARENESS: Primary | ICD-10-CM

## 2024-05-07 PROBLEM — R56.9 SEIZURE-LIKE ACTIVITY (HCC): Status: ACTIVE | Noted: 2024-05-07

## 2024-05-07 LAB
ANION GAP SERPL CALC-SCNC: 9 MEQ/L (ref 8–16)
BACTERIA: ABNORMAL
BASOPHILS ABSOLUTE: 0.1 THOU/MM3 (ref 0–0.1)
BASOPHILS NFR BLD AUTO: 0.7 %
BILIRUB UR QL STRIP: NEGATIVE
BUN SERPL-MCNC: 23 MG/DL (ref 7–22)
CALCIUM SERPL-MCNC: 9.5 MG/DL (ref 8.5–10.5)
CASTS #/AREA URNS LPF: ABNORMAL /LPF
CASTS #/AREA URNS LPF: ABNORMAL /LPF
CHARACTER UR: CLEAR
CHARCOAL URNS QL MICRO: ABNORMAL
CHLORIDE SERPL-SCNC: 104 MEQ/L (ref 98–111)
CO2 SERPL-SCNC: 27 MEQ/L (ref 23–33)
COLOR UR: YELLOW
CREAT SERPL-MCNC: 1.4 MG/DL (ref 0.4–1.2)
CRYSTALS URNS QL MICRO: ABNORMAL
DEPRECATED RDW RBC AUTO: 44.4 FL (ref 35–45)
EKG Q-T INTERVAL: 382 MS
EKG QRS DURATION: 112 MS
EKG QTC CALCULATION (BAZETT): 487 MS
EKG R AXIS: 64 DEGREES
EKG T AXIS: 69 DEGREES
EKG VENTRICULAR RATE: 98 BPM
EOSINOPHIL NFR BLD AUTO: 1.3 %
EOSINOPHILS ABSOLUTE: 0.1 THOU/MM3 (ref 0–0.4)
EPITHELIAL CELLS, UA: ABNORMAL /HPF
ERYTHROCYTE [DISTWIDTH] IN BLOOD BY AUTOMATED COUNT: 13.2 % (ref 11.5–14.5)
GFR SERPL CREATININE-BSD FRML MDRD: 53 ML/MIN/1.73M2
GLUCOSE BLD STRIP.AUTO-MCNC: 118 MG/DL (ref 70–108)
GLUCOSE BLD STRIP.AUTO-MCNC: 127 MG/DL (ref 70–108)
GLUCOSE BLD STRIP.AUTO-MCNC: 140 MG/DL (ref 70–108)
GLUCOSE SERPL-MCNC: 123 MG/DL (ref 70–108)
GLUCOSE UR QL STRIP.AUTO: NEGATIVE MG/DL
HCT VFR BLD AUTO: 53 % (ref 42–52)
HGB BLD-MCNC: 18.1 GM/DL (ref 14–18)
HGB UR QL STRIP.AUTO: NEGATIVE
IMM GRANULOCYTES # BLD AUTO: 0.02 THOU/MM3 (ref 0–0.07)
IMM GRANULOCYTES NFR BLD AUTO: 0.3 %
KETONES UR QL STRIP.AUTO: NEGATIVE
LEUKOCYTE ESTERASE UR QL STRIP.AUTO: NEGATIVE
LYMPHOCYTES ABSOLUTE: 1.6 THOU/MM3 (ref 1–4.8)
LYMPHOCYTES NFR BLD AUTO: 21.5 %
MCH RBC QN AUTO: 31.3 PG (ref 26–33)
MCHC RBC AUTO-ENTMCNC: 34.2 GM/DL (ref 32.2–35.5)
MCV RBC AUTO: 91.7 FL (ref 80–94)
MONOCYTES ABSOLUTE: 0.4 THOU/MM3 (ref 0.4–1.3)
MONOCYTES NFR BLD AUTO: 5.8 %
NEUTROPHILS ABSOLUTE: 5.2 THOU/MM3 (ref 1.8–7.7)
NEUTROPHILS NFR BLD AUTO: 70.4 %
NITRITE UR QL STRIP.AUTO: NEGATIVE
NRBC BLD AUTO-RTO: 0 /100 WBC
OSMOLALITY SERPL CALC.SUM OF ELEC: 284.4 MOSMOL/KG (ref 275–300)
PH UR STRIP.AUTO: 5.5 [PH] (ref 5–9)
PLATELET # BLD AUTO: 194 THOU/MM3 (ref 130–400)
PMV BLD AUTO: 10.5 FL (ref 9.4–12.4)
POTASSIUM SERPL-SCNC: 4.5 MEQ/L (ref 3.5–5.2)
PROT UR STRIP.AUTO-MCNC: NEGATIVE MG/DL
RBC # BLD AUTO: 5.78 MILL/MM3 (ref 4.7–6.1)
RBC #/AREA URNS HPF: ABNORMAL /HPF
RENAL EPI CELLS #/AREA URNS HPF: ABNORMAL /[HPF]
SODIUM SERPL-SCNC: 140 MEQ/L (ref 135–145)
SPECIFIC GRAVITY UA: > 1.03 (ref 1–1.03)
TROPONIN, HIGH SENSITIVITY: 15 NG/L (ref 0–12)
TSH SERPL DL<=0.005 MIU/L-ACNC: 3.12 UIU/ML (ref 0.4–4.2)
UROBILINOGEN, URINE: 0.2 EU/DL (ref 0–1)
VIT B12 SERPL-MCNC: 430 PG/ML (ref 211–911)
WBC # BLD AUTO: 7.4 THOU/MM3 (ref 4.8–10.8)
WBC #/AREA URNS HPF: ABNORMAL /HPF
YEAST LIKE FUNGI URNS QL MICRO: ABNORMAL

## 2024-05-07 PROCEDURE — 81001 URINALYSIS AUTO W/SCOPE: CPT

## 2024-05-07 PROCEDURE — 96372 THER/PROPH/DIAG INJ SC/IM: CPT

## 2024-05-07 PROCEDURE — 6360000002 HC RX W HCPCS

## 2024-05-07 PROCEDURE — 82607 VITAMIN B-12: CPT

## 2024-05-07 PROCEDURE — 82668 ASSAY OF ERYTHROPOIETIN: CPT

## 2024-05-07 PROCEDURE — G0378 HOSPITAL OBSERVATION PER HR: HCPCS

## 2024-05-07 PROCEDURE — 99233 SBSQ HOSP IP/OBS HIGH 50: CPT | Performed by: INTERNAL MEDICINE

## 2024-05-07 PROCEDURE — 99285 EMERGENCY DEPT VISIT HI MDM: CPT

## 2024-05-07 PROCEDURE — 99223 1ST HOSP IP/OBS HIGH 75: CPT

## 2024-05-07 PROCEDURE — 2580000003 HC RX 258

## 2024-05-07 PROCEDURE — A9579 GAD-BASE MR CONTRAST NOS,1ML: HCPCS | Performed by: NURSE PRACTITIONER

## 2024-05-07 PROCEDURE — 85025 COMPLETE CBC W/AUTO DIFF WBC: CPT

## 2024-05-07 PROCEDURE — 6360000004 HC RX CONTRAST MEDICATION: Performed by: NURSE PRACTITIONER

## 2024-05-07 PROCEDURE — 84443 ASSAY THYROID STIM HORMONE: CPT

## 2024-05-07 PROCEDURE — 70498 CT ANGIOGRAPHY NECK: CPT

## 2024-05-07 PROCEDURE — 93010 ELECTROCARDIOGRAM REPORT: CPT | Performed by: INTERNAL MEDICINE

## 2024-05-07 PROCEDURE — 95816 EEG AWAKE AND DROWSY: CPT

## 2024-05-07 PROCEDURE — 70496 CT ANGIOGRAPHY HEAD: CPT

## 2024-05-07 PROCEDURE — 70450 CT HEAD/BRAIN W/O DYE: CPT

## 2024-05-07 PROCEDURE — 6370000000 HC RX 637 (ALT 250 FOR IP)

## 2024-05-07 PROCEDURE — 36415 COLL VENOUS BLD VENIPUNCTURE: CPT

## 2024-05-07 PROCEDURE — 80048 BASIC METABOLIC PNL TOTAL CA: CPT

## 2024-05-07 PROCEDURE — 84484 ASSAY OF TROPONIN QUANT: CPT

## 2024-05-07 PROCEDURE — 70553 MRI BRAIN STEM W/O & W/DYE: CPT

## 2024-05-07 PROCEDURE — 82948 REAGENT STRIP/BLOOD GLUCOSE: CPT

## 2024-05-07 PROCEDURE — 93005 ELECTROCARDIOGRAM TRACING: CPT | Performed by: NURSE PRACTITIONER

## 2024-05-07 RX ORDER — POLYETHYLENE GLYCOL 3350 17 G/17G
17 POWDER, FOR SOLUTION ORAL DAILY PRN
Status: DISCONTINUED | OUTPATIENT
Start: 2024-05-07 | End: 2024-05-08 | Stop reason: HOSPADM

## 2024-05-07 RX ORDER — ACETAMINOPHEN 325 MG/1
650 TABLET ORAL EVERY 6 HOURS PRN
Status: DISCONTINUED | OUTPATIENT
Start: 2024-05-07 | End: 2024-05-08 | Stop reason: HOSPADM

## 2024-05-07 RX ORDER — LOSARTAN POTASSIUM 25 MG/1
25 TABLET ORAL DAILY
Status: DISCONTINUED | OUTPATIENT
Start: 2024-05-08 | End: 2024-05-08 | Stop reason: HOSPADM

## 2024-05-07 RX ORDER — SODIUM CHLORIDE 0.9 % (FLUSH) 0.9 %
10 SYRINGE (ML) INJECTION EVERY 12 HOURS SCHEDULED
Status: DISCONTINUED | OUTPATIENT
Start: 2024-05-07 | End: 2024-05-08 | Stop reason: HOSPADM

## 2024-05-07 RX ORDER — SODIUM CHLORIDE 9 MG/ML
INJECTION, SOLUTION INTRAVENOUS PRN
Status: DISCONTINUED | OUTPATIENT
Start: 2024-05-07 | End: 2024-05-08 | Stop reason: HOSPADM

## 2024-05-07 RX ORDER — ASPIRIN 81 MG/1
81 TABLET, CHEWABLE ORAL DAILY
Status: DISCONTINUED | OUTPATIENT
Start: 2024-05-08 | End: 2024-05-08 | Stop reason: HOSPADM

## 2024-05-07 RX ORDER — METOPROLOL TARTRATE 50 MG/1
50 TABLET, FILM COATED ORAL 2 TIMES DAILY
Status: DISCONTINUED | OUTPATIENT
Start: 2024-05-07 | End: 2024-05-08 | Stop reason: HOSPADM

## 2024-05-07 RX ORDER — ROSUVASTATIN CALCIUM 20 MG/1
20 TABLET, COATED ORAL DAILY
Status: DISCONTINUED | OUTPATIENT
Start: 2024-05-08 | End: 2024-05-08 | Stop reason: HOSPADM

## 2024-05-07 RX ORDER — MAGNESIUM SULFATE IN WATER 40 MG/ML
2000 INJECTION, SOLUTION INTRAVENOUS PRN
Status: DISCONTINUED | OUTPATIENT
Start: 2024-05-07 | End: 2024-05-08 | Stop reason: HOSPADM

## 2024-05-07 RX ORDER — POTASSIUM CHLORIDE 7.45 MG/ML
10 INJECTION INTRAVENOUS PRN
Status: DISCONTINUED | OUTPATIENT
Start: 2024-05-07 | End: 2024-05-08 | Stop reason: HOSPADM

## 2024-05-07 RX ORDER — ENOXAPARIN SODIUM 100 MG/ML
30 INJECTION SUBCUTANEOUS 2 TIMES DAILY
Status: DISCONTINUED | OUTPATIENT
Start: 2024-05-07 | End: 2024-05-08 | Stop reason: HOSPADM

## 2024-05-07 RX ORDER — DILTIAZEM HYDROCHLORIDE 120 MG/1
120 CAPSULE, COATED, EXTENDED RELEASE ORAL NIGHTLY
Status: DISCONTINUED | OUTPATIENT
Start: 2024-05-07 | End: 2024-05-08 | Stop reason: HOSPADM

## 2024-05-07 RX ORDER — FUROSEMIDE 40 MG/1
40 TABLET ORAL DAILY
Status: DISCONTINUED | OUTPATIENT
Start: 2024-05-08 | End: 2024-05-08 | Stop reason: HOSPADM

## 2024-05-07 RX ORDER — ONDANSETRON 4 MG/1
4 TABLET, ORALLY DISINTEGRATING ORAL EVERY 8 HOURS PRN
Status: DISCONTINUED | OUTPATIENT
Start: 2024-05-07 | End: 2024-05-08 | Stop reason: HOSPADM

## 2024-05-07 RX ORDER — LEVETIRACETAM 500 MG/1
500 TABLET ORAL 2 TIMES DAILY
Status: DISCONTINUED | OUTPATIENT
Start: 2024-05-07 | End: 2024-05-08 | Stop reason: HOSPADM

## 2024-05-07 RX ORDER — SODIUM CHLORIDE 0.9 % (FLUSH) 0.9 %
10 SYRINGE (ML) INJECTION PRN
Status: DISCONTINUED | OUTPATIENT
Start: 2024-05-07 | End: 2024-05-08 | Stop reason: HOSPADM

## 2024-05-07 RX ORDER — VITAMIN B COMPLEX
5000 TABLET ORAL DAILY
Status: DISCONTINUED | OUTPATIENT
Start: 2024-05-08 | End: 2024-05-08 | Stop reason: HOSPADM

## 2024-05-07 RX ORDER — BISACODYL 5 MG/1
5 TABLET, DELAYED RELEASE ORAL NIGHTLY
Status: DISCONTINUED | OUTPATIENT
Start: 2024-05-07 | End: 2024-05-08 | Stop reason: HOSPADM

## 2024-05-07 RX ORDER — POTASSIUM CHLORIDE 20 MEQ/1
40 TABLET, EXTENDED RELEASE ORAL PRN
Status: DISCONTINUED | OUTPATIENT
Start: 2024-05-07 | End: 2024-05-08 | Stop reason: HOSPADM

## 2024-05-07 RX ORDER — GLUCAGON 1 MG/ML
1 KIT INJECTION PRN
Status: DISCONTINUED | OUTPATIENT
Start: 2024-05-07 | End: 2024-05-08 | Stop reason: HOSPADM

## 2024-05-07 RX ORDER — INSULIN LISPRO 100 [IU]/ML
0-4 INJECTION, SOLUTION INTRAVENOUS; SUBCUTANEOUS NIGHTLY
Status: DISCONTINUED | OUTPATIENT
Start: 2024-05-07 | End: 2024-05-08 | Stop reason: HOSPADM

## 2024-05-07 RX ORDER — INSULIN LISPRO 100 [IU]/ML
0-4 INJECTION, SOLUTION INTRAVENOUS; SUBCUTANEOUS
Status: DISCONTINUED | OUTPATIENT
Start: 2024-05-07 | End: 2024-05-08 | Stop reason: HOSPADM

## 2024-05-07 RX ORDER — ACETAMINOPHEN 650 MG/1
650 SUPPOSITORY RECTAL EVERY 6 HOURS PRN
Status: DISCONTINUED | OUTPATIENT
Start: 2024-05-07 | End: 2024-05-08 | Stop reason: HOSPADM

## 2024-05-07 RX ORDER — DEXTROSE MONOHYDRATE 100 MG/ML
INJECTION, SOLUTION INTRAVENOUS CONTINUOUS PRN
Status: DISCONTINUED | OUTPATIENT
Start: 2024-05-07 | End: 2024-05-08 | Stop reason: HOSPADM

## 2024-05-07 RX ORDER — ONDANSETRON 2 MG/ML
4 INJECTION INTRAMUSCULAR; INTRAVENOUS EVERY 6 HOURS PRN
Status: DISCONTINUED | OUTPATIENT
Start: 2024-05-07 | End: 2024-05-08 | Stop reason: HOSPADM

## 2024-05-07 RX ADMIN — ENOXAPARIN SODIUM 30 MG: 100 INJECTION SUBCUTANEOUS at 14:20

## 2024-05-07 RX ADMIN — LEVETIRACETAM 500 MG: 500 TABLET, FILM COATED ORAL at 20:33

## 2024-05-07 RX ADMIN — BISACODYL 5 MG: 5 TABLET, COATED ORAL at 20:00

## 2024-05-07 RX ADMIN — GADOTERIDOL 20 ML: 279.3 INJECTION, SOLUTION INTRAVENOUS at 17:19

## 2024-05-07 RX ADMIN — IOPAMIDOL 80 ML: 755 INJECTION, SOLUTION INTRAVENOUS at 08:10

## 2024-05-07 RX ADMIN — ENOXAPARIN SODIUM 30 MG: 100 INJECTION SUBCUTANEOUS at 19:59

## 2024-05-07 RX ADMIN — DILTIAZEM HYDROCHLORIDE 120 MG: 120 CAPSULE, EXTENDED RELEASE ORAL at 20:00

## 2024-05-07 RX ADMIN — METOPROLOL TARTRATE 50 MG: 50 TABLET, FILM COATED ORAL at 20:00

## 2024-05-07 RX ADMIN — SODIUM CHLORIDE, PRESERVATIVE FREE 10 ML: 5 INJECTION INTRAVENOUS at 19:59

## 2024-05-07 ASSESSMENT — ENCOUNTER SYMPTOMS
ABDOMINAL PAIN: 0
SHORTNESS OF BREATH: 0
DIARRHEA: 0
COUGH: 1
VOMITING: 0
NAUSEA: 0
TROUBLE SWALLOWING: 0
WHEEZING: 0
SORE THROAT: 0

## 2024-05-07 ASSESSMENT — PAIN - FUNCTIONAL ASSESSMENT
PAIN_FUNCTIONAL_ASSESSMENT: NONE - DENIES PAIN

## 2024-05-07 ASSESSMENT — LIFESTYLE VARIABLES
HOW MANY STANDARD DRINKS CONTAINING ALCOHOL DO YOU HAVE ON A TYPICAL DAY: PATIENT DOES NOT DRINK
HOW OFTEN DO YOU HAVE A DRINK CONTAINING ALCOHOL: NEVER

## 2024-05-07 ASSESSMENT — VISUAL ACUITY: VA_NORMAL: 1

## 2024-05-07 NOTE — ED NOTES
ED to inpatient nurses report      Chief Complaint:  Chief Complaint   Patient presents with    Dizziness     Present to ED from: home    MOA:     LOC: alert and orientated to name and place  Mobility: Requires assistance * 1  Oxygen Baseline: RA    Current needs required: RA     Code Status:   Prior    What abnormal results were found and what did you give/do to treat them? none  Any procedures or intervention occur? none    Mental Status:  Level of Consciousness: Alert (0)    Psych Assessment:        Vitals:  Patient Vitals for the past 24 hrs:   BP Temp Temp src Pulse Resp SpO2 Weight   05/07/24 1159 (!) 152/96 -- -- 78 18 96 % --   05/07/24 1111 (!) 104/100 -- -- 88 20 96 % --   05/07/24 1032 (!) 139/91 -- -- 83 22 95 % --   05/07/24 0928 (!) 127/90 -- -- 86 23 96 % --   05/07/24 0903 136/85 -- -- 79 20 94 % --   05/07/24 0800 113/87 -- -- 89 24 94 % --   05/07/24 0729 (!) 145/95 98.6 °F (37 °C) Oral 93 20 94 % 104.3 kg (230 lb)        LDAs:   Peripheral IV 05/07/24 Distal;Right Cephalic (Active)   Site Assessment Clean, dry & intact 05/07/24 1032   Line Status Flushed;Blood return noted;Specimen collected 05/07/24 0735       Ambulatory Status:  No data recorded    Diagnosis:  DISPOSITION Admitted 05/07/2024 12:01:13 PM   Final diagnoses:   None        Consults:  IP CONSULT TO NEUROLOGY     Pain Score:  Pain Assessment  Pain Assessment: None - Denies Pain    C-SSRS:        Sepsis Screening:  Sepsis Risk Score: 2.72    Satin Fall Risk:       Swallow Screening        Preferred Language:   English      ALLERGIES     Patient has no known allergies.    SURGICAL HISTORY       Past Surgical History:   Procedure Laterality Date    ADENOIDECTOMY      CARDIAC CATHETERIZATION  09/23/2011    Right radial artery cannulation. Moderate LV dysfunction. The patient has disease in the ostium of diagonal 1 and diagonal 2 branch, however they are both are at the  ostium of the diagonals.  Intervention could compromise flow of LAD.

## 2024-05-07 NOTE — PROCEDURES
EEG REPORT       Patient: Alex Mckeon Age: 74 y.o.  MRN: [id number]      Referring Provider: No ref. provider found    History: This routine 30 minute scalp EEG was recorded with video- monitoring for a 74 y.o.. male  who presented with encephalopathy. This EEG was performed to evaluate for focal and epileptiform abnormalities.     Alex Mckeon   No current facility-administered medications for this encounter.     Current Outpatient Medications   Medication Sig Dispense Refill    dilTIAZem (CARDIZEM CD) 120 MG extended release capsule Take 1 capsule by mouth at bedtime 90 capsule 3    losartan (COZAAR) 25 MG tablet Take 1 tablet by mouth daily 90 tablet 1    furosemide (LASIX) 40 MG tablet Take 1 tablet by mouth daily 90 tablet 1    nitroGLYCERIN (NITROSTAT) 0.4 MG SL tablet Place 1 tablet under the tongue every 5 minutes as needed for Chest pain up to max of 3 total doses. If no relief after 1 dose, call 911. 25 tablet 0    metoprolol tartrate (LOPRESSOR) 50 MG tablet Take 1 tablet by mouth 2 times daily 60 tablet 3    omeprazole (PRILOSEC) 20 MG delayed release capsule Take 1 capsule by mouth every morning (before breakfast) (Patient not taking: Reported on 2/14/2024) 30 capsule 0    vitamin D (CHOLECALCIFEROL) 50 MCG (2000 UT) TABS tablet Take 1 tablet by mouth daily      rosuvastatin (CRESTOR) 20 MG tablet Take 1 tablet by mouth daily 90 tablet 3    potassium chloride (KLOR-CON M) 20 MEQ extended release tablet Take 1 tablet by mouth 2 times daily (Patient not taking: Reported on 2/14/2024) 180 tablet 3    metoprolol (LOPRESSOR) 100 MG tablet Take 1 tablet by mouth 2 times daily (Patient not taking: Reported on 2/14/2024) 180 tablet 3    aspirin 81 MG chewable tablet Take 1 tablet by mouth in the morning. 30 tablet 3    bisacodyl (DULCOLAX) 5 MG EC tablet Take 1 tablet by mouth nightly          Technical Description: This is a 21 channel digital EEG recording with time-locked video.

## 2024-05-07 NOTE — H&P
origin. There is no associated stenosis. The right vertebral artery is a smaller vessel. The right vertebral artery is normal. CTA HEAD: Internal carotid arteries: There is calcified atheromatosis of the cavernous segments of the internal carotid arteries bilaterally. There is no stenosis. The proximal ophthalmic arteries are normal. Middle cerebral arteries: The M1 segments of the middle cerebral arteries are normal and symmetric. The M2 branches are normal and symmetric. Anterior cerebral arteries: The A1 branches are symmetric and normal.  The A2 branches are normal. There is a normal anterior communicating artery. Vertebral arteries: There is mild calcified atheromatosis of the distal vertebral arteries bilaterally as they enter the dural ring. There is no associated significant stenosis. There are bilateral normal posterior inferior cerebellar arteries that originate before the atherosclerosis. Basilar artery: Normal. Superior cerebellar arteries: Normal. Posterior cerebral arteries: The P1 segments are normal and symmetric.  The P2 branches are normal. No aneurysms, stenoses or occlusions are noted. The superior sagittal sinus, vein of Raymon, internal cerebral veins, straight sinus, transverse sinuses and sigmoid sinuses are patent. Axial source data: There are no gross abnormalities in the brain. There is no cervical adenopathy. There is a watchman device in the left atrial appendage. There are no suspicious findings in the lung apices. There is possible mild pulmonary edema.     1. No stenosis of either carotid bulb. 2. Calcified atherosclerosis of the cavernous segments of the internal carotid arteries and the vertebral arteries as they enter the dural ring. There is no associated stenosis. 3. No intracranial stenoses or occlusions. 4. Possible mild pulmonary edema in the lung apices. This could also be related to respiratory motion. **This report has been created using voice recognition software. It may

## 2024-05-07 NOTE — CONSULTS
Neurology Consult Note    Date:5/7/2024       Room:[address] [place] [address]  Patient Name:Alex Mckeon     YOB: 1950     Age:74 y.o.    Requesting Physician: Joby Gonzalez MD     Reason for Consult:  Evaluate for intermittent AMS.       Chief Complaint:   Chief Complaint   Patient presents with    Dizziness       Subjective     Alex Mckeon is a 74 y.o. male with a history of polycythemia vera, atrial fibrillation, BPH, CAD, dementia, dizziness, GERD, HLD, HTN, DM2, epistaxis embolization who presented to [facility] on 5/7/24 for altered mental status. Patient was with his wife who was obtaining outpatient labs at [facility]. His wife stated she came out of the restroom to find the patient leaning up against the wall, wouldn't respond to her, staring off, eyes wide open, and repetitive lip smacking. She explained during this time he couldn't talk. She stated this episode lasted 1 - 2 minutes. He stated he felt \"queasy' prior to the event but denied being nauseated. She explained she noted tremor like activity in both of his hands. She denies any jerking/shaking/tremor movements in in lower extremities. He denies incontence of urine and stool. He explained he does \"leak\" since his prostatectomy.  The patient was standing the whole time and did not fall to the ground nor did he fall during other similar episodes. He denies tongue biting. She said then he started talking but \"it did not make sense\".  His wife stated these episodes started 6 to 8 months ago but the patient has never followed up for further evaluation.  She explained this most recent episode is exactly the same how the other episodes presented.  He denies history of seizures, stroke, head trauma, and intracranial bleeding. He denies alcohol, smoking, and drug use.  NIH was 1 in the ED for stating the year 2025.  He is positive for cough,congestion, and speech difficulty (during these episodes).  He is negative for recent illness, chills,

## 2024-05-07 NOTE — PROGRESS NOTES
[facility]     Neurodiagnostic Laboratory Technician Worksheet      EEG Date: 2024    Name: Alex Mckeon  : 1950  Age: 74 y.o.  Sex: male  MRN: [id number]  CSN: [id number]    Room/Location:   Ordering Provider: JUAN Houston    EEG Number: [id number]    Time In: 1157  Time Out: 1217  Total Treatment Time: 20 mins    Clinical History: pt has a HX of Afib w/ Watchman and alzheimer's. Pt had seizure like episode,Wife reported he had a dazed look, he started lip smacking, and had incompressible speech; pt noted he had pressure in his head. He's had multiple episodes over the past 6 months and back to baseline after episodes.    Hand Dominance: Right   Sedation: No   H.V. Completed: No, age protocol   Photic: Yes   Sleep: No   Drowsy: Yes   Sleep Deprived: Yes   Seizures Observed: No   Mentality: alert     Technician: Flora Brown 2024

## 2024-05-07 NOTE — PLAN OF CARE
Problem: Discharge Planning  Goal: Discharge to home or other facility with appropriate resources  5/7/2024 1434 by Sapphire Pena, RN  Outcome: Progressing  Flowsheets (Taken 5/7/2024 1434)  Discharge to home or other facility with appropriate resources:   Identify barriers to discharge with patient and caregiver   Arrange for needed discharge resources and transportation as appropriate   Identify discharge learning needs (meds, wound care, etc)   Arrange for interpreters to assist at discharge as needed   Refer to discharge planning if patient needs post-hospital services based on physician order or complex needs related to functional status, cognitive ability or social support system     Problem: Chronic Conditions and Co-morbidities  Goal: Patient's chronic conditions and co-morbidity symptoms are monitored and maintained or improved  Outcome: Progressing  Flowsheets (Taken 5/7/2024 1434)  Care Plan - Patient's Chronic Conditions and Co-Morbidity Symptoms are Monitored and Maintained or Improved:   Monitor and assess patient's chronic conditions and comorbid symptoms for stability, deterioration, or improvement   Collaborate with multidisciplinary team to address chronic and comorbid conditions and prevent exacerbation or deterioration   Update acute care plan with appropriate goals if chronic or comorbid symptoms are exacerbated and prevent overall improvement and discharge     Problem: Neurosensory - Adult  Goal: Achieves stable or improved neurological status  Outcome: Progressing  Flowsheets (Taken 5/7/2024 1434)  Achieves stable or improved neurological status:   Assess for and report changes in neurological status   Monitor temperature, glucose, and sodium. Initiate appropriate interventions as ordered     Problem: Neurosensory - Adult  Goal: Absence of seizures  Outcome: Progressing  Flowsheets (Taken 5/7/2024 1434)  Absence of seizures:   Monitor for seizure activity.  If seizure occurs, document type

## 2024-05-07 NOTE — PROGRESS NOTES
This RN entered patient's room and noted twitching of left side of face/left eye as well as lip smacking. Patient not able to speak but shakes head yes/no to questions. Dr. Gonzalez on floor and notified. Upon Dr. Gonzalez and this RN entering the room, patient's symptoms had resolved.

## 2024-05-07 NOTE — DISCHARGE INSTRUCTIONS
-No driving, climbing heights, operating machinery, and submerging in water for 6 months or until cleared by outpatient neurology  -Continue Keppra 500mg two times daily  -Follow-up with Neurology - Dr. Olmos  -Follow-up with PCP in 1 week

## 2024-05-07 NOTE — ED PROVIDER NOTES
may contain minor errors which are inherent in voice recognition technology.**      Final report electronically signed by Dr. Ngozi Ortega on 5/7/2024 9:26 AM      MRI BRAIN W WO CONTRAST    (Results Pending)       LABS:   Labs Reviewed   CBC WITH AUTO DIFFERENTIAL - Abnormal; Notable for the following components:       Result Value    Hemoglobin 18.1 (*)     Hematocrit 53.0 (*)     All other components within normal limits   BASIC METABOLIC PANEL - Abnormal; Notable for the following components:    Glucose 123 (*)     BUN 23 (*)     Creatinine 1.4 (*)     All other components within normal limits   TROPONIN - Abnormal; Notable for the following components:    Troponin, High Sensitivity 15 (*)     All other components within normal limits   URINALYSIS WITH MICROSCOPIC - Abnormal; Notable for the following components:    Specific Gravity, UA >1.030 (*)     All other components within normal limits   GLOMERULAR FILTRATION RATE, ESTIMATED - Abnormal; Notable for the following components:    Est, Glom Filt Rate 53 (*)     All other components within normal limits   POCT GLUCOSE - Abnormal; Notable for the following components:    POC Glucose 127 (*)     All other components within normal limits   TSH   ANION GAP   OSMOLALITY       EMERGENCY DEPARTMENT COURSE:   Vitals:    Vitals:    05/07/24 1032 05/07/24 1111 05/07/24 1159 05/07/24 1232   BP: (!) 139/91 (!) 104/100 (!) 152/96 (!) 144/94   Pulse: 83 88 78 91   Resp: 22 20 18 16   Temp:       TempSrc:       SpO2: 95% 96% 96% 95%   Weight:             MDM    Patient was seen and evaluated in the emergency department, patient appeared to be in no acute distress, vital signs reviewed, hypertension noted.  Physical exam completed, he was disoriented to the year but he knew the month and day of the week, he knew where he was, he had no sensorimotor or coordination deficits on exam.  NIH of 1.  He has past medical history of dementia.  His blood glucose was normal, I did not

## 2024-05-08 VITALS
RESPIRATION RATE: 16 BRPM | BODY MASS INDEX: 29.72 KG/M2 | HEART RATE: 88 BPM | WEIGHT: 224.21 LBS | DIASTOLIC BLOOD PRESSURE: 76 MMHG | SYSTOLIC BLOOD PRESSURE: 126 MMHG | TEMPERATURE: 97.6 F | HEIGHT: 73 IN | OXYGEN SATURATION: 97 %

## 2024-05-08 LAB
ANION GAP SERPL CALC-SCNC: 10 MEQ/L (ref 8–16)
BUN SERPL-MCNC: 21 MG/DL (ref 7–22)
CALCIUM SERPL-MCNC: 9.2 MG/DL (ref 8.5–10.5)
CHLORIDE SERPL-SCNC: 107 MEQ/L (ref 98–111)
CO2 SERPL-SCNC: 25 MEQ/L (ref 23–33)
CREAT SERPL-MCNC: 1.2 MG/DL (ref 0.4–1.2)
DEPRECATED RDW RBC AUTO: 44.9 FL (ref 35–45)
ERYTHROCYTE [DISTWIDTH] IN BLOOD BY AUTOMATED COUNT: 13.2 % (ref 11.5–14.5)
GFR SERPL CREATININE-BSD FRML MDRD: 64 ML/MIN/1.73M2
GLUCOSE BLD STRIP.AUTO-MCNC: 106 MG/DL (ref 70–108)
GLUCOSE BLD STRIP.AUTO-MCNC: 116 MG/DL (ref 70–108)
GLUCOSE SERPL-MCNC: 100 MG/DL (ref 70–108)
HCT VFR BLD AUTO: 51.4 % (ref 42–52)
HGB BLD-MCNC: 17.3 GM/DL (ref 14–18)
MCH RBC QN AUTO: 31.3 PG (ref 26–33)
MCHC RBC AUTO-ENTMCNC: 33.7 GM/DL (ref 32.2–35.5)
MCV RBC AUTO: 92.9 FL (ref 80–94)
PLATELET # BLD AUTO: 167 THOU/MM3 (ref 130–400)
PMV BLD AUTO: 10.6 FL (ref 9.4–12.4)
POTASSIUM SERPL-SCNC: 4.3 MEQ/L (ref 3.5–5.2)
RBC # BLD AUTO: 5.53 MILL/MM3 (ref 4.7–6.1)
SODIUM SERPL-SCNC: 142 MEQ/L (ref 135–145)
WBC # BLD AUTO: 7.1 THOU/MM3 (ref 4.8–10.8)

## 2024-05-08 PROCEDURE — G0378 HOSPITAL OBSERVATION PER HR: HCPCS

## 2024-05-08 PROCEDURE — 96372 THER/PROPH/DIAG INJ SC/IM: CPT

## 2024-05-08 PROCEDURE — 6370000000 HC RX 637 (ALT 250 FOR IP)

## 2024-05-08 PROCEDURE — 85027 COMPLETE CBC AUTOMATED: CPT

## 2024-05-08 PROCEDURE — 6360000002 HC RX W HCPCS

## 2024-05-08 PROCEDURE — 82948 REAGENT STRIP/BLOOD GLUCOSE: CPT

## 2024-05-08 PROCEDURE — 36415 COLL VENOUS BLD VENIPUNCTURE: CPT

## 2024-05-08 PROCEDURE — 2580000003 HC RX 258

## 2024-05-08 PROCEDURE — 99239 HOSP IP/OBS DSCHRG MGMT >30: CPT | Performed by: INTERNAL MEDICINE

## 2024-05-08 PROCEDURE — 80048 BASIC METABOLIC PNL TOTAL CA: CPT

## 2024-05-08 RX ORDER — SENNOSIDES A AND B 8.6 MG/1
1 TABLET, FILM COATED ORAL 2 TIMES DAILY
Qty: 60 TABLET | Refills: 0 | Status: CANCELLED | OUTPATIENT
Start: 2024-05-08 | End: 2024-06-07

## 2024-05-08 RX ORDER — LEVETIRACETAM 500 MG/1
500 TABLET ORAL 2 TIMES DAILY
Qty: 60 TABLET | Refills: 0 | Status: SHIPPED | OUTPATIENT
Start: 2024-05-08

## 2024-05-08 RX ADMIN — LEVETIRACETAM 500 MG: 500 TABLET, FILM COATED ORAL at 08:05

## 2024-05-08 RX ADMIN — FUROSEMIDE 40 MG: 40 TABLET ORAL at 08:05

## 2024-05-08 RX ADMIN — SODIUM CHLORIDE, PRESERVATIVE FREE 10 ML: 5 INJECTION INTRAVENOUS at 08:11

## 2024-05-08 RX ADMIN — METOPROLOL TARTRATE 50 MG: 50 TABLET, FILM COATED ORAL at 08:05

## 2024-05-08 RX ADMIN — ENOXAPARIN SODIUM 30 MG: 100 INJECTION SUBCUTANEOUS at 08:05

## 2024-05-08 RX ADMIN — LOSARTAN POTASSIUM 25 MG: 25 TABLET, FILM COATED ORAL at 08:05

## 2024-05-08 RX ADMIN — ASPIRIN 81 MG 81 MG: 81 TABLET ORAL at 08:06

## 2024-05-08 RX ADMIN — POLYETHYLENE GLYCOL 3350 17 G: 17 POWDER, FOR SOLUTION ORAL at 08:06

## 2024-05-08 RX ADMIN — Medication 5000 UNITS: at 08:05

## 2024-05-08 NOTE — DISCHARGE SUMMARY
identified.      This document has been electronically signed by: Geronimo Leija MD on    05/07/2024 05:53 PM      CT HEAD WO CONTRAST   Final Result    No evidence of an acute process. No change from prior.               **This report has been created using voice recognition software. It may contain minor errors which are inherent in voice recognition technology.**      Final report electronically signed by Dr. Ngozi Ortega on 5/7/2024 8:43 AM      CTA HEAD W WO CONTRAST   Final Result      1. No stenosis of either carotid bulb.   2. Calcified atherosclerosis of the cavernous segments of the internal carotid arteries and the vertebral arteries as they enter the dural ring. There is no associated stenosis.   3. No intracranial stenoses or occlusions.   4. Possible mild pulmonary edema in the lung apices. This could also be related to respiratory motion.               **This report has been created using voice recognition software. It may contain minor errors which are inherent in voice recognition technology.**      Final report electronically signed by Dr. Ngozi Ortega on 5/7/2024 9:26 AM      CTA NECK W WO CONTRAST   Final Result      1. No stenosis of either carotid bulb.   2. Calcified atherosclerosis of the cavernous segments of the internal carotid arteries and the vertebral arteries as they enter the dural ring. There is no associated stenosis.   3. No intracranial stenoses or occlusions.   4. Possible mild pulmonary edema in the lung apices. This could also be related to respiratory motion.               **This report has been created using voice recognition software. It may contain minor errors which are inherent in voice recognition technology.**      Final report electronically signed by Dr. Ngozi Ortega on 5/7/2024 9:26 AM            Consults:     IP CONSULT TO NEUROLOGY  IP CONSULT TO NEUROLOGY  IP CONSULT TO SPIRITUAL SERVICES    Disposition:    [x] Home        [] TCU       [] Rehab       [] Psych

## 2024-05-08 NOTE — CARE COORDINATION
Case Management Assessment Initial Evaluation    Date/Time of Evaluation: 2024 7:11 AM  Assessment Completed by: Brittani Ram RN    If patient is discharged prior to next notation, then this note serves as note for discharge by case management.    Patient Name: Alex Mckeon                   YOB: 1950  Diagnosis: Transient alteration of awareness [R40.4]  Seizure-like activity (HCC) [R56.9]                   Date / Time: 2024  7:26 AM  Location: [address] [place] [address]     Patient Admission Status: Observation   Readmission Risk Low 0-14, Mod 15-19), High > 20: No data recorded  Current PCP: Annie Eckert, NARESH - CNP    Additional Case Management Notes: Presented with seizure like activity. Hospitalist and neuro following. [place]. Swallow eval. Ambulate.     Procedures:    EEG: This EEG was normal in wakefulness and sleep.     Imagin/7 CT head: There is no hemorrhage. There are no intra-or extra-axial collections.  There is stable mild global volume loss. There are vascular calcifications.  The gray-white matter differentiation is preserved. The paranasal sinuses and mastoid air cells are normally aerated.  There is no suspicious calvarial abnormality.     CTA  head:    1. No stenosis of either carotid bulb.  2. Calcified atherosclerosis of the cavernous segments of the internal carotid arteries and the vertebral arteries as they enter the dural ring. There is no associated stenosis.  3. No intracranial stenoses or occlusions.  4. Possible mild pulmonary edema in the lung apices. This could also be related to respiratory motion.   MRI brain: no acute findings  Patient Goals/Plan/Treatment Preferences: Spoke with Alex and wife prior to discharge. Plans to return home. Is independent and does not use dme. Denies home going needs.

## 2024-05-08 NOTE — PROGRESS NOTES
Discharge teaching and instructions for diagnosis/procedure of seizure like activity completed with patient using teachback method. AVS reviewed. Printed prescriptions given to patient. Patient voiced understanding regarding prescriptions, follow up appointments, and care of self at home. Discharged ambulatory to  home with support per family

## 2024-05-09 LAB — EPO SERPL-ACNC: 13 MU/ML (ref 4–27)

## 2024-05-10 ENCOUNTER — TELEPHONE (OUTPATIENT)
Dept: FAMILY MEDICINE CLINIC | Age: 74
End: 2024-05-10

## 2024-05-10 NOTE — TELEPHONE ENCOUNTER
Care Transitions Initial Follow Up Call    Call within 2 business days of discharge: Yes     Patient: Alex Mckeon Patient : 1950 MRN: [id number]    [unfilled]    RARS: No data recorded     Spoke with: patient's wife Hali ley per HIPAA    Discharge department/facility: [facility]     Non-face-to-face services provided:  Wife States that he is doing well and declines a follow-up. She states that Dr Olmos's office is to call to schedule with him.     Follow Up  Future Appointments   Date Time Provider Department Center   2024  8:00 AM Ukiwe, Sony Randy, MD [facility]   2024  8:30 AM Annie Eckert APRN - [facility]   2024 10:00 AM Laurent Valdez MD N [facility]       Taylor Kocher, CMA ([facility])

## 2024-05-13 ENCOUNTER — HOSPITAL ENCOUNTER (OUTPATIENT)
Age: 74
Discharge: HOME | End: 2024-05-13
Payer: MEDICARE

## 2024-05-13 DIAGNOSIS — E55.9 VITAMIN D DEFICIENCY: ICD-10-CM

## 2024-05-13 DIAGNOSIS — N18.31 STAGE 3A CHRONIC KIDNEY DISEASE (HCC): ICD-10-CM

## 2024-05-13 LAB
25(OH)D3 SERPL-MCNC: 38 NG/ML (ref 30–100)
ANION GAP SERPL CALC-SCNC: 15 MEQ/L (ref 8–16)
BUN SERPL-MCNC: 32 MG/DL (ref 7–22)
CALCIUM SERPL-MCNC: 9.7 MG/DL (ref 8.5–10.5)
CHLORIDE SERPL-SCNC: 106 MEQ/L (ref 98–111)
CO2 SERPL-SCNC: 24 MEQ/L (ref 23–33)
CREAT SERPL-MCNC: 1.3 MG/DL (ref 0.4–1.2)
GFR SERPL CREATININE-BSD FRML MDRD: 58 ML/MIN/1.73M2
GLUCOSE SERPL-MCNC: 105 MG/DL (ref 70–108)
POTASSIUM SERPL-SCNC: 4.6 MEQ/L (ref 3.5–5.2)
SODIUM SERPL-SCNC: 145 MEQ/L (ref 135–145)

## 2024-05-13 PROCEDURE — 82306 VITAMIN D 25 HYDROXY: CPT

## 2024-05-13 PROCEDURE — 80048 BASIC METABOLIC PNL TOTAL CA: CPT

## 2024-05-13 PROCEDURE — 36415 COLL VENOUS BLD VENIPUNCTURE: CPT

## 2024-05-20 ENCOUNTER — OFFICE VISIT (OUTPATIENT)
Dept: NEPHROLOGY | Age: 74
End: 2024-05-20
Payer: MEDICARE

## 2024-05-20 VITALS
DIASTOLIC BLOOD PRESSURE: 75 MMHG | HEART RATE: 74 BPM | HEIGHT: 72 IN | BODY MASS INDEX: 31.29 KG/M2 | OXYGEN SATURATION: 96 % | WEIGHT: 231 LBS | SYSTOLIC BLOOD PRESSURE: 114 MMHG

## 2024-05-20 DIAGNOSIS — E78.5 DYSLIPIDEMIA: ICD-10-CM

## 2024-05-20 DIAGNOSIS — N18.31 STAGE 3A CHRONIC KIDNEY DISEASE (HCC): Primary | ICD-10-CM

## 2024-05-20 DIAGNOSIS — I10 PRIMARY HYPERTENSION: ICD-10-CM

## 2024-05-20 DIAGNOSIS — E55.9 VITAMIN D DEFICIENCY: ICD-10-CM

## 2024-05-20 PROCEDURE — 99213 OFFICE O/P EST LOW 20 MIN: CPT | Performed by: INTERNAL MEDICINE

## 2024-05-20 PROCEDURE — 1036F TOBACCO NON-USER: CPT | Performed by: INTERNAL MEDICINE

## 2024-05-20 PROCEDURE — G8417 CALC BMI ABV UP PARAM F/U: HCPCS | Performed by: INTERNAL MEDICINE

## 2024-05-20 PROCEDURE — 3017F COLORECTAL CA SCREEN DOC REV: CPT | Performed by: INTERNAL MEDICINE

## 2024-05-20 PROCEDURE — G8427 DOCREV CUR MEDS BY ELIG CLIN: HCPCS | Performed by: INTERNAL MEDICINE

## 2024-05-20 PROCEDURE — 1123F ACP DISCUSS/DSCN MKR DOCD: CPT | Performed by: INTERNAL MEDICINE

## 2024-05-20 PROCEDURE — 3074F SYST BP LT 130 MM HG: CPT | Performed by: INTERNAL MEDICINE

## 2024-05-20 PROCEDURE — 3078F DIAST BP <80 MM HG: CPT | Performed by: INTERNAL MEDICINE

## 2024-05-20 RX ORDER — ATORVASTATIN CALCIUM 10 MG/1
10 TABLET, FILM COATED ORAL DAILY
COMMUNITY

## 2024-05-20 RX ORDER — ESTRADIOL 0.1 MG/G
2 CREAM VAGINAL DAILY
COMMUNITY
End: 2024-05-20 | Stop reason: CLARIF

## 2024-05-20 NOTE — PATIENT INSTRUCTIONS
Drugs to Avoid with Chronic Kidney Disease    Non-steroidal anti-inflammatory drugs (NSAIDS)    Potential complication and side effects of NSAIDS include:  Kidney injury and worsening kidney function   Risk of stomach ulcer and intestinal bleeding  Fluid retention and edema  Increased Blood Pressure    Non-Steroidal Anti-Inflammatory Drugs    Celecoxib (Celebrex)  Choline and magnesium salicylates (CMT, Trisosal, Trilisate)  Choline salicylate (Arthropan)  Diclofenac (Voltaren, Arthrotec, Cataflam, Cambia, Voltaren-XR, Zipsor)  Diflunisal (Dolobid)  Etodolac (Lodine XL, Lodine)  Famotidine + Ibuprofen (Duexis)  Fenoprofen (Nalfon, Nalfon 200)  Furbiprofen (Asaid)  Ibuprofen (Advil, Motrin, Midol, Nuprin, Genpril, Dolgesic,Profen,, Vicoprofen,Combunox, Actiprofen, Addaprin, Caldolor, Haltran, Q-Profen,Ibren, Menadol, Rufen,Saleto-200, [place],Ultraprin, Uni-Pro,Wal-Profen)  Indomethacin (Indocin, Indomethegan, Indo-Joe)   Ketoprofen (Orudis  KT, Oruvail, Actron)  Ketorolac (Toradol, Sprix)  Magnesium Sulfate (Arthritab, Yasmeen Select, Deshawn's pills, Margarito, Mobidin, Mobogesic)  Meclofenamate Sodium (Ponstel)  Meloxicam (Mobic)  Naproxen (Aleve, Anaprox, Naprosyn, EC-Naprosyn, Naprelan, All Day Pain Relief, Aflaxen, Anaprox-DS, Midol Extended Relief, Naprelan,Prevacid NapraPac, Naprapac, Vimovo)  Nabumetone (Relafen)  Oxaprozin (Daypro)  Piroxicam (Feldene)  Rofecoxib (Vioxx)  Salsalate (Amigesic, Anaflex 750, Disalcid, Marthritic, Mono-gesic, Salflex, Salsitab)  Sodium salicylate (various generics)  Sulindac (Clinoril)  Tolmetin (Tolectin)  Valdecoxib (Bextra)  Mefenamic Acid (Ponstel)  Famotidine and Ibuprofen (Duexis) but not famotidine by itself  Meclofenamate (Meclomen)    Antibiotics  Bactrim  Gentamycin  Tobramycin  Vancomycin  Tetracycline  Macrobid    Other                  IV contrast dye

## 2024-05-20 NOTE — PROGRESS NOTES
05/13/2024     05/08/2024     05/07/2024    CO2 24 05/13/2024    CO2 25 05/08/2024    CO2 27 05/07/2024    BUN 32 (H) 05/13/2024    BUN 21 05/08/2024    BUN 23 (H) 05/07/2024    CREATININE 1.3 (H) 05/13/2024    CREATININE 1.2 05/08/2024    CREATININE 1.4 (H) 05/07/2024    GLUCOSE 105 05/13/2024    GLUCOSE 100 05/08/2024    GLUCOSE 123 (H) 05/07/2024      Hepatic:   Lab Results   Component Value Date    AST 43 (H) 02/07/2024    AST 44 (H) 12/07/2023    AST 55 (H) 12/06/2023    ALT 68 (H) 02/07/2024    ALT 66 12/07/2023    ALT 71 (H) 12/06/2023    BILITOT 0.8 02/07/2024    BILITOT 0.5 12/07/2023    BILITOT 0.4 12/06/2023    ALKPHOS 71 02/07/2024    ALKPHOS 62 12/07/2023    ALKPHOS 66 12/06/2023     BNP: No results found for: \"BNP\"  Lipids:   Lab Results   Component Value Date    CHOL 115 02/07/2024    HDL 40 02/07/2024     INR:   Lab Results   Component Value Date    INR 1.00 12/07/2023    INR 1.15 (H) 11/10/2022    INR 1.17 (H) 10/02/2022     URINE:   Lab Results   Component Value Date/Time    PROTUR 45.9 11/13/2023 06:44 AM     Lab Results   Component Value Date/Time    NITRU NEGATIVE 05/07/2024 09:00 AM    COLORU YELLOW 05/07/2024 09:00 AM    PHUR 5.5 05/07/2024 09:00 AM    PHUR 5.0 03/26/2021 12:10 PM    LABCAST NONE SEEN 05/07/2024 09:00 AM    LABCAST NONE SEEN 05/07/2024 09:00 AM    WBCUA NONE SEEN 05/07/2024 09:00 AM    RBCUA NONE SEEN 05/07/2024 09:00 AM    YEAST NONE SEEN 05/07/2024 09:00 AM    BACTERIA NONE SEEN 05/07/2024 09:00 AM    CLARITYU clear 09/12/2018 07:37 AM    SPECGRAV 1.020 09/12/2018 07:37 AM    LEUKOCYTESUR NEGATIVE 05/07/2024 09:00 AM    UROBILINOGEN 0.2 05/07/2024 09:00 AM    BILIRUBINUR NEGATIVE 05/07/2024 09:00 AM    BLOODU NEGATIVE 05/07/2024 09:00 AM    GLUCOSEU NEGATIVE 05/07/2024 09:00 AM    KETUA NEGATIVE 05/07/2024 09:00 AM      Microalbumen/Creatinine ratio:  No components found for: \"RUCREAT\"    Objective:   Vitals: /75 (Site: Left Upper Arm, Position: Sitting,

## 2024-05-21 ENCOUNTER — HOSPITAL ENCOUNTER (EMERGENCY)
Age: 74
Discharge: HOME | End: 2024-05-21
Attending: EMERGENCY MEDICINE
Payer: MEDICARE

## 2024-05-21 ENCOUNTER — APPOINTMENT (OUTPATIENT)
Dept: GENERAL RADIOLOGY | Age: 74
End: 2024-05-21
Payer: MEDICARE

## 2024-05-21 VITALS
TEMPERATURE: 97.7 F | RESPIRATION RATE: 19 BRPM | OXYGEN SATURATION: 93 % | WEIGHT: 230 LBS | SYSTOLIC BLOOD PRESSURE: 134 MMHG | BODY MASS INDEX: 31.15 KG/M2 | HEIGHT: 72 IN | HEART RATE: 78 BPM | DIASTOLIC BLOOD PRESSURE: 96 MMHG

## 2024-05-21 DIAGNOSIS — R53.83 OTHER FATIGUE: Primary | ICD-10-CM

## 2024-05-21 DIAGNOSIS — R53.1 GENERAL WEAKNESS: ICD-10-CM

## 2024-05-21 LAB
ALBUMIN SERPL BCG-MCNC: 3.9 G/DL (ref 3.5–5.1)
ALP SERPL-CCNC: 74 U/L (ref 38–126)
ALT SERPL W/O P-5'-P-CCNC: 32 U/L (ref 11–66)
ANION GAP SERPL CALC-SCNC: 17 MEQ/L (ref 8–16)
AST SERPL-CCNC: 23 U/L (ref 5–40)
BACTERIA URNS QL MICRO: ABNORMAL /HPF
BASOPHILS ABSOLUTE: 0.1 THOU/MM3 (ref 0–0.1)
BASOPHILS NFR BLD AUTO: 1 %
BILIRUB CONJ SERPL-MCNC: < 0.2 MG/DL (ref 0–0.3)
BILIRUB SERPL-MCNC: 0.5 MG/DL (ref 0.3–1.2)
BILIRUB UR QL STRIP.AUTO: NEGATIVE
BUN SERPL-MCNC: 41 MG/DL (ref 7–22)
CALCIUM SERPL-MCNC: 9.7 MG/DL (ref 8.5–10.5)
CASTS #/AREA URNS LPF: ABNORMAL /LPF
CASTS 2: ABNORMAL /LPF
CHARACTER UR: ABNORMAL
CHLORIDE SERPL-SCNC: 104 MEQ/L (ref 98–111)
CK SERPL-CCNC: 125 U/L (ref 55–170)
CO2 SERPL-SCNC: 19 MEQ/L (ref 23–33)
COLOR: YELLOW
CREAT SERPL-MCNC: 1.8 MG/DL (ref 0.4–1.2)
CRYSTALS URNS MICRO: ABNORMAL
DEPRECATED RDW RBC AUTO: 45.6 FL (ref 35–45)
EOSINOPHIL NFR BLD AUTO: 2.7 %
EOSINOPHILS ABSOLUTE: 0.2 THOU/MM3 (ref 0–0.4)
EPITHELIAL CELLS, UA: ABNORMAL /HPF
ERYTHROCYTE [DISTWIDTH] IN BLOOD BY AUTOMATED COUNT: 13.5 % (ref 11.5–14.5)
FLUAV RNA RESP QL NAA+PROBE: NOT DETECTED
FLUBV RNA RESP QL NAA+PROBE: NOT DETECTED
GFR SERPL CREATININE-BSD FRML MDRD: 39 ML/MIN/1.73M2
GLUCOSE SERPL-MCNC: 140 MG/DL (ref 70–108)
GLUCOSE UR QL STRIP.AUTO: NEGATIVE MG/DL
HCT VFR BLD AUTO: 53.5 % (ref 42–52)
HGB BLD-MCNC: 18 GM/DL (ref 14–18)
HGB UR QL STRIP.AUTO: NEGATIVE
IMM GRANULOCYTES # BLD AUTO: 0.02 THOU/MM3 (ref 0–0.07)
IMM GRANULOCYTES NFR BLD AUTO: 0.3 %
KETONES UR QL STRIP.AUTO: NEGATIVE
LYMPHOCYTES ABSOLUTE: 1.7 THOU/MM3 (ref 1–4.8)
LYMPHOCYTES NFR BLD AUTO: 21.7 %
MCH RBC QN AUTO: 31 PG (ref 26–33)
MCHC RBC AUTO-ENTMCNC: 33.6 GM/DL (ref 32.2–35.5)
MCV RBC AUTO: 92.2 FL (ref 80–94)
MISCELLANEOUS 2: ABNORMAL
MONOCYTES ABSOLUTE: 0.6 THOU/MM3 (ref 0.4–1.3)
MONOCYTES NFR BLD AUTO: 7.4 %
NEUTROPHILS ABSOLUTE: 5.2 THOU/MM3 (ref 1.8–7.7)
NEUTROPHILS NFR BLD AUTO: 66.9 %
NITRITE UR QL STRIP: NEGATIVE
NRBC BLD AUTO-RTO: 0 /100 WBC
OSMOLALITY SERPL CALC.SUM OF ELEC: 291.8 MOSMOL/KG (ref 275–300)
PH UR STRIP.AUTO: 5 [PH] (ref 5–9)
PLATELET # BLD AUTO: 217 THOU/MM3 (ref 130–400)
PMV BLD AUTO: 10.9 FL (ref 9.4–12.4)
POTASSIUM SERPL-SCNC: 4.1 MEQ/L (ref 3.5–5.2)
PROT SERPL-MCNC: 7.1 G/DL (ref 6.1–8)
PROT UR STRIP.AUTO-MCNC: 30 MG/DL
RBC # BLD AUTO: 5.8 MILL/MM3 (ref 4.7–6.1)
RBC URINE: ABNORMAL /HPF
RENAL EPI CELLS #/AREA URNS HPF: ABNORMAL /[HPF]
SARS-COV-2 RNA RESP QL NAA+PROBE: NOT DETECTED
SODIUM SERPL-SCNC: 140 MEQ/L (ref 135–145)
SP GR UR REFRACT.AUTO: 1.02 (ref 1–1.03)
TROPONIN, HIGH SENSITIVITY: 20 NG/L (ref 0–12)
UROBILINOGEN, URINE: 0.2 EU/DL (ref 0–1)
WBC # BLD AUTO: 7.7 THOU/MM3 (ref 4.8–10.8)
WBC #/AREA URNS HPF: ABNORMAL /HPF
WBC #/AREA URNS HPF: NEGATIVE /[HPF]
YEAST LIKE FUNGI URNS QL MICRO: ABNORMAL

## 2024-05-21 PROCEDURE — 85025 COMPLETE CBC W/AUTO DIFF WBC: CPT

## 2024-05-21 PROCEDURE — 99285 EMERGENCY DEPT VISIT HI MDM: CPT

## 2024-05-21 PROCEDURE — 96360 HYDRATION IV INFUSION INIT: CPT

## 2024-05-21 PROCEDURE — 2580000003 HC RX 258: Performed by: STUDENT IN AN ORGANIZED HEALTH CARE EDUCATION/TRAINING PROGRAM

## 2024-05-21 PROCEDURE — 93005 ELECTROCARDIOGRAM TRACING: CPT | Performed by: STUDENT IN AN ORGANIZED HEALTH CARE EDUCATION/TRAINING PROGRAM

## 2024-05-21 PROCEDURE — 71045 X-RAY EXAM CHEST 1 VIEW: CPT

## 2024-05-21 PROCEDURE — 80053 COMPREHEN METABOLIC PANEL: CPT

## 2024-05-21 PROCEDURE — 82550 ASSAY OF CK (CPK): CPT

## 2024-05-21 PROCEDURE — 36415 COLL VENOUS BLD VENIPUNCTURE: CPT

## 2024-05-21 PROCEDURE — 84484 ASSAY OF TROPONIN QUANT: CPT

## 2024-05-21 PROCEDURE — 87636 SARSCOV2 & INF A&B AMP PRB: CPT

## 2024-05-21 PROCEDURE — 82248 BILIRUBIN DIRECT: CPT

## 2024-05-21 PROCEDURE — 81001 URINALYSIS AUTO W/SCOPE: CPT

## 2024-05-21 RX ORDER — 0.9 % SODIUM CHLORIDE 0.9 %
500 INTRAVENOUS SOLUTION INTRAVENOUS ONCE
Status: COMPLETED | OUTPATIENT
Start: 2024-05-21 | End: 2024-05-21

## 2024-05-21 RX ADMIN — SODIUM CHLORIDE 500 ML: 9 INJECTION, SOLUTION INTRAVENOUS at 20:26

## 2024-05-21 ASSESSMENT — PAIN - FUNCTIONAL ASSESSMENT: PAIN_FUNCTIONAL_ASSESSMENT: NONE - DENIES PAIN

## 2024-05-21 NOTE — ED PROVIDER NOTES
[facility] EMERGENCY DEPT    Pt Name: Alex Mckeon  MRN: [id number]  Birthdate 1950  Date of evaluation: 5/21/2024  Resident Physician: Sully Lind MD  Supervising Physician: Dr. Garth Bonds DO    CHIEF COMPLAINT       Chief Complaint   Patient presents with    Fatigue    Sweats     HISTORY OF PRESENT ILLNESS   Alex Mckeon is a 74 y.o. male with PMHx of CHF, acquired polycythemia vera, AF status post watchman, hypertension, hyperlipidemia, CKD, diabetes, GERD, prostate cancer status post prostatectomy, seizure-like activity  who presents to the emergency department for evaluation of fatigue, sweating.    Per chart review, patient was last hospitalized on 5/7/2024 for seizure-like activity.    Today, patient was out in the yard weeding when he suddenly got extremely weak and hot.  Denies having any pain in the chest pain, shortness of breath.  States that he went to sit down and he was unable to get up.  Denies any LOC.  EMS brought him to ED.  Appears diaphoretic on ED arrival.    The patient has no other acute complaints at this time.    Review of Systems    Negative Except as Documented Above.    PASTMEDICAL HISTORY     Past Medical History:   Diagnosis Date    Acquired polycythemia vera (HCC) 8/9/2023    Anesthesia     takes large dose of medications to make him sleepy for surgery    Atrial fibrillation (HCC)     Benign prostatic hyperplasia with urinary frequency 10/21/2019    CAD (coronary artery disease)     Chest pain     Colon polyp 2011    removed    Coronary artery disease involving native coronary artery of native heart without angina pectoris 10/21/2019    Dementia (HCC)     Dizziness     Elevated PSA     GERD (gastroesophageal reflux disease)     Heart disease     Hyperlipidemia     Hypertension     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

## 2024-05-21 NOTE — ED TRIAGE NOTES
Pt arrives via EMS for weakness. Pt was outside working on yard and began feeling weak and states he \"didn't feel right\". Pt denies pain. Pt diaphoretic. Hx Afib. EKG complete on arrival. Pt states he has a watchman. IV in L AC on arrival. Pt reports new onset seizures over the last few months.

## 2024-05-22 LAB
EKG Q-T INTERVAL: 390 MS
EKG QRS DURATION: 98 MS
EKG QTC CALCULATION (BAZETT): 484 MS
EKG R AXIS: -37 DEGREES
EKG T AXIS: 52 DEGREES
EKG VENTRICULAR RATE: 93 BPM

## 2024-05-22 PROCEDURE — 93010 ELECTROCARDIOGRAM REPORT: CPT | Performed by: INTERNAL MEDICINE

## 2024-05-22 NOTE — ED NOTES
Patient ambulated independently to bathroom and back to room with steady gait. Returned to bed. Wife at bedside. Updated on POC. Patient resting in bed. Respirations easy and unlabored. No distress noted. Call light within reach.

## 2024-05-22 NOTE — DISCHARGE INSTRUCTIONS
In the emergency department for fatigue.  This was probably due to exertion.  Your workup in the emergency department was reassuring.    Please follow-up with your cardiologist as well as your family doctor regarding today's visit.

## 2024-05-30 ENCOUNTER — OFFICE VISIT (OUTPATIENT)
Dept: SURGERY | Age: 74
End: 2024-05-30
Payer: MEDICARE

## 2024-05-30 VITALS
OXYGEN SATURATION: 96 % | TEMPERATURE: 97.1 F | HEIGHT: 72 IN | HEART RATE: 74 BPM | BODY MASS INDEX: 31.67 KG/M2 | WEIGHT: 233.8 LBS | RESPIRATION RATE: 16 BRPM | DIASTOLIC BLOOD PRESSURE: 64 MMHG | SYSTOLIC BLOOD PRESSURE: 118 MMHG

## 2024-05-30 DIAGNOSIS — K42.9 UMBILICAL HERNIA WITHOUT OBSTRUCTION AND WITHOUT GANGRENE: Primary | ICD-10-CM

## 2024-05-30 PROCEDURE — G8417 CALC BMI ABV UP PARAM F/U: HCPCS | Performed by: SURGERY

## 2024-05-30 PROCEDURE — G8427 DOCREV CUR MEDS BY ELIG CLIN: HCPCS | Performed by: SURGERY

## 2024-05-30 PROCEDURE — 3017F COLORECTAL CA SCREEN DOC REV: CPT | Performed by: SURGERY

## 2024-05-30 PROCEDURE — 3078F DIAST BP <80 MM HG: CPT | Performed by: SURGERY

## 2024-05-30 PROCEDURE — 1036F TOBACCO NON-USER: CPT | Performed by: SURGERY

## 2024-05-30 PROCEDURE — 99204 OFFICE O/P NEW MOD 45 MIN: CPT | Performed by: SURGERY

## 2024-05-30 PROCEDURE — 3074F SYST BP LT 130 MM HG: CPT | Performed by: SURGERY

## 2024-05-30 PROCEDURE — 1123F ACP DISCUSS/DSCN MKR DOCD: CPT | Performed by: SURGERY

## 2024-05-30 ASSESSMENT — ENCOUNTER SYMPTOMS
RESPIRATORY NEGATIVE: 1
ALLERGIC/IMMUNOLOGIC NEGATIVE: 1
GASTROINTESTINAL NEGATIVE: 1
EYES NEGATIVE: 1

## 2024-05-30 NOTE — PROGRESS NOTES
Monet Alvarez MD  [facility]    Pt Name: Alex Mckeon  MRN: [id number]  YOB: 1950  Date of evaluation: 5/30/2024  Primary Care Physician: Annie Eckert, APRN - CNP  Patient evaluated at the request of  Annie Eckert   Reason for evaluation: umbilical hernia   IMPRESSIONS:       ICD-10-CM    1. Umbilical hernia without obstruction and without gangrene  K42.9         has Paroxysmal atrial fibrillation (HCC) and Obesity due to excess calories on their pertinent problem list.    PLANS:   Patient does have a small umbilical hernia and is completely asymptomatic.  It seen on CT scan.  I discussed with patient that it is asymptomatic and very small and anticipated getting larger or causing issues at this time my recommendation would not be to perform hernia surgery unless it becomes symptomatic.  He is in agreement.  If anything changes I am happy to see him back and discuss umbilical hernia repair    SUBJECTIVE:   CC: Hernia    History of Chief Complaint:    Alex is a 74 y.o.male who recently had a prostatectomy.  At that time he was told he had umbilical hernia.  Patient denies any bulge or any pain.  No change in bowel habits.  He does have urinary incontinence ever since his prostatectomy.  CT abdomen pelvis was done that demonstrated a small umbilical hernia.  He states he went to be evaluated as he had a friend who has a large umbilical hernia that was unable to be repaired as he waited too long.    Past Medical History  Past Medical History:   Diagnosis Date    Acquired polycythemia vera (HCC) 8/9/2023    Anesthesia     takes large dose of medications to make him sleepy for surgery    Atrial fibrillation (HCC)     Benign prostatic hyperplasia with urinary frequency 10/21/2019    CAD (coronary artery disease)     Chest pain     Colon polyp 2011    removed    Coronary artery disease involving native coronary artery of native heart without angina pectoris 10/21/2019    Dementia

## 2024-06-03 RX ORDER — LEVETIRACETAM 500 MG/1
500 TABLET ORAL 2 TIMES DAILY
Qty: 60 TABLET | Refills: 1 | Status: SHIPPED | OUTPATIENT
Start: 2024-06-03

## 2024-06-03 NOTE — TELEPHONE ENCOUNTER
Patient recently admitted to [facility] and placed on Keppra 500 mg bid per internist.  He is scheduled to see neurology next month but will be out of the medication this Friday.  Wife would like to see if you would refill until pt is seen by specialist.  No need to call back unless issues.   Dose verified and order pended.     I did offer to schedule hospital f/u appt for pt but wife declined at this time.

## 2024-06-04 ENCOUNTER — OFFICE VISIT (OUTPATIENT)
Dept: FAMILY MEDICINE CLINIC | Age: 74
End: 2024-06-04

## 2024-06-04 VITALS
HEART RATE: 72 BPM | WEIGHT: 236.2 LBS | BODY MASS INDEX: 31.99 KG/M2 | RESPIRATION RATE: 18 BRPM | TEMPERATURE: 98.6 F | SYSTOLIC BLOOD PRESSURE: 122 MMHG | HEIGHT: 72 IN | DIASTOLIC BLOOD PRESSURE: 72 MMHG

## 2024-06-04 DIAGNOSIS — N25.81 HYPERPARATHYROIDISM, SECONDARY RENAL (HCC): ICD-10-CM

## 2024-06-04 DIAGNOSIS — I10 ESSENTIAL HYPERTENSION: ICD-10-CM

## 2024-06-04 DIAGNOSIS — R56.9 SEIZURE-LIKE ACTIVITY (HCC): Primary | ICD-10-CM

## 2024-06-04 DIAGNOSIS — D68.69 SECONDARY HYPERCOAGULABLE STATE (HCC): ICD-10-CM

## 2024-06-04 DIAGNOSIS — D45 ACQUIRED POLYCYTHEMIA VERA (HCC): ICD-10-CM

## 2024-06-04 DIAGNOSIS — I48.19 PERSISTENT ATRIAL FIBRILLATION (HCC): ICD-10-CM

## 2024-06-04 RX ORDER — LEVETIRACETAM 500 MG/1
500 TABLET ORAL 2 TIMES DAILY
Qty: 60 TABLET | Refills: 1 | Status: CANCELLED | OUTPATIENT
Start: 2024-06-04

## 2024-06-04 NOTE — PROGRESS NOTES
[facility] PROFESSIONAL SERVS  [facility]  [address] [facility] [address]  Dept: [phone]  Dept Fax: [phone]  Loc: [phone]     2024     Alex Mckeon (:  1950) is a 74 y.o. male, here for evaluation of the following medical concerns:    Chief Complaint   Patient presents with    Medication Refill     No seizure activity from  to now. Feels the medication is working. Can't get in to Neurology until . Patient would like you to take over for the medication.      Weight Management     Questioning if the medication is causing him to gain weight.        Pt presents to the office today for follow up from hospital and seizure like activity.  Keppra 500 mg BID added and pt has follow up with neurology scheduled for 7/10/24.  Pt denies any seizures or shaking since hospitalization.  Pt reports that he feels \"great\" otherwise. He has gained some weight.          Wt Readings from Last 3 Encounters:   24 107.1 kg (236 lb 3.2 oz)   24 106.1 kg (233 lb 12.8 oz)   24 104.3 kg (230 lb)     BMI Readings from Last 3 Encounters:   24 32.03 kg/m²   24 31.71 kg/m²   24 31.19 kg/m²       Treatment Adherence:   Medication compliance:  compliant all of the time  Diet compliance:  compliant most of the time  Weight trend: stable    Hypertension:  Home blood pressure monitoring: No. Patient denies chest pain, shortness of breath, and headache.  Antihypertensive medication side effects: no medication side effects noted.  Use of agents associated with hypertension: none.                                        Sodium (meq/L)   Date Value   2024 140    BUN (mg/dL)   Date Value   2024 41 (H)    Glucose   Date Value   2024 140 mg/dL (H)   2016 93 mg/dl      Potassium (meq/L)   Date Value   2024 4.1     Potassium reflex Magnesium (meq/L)   Date Value   2024 4.3    Creatinine (mg/dL)   Date Value

## 2024-06-05 PROBLEM — D68.69 SECONDARY HYPERCOAGULABLE STATE (HCC): Status: ACTIVE | Noted: 2024-06-05

## 2024-06-05 ASSESSMENT — ENCOUNTER SYMPTOMS
COUGH: 0
NAUSEA: 0
SHORTNESS OF BREATH: 0
TROUBLE SWALLOWING: 0
EYE PAIN: 0
DIARRHEA: 0
SORE THROAT: 0
WHEEZING: 0
VOMITING: 0
BACK PAIN: 0
COLOR CHANGE: 0
SINUS PAIN: 0
FACIAL SWELLING: 0
ABDOMINAL PAIN: 0

## 2024-07-10 ENCOUNTER — OFFICE VISIT (OUTPATIENT)
Dept: NEUROLOGY | Age: 74
End: 2024-07-10
Payer: MEDICARE

## 2024-07-10 ENCOUNTER — HOSPITAL ENCOUNTER (OUTPATIENT)
Age: 74
Discharge: HOME | End: 2024-07-10
Payer: MEDICARE

## 2024-07-10 VITALS
HEART RATE: 62 BPM | DIASTOLIC BLOOD PRESSURE: 70 MMHG | OXYGEN SATURATION: 98 % | WEIGHT: 235 LBS | BODY MASS INDEX: 31.83 KG/M2 | SYSTOLIC BLOOD PRESSURE: 116 MMHG | HEIGHT: 72 IN

## 2024-07-10 DIAGNOSIS — R56.9 SEIZURE (HCC): Primary | ICD-10-CM

## 2024-07-10 DIAGNOSIS — R56.9 SEIZURE (HCC): ICD-10-CM

## 2024-07-10 LAB — KEPPRA: 12 UG/ML

## 2024-07-10 PROCEDURE — 3078F DIAST BP <80 MM HG: CPT | Performed by: PSYCHIATRY & NEUROLOGY

## 2024-07-10 PROCEDURE — 1036F TOBACCO NON-USER: CPT | Performed by: PSYCHIATRY & NEUROLOGY

## 2024-07-10 PROCEDURE — 3017F COLORECTAL CA SCREEN DOC REV: CPT | Performed by: PSYCHIATRY & NEUROLOGY

## 2024-07-10 PROCEDURE — 80177 DRUG SCRN QUAN LEVETIRACETAM: CPT

## 2024-07-10 PROCEDURE — 1123F ACP DISCUSS/DSCN MKR DOCD: CPT | Performed by: PSYCHIATRY & NEUROLOGY

## 2024-07-10 PROCEDURE — G8427 DOCREV CUR MEDS BY ELIG CLIN: HCPCS | Performed by: PSYCHIATRY & NEUROLOGY

## 2024-07-10 PROCEDURE — 99205 OFFICE O/P NEW HI 60 MIN: CPT | Performed by: PSYCHIATRY & NEUROLOGY

## 2024-07-10 PROCEDURE — G8417 CALC BMI ABV UP PARAM F/U: HCPCS | Performed by: PSYCHIATRY & NEUROLOGY

## 2024-07-10 PROCEDURE — 36415 COLL VENOUS BLD VENIPUNCTURE: CPT

## 2024-07-10 PROCEDURE — 3074F SYST BP LT 130 MM HG: CPT | Performed by: PSYCHIATRY & NEUROLOGY

## 2024-07-10 RX ORDER — LEVETIRACETAM 500 MG/1
500 TABLET ORAL 2 TIMES DAILY
Qty: 60 TABLET | Refills: 5 | Status: ON HOLD | OUTPATIENT
Start: 2024-07-10

## 2024-07-10 NOTE — PATIENT INSTRUCTIONS
Continue with Keppra 500 mg two times a day. Refills given  Keppra level  No driving, swimming, operating heavy machinery or compromising heights until event free for 6 months. Report any new events. Call if any questions.  Report any new events  Call with any new symptoms or concerns.   Follow up in 4 months.

## 2024-07-11 ENCOUNTER — TELEPHONE (OUTPATIENT)
Dept: NEUROLOGY | Age: 74
End: 2024-07-11

## 2024-07-11 RX ORDER — LEVETIRACETAM 750 MG/1
750 TABLET ORAL NIGHTLY
Qty: 30 TABLET | Refills: 3 | Status: ON HOLD | OUTPATIENT
Start: 2024-07-11

## 2024-07-11 NOTE — TELEPHONE ENCOUNTER
Patient/spouse came into the office and was informed. Patient verbalized understanding and will recheck lab work in 1 week at [facility].

## 2024-07-11 NOTE — TELEPHONE ENCOUNTER
----- Message from Paige L Leopold, APRN - CNP sent at 7/11/2024  7:49 AM EDT -----  Please let patient know his Keppra level is on low end of normal=12. (Normal is 12-46). Would recommend that he increase his keppra to 500 mg in the morning and 750 mg at night  Repeat keppra level in 1 week  Paige Leopold, CNP

## 2024-07-18 ENCOUNTER — HOSPITAL ENCOUNTER (OUTPATIENT)
Age: 74
Discharge: HOME | End: 2024-07-18
Payer: MEDICARE

## 2024-07-18 DIAGNOSIS — R56.9 SEIZURE (HCC): ICD-10-CM

## 2024-07-18 LAB — KEPPRA: 23 UG/ML

## 2024-07-18 PROCEDURE — 80177 DRUG SCRN QUAN LEVETIRACETAM: CPT

## 2024-07-18 PROCEDURE — 36415 COLL VENOUS BLD VENIPUNCTURE: CPT

## 2024-07-20 ENCOUNTER — APPOINTMENT (OUTPATIENT)
Dept: GENERAL RADIOLOGY | Age: 74
End: 2024-07-20
Payer: MEDICARE

## 2024-07-20 ENCOUNTER — HOSPITAL ENCOUNTER (INPATIENT)
Age: 74
LOS: 2 days | Discharge: HOME | End: 2024-07-22
Attending: EMERGENCY MEDICINE
Payer: MEDICARE

## 2024-07-20 ENCOUNTER — APPOINTMENT (OUTPATIENT)
Dept: CT IMAGING | Age: 74
End: 2024-07-20
Payer: MEDICARE

## 2024-07-20 DIAGNOSIS — G40.909 SEIZURE DISORDER (HCC): ICD-10-CM

## 2024-07-20 DIAGNOSIS — R55 NEAR SYNCOPE: Primary | ICD-10-CM

## 2024-07-20 DIAGNOSIS — I50.9 CHRONIC CONGESTIVE HEART FAILURE, UNSPECIFIED HEART FAILURE TYPE (HCC): ICD-10-CM

## 2024-07-20 DIAGNOSIS — I42.9 CARDIOMYOPATHY, UNSPECIFIED TYPE (HCC): ICD-10-CM

## 2024-07-20 DIAGNOSIS — D45 POLYCYTHEMIA VERA (HCC): ICD-10-CM

## 2024-07-20 DIAGNOSIS — I48.11 LONGSTANDING PERSISTENT ATRIAL FIBRILLATION (HCC): ICD-10-CM

## 2024-07-20 DIAGNOSIS — R55 SYNCOPE, UNSPECIFIED SYNCOPE TYPE: ICD-10-CM

## 2024-07-20 PROBLEM — E66.9 CLASS 1 OBESITY WITH SERIOUS COMORBIDITY AND BODY MASS INDEX (BMI) OF 32.0 TO 32.9 IN ADULT: Status: ACTIVE | Noted: 2024-07-20

## 2024-07-20 PROBLEM — E66.811 CLASS 1 OBESITY WITH SERIOUS COMORBIDITY AND BODY MASS INDEX (BMI) OF 32.0 TO 32.9 IN ADULT: Status: ACTIVE | Noted: 2024-07-20

## 2024-07-20 LAB
ANION GAP SERPL CALC-SCNC: 11 MEQ/L (ref 8–16)
APTT PPP: 31.7 SECONDS (ref 22–38)
BASOPHILS ABSOLUTE: 0.1 THOU/MM3 (ref 0–0.1)
BASOPHILS NFR BLD AUTO: 1 %
BUN SERPL-MCNC: 28 MG/DL (ref 7–22)
CALCIUM SERPL-MCNC: 9.6 MG/DL (ref 8.5–10.5)
CHLORIDE SERPL-SCNC: 106 MEQ/L (ref 98–111)
CO2 SERPL-SCNC: 24 MEQ/L (ref 23–33)
CREAT SERPL-MCNC: 1.5 MG/DL (ref 0.4–1.2)
DEPRECATED RDW RBC AUTO: 44.1 FL (ref 35–45)
EOSINOPHIL NFR BLD AUTO: 1.9 %
EOSINOPHILS ABSOLUTE: 0.2 THOU/MM3 (ref 0–0.4)
ERYTHROCYTE [DISTWIDTH] IN BLOOD BY AUTOMATED COUNT: 13.2 % (ref 11.5–14.5)
FIBRINOGEN PPP-MCNC: 307 MG/100ML (ref 155–475)
FSP PPP LA-ACNC: < 5 MCG/ML
GFR SERPL CREATININE-BSD FRML MDRD: 49 ML/MIN/1.73M2
GLUCOSE BLD STRIP.AUTO-MCNC: 128 MG/DL (ref 70–108)
GLUCOSE BLD STRIP.AUTO-MCNC: 148 MG/DL (ref 70–108)
GLUCOSE BLD STRIP.AUTO-MCNC: 94 MG/DL (ref 70–108)
GLUCOSE SERPL-MCNC: 126 MG/DL (ref 70–108)
HCT VFR BLD AUTO: 56.2 % (ref 42–52)
HGB BLD-MCNC: 18.9 GM/DL (ref 14–18)
IMM GRANULOCYTES # BLD AUTO: 0.03 THOU/MM3 (ref 0–0.07)
IMM GRANULOCYTES NFR BLD AUTO: 0.3 %
INR PPP: 1.13 (ref 0.85–1.13)
LACTATE SERPL-SCNC: 2.1 MMOL/L (ref 0.5–2)
LACTATE SERPL-SCNC: 2.2 MMOL/L (ref 0.5–2)
LYMPHOCYTES ABSOLUTE: 1.7 THOU/MM3 (ref 1–4.8)
LYMPHOCYTES NFR BLD AUTO: 18.5 %
MCH RBC QN AUTO: 30.8 PG (ref 26–33)
MCHC RBC AUTO-ENTMCNC: 33.6 GM/DL (ref 32.2–35.5)
MCV RBC AUTO: 91.7 FL (ref 80–94)
MONOCYTES ABSOLUTE: 0.6 THOU/MM3 (ref 0.4–1.3)
MONOCYTES NFR BLD AUTO: 6.7 %
NEUTROPHILS ABSOLUTE: 6.7 THOU/MM3 (ref 1.8–7.7)
NEUTROPHILS NFR BLD AUTO: 71.6 %
NRBC BLD AUTO-RTO: 0 /100 WBC
OSMOLALITY SERPL CALC.SUM OF ELEC: 288.3 MOSMOL/KG (ref 275–300)
PLATELET # BLD AUTO: 223 THOU/MM3 (ref 130–400)
PMV BLD AUTO: 10.6 FL (ref 9.4–12.4)
POTASSIUM SERPL-SCNC: 4.2 MEQ/L (ref 3.5–5.2)
PROCALCITONIN SERPL IA-MCNC: 0.05 NG/ML (ref 0.01–0.09)
RBC # BLD AUTO: 6.13 MILL/MM3 (ref 4.7–6.1)
SODIUM SERPL-SCNC: 141 MEQ/L (ref 135–145)
TROPONIN, HIGH SENSITIVITY: 14 NG/L (ref 0–12)
TROPONIN, HIGH SENSITIVITY: 18 NG/L (ref 0–12)
TSH SERPL DL<=0.005 MIU/L-ACNC: 2.11 UIU/ML (ref 0.4–4.2)
WBC # BLD AUTO: 9.4 THOU/MM3 (ref 4.8–10.8)

## 2024-07-20 PROCEDURE — 87040 BLOOD CULTURE FOR BACTERIA: CPT

## 2024-07-20 PROCEDURE — 1200000003 HC TELEMETRY R&B

## 2024-07-20 PROCEDURE — 84145 PROCALCITONIN (PCT): CPT

## 2024-07-20 PROCEDURE — 6370000000 HC RX 637 (ALT 250 FOR IP)

## 2024-07-20 PROCEDURE — 85025 COMPLETE CBC W/AUTO DIFF WBC: CPT

## 2024-07-20 PROCEDURE — 93005 ELECTROCARDIOGRAM TRACING: CPT

## 2024-07-20 PROCEDURE — 2580000003 HC RX 258

## 2024-07-20 PROCEDURE — 96360 HYDRATION IV INFUSION INIT: CPT

## 2024-07-20 PROCEDURE — 96361 HYDRATE IV INFUSION ADD-ON: CPT

## 2024-07-20 PROCEDURE — 99223 1ST HOSP IP/OBS HIGH 75: CPT

## 2024-07-20 PROCEDURE — 36415 COLL VENOUS BLD VENIPUNCTURE: CPT

## 2024-07-20 PROCEDURE — 84484 ASSAY OF TROPONIN QUANT: CPT

## 2024-07-20 PROCEDURE — 80048 BASIC METABOLIC PNL TOTAL CA: CPT

## 2024-07-20 PROCEDURE — 99285 EMERGENCY DEPT VISIT HI MDM: CPT

## 2024-07-20 PROCEDURE — 85730 THROMBOPLASTIN TIME PARTIAL: CPT

## 2024-07-20 PROCEDURE — 85610 PROTHROMBIN TIME: CPT

## 2024-07-20 PROCEDURE — 83605 ASSAY OF LACTIC ACID: CPT

## 2024-07-20 PROCEDURE — 82948 REAGENT STRIP/BLOOD GLUCOSE: CPT

## 2024-07-20 PROCEDURE — 85362 FIBRIN DEGRADATION PRODUCTS: CPT

## 2024-07-20 PROCEDURE — 71045 X-RAY EXAM CHEST 1 VIEW: CPT

## 2024-07-20 PROCEDURE — 70450 CT HEAD/BRAIN W/O DYE: CPT

## 2024-07-20 PROCEDURE — 85384 FIBRINOGEN ACTIVITY: CPT

## 2024-07-20 PROCEDURE — 84443 ASSAY THYROID STIM HORMONE: CPT

## 2024-07-20 RX ORDER — DEXTROSE MONOHYDRATE 100 MG/ML
INJECTION, SOLUTION INTRAVENOUS CONTINUOUS PRN
Status: DISCONTINUED | OUTPATIENT
Start: 2024-07-20 | End: 2024-07-22 | Stop reason: HOSPADM

## 2024-07-20 RX ORDER — LEVETIRACETAM 500 MG/1
500 TABLET ORAL EVERY MORNING
Status: DISCONTINUED | OUTPATIENT
Start: 2024-07-21 | End: 2024-07-20 | Stop reason: SDUPTHER

## 2024-07-20 RX ORDER — MAGNESIUM SULFATE IN WATER 40 MG/ML
2000 INJECTION, SOLUTION INTRAVENOUS PRN
Status: DISCONTINUED | OUTPATIENT
Start: 2024-07-20 | End: 2024-07-22 | Stop reason: HOSPADM

## 2024-07-20 RX ORDER — LOSARTAN POTASSIUM 25 MG/1
25 TABLET ORAL DAILY
Status: DISCONTINUED | OUTPATIENT
Start: 2024-07-20 | End: 2024-07-22 | Stop reason: HOSPADM

## 2024-07-20 RX ORDER — ACETAMINOPHEN 325 MG/1
650 TABLET ORAL EVERY 6 HOURS PRN
Status: DISCONTINUED | OUTPATIENT
Start: 2024-07-20 | End: 2024-07-22 | Stop reason: HOSPADM

## 2024-07-20 RX ORDER — ONDANSETRON 4 MG/1
4 TABLET, ORALLY DISINTEGRATING ORAL EVERY 8 HOURS PRN
Status: DISCONTINUED | OUTPATIENT
Start: 2024-07-20 | End: 2024-07-22 | Stop reason: HOSPADM

## 2024-07-20 RX ORDER — ONDANSETRON 2 MG/ML
4 INJECTION INTRAMUSCULAR; INTRAVENOUS EVERY 6 HOURS PRN
Status: DISCONTINUED | OUTPATIENT
Start: 2024-07-20 | End: 2024-07-22 | Stop reason: HOSPADM

## 2024-07-20 RX ORDER — NITROGLYCERIN 0.4 MG/1
0.4 TABLET SUBLINGUAL EVERY 5 MIN PRN
Status: DISCONTINUED | OUTPATIENT
Start: 2024-07-20 | End: 2024-07-22 | Stop reason: HOSPADM

## 2024-07-20 RX ORDER — DILTIAZEM HYDROCHLORIDE 120 MG/1
120 CAPSULE, COATED, EXTENDED RELEASE ORAL NIGHTLY
Status: DISCONTINUED | OUTPATIENT
Start: 2024-07-20 | End: 2024-07-22 | Stop reason: HOSPADM

## 2024-07-20 RX ORDER — POTASSIUM CHLORIDE 20 MEQ/1
40 TABLET, EXTENDED RELEASE ORAL PRN
Status: DISCONTINUED | OUTPATIENT
Start: 2024-07-20 | End: 2024-07-22 | Stop reason: HOSPADM

## 2024-07-20 RX ORDER — GLUCAGON 1 MG/ML
1 KIT INJECTION PRN
Status: DISCONTINUED | OUTPATIENT
Start: 2024-07-20 | End: 2024-07-22 | Stop reason: HOSPADM

## 2024-07-20 RX ORDER — POTASSIUM CHLORIDE 7.45 MG/ML
10 INJECTION INTRAVENOUS PRN
Status: DISCONTINUED | OUTPATIENT
Start: 2024-07-20 | End: 2024-07-22 | Stop reason: HOSPADM

## 2024-07-20 RX ORDER — PANTOPRAZOLE SODIUM 40 MG/1
40 TABLET, DELAYED RELEASE ORAL
Status: DISCONTINUED | OUTPATIENT
Start: 2024-07-21 | End: 2024-07-22 | Stop reason: HOSPADM

## 2024-07-20 RX ORDER — SODIUM CHLORIDE 9 MG/ML
INJECTION, SOLUTION INTRAVENOUS PRN
Status: DISCONTINUED | OUTPATIENT
Start: 2024-07-20 | End: 2024-07-22 | Stop reason: HOSPADM

## 2024-07-20 RX ORDER — SODIUM CHLORIDE 0.9 % (FLUSH) 0.9 %
5-40 SYRINGE (ML) INJECTION PRN
Status: DISCONTINUED | OUTPATIENT
Start: 2024-07-20 | End: 2024-07-22 | Stop reason: HOSPADM

## 2024-07-20 RX ORDER — METOPROLOL TARTRATE 50 MG/1
50 TABLET, FILM COATED ORAL 2 TIMES DAILY
Status: DISCONTINUED | OUTPATIENT
Start: 2024-07-20 | End: 2024-07-22 | Stop reason: HOSPADM

## 2024-07-20 RX ORDER — OXYMETAZOLINE HYDROCHLORIDE 0.05 G/100ML
2 SPRAY NASAL ONCE
Status: COMPLETED | OUTPATIENT
Start: 2024-07-20 | End: 2024-07-21

## 2024-07-20 RX ORDER — ASPIRIN 81 MG/1
81 TABLET, CHEWABLE ORAL DAILY
Status: DISCONTINUED | OUTPATIENT
Start: 2024-07-20 | End: 2024-07-22 | Stop reason: HOSPADM

## 2024-07-20 RX ORDER — ENOXAPARIN SODIUM 100 MG/ML
30 INJECTION SUBCUTANEOUS 2 TIMES DAILY
Status: DISCONTINUED | OUTPATIENT
Start: 2024-07-20 | End: 2024-07-22 | Stop reason: HOSPADM

## 2024-07-20 RX ORDER — 0.9 % SODIUM CHLORIDE 0.9 %
1000 INTRAVENOUS SOLUTION INTRAVENOUS ONCE
Status: COMPLETED | OUTPATIENT
Start: 2024-07-20 | End: 2024-07-20

## 2024-07-20 RX ORDER — LIDOCAINE HYDROCHLORIDE AND EPINEPHRINE 10; 10 MG/ML; UG/ML
20 INJECTION, SOLUTION INFILTRATION; PERINEURAL ONCE
Status: DISCONTINUED | OUTPATIENT
Start: 2024-07-20 | End: 2024-07-22 | Stop reason: HOSPADM

## 2024-07-20 RX ORDER — ACETAMINOPHEN 650 MG/1
650 SUPPOSITORY RECTAL EVERY 6 HOURS PRN
Status: DISCONTINUED | OUTPATIENT
Start: 2024-07-20 | End: 2024-07-22 | Stop reason: HOSPADM

## 2024-07-20 RX ORDER — ROSUVASTATIN CALCIUM 20 MG/1
20 TABLET, COATED ORAL DAILY
Status: DISCONTINUED | OUTPATIENT
Start: 2024-07-21 | End: 2024-07-22 | Stop reason: HOSPADM

## 2024-07-20 RX ORDER — POLYETHYLENE GLYCOL 3350 17 G/17G
17 POWDER, FOR SOLUTION ORAL DAILY PRN
Status: DISCONTINUED | OUTPATIENT
Start: 2024-07-20 | End: 2024-07-22 | Stop reason: HOSPADM

## 2024-07-20 RX ORDER — SODIUM CHLORIDE 0.9 % (FLUSH) 0.9 %
5-40 SYRINGE (ML) INJECTION EVERY 12 HOURS SCHEDULED
Status: DISCONTINUED | OUTPATIENT
Start: 2024-07-20 | End: 2024-07-22 | Stop reason: HOSPADM

## 2024-07-20 RX ORDER — GINSENG 100 MG
CAPSULE ORAL 3 TIMES DAILY
Status: DISCONTINUED | OUTPATIENT
Start: 2024-07-20 | End: 2024-07-21

## 2024-07-20 RX ADMIN — LEVETIRACETAM 750 MG: 500 TABLET, FILM COATED ORAL at 20:13

## 2024-07-20 RX ADMIN — SODIUM CHLORIDE, PRESERVATIVE FREE 10 ML: 5 INJECTION INTRAVENOUS at 20:14

## 2024-07-20 RX ADMIN — SODIUM CHLORIDE 1000 ML: 9 INJECTION, SOLUTION INTRAVENOUS at 11:30

## 2024-07-20 RX ADMIN — BACITRACIN: 500 OINTMENT TOPICAL at 23:57

## 2024-07-20 ASSESSMENT — PAIN - FUNCTIONAL ASSESSMENT
PAIN_FUNCTIONAL_ASSESSMENT: NONE - DENIES PAIN
PAIN_FUNCTIONAL_ASSESSMENT: NONE - DENIES PAIN

## 2024-07-20 ASSESSMENT — HEART SCORE: ECG: NON-SPECIFC REPOLARIZATION DISTURBANCE/LBTB/PM

## 2024-07-20 NOTE — ED NOTES
ED to inpatient nurses report      Chief Complaint:  Chief Complaint   Patient presents with    Epistaxis     Present to ED from: home    MOA:     LOC: alert and orientated to name, place, date  Mobility: Requires assistance * 1  Oxygen Baseline: room air    Current needs required: room air     Code Status:   Full Code    What abnormal results were found and what did you give/do to treat them? Syncope, epistaxis  Any procedures or intervention occur? [place], fluids, labs     Mental Status:  Level of Consciousness: Alert (0)    Psych Assessment:        Vitals:  Patient Vitals for the past 24 hrs:   BP Temp Temp src Pulse Resp SpO2 Height Weight   07/20/24 1414 (!) 126/57 -- -- 92 27 96 % -- --   07/20/24 1314 (!) 121/104 -- -- 70 23 98 % -- --   07/20/24 1245 121/66 -- -- 72 23 96 % -- --   07/20/24 1128 100/75 -- -- 78 25 95 % -- --   07/20/24 1011 116/82 97.7 °F (36.5 °C) Oral (!) 102 18 96 % 1.829 m (6') 106.6 kg (235 lb)        LDAs:   Peripheral IV 07/20/24 Right Antecubital (Active)   Site Assessment Clean, dry & intact 07/20/24 1225   Line Status Infusing 07/20/24 1225   Phlebitis Assessment No symptoms 07/20/24 1225   Infiltration Assessment 0 07/20/24 1225   Dressing Intervention New 07/20/24 1127       Ambulatory Status:  No data recorded    Diagnosis:  DISPOSITION Admitted 07/20/2024 02:00:05 PM   Final diagnoses:   Near syncope   Longstanding persistent atrial fibrillation (HCC)   Seizure disorder (HCC)   Polycythemia vera (HCC)        Consults:  None     Pain Score:  Pain Assessment  Pain Assessment: None - Denies Pain    C-SSRS:   Risk of Suicide: No Risk    Sepsis Screening:       Britton Fall Risk:       Swallow Screening   Reason for Fail: (S) Other (comment) (nothing by mouth at this time due to nose bleed and decreased LOC at this time.)    Preferred Language:   English      ALLERGIES     Patient has no known allergies.    SURGICAL HISTORY       Past Surgical History:   Procedure Laterality  with chronic kidney disease 02/14/2024           Electronically signed by Shala Mckenzie RN on 7/20/2024 at 2:25 PM

## 2024-07-20 NOTE — H&P
MG tablet Take 1 tablet by mouth at bedtime 7/11/24   Leopold, Paige L, APRN - CNP   levETIRAcetam (KEPPRA) 500 MG tablet Take 1 tablet by mouth 2 times daily 7/10/24   Leopold, Paige L, APRN - CNP   Cholecalciferol (VITAMIN D3) 125 MCG (5000 UT) TABS Take 500 Units by mouth Daily    ProviderSkyler MD   dilTIAZem (CARDIZEM CD) 120 MG extended release capsule Take 1 capsule by mouth at bedtime 4/17/24   Chidi Colbert MD   losartan (COZAAR) 25 MG tablet Take 1 tablet by mouth daily 3/25/24   Chidi Colbert MD   furosemide (LASIX) 40 MG tablet Take 1 tablet by mouth daily 3/25/24   Chidi Colbert MD   nitroGLYCERIN (NITROSTAT) 0.4 MG SL tablet Place 1 tablet under the tongue every 5 minutes as needed for Chest pain up to max of 3 total doses. If no relief after 1 dose, call 911. 2/14/24   Annie Eckert APRN - CNP   metoprolol tartrate (LOPRESSOR) 50 MG tablet Take 1 tablet by mouth 2 times daily 12/7/23   Karen Headley MD   rosuvastatin (CRESTOR) 20 MG tablet Take 1 tablet by mouth daily 10/17/23   Serenity Lunsford APRN - CNP   aspirin 81 MG chewable tablet Take 1 tablet by mouth in the morning. 7/20/22   Kyle Henry PA-C   bisacodyl (DULCOLAX) 5 MG EC tablet Take 1 tablet by mouth nightly    Provider, MD Skyler       Allergies:  Patient has no known allergies.    Social History:      The patient currently lives     TOBACCO:   reports that he has never smoked. He has been exposed to tobacco smoke. He has never used smokeless tobacco.  ETOH:   reports no history of alcohol use.      Family History:       Reviewed in detail and negative for DM, CAD, Cancer, CVA. Positive as follows:        Problem Relation Age of Onset    Heart Surgery Father     Cancer Father         lung    Heart Disease Father         CABG    Alzheimer's Disease Mother     Diabetes Neg Hx     High Blood Pressure Neg Hx     Stroke Neg Hx        Diet:  ADULT DIET; Regular; 5 carb choices (75 gm/meal); Low Fat/Low  Chol/High Fiber/2 gm Na; Low Sodium (2 gm); 2000 ml    REVIEW OF SYSTEMS:   All 13 review of symptoms are negative except as listed above in the HPI.    PHYSICAL EXAM:    BP (!) 121/104   Pulse 70   Temp 97.7 °F (36.5 °C) (Oral)   Resp 23   Ht 1.829 m (6')   Wt 106.6 kg (235 lb)   SpO2 98%   BMI 31.87 kg/m²     General appearance:  No apparent distress, appears stated age and cooperative.  HEENT:  Normal cephalic, atraumatic without obvious deformity. Pupils equal, round, and reactive to light.  Extra ocular muscles intact. Conjunctivae/corneas clear.  Neck: Supple, with full range of motion. No jugular venous distention. Trachea midline.  Respiratory:  Normal respiratory effort. Clear to auscultation, bilaterally without Rales/Wheezes/Rhonchi.  Cardiovascular:  Regular rate and rhythm with normal S1/S2 without murmurs, rubs or gallops.  Abdomen: Soft, non-tender, non-distended with normal bowel sounds.  Musculoskeletal:  No clubbing, cyanosis or edema bilaterally.  Full range of motion without deformity.  Skin: Skin color, texture, turgor normal.  No rashes or lesions.  Neurologic:  Neurovascularly intact without any focal sensory/motor deficits. Cranial nerves: II-XII intact, grossly non-focal.  Psychiatric:  Alert and oriented, thought content appropriate, normal insight  Capillary Refill: Brisk,< 3 seconds   Peripheral Pulses: +2 palpable, equal bilaterally       Labs:     Recent Labs     07/20/24  1125   WBC 9.4   HGB 18.9*   HCT 56.2*        Recent Labs     07/20/24  1125      K 4.2      CO2 24   BUN 28*   CREATININE 1.5*   CALCIUM 9.6     No results for input(s): \"AST\", \"ALT\", \"BILIDIR\", \"BILITOT\", \"ALKPHOS\" in the last 72 hours.  No results for input(s): \"INR\" in the last 72 hours.  No results for input(s): \"CKTOTAL\", \"TROPONINI\" in the last 72 hours.    Urinalysis:      Lab Results   Component Value Date/Time    NITRU NEGATIVE 05/21/2024 08:32 PM    WBCUA 0-2 05/21/2024 08:32 PM

## 2024-07-20 NOTE — ED PROVIDER NOTES
[facility] EMERGENCY DEPT  EMERGENCY DEPARTMENT ENCOUNTER          Pt Name: Alex Mckeon  MRN: [id number]  Birthdate 1950  Date of evaluation: 7/20/2024  Physician: Hafsa Pavon MD  Supervising Attending Physician: Balbir Campo MD       CHIEF COMPLAINT       Chief Complaint   Patient presents with    Epistaxis         HISTORY OF PRESENT ILLNESS    HPI  Alex Mckeon is a 74 y.o. male who presents to the emergency department  as walk in , by private vehicle for evaluation of epistaxis.  This has been happening since this morning and has been profuse from the right nostril and has not resolved by pinching of the nose. This has not happened before to this intensity.  He does have a history of the same remind in his left nostril which was then operated upon. The patient denies feeling chills having any pain, headache or having any other associated symptoms. The patient has no other acute complaints at this time.      PAST MEDICAL AND SURGICAL HISTORY     Past Medical History:   Diagnosis Date    Acquired polycythemia vera (HCC) 08/09/2023    Anesthesia     takes large dose of medications to make him sleepy for surgery    Atrial fibrillation (HCC)     Benign prostatic hyperplasia with urinary frequency 10/21/2019    CAD (coronary artery disease)     Chest pain     Colon polyp 2011    removed    Coronary artery disease involving native coronary artery of native heart without angina pectoris 10/21/2019    Dementia (HCC)     Dizziness     Elevated PSA     GERD (gastroesophageal reflux disease)     Heart disease     Hyperlipidemia     Hypertension     Seizure-like activity (HCC)     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 Moderate LV dysfunction. The patient has disease in the ostium of diagonal 1 and diagonal 2  from [facility]  on 7/20/2024  ** All calculations should be rechecked by clinician prior to use **    RESULT SUMMARY:  8 points  High Score (7-10 points)    Risk of MACE of 50-65%.      INPUTS:  History --> 2 = Highly suspicious  EKG --> 1 = Non-specific repolarization disturbance  Age --> 2 = ?65  Risk factors --> 2 = ?3 risk factors or history of atherosclerotic disease  Initial troponin --> 1 = 1-3× normal limit       Code Status:  Not addressed during this ED visit    Social determinants of health impacting treatment or disposition:  Considered and not applicable     MIPS documentation:      Medical Decision Making    Patient plan for cauterization of bleeding procedure explained to the patient and the family that is the spouse.  Patient tolerated procedure well. In observation after the procedure, patient became lightheaded clammy and hypotensive.  Patient transferred to room from triage plan to evaluate for causes of hypotension and lightheadedness made, labs and scans sent plan to the patient in the hospital for observation.  Possible lightheadedness due to longstanding bleeding.  ECG with no new changes.  Patient stabilized and fluid bolus given  Patient's wife verbalized understanding and agreement to plan - in stable condition. Patient slightly confused and disoriented. Plan to admit discussed with hospitalist coverage, Dr. Puri. Refer to ED course for additional information.      ED COURSE   ED Medications administered this visit (None if left blank):   Medications   silver nitrate applicators applicator 1 each (has no administration in time range)   lidocaine-EPINEPHrine 1 %-1:[id number] injection 20 mL (has no administration in time range)   oxymetazoline (AFRIN) 0.05 % nasal spray 2 spray (has no administration in time range)   bacitracin ointment (has no administration in time range)   sodium chloride flush 0.9 % injection 5-40 mL (has no administration in time range)   sodium chloride flush 0.9 %

## 2024-07-20 NOTE — ED PROVIDER NOTES
PATIENT NAME: Alex Mckeon  MRN: [id number]  : 1950  XIONG: 2024    I performed a history and physical examination of the patient and discussed management with the Resident. I reviewed the Resident's note and agree with the documented findings and plan of care. Any areas of disagreement are noted on the chart. I was personally present for the key portions of any procedures and have documented in the chart those procedures where I was not present during the key portions. I have reviewed the emergency nurses triage note and agree with the chief complaint, past medical history, past surgical history, allergies, medications, social and family history as documented unless otherwise noted below.    MEDICAL DEDISION MAKING (MDM)     Alex Mckeon is a 74 y.o. male who presents to Emergency Department with Epistaxis     Right side since this AM. He says bleeding seems to be related recent dose adjustment of his Keppra.  No recalled trauma to nose.  On aspirin only.  No other OAC.    Exam: Tachycardic, anxious, heart reveals irregularly irregular rhythm, S1-S2, lungs clear.  Soft abdomen without tenderness.  Active nosebleeding from the right naris.    EKG reveals atrial fibrillation, VR 66 bpm, QRS duration 114 ms,  ms, no acute ischemic changes    Nose bleeding cauterization procedure     Procedure: Patient had persistent bright red blood oozing despite use of pressure applied to the affect right nares.  First, I used a Chacko suction to evacuate bright red blood and clotted blood from the nare.  I was able to localize the area of bleeding on the right anterior septum.  I placed a cotton pledget impregnated with Afrin and 1% topical lidocaine with epinephrine directly over the area.  This greatly reduced the rate of bleeding.  Once adequate topical anesthesia was achieved I cauterized the area of active bleeding with Silver Nitrate sticks and this also slowed the bleed.  I then wiped of

## 2024-07-20 NOTE — ED NOTES
Pt resting on cot with no signs of distress. Pt alert at this time. Pt has call light in hand and instructed how to use it. Pt denies any needs at this time.

## 2024-07-20 NOTE — ED NOTES
Pt diaphoretic and pale at this time. Fluid bolus started at this time per verbal order from Dr. Campo.

## 2024-07-20 NOTE — ED NOTES
Informed 8B of transfer to floor, spoke to [place]. Taken by wheelchair in stable condition.

## 2024-07-21 LAB
AMPHETAMINES UR QL SCN: NEGATIVE
ANION GAP SERPL CALC-SCNC: 13 MEQ/L (ref 8–16)
ANION GAP SERPL CALC-SCNC: 9 MEQ/L (ref 8–16)
BARBITURATES UR QL SCN: NEGATIVE
BASOPHILS ABSOLUTE: 0.1 THOU/MM3 (ref 0–0.1)
BASOPHILS NFR BLD AUTO: 0.6 %
BENZODIAZ UR QL SCN: NEGATIVE
BILIRUB UR QL STRIP.AUTO: NEGATIVE
BUN SERPL-MCNC: 25 MG/DL (ref 7–22)
BUN SERPL-MCNC: 26 MG/DL (ref 7–22)
BZE UR QL SCN: NEGATIVE
CA-I BLD ISE-SCNC: 1.16 MMOL/L (ref 1.12–1.32)
CALCIUM SERPL-MCNC: 8.9 MG/DL (ref 8.5–10.5)
CALCIUM SERPL-MCNC: 9.1 MG/DL (ref 8.5–10.5)
CANNABINOIDS UR QL SCN: NEGATIVE
CHARACTER UR: CLEAR
CHLORIDE SERPL-SCNC: 106 MEQ/L (ref 98–111)
CHLORIDE SERPL-SCNC: 109 MEQ/L (ref 98–111)
CO2 SERPL-SCNC: 23 MEQ/L (ref 23–33)
CO2 SERPL-SCNC: 25 MEQ/L (ref 23–33)
COLOR, UA: YELLOW
CORTIS SERPL-MCNC: 9.33 UG/DL
CREAT SERPL-MCNC: 1.2 MG/DL (ref 0.4–1.2)
CREAT SERPL-MCNC: 1.3 MG/DL (ref 0.4–1.2)
DEPRECATED RDW RBC AUTO: 45 FL (ref 35–45)
EKG Q-T INTERVAL: 430 MS
EKG QRS DURATION: 114 MS
EKG QTC CALCULATION (BAZETT): 450 MS
EKG R AXIS: 24 DEGREES
EKG T AXIS: 67 DEGREES
EKG VENTRICULAR RATE: 66 BPM
EOSINOPHIL NFR BLD AUTO: 1.8 %
EOSINOPHILS ABSOLUTE: 0.2 THOU/MM3 (ref 0–0.4)
ERYTHROCYTE [DISTWIDTH] IN BLOOD BY AUTOMATED COUNT: 13.2 % (ref 11.5–14.5)
FENTANYL: NEGATIVE
GFR SERPL CREATININE-BSD FRML MDRD: 58 ML/MIN/1.73M2
GFR SERPL CREATININE-BSD FRML MDRD: 64 ML/MIN/1.73M2
GLUCOSE BLD STRIP.AUTO-MCNC: 102 MG/DL (ref 70–108)
GLUCOSE BLD STRIP.AUTO-MCNC: 88 MG/DL (ref 70–108)
GLUCOSE SERPL-MCNC: 91 MG/DL (ref 70–108)
GLUCOSE SERPL-MCNC: 95 MG/DL (ref 70–108)
GLUCOSE UR QL STRIP.AUTO: NEGATIVE MG/DL
HCT VFR BLD AUTO: 52.4 % (ref 42–52)
HCT VFR BLD AUTO: 53.7 % (ref 42–52)
HGB BLD-MCNC: 17.6 GM/DL (ref 14–18)
HGB BLD-MCNC: 18 GM/DL (ref 14–18)
HGB UR QL STRIP.AUTO: NEGATIVE
IMM GRANULOCYTES # BLD AUTO: 0.02 THOU/MM3 (ref 0–0.07)
IMM GRANULOCYTES NFR BLD AUTO: 0.2 %
KEPPRA: 27 UG/ML
KETONES UR QL STRIP.AUTO: NEGATIVE
LYMPHOCYTES ABSOLUTE: 1.9 THOU/MM3 (ref 1–4.8)
LYMPHOCYTES NFR BLD AUTO: 19.9 %
MAGNESIUM SERPL-MCNC: 1.9 MG/DL (ref 1.6–2.4)
MCH RBC QN AUTO: 31 PG (ref 26–33)
MCHC RBC AUTO-ENTMCNC: 33.5 GM/DL (ref 32.2–35.5)
MCV RBC AUTO: 92.6 FL (ref 80–94)
MONOCYTES ABSOLUTE: 0.7 THOU/MM3 (ref 0.4–1.3)
MONOCYTES NFR BLD AUTO: 7.5 %
NEUTROPHILS ABSOLUTE: 6.7 THOU/MM3 (ref 1.8–7.7)
NEUTROPHILS NFR BLD AUTO: 70 %
NITRITE UR QL STRIP: NEGATIVE
NRBC BLD AUTO-RTO: 0 /100 WBC
OPIATES UR QL SCN: NEGATIVE
OXYCODONE: NEGATIVE
PATHOLOGIST REVIEW: ABNORMAL
PCP UR QL SCN: NEGATIVE
PH BLDV: 7.35 [PH] (ref 7.31–7.41)
PH UR STRIP.AUTO: 5.5 [PH] (ref 5–9)
PLATELET # BLD AUTO: 180 THOU/MM3 (ref 130–400)
PMV BLD AUTO: 10.5 FL (ref 9.4–12.4)
POTASSIUM SERPL-SCNC: 3.9 MEQ/L (ref 3.5–5.2)
POTASSIUM SERPL-SCNC: 5.1 MEQ/L (ref 3.5–5.2)
PROT UR STRIP.AUTO-MCNC: NEGATIVE MG/DL
RBC # BLD AUTO: 5.8 MILL/MM3 (ref 4.7–6.1)
REVIEWED BY: NORMAL
SMEAR REVIEW: NORMAL
SODIUM SERPL-SCNC: 142 MEQ/L (ref 135–145)
SODIUM SERPL-SCNC: 143 MEQ/L (ref 135–145)
SP GR UR REFRACT.AUTO: 1.02 (ref 1–1.03)
UROBILINOGEN, URINE: 0.2 EU/DL (ref 0–1)
WBC # BLD AUTO: 9.5 THOU/MM3 (ref 4.8–10.8)
WBC #/AREA URNS HPF: NEGATIVE /[HPF]

## 2024-07-21 PROCEDURE — 85018 HEMOGLOBIN: CPT

## 2024-07-21 PROCEDURE — 82800 BLOOD PH: CPT

## 2024-07-21 PROCEDURE — 82330 ASSAY OF CALCIUM: CPT

## 2024-07-21 PROCEDURE — 82948 REAGENT STRIP/BLOOD GLUCOSE: CPT

## 2024-07-21 PROCEDURE — 82533 TOTAL CORTISOL: CPT

## 2024-07-21 PROCEDURE — 80048 BASIC METABOLIC PNL TOTAL CA: CPT

## 2024-07-21 PROCEDURE — 81003 URINALYSIS AUTO W/O SCOPE: CPT

## 2024-07-21 PROCEDURE — 6370000000 HC RX 637 (ALT 250 FOR IP)

## 2024-07-21 PROCEDURE — 80307 DRUG TEST PRSMV CHEM ANLYZR: CPT

## 2024-07-21 PROCEDURE — 85025 COMPLETE CBC W/AUTO DIFF WBC: CPT

## 2024-07-21 PROCEDURE — 83735 ASSAY OF MAGNESIUM: CPT

## 2024-07-21 PROCEDURE — 6370000000 HC RX 637 (ALT 250 FOR IP): Performed by: PHYSICIAN ASSISTANT

## 2024-07-21 PROCEDURE — 36415 COLL VENOUS BLD VENIPUNCTURE: CPT

## 2024-07-21 PROCEDURE — 6370000000 HC RX 637 (ALT 250 FOR IP): Performed by: REGISTERED NURSE

## 2024-07-21 PROCEDURE — 2580000003 HC RX 258

## 2024-07-21 PROCEDURE — 1200000003 HC TELEMETRY R&B

## 2024-07-21 PROCEDURE — 93010 ELECTROCARDIOGRAM REPORT: CPT | Performed by: INTERNAL MEDICINE

## 2024-07-21 PROCEDURE — 85014 HEMATOCRIT: CPT

## 2024-07-21 PROCEDURE — 6370000000 HC RX 637 (ALT 250 FOR IP): Performed by: EMERGENCY MEDICINE

## 2024-07-21 PROCEDURE — 99222 1ST HOSP IP/OBS MODERATE 55: CPT | Performed by: REGISTERED NURSE

## 2024-07-21 PROCEDURE — 80177 DRUG SCRN QUAN LEVETIRACETAM: CPT

## 2024-07-21 RX ORDER — OXYMETAZOLINE HYDROCHLORIDE 0.05 G/100ML
3 SPRAY NASAL 3 TIMES DAILY
Status: DISCONTINUED | OUTPATIENT
Start: 2024-07-21 | End: 2024-07-22 | Stop reason: HOSPADM

## 2024-07-21 RX ORDER — OXYMETAZOLINE HYDROCHLORIDE 0.05 G/100ML
3 SPRAY NASAL ONCE
Status: COMPLETED | OUTPATIENT
Start: 2024-07-21 | End: 2024-07-21

## 2024-07-21 RX ORDER — CETIRIZINE HYDROCHLORIDE 10 MG/1
10 TABLET ORAL DAILY
Status: DISCONTINUED | OUTPATIENT
Start: 2024-07-21 | End: 2024-07-22 | Stop reason: HOSPADM

## 2024-07-21 RX ORDER — LEVETIRACETAM 500 MG/1
500 TABLET ORAL DAILY
Status: DISCONTINUED | OUTPATIENT
Start: 2024-07-21 | End: 2024-07-22 | Stop reason: HOSPADM

## 2024-07-21 RX ORDER — BISACODYL 5 MG/1
5 TABLET, DELAYED RELEASE ORAL DAILY PRN
Status: DISCONTINUED | OUTPATIENT
Start: 2024-07-21 | End: 2024-07-22 | Stop reason: HOSPADM

## 2024-07-21 RX ADMIN — OXYMETAZOLINE HCL 2 SPRAY: 0.05 SPRAY NASAL at 20:01

## 2024-07-21 RX ADMIN — SODIUM CHLORIDE, PRESERVATIVE FREE 10 ML: 5 INJECTION INTRAVENOUS at 19:52

## 2024-07-21 RX ADMIN — METOPROLOL TARTRATE 50 MG: 50 TABLET, FILM COATED ORAL at 19:52

## 2024-07-21 RX ADMIN — BACITRACIN: 500 OINTMENT TOPICAL at 15:18

## 2024-07-21 RX ADMIN — ROSUVASTATIN CALCIUM 20 MG: 20 TABLET, FILM COATED ORAL at 09:43

## 2024-07-21 RX ADMIN — OXYMETAZOLINE HCL 3 SPRAY: 0.05 SPRAY NASAL at 21:13

## 2024-07-21 RX ADMIN — NASAL DECONGESTANT 3 SPRAY: 0.05 SPRAY NASAL at 22:54

## 2024-07-21 RX ADMIN — BISACODYL 5 MG: 5 TABLET, COATED ORAL at 17:58

## 2024-07-21 RX ADMIN — BACITRACIN: 500 OINTMENT TOPICAL at 09:43

## 2024-07-21 RX ADMIN — DILTIAZEM HYDROCHLORIDE 120 MG: 120 CAPSULE, COATED, EXTENDED RELEASE ORAL at 19:52

## 2024-07-21 RX ADMIN — CETIRIZINE HYDROCHLORIDE 10 MG: 10 TABLET, FILM COATED ORAL at 15:18

## 2024-07-21 RX ADMIN — LEVETIRACETAM 750 MG: 500 TABLET, FILM COATED ORAL at 19:51

## 2024-07-21 RX ADMIN — LEVETIRACETAM 500 MG: 500 TABLET, FILM COATED ORAL at 09:43

## 2024-07-21 NOTE — PLAN OF CARE
Problem: Discharge Planning  Goal: Discharge to home or other facility with appropriate resources  7/21/2024 0318 by Dnaia Irizarry RN  Outcome: Progressing  7/21/2024 0257 by Dania Irizarry RN  Outcome: Progressing  Flowsheets  Taken 7/20/2024 2013 by Dania Irizarry RN  Discharge to home or other facility with appropriate resources: Identify barriers to discharge with patient and caregiver  Taken 7/20/2024 1538 by Rey Small RN  Discharge to home or other facility with appropriate resources: Identify barriers to discharge with patient and caregiver     Problem: Safety - Adult  Goal: Free from fall injury  7/21/2024 0318 by Dania Irizarry RN  Outcome: Progressing  7/21/2024 0257 by Dania Irizarry RN  Outcome: Progressing     Problem: Cardiovascular - Adult  Goal: Maintains optimal cardiac output and hemodynamic stability  7/21/2024 0318 by Dania Irizarry RN  Outcome: Progressing  7/21/2024 0257 by Dania Irizarry RN  Outcome: Progressing  Flowsheets (Taken 7/20/2024 2013)  Maintains optimal cardiac output and hemodynamic stability: Monitor blood pressure and heart rate  Goal: Absence of cardiac dysrhythmias or at baseline  7/21/2024 0318 by Dania Irizarry RN  Outcome: Progressing  7/21/2024 0257 by Dania Irizarry RN  Outcome: Progressing  Flowsheets (Taken 7/20/2024 2013)  Absence of cardiac dysrhythmias or at baseline: Monitor cardiac rate and rhythm     Problem: Metabolic/Fluid and Electrolytes - Adult  Goal: Electrolytes maintained within normal limits  7/21/2024 0318 by Dania Irizarry RN  Outcome: Progressing  7/21/2024 0257 by Dania Irizarry RN  Outcome: Progressing  Flowsheets (Taken 7/20/2024 2013)  Electrolytes maintained within normal limits: Monitor labs and assess patient for signs and symptoms of electrolyte imbalances

## 2024-07-21 NOTE — CONSULTS
Department of Otolaryngology  Consult Note    Reason for Consult:  epistaxis  Requesting Physician:  Heydi Pedroza PA-C    CHIEF COMPLAINT:  epistaxis    History Obtained From:  patient, electronic medical record    HISTORY OF PRESENT ILLNESS:                The patient is a 74 y.o. male who presents with epistaxis yesterday to the ER. He voices sitting in [employer] yesterday morning sipping coffee and having profuse right anterior epistaxis and EMS transported to the emergency department where his right nare was cauterized and packed with Merocel. Per ER chart review patient had concern for bleeding to right anterior and posterior region. After packing placed patient had syncopal episode with brief confusion and admitted due to these concerns. He has history of right sided posterior and anterior epistaxis on chart review and had Watchman device placed; currently on 81 mg ASA. Since packing placed patient denies recurrence of bleeding bilaterally or sensation of swallowing blood. His main concern is constant clear dripping mucous from right nasal airway surrounding packing. No reports of nasal obstruction with packing in place.    ALLERGIES:  Patient has no known allergies.    Past Medical History:  Past Medical History:   Diagnosis Date    Acquired polycythemia vera (HCC) 08/09/2023    Anesthesia     takes large dose of medications to make him sleepy for surgery    Atrial fibrillation (HCC)     Benign prostatic hyperplasia with urinary frequency 10/21/2019    CAD (coronary artery disease)     Chest pain     Chronic congestive heart failure (HCC) 7/20/2024    Colon polyp 2011    removed    Coronary artery disease involving native coronary artery of native heart without angina pectoris 10/21/2019    Dementia (HCC)     Dizziness     Elevated PSA     GERD (gastroesophageal reflux disease)     Heart disease     Hyperlipidemia     Hypertension     Seizure-like activity (HCC)     Type 2 diabetes mellitus with chronic  lesions noted   Dentition: fair, no malocclusion  Oral mucosa: moist  Oropharynx: normal-appearing mucosa; no pooling of blood to posterior oropharynx  Hard and soft palates: symmetrical and intact.  Salivary glands: not enlarged and no tenderness to palpation.  Uvula: midline, no obvious lesions   Gag reflex is present    Neck: Trachea midline.   Lymphatic: No cervical lymphadenopathy noted.  Eyes: BELKIS, EOM intact. Conjunctiva moist without discharge.  Lungs: Normal effort of breathing, not obviously distressed. On room air.  Neuro: Cranial nerves II-XII grossly intact.  Extremities: No clubbing, edema, or cyanosis noted.    DATA:    CBC with Differential:    Lab Results   Component Value Date/Time    WBC 9.5 07/21/2024 06:00 AM    RBC 5.80 07/21/2024 06:00 AM    RBC 5.63 05/17/2016 07:30 AM    HGB 18.0 07/21/2024 06:00 AM    HGB 14.4 12/13/2011 11:55 PM    HCT 53.7 07/21/2024 06:00 AM     07/21/2024 06:00 AM    MCV 92.6 07/21/2024 06:00 AM    MCH 31.0 07/21/2024 06:00 AM    MCHC 33.5 07/21/2024 06:00 AM    RDW 13.9 06/23/2018 06:39 AM    NRBC 0 07/21/2024 06:00 AM    NRBC 0 09/23/2011 03:20 AM    LYMPHOPCT 29.1 05/17/2016 07:30 AM    MONOPCT 7.5 07/21/2024 06:00 AM    EOSPCT 3.0 05/17/2016 07:30 AM    BASOPCT 0.7 05/17/2016 07:30 AM    MONOSABS 0.7 07/21/2024 06:00 AM    LYMPHSABS 1.9 07/21/2024 06:00 AM    EOSABS 0.2 07/21/2024 06:00 AM    BASOSABS 0.1 07/21/2024 06:00 AM    DIFFTYPE see below 07/10/2023 06:51 AM     BMP:    Lab Results   Component Value Date/Time     07/21/2024 06:00 AM    K 5.1 07/21/2024 06:00 AM    K 4.3 05/08/2024 04:18 AM     07/21/2024 06:00 AM    CO2 25 07/21/2024 06:00 AM    BUN 25 07/21/2024 06:00 AM    CREATININE 1.2 07/21/2024 06:00 AM    CALCIUM 9.1 07/21/2024 06:00 AM    LABGLOM 64 07/21/2024 06:00 AM    LABGLOM 58 02/07/2024 06:36 AM    GLUCOSE 95 07/21/2024 06:00 AM    GLUCOSE 93 05/17/2016 07:30 AM       IMPRESSION/RECOMMENDATIONS:      Right-sided

## 2024-07-21 NOTE — PLAN OF CARE
Problem: Discharge Planning  Goal: Discharge to home or other facility with appropriate resources  7/21/2024 0321 by Dania Irizarry RN  Outcome: Progressing  7/21/2024 0318 by Dania Irizarry RN  Outcome: Progressing  7/21/2024 0257 by Dania Irizarry RN  Outcome: Progressing  Flowsheets  Taken 7/20/2024 2013 by Dania Irizarry RN  Discharge to home or other facility with appropriate resources: Identify barriers to discharge with patient and caregiver  Taken 7/20/2024 1538 by Rey Small RN  Discharge to home or other facility with appropriate resources: Identify barriers to discharge with patient and caregiver     Problem: Safety - Adult  Goal: Free from fall injury  7/21/2024 0321 by Dania Irizarry RN  Outcome: Progressing  7/21/2024 0318 by Dania Irizarry RN  Outcome: Progressing  7/21/2024 0257 by Dania Irizarry RN  Outcome: Progressing  Flowsheets (Taken 7/20/2024 2013)  Free From Fall Injury: Instruct family/caregiver on patient safety     Problem: Cardiovascular - Adult  Goal: Maintains optimal cardiac output and hemodynamic stability  7/21/2024 0321 by Dania Irizarry RN  Outcome: Progressing  7/21/2024 0318 by Dania Irizarry RN  Outcome: Progressing  7/21/2024 0257 by Dania Irizarry RN  Outcome: Progressing  Flowsheets (Taken 7/20/2024 2013)  Maintains optimal cardiac output and hemodynamic stability: Monitor blood pressure and heart rate  Goal: Absence of cardiac dysrhythmias or at baseline  7/21/2024 0321 by Dania Irizarry RN  Outcome: Progressing  7/21/2024 0318 by Dania Irizarry RN  Outcome: Progressing  7/21/2024 0257 by Dania Irizarry RN  Outcome: Progressing  Flowsheets (Taken 7/20/2024 2013)  Absence of cardiac dysrhythmias or at baseline: Monitor cardiac rate and rhythm     Problem: Metabolic/Fluid and Electrolytes - Adult  Goal: Electrolytes maintained within normal limits  7/21/2024 0321 by Dania Irizarry RN  Outcome:

## 2024-07-21 NOTE — PROGRESS NOTES
Hospitalist Progress Note      Patient:  Alex Mckeon 74 y.o. male     Unit/Bed:[facility]23/023-A    Date of Admission: 7/20/2024      ASSESSMENT AND PLAN    Active Problems  Syncope and collapse:  Unclear etiology, multiple episodes in the past couple of days  Patient does have a history of A-fib. Telemetry monitor.  Will benefit from a cardiac event monitor at discharge  Echocardiogram pending  Cannot rule out vasovagal episode as while in the ED and shortly following packing he developed dizziness, near syncope, diaphoresis and hypotension with BP 79/24  EKG showed A-fib rate controlled    R epistaxis s/p R nostril packing  Hold ASA, avoid chemical/pharmacological DVT prophylaxis for now  INR 1.13, fibrinogen 307, fibrin split products less than 5  ENT consult  Recommend Merocel remain in place until Tuesday - if in the hospital then will remove otherwise will see outpatient  No need for abx, discussed with ENT    Resolved Problems  Elevated creatinine  Hypotension    Chronic Conditions (reviewed and stable unless otherwise stated)  History of right sided anterior and posterior epistaxis managed previously by ENT practice as well as history of embolization on the right side   HFpEF without acute decompensation  Last echocardiogram 7/2023 with EF of 55%  PAF s/p Watchman  Continue with rate control medications  Prediabetes  Last hemoglobin A1c 6.1% in 2/2024  Lifestyle modifications encouraged, to follow-up with PCP for further monitoring  Essential hypertension  Continue with home meds and monitor BP closely  Acquired polycythemia vera   Hematology referral at discharge  CAD  Continue home meds excepting ASA for now  Seizure disorder  Continue home medications, Keppra 500 mg in the morning and 750 mg nightly  Seizure precautions  Check Keppra level       LDA: []CVC / []PICC / []Midline / [][place] / []Drains / []Mediport / []None  Antibiotics:   Steroids:   Labs (still needed?): [x]Yes / []No  IVF (still

## 2024-07-21 NOTE — PLAN OF CARE
Problem: Discharge Planning  Goal: Discharge to home or other facility with appropriate resources  7/21/2024 0955 by Cassidy Lujan RN  Outcome: Progressing  7/21/2024 0321 by Dania Irizarry RN  Outcome: Progressing  7/21/2024 0318 by Dania Irizarry RN  Outcome: Progressing  7/21/2024 0257 by Dania Irizarry RN  Outcome: Progressing  Flowsheets  Taken 7/20/2024 2013 by Dania Irizarry RN  Discharge to home or other facility with appropriate resources: Identify barriers to discharge with patient and caregiver  Taken 7/20/2024 1538 by Rey Small RN  Discharge to home or other facility with appropriate resources: Identify barriers to discharge with patient and caregiver     Problem: Safety - Adult  Goal: Free from fall injury  7/21/2024 0955 by Cassidy Lujan RN  Outcome: Progressing  7/21/2024 0321 by Dania Irizarry RN  Outcome: Progressing  7/21/2024 0318 by Dania Irizarry RN  Outcome: Progressing  7/21/2024 0257 by Dania Irizarry RN  Outcome: Progressing  Flowsheets (Taken 7/20/2024 2013)  Free From Fall Injury: Instruct family/caregiver on patient safety

## 2024-07-21 NOTE — PLAN OF CARE
Problem: Discharge Planning  Goal: Discharge to home or other facility with appropriate resources  Outcome: Progressing  Flowsheets (Taken 7/20/2024 1538 by Rey Small, RN)  Discharge to home or other facility with appropriate resources: Identify barriers to discharge with patient and caregiver     Problem: Safety - Adult  Goal: Free from fall injury  Outcome: Progressing     Problem: Cardiovascular - Adult  Goal: Maintains optimal cardiac output and hemodynamic stability  Outcome: Progressing  Goal: Absence of cardiac dysrhythmias or at baseline  Outcome: Progressing     Problem: Metabolic/Fluid and Electrolytes - Adult  Goal: Electrolytes maintained within normal limits  Outcome: Progressing

## 2024-07-22 ENCOUNTER — TELEPHONE (OUTPATIENT)
Dept: ENT CLINIC | Age: 74
End: 2024-07-22

## 2024-07-22 ENCOUNTER — APPOINTMENT (OUTPATIENT)
Age: 74
End: 2024-07-22
Payer: MEDICARE

## 2024-07-22 VITALS
SYSTOLIC BLOOD PRESSURE: 124 MMHG | HEART RATE: 83 BPM | HEIGHT: 72 IN | TEMPERATURE: 97.9 F | WEIGHT: 238.4 LBS | DIASTOLIC BLOOD PRESSURE: 63 MMHG | RESPIRATION RATE: 16 BRPM | BODY MASS INDEX: 32.29 KG/M2 | OXYGEN SATURATION: 95 %

## 2024-07-22 LAB
ANION GAP SERPL CALC-SCNC: 9 MEQ/L (ref 8–16)
BUN SERPL-MCNC: 22 MG/DL (ref 7–22)
CALCIUM SERPL-MCNC: 8.9 MG/DL (ref 8.5–10.5)
CHLORIDE SERPL-SCNC: 107 MEQ/L (ref 98–111)
CO2 SERPL-SCNC: 25 MEQ/L (ref 23–33)
CREAT SERPL-MCNC: 1.3 MG/DL (ref 0.4–1.2)
DEPRECATED MEAN GLUCOSE BLD GHB EST-ACNC: 126 MG/DL (ref 70–126)
ECHO AO ASC DIAM: 3.3 CM
ECHO AO ASCENDING AORTA INDEX: 1.43 CM/M2
ECHO AR MAX VEL PISA: 4.2 M/S
ECHO AV CUSP MM: 1.5 CM
ECHO AV PEAK GRADIENT: 5 MMHG
ECHO AV PEAK VELOCITY: 1.1 M/S
ECHO AV REGURGITANT PHT: 518 MS
ECHO AV VELOCITY RATIO: 0.73
ECHO BSA: 2.34 M2
ECHO LA AREA 2C: 21.7 CM2
ECHO LA AREA 4C: 20 CM2
ECHO LA DIAMETER INDEX: 2.48 CM/M2
ECHO LA DIAMETER: 5.7 CM
ECHO LA MAJOR AXIS: 5.8 CM
ECHO LA MINOR AXIS: 6.3 CM
ECHO LA VOL BP: 61 ML (ref 18–58)
ECHO LA VOL MOD A2C: 62 ML (ref 18–58)
ECHO LA VOL MOD A4C: 56 ML (ref 18–58)
ECHO LA VOL/BSA BIPLANE: 27 ML/M2 (ref 16–34)
ECHO LA VOLUME INDEX MOD A2C: 27 ML/M2 (ref 16–34)
ECHO LA VOLUME INDEX MOD A4C: 24 ML/M2 (ref 16–34)
ECHO LV FRACTIONAL SHORTENING: 29 % (ref 28–44)
ECHO LV INTERNAL DIMENSION DIASTOLE INDEX: 2.26 CM/M2
ECHO LV INTERNAL DIMENSION DIASTOLIC: 5.2 CM (ref 4.2–5.9)
ECHO LV INTERNAL DIMENSION SYSTOLIC INDEX: 1.61 CM/M2
ECHO LV INTERNAL DIMENSION SYSTOLIC: 3.7 CM
ECHO LV IVSD: 0.9 CM (ref 0.6–1)
ECHO LV MASS 2D: 169 G (ref 88–224)
ECHO LV MASS INDEX 2D: 73.5 G/M2 (ref 49–115)
ECHO LV POSTERIOR WALL DIASTOLIC: 0.9 CM (ref 0.6–1)
ECHO LV RELATIVE WALL THICKNESS RATIO: 0.35
ECHO LVOT PEAK GRADIENT: 3 MMHG
ECHO LVOT PEAK VELOCITY: 0.8 M/S
ECHO MV E DECELERATION TIME (DT): 258 MS
ECHO MV E VELOCITY: 0.84 M/S
ECHO MV REGURGITANT PEAK GRADIENT: 81 MMHG
ECHO MV REGURGITANT PEAK VELOCITY: 4.5 M/S
ECHO PV MAX VELOCITY: 0.6 M/S
ECHO PV PEAK GRADIENT: 1 MMHG
ECHO RV INTERNAL DIMENSION: 3 CM
ECHO RV TAPSE: 2.6 CM (ref 1.7–?)
ECHO TV E WAVE: 0.5 M/S
ECHO TV REGURGITANT MAX VELOCITY: 2.44 M/S
ECHO TV REGURGITANT PEAK GRADIENT: 24 MMHG
GFR SERPL CREATININE-BSD FRML MDRD: 58 ML/MIN/1.73M2
GLUCOSE BLD STRIP.AUTO-MCNC: 104 MG/DL (ref 70–108)
GLUCOSE BLD STRIP.AUTO-MCNC: 94 MG/DL (ref 70–108)
GLUCOSE BLD STRIP.AUTO-MCNC: 97 MG/DL (ref 70–108)
GLUCOSE SERPL-MCNC: 93 MG/DL (ref 70–108)
HBA1C MFR BLD HPLC: 6.2 % (ref 4.4–6.4)
POTASSIUM SERPL-SCNC: 4.4 MEQ/L (ref 3.5–5.2)
SODIUM SERPL-SCNC: 141 MEQ/L (ref 135–145)

## 2024-07-22 PROCEDURE — 6370000000 HC RX 637 (ALT 250 FOR IP): Performed by: PHYSICIAN ASSISTANT

## 2024-07-22 PROCEDURE — 80048 BASIC METABOLIC PNL TOTAL CA: CPT

## 2024-07-22 PROCEDURE — 6370000000 HC RX 637 (ALT 250 FOR IP)

## 2024-07-22 PROCEDURE — 99232 SBSQ HOSP IP/OBS MODERATE 35: CPT | Performed by: REGISTERED NURSE

## 2024-07-22 PROCEDURE — 95819 EEG AWAKE AND ASLEEP: CPT | Performed by: PSYCHIATRY & NEUROLOGY

## 2024-07-22 PROCEDURE — 82948 REAGENT STRIP/BLOOD GLUCOSE: CPT

## 2024-07-22 PROCEDURE — 95819 EEG AWAKE AND ASLEEP: CPT

## 2024-07-22 PROCEDURE — 30901 CONTROL OF NOSEBLEED: CPT | Performed by: REGISTERED NURSE

## 2024-07-22 PROCEDURE — 83036 HEMOGLOBIN GLYCOSYLATED A1C: CPT

## 2024-07-22 PROCEDURE — 99239 HOSP IP/OBS DSCHRG MGMT >30: CPT | Performed by: PHYSICIAN ASSISTANT

## 2024-07-22 PROCEDURE — 2Y41X5Z PACKING OF NASAL REGION USING PACKING MATERIAL: ICD-10-PCS

## 2024-07-22 PROCEDURE — 93306 TTE W/DOPPLER COMPLETE: CPT | Performed by: INTERNAL MEDICINE

## 2024-07-22 PROCEDURE — 36415 COLL VENOUS BLD VENIPUNCTURE: CPT

## 2024-07-22 PROCEDURE — 93306 TTE W/DOPPLER COMPLETE: CPT

## 2024-07-22 RX ORDER — CETIRIZINE HYDROCHLORIDE 10 MG/1
10 TABLET ORAL DAILY
Qty: 30 TABLET | Refills: 0 | Status: SHIPPED | OUTPATIENT
Start: 2024-07-22 | End: 2024-07-29

## 2024-07-22 RX ADMIN — NASAL DECONGESTANT 3 SPRAY: 0.05 SPRAY NASAL at 09:11

## 2024-07-22 RX ADMIN — LEVETIRACETAM 500 MG: 500 TABLET, FILM COATED ORAL at 09:11

## 2024-07-22 RX ADMIN — CETIRIZINE HYDROCHLORIDE 10 MG: 10 TABLET, FILM COATED ORAL at 09:11

## 2024-07-22 RX ADMIN — MAGNESIUM HYDROXIDE 30 ML: 1200 LIQUID ORAL at 10:53

## 2024-07-22 RX ADMIN — ROSUVASTATIN CALCIUM 20 MG: 20 TABLET, FILM COATED ORAL at 09:11

## 2024-07-22 RX ADMIN — METOPROLOL TARTRATE 50 MG: 50 TABLET, FILM COATED ORAL at 09:11

## 2024-07-22 NOTE — PROGRESS NOTES
[facility]     Neurodiagnostic Laboratory Technician Worksheet      EEG Date: 2024    Name: Alex Mckeon  : 1950  Age: 74 y.o.  Sex: male  MRN: [id number]  CSN: [id number]    Room/Location: [facility]/023-A  Ordering Provider: THIERNO Clifford    EEG Number: [id number]    Time In: 1009  Time Out: 1029  Total Treatment Time: 20 mins    Clinical History: Hx of Afib, Seizure (On Keppra) pt having episodes of syncope, where he was he was nauseous and uneasy.   Ct of the head 24  IMPRESSION:     1. Stable CT appearance the brain. No new abnormalities.  2. New mucosal thickening in the right maxillary sinus.  MRI of the brain 2024  IMPRESSION:  Impression:  No acute intracranial abnormality is identified.  Hand Dominance: Right   Sedation: No   H.V. Completed: No, age protocol   Photic: Yes   Sleep: Yes   Drowsy: Yes   Sleep Deprived: Yes   Seizures Observed: No   Mentality: alert     Technician: Flora Brown 2024

## 2024-07-22 NOTE — PROCEDURES
PROCEDURE NOTE  Date: 7/22/2024   Name: Alex Mckeon  YOB: 1950    Procedures            Date: 7/22/2024  Referring physician: Dr. Pedroza    Indication  Patient aged 74 y with encephalopathy. EEG done to assess for epileptiform activity.    Introduction  This routine 20-minute EEG was recorded using the [employer] 10-20 System on a [employer] workstation at 256 samples/s. Automated spike and seizure detection algorithms were applied.    Description  During the maximal alert state, a well-regulated, symmetric, and reactive 9 Hz posterior dominant rhythm was seen. No consistent focal slowing or interhemispheric asymmetry was noted. Stage I and stage II sleep were observed. There were no interictal epileptiform discharges or electrographic seizures.    Activations  Hyperventilation was not performed. Intermittent photic stimulation was performed and demonstrated no posterior driving response.    Impression  Normal awake and sleep EEG.       EKG lead did not show clear arrhythmia, if still in concern consider formal EKG or correlation with telemetry.       No epileptiform discharges were identified. Please note the absence of such activity on this record cannot conclusively rule out an epileptic disorder. If such is still clinically suspected, a repeat study with sleep deprivation and/or prolonged sampling may be helpful.    Luis Donaldson MD  Epilepsy Board Certified.  Neurology Board Certified.    Electronically Signed

## 2024-07-22 NOTE — CARE COORDINATION
Case Management Assessment Initial Evaluation    Date/Time of Evaluation: 2024 11:38 AM  Assessment Completed by: Heydi Sears RN    If patient is discharged prior to next notation, then this note serves as note for discharge by case management.    Patient Name: Alex Mckeon                   YOB: 1950  Diagnosis: Syncope and collapse [R55]  Seizure disorder (HCC) [G40.909]  Near syncope [R55]  Polycythemia vera (HCC) [D45]  Longstanding persistent atrial fibrillation (HCC) [I48.11]  Cardiomyopathy, unspecified type (HCC) [I42.9]  Chronic congestive heart failure, unspecified heart failure type (HCC) [I50.9]                   Date / Time: 2024 10:09 AM  Location: [address] [place] [address]     Patient Admission Status: Inpatient   Readmission Risk Low 0-14, Mod 15-19), High > 20: Readmission Risk Score: 11.6    Current PCP: Annie Eckert APRN - CNP    Additional Case Management Notes: admit from ER with nose bleed and syncope with collapse. ENT consulted. Nose was packed and then cauterized.     Procedures:  ECHO completed, waiting on results   EEG: Normal awake and sleep EEG     Imagin/20 Ct Head wo: New mucosal thickening in the right maxillary sinus.     Patient Goals/Plan/Treatment Preferences: Planning to return home with his wife. Denies any discharge needs at this time.            24 1128   Service Assessment   Patient Orientation Alert and Oriented   Cognition Alert   History Provided By Patient   Primary Caregiver Self   Accompanied By/Relationship wife   Support Systems Spouse/Significant Other   Patient's Healthcare Decision Maker is: Named in Scanned ACP Document   PCP Verified by CM Yes   Last Visit to PCP Within last 3 months   Prior Functional Level Independent in ADLs/IADLs   Current Functional Level Independent in ADLs/IADLs   Can patient return to prior living arrangement Yes   Ability to make needs known: Good   Family able to assist with home care needs:

## 2024-07-22 NOTE — DISCHARGE SUMMARY
Hospitalist Discharge Note      Patient:  Alex Mckeon    Unit/Bed:8B-23/023-A  YOB: 1950  MRN: [id number]   Acct: [id number]     PCP: Annie Eckert APRN - CNP  Date of Admission: 7/20/2024      Discharge date: No discharge date for patient encounter.    Chief Complaint on presentation :-  Nosebleeding , then experienced syncope and collapse with loss of consciousness     Discharge Assessment and Plan:-   ***    Initial H and P and Hospital course:-  \"74 y.o. male who presented to [facility] with nose bleeding which started this morning and made the patient and his wife to come to ED since the nosebleed did not stop.  Patient has had history of nosebleed, was seen in the a while ago for left nose.  Patient states that initially he did not feel chest pain, heart palpitations, abdominal pain dysuria diarrhea cough productive sputum, dizziness lightheadedness.  Patient denies trauma, recent sickness, recent travel, but admits he cleaned his both nostrils this morning.     In ED, patient is seen by ED physician and right nostril was packed.  Shortly after the procedure, patient became hypotensive, pale, cold and clammy and tachycardic.  This episode lasted few minutes, witnessed by ED staff and wife at the bedside.     At the time of interview, patient is awake, alert oriented X.3.  Wife at the bedside.  Both confirmed that patient had an episode of syncope and collapse with a brief loss of consciousness, which lasted few minutes and fully regained. \"    7/21: pt is doing alright, he is sitting up in bed. His main complaint is drainage from the packed nostril at this time. He denies any other issues, CP/palpitations, SOB.        Physical Exam:-  Vitals:   Patient Vitals for the past 24 hrs:   BP Temp Temp src Pulse Resp SpO2 Weight   07/22/24 0600 -- -- -- -- -- -- 108.1 kg (238 lb 6.4 oz)   07/22/24 0430 121/75 97.5 °F (36.4 °C) Oral 75 18 92 % --   07/21/24

## 2024-07-22 NOTE — PLAN OF CARE
Problem: Discharge Planning  Goal: Discharge to home or other facility with appropriate resources  Outcome: Progressing  Flowsheets (Taken 7/21/2024 1945)  Discharge to home or other facility with appropriate resources: Identify barriers to discharge with patient and caregiver     Problem: Safety - Adult  Goal: Free from fall injury  Outcome: Progressing  Flowsheets (Taken 7/21/2024 1945)  Free From Fall Injury: Instruct family/caregiver on patient safety     Problem: Cardiovascular - Adult  Goal: Maintains optimal cardiac output and hemodynamic stability  Outcome: Progressing  Flowsheets (Taken 7/21/2024 1945)  Maintains optimal cardiac output and hemodynamic stability: Monitor blood pressure and heart rate  Goal: Absence of cardiac dysrhythmias or at baseline  Outcome: Progressing  Flowsheets (Taken 7/21/2024 1945)  Absence of cardiac dysrhythmias or at baseline: Monitor cardiac rate and rhythm     Problem: Metabolic/Fluid and Electrolytes - Adult  Goal: Electrolytes maintained within normal limits  Outcome: Progressing  Flowsheets (Taken 7/21/2024 1945)  Electrolytes maintained within normal limits: Monitor labs and assess patient for signs and symptoms of electrolyte imbalances     Problem: Chronic Conditions and Co-morbidities  Goal: Patient's chronic conditions and co-morbidity symptoms are monitored and maintained or improved  Recent Flowsheet Documentation  Taken 7/21/2024 1945 by Dnaia Irizarry, RN  Care Plan - Patient's Chronic Conditions and Co-Morbidity Symptoms are Monitored and Maintained or Improved: Monitor and assess patient's chronic conditions and comorbid symptoms for stability, deterioration, or improvement

## 2024-07-22 NOTE — PROGRESS NOTES
Department of Otolaryngology  Progress Note    Chief Complaint:  epistaxis    Initial ENT HPI: The patient is a 74 y.o. male who presents with epistaxis yesterday to the ER. He voices sitting in [employer] yesterday morning sipping coffee and having profuse right anterior epistaxis and EMS transported to the emergency department where his right nare was cauterized and packed with Merocel. Per ER chart review patient had concern for bleeding to right anterior and posterior region. After packing placed patient had syncopal episode with brief confusion and admitted due to these concerns. He has history of right sided posterior and anterior epistaxis on chart review and had Watchman device placed; currently on 81 mg ASA. Since packing placed patient denies recurrence of bleeding bilaterally or sensation of swallowing blood. His main concern is constant clear dripping mucous from right nasal airway surrounding packing. No reports of nasal obstruction with packing in place.    SUBJECTIVE 7/22/24:  Patient had some brief bright red bleeding from right anterior nare yesterday evening overnight that filled up a few tissues. Bleeding controlled with Afrin nasal spray and clamp and hasn't recurred since midnight last night. Per nursing patient has been up walking around the hallway today without concerns. Anticipating EEG this morning and if negative results; potential discharge today. No bleeding concerns from left nare or sensation of swallowing blood noted.      REVIEW OF SYSTEMS:    Pertinent positives as noted in the HPI. All other systems reviewed and negative.    OBJECTIVE      Physical  VITALS:  BP (!) 138/98   Pulse 77   Temp 97.6 °F (36.4 °C) (Oral)   Resp 15   Ht 1.829 m (6' 0.01\")   Wt 108.1 kg (238 lb 6.4 oz)   SpO2 96%   BMI 32.33 kg/m²     This is a 74 y.o. male. Patient is alert and oriented to person, place and time. Patient appears well developed, well nourished. Mood is happy with normal affect. Not  well controlled. Patient ambulating well. Okay for discharge from ENT standpoint with close outpatient follow-up.    Electronically signed by NARESH Ames CNP on 7/22/2024 at 10:49 AM

## 2024-07-22 NOTE — TELEPHONE ENCOUNTER
Patient seen in hospital for epistaxis concerns and dissolvable packing placed. Anticipated discharge from hospital later today. Please call to arrange hospital follow up in the next 7-10 days. I can see at 3:15 on office day or if Crystal has availability on a rounding day; or any other opening we could find.

## 2024-07-23 ENCOUNTER — TELEPHONE (OUTPATIENT)
Dept: NEUROLOGY | Age: 74
End: 2024-07-23

## 2024-07-23 NOTE — TELEPHONE ENCOUNTER
LOV:7/10/24  Cont. Keppra 500mg BID  Keppra Level  F/U 4 mo - scheduled 11/11/24    Keppra level results 7/10/24=12  Increase Keppra to 500 mg in the morning, and 750 mg at night.  Repeat keppra level 1 week  Keppra level results 7/18/24=23  Patient was admitted to the hospital on 7/20/24 for nosebleed, syncope and collapse  Keppra level 7/21/24= 27    Spouse and patient wanted to clarify Keppra dosage. Is patient to continue the Keppra 500 mg in the morning and 750 mg at night

## 2024-07-23 NOTE — TELEPHONE ENCOUNTER
Continue with Keppra at current dose, 500 mg in the morning, and 750 mg at night.   Paige Leopold, CNP

## 2024-07-25 LAB — BACTERIA BLD AEROBE CULT: NORMAL

## 2024-07-28 NOTE — PROGRESS NOTES
capsule by mouth at bedtime 90 capsule 3    losartan (COZAAR) 25 MG tablet Take 1 tablet by mouth daily 90 tablet 1    furosemide (LASIX) 40 MG tablet Take 1 tablet by mouth daily 90 tablet 1    nitroGLYCERIN (NITROSTAT) 0.4 MG SL tablet Place 1 tablet under the tongue every 5 minutes as needed for Chest pain up to max of 3 total doses. If no relief after 1 dose, call 911. 25 tablet 0    metoprolol tartrate (LOPRESSOR) 50 MG tablet Take 1 tablet by mouth 2 times daily 60 tablet 3    rosuvastatin (CRESTOR) 20 MG tablet Take 1 tablet by mouth daily 90 tablet 3    bisacodyl (DULCOLAX) 5 MG EC tablet Take 1 tablet by mouth nightly       No current facility-administered medications for this visit.       Objective:   /68 (Site: Left Upper Arm, Position: Sitting)   Pulse 69   Temp 97.2 °F (36.2 °C) (Infrared)   Ht 1.829 m (6')   Wt 108.9 kg (240 lb 1.6 oz)   SpO2 96%   BMI 32.56 kg/m²     PHYSICAL EXAM  Constitutional: Oriented and cooperative. Appears well-developed and well-nourished. No distress.   HENT:   Head: Normocephalic and atraumatic.   Right nare with ulcerated/prominent vessel to right anterior septal wall indicative of recent bleeding.   No retained nasopore/surgiflo to right nare. Left nare clear and without evidence of recent bleeding.     Procedure:  Afrin and 4% Lidocaine instilled to area of concern to right anterior septal wall. After adequate timeframe; silver nitrate cauterization performed to right anterior septal wall without complications. Excess silver nitrate wiped off with Afrin soaked Q-tip. Hemostasis achieved well and no recurrence of bleeding.    Mouth/Throat:  Good dentition. Oral cavity mucosa normal, no masses or lesions noted. Oropharynx is clear and moist without bloody pooling. Tonsils atrophic. Hard and soft palate symmetrical and intact  Eyes:  Pupils are equal, round, and reactive to light. Conjunctivae and EOM are normal.   Cardiovascular:  Normal rate.

## 2024-07-29 ENCOUNTER — OFFICE VISIT (OUTPATIENT)
Dept: ENT CLINIC | Age: 74
End: 2024-07-29
Payer: MEDICARE

## 2024-07-29 VITALS
SYSTOLIC BLOOD PRESSURE: 116 MMHG | BODY MASS INDEX: 32.52 KG/M2 | TEMPERATURE: 97.2 F | DIASTOLIC BLOOD PRESSURE: 68 MMHG | WEIGHT: 240.1 LBS | HEART RATE: 69 BPM | HEIGHT: 72 IN | OXYGEN SATURATION: 96 %

## 2024-07-29 DIAGNOSIS — R04.0 EPISTAXIS: Primary | ICD-10-CM

## 2024-07-29 PROCEDURE — 1036F TOBACCO NON-USER: CPT | Performed by: REGISTERED NURSE

## 2024-07-29 PROCEDURE — G8427 DOCREV CUR MEDS BY ELIG CLIN: HCPCS | Performed by: REGISTERED NURSE

## 2024-07-29 PROCEDURE — 3078F DIAST BP <80 MM HG: CPT | Performed by: REGISTERED NURSE

## 2024-07-29 PROCEDURE — 1111F DSCHRG MED/CURRENT MED MERGE: CPT | Performed by: REGISTERED NURSE

## 2024-07-29 PROCEDURE — 3074F SYST BP LT 130 MM HG: CPT | Performed by: REGISTERED NURSE

## 2024-07-29 PROCEDURE — G8417 CALC BMI ABV UP PARAM F/U: HCPCS | Performed by: REGISTERED NURSE

## 2024-07-29 PROCEDURE — 1123F ACP DISCUSS/DSCN MKR DOCD: CPT | Performed by: REGISTERED NURSE

## 2024-07-29 PROCEDURE — 30901 CONTROL OF NOSEBLEED: CPT | Performed by: REGISTERED NURSE

## 2024-07-29 PROCEDURE — 99213 OFFICE O/P EST LOW 20 MIN: CPT | Performed by: REGISTERED NURSE

## 2024-07-29 PROCEDURE — 3017F COLORECTAL CA SCREEN DOC REV: CPT | Performed by: REGISTERED NURSE

## 2024-07-29 RX ORDER — ASPIRIN 81 MG/1
81 TABLET, CHEWABLE ORAL DAILY
COMMUNITY

## 2024-07-29 NOTE — PATIENT INSTRUCTIONS
If you have a nosebleed at home, you should try the following:    Lightly blow your nose in attempts to dislodge old blood and blood clots  Spray 3 sprays of Afrin (oxymetazoline) to the bleeding nostril. If unsure which nostril is bleeding, spray both nostrils  Apply direct pressure to the nose, compressing both nostrils completely shut. You can use your fingers or nasal clamp  Hold pressure with your fingers/nasal clamp for at least 15 minutes. Do not release pressure to check if bleeding has resolved before that.  If bleeding has not resolved after this, you can repeat steps 1-4. You can do this up to 3 times consecutively, but if bleeding still persists you should consider being evaluated in the ER or ENT office.

## 2024-08-06 ENCOUNTER — HOSPITAL ENCOUNTER (OUTPATIENT)
Age: 74
Discharge: HOME | End: 2024-08-06
Payer: MEDICARE

## 2024-08-06 DIAGNOSIS — D45 ACQUIRED POLYCYTHEMIA VERA (HCC): ICD-10-CM

## 2024-08-06 DIAGNOSIS — E11.9 TYPE 2 DIABETES MELLITUS WITHOUT COMPLICATION, WITHOUT LONG-TERM CURRENT USE OF INSULIN (HCC): ICD-10-CM

## 2024-08-06 DIAGNOSIS — I50.22 CHRONIC SYSTOLIC CONGESTIVE HEART FAILURE, NYHA CLASS 2 (HCC): ICD-10-CM

## 2024-08-06 DIAGNOSIS — I48.91 ATRIAL FIBRILLATION, UNSPECIFIED TYPE (HCC): ICD-10-CM

## 2024-08-06 LAB
BASOPHILS ABSOLUTE: 0.1 THOU/MM3 (ref 0–0.1)
BASOPHILS NFR BLD AUTO: 1.8 %
DEPRECATED MEAN GLUCOSE BLD GHB EST-ACNC: 117 MG/DL (ref 70–126)
DEPRECATED RDW RBC AUTO: 43.8 FL (ref 35–45)
EOSINOPHIL NFR BLD AUTO: 3.8 %
EOSINOPHILS ABSOLUTE: 0.2 THOU/MM3 (ref 0–0.4)
ERYTHROCYTE [DISTWIDTH] IN BLOOD BY AUTOMATED COUNT: 13.1 % (ref 11.5–14.5)
HBA1C MFR BLD HPLC: 5.9 % (ref 4.4–6.4)
HCT VFR BLD AUTO: 55.1 % (ref 42–52)
HGB BLD-MCNC: 18.4 GM/DL (ref 14–18)
IMM GRANULOCYTES # BLD AUTO: 0.01 THOU/MM3 (ref 0–0.07)
IMM GRANULOCYTES NFR BLD AUTO: 0.2 %
LYMPHOCYTES ABSOLUTE: 2.1 THOU/MM3 (ref 1–4.8)
LYMPHOCYTES NFR BLD AUTO: 47.3 %
MCH RBC QN AUTO: 30.6 PG (ref 26–33)
MCHC RBC AUTO-ENTMCNC: 33.4 GM/DL (ref 32.2–35.5)
MCV RBC AUTO: 91.7 FL (ref 80–94)
MONOCYTES ABSOLUTE: 0.7 THOU/MM3 (ref 0.4–1.3)
MONOCYTES NFR BLD AUTO: 16.3 %
NEUTROPHILS ABSOLUTE: 1.3 THOU/MM3 (ref 1.8–7.7)
NEUTROPHILS NFR BLD AUTO: 30.6 %
NRBC BLD AUTO-RTO: 0 /100 WBC
PLATELET # BLD AUTO: 209 THOU/MM3 (ref 130–400)
PMV BLD AUTO: 11 FL (ref 9.4–12.4)
RBC # BLD AUTO: 6.01 MILL/MM3 (ref 4.7–6.1)
WBC # BLD AUTO: 4.4 THOU/MM3 (ref 4.8–10.8)

## 2024-08-06 PROCEDURE — 36415 COLL VENOUS BLD VENIPUNCTURE: CPT

## 2024-08-06 PROCEDURE — 85025 COMPLETE CBC W/AUTO DIFF WBC: CPT

## 2024-08-06 PROCEDURE — 83036 HEMOGLOBIN GLYCOSYLATED A1C: CPT

## 2024-08-12 RX ORDER — METOPROLOL TARTRATE 50 MG/1
50 TABLET, FILM COATED ORAL 2 TIMES DAILY
Qty: 180 TABLET | Refills: 3 | Status: SHIPPED | OUTPATIENT
Start: 2024-08-12

## 2024-08-12 NOTE — TELEPHONE ENCOUNTER
Alex Mckeon called requesting a refill on the following medications:  Requested Prescriptions     Pending Prescriptions Disp Refills    metoprolol tartrate (LOPRESSOR) 50 MG tablet 60 tablet 3     Sig: Take 1 tablet by mouth 2 times daily     Pharmacy verified:    [address] - P [phone] - F [phone]     Date of last visit: 10/02/2023  Date of next visit (if applicable): 8/22/2024      Would like 90 day supply

## 2024-08-13 ENCOUNTER — OFFICE VISIT (OUTPATIENT)
Dept: FAMILY MEDICINE CLINIC | Age: 74
End: 2024-08-13
Payer: MEDICARE

## 2024-08-13 VITALS
RESPIRATION RATE: 16 BRPM | SYSTOLIC BLOOD PRESSURE: 124 MMHG | BODY MASS INDEX: 32.37 KG/M2 | WEIGHT: 239 LBS | HEART RATE: 68 BPM | DIASTOLIC BLOOD PRESSURE: 84 MMHG | HEIGHT: 72 IN

## 2024-08-13 DIAGNOSIS — N25.81 HYPERPARATHYROIDISM, SECONDARY RENAL (HCC): ICD-10-CM

## 2024-08-13 DIAGNOSIS — Z00.00 MEDICARE ANNUAL WELLNESS VISIT, SUBSEQUENT: Primary | ICD-10-CM

## 2024-08-13 DIAGNOSIS — R56.9 SEIZURE-LIKE ACTIVITY (HCC): ICD-10-CM

## 2024-08-13 DIAGNOSIS — I10 ESSENTIAL HYPERTENSION: ICD-10-CM

## 2024-08-13 DIAGNOSIS — D45 ACQUIRED POLYCYTHEMIA VERA (HCC): ICD-10-CM

## 2024-08-13 DIAGNOSIS — R32 URINARY INCONTINENCE, UNSPECIFIED TYPE: ICD-10-CM

## 2024-08-13 DIAGNOSIS — I48.19 PERSISTENT ATRIAL FIBRILLATION (HCC): ICD-10-CM

## 2024-08-13 DIAGNOSIS — E11.9 TYPE 2 DIABETES MELLITUS WITHOUT COMPLICATION, WITHOUT LONG-TERM CURRENT USE OF INSULIN (HCC): ICD-10-CM

## 2024-08-13 DIAGNOSIS — D68.69 SECONDARY HYPERCOAGULABLE STATE (HCC): ICD-10-CM

## 2024-08-13 PROCEDURE — G0439 PPPS, SUBSEQ VISIT: HCPCS | Performed by: NURSE PRACTITIONER

## 2024-08-13 PROCEDURE — 3079F DIAST BP 80-89 MM HG: CPT | Performed by: NURSE PRACTITIONER

## 2024-08-13 PROCEDURE — 3017F COLORECTAL CA SCREEN DOC REV: CPT | Performed by: NURSE PRACTITIONER

## 2024-08-13 PROCEDURE — 3074F SYST BP LT 130 MM HG: CPT | Performed by: NURSE PRACTITIONER

## 2024-08-13 PROCEDURE — 3044F HG A1C LEVEL LT 7.0%: CPT | Performed by: NURSE PRACTITIONER

## 2024-08-13 PROCEDURE — 1123F ACP DISCUSS/DSCN MKR DOCD: CPT | Performed by: NURSE PRACTITIONER

## 2024-08-13 SDOH — ECONOMIC STABILITY: INCOME INSECURITY: HOW HARD IS IT FOR YOU TO PAY FOR THE VERY BASICS LIKE FOOD, HOUSING, MEDICAL CARE, AND HEATING?: NOT HARD AT ALL

## 2024-08-13 SDOH — ECONOMIC STABILITY: FOOD INSECURITY: WITHIN THE PAST 12 MONTHS, YOU WORRIED THAT YOUR FOOD WOULD RUN OUT BEFORE YOU GOT MONEY TO BUY MORE.: NEVER TRUE

## 2024-08-13 SDOH — ECONOMIC STABILITY: FOOD INSECURITY: WITHIN THE PAST 12 MONTHS, THE FOOD YOU BOUGHT JUST DIDN'T LAST AND YOU DIDN'T HAVE MONEY TO GET MORE.: NEVER TRUE

## 2024-08-13 ASSESSMENT — ENCOUNTER SYMPTOMS
NAUSEA: 0
WHEEZING: 0
BACK PAIN: 0
SHORTNESS OF BREATH: 0
COLOR CHANGE: 0
VOMITING: 0
EYE PAIN: 0
SORE THROAT: 0
ABDOMINAL PAIN: 0
FACIAL SWELLING: 0
COUGH: 0
BLOOD IN STOOL: 0
TROUBLE SWALLOWING: 0
SINUS PAIN: 0
DIARRHEA: 0

## 2024-08-13 ASSESSMENT — PATIENT HEALTH QUESTIONNAIRE - PHQ9
10. IF YOU CHECKED OFF ANY PROBLEMS, HOW DIFFICULT HAVE THESE PROBLEMS MADE IT FOR YOU TO DO YOUR WORK, TAKE CARE OF THINGS AT HOME, OR GET ALONG WITH OTHER PEOPLE: NOT DIFFICULT AT ALL
3. TROUBLE FALLING OR STAYING ASLEEP: NOT AT ALL
5. POOR APPETITE OR OVEREATING: NOT AT ALL
2. FEELING DOWN, DEPRESSED OR HOPELESS: NEARLY EVERY DAY
SUM OF ALL RESPONSES TO PHQ QUESTIONS 1-9: 6
8. MOVING OR SPEAKING SO SLOWLY THAT OTHER PEOPLE COULD HAVE NOTICED. OR THE OPPOSITE, BEING SO FIGETY OR RESTLESS THAT YOU HAVE BEEN MOVING AROUND A LOT MORE THAN USUAL: NOT AT ALL
SUM OF ALL RESPONSES TO PHQ9 QUESTIONS 1 & 2: 6
7. TROUBLE CONCENTRATING ON THINGS, SUCH AS READING THE NEWSPAPER OR WATCHING TELEVISION: NOT AT ALL
SUM OF ALL RESPONSES TO PHQ QUESTIONS 1-9: 6
SUM OF ALL RESPONSES TO PHQ QUESTIONS 1-9: 6
4. FEELING TIRED OR HAVING LITTLE ENERGY: NOT AT ALL
6. FEELING BAD ABOUT YOURSELF - OR THAT YOU ARE A FAILURE OR HAVE LET YOURSELF OR YOUR FAMILY DOWN: NOT AT ALL
9. THOUGHTS THAT YOU WOULD BE BETTER OFF DEAD, OR OF HURTING YOURSELF: NOT AT ALL
1. LITTLE INTEREST OR PLEASURE IN DOING THINGS: NEARLY EVERY DAY
SUM OF ALL RESPONSES TO PHQ QUESTIONS 1-9: 6

## 2024-08-13 NOTE — PROGRESS NOTES
Medicare Annual Wellness Visit    Alex SONJA Mckeon is here for Medicare AWV (Pt does have a few things he would like to discuss.) and Discuss Labs (Completed 8/6/2024.)    Assessment & Plan   Medicare annual wellness visit, subsequent  Seizure-like activity (HCC)  Hyperparathyroidism, secondary renal (HCC)  Acquired polycythemia vera (HCC)  Persistent atrial fibrillation (HCC)  Essential hypertension  Secondary hypercoagulable state (HCC)  Type 2 diabetes mellitus without complication, without long-term current use of insulin (HCC)  -     Hemoglobin A1C; Future  -     Comprehensive Metabolic Panel; Future  -     CBC with Auto Differential; Future  Urinary incontinence, unspecified type  -     Dom Jones MD, Urology, [place]     Recommendations for Preventive Services Due: see orders and patient instructions/AVS.  Recommended screening schedule for the next 5-10 years is provided to the patient in written form: see Patient Instructions/AVS.     Return in 6 months (on 2/13/2025).     Subjective       Patient's complete Health Risk Assessment and screening values have been reviewed and are found in Flowsheets. The following problems were reviewed today and where indicated follow up appointments were made and/or referrals ordered.    Positive Risk Factor Screenings with Interventions:        Depression:  PHQ-2 Score: 6  PHQ-9 Total Score: 6  Total Score Interpretation: 5-9 = mild depression  Interventions:  Patient declines any further evaluation or treatment          General HRA Questions:  Select all that apply: (!) New or Increased Fatigue, Loneliness, Anger, Stress, Social Isolation  Interventions Fatigue:  Patient declined any further interventions or treatment  Interventions - Loneliness:  Patient declined any further interventions or treatment  Interventions - Social Isolation:  Patient declined any further interventions or treatment  Interventions - Stress:  Patient declined any further

## 2024-08-13 NOTE — PROGRESS NOTES
[facility]  [address] [address]  Dept: [phone]  Dept Fax: [phone]  Loc: [phone]     2024     Alex Mckeon (:  1950) is a 74 y.o. male, here for evaluation of the following medical concerns:    Chief Complaint   Patient presents with    Medicare AWV     Pt does have a few things he would like to discuss.    Discuss Labs     Completed 2024.       Pt presents to the office today with his wife for AWV and follow up on chronic conditions. He is angry about everything he states because \"no one listens to him\"  He is depressed, angry and having a lot of Urinary incontinence.  Pt wife is not happy with his care overall.  Discussed his symptoms and offered referral to psych and medications for depression.  Pt wife states that \"medications don't work for me and they probably won't work for him\" and pt refuses referral to psych and meds at this time.  Pt also states he was told that by \"someone\" that getting his prostate out would fix all of his urinary issues, but he can not remember who told him this.  No recent urology follow ups.  He has to urinate every 15 min.       Treatment Adherence:   Medication compliance:  compliant all of the time  Diet compliance:  compliant most of the time  Weight trend: stable    Diabetes Mellitus Type 2: Current symptoms/problems include none.    Home blood sugar records: patient does not test  Any episodes of hypoglycemia? no  Tobacco history: He  reports that he has never smoked. He has been exposed to tobacco smoke. He has never used smokeless tobacco.     Hypertension:  Home blood pressure monitoring: No.  He is adherent to a low sodium diet. Patient denies chest pain, shortness of breath, headache, and lightheadedness.  Antihypertensive medication side effects: no medication side effects noted.  Use of agents associated with hypertension: none.     Hyperlipidemia:  No new

## 2024-08-13 NOTE — PATIENT INSTRUCTIONS
Preventive Plan for Alex Mckeon - 8/13/2024  Medicare offers a range of preventive health benefits. Some of the tests and screenings are paid in full while other may be subject to a deductible, co-insurance, and/or copay.    Some of these benefits include a comprehensive review of your medical history including lifestyle, illnesses that may run in your family, and various assessments and screenings as appropriate.    After reviewing your medical record and screening and assessments performed today your provider may have ordered immunizations, labs, imaging, and/or referrals for you.  A list of these orders (if applicable) as well as your Preventive Care list are included within your After Visit Summary for your review.    Other Preventive Recommendations:    A preventive eye exam performed by an eye specialist is recommended every 1-2 years to screen for glaucoma; cataracts, macular degeneration, and other eye disorders.  A preventive dental visit is recommended every 6 months.  Try to get at least 150 minutes of exercise per week or 10,000 steps per day on a pedometer .  Order or download the FREE \"Exercise & Physical Activity: Your Everyday Guide\" from [employer] [place] [employer]. Call 1-528.590.4390 or search [employer] [place] [employer] online.  You need 5195-8671 mg of calcium and 9038-6625 IU of vitamin D per day. It is possible to meet your calcium requirement with diet alone, but a vitamin D supplement is usually necessary to meet this goal.  When exposed to the sun, use a sunscreen that protects against both UVA and UVB radiation with an SPF of 30 or greater. Reapply every 2 to 3 hours or after sweating, drying off with a towel, or swimming.  Always wear a seat belt when traveling in a car. Always wear a helmet when riding a bicycle or motorcycle.

## 2024-08-22 ENCOUNTER — OFFICE VISIT (OUTPATIENT)
Dept: CARDIOLOGY CLINIC | Age: 74
End: 2024-08-22

## 2024-08-22 VITALS
HEIGHT: 72 IN | WEIGHT: 240 LBS | HEART RATE: 74 BPM | DIASTOLIC BLOOD PRESSURE: 62 MMHG | SYSTOLIC BLOOD PRESSURE: 130 MMHG | BODY MASS INDEX: 32.51 KG/M2

## 2024-08-22 DIAGNOSIS — Z95.818 PRESENCE OF WATCHMAN LEFT ATRIAL APPENDAGE CLOSURE DEVICE: Primary | ICD-10-CM

## 2024-08-22 RX ORDER — LEVETIRACETAM 500 MG/1
500 TABLET ORAL EVERY MORNING
COMMUNITY

## 2024-08-22 NOTE — PROGRESS NOTES
[facility] [place] [facility]  [facility] CARDIOLOG[facility]  [address]  [address]  [facility] [address]  Dept: [phone]  Dept Fax: [phone]  Loc: [phone]    Visit Date: 8/22/2024    Mr. Mckeon is a 74 y.o. male  who presented for:  Chief Complaint   Patient presents with    Check-Up     2 year f/u s/p watchman    Shortness of Breath       HPI:   HPI   73 yo M presents f/u WATCHMAN.  He was admitted for heat exhaustion and nose bleeds.  BP was low but he was hydrated.  He has no major symptoms.  No CVA.  He has seizure like issues that stopped.  No chest pain, angina, XIONG, orthopnea, PND, sob at rest, palpitations, LE edema, or syncope.  EF preserved.       ECG (if obtained today):  N/A      Current Outpatient Medications:     Cholecalciferol (VITAMIN D-3 PO), Take by mouth, Disp: , Rfl:     levETIRAcetam (KEPPRA) 500 MG tablet, Take 1 tablet by mouth every morning, Disp: , Rfl:     metoprolol tartrate (LOPRESSOR) 50 MG tablet, Take 1 tablet by mouth 2 times daily, Disp: 180 tablet, Rfl: 3    aspirin 81 MG chewable tablet, Take 1 tablet by mouth daily, Disp: , Rfl:     levETIRAcetam (KEPPRA) 750 MG tablet, Take 1 tablet by mouth at bedtime, Disp: 30 tablet, Rfl: 3    dilTIAZem (CARDIZEM CD) 120 MG extended release capsule, Take 1 capsule by mouth at bedtime, Disp: 90 capsule, Rfl: 3    losartan (COZAAR) 25 MG tablet, Take 1 tablet by mouth daily, Disp: 90 tablet, Rfl: 1    furosemide (LASIX) 40 MG tablet, Take 1 tablet by mouth daily, Disp: 90 tablet, Rfl: 1    nitroGLYCERIN (NITROSTAT) 0.4 MG SL tablet, Place 1 tablet under the tongue every 5 minutes as needed for Chest pain up to max of 3 total doses. If no relief after 1 dose, call 911., Disp: 25 tablet, Rfl: 0    rosuvastatin (CRESTOR) 20 MG tablet, Take 1 tablet by mouth daily, Disp: 90 tablet, Rfl: 3    bisacodyl (DULCOLAX) 5 MG EC tablet, Take 1 tablet by mouth nightly, Disp: , Rfl:     Past Medical History  Alex  has

## 2024-08-26 RX ORDER — LEVETIRACETAM 500 MG/1
500 TABLET ORAL EVERY MORNING
Qty: 60 TABLET | Refills: 4 | OUTPATIENT
Start: 2024-08-26

## 2024-08-26 NOTE — TELEPHONE ENCOUNTER
This medication refill is regarding a telephone request. Refill requested by spouse/partner.    Requested Prescriptions     Pending Prescriptions Disp Refills    levETIRAcetam (KEPPRA) 500 MG tablet 60 tablet 4     Sig: Take 1 tablet by mouth every morning       Date of last visit: 8/13/2024   Date of next visit: 2/13/2025  Date of last refill: historical provider; pt also takes Levetiracetam 750 mg that was last prescribed by Paige Leopold. Pts wife states they discussed with WS in the past that she would start prescribing this for the patient.  Pharmacy Name: [facility]     Rx verified, ordered and set to EP.     Call Hali back with WS response.

## 2024-08-26 NOTE — TELEPHONE ENCOUNTER
Pt follows up with [facility] neurology for this medication and new onset seizures.  Pt wife asked if I would refill it and I said no and she would need to follow up with neurology for this.  Please have pt call [facility] Neurology for this refill. -WS

## 2024-08-26 NOTE — TELEPHONE ENCOUNTER
Spoke to pts wife and notified her of WS response and she verbalized understanding. Will contact neurology for refill.

## 2024-08-27 RX ORDER — LEVETIRACETAM 500 MG/1
500 TABLET ORAL EVERY MORNING
Qty: 30 TABLET | Refills: 5 | Status: SHIPPED | OUTPATIENT
Start: 2024-08-27

## 2024-09-06 ENCOUNTER — HOSPITAL ENCOUNTER (EMERGENCY)
Age: 74
Discharge: HOME | End: 2024-09-06
Attending: FAMILY MEDICINE
Payer: MEDICARE

## 2024-09-06 ENCOUNTER — TELEPHONE (OUTPATIENT)
Dept: ENT CLINIC | Age: 74
End: 2024-09-06

## 2024-09-06 VITALS
DIASTOLIC BLOOD PRESSURE: 97 MMHG | TEMPERATURE: 97.8 F | OXYGEN SATURATION: 98 % | HEART RATE: 61 BPM | RESPIRATION RATE: 14 BRPM | SYSTOLIC BLOOD PRESSURE: 126 MMHG

## 2024-09-06 DIAGNOSIS — R04.0 EPISTAXIS: Primary | ICD-10-CM

## 2024-09-06 LAB
ANION GAP SERPL CALC-SCNC: 8 MEQ/L (ref 8–16)
BASOPHILS ABSOLUTE: 0.1 THOU/MM3 (ref 0–0.1)
BASOPHILS NFR BLD AUTO: 1 %
BUN SERPL-MCNC: 16 MG/DL (ref 7–22)
CALCIUM SERPL-MCNC: 9.1 MG/DL (ref 8.5–10.5)
CHLORIDE SERPL-SCNC: 104 MEQ/L (ref 98–111)
CO2 SERPL-SCNC: 27 MEQ/L (ref 23–33)
CREAT SERPL-MCNC: 1.1 MG/DL (ref 0.4–1.2)
DEPRECATED RDW RBC AUTO: 43.8 FL (ref 35–45)
EKG Q-T INTERVAL: 418 MS
EKG QRS DURATION: 94 MS
EKG QTC CALCULATION (BAZETT): 427 MS
EKG R AXIS: -39 DEGREES
EKG T AXIS: 105 DEGREES
EKG VENTRICULAR RATE: 63 BPM
EOSINOPHIL NFR BLD AUTO: 3.1 %
EOSINOPHILS ABSOLUTE: 0.2 THOU/MM3 (ref 0–0.4)
ERYTHROCYTE [DISTWIDTH] IN BLOOD BY AUTOMATED COUNT: 13.3 % (ref 11.5–14.5)
GFR SERPL CREATININE-BSD FRML MDRD: 71 ML/MIN/1.73M2
GLUCOSE SERPL-MCNC: 171 MG/DL (ref 70–108)
HCT VFR BLD AUTO: 53.1 % (ref 42–52)
HGB BLD-MCNC: 17.8 GM/DL (ref 14–18)
IMM GRANULOCYTES # BLD AUTO: 0.01 THOU/MM3 (ref 0–0.07)
IMM GRANULOCYTES NFR BLD AUTO: 0.2 %
INR PPP: 1.06 (ref 0.85–1.13)
LYMPHOCYTES ABSOLUTE: 1.1 THOU/MM3 (ref 1–4.8)
LYMPHOCYTES NFR BLD AUTO: 18.5 %
MCH RBC QN AUTO: 30.3 PG (ref 26–33)
MCHC RBC AUTO-ENTMCNC: 33.5 GM/DL (ref 32.2–35.5)
MCV RBC AUTO: 90.3 FL (ref 80–94)
MONOCYTES ABSOLUTE: 0.4 THOU/MM3 (ref 0.4–1.3)
MONOCYTES NFR BLD AUTO: 7.5 %
NEUTROPHILS ABSOLUTE: 4 THOU/MM3 (ref 1.8–7.7)
NEUTROPHILS NFR BLD AUTO: 69.7 %
NRBC BLD AUTO-RTO: 0 /100 WBC
OSMOLALITY SERPL CALC.SUM OF ELEC: 282.8 MOSMOL/KG (ref 275–300)
PLATELET # BLD AUTO: 191 THOU/MM3 (ref 130–400)
PMV BLD AUTO: 10.6 FL (ref 9.4–12.4)
POTASSIUM SERPL-SCNC: 4.4 MEQ/L (ref 3.5–5.2)
RBC # BLD AUTO: 5.88 MILL/MM3 (ref 4.7–6.1)
SODIUM SERPL-SCNC: 139 MEQ/L (ref 135–145)
WBC # BLD AUTO: 5.8 THOU/MM3 (ref 4.8–10.8)

## 2024-09-06 PROCEDURE — 85025 COMPLETE CBC W/AUTO DIFF WBC: CPT

## 2024-09-06 PROCEDURE — 6370000000 HC RX 637 (ALT 250 FOR IP): Performed by: STUDENT IN AN ORGANIZED HEALTH CARE EDUCATION/TRAINING PROGRAM

## 2024-09-06 PROCEDURE — 2500000003 HC RX 250 WO HCPCS: Performed by: STUDENT IN AN ORGANIZED HEALTH CARE EDUCATION/TRAINING PROGRAM

## 2024-09-06 PROCEDURE — 99284 EMERGENCY DEPT VISIT MOD MDM: CPT

## 2024-09-06 PROCEDURE — 80048 BASIC METABOLIC PNL TOTAL CA: CPT

## 2024-09-06 PROCEDURE — 30901 CONTROL OF NOSEBLEED: CPT

## 2024-09-06 PROCEDURE — 36415 COLL VENOUS BLD VENIPUNCTURE: CPT

## 2024-09-06 PROCEDURE — 85610 PROTHROMBIN TIME: CPT

## 2024-09-06 PROCEDURE — 93010 ELECTROCARDIOGRAM REPORT: CPT | Performed by: INTERNAL MEDICINE

## 2024-09-06 PROCEDURE — 93005 ELECTROCARDIOGRAM TRACING: CPT | Performed by: STUDENT IN AN ORGANIZED HEALTH CARE EDUCATION/TRAINING PROGRAM

## 2024-09-06 RX ORDER — OXYMETAZOLINE HYDROCHLORIDE 0.05 G/100ML
1 SPRAY NASAL ONCE
Status: COMPLETED | OUTPATIENT
Start: 2024-09-06 | End: 2024-09-06

## 2024-09-06 RX ORDER — TRANEXAMIC ACID 100 MG/ML
1000 INJECTION, SOLUTION INTRAVENOUS ONCE
Status: COMPLETED | OUTPATIENT
Start: 2024-09-06 | End: 2024-09-06

## 2024-09-06 RX ORDER — 0.9 % SODIUM CHLORIDE 0.9 %
500 INTRAVENOUS SOLUTION INTRAVENOUS ONCE
Status: DISCONTINUED | OUTPATIENT
Start: 2024-09-06 | End: 2024-09-06 | Stop reason: HOSPADM

## 2024-09-06 RX ORDER — LIDOCAINE HYDROCHLORIDE AND EPINEPHRINE 10; 10 MG/ML; UG/ML
20 INJECTION, SOLUTION INFILTRATION; PERINEURAL ONCE
Status: COMPLETED | OUTPATIENT
Start: 2024-09-06 | End: 2024-09-06

## 2024-09-06 RX ADMIN — TRANEXAMIC ACID 1000 MG: 100 INJECTION, SOLUTION INTRAVENOUS at 10:15

## 2024-09-06 RX ADMIN — LIDOCAINE HYDROCHLORIDE AND EPINEPHRINE 20 ML: 10; 10 INJECTION, SOLUTION INFILTRATION; PERINEURAL at 10:15

## 2024-09-06 RX ADMIN — OXYMETAZOLINE HCL 1 SPRAY: 0.05 SPRAY NASAL at 10:15

## 2024-09-06 NOTE — TELEPHONE ENCOUNTER
I called and spoke with patient's wife, she requested to see Dr. Laws because she feels this is something he needs to take care of. I informed patient that we do not currently have any openings with Dr. aLws and I would recommend having patient come in and see Smiley and, if Smiley believes the patient needs to be seen by Dr. Laws she will let us know at that time. Patient's wife was agreeable to this. Patient is now scheduled Monday 9/9/2024 at 11:40 with Smiley.

## 2024-09-06 NOTE — ED NOTES
Pt resting quietly on cot in stable condition. Bleeding remains controlled. Denies any needs at this time. Call light in reach.

## 2024-09-06 NOTE — ED TRIAGE NOTES
Pt to ED for eval of nose bleed that started at 830 am. Pt in stable condition with bleeding controlled with pressure device. No active bleeding noted. Pt in stable condition with respirations even and unlabored.

## 2024-09-06 NOTE — TELEPHONE ENCOUNTER
Patient seen in ER today for cauterization/epistaxis. No packing placed. Please call patient to arrange close hospital follow up. I can see Monday at 11: 40 AM if he is available then.

## 2024-09-06 NOTE — ED PROVIDER NOTES
medications found. Please discuss with provider.          ALLERGIES     has No Known Allergies.    FAMILY HISTORY     He indicated that his mother is . He indicated that his father is . He indicated that his sister is alive. He indicated that his maternal grandmother is . He indicated that his maternal grandfather is . He indicated that his paternal grandmother is . He indicated that his paternal grandfather is . He indicated that the status of his neg hx is unknown.       SOCIAL HISTORY       Social History     Tobacco Use    Smoking status: Never     Passive exposure: Past    Smokeless tobacco: Never   Vaping Use    Vaping status: Never Used   Substance Use Topics    Alcohol use: No    Drug use: No       PHYSICAL EXAM       ED Triage Vitals [24 0914]   BP Systolic BP Percentile Diastolic BP Percentile Temp Temp Source Pulse Respirations SpO2   (!) 158/90 -- -- 97.8 °F (36.6 °C) Axillary 88 17 96 %      Height Weight         -- --             Additional Vital Signs:  Vitals:    24 1245   BP: (!) 126/97   Pulse: 61   Resp: 14   Temp:    SpO2:      Physical Exam  Constitutional:       General: He is not in acute distress.     Appearance: He is not ill-appearing, toxic-appearing or diaphoretic.   HENT:      Head: Normocephalic and atraumatic.      Right Ear: External ear normal.      Left Ear: External ear normal.      Nose: Nose normal. No congestion or rhinorrhea.      Comments: Right nostril presents excesses, no vessel is identified     Mouth/Throat:      Mouth: Mucous membranes are moist.      Pharynx: No oropharyngeal exudate or posterior oropharyngeal erythema.   Eyes:      Extraocular Movements: Extraocular movements intact.      Pupils: Pupils are equal, round, and reactive to light.   Cardiovascular:      Rate and Rhythm: Normal rate and regular rhythm.      Pulses: Normal pulses.      Heart sounds: Normal heart sounds. No murmur heard.     No  Epinephrine (Lidocaine)  Procedure details:     Treatment site:  R septum    Treatment method:  Silver nitrate and nasal tampon (Nasal tampon with TXA, Afrin, lidocaine plus)    Treatment episode: recurring    Post-procedure details:     Assessment:  Bleeding stopped    Procedure completion:  Tolerated  :     CRITICAL CARE: (None if blank)      DISCHARGE PRESCRIPTIONS: (None if blank)  Discharge Medication List as of 9/6/2024 12:47 PM          FINAL IMPRESSION      1. Epistaxis          DISPOSITION/PLAN   DISPOSITION Decision To Discharge 09/06/2024 12:44:38 PM  Condition at Disposition: Stable      OUTPATIENT FOLLOW UP THE PATIENT:  Eloy Laws MD  [address] [place] [address]  [phone]    Go on 9/9/2024        Ha Pulido MD

## 2024-09-09 ENCOUNTER — OFFICE VISIT (OUTPATIENT)
Dept: ENT CLINIC | Age: 74
End: 2024-09-09
Payer: MEDICARE

## 2024-09-09 VITALS
HEART RATE: 63 BPM | DIASTOLIC BLOOD PRESSURE: 76 MMHG | RESPIRATION RATE: 18 BRPM | TEMPERATURE: 97.2 F | OXYGEN SATURATION: 94 % | BODY MASS INDEX: 32.76 KG/M2 | SYSTOLIC BLOOD PRESSURE: 108 MMHG | WEIGHT: 241.9 LBS | HEIGHT: 72 IN

## 2024-09-09 DIAGNOSIS — R04.0 RECURRENT EPISTAXIS: Primary | ICD-10-CM

## 2024-09-09 PROCEDURE — 3078F DIAST BP <80 MM HG: CPT | Performed by: REGISTERED NURSE

## 2024-09-09 PROCEDURE — G8417 CALC BMI ABV UP PARAM F/U: HCPCS | Performed by: REGISTERED NURSE

## 2024-09-09 PROCEDURE — 99213 OFFICE O/P EST LOW 20 MIN: CPT | Performed by: REGISTERED NURSE

## 2024-09-09 PROCEDURE — 1123F ACP DISCUSS/DSCN MKR DOCD: CPT | Performed by: REGISTERED NURSE

## 2024-09-09 PROCEDURE — 3074F SYST BP LT 130 MM HG: CPT | Performed by: REGISTERED NURSE

## 2024-09-09 PROCEDURE — 3017F COLORECTAL CA SCREEN DOC REV: CPT | Performed by: REGISTERED NURSE

## 2024-09-09 PROCEDURE — 1036F TOBACCO NON-USER: CPT | Performed by: REGISTERED NURSE

## 2024-09-09 PROCEDURE — G8427 DOCREV CUR MEDS BY ELIG CLIN: HCPCS | Performed by: REGISTERED NURSE

## 2024-09-18 ENCOUNTER — HOSPITAL ENCOUNTER (EMERGENCY)
Age: 74
Discharge: HOME | End: 2024-09-18
Payer: MEDICARE

## 2024-09-18 VITALS
SYSTOLIC BLOOD PRESSURE: 143 MMHG | TEMPERATURE: 98.2 F | OXYGEN SATURATION: 99 % | RESPIRATION RATE: 18 BRPM | DIASTOLIC BLOOD PRESSURE: 88 MMHG | HEART RATE: 83 BPM

## 2024-09-18 DIAGNOSIS — R04.0 EPISTAXIS: Primary | ICD-10-CM

## 2024-09-18 PROCEDURE — 99282 EMERGENCY DEPT VISIT SF MDM: CPT

## 2024-09-18 RX ORDER — OXYMETAZOLINE HYDROCHLORIDE 0.05 G/100ML
2 SPRAY NASAL ONCE
Status: DISCONTINUED | OUTPATIENT
Start: 2024-09-18 | End: 2024-09-18 | Stop reason: HOSPADM

## 2024-09-18 RX ORDER — TRANEXAMIC ACID 100 MG/ML
100 INJECTION, SOLUTION INTRAVENOUS ONCE
Status: DISCONTINUED | OUTPATIENT
Start: 2024-09-18 | End: 2024-09-18 | Stop reason: HOSPADM

## 2024-09-18 ASSESSMENT — PAIN - FUNCTIONAL ASSESSMENT: PAIN_FUNCTIONAL_ASSESSMENT: NONE - DENIES PAIN

## 2024-09-23 ENCOUNTER — TELEPHONE (OUTPATIENT)
Dept: NEUROLOGY | Age: 74
End: 2024-09-23

## 2024-09-23 ENCOUNTER — TELEPHONE (OUTPATIENT)
Dept: FAMILY MEDICINE CLINIC | Age: 74
End: 2024-09-23

## 2024-10-22 RX ORDER — ROSUVASTATIN CALCIUM 20 MG/1
20 TABLET, COATED ORAL DAILY
Qty: 90 TABLET | Refills: 3 | Status: SHIPPED | OUTPATIENT
Start: 2024-10-22

## 2024-10-22 RX ORDER — ROSUVASTATIN CALCIUM 20 MG/1
20 TABLET, COATED ORAL DAILY
Qty: 90 TABLET | Refills: 0 | OUTPATIENT
Start: 2024-10-22

## 2024-10-22 NOTE — TELEPHONE ENCOUNTER
Pt's spouse calling to request refill on Crestor 20 mg qd.   States that you previously agreed to start managing this for him.  Last seen 8/13/24, next appt 2/13/25.  Lipids done 2/7/24.  Med verified.  Order pended.  WCP

## 2024-10-28 NOTE — TELEPHONE ENCOUNTER
Alex Mckeon called requesting a refill on the following medications:  Requested Prescriptions     Pending Prescriptions Disp Refills    levETIRAcetam (KEPPRA) 750 MG tablet 90 tablet 1     Sig: Take 1 tablet by mouth at bedtime       Date of last visit: 7/10/2024- Dr. Olmos  Date of next visit (if applicable):11/11/2024- Paige Leopold, CNP  Date of last refill: 7/11/24  Pharmacy Name: [facility]

## 2024-10-29 RX ORDER — LEVETIRACETAM 750 MG/1
750 TABLET ORAL NIGHTLY
Qty: 30 TABLET | Refills: 0 | OUTPATIENT
Start: 2024-10-29

## 2024-10-29 RX ORDER — LEVETIRACETAM 750 MG/1
750 TABLET ORAL NIGHTLY
Qty: 90 TABLET | Refills: 1 | Status: SHIPPED | OUTPATIENT
Start: 2024-10-29

## 2024-11-04 RX ORDER — LOSARTAN POTASSIUM 25 MG/1
25 TABLET ORAL DAILY
Qty: 90 TABLET | Refills: 3 | Status: SHIPPED | OUTPATIENT
Start: 2024-11-04

## 2024-11-04 NOTE — TELEPHONE ENCOUNTER
Wife calls to request refill of losartan 25 mg qd.  States that Annie was agreeable to managing this medication.  Last seen 8/13/24, next appt 2/13/25.  Med verified.  Order pended.  WCP

## 2024-11-11 ENCOUNTER — OFFICE VISIT (OUTPATIENT)
Dept: NEUROLOGY | Age: 74
End: 2024-11-11
Payer: MEDICARE

## 2024-11-11 VITALS
HEART RATE: 95 BPM | OXYGEN SATURATION: 95 % | SYSTOLIC BLOOD PRESSURE: 118 MMHG | HEIGHT: 72 IN | WEIGHT: 246.6 LBS | DIASTOLIC BLOOD PRESSURE: 72 MMHG | BODY MASS INDEX: 33.4 KG/M2

## 2024-11-11 DIAGNOSIS — R56.9 SEIZURE (HCC): Primary | ICD-10-CM

## 2024-11-11 PROCEDURE — G8484 FLU IMMUNIZE NO ADMIN: HCPCS | Performed by: NURSE PRACTITIONER

## 2024-11-11 PROCEDURE — 99213 OFFICE O/P EST LOW 20 MIN: CPT | Performed by: NURSE PRACTITIONER

## 2024-11-11 PROCEDURE — G8427 DOCREV CUR MEDS BY ELIG CLIN: HCPCS | Performed by: NURSE PRACTITIONER

## 2024-11-11 PROCEDURE — 3078F DIAST BP <80 MM HG: CPT | Performed by: NURSE PRACTITIONER

## 2024-11-11 PROCEDURE — 3074F SYST BP LT 130 MM HG: CPT | Performed by: NURSE PRACTITIONER

## 2024-11-11 PROCEDURE — 3017F COLORECTAL CA SCREEN DOC REV: CPT | Performed by: NURSE PRACTITIONER

## 2024-11-11 PROCEDURE — G8417 CALC BMI ABV UP PARAM F/U: HCPCS | Performed by: NURSE PRACTITIONER

## 2024-11-11 PROCEDURE — 1123F ACP DISCUSS/DSCN MKR DOCD: CPT | Performed by: NURSE PRACTITIONER

## 2024-11-11 PROCEDURE — 1159F MED LIST DOCD IN RCRD: CPT | Performed by: NURSE PRACTITIONER

## 2024-11-11 PROCEDURE — 1036F TOBACCO NON-USER: CPT | Performed by: NURSE PRACTITIONER

## 2024-11-11 RX ORDER — LEVETIRACETAM 500 MG/1
500 TABLET ORAL EVERY MORNING
Qty: 90 TABLET | Refills: 3 | Status: SHIPPED | OUTPATIENT
Start: 2024-11-11

## 2024-11-11 RX ORDER — LEVETIRACETAM 750 MG/1
750 TABLET ORAL NIGHTLY
Qty: 90 TABLET | Refills: 3 | Status: SHIPPED | OUTPATIENT
Start: 2024-11-11

## 2024-11-11 NOTE — PROGRESS NOTES
head CT same day.    Findings:  There is no hydrocephalus.  There are mild nonspecific white matter changes.  No abnormal extra-axial collections are identified.  No masses are seen and there is no mass effect.  No acute infarct is identified.    Impression  Impression:  No acute intracranial abnormality is identified.    This document has been electronically signed by: Geronimo Leija MD on  05/07/2024 05:53 PM    No results found for this or any previous visit.    Results for orders placed during the hospital encounter of 07/20/24    CT Head W/O Contrast    Narrative  PROCEDURE: CT HEAD WO CONTRAST    CLINICAL INFORMATION: altered mental status.    COMPARISON: Head CT 5/7/2024. Brain MRI 5/7/2024.    TECHNIQUE: Noncontrast 5 mm axial images were obtained through the brain.  Sagittal and coronal reconstructions were obtained. .    All CT scans at this facility use dose modulation, iterative reconstruction,  and/or weight-based dosing when appropriate to reduce radiation dose to as low  as reasonably achievable.    FINDINGS:    There is stable mild global volume loss.    There is no hemorrhage. There are no intra-or extra-axial collections.  There is  no hydrocephalus, midline shift or mass effect.  The gray-white matter  differentiation is preserved.    There are vascular calcifications. There is some dependent mucosal thickening in  the right maxillary sinus. The remaining paranasal sinuses are normally aerated.  There is no suspicious calvarial abnormality.    Impression  1. Stable CT appearance the brain. No new abnormalities.  2. New mucosal thickening in the right maxillary sinus.          **This report has been created using voice recognition software. It may contain  minor errors which are inherent in voice recognition technology.**    Electronically signed by Dr. Ngozi Ortega         Assessment:     Diagnosis Orders   1. Seizure (HCC)  Levetiracetam Level           Follow up for seizure. He is doing well. He

## 2024-11-11 NOTE — PATIENT INSTRUCTIONS
Continue with Keppra 500 mg in the morning and 750 mg at night. Refills given  Keppra level to be done 12/2024  No driving, swimming, operating heavy machinery or compromising heights until event free for 6 months. Report any new events. Call if any questions.  Report any new events/seizure  Call with any new symptoms or concerns.   Follow up in 12 months.

## 2024-12-02 ENCOUNTER — HOSPITAL ENCOUNTER (OUTPATIENT)
Age: 74
Discharge: HOME | End: 2024-12-02
Payer: MEDICARE

## 2024-12-02 DIAGNOSIS — R56.9 SEIZURE (HCC): ICD-10-CM

## 2024-12-02 LAB — KEPPRA: 14 UG/ML

## 2024-12-02 PROCEDURE — 36415 COLL VENOUS BLD VENIPUNCTURE: CPT

## 2024-12-02 PROCEDURE — 80177 DRUG SCRN QUAN LEVETIRACETAM: CPT

## 2024-12-03 ENCOUNTER — TELEPHONE (OUTPATIENT)
Dept: NEUROLOGY | Age: 74
End: 2024-12-03

## 2024-12-03 NOTE — TELEPHONE ENCOUNTER
Patient informed and verbalized understanding. Patient reports he will get his lab work done at  [facility].

## 2024-12-03 NOTE — TELEPHONE ENCOUNTER
----- Message from Paige Leopold, APRN - CNP sent at 12/3/2024  7:51 AM EST -----  Please let patient know his Keppra level is on low end of normal=14. His previous level done 7/21/24=27.  He needs to take the keppra consistently, as prescribed and repeat Keppra level in 1 week  Paige Leopold, CNP

## 2024-12-10 ENCOUNTER — HOSPITAL ENCOUNTER (OUTPATIENT)
Age: 74
Discharge: HOME | End: 2024-12-10
Payer: MEDICARE

## 2024-12-10 DIAGNOSIS — R56.9 SEIZURE (HCC): ICD-10-CM

## 2024-12-10 LAB — KEPPRA: 11 UG/ML

## 2024-12-10 PROCEDURE — 36415 COLL VENOUS BLD VENIPUNCTURE: CPT

## 2024-12-10 PROCEDURE — 80177 DRUG SCRN QUAN LEVETIRACETAM: CPT

## 2024-12-11 ENCOUNTER — TELEPHONE (OUTPATIENT)
Dept: NEUROLOGY | Age: 74
End: 2024-12-11

## 2024-12-11 NOTE — TELEPHONE ENCOUNTER
----- Message from Paige Leopold, APRN - CNP sent at 12/11/2024  7:54 AM EST -----  Please let patient know his Keppra level is low=11. This is lower than his level 1 week ago 12/2/24=14. His level prior to these was=27 on 7/21/24  Please verify with the patient his dose and frequency of the keppra he is taking. Has he missed any doses.   He needs to take his medications consistently, as prescribed  Repeat Keppra level in 1 week, AFTER taking his medication consistently, as prescribed  Paige Leopold, CNP

## 2024-12-11 NOTE — TELEPHONE ENCOUNTER
Patient informed of results and verbalized that he takes Keppra 500 mg in the morning and Keppra 750 mg in the evening. Patient denies missing any doses. Patient reports he will get his lab work rechecked in 1 week.

## 2024-12-18 ENCOUNTER — HOSPITAL ENCOUNTER (OUTPATIENT)
Age: 74
Discharge: HOME | End: 2024-12-18
Payer: MEDICARE

## 2024-12-18 DIAGNOSIS — R56.9 SEIZURE (HCC): ICD-10-CM

## 2024-12-18 LAB — KEPPRA: 14 UG/ML

## 2024-12-18 PROCEDURE — 36415 COLL VENOUS BLD VENIPUNCTURE: CPT

## 2024-12-18 PROCEDURE — 80177 DRUG SCRN QUAN LEVETIRACETAM: CPT

## 2024-12-19 ENCOUNTER — TELEPHONE (OUTPATIENT)
Dept: NEUROLOGY | Age: 74
End: 2024-12-19

## 2024-12-19 DIAGNOSIS — R56.9 SEIZURE (HCC): Primary | ICD-10-CM

## 2024-12-19 NOTE — TELEPHONE ENCOUNTER
----- Message from Paige Leopold, APRN - CNP sent at 12/19/2024  7:54 AM EST -----  Please let patient know his Keppra level is on low end of normal=14  Please verify with the patient the dose and frequency of the keppra he is taking.  Has he missed any doses?  Paige Leopold, CNP

## 2024-12-19 NOTE — TELEPHONE ENCOUNTER
Patient/spouse notified of results. Patient verbalized taking Keppra 500 mg in the morning and Keppra 750 mg at night. Patient denies missing any doses.

## 2024-12-20 RX ORDER — LEVETIRACETAM 750 MG/1
750 TABLET ORAL 2 TIMES DAILY
Qty: 60 TABLET | Refills: 4 | Status: SHIPPED | OUTPATIENT
Start: 2024-12-20

## 2024-12-20 NOTE — TELEPHONE ENCOUNTER
Patient/spouse informed and and verbalized understanding. Patient will get lab work done at [facility]

## 2024-12-30 ENCOUNTER — HOSPITAL ENCOUNTER (OUTPATIENT)
Age: 74
Discharge: HOME | End: 2024-12-30
Payer: MEDICARE

## 2024-12-30 DIAGNOSIS — R56.9 SEIZURE (HCC): ICD-10-CM

## 2024-12-30 LAB — KEPPRA: 17 UG/ML

## 2024-12-30 PROCEDURE — 36415 COLL VENOUS BLD VENIPUNCTURE: CPT

## 2024-12-30 PROCEDURE — 80177 DRUG SCRN QUAN LEVETIRACETAM: CPT

## 2025-02-17 RX ORDER — LEVETIRACETAM 500 MG/1
500 TABLET ORAL EVERY MORNING
Qty: 90 TABLET | Refills: 0 | OUTPATIENT
Start: 2025-02-17

## 2025-02-17 RX ORDER — LEVETIRACETAM 750 MG/1
750 TABLET ORAL 2 TIMES DAILY
Qty: 180 TABLET | Refills: 0 | OUTPATIENT
Start: 2025-02-17

## 2025-04-21 RX ORDER — DILTIAZEM HYDROCHLORIDE 120 MG/1
120 CAPSULE, COATED, EXTENDED RELEASE ORAL NIGHTLY
Qty: 90 CAPSULE | Refills: 0 | OUTPATIENT
Start: 2025-04-21

## 2025-05-27 ENCOUNTER — HOSPITAL ENCOUNTER (OUTPATIENT)
Age: 75
Discharge: HOME | End: 2025-05-27
Payer: MEDICARE

## 2025-05-27 ENCOUNTER — HOSPITAL ENCOUNTER (OUTPATIENT)
Dept: GENERAL RADIOLOGY | Age: 75
Discharge: HOME | End: 2025-05-27
Payer: MEDICARE

## 2025-05-27 DIAGNOSIS — R06.02 SHORTNESS OF BREATH: ICD-10-CM

## 2025-05-27 LAB
ANION GAP SERPL CALC-SCNC: 13 MEQ/L (ref 8–16)
BASOPHILS ABSOLUTE: 0.1 THOU/MM3 (ref 0–0.1)
BASOPHILS NFR BLD AUTO: 0.8 %
BUN SERPL-MCNC: 17 MG/DL (ref 8–23)
CALCIUM SERPL-MCNC: 9.3 MG/DL (ref 8.8–10.2)
CEA SERPL-MCNC: 2.3 NG/ML (ref 0–3.8)
CHLORIDE SERPL-SCNC: 104 MEQ/L (ref 98–111)
CO2 SERPL-SCNC: 23 MEQ/L (ref 22–29)
CREAT SERPL-MCNC: 1.2 MG/DL (ref 0.7–1.2)
DEPRECATED RDW RBC AUTO: 44.2 FL (ref 35–45)
EOSINOPHIL NFR BLD AUTO: 2.4 %
EOSINOPHILS ABSOLUTE: 0.2 THOU/MM3 (ref 0–0.4)
ERYTHROCYTE [DISTWIDTH] IN BLOOD BY AUTOMATED COUNT: 14.5 % (ref 11.5–14.5)
GFR SERPL CREATININE-BSD FRML MDRD: 64 ML/MIN/1.73M2
GLUCOSE SERPL-MCNC: 93 MG/DL (ref 74–109)
HCT VFR BLD AUTO: 57.5 % (ref 42–52)
HGB BLD-MCNC: 18.8 GM/DL (ref 14–18)
IMM GRANULOCYTES # BLD AUTO: 0.02 THOU/MM3 (ref 0–0.07)
IMM GRANULOCYTES NFR BLD AUTO: 0.2 %
LYMPHOCYTES ABSOLUTE: 2.4 THOU/MM3 (ref 1–4.8)
LYMPHOCYTES NFR BLD AUTO: 25.5 %
MCH RBC QN AUTO: 28.8 PG (ref 26–33)
MCHC RBC AUTO-ENTMCNC: 32.7 GM/DL (ref 32.2–35.5)
MCV RBC AUTO: 88.2 FL (ref 80–94)
MONOCYTES ABSOLUTE: 0.7 THOU/MM3 (ref 0.4–1.3)
MONOCYTES NFR BLD AUTO: 7.6 %
NEUTROPHILS ABSOLUTE: 5.9 THOU/MM3 (ref 1.8–7.7)
NEUTROPHILS NFR BLD AUTO: 63.5 %
NRBC BLD AUTO-RTO: 0 /100 WBC
PATHOLOGIST REVIEW: ABNORMAL
PLATELET # BLD AUTO: 196 THOU/MM3 (ref 130–400)
PMV BLD AUTO: 11.2 FL (ref 9.4–12.4)
POTASSIUM SERPL-SCNC: 4.5 MEQ/L (ref 3.5–5.2)
PSA SERPL-MCNC: < 0.02 NG/ML (ref 0–1)
RBC # BLD AUTO: 6.52 MILL/MM3 (ref 4.7–6.1)
SCAN OF BLOOD SMEAR: NORMAL
SODIUM SERPL-SCNC: 140 MEQ/L (ref 135–145)
WBC # BLD AUTO: 9.3 THOU/MM3 (ref 4.8–10.8)

## 2025-05-27 PROCEDURE — 36415 COLL VENOUS BLD VENIPUNCTURE: CPT

## 2025-05-27 PROCEDURE — 82378 CARCINOEMBRYONIC ANTIGEN: CPT

## 2025-05-27 PROCEDURE — 84153 ASSAY OF PSA TOTAL: CPT

## 2025-05-27 PROCEDURE — 71046 X-RAY EXAM CHEST 2 VIEWS: CPT

## 2025-05-27 PROCEDURE — 80048 BASIC METABOLIC PNL TOTAL CA: CPT

## 2025-05-27 PROCEDURE — 85025 COMPLETE CBC W/AUTO DIFF WBC: CPT

## 2025-06-26 RX ORDER — LEVETIRACETAM 750 MG/1
750 TABLET ORAL 2 TIMES DAILY
Qty: 60 TABLET | Refills: 4 | Status: SHIPPED | OUTPATIENT
Start: 2025-06-26

## 2025-06-26 NOTE — TELEPHONE ENCOUNTER
We have received a refill request on the following medications:  Requested Prescriptions     Pending Prescriptions Disp Refills    levETIRAcetam (KEPPRA) 750 MG tablet 60 tablet 4     Sig: Take 1 tablet by mouth 2 times daily       Date last time medication prescribed: 12/20/24    Most recent labs completed:  Levetiracetam Level 12/30/24 =17    Last Visit Date:  11/11/2024 with Paige Leopold, CNP    Next Visit Date:    11/10/2025 with Tanisha Olmos MD    Please approve or deny.

## 2025-08-05 ENCOUNTER — TELEPHONE (OUTPATIENT)
Dept: NEUROLOGY | Age: 75
End: 2025-08-05

## 2025-08-28 ENCOUNTER — APPOINTMENT (OUTPATIENT)
Dept: GENERAL RADIOLOGY | Age: 75
DRG: 287 | End: 2025-08-28
Payer: MEDICARE

## 2025-08-28 ENCOUNTER — APPOINTMENT (OUTPATIENT)
Dept: INTERVENTIONAL RADIOLOGY/VASCULAR | Age: 75
DRG: 287 | End: 2025-08-28
Attending: NURSE PRACTITIONER
Payer: MEDICARE

## 2025-08-28 ENCOUNTER — APPOINTMENT (OUTPATIENT)
Age: 75
DRG: 287 | End: 2025-08-28
Attending: NURSE PRACTITIONER
Payer: MEDICARE

## 2025-08-28 ENCOUNTER — HOSPITAL ENCOUNTER (INPATIENT)
Age: 75
LOS: 3 days | Discharge: HOME | DRG: 287 | End: 2025-09-01
Attending: FAMILY MEDICINE | Admitting: NURSE PRACTITIONER
Payer: MEDICARE

## 2025-08-28 ENCOUNTER — APPOINTMENT (OUTPATIENT)
Dept: CT IMAGING | Age: 75
DRG: 287 | End: 2025-08-28
Payer: MEDICARE

## 2025-08-28 DIAGNOSIS — R60.0 EDEMA OF LEFT LOWER EXTREMITY: ICD-10-CM

## 2025-08-28 DIAGNOSIS — I50.23 ACUTE ON CHRONIC SYSTOLIC CONGESTIVE HEART FAILURE (HCC): Primary | ICD-10-CM

## 2025-08-28 DIAGNOSIS — R06.02 SHORTNESS OF BREATH: ICD-10-CM

## 2025-08-28 PROBLEM — R06.00 DYSPNEA: Status: ACTIVE | Noted: 2025-08-28

## 2025-08-28 PROBLEM — I48.21 PERMANENT ATRIAL FIBRILLATION (HCC): Status: ACTIVE | Noted: 2023-12-06

## 2025-08-28 PROBLEM — I50.33 DIASTOLIC CHF, ACUTE ON CHRONIC (HCC): Status: ACTIVE | Noted: 2025-08-28

## 2025-08-28 PROBLEM — I35.1 NONRHEUMATIC AORTIC VALVE INSUFFICIENCY: Status: ACTIVE | Noted: 2025-08-28

## 2025-08-28 LAB
ALBUMIN SERPL BCG-MCNC: 4.1 G/DL (ref 3.4–4.9)
ALP SERPL-CCNC: 84 U/L (ref 40–129)
ALT SERPL W/O P-5'-P-CCNC: 29 U/L (ref 10–50)
ANION GAP SERPL CALC-SCNC: 9 MEQ/L (ref 8–16)
AST SERPL-CCNC: 40 U/L (ref 10–50)
BASOPHILS ABSOLUTE: 0.1 THOU/MM3 (ref 0–0.1)
BASOPHILS NFR BLD AUTO: 0.8 %
BILIRUB SERPL-MCNC: 0.8 MG/DL (ref 0.3–1.2)
BUN SERPL-MCNC: 25 MG/DL (ref 8–23)
CALCIUM SERPL-MCNC: 9.9 MG/DL (ref 8.5–10.5)
CHLORIDE SERPL-SCNC: 107 MEQ/L (ref 98–111)
CO2 SERPL-SCNC: 24 MEQ/L (ref 22–29)
CREAT SERPL-MCNC: 1.3 MG/DL (ref 0.7–1.2)
D DIMER PPP IA.FEU-MCNC: 414 NG/ML FEU (ref 0–500)
DEPRECATED RDW RBC AUTO: 43.4 FL (ref 35–45)
EKG Q-T INTERVAL: 406 MS
EKG QRS DURATION: 140 MS
EKG QTC CALCULATION (BAZETT): 523 MS
EKG R AXIS: 160 DEGREES
EKG T AXIS: 25 DEGREES
EKG VENTRICULAR RATE: 100 BPM
EOSINOPHIL NFR BLD AUTO: 1.7 %
EOSINOPHILS ABSOLUTE: 0.1 THOU/MM3 (ref 0–0.4)
ERYTHROCYTE [DISTWIDTH] IN BLOOD BY AUTOMATED COUNT: 14.5 % (ref 11.5–14.5)
GFR SERPL CREATININE-BSD FRML MDRD: 58 ML/MIN/1.73M2
GLUCOSE BLD STRIP.AUTO-MCNC: 90 MG/DL (ref 70–108)
GLUCOSE SERPL-MCNC: 95 MG/DL (ref 74–109)
HCT VFR BLD AUTO: 57.2 % (ref 42–52)
HGB BLD-MCNC: 19 GM/DL (ref 14–18)
IMM GRANULOCYTES # BLD AUTO: 0.02 THOU/MM3 (ref 0–0.07)
IMM GRANULOCYTES NFR BLD AUTO: 0.2 %
KEPPRA: 22 UG/ML
LYMPHOCYTES ABSOLUTE: 2.1 THOU/MM3 (ref 1–4.8)
LYMPHOCYTES NFR BLD AUTO: 24.8 %
MAGNESIUM SERPL-MCNC: 2 MG/DL (ref 1.6–2.6)
MAGNESIUM SERPL-MCNC: 2.2 MG/DL (ref 1.6–2.6)
MCH RBC QN AUTO: 29.1 PG (ref 26–33)
MCHC RBC AUTO-ENTMCNC: 33.2 GM/DL (ref 32.2–35.5)
MCV RBC AUTO: 87.5 FL (ref 80–94)
MONOCYTES ABSOLUTE: 0.9 THOU/MM3 (ref 0.4–1.3)
MONOCYTES NFR BLD AUTO: 10.2 %
NEUTROPHILS ABSOLUTE: 5.4 THOU/MM3 (ref 1.8–7.7)
NEUTROPHILS NFR BLD AUTO: 62.3 %
NRBC BLD AUTO-RTO: 0 /100 WBC
NT-PROBNP SERPL IA-MCNC: 1964 PG/ML (ref 0–124)
OSMOLALITY SERPL CALC.SUM OF ELEC: 283.6 MOSMOL/KG (ref 275–300)
PATHOLOGIST REVIEW: ABNORMAL
PHOSPHATE SERPL-MCNC: 3.2 MG/DL (ref 2.5–4.5)
PLATELET # BLD AUTO: 225 THOU/MM3 (ref 130–400)
PMV BLD AUTO: 10.9 FL (ref 9.4–12.4)
POTASSIUM SERPL-SCNC: 4.2 MEQ/L (ref 3.5–5.2)
POTASSIUM SERPL-SCNC: 4.8 MEQ/L (ref 3.5–5.2)
PROT SERPL-MCNC: 7.2 G/DL (ref 6.4–8.3)
RBC # BLD AUTO: 6.54 MILL/MM3 (ref 4.7–6.1)
REASON FOR REJECTION: NORMAL
REJECTED TEST: NORMAL
SODIUM SERPL-SCNC: 140 MEQ/L (ref 135–145)
TROPONIN, HIGH SENSITIVITY: 24 NG/L (ref 0–12)
WBC # BLD AUTO: 8.6 THOU/MM3 (ref 4.8–10.8)

## 2025-08-28 PROCEDURE — G0378 HOSPITAL OBSERVATION PER HR: HCPCS

## 2025-08-28 PROCEDURE — 6360000002 HC RX W HCPCS: Performed by: NURSE PRACTITIONER

## 2025-08-28 PROCEDURE — 84100 ASSAY OF PHOSPHORUS: CPT

## 2025-08-28 PROCEDURE — 6370000000 HC RX 637 (ALT 250 FOR IP): Performed by: NURSE PRACTITIONER

## 2025-08-28 PROCEDURE — 85025 COMPLETE CBC W/AUTO DIFF WBC: CPT

## 2025-08-28 PROCEDURE — 84484 ASSAY OF TROPONIN QUANT: CPT

## 2025-08-28 PROCEDURE — 85379 FIBRIN DEGRADATION QUANT: CPT

## 2025-08-28 PROCEDURE — 36415 COLL VENOUS BLD VENIPUNCTURE: CPT

## 2025-08-28 PROCEDURE — 99223 1ST HOSP IP/OBS HIGH 75: CPT | Performed by: NURSE PRACTITIONER

## 2025-08-28 PROCEDURE — 6360000002 HC RX W HCPCS

## 2025-08-28 PROCEDURE — 99285 EMERGENCY DEPT VISIT HI MDM: CPT

## 2025-08-28 PROCEDURE — 96374 THER/PROPH/DIAG INJ IV PUSH: CPT

## 2025-08-28 PROCEDURE — 80053 COMPREHEN METABOLIC PANEL: CPT

## 2025-08-28 PROCEDURE — C8929 TTE W OR WO FOL WCON,DOPPLER: HCPCS

## 2025-08-28 PROCEDURE — 83880 ASSAY OF NATRIURETIC PEPTIDE: CPT

## 2025-08-28 PROCEDURE — 93005 ELECTROCARDIOGRAM TRACING: CPT

## 2025-08-28 PROCEDURE — 70450 CT HEAD/BRAIN W/O DYE: CPT

## 2025-08-28 PROCEDURE — 71045 X-RAY EXAM CHEST 1 VIEW: CPT

## 2025-08-28 PROCEDURE — 93010 ELECTROCARDIOGRAM REPORT: CPT | Performed by: INTERNAL MEDICINE

## 2025-08-28 PROCEDURE — 80177 DRUG SCRN QUAN LEVETIRACETAM: CPT

## 2025-08-28 PROCEDURE — 83735 ASSAY OF MAGNESIUM: CPT

## 2025-08-28 PROCEDURE — 2500000003 HC RX 250 WO HCPCS: Performed by: NURSE PRACTITIONER

## 2025-08-28 PROCEDURE — 82948 REAGENT STRIP/BLOOD GLUCOSE: CPT

## 2025-08-28 PROCEDURE — 84132 ASSAY OF SERUM POTASSIUM: CPT

## 2025-08-28 PROCEDURE — 93971 EXTREMITY STUDY: CPT

## 2025-08-28 PROCEDURE — 6360000004 HC RX CONTRAST MEDICATION: Performed by: NURSE PRACTITIONER

## 2025-08-28 RX ORDER — FUROSEMIDE 10 MG/ML
20 INJECTION INTRAMUSCULAR; INTRAVENOUS ONCE
Status: COMPLETED | OUTPATIENT
Start: 2025-08-28 | End: 2025-08-28

## 2025-08-28 RX ORDER — POTASSIUM CHLORIDE 1500 MG/1
40 TABLET, EXTENDED RELEASE ORAL PRN
Status: DISCONTINUED | OUTPATIENT
Start: 2025-08-28 | End: 2025-09-01 | Stop reason: HOSPADM

## 2025-08-28 RX ORDER — FUROSEMIDE 10 MG/ML
20 INJECTION INTRAMUSCULAR; INTRAVENOUS 2 TIMES DAILY
Status: COMPLETED | OUTPATIENT
Start: 2025-08-28 | End: 2025-08-29

## 2025-08-28 RX ORDER — BISACODYL 5 MG/1
5 TABLET, DELAYED RELEASE ORAL NIGHTLY
Status: DISCONTINUED | OUTPATIENT
Start: 2025-08-28 | End: 2025-09-01 | Stop reason: HOSPADM

## 2025-08-28 RX ORDER — ONDANSETRON 4 MG/1
4 TABLET, ORALLY DISINTEGRATING ORAL EVERY 8 HOURS PRN
Status: DISCONTINUED | OUTPATIENT
Start: 2025-08-28 | End: 2025-09-01 | Stop reason: HOSPADM

## 2025-08-28 RX ORDER — DILTIAZEM HYDROCHLORIDE 120 MG/1
120 CAPSULE, COATED, EXTENDED RELEASE ORAL NIGHTLY
Status: DISCONTINUED | OUTPATIENT
Start: 2025-08-28 | End: 2025-09-01 | Stop reason: HOSPADM

## 2025-08-28 RX ORDER — ACETAMINOPHEN 325 MG/1
650 TABLET ORAL EVERY 6 HOURS PRN
Status: DISCONTINUED | OUTPATIENT
Start: 2025-08-28 | End: 2025-08-31 | Stop reason: SDUPTHER

## 2025-08-28 RX ORDER — ACETAMINOPHEN 650 MG/1
650 SUPPOSITORY RECTAL EVERY 6 HOURS PRN
Status: DISCONTINUED | OUTPATIENT
Start: 2025-08-28 | End: 2025-09-01 | Stop reason: HOSPADM

## 2025-08-28 RX ORDER — MAGNESIUM SULFATE IN WATER 40 MG/ML
2000 INJECTION, SOLUTION INTRAVENOUS PRN
Status: DISCONTINUED | OUTPATIENT
Start: 2025-08-28 | End: 2025-09-01 | Stop reason: HOSPADM

## 2025-08-28 RX ORDER — ENOXAPARIN SODIUM 100 MG/ML
30 INJECTION SUBCUTANEOUS 2 TIMES DAILY
Status: DISCONTINUED | OUTPATIENT
Start: 2025-08-28 | End: 2025-09-01 | Stop reason: HOSPADM

## 2025-08-28 RX ORDER — SODIUM CHLORIDE 9 MG/ML
INJECTION, SOLUTION INTRAVENOUS PRN
Status: DISCONTINUED | OUTPATIENT
Start: 2025-08-28 | End: 2025-09-01 | Stop reason: HOSPADM

## 2025-08-28 RX ORDER — ASPIRIN 81 MG/1
81 TABLET, CHEWABLE ORAL DAILY
Status: DISCONTINUED | OUTPATIENT
Start: 2025-08-28 | End: 2025-09-01 | Stop reason: HOSPADM

## 2025-08-28 RX ORDER — ONDANSETRON 2 MG/ML
4 INJECTION INTRAMUSCULAR; INTRAVENOUS EVERY 6 HOURS PRN
Status: DISCONTINUED | OUTPATIENT
Start: 2025-08-28 | End: 2025-09-01 | Stop reason: HOSPADM

## 2025-08-28 RX ORDER — SODIUM CHLORIDE 0.9 % (FLUSH) 0.9 %
5-40 SYRINGE (ML) INJECTION PRN
Status: DISCONTINUED | OUTPATIENT
Start: 2025-08-28 | End: 2025-09-01 | Stop reason: HOSPADM

## 2025-08-28 RX ORDER — METOPROLOL TARTRATE 50 MG
50 TABLET ORAL 2 TIMES DAILY
Status: DISCONTINUED | OUTPATIENT
Start: 2025-08-28 | End: 2025-08-30

## 2025-08-28 RX ORDER — LOSARTAN POTASSIUM 25 MG/1
25 TABLET ORAL DAILY
Status: DISCONTINUED | OUTPATIENT
Start: 2025-08-28 | End: 2025-08-30

## 2025-08-28 RX ORDER — SODIUM CHLORIDE 0.9 % (FLUSH) 0.9 %
5-40 SYRINGE (ML) INJECTION EVERY 12 HOURS SCHEDULED
Status: DISCONTINUED | OUTPATIENT
Start: 2025-08-28 | End: 2025-09-01 | Stop reason: HOSPADM

## 2025-08-28 RX ORDER — POLYETHYLENE GLYCOL 3350 17 G/17G
17 POWDER, FOR SOLUTION ORAL DAILY PRN
Status: DISCONTINUED | OUTPATIENT
Start: 2025-08-28 | End: 2025-09-01 | Stop reason: HOSPADM

## 2025-08-28 RX ORDER — ROSUVASTATIN CALCIUM 20 MG/1
20 TABLET, COATED ORAL DAILY
Status: DISCONTINUED | OUTPATIENT
Start: 2025-08-28 | End: 2025-09-01 | Stop reason: HOSPADM

## 2025-08-28 RX ORDER — POTASSIUM CHLORIDE 7.45 MG/ML
10 INJECTION INTRAVENOUS PRN
Status: DISCONTINUED | OUTPATIENT
Start: 2025-08-28 | End: 2025-09-01 | Stop reason: HOSPADM

## 2025-08-28 RX ADMIN — FUROSEMIDE 20 MG: 10 INJECTION, SOLUTION INTRAMUSCULAR; INTRAVENOUS at 18:35

## 2025-08-28 RX ADMIN — LEVETIRACETAM 750 MG: 500 TABLET, FILM COATED ORAL at 21:00

## 2025-08-28 RX ADMIN — METOPROLOL TARTRATE 50 MG: 50 TABLET, FILM COATED ORAL at 21:00

## 2025-08-28 RX ADMIN — FUROSEMIDE 20 MG: 10 INJECTION, SOLUTION INTRAMUSCULAR; INTRAVENOUS at 12:38

## 2025-08-28 RX ADMIN — SODIUM CHLORIDE, PRESERVATIVE FREE 10 ML: 5 INJECTION INTRAVENOUS at 21:01

## 2025-08-28 RX ADMIN — BISACODYL 5 MG: 5 TABLET, COATED ORAL at 21:00

## 2025-08-28 RX ADMIN — ENOXAPARIN SODIUM 30 MG: 100 INJECTION SUBCUTANEOUS at 21:01

## 2025-08-28 RX ADMIN — SULFUR HEXAFLUORIDE 2 ML: KIT at 16:02

## 2025-08-28 RX ADMIN — DILTIAZEM HYDROCHLORIDE 120 MG: 120 CAPSULE, EXTENDED RELEASE ORAL at 21:00

## 2025-08-28 ASSESSMENT — PAIN SCALES - GENERAL
PAINLEVEL_OUTOF10: 0

## 2025-08-28 ASSESSMENT — PAIN - FUNCTIONAL ASSESSMENT: PAIN_FUNCTIONAL_ASSESSMENT: 0-10

## 2025-08-29 PROBLEM — I50.9 ACUTE EXACERBATION OF CHRONIC HEART FAILURE (HCC): Status: ACTIVE | Noted: 2025-08-29

## 2025-08-29 LAB
ANION GAP SERPL CALC-SCNC: 13 MEQ/L (ref 8–16)
BASOPHILS ABSOLUTE: 0.1 THOU/MM3 (ref 0–0.1)
BASOPHILS NFR BLD AUTO: 0.9 %
BUN SERPL-MCNC: 26 MG/DL (ref 8–23)
CALCIUM SERPL-MCNC: 9.6 MG/DL (ref 8.5–10.5)
CHLORIDE SERPL-SCNC: 106 MEQ/L (ref 98–111)
CO2 SERPL-SCNC: 22 MEQ/L (ref 22–29)
CREAT SERPL-MCNC: 1.5 MG/DL (ref 0.7–1.2)
DEPRECATED RDW RBC AUTO: 43.9 FL (ref 35–45)
ECHO AO ASC DIAM: 3.5 CM
ECHO AO ASCENDING AORTA INDEX: 1.49 CM/M2
ECHO AR MAX VEL PISA: 4.5 M/S
ECHO AV CUSP MM: 1.9 CM
ECHO AV PEAK GRADIENT: 7 MMHG
ECHO AV PEAK VELOCITY: 1.4 M/S
ECHO AV REGURGITANT PHT: 890 MS
ECHO AV VELOCITY RATIO: 0.36
ECHO BSA: 2.4 M2
ECHO EST RA PRESSURE: 3 MMHG
ECHO LA AREA 2C: 22.7 CM2
ECHO LA AREA 4C: 22.7 CM2
ECHO LA DIAMETER INDEX: 2.09 CM/M2
ECHO LA DIAMETER: 4.9 CM
ECHO LA MAJOR AXIS: 6.8 CM
ECHO LA MINOR AXIS: 6.1 CM
ECHO LA VOL BP: 70 ML (ref 18–58)
ECHO LA VOL MOD A2C: 71 ML (ref 18–58)
ECHO LA VOL MOD A4C: 63 ML (ref 18–58)
ECHO LA VOL/BSA BIPLANE: 30 ML/M2 (ref 16–34)
ECHO LA VOLUME INDEX MOD A2C: 30 ML/M2 (ref 16–34)
ECHO LA VOLUME INDEX MOD A4C: 27 ML/M2 (ref 16–34)
ECHO LV EDV A2C: 132 ML
ECHO LV EDV A4C: 154 ML
ECHO LV EDV INDEX A4C: 66 ML/M2
ECHO LV EDV NDEX A2C: 56 ML/M2
ECHO LV EF PHYSICIAN: 25 %
ECHO LV EJECTION FRACTION A2C: 30 %
ECHO LV EJECTION FRACTION A4C: 30 %
ECHO LV EJECTION FRACTION BIPLANE: 29 % (ref 55–100)
ECHO LV ESV A2C: 93 ML
ECHO LV ESV A4C: 109 ML
ECHO LV ESV INDEX A2C: 40 ML/M2
ECHO LV ESV INDEX A4C: 46 ML/M2
ECHO LV FRACTIONAL SHORTENING: 13 % (ref 28–44)
ECHO LV INTERNAL DIMENSION DIASTOLE INDEX: 2.26 CM/M2
ECHO LV INTERNAL DIMENSION DIASTOLIC: 5.3 CM (ref 4.2–5.9)
ECHO LV INTERNAL DIMENSION SYSTOLIC INDEX: 1.96 CM/M2
ECHO LV INTERNAL DIMENSION SYSTOLIC: 4.6 CM
ECHO LV ISOVOLUMETRIC RELAXATION TIME (IVRT): 88 MS
ECHO LV IVSD: 1.1 CM (ref 0.6–1)
ECHO LV MASS 2D: 256.6 G (ref 88–224)
ECHO LV MASS INDEX 2D: 109.2 G/M2 (ref 49–115)
ECHO LV POSTERIOR WALL DIASTOLIC: 1.3 CM (ref 0.6–1)
ECHO LV RELATIVE WALL THICKNESS RATIO: 0.49
ECHO LVOT PEAK GRADIENT: 1 MMHG
ECHO LVOT PEAK VELOCITY: 0.5 M/S
ECHO MV E DECELERATION TIME (DT): 199 MS
ECHO MV E VELOCITY: 0.91 M/S
ECHO MV REGURGITANT PEAK GRADIENT: 61 MMHG
ECHO MV REGURGITANT PEAK VELOCITY: 3.9 M/S
ECHO PV MAX VELOCITY: 0.6 M/S
ECHO PV PEAK GRADIENT: 1 MMHG
ECHO RV INTERNAL DIMENSION: 2.7 CM
ECHO RV TAPSE: 1.7 CM (ref 1.7–?)
ECHO TV E WAVE: 0.5 M/S
EOSINOPHIL NFR BLD AUTO: 2.1 %
EOSINOPHILS ABSOLUTE: 0.2 THOU/MM3 (ref 0–0.4)
ERYTHROCYTE [DISTWIDTH] IN BLOOD BY AUTOMATED COUNT: 14.1 % (ref 11.5–14.5)
GFR SERPL CREATININE-BSD FRML MDRD: 49 ML/MIN/1.73M2
GLUCOSE SERPL-MCNC: 92 MG/DL (ref 74–109)
HCT VFR BLD AUTO: 55.6 % (ref 42–52)
HGB BLD-MCNC: 18.5 GM/DL (ref 14–18)
IMM GRANULOCYTES # BLD AUTO: 0.01 THOU/MM3 (ref 0–0.07)
IMM GRANULOCYTES NFR BLD AUTO: 0.1 %
LYMPHOCYTES ABSOLUTE: 2.1 THOU/MM3 (ref 1–4.8)
LYMPHOCYTES NFR BLD AUTO: 27.6 %
MCH RBC QN AUTO: 29.1 PG (ref 26–33)
MCHC RBC AUTO-ENTMCNC: 33.3 GM/DL (ref 32.2–35.5)
MCV RBC AUTO: 87.6 FL (ref 80–94)
MONOCYTES ABSOLUTE: 0.7 THOU/MM3 (ref 0.4–1.3)
MONOCYTES NFR BLD AUTO: 9.7 %
NEUTROPHILS ABSOLUTE: 4.5 THOU/MM3 (ref 1.8–7.7)
NEUTROPHILS NFR BLD AUTO: 59.6 %
NRBC BLD AUTO-RTO: 0 /100 WBC
PLATELET # BLD AUTO: 198 THOU/MM3 (ref 130–400)
PMV BLD AUTO: 10.6 FL (ref 9.4–12.4)
POTASSIUM SERPL-SCNC: 4.2 MEQ/L (ref 3.5–5.2)
RBC # BLD AUTO: 6.35 MILL/MM3 (ref 4.7–6.1)
SODIUM SERPL-SCNC: 141 MEQ/L (ref 135–145)
WBC # BLD AUTO: 7.6 THOU/MM3 (ref 4.8–10.8)

## 2025-08-29 PROCEDURE — 80048 BASIC METABOLIC PNL TOTAL CA: CPT

## 2025-08-29 PROCEDURE — 6370000000 HC RX 637 (ALT 250 FOR IP): Performed by: NURSE PRACTITIONER

## 2025-08-29 PROCEDURE — 36415 COLL VENOUS BLD VENIPUNCTURE: CPT

## 2025-08-29 PROCEDURE — 99223 1ST HOSP IP/OBS HIGH 75: CPT | Performed by: INTERNAL MEDICINE

## 2025-08-29 PROCEDURE — 6360000002 HC RX W HCPCS: Performed by: NURSE PRACTITIONER

## 2025-08-29 PROCEDURE — 6360000002 HC RX W HCPCS: Performed by: INTERNAL MEDICINE

## 2025-08-29 PROCEDURE — 85025 COMPLETE CBC W/AUTO DIFF WBC: CPT

## 2025-08-29 PROCEDURE — 2500000003 HC RX 250 WO HCPCS: Performed by: NURSE PRACTITIONER

## 2025-08-29 PROCEDURE — 99233 SBSQ HOSP IP/OBS HIGH 50: CPT | Performed by: NURSE PRACTITIONER

## 2025-08-29 PROCEDURE — 1200000003 HC TELEMETRY R&B

## 2025-08-29 PROCEDURE — 93306 TTE W/DOPPLER COMPLETE: CPT | Performed by: INTERNAL MEDICINE

## 2025-08-29 RX ORDER — BUMETANIDE 0.25 MG/ML
1 INJECTION, SOLUTION INTRAMUSCULAR; INTRAVENOUS 2 TIMES DAILY
Status: DISCONTINUED | OUTPATIENT
Start: 2025-08-29 | End: 2025-09-01

## 2025-08-29 RX ADMIN — BISACODYL 5 MG: 5 TABLET, COATED ORAL at 21:16

## 2025-08-29 RX ADMIN — METOPROLOL TARTRATE 50 MG: 50 TABLET, FILM COATED ORAL at 08:20

## 2025-08-29 RX ADMIN — LEVETIRACETAM 750 MG: 500 TABLET, FILM COATED ORAL at 21:16

## 2025-08-29 RX ADMIN — BUMETANIDE 1 MG: 0.25 INJECTION INTRAMUSCULAR; INTRAVENOUS at 09:16

## 2025-08-29 RX ADMIN — DILTIAZEM HYDROCHLORIDE 120 MG: 120 CAPSULE, EXTENDED RELEASE ORAL at 21:16

## 2025-08-29 RX ADMIN — BUMETANIDE 1 MG: 0.25 INJECTION INTRAMUSCULAR; INTRAVENOUS at 18:31

## 2025-08-29 RX ADMIN — ENOXAPARIN SODIUM 30 MG: 100 INJECTION SUBCUTANEOUS at 21:17

## 2025-08-29 RX ADMIN — SODIUM CHLORIDE, PRESERVATIVE FREE 10 ML: 5 INJECTION INTRAVENOUS at 08:22

## 2025-08-29 RX ADMIN — ENOXAPARIN SODIUM 30 MG: 100 INJECTION SUBCUTANEOUS at 08:18

## 2025-08-29 RX ADMIN — SODIUM CHLORIDE, PRESERVATIVE FREE 10 ML: 5 INJECTION INTRAVENOUS at 21:17

## 2025-08-29 RX ADMIN — LOSARTAN POTASSIUM 25 MG: 25 TABLET, FILM COATED ORAL at 08:20

## 2025-08-29 RX ADMIN — LEVETIRACETAM 750 MG: 500 TABLET, FILM COATED ORAL at 08:19

## 2025-08-29 RX ADMIN — ASPIRIN 81 MG: 81 TABLET, CHEWABLE ORAL at 08:19

## 2025-08-29 RX ADMIN — FUROSEMIDE 20 MG: 10 INJECTION, SOLUTION INTRAMUSCULAR; INTRAVENOUS at 08:19

## 2025-08-29 RX ADMIN — METOPROLOL TARTRATE 50 MG: 50 TABLET, FILM COATED ORAL at 21:16

## 2025-08-29 RX ADMIN — ROSUVASTATIN CALCIUM 20 MG: 20 TABLET, FILM COATED ORAL at 08:19

## 2025-08-29 ASSESSMENT — PAIN SCALES - GENERAL
PAINLEVEL_OUTOF10: 0

## 2025-08-30 PROBLEM — I13.10 CARDIORENAL SYNDROME: Status: ACTIVE | Noted: 2025-08-30

## 2025-08-30 LAB
ANION GAP SERPL CALC-SCNC: 10 MEQ/L (ref 8–16)
BUN SERPL-MCNC: 35 MG/DL (ref 8–23)
CALCIUM SERPL-MCNC: 9.6 MG/DL (ref 8.5–10.5)
CHLORIDE SERPL-SCNC: 103 MEQ/L (ref 98–111)
CO2 SERPL-SCNC: 27 MEQ/L (ref 22–29)
CREAT SERPL-MCNC: 1.5 MG/DL (ref 0.7–1.2)
DEPRECATED RDW RBC AUTO: 44.3 FL (ref 35–45)
ERYTHROCYTE [DISTWIDTH] IN BLOOD BY AUTOMATED COUNT: 14.1 % (ref 11.5–14.5)
GFR SERPL CREATININE-BSD FRML MDRD: 49 ML/MIN/1.73M2
GLUCOSE SERPL-MCNC: 109 MG/DL (ref 74–109)
HCT VFR BLD AUTO: 58.1 % (ref 42–52)
HGB BLD-MCNC: 19 GM/DL (ref 14–18)
MCH RBC QN AUTO: 29.1 PG (ref 26–33)
MCHC RBC AUTO-ENTMCNC: 32.7 GM/DL (ref 32.2–35.5)
MCV RBC AUTO: 89.1 FL (ref 80–94)
PLATELET # BLD AUTO: 212 THOU/MM3 (ref 130–400)
PMV BLD AUTO: 10.7 FL (ref 9.4–12.4)
POTASSIUM SERPL-SCNC: 4.4 MEQ/L (ref 3.5–5.2)
RBC # BLD AUTO: 6.52 MILL/MM3 (ref 4.7–6.1)
SODIUM SERPL-SCNC: 140 MEQ/L (ref 135–145)
WBC # BLD AUTO: 7.7 THOU/MM3 (ref 4.8–10.8)

## 2025-08-30 PROCEDURE — 2580000003 HC RX 258: Performed by: INTERNAL MEDICINE

## 2025-08-30 PROCEDURE — 6360000002 HC RX W HCPCS: Performed by: INTERNAL MEDICINE

## 2025-08-30 PROCEDURE — 6370000000 HC RX 637 (ALT 250 FOR IP): Performed by: STUDENT IN AN ORGANIZED HEALTH CARE EDUCATION/TRAINING PROGRAM

## 2025-08-30 PROCEDURE — 6360000002 HC RX W HCPCS: Performed by: NURSE PRACTITIONER

## 2025-08-30 PROCEDURE — 85027 COMPLETE CBC AUTOMATED: CPT

## 2025-08-30 PROCEDURE — 6370000000 HC RX 637 (ALT 250 FOR IP): Performed by: NURSE PRACTITIONER

## 2025-08-30 PROCEDURE — 2500000003 HC RX 250 WO HCPCS: Performed by: NURSE PRACTITIONER

## 2025-08-30 PROCEDURE — 99233 SBSQ HOSP IP/OBS HIGH 50: CPT | Performed by: NURSE PRACTITIONER

## 2025-08-30 PROCEDURE — 99232 SBSQ HOSP IP/OBS MODERATE 35: CPT | Performed by: STUDENT IN AN ORGANIZED HEALTH CARE EDUCATION/TRAINING PROGRAM

## 2025-08-30 PROCEDURE — 83880 ASSAY OF NATRIURETIC PEPTIDE: CPT

## 2025-08-30 PROCEDURE — 80048 BASIC METABOLIC PNL TOTAL CA: CPT

## 2025-08-30 PROCEDURE — 36415 COLL VENOUS BLD VENIPUNCTURE: CPT

## 2025-08-30 PROCEDURE — 1200000003 HC TELEMETRY R&B

## 2025-08-30 PROCEDURE — 99222 1ST HOSP IP/OBS MODERATE 55: CPT | Performed by: INTERNAL MEDICINE

## 2025-08-30 RX ORDER — METOPROLOL SUCCINATE 50 MG/1
50 TABLET, EXTENDED RELEASE ORAL 2 TIMES DAILY
Status: DISCONTINUED | OUTPATIENT
Start: 2025-08-30 | End: 2025-09-01 | Stop reason: HOSPADM

## 2025-08-30 RX ORDER — SODIUM CHLORIDE 9 MG/ML
INJECTION, SOLUTION INTRAVENOUS CONTINUOUS
Status: DISCONTINUED | OUTPATIENT
Start: 2025-08-30 | End: 2025-08-31 | Stop reason: SDUPTHER

## 2025-08-30 RX ORDER — SACUBITRIL AND VALSARTAN 24; 26 MG/1; MG/1
1 TABLET ORAL 2 TIMES DAILY
Status: DISCONTINUED | OUTPATIENT
Start: 2025-08-30 | End: 2025-09-01

## 2025-08-30 RX ADMIN — SODIUM CHLORIDE, PRESERVATIVE FREE 10 ML: 5 INJECTION INTRAVENOUS at 20:27

## 2025-08-30 RX ADMIN — METOPROLOL SUCCINATE 50 MG: 50 TABLET, EXTENDED RELEASE ORAL at 08:30

## 2025-08-30 RX ADMIN — ASPIRIN 81 MG: 81 TABLET, CHEWABLE ORAL at 07:44

## 2025-08-30 RX ADMIN — SODIUM CHLORIDE, PRESERVATIVE FREE 10 ML: 5 INJECTION INTRAVENOUS at 07:44

## 2025-08-30 RX ADMIN — LEVETIRACETAM 750 MG: 500 TABLET, FILM COATED ORAL at 21:27

## 2025-08-30 RX ADMIN — LEVETIRACETAM 750 MG: 500 TABLET, FILM COATED ORAL at 07:44

## 2025-08-30 RX ADMIN — METOPROLOL SUCCINATE 50 MG: 50 TABLET, EXTENDED RELEASE ORAL at 20:23

## 2025-08-30 RX ADMIN — BUMETANIDE 1 MG: 0.25 INJECTION INTRAMUSCULAR; INTRAVENOUS at 07:45

## 2025-08-30 RX ADMIN — ENOXAPARIN SODIUM 30 MG: 100 INJECTION SUBCUTANEOUS at 20:23

## 2025-08-30 RX ADMIN — SODIUM CHLORIDE: 0.9 INJECTION, SOLUTION INTRAVENOUS at 23:32

## 2025-08-30 RX ADMIN — SACUBITRIL AND VALSARTAN 1 TABLET: 24; 26 TABLET, FILM COATED ORAL at 08:30

## 2025-08-30 RX ADMIN — BISACODYL 5 MG: 5 TABLET, COATED ORAL at 21:27

## 2025-08-30 RX ADMIN — DILTIAZEM HYDROCHLORIDE 120 MG: 120 CAPSULE, EXTENDED RELEASE ORAL at 21:27

## 2025-08-30 RX ADMIN — ENOXAPARIN SODIUM 30 MG: 100 INJECTION SUBCUTANEOUS at 07:45

## 2025-08-30 RX ADMIN — ROSUVASTATIN CALCIUM 20 MG: 20 TABLET, FILM COATED ORAL at 07:43

## 2025-08-30 ASSESSMENT — PAIN SCALES - GENERAL
PAINLEVEL_OUTOF10: 0

## 2025-08-31 LAB
ABO GROUP BLD: NORMAL
ANION GAP SERPL CALC-SCNC: 12 MEQ/L (ref 8–16)
APTT PPP: 31.7 SECONDS (ref 22–38)
BASOPHILS ABSOLUTE: 0.1 THOU/MM3 (ref 0–0.1)
BASOPHILS NFR BLD AUTO: 0.7 %
BUN SERPL-MCNC: 40 MG/DL (ref 8–23)
CALCIUM SERPL-MCNC: 9.4 MG/DL (ref 8.5–10.5)
CHLORIDE SERPL-SCNC: 104 MEQ/L (ref 98–111)
CO2 SERPL-SCNC: 26 MEQ/L (ref 22–29)
CREAT SERPL-MCNC: 1.4 MG/DL (ref 0.7–1.2)
DEPRECATED RDW RBC AUTO: 45 FL (ref 35–45)
ECHO BSA: 2.4 M2
EKG Q-T INTERVAL: 436 MS
EKG QRS DURATION: 154 MS
EKG QTC CALCULATION (BAZETT): 545 MS
EKG R AXIS: 143 DEGREES
EKG T AXIS: -23 DEGREES
EKG VENTRICULAR RATE: 94 BPM
EOSINOPHIL NFR BLD AUTO: 2.2 %
EOSINOPHILS ABSOLUTE: 0.2 THOU/MM3 (ref 0–0.4)
ERYTHROCYTE [DISTWIDTH] IN BLOOD BY AUTOMATED COUNT: 14.1 % (ref 11.5–14.5)
GFR SERPL CREATININE-BSD FRML MDRD: 53 ML/MIN/1.73M2
GLUCOSE SERPL-MCNC: 99 MG/DL (ref 74–109)
HCT VFR BLD AUTO: 60.3 % (ref 42–52)
HGB BLD-MCNC: 19.3 GM/DL (ref 14–18)
IAT IGG-SP REAG SERPL QL: NORMAL
IMM GRANULOCYTES # BLD AUTO: 0.01 THOU/MM3 (ref 0–0.07)
IMM GRANULOCYTES NFR BLD AUTO: 0.1 %
INR PPP: 1.16 (ref 0.85–1.13)
LYMPHOCYTES ABSOLUTE: 1.9 THOU/MM3 (ref 1–4.8)
LYMPHOCYTES NFR BLD AUTO: 25.6 %
MCH RBC QN AUTO: 29 PG (ref 26–33)
MCHC RBC AUTO-ENTMCNC: 32 GM/DL (ref 32.2–35.5)
MCV RBC AUTO: 90.5 FL (ref 80–94)
MONOCYTES ABSOLUTE: 0.7 THOU/MM3 (ref 0.4–1.3)
MONOCYTES NFR BLD AUTO: 9.7 %
NEUTROPHILS ABSOLUTE: 4.6 THOU/MM3 (ref 1.8–7.7)
NEUTROPHILS NFR BLD AUTO: 61.7 %
NRBC BLD AUTO-RTO: 0 /100 WBC
NT-PROBNP SERPL IA-MCNC: 431 PG/ML (ref 0–124)
PLATELET # BLD AUTO: 198 THOU/MM3 (ref 130–400)
PMV BLD AUTO: 11 FL (ref 9.4–12.4)
POTASSIUM SERPL-SCNC: 4.4 MEQ/L (ref 3.5–5.2)
PROTHROMBIN TIME: 13.6 SECONDS (ref 10–13.5)
RBC # BLD AUTO: 6.66 MILL/MM3 (ref 4.7–6.1)
RH BLD: NORMAL
SODIUM SERPL-SCNC: 142 MEQ/L (ref 135–145)
WBC # BLD AUTO: 7.4 THOU/MM3 (ref 4.8–10.8)

## 2025-08-31 PROCEDURE — B2111ZZ FLUOROSCOPY OF MULTIPLE CORONARY ARTERIES USING LOW OSMOLAR CONTRAST: ICD-10-PCS | Performed by: INTERNAL MEDICINE

## 2025-08-31 PROCEDURE — 99232 SBSQ HOSP IP/OBS MODERATE 35: CPT | Performed by: INTERNAL MEDICINE

## 2025-08-31 PROCEDURE — 85730 THROMBOPLASTIN TIME PARTIAL: CPT

## 2025-08-31 PROCEDURE — 6360000004 HC RX CONTRAST MEDICATION: Performed by: INTERNAL MEDICINE

## 2025-08-31 PROCEDURE — 99152 MOD SED SAME PHYS/QHP 5/>YRS: CPT | Performed by: INTERNAL MEDICINE

## 2025-08-31 PROCEDURE — 6370000000 HC RX 637 (ALT 250 FOR IP): Performed by: NURSE PRACTITIONER

## 2025-08-31 PROCEDURE — 85025 COMPLETE CBC W/AUTO DIFF WBC: CPT

## 2025-08-31 PROCEDURE — C1894 INTRO/SHEATH, NON-LASER: HCPCS | Performed by: INTERNAL MEDICINE

## 2025-08-31 PROCEDURE — 36415 COLL VENOUS BLD VENIPUNCTURE: CPT

## 2025-08-31 PROCEDURE — 93010 ELECTROCARDIOGRAM REPORT: CPT | Performed by: INTERNAL MEDICINE

## 2025-08-31 PROCEDURE — 93458 L HRT ARTERY/VENTRICLE ANGIO: CPT | Performed by: INTERNAL MEDICINE

## 2025-08-31 PROCEDURE — 6370000000 HC RX 637 (ALT 250 FOR IP): Performed by: STUDENT IN AN ORGANIZED HEALTH CARE EDUCATION/TRAINING PROGRAM

## 2025-08-31 PROCEDURE — 86901 BLOOD TYPING SEROLOGIC RH(D): CPT

## 2025-08-31 PROCEDURE — 2500000003 HC RX 250 WO HCPCS: Performed by: INTERNAL MEDICINE

## 2025-08-31 PROCEDURE — 2500000003 HC RX 250 WO HCPCS: Performed by: NURSE PRACTITIONER

## 2025-08-31 PROCEDURE — C1769 GUIDE WIRE: HCPCS | Performed by: INTERNAL MEDICINE

## 2025-08-31 PROCEDURE — 6360000002 HC RX W HCPCS: Performed by: INTERNAL MEDICINE

## 2025-08-31 PROCEDURE — 85610 PROTHROMBIN TIME: CPT

## 2025-08-31 PROCEDURE — 86900 BLOOD TYPING SEROLOGIC ABO: CPT

## 2025-08-31 PROCEDURE — 86885 COOMBS TEST INDIRECT QUAL: CPT

## 2025-08-31 PROCEDURE — 4A023N7 MEASUREMENT OF CARDIAC SAMPLING AND PRESSURE, LEFT HEART, PERCUTANEOUS APPROACH: ICD-10-PCS | Performed by: INTERNAL MEDICINE

## 2025-08-31 PROCEDURE — 93005 ELECTROCARDIOGRAM TRACING: CPT | Performed by: NURSE PRACTITIONER

## 2025-08-31 PROCEDURE — 6370000000 HC RX 637 (ALT 250 FOR IP): Performed by: INTERNAL MEDICINE

## 2025-08-31 PROCEDURE — 1200000003 HC TELEMETRY R&B

## 2025-08-31 PROCEDURE — 2709999900 HC NON-CHARGEABLE SUPPLY: Performed by: INTERNAL MEDICINE

## 2025-08-31 PROCEDURE — 99233 SBSQ HOSP IP/OBS HIGH 50: CPT | Performed by: NURSE PRACTITIONER

## 2025-08-31 PROCEDURE — 6360000002 HC RX W HCPCS: Performed by: NURSE PRACTITIONER

## 2025-08-31 PROCEDURE — 80048 BASIC METABOLIC PNL TOTAL CA: CPT

## 2025-08-31 RX ORDER — SODIUM CHLORIDE 9 MG/ML
INJECTION, SOLUTION INTRAVENOUS PRN
Status: DISCONTINUED | OUTPATIENT
Start: 2025-08-31 | End: 2025-09-01 | Stop reason: HOSPADM

## 2025-08-31 RX ORDER — FENTANYL CITRATE 50 UG/ML
INJECTION, SOLUTION INTRAMUSCULAR; INTRAVENOUS PRN
Status: DISCONTINUED | OUTPATIENT
Start: 2025-08-31 | End: 2025-08-31 | Stop reason: HOSPADM

## 2025-08-31 RX ORDER — MIDAZOLAM HYDROCHLORIDE 1 MG/ML
INJECTION, SOLUTION INTRAMUSCULAR; INTRAVENOUS PRN
Status: DISCONTINUED | OUTPATIENT
Start: 2025-08-31 | End: 2025-08-31 | Stop reason: HOSPADM

## 2025-08-31 RX ORDER — SODIUM CHLORIDE 9 MG/ML
INJECTION, SOLUTION INTRAVENOUS CONTINUOUS
Status: DISCONTINUED | OUTPATIENT
Start: 2025-08-31 | End: 2025-08-31

## 2025-08-31 RX ORDER — SODIUM CHLORIDE 0.9 % (FLUSH) 0.9 %
5-40 SYRINGE (ML) INJECTION PRN
Status: DISCONTINUED | OUTPATIENT
Start: 2025-08-31 | End: 2025-09-01 | Stop reason: HOSPADM

## 2025-08-31 RX ORDER — SODIUM CHLORIDE 9 MG/ML
INJECTION, SOLUTION INTRAVENOUS CONTINUOUS
Status: DISCONTINUED | OUTPATIENT
Start: 2025-08-31 | End: 2025-08-31 | Stop reason: HOSPADM

## 2025-08-31 RX ORDER — NITROGLYCERIN 0.4 MG/1
0.4 TABLET SUBLINGUAL EVERY 5 MIN PRN
Status: DISCONTINUED | OUTPATIENT
Start: 2025-08-31 | End: 2025-08-31 | Stop reason: HOSPADM

## 2025-08-31 RX ORDER — SODIUM CHLORIDE 0.9 % (FLUSH) 0.9 %
5-40 SYRINGE (ML) INJECTION EVERY 12 HOURS SCHEDULED
Status: DISCONTINUED | OUTPATIENT
Start: 2025-08-31 | End: 2025-08-31 | Stop reason: HOSPADM

## 2025-08-31 RX ORDER — SODIUM CHLORIDE 0.9 % (FLUSH) 0.9 %
5-40 SYRINGE (ML) INJECTION EVERY 12 HOURS SCHEDULED
Status: DISCONTINUED | OUTPATIENT
Start: 2025-08-31 | End: 2025-09-01 | Stop reason: HOSPADM

## 2025-08-31 RX ORDER — IOPAMIDOL 755 MG/ML
INJECTION, SOLUTION INTRAVASCULAR PRN
Status: DISCONTINUED | OUTPATIENT
Start: 2025-08-31 | End: 2025-08-31 | Stop reason: HOSPADM

## 2025-08-31 RX ORDER — ACETAMINOPHEN 325 MG/1
650 TABLET ORAL EVERY 4 HOURS PRN
Status: DISCONTINUED | OUTPATIENT
Start: 2025-08-31 | End: 2025-09-01 | Stop reason: HOSPADM

## 2025-08-31 RX ORDER — ASPIRIN 325 MG
325 TABLET ORAL ONCE
Status: COMPLETED | OUTPATIENT
Start: 2025-08-31 | End: 2025-08-31

## 2025-08-31 RX ORDER — HYDROCORTISONE SODIUM SUCCINATE 100 MG/2ML
200 INJECTION INTRAMUSCULAR; INTRAVENOUS ONCE
Status: DISCONTINUED | OUTPATIENT
Start: 2025-08-31 | End: 2025-08-31

## 2025-08-31 RX ORDER — ATROPINE SULFATE 0.4 MG/ML
0.5 INJECTION, SOLUTION INTRAVENOUS
Status: DISCONTINUED | OUTPATIENT
Start: 2025-08-31 | End: 2025-09-01 | Stop reason: HOSPADM

## 2025-08-31 RX ORDER — SODIUM CHLORIDE 9 MG/ML
INJECTION, SOLUTION INTRAVENOUS CONTINUOUS
Status: DISCONTINUED | OUTPATIENT
Start: 2025-08-31 | End: 2025-09-01 | Stop reason: HOSPADM

## 2025-08-31 RX ORDER — DIPHENHYDRAMINE HYDROCHLORIDE 50 MG/ML
50 INJECTION, SOLUTION INTRAMUSCULAR; INTRAVENOUS ONCE
Status: DISCONTINUED | OUTPATIENT
Start: 2025-08-31 | End: 2025-08-31

## 2025-08-31 RX ORDER — SODIUM CHLORIDE 0.9 % (FLUSH) 0.9 %
5-40 SYRINGE (ML) INJECTION PRN
Status: DISCONTINUED | OUTPATIENT
Start: 2025-08-31 | End: 2025-08-31 | Stop reason: HOSPADM

## 2025-08-31 RX ADMIN — ASPIRIN 81 MG: 81 TABLET, CHEWABLE ORAL at 07:46

## 2025-08-31 RX ADMIN — BISACODYL 5 MG: 5 TABLET, COATED ORAL at 21:33

## 2025-08-31 RX ADMIN — SODIUM CHLORIDE, PRESERVATIVE FREE 10 ML: 5 INJECTION INTRAVENOUS at 21:34

## 2025-08-31 RX ADMIN — LEVETIRACETAM 750 MG: 500 TABLET, FILM COATED ORAL at 21:33

## 2025-08-31 RX ADMIN — SODIUM CHLORIDE, PRESERVATIVE FREE 10 ML: 5 INJECTION INTRAVENOUS at 21:50

## 2025-08-31 RX ADMIN — LEVETIRACETAM 750 MG: 500 TABLET, FILM COATED ORAL at 07:45

## 2025-08-31 RX ADMIN — ENOXAPARIN SODIUM 30 MG: 100 INJECTION SUBCUTANEOUS at 07:44

## 2025-08-31 RX ADMIN — ACETAMINOPHEN 650 MG: 325 TABLET ORAL at 21:33

## 2025-08-31 RX ADMIN — DILTIAZEM HYDROCHLORIDE 120 MG: 120 CAPSULE, EXTENDED RELEASE ORAL at 21:37

## 2025-08-31 RX ADMIN — ASPIRIN 325 MG: 325 TABLET ORAL at 07:46

## 2025-08-31 RX ADMIN — ROSUVASTATIN CALCIUM 20 MG: 20 TABLET, FILM COATED ORAL at 07:46

## 2025-08-31 RX ADMIN — METOPROLOL SUCCINATE 50 MG: 50 TABLET, EXTENDED RELEASE ORAL at 07:45

## 2025-08-31 RX ADMIN — METOPROLOL SUCCINATE 50 MG: 50 TABLET, EXTENDED RELEASE ORAL at 21:33

## 2025-08-31 ASSESSMENT — PAIN SCALES - GENERAL
PAINLEVEL_OUTOF10: 0

## 2025-08-31 ASSESSMENT — PAIN - FUNCTIONAL ASSESSMENT: PAIN_FUNCTIONAL_ASSESSMENT: 0-10

## 2025-09-01 VITALS
HEIGHT: 73 IN | DIASTOLIC BLOOD PRESSURE: 90 MMHG | TEMPERATURE: 97.3 F | WEIGHT: 246.69 LBS | SYSTOLIC BLOOD PRESSURE: 129 MMHG | HEART RATE: 87 BPM | RESPIRATION RATE: 20 BRPM | BODY MASS INDEX: 32.7 KG/M2 | OXYGEN SATURATION: 95 %

## 2025-09-01 PROBLEM — I25.5 ISCHEMIC CARDIOMYOPATHY: Status: ACTIVE | Noted: 2025-09-01

## 2025-09-01 LAB
ANION GAP SERPL CALC-SCNC: 11 MEQ/L (ref 8–16)
BUN SERPL-MCNC: 36 MG/DL (ref 8–23)
CALCIUM SERPL-MCNC: 9.4 MG/DL (ref 8.5–10.5)
CHLORIDE SERPL-SCNC: 106 MEQ/L (ref 98–111)
CO2 SERPL-SCNC: 25 MEQ/L (ref 22–29)
CREAT SERPL-MCNC: 1.3 MG/DL (ref 0.7–1.2)
GFR SERPL CREATININE-BSD FRML MDRD: 58 ML/MIN/1.73M2
GLUCOSE SERPL-MCNC: 95 MG/DL (ref 74–109)
POTASSIUM SERPL-SCNC: 4.5 MEQ/L (ref 3.5–5.2)
SODIUM SERPL-SCNC: 142 MEQ/L (ref 135–145)

## 2025-09-01 PROCEDURE — 80048 BASIC METABOLIC PNL TOTAL CA: CPT

## 2025-09-01 PROCEDURE — 99239 HOSP IP/OBS DSCHRG MGMT >30: CPT | Performed by: NURSE PRACTITIONER

## 2025-09-01 PROCEDURE — 99232 SBSQ HOSP IP/OBS MODERATE 35: CPT | Performed by: INTERNAL MEDICINE

## 2025-09-01 PROCEDURE — 99232 SBSQ HOSP IP/OBS MODERATE 35: CPT | Performed by: STUDENT IN AN ORGANIZED HEALTH CARE EDUCATION/TRAINING PROGRAM

## 2025-09-01 PROCEDURE — 2500000003 HC RX 250 WO HCPCS: Performed by: INTERNAL MEDICINE

## 2025-09-01 PROCEDURE — 6360000002 HC RX W HCPCS: Performed by: NURSE PRACTITIONER

## 2025-09-01 PROCEDURE — 36415 COLL VENOUS BLD VENIPUNCTURE: CPT

## 2025-09-01 PROCEDURE — 6370000000 HC RX 637 (ALT 250 FOR IP): Performed by: STUDENT IN AN ORGANIZED HEALTH CARE EDUCATION/TRAINING PROGRAM

## 2025-09-01 PROCEDURE — 2500000003 HC RX 250 WO HCPCS: Performed by: NURSE PRACTITIONER

## 2025-09-01 PROCEDURE — 6370000000 HC RX 637 (ALT 250 FOR IP): Performed by: NURSE PRACTITIONER

## 2025-09-01 RX ORDER — SACUBITRIL AND VALSARTAN 24; 26 MG/1; MG/1
1 TABLET ORAL 2 TIMES DAILY
Status: DISCONTINUED | OUTPATIENT
Start: 2025-09-01 | End: 2025-09-01 | Stop reason: HOSPADM

## 2025-09-01 RX ORDER — BUMETANIDE 1 MG/1
1 TABLET ORAL DAILY
Status: DISCONTINUED | OUTPATIENT
Start: 2025-09-01 | End: 2025-09-01 | Stop reason: HOSPADM

## 2025-09-01 RX ORDER — BUMETANIDE 1 MG/1
1 TABLET ORAL DAILY
Qty: 30 TABLET | Refills: 0 | Status: SHIPPED | OUTPATIENT
Start: 2025-09-01

## 2025-09-01 RX ORDER — METOPROLOL SUCCINATE 50 MG/1
50 TABLET, EXTENDED RELEASE ORAL 2 TIMES DAILY
Qty: 30 TABLET | Refills: 0 | Status: SHIPPED | OUTPATIENT
Start: 2025-09-01

## 2025-09-01 RX ORDER — SACUBITRIL AND VALSARTAN 24; 26 MG/1; MG/1
1 TABLET ORAL 2 TIMES DAILY
Qty: 60 TABLET | Refills: 0 | Status: SHIPPED | OUTPATIENT
Start: 2025-09-01

## 2025-09-01 RX ADMIN — EMPAGLIFLOZIN 10 MG: 10 TABLET, FILM COATED ORAL at 11:34

## 2025-09-01 RX ADMIN — ROSUVASTATIN CALCIUM 20 MG: 20 TABLET, FILM COATED ORAL at 09:04

## 2025-09-01 RX ADMIN — SACUBITRIL AND VALSARTAN 1 TABLET: 24; 26 TABLET, FILM COATED ORAL at 11:34

## 2025-09-01 RX ADMIN — SODIUM CHLORIDE, PRESERVATIVE FREE 10 ML: 5 INJECTION INTRAVENOUS at 09:05

## 2025-09-01 RX ADMIN — ASPIRIN 81 MG: 81 TABLET, CHEWABLE ORAL at 09:04

## 2025-09-01 RX ADMIN — BUMETANIDE 1 MG: 1 TABLET ORAL at 11:34

## 2025-09-01 RX ADMIN — METOPROLOL SUCCINATE 50 MG: 50 TABLET, EXTENDED RELEASE ORAL at 09:04

## 2025-09-01 RX ADMIN — ENOXAPARIN SODIUM 30 MG: 100 INJECTION SUBCUTANEOUS at 09:04

## 2025-09-01 RX ADMIN — SODIUM CHLORIDE, PRESERVATIVE FREE 10 ML: 5 INJECTION INTRAVENOUS at 09:04

## 2025-09-01 RX ADMIN — LEVETIRACETAM 750 MG: 500 TABLET, FILM COATED ORAL at 09:04

## 2025-09-01 ASSESSMENT — PAIN SCALES - GENERAL
PAINLEVEL_OUTOF10: 0
PAINLEVEL_OUTOF10: 0

## 2025-09-02 ENCOUNTER — TELEPHONE (OUTPATIENT)
Dept: CARDIOLOGY CLINIC | Age: 75
End: 2025-09-02

## 2025-09-04 ENCOUNTER — OFFICE VISIT (OUTPATIENT)
Dept: FAMILY MEDICINE CLINIC | Age: 75
End: 2025-09-04
Payer: MEDICARE

## 2025-09-04 VITALS
TEMPERATURE: 97.7 F | DIASTOLIC BLOOD PRESSURE: 82 MMHG | OXYGEN SATURATION: 95 % | RESPIRATION RATE: 20 BRPM | HEIGHT: 73 IN | BODY MASS INDEX: 32.34 KG/M2 | WEIGHT: 244 LBS | HEART RATE: 52 BPM | SYSTOLIC BLOOD PRESSURE: 102 MMHG

## 2025-09-04 DIAGNOSIS — N18.30 TYPE 2 DIABETES MELLITUS WITH STAGE 3 CHRONIC KIDNEY DISEASE, WITHOUT LONG-TERM CURRENT USE OF INSULIN, UNSPECIFIED WHETHER STAGE 3A OR 3B CKD (HCC): Primary | ICD-10-CM

## 2025-09-04 DIAGNOSIS — E11.22 TYPE 2 DIABETES MELLITUS WITH STAGE 3 CHRONIC KIDNEY DISEASE, WITHOUT LONG-TERM CURRENT USE OF INSULIN, UNSPECIFIED WHETHER STAGE 3A OR 3B CKD (HCC): Primary | ICD-10-CM

## 2025-09-04 DIAGNOSIS — R56.9 SEIZURE-LIKE ACTIVITY (HCC): ICD-10-CM

## 2025-09-04 DIAGNOSIS — N18.30 STAGE 3 CHRONIC KIDNEY DISEASE, UNSPECIFIED WHETHER STAGE 3A OR 3B CKD (HCC): ICD-10-CM

## 2025-09-04 DIAGNOSIS — I50.33 DIASTOLIC CHF, ACUTE ON CHRONIC (HCC): ICD-10-CM

## 2025-09-04 DIAGNOSIS — N13.8 BPH WITH OBSTRUCTION/LOWER URINARY TRACT SYMPTOMS: ICD-10-CM

## 2025-09-04 DIAGNOSIS — N40.1 BPH WITH OBSTRUCTION/LOWER URINARY TRACT SYMPTOMS: ICD-10-CM

## 2025-09-04 DIAGNOSIS — I50.23 ACUTE ON CHRONIC SYSTOLIC CONGESTIVE HEART FAILURE (HCC): ICD-10-CM

## 2025-09-04 DIAGNOSIS — I48.91 ATRIAL FIBRILLATION, UNSPECIFIED TYPE (HCC): ICD-10-CM

## 2025-09-04 DIAGNOSIS — E66.09 CLASS 1 OBESITY DUE TO EXCESS CALORIES WITH SERIOUS COMORBIDITY AND BODY MASS INDEX (BMI) OF 31.0 TO 31.9 IN ADULT: ICD-10-CM

## 2025-09-04 DIAGNOSIS — D58.2 ELEVATED HEMOGLOBIN: ICD-10-CM

## 2025-09-04 DIAGNOSIS — I10 ESSENTIAL HYPERTENSION: ICD-10-CM

## 2025-09-04 DIAGNOSIS — I25.119 ATHEROSCLEROSIS OF NATIVE CORONARY ARTERY OF NATIVE HEART WITH ANGINA PECTORIS: ICD-10-CM

## 2025-09-04 DIAGNOSIS — E66.811 CLASS 1 OBESITY DUE TO EXCESS CALORIES WITH SERIOUS COMORBIDITY AND BODY MASS INDEX (BMI) OF 31.0 TO 31.9 IN ADULT: ICD-10-CM

## 2025-09-04 DIAGNOSIS — E78.5 HYPERLIPIDEMIA, UNSPECIFIED HYPERLIPIDEMIA TYPE: Chronic | ICD-10-CM

## 2025-09-04 DIAGNOSIS — I25.10 CORONARY ARTERY DISEASE INVOLVING NATIVE CORONARY ARTERY OF NATIVE HEART WITHOUT ANGINA PECTORIS: ICD-10-CM

## 2025-09-04 PROBLEM — I48.19 PERSISTENT ATRIAL FIBRILLATION (HCC): Status: RESOLVED | Noted: 2022-07-19 | Resolved: 2025-09-04

## 2025-09-04 PROBLEM — R35.0 BENIGN PROSTATIC HYPERPLASIA WITH URINARY FREQUENCY: Status: RESOLVED | Noted: 2019-10-21 | Resolved: 2025-09-04

## 2025-09-04 PROBLEM — I50.9 ACUTE EXACERBATION OF CHRONIC HEART FAILURE (HCC): Status: RESOLVED | Noted: 2025-08-29 | Resolved: 2025-09-04

## 2025-09-04 PROBLEM — I20.9 ANGINA PECTORIS, UNSPECIFIED: Status: RESOLVED | Noted: 2023-08-09 | Resolved: 2025-09-04

## 2025-09-04 PROBLEM — R42 DIZZINESS: Status: RESOLVED | Noted: 2018-07-17 | Resolved: 2025-09-04

## 2025-09-04 PROBLEM — I50.9 CHRONIC CONGESTIVE HEART FAILURE (HCC): Status: RESOLVED | Noted: 2024-07-20 | Resolved: 2025-09-04

## 2025-09-04 PROBLEM — I48.21 PERMANENT ATRIAL FIBRILLATION (HCC): Status: RESOLVED | Noted: 2023-12-06 | Resolved: 2025-09-04

## 2025-09-04 PROBLEM — R04.0 POSTERIOR EPISTAXIS: Status: RESOLVED | Noted: 2022-06-03 | Resolved: 2025-09-04

## 2025-09-04 PROBLEM — I48.0 PAROXYSMAL ATRIAL FIBRILLATION (HCC): Status: RESOLVED | Noted: 2017-03-27 | Resolved: 2025-09-04

## 2025-09-04 LAB — HBA1C MFR BLD: 6 % (ref 4.3–5.7)

## 2025-09-04 PROCEDURE — 3044F HG A1C LEVEL LT 7.0%: CPT | Performed by: NURSE PRACTITIONER

## 2025-09-04 PROCEDURE — 3017F COLORECTAL CA SCREEN DOC REV: CPT | Performed by: NURSE PRACTITIONER

## 2025-09-04 PROCEDURE — 1159F MED LIST DOCD IN RCRD: CPT | Performed by: NURSE PRACTITIONER

## 2025-09-04 PROCEDURE — 1160F RVW MEDS BY RX/DR IN RCRD: CPT | Performed by: NURSE PRACTITIONER

## 2025-09-04 PROCEDURE — 1036F TOBACCO NON-USER: CPT | Performed by: NURSE PRACTITIONER

## 2025-09-04 PROCEDURE — 3074F SYST BP LT 130 MM HG: CPT | Performed by: NURSE PRACTITIONER

## 2025-09-04 PROCEDURE — 2022F DILAT RTA XM EVC RTNOPTHY: CPT | Performed by: NURSE PRACTITIONER

## 2025-09-04 PROCEDURE — 99204 OFFICE O/P NEW MOD 45 MIN: CPT | Performed by: NURSE PRACTITIONER

## 2025-09-04 PROCEDURE — 1111F DSCHRG MED/CURRENT MED MERGE: CPT | Performed by: NURSE PRACTITIONER

## 2025-09-04 PROCEDURE — G8427 DOCREV CUR MEDS BY ELIG CLIN: HCPCS | Performed by: NURSE PRACTITIONER

## 2025-09-04 PROCEDURE — G8417 CALC BMI ABV UP PARAM F/U: HCPCS | Performed by: NURSE PRACTITIONER

## 2025-09-04 PROCEDURE — 3079F DIAST BP 80-89 MM HG: CPT | Performed by: NURSE PRACTITIONER

## 2025-09-04 PROCEDURE — 1123F ACP DISCUSS/DSCN MKR DOCD: CPT | Performed by: NURSE PRACTITIONER

## 2025-09-04 RX ORDER — ROSUVASTATIN CALCIUM 20 MG/1
20 TABLET, COATED ORAL DAILY
Qty: 90 TABLET | Refills: 4 | Status: SHIPPED | OUTPATIENT
Start: 2025-09-04

## 2025-09-04 RX ORDER — NITROGLYCERIN 0.4 MG/1
0.4 TABLET SUBLINGUAL EVERY 5 MIN PRN
Qty: 25 TABLET | Refills: 0 | Status: SHIPPED | OUTPATIENT
Start: 2025-09-04 | End: 2025-11-03

## 2025-09-04 ASSESSMENT — PATIENT HEALTH QUESTIONNAIRE - PHQ9
SUM OF ALL RESPONSES TO PHQ QUESTIONS 1-9: 6
SUM OF ALL RESPONSES TO PHQ QUESTIONS 1-9: 6
1. LITTLE INTEREST OR PLEASURE IN DOING THINGS: NEARLY EVERY DAY
SUM OF ALL RESPONSES TO PHQ QUESTIONS 1-9: 6
7. TROUBLE CONCENTRATING ON THINGS, SUCH AS READING THE NEWSPAPER OR WATCHING TELEVISION: NOT AT ALL
8. MOVING OR SPEAKING SO SLOWLY THAT OTHER PEOPLE COULD HAVE NOTICED. OR THE OPPOSITE, BEING SO FIGETY OR RESTLESS THAT YOU HAVE BEEN MOVING AROUND A LOT MORE THAN USUAL: NOT AT ALL
5. POOR APPETITE OR OVEREATING: NOT AT ALL
9. THOUGHTS THAT YOU WOULD BE BETTER OFF DEAD, OR OF HURTING YOURSELF: NOT AT ALL
SUM OF ALL RESPONSES TO PHQ QUESTIONS 1-9: 6
2. FEELING DOWN, DEPRESSED OR HOPELESS: NEARLY EVERY DAY
3. TROUBLE FALLING OR STAYING ASLEEP: NOT AT ALL
6. FEELING BAD ABOUT YOURSELF - OR THAT YOU ARE A FAILURE OR HAVE LET YOURSELF OR YOUR FAMILY DOWN: NOT AT ALL
4. FEELING TIRED OR HAVING LITTLE ENERGY: NOT AT ALL
10. IF YOU CHECKED OFF ANY PROBLEMS, HOW DIFFICULT HAVE THESE PROBLEMS MADE IT FOR YOU TO DO YOUR WORK, TAKE CARE OF THINGS AT HOME, OR GET ALONG WITH OTHER PEOPLE: NOT DIFFICULT AT ALL

## 2025-09-04 ASSESSMENT — ENCOUNTER SYMPTOMS
PHOTOPHOBIA: 0
SHORTNESS OF BREATH: 1
GASTROINTESTINAL NEGATIVE: 1

## (undated) DEVICE — INSUFFLATION NEEDLE TO ESTABLISH PNEUMOPERITONEUM.: Brand: INSUFFLATION NEEDLE

## (undated) DEVICE — COVER EQUIP STEEP TREND EZ CLN UP WTRPRF DISP FOR STD SZ

## (undated) DEVICE — GLOVE ORANGE PI 7 1/2   MSG9075

## (undated) DEVICE — SUTURE V-LOC 180 SZ 3-0 L6IN ABSRB GRN V-20 L26MM 1/2 CIR VLOCL0604

## (undated) DEVICE — TOWEL,OR,DSP,ST,BLUE,STD,4/PK,20PK/CS: Brand: MEDLINE

## (undated) DEVICE — 3M™ IOBAN™ 2 ANTIMICROBIAL INCISE DRAPE 6650EZ: Brand: IOBAN™ 2

## (undated) DEVICE — TIP COVER ACCESSORY

## (undated) DEVICE — AGENT HEMSTAT W2XL14IN OXIDIZED REGENERATED CELOS ABSRB FOR

## (undated) DEVICE — GUIDEWIRE VASC L260CM DIA0.035IN RAD 3MM J TIP L7CM PTFE

## (undated) DEVICE — CORD,CAUTERY,BIPOLAR,STERILE: Brand: MEDLINE

## (undated) DEVICE — PACK PROCEDURE SURG SET UP SRMC

## (undated) DEVICE — SOLUTION IRRIG 1000ML 0.9% SOD CHL USP POUR PLAS BTL

## (undated) DEVICE — JELLY,LUBE,STERILE,FLIP TOP,TUBE,2-OZ: Brand: MEDLINE

## (undated) DEVICE — GLOVE ORANGE PI 7   MSG9070

## (undated) DEVICE — DRAPE KIT RAMAPR RADIATION SHIELD

## (undated) DEVICE — TUBING, SUCTION, 1/4" X 20', STRAIGHT: Brand: MEDLINE INDUSTRIES, INC.

## (undated) DEVICE — ADHESIVE SKIN CLSR 0.7ML TOP DERMBND ADV

## (undated) DEVICE — GAUZE,SPONGE,8"X4",12PLY,XRAY,STRL,LF: Brand: MEDLINE

## (undated) DEVICE — GLOVE SURG SZ 75 L12IN FNGR THK94MIL WHT POLYMER LTX BEAD

## (undated) DEVICE — CODMAN® SURGICAL PATTIES 1/2" X 1/2" (1.27CM X 1.27CM): Brand: CODMAN®

## (undated) DEVICE — CATHETER DIAG 5FR L100CM LUMN ID0.047IN JR4 CRV 0 SIDE H

## (undated) DEVICE — SUTURE VCRL SZ 2-0 L27IN ABSRB UD L26MM SH 1/2 CIR J417H

## (undated) DEVICE — CATHETER,FOLEY,SILI-ELAST,LTX,20FR,30ML: Brand: MEDLINE

## (undated) DEVICE — SYRINGE MED 10ML LUERLOCK TIP W/O SFTY DISP

## (undated) DEVICE — BASIC SINGLE BASIN BTC-LF: Brand: MEDLINE INDUSTRIES, INC.

## (undated) DEVICE — CANNULA SEAL

## (undated) DEVICE — CONTAINER,SPECIMEN,PNEU TUBE,4OZ,OR STRL: Brand: MEDLINE

## (undated) DEVICE — CLIP LIG M TI 6 SIL HNDL FOR OPN AND ENDOSCP SGL APPL

## (undated) DEVICE — BLADE CLIPPER GEN PURP NS

## (undated) DEVICE — DRAINBAG,ANTI-REFLUX TOWER,L/F,2000ML,LL: Brand: MEDLINE

## (undated) DEVICE — ELECTRO LUBE IS A SINGLE PATIENT USE DEVICE THAT IS INTENDED TO BE USED ON ELECTROSURGICAL ELECTRODES TO REDUCE STICKING.: Brand: KEY SURGICAL ELECTRO LUBE

## (undated) DEVICE — KIT ANGIO W/ AT P65 PREM HND CTRL FOR CNTRST DEL ANGIOTOUCH

## (undated) DEVICE — AIRSEAL 12 MM ACCESS PORT AND PALM GRIP OBTURATOR WITH BLADELESS OPTICAL TIP, 120 MM LENGTH: Brand: AIRSEAL

## (undated) DEVICE — SOLUTION SCRB 4OZ 10% PVP I POVIDONE IOD TOP PAINT EXIDINE

## (undated) DEVICE — KIT MFLD ISOLATN NACL CNTRST PRT TBNG SPIK W/ PRSS TRNSDUC

## (undated) DEVICE — GOWN,SIRUS,NON REINFRCD,LARGE,SET IN SL: Brand: MEDLINE

## (undated) DEVICE — SYRINGE CATH TIP 50ML

## (undated) DEVICE — AGENT HEMSTAT 3GM OXIDIZED REGENERATED CELOS ABSRB FOR CONT (ORDER MULTIPLES OF 5EA)

## (undated) DEVICE — INTENDED FOR TISSUE SEPARATION, AND OTHER PROCEDURES THAT REQUIRE A SHARP SURGICAL BLADE TO PUNCTURE OR CUT.: Brand: BARD-PARKER ® CARBON RIB-BACK BLADES

## (undated) DEVICE — CANNULA NSL W/ O2 DEL AD 4 M

## (undated) DEVICE — Z DISCONTINUED BY MEDLINE USE 2711682 TRAY SKIN PREP DRY W/ PREM GLV

## (undated) DEVICE — KIT PROSTATE BIOPSY

## (undated) DEVICE — TRI-LUMEN FILTERED TUBE SET WITH ACTIVATED CHARCOAL FILTER: Brand: AIRSEAL

## (undated) DEVICE — SUTURE MCRYL SZ 3-0 L27IN ABSRB VLT L17MM RB-1 1/2 CIR Y305H

## (undated) DEVICE — DRAPE,ROBOTICS,STERILE: Brand: MEDLINE

## (undated) DEVICE — TISSUE RETRIEVAL SYSTEM: Brand: INZII RETRIEVAL SYSTEM

## (undated) DEVICE — BAND COMPR L24CM REG CLR PLAS HEMSTAT EXT HK AND LOOP RETEN

## (undated) DEVICE — GLOVE ORANGE PI 8 1/2   MSG9085

## (undated) DEVICE — APPLICATOR MEDICATED 26 CC SOLUTION HI LT ORNG CHLORAPREP

## (undated) DEVICE — SOLUTION SCRB 4OZ 4% CHG H2O AIDED FOR PREOPERATIVE SKIN

## (undated) DEVICE — SPONGE GZ W4XL4IN COT 12 PLY TYP VII WVN C FLD DSGN

## (undated) DEVICE — CATHETER DIAG 5FR L100CM LUMN ID0.047IN JL3.5 CRV 0 SIDE H

## (undated) DEVICE — MAX-CORE® DISPOSABLE CORE BIOPSY INSTRUMENT, 18G X 25CM: Brand: MAX-CORE

## (undated) DEVICE — TROCAR: Brand: KII FIOS FIRST ENTRY

## (undated) DEVICE — KIT,ANTI FOG,W/SPONGE & FLUID,SOFT PACK: Brand: MEDLINE

## (undated) DEVICE — SYRINGE MED 30ML STD CLR PLAS LUERLOCK TIP N CTRL DISP

## (undated) DEVICE — SURGICEL ENDOSCP APPL

## (undated) DEVICE — DRAPE SURG NEO W43.5XL60IN ABSRB REINF W18XL20IN FEN

## (undated) DEVICE — Device

## (undated) DEVICE — PUMP SUC IRR TBNG L10FT W/ HNDPC ASSEMB STRYKEFLOW 2

## (undated) DEVICE — SUTURE MCRYL SZ 4-0 L27IN ABSRB UD L19MM PS-2 1/2 CIR PRIM Y426H

## (undated) DEVICE — SUTURE DEV SZ 0 L6IN N ABSORBABLE

## (undated) DEVICE — BLADELESS OBTURATOR: Brand: WECK VISTA

## (undated) DEVICE — GOWN,SIRUS,NONRNF,SETINSLV,XL,20/CS: Brand: MEDLINE

## (undated) DEVICE — AGENT HEMOSTATIC SURGIFLOW MATRIX KIT W/THROMBIN

## (undated) DEVICE — COLUMN DRAPE

## (undated) DEVICE — GLOVE SURG SZ 6 THK91MIL LTX FREE SYN POLYISOPRENE ANTI

## (undated) DEVICE — SHEATH INTRO 6FR L10CM NDL 21GA L38MM DIL 0.021IN NIT FLPY

## (undated) DEVICE — PACK-MAJOR

## (undated) DEVICE — SOLUTION ANTIFOG VIS SYS CLEARIFY LAPSCP

## (undated) DEVICE — GLOVE SURG SZ 65 THK91MIL LTX FREE SYN POLYISOPRENE